# Patient Record
Sex: MALE | Race: AMERICAN INDIAN OR ALASKA NATIVE | NOT HISPANIC OR LATINO | Employment: OTHER | URBAN - METROPOLITAN AREA
[De-identification: names, ages, dates, MRNs, and addresses within clinical notes are randomized per-mention and may not be internally consistent; named-entity substitution may affect disease eponyms.]

---

## 2017-09-21 ENCOUNTER — APPOINTMENT (OUTPATIENT)
Dept: RADIOLOGY | Facility: MEDICAL CENTER | Age: 67
DRG: 638 | End: 2017-09-21
Attending: EMERGENCY MEDICINE
Payer: MEDICARE

## 2017-09-21 ENCOUNTER — APPOINTMENT (OUTPATIENT)
Dept: RADIOLOGY | Facility: MEDICAL CENTER | Age: 67
DRG: 638 | End: 2017-09-21
Attending: FAMILY MEDICINE
Payer: MEDICARE

## 2017-09-21 ENCOUNTER — RESOLUTE PROFESSIONAL BILLING HOSPITAL PROF FEE (OUTPATIENT)
Dept: HOSPITALIST | Facility: MEDICAL CENTER | Age: 67
End: 2017-09-21
Payer: MEDICARE

## 2017-09-21 ENCOUNTER — HOSPITAL ENCOUNTER (INPATIENT)
Facility: MEDICAL CENTER | Age: 67
LOS: 2 days | DRG: 638 | End: 2017-09-23
Attending: EMERGENCY MEDICINE | Admitting: FAMILY MEDICINE
Payer: MEDICARE

## 2017-09-21 DIAGNOSIS — R42 VERTIGO: ICD-10-CM

## 2017-09-21 PROBLEM — I63.9 STROKE (HCC): Status: ACTIVE | Noted: 2017-09-21

## 2017-09-21 LAB
ALBUMIN SERPL BCP-MCNC: 3.8 G/DL (ref 3.2–4.9)
ALBUMIN/GLOB SERPL: 1.1 G/DL
ALP SERPL-CCNC: 74 U/L (ref 30–99)
ALT SERPL-CCNC: 45 U/L (ref 2–50)
ANION GAP SERPL CALC-SCNC: 13 MMOL/L (ref 0–11.9)
APTT PPP: 24.3 SEC (ref 24.7–36)
AST SERPL-CCNC: 32 U/L (ref 12–45)
BASOPHILS # BLD AUTO: 0.5 % (ref 0–1.8)
BASOPHILS # BLD: 0.05 K/UL (ref 0–0.12)
BILIRUB SERPL-MCNC: 0.5 MG/DL (ref 0.1–1.5)
BUN SERPL-MCNC: 29 MG/DL (ref 8–22)
CALCIUM SERPL-MCNC: 8.8 MG/DL (ref 8.5–10.5)
CHLORIDE SERPL-SCNC: 100 MMOL/L (ref 96–112)
CO2 SERPL-SCNC: 21 MMOL/L (ref 20–33)
CREAT SERPL-MCNC: 1.54 MG/DL (ref 0.5–1.4)
EKG IMPRESSION: NORMAL
EOSINOPHIL # BLD AUTO: 0.13 K/UL (ref 0–0.51)
EOSINOPHIL NFR BLD: 1.2 % (ref 0–6.9)
ERYTHROCYTE [DISTWIDTH] IN BLOOD BY AUTOMATED COUNT: 40.3 FL (ref 35.9–50)
EST. AVERAGE GLUCOSE BLD GHB EST-MCNC: 214 MG/DL
GFR SERPL CREATININE-BSD FRML MDRD: 45 ML/MIN/1.73 M 2
GLOBULIN SER CALC-MCNC: 3.6 G/DL (ref 1.9–3.5)
GLUCOSE BLD-MCNC: 145 MG/DL (ref 65–99)
GLUCOSE SERPL-MCNC: 259 MG/DL (ref 65–99)
HBA1C MFR BLD: 9.1 % (ref 0–5.6)
HCT VFR BLD AUTO: 41.7 % (ref 42–52)
HGB BLD-MCNC: 14.8 G/DL (ref 14–18)
IMM GRANULOCYTES # BLD AUTO: 0.03 K/UL (ref 0–0.11)
IMM GRANULOCYTES NFR BLD AUTO: 0.3 % (ref 0–0.9)
INR PPP: 0.96 (ref 0.87–1.13)
LYMPHOCYTES # BLD AUTO: 1.26 K/UL (ref 1–4.8)
LYMPHOCYTES NFR BLD: 11.5 % (ref 22–41)
MCH RBC QN AUTO: 31.4 PG (ref 27–33)
MCHC RBC AUTO-ENTMCNC: 35.5 G/DL (ref 33.7–35.3)
MCV RBC AUTO: 88.5 FL (ref 81.4–97.8)
MONOCYTES # BLD AUTO: 0.6 K/UL (ref 0–0.85)
MONOCYTES NFR BLD AUTO: 5.5 % (ref 0–13.4)
NEUTROPHILS # BLD AUTO: 8.91 K/UL (ref 1.82–7.42)
NEUTROPHILS NFR BLD: 81 % (ref 44–72)
NRBC # BLD AUTO: 0.02 K/UL
NRBC BLD AUTO-RTO: 0.2 /100 WBC
PLATELET # BLD AUTO: 247 K/UL (ref 164–446)
PMV BLD AUTO: 11.5 FL (ref 9–12.9)
POTASSIUM SERPL-SCNC: 4.1 MMOL/L (ref 3.6–5.5)
PROT SERPL-MCNC: 7.4 G/DL (ref 6–8.2)
PROTHROMBIN TIME: 13.1 SEC (ref 12–14.6)
RBC # BLD AUTO: 4.71 M/UL (ref 4.7–6.1)
SODIUM SERPL-SCNC: 134 MMOL/L (ref 135–145)
TROPONIN I SERPL-MCNC: <0.01 NG/ML (ref 0–0.04)
TSH SERPL DL<=0.005 MIU/L-ACNC: 0.77 UIU/ML (ref 0.3–3.7)
WBC # BLD AUTO: 11 K/UL (ref 4.8–10.8)

## 2017-09-21 PROCEDURE — 80053 COMPREHEN METABOLIC PANEL: CPT

## 2017-09-21 PROCEDURE — 770020 HCHG ROOM/CARE - TELE (206)

## 2017-09-21 PROCEDURE — 99285 EMERGENCY DEPT VISIT HI MDM: CPT

## 2017-09-21 PROCEDURE — 71010 DX-CHEST-PORTABLE (1 VIEW): CPT

## 2017-09-21 PROCEDURE — 700102 HCHG RX REV CODE 250 W/ 637 OVERRIDE(OP): Performed by: FAMILY MEDICINE

## 2017-09-21 PROCEDURE — 93005 ELECTROCARDIOGRAM TRACING: CPT | Performed by: EMERGENCY MEDICINE

## 2017-09-21 PROCEDURE — 85730 THROMBOPLASTIN TIME PARTIAL: CPT

## 2017-09-21 PROCEDURE — 84484 ASSAY OF TROPONIN QUANT: CPT

## 2017-09-21 PROCEDURE — 700105 HCHG RX REV CODE 258: Performed by: FAMILY MEDICINE

## 2017-09-21 PROCEDURE — 70450 CT HEAD/BRAIN W/O DYE: CPT

## 2017-09-21 PROCEDURE — 82962 GLUCOSE BLOOD TEST: CPT | Mod: 91

## 2017-09-21 PROCEDURE — 84443 ASSAY THYROID STIM HORMONE: CPT

## 2017-09-21 PROCEDURE — 83036 HEMOGLOBIN GLYCOSYLATED A1C: CPT

## 2017-09-21 PROCEDURE — 700111 HCHG RX REV CODE 636 W/ 250 OVERRIDE (IP): Performed by: FAMILY MEDICINE

## 2017-09-21 PROCEDURE — 94760 N-INVAS EAR/PLS OXIMETRY 1: CPT

## 2017-09-21 PROCEDURE — 70551 MRI BRAIN STEM W/O DYE: CPT

## 2017-09-21 PROCEDURE — 99223 1ST HOSP IP/OBS HIGH 75: CPT | Mod: AI | Performed by: FAMILY MEDICINE

## 2017-09-21 PROCEDURE — A9270 NON-COVERED ITEM OR SERVICE: HCPCS | Performed by: FAMILY MEDICINE

## 2017-09-21 PROCEDURE — 93306 TTE W/DOPPLER COMPLETE: CPT | Mod: 26 | Performed by: INTERNAL MEDICINE

## 2017-09-21 PROCEDURE — 85025 COMPLETE CBC W/AUTO DIFF WBC: CPT

## 2017-09-21 PROCEDURE — 85610 PROTHROMBIN TIME: CPT

## 2017-09-21 RX ORDER — AMOXICILLIN 250 MG
2 CAPSULE ORAL 2 TIMES DAILY
Status: DISCONTINUED | OUTPATIENT
Start: 2017-09-21 | End: 2017-09-23 | Stop reason: HOSPADM

## 2017-09-21 RX ORDER — GLIMEPIRIDE 2 MG/1
2 TABLET ORAL EVERY MORNING
Status: ON HOLD | COMMUNITY
End: 2017-09-23

## 2017-09-21 RX ORDER — POLYETHYLENE GLYCOL 3350 17 G/17G
1 POWDER, FOR SOLUTION ORAL
Status: DISCONTINUED | OUTPATIENT
Start: 2017-09-21 | End: 2017-09-23 | Stop reason: HOSPADM

## 2017-09-21 RX ORDER — CHLORAL HYDRATE 500 MG
1000 CAPSULE ORAL 2 TIMES DAILY
COMMUNITY
End: 2021-06-20

## 2017-09-21 RX ORDER — ATORVASTATIN CALCIUM 20 MG/1
20 TABLET, FILM COATED ORAL EVERY MORNING
Status: ON HOLD | COMMUNITY
End: 2017-09-23

## 2017-09-21 RX ORDER — DIPYRIDAMOLE 25 MG
25 TABLET ORAL 2 TIMES DAILY
COMMUNITY
End: 2021-06-20

## 2017-09-21 RX ORDER — BISACODYL 10 MG
10 SUPPOSITORY, RECTAL RECTAL
Status: DISCONTINUED | OUTPATIENT
Start: 2017-09-21 | End: 2017-09-23 | Stop reason: HOSPADM

## 2017-09-21 RX ORDER — SODIUM CHLORIDE 9 MG/ML
INJECTION, SOLUTION INTRAVENOUS CONTINUOUS
Status: DISCONTINUED | OUTPATIENT
Start: 2017-09-21 | End: 2017-09-23 | Stop reason: HOSPADM

## 2017-09-21 RX ORDER — DOXAZOSIN 8 MG/1
8 TABLET ORAL EVERY EVENING
Status: ON HOLD | COMMUNITY
End: 2021-06-30

## 2017-09-21 RX ORDER — POTASSIUM CITRATE 10 MEQ/1
10 TABLET, EXTENDED RELEASE ORAL EVERY MORNING
COMMUNITY
End: 2017-10-08 | Stop reason: CLARIF

## 2017-09-21 RX ORDER — LISINOPRIL 40 MG/1
40 TABLET ORAL DAILY
Status: ON HOLD | COMMUNITY
End: 2021-06-30

## 2017-09-21 RX ORDER — LABETALOL HYDROCHLORIDE 5 MG/ML
10 INJECTION, SOLUTION INTRAVENOUS EVERY 4 HOURS PRN
Status: DISCONTINUED | OUTPATIENT
Start: 2017-09-21 | End: 2017-09-22

## 2017-09-21 RX ORDER — ACETAMINOPHEN 325 MG/1
325-650 TABLET ORAL EVERY 6 HOURS PRN
COMMUNITY
End: 2017-09-28

## 2017-09-21 RX ORDER — ACETAMINOPHEN 325 MG/1
650 TABLET ORAL EVERY 6 HOURS PRN
Status: DISCONTINUED | OUTPATIENT
Start: 2017-09-21 | End: 2017-09-23 | Stop reason: HOSPADM

## 2017-09-21 RX ORDER — ONDANSETRON 4 MG/1
4 TABLET, ORALLY DISINTEGRATING ORAL EVERY 4 HOURS PRN
Status: DISCONTINUED | OUTPATIENT
Start: 2017-09-21 | End: 2017-09-23 | Stop reason: HOSPADM

## 2017-09-21 RX ORDER — ATORVASTATIN CALCIUM 80 MG/1
80 TABLET, FILM COATED ORAL EVERY EVENING
Status: DISCONTINUED | OUTPATIENT
Start: 2017-09-21 | End: 2017-09-23 | Stop reason: HOSPADM

## 2017-09-21 RX ORDER — CLONIDINE HYDROCHLORIDE 0.1 MG/1
0.1 TABLET ORAL 2 TIMES DAILY
Status: ON HOLD | COMMUNITY
End: 2017-09-23

## 2017-09-21 RX ORDER — FELODIPINE 10 MG/1
10 TABLET, EXTENDED RELEASE ORAL EVERY EVENING
Status: ON HOLD | COMMUNITY
End: 2021-06-30

## 2017-09-21 RX ORDER — HEPARIN SODIUM 5000 [USP'U]/ML
5000 INJECTION, SOLUTION INTRAVENOUS; SUBCUTANEOUS EVERY 8 HOURS
Status: DISCONTINUED | OUTPATIENT
Start: 2017-09-21 | End: 2017-09-23 | Stop reason: HOSPADM

## 2017-09-21 RX ORDER — ASPIRIN AND DIPYRIDAMOLE 25; 200 MG/1; MG/1
1 CAPSULE, EXTENDED RELEASE ORAL 2 TIMES DAILY
Status: DISCONTINUED | OUTPATIENT
Start: 2017-09-21 | End: 2017-09-23 | Stop reason: HOSPADM

## 2017-09-21 RX ORDER — HYDRALAZINE HYDROCHLORIDE 20 MG/ML
10 INJECTION INTRAMUSCULAR; INTRAVENOUS
Status: DISCONTINUED | OUTPATIENT
Start: 2017-09-21 | End: 2017-09-22

## 2017-09-21 RX ORDER — HYDROCHLOROTHIAZIDE 25 MG/1
25 TABLET ORAL EVERY MORNING
Status: ON HOLD | COMMUNITY
End: 2021-06-30

## 2017-09-21 RX ORDER — ONDANSETRON 2 MG/ML
4 INJECTION INTRAMUSCULAR; INTRAVENOUS EVERY 4 HOURS PRN
Status: DISCONTINUED | OUTPATIENT
Start: 2017-09-21 | End: 2017-09-23 | Stop reason: HOSPADM

## 2017-09-21 RX ADMIN — SODIUM CHLORIDE: 9 INJECTION, SOLUTION INTRAVENOUS at 18:18

## 2017-09-21 RX ADMIN — INSULIN LISPRO 3 UNITS: 100 INJECTION, SOLUTION INTRAVENOUS; SUBCUTANEOUS at 20:57

## 2017-09-21 RX ADMIN — ATORVASTATIN CALCIUM 80 MG: 80 TABLET, FILM COATED ORAL at 20:55

## 2017-09-21 RX ADMIN — HEPARIN SODIUM 5000 UNITS: 5000 INJECTION, SOLUTION INTRAVENOUS; SUBCUTANEOUS at 18:19

## 2017-09-21 RX ADMIN — ASPIRIN AND DIPYRIDAMOLE 1 CAPSULE: 25; 200 CAPSULE, EXTENDED RELEASE ORAL at 20:57

## 2017-09-21 ASSESSMENT — PATIENT HEALTH QUESTIONNAIRE - PHQ9
1. LITTLE INTEREST OR PLEASURE IN DOING THINGS: NOT AT ALL
SUM OF ALL RESPONSES TO PHQ QUESTIONS 1-9: 0
2. FEELING DOWN, DEPRESSED, IRRITABLE, OR HOPELESS: NOT AT ALL
SUM OF ALL RESPONSES TO PHQ9 QUESTIONS 1 AND 2: 0

## 2017-09-21 ASSESSMENT — COGNITIVE AND FUNCTIONAL STATUS - GENERAL
SUGGESTED CMS G CODE MODIFIER MOBILITY: CH
DAILY ACTIVITIY SCORE: 24
MOBILITY SCORE: 24
SUGGESTED CMS G CODE MODIFIER DAILY ACTIVITY: CH

## 2017-09-21 ASSESSMENT — LIFESTYLE VARIABLES: ALCOHOL_USE: NO

## 2017-09-21 ASSESSMENT — PAIN SCALES - GENERAL: PAINLEVEL_OUTOF10: 0

## 2017-09-21 NOTE — ED NOTES
Med rec complete per pt with medication list from the [facility]  Reviewed list with pt and returned  Allergies reviewed  No ABX in last month

## 2017-09-21 NOTE — ED NOTES
Chief Complaint   Patient presents with   • Dizziness     Patient states he has been having increasing dizziness. Was seen at the clinic yesterday, was told to come to ER for further evaluation. Denies CP or SOB. AAOx4, VSS, told to inform RN of changes.

## 2017-09-21 NOTE — ED PROVIDER NOTES
"ED Provider Note    Scribed for Paulo Friend M.D. by Clifford Garcia. 9/21/2017  2:09 PM    Primary care provider: Pcp Pt States None  Means of arrival: Walk in  History obtained from: Patient  History limited by: None    CHIEF COMPLAINT  Chief Complaint   Patient presents with   • Dizziness       HPI  Jean Barboza is a 66 y.o. male who presents to the Emergency Department complaining of dizziness which initially started two weeks ago. Patient states he has been having constant dizziness since then which he describes as \"off-balance.\" He states this off-balance is worsened when closing his eyes in the shower. He reports experiencing worsened episodes of dizziness starting 5 days ago. The following day, he did not have the dizziness. Patient then experienced an episode of dizziness yesterday, prompting him to visit his PCP. He also visited his PCP today for the dizziness. He notes having labs done and was informed of having low potassium. On exam, patient reports associated left foot tingling, frontal headaches, and migraines. He denies any numbness, tingling, or weakness anywhere else other than his left foot. Patient has a history of occipital lobe stroke which occurred in 2012. 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 He was put on aspirin after this stroke. He states he is compliant with taking his aspirin twice per day.     REVIEW OF SYSTEMS  Pertinent positives include dizziness, sensation of \"off-balance,\" tingling to left foot, frontal headaches, migraines. Pertinent negatives include no numbness, tingling, or weakness anywhere else other than left foot.  All other systems reviewed and negative.  C    PAST MEDICAL HISTORY   has a past medical history of CATARACT; Diabetes (2005); Hyperlipidemia; Hypertension; Seizure ([facility]) (1987); Seizure disorder (CMS-HCC); Snoring; and Stroke (CMS-HCC).    SURGICAL HISTORY   has a past surgical history that includes other (2003); other (2011); cataract phaco " "with iol (4/20/2011); cataract phaco with iol (5/4/2011); and cataract extraction with iol (2011).    SOCIAL HISTORY  Social History   Substance Use Topics   • Smoking status: Former Smoker     Packs/day: 0.50     Years: 4.00     Types: Cigarettes     Quit date: 2/2/1985   • Alcohol use No      History   Drug Use No       FAMILY HISTORY  None noted    CURRENT MEDICATIONS  No current facility-administered medications on file prior to encounter.      No current outpatient prescriptions on file prior to encounter.      ALLERGIES  Allergies   Allergen Reactions   • Penicillin G Rash     Rxn - Exacerbated a rash on his legs  Early 2000's         PHYSICAL EXAM  VITAL SIGNS: /74   Pulse (!) 111   Temp 36.6 °C (97.8 °F)   Resp 18   Ht 1.727 m (5' 8\")   Wt 98.4 kg (216 lb 14.9 oz)   SpO2 95%   BMI 32.98 kg/m²   Constitutional: Well developed, Well nourished, no distress, Non-toxic appearance.   HENT: Normocephalic, Atraumatic, Bilateral external ears normal, Oropharynx moist, No oral exudates.   Eyes: PERRLA, EOMI, Conjunctiva normal, No discharge.   Neck: No tenderness, Supple, No stridor.   Lymphatic: No lymphadenopathy noted.   Cardiovascular: Normal heart rate, Normal rhythm.   Thorax & Lungs: Clear to auscultation bilaterally, No respiratory distress, No wheezing, No crackles.   Abdomen: Soft, No tenderness, No masses, No pulsatile masses.   Skin: Warm, Dry, No erythema, No rash.   Extremities:, No edema No cyanosis.   Musculoskeletal: No tenderness to palpation or major deformities noted.  Intact distal pulses  Neurologic: Awake, alert. Cranial nerves 2-11 intact, muscle strength 5/5 in bilateral upper and lower extremities. Normal finger-to-nose, normal heel-to-shin  Psychiatric: Affect normal, Judgment normal, Mood normal.     LABS  Results for orders placed or performed during the hospital encounter of 09/21/17   CBC WITH DIFFERENTIAL   Result Value Ref Range    WBC 11.0 (H) 4.8 - 10.8 K/uL    RBC 4.71 " 4.70 - 6.10 M/uL    Hemoglobin 14.8 14.0 - 18.0 g/dL    Hematocrit 41.7 (L) 42.0 - 52.0 %    MCV 88.5 81.4 - 97.8 fL    MCH 31.4 27.0 - 33.0 pg    MCHC 35.5 (H) 33.7 - 35.3 g/dL    RDW 40.3 35.9 - 50.0 fL    Platelet Count 247 164 - 446 K/uL    MPV 11.5 9.0 - 12.9 fL    Neutrophils-Polys 81.00 (H) 44.00 - 72.00 %    Lymphocytes 11.50 (L) 22.00 - 41.00 %    Monocytes 5.50 0.00 - 13.40 %    Eosinophils 1.20 0.00 - 6.90 %    Basophils 0.50 0.00 - 1.80 %    Immature Granulocytes 0.30 0.00 - 0.90 %    Nucleated RBC 0.20 /100 WBC    Neutrophils (Absolute) 8.91 (H) 1.82 - 7.42 K/uL    Lymphs (Absolute) 1.26 1.00 - 4.80 K/uL    Monos (Absolute) 0.60 0.00 - 0.85 K/uL    Eos (Absolute) 0.13 0.00 - 0.51 K/uL    Baso (Absolute) 0.05 0.00 - 0.12 K/uL    Immature Granulocytes (abs) 0.03 0.00 - 0.11 K/uL    NRBC (Absolute) 0.02 K/uL   COMP METABOLIC PANEL   Result Value Ref Range    Sodium 134 (L) 135 - 145 mmol/L    Potassium 4.1 3.6 - 5.5 mmol/L    Chloride 100 96 - 112 mmol/L    Co2 21 20 - 33 mmol/L    Anion Gap 13.0 (H) 0.0 - 11.9    Glucose 259 (H) 65 - 99 mg/dL    Bun 29 (H) 8 - 22 mg/dL    Creatinine 1.54 (H) 0.50 - 1.40 mg/dL    Calcium 8.8 8.5 - 10.5 mg/dL    AST(SGOT) 32 12 - 45 U/L    ALT(SGPT) 45 2 - 50 U/L    Alkaline Phosphatase 74 30 - 99 U/L    Total Bilirubin 0.5 0.1 - 1.5 mg/dL    Albumin 3.8 3.2 - 4.9 g/dL    Total Protein 7.4 6.0 - 8.2 g/dL    Globulin 3.6 (H) 1.9 - 3.5 g/dL    A-G Ratio 1.1 g/dL   TROPONIN   Result Value Ref Range    Troponin I <0.01 0.00 - 0.04 ng/mL   PRTOTHROMBIN TIME (INR)   Result Value Ref Range    PT 13.1 12.0 - 14.6 sec    INR 0.96 0.87 - 1.13   APTT   Result Value Ref Range    APTT 24.3 (L) 24.7 - 36.0 sec   ESTIMATED GFR   Result Value Ref Range    GFR If  55 (A) >60 mL/min/1.73 m 2    GFR If Non African American 45 (A) >60 mL/min/1.73 m 2   TSH (Thyroid Stimulating Hormone)   Result Value Ref Range    TSH 0.770 0.300 - 3.700 uIU/mL   ACCU-CHEK GLUCOSE   Result Value  Ref Range    Glucose - Accu-Ck 145 (H) 65 - 99 mg/dL   EKG (ER)   Result Value Ref Range    Report       [facility] Emergency Dept.    Test Date:  2017  Pt Name:    DARRELL KOWALSKI                  Department: ER  MRN:        [id number]                      Room:       80  Gender:     M                            Technician: [id number]  :        [id number]                   Requested By:CHUNG SANON  Order #:    [id number]                    Reading MD: CHUNG SANON MD    Measurements  Intervals                                Axis  Rate:       94                           P:          59  ND:         168                          QRS:        23  QRSD:       78                           T:          -33  QT:         340  QTc:        426    Interpretive Statements  SINUS RHYTHM  NONSPECIFIC T ABNORMALITIES, INFERIOR LEADS  BASELINE WANDER IN LEAD(S) V2  Compared to ECG 2012 20:55:13  Sinus bradycardia no longer present  T-wave abnormality still present    Electronically Signed On 2017 19:42:33 PDT by CHUNG SNAON MD        All labs reviewed by me.    EKG  See labs above. Interpreted by me.     RADIOLOGY  DX-CHEST-PORTABLE (1 VIEW)   Final Result      No acute cardiopulmonary process is seen.      CT-HEAD W/O   Final Result      1.  No evidence of acute intracranial process.      2.  Chronic ischemic change.      Echocardiogram Comp W/O cont    (Results Pending)   Carotid Duplex ([facility] Only) - Do not order if CTA - Neck done in ER    (Results Pending)   MR-BRAIN-W/O    (Results Pending)     The radiologist's interpretation of all radiological studies have been reviewed by me.    COURSE & MEDICAL DECISION MAKING  Pertinent Labs & Imaging studies reviewed. (See chart for details)    I reviewed the patient's medical records which showed patient has a history of occipital lobe stroke in .    2:09 PM - Patient seen and examined at bedside. Ordered CT  head, DX chest, estimated GFR, CBC, CMP, troponin, INR, APTT, EKG to evaluate his symptoms. The differential diagnoses include but are not limited to: vertigo, posterior circulation stroke, metabolic    3:39 PM Paged hospitalist.      Decision Making:  Patient with a history of posterior stroke in the past is going with vertigo, believe the patient should be admitted for stroke workup, discussed the case with the hospitalist for admission to hospital.    DISPOSITION:  Patient will be admitted to Dr. Boucher in guarded condition.     FINAL IMPRESSION  1. Vertigo          Clifford AMAYA (Scribe), am scribing for, and in the presence of, Paulo Friend M.D..    Electronically signed by: Clifford Garcia (Scribe), 9/21/2017    Paulo AMAYA M.D. personally performed the services described in this documentation, as scribed by Clifford Garcia in my presence, and it is both accurate and complete.    The note accurately reflects work and decisions made by me.  Paulo Friend  9/21/2017  7:54 PM

## 2017-09-21 NOTE — ED NOTES
Unable to complete med rec at this time, daughter went to the car to obtain patients list of meds.      No ABX in the past 30 days    Pharmacy - [facility].

## 2017-09-21 NOTE — ED NOTES
Pt to [facility] 8 via wheelchair.  Pt reports episode of dizziness on Saturday that resolved and dizziness x 2 days.  Friend reports that pt has been more forgetful and slower to do things and has been staggering when walking.  Pt changing into gown.

## 2017-09-22 ENCOUNTER — APPOINTMENT (OUTPATIENT)
Dept: RADIOLOGY | Facility: MEDICAL CENTER | Age: 67
End: 2017-09-22
Attending: GENERAL PRACTICE
Payer: MEDICARE

## 2017-09-22 PROBLEM — G45.9 TIA (TRANSIENT ISCHEMIC ATTACK): Chronic | Status: ACTIVE | Noted: 2017-09-21

## 2017-09-22 PROBLEM — G45.9 TIA (TRANSIENT ISCHEMIC ATTACK): Status: ACTIVE | Noted: 2017-09-21

## 2017-09-22 PROBLEM — I16.0 HYPERTENSIVE URGENCY: Status: ACTIVE | Noted: 2017-09-22

## 2017-09-22 LAB
ALBUMIN SERPL BCP-MCNC: 3 G/DL (ref 3.2–4.9)
ALBUMIN/GLOB SERPL: 0.9 G/DL
ALP SERPL-CCNC: 61 U/L (ref 30–99)
ALT SERPL-CCNC: 37 U/L (ref 2–50)
ANION GAP SERPL CALC-SCNC: 10 MMOL/L (ref 0–11.9)
APPEARANCE UR: CLEAR
AST SERPL-CCNC: 30 U/L (ref 12–45)
BACTERIA #/AREA URNS HPF: NEGATIVE /HPF
BASOPHILS # BLD AUTO: 0.5 % (ref 0–1.8)
BASOPHILS # BLD: 0.05 K/UL (ref 0–0.12)
BILIRUB SERPL-MCNC: 0.6 MG/DL (ref 0.1–1.5)
BILIRUB UR QL STRIP.AUTO: NEGATIVE
BUN SERPL-MCNC: 28 MG/DL (ref 8–22)
CALCIUM SERPL-MCNC: 8.5 MG/DL (ref 8.5–10.5)
CHLORIDE SERPL-SCNC: 101 MMOL/L (ref 96–112)
CHOLEST SERPL-MCNC: 94 MG/DL (ref 100–199)
CO2 SERPL-SCNC: 23 MMOL/L (ref 20–33)
COLOR UR: YELLOW
CREAT SERPL-MCNC: 1.39 MG/DL (ref 0.5–1.4)
EOSINOPHIL # BLD AUTO: 0.18 K/UL (ref 0–0.51)
EOSINOPHIL NFR BLD: 1.9 % (ref 0–6.9)
EPI CELLS #/AREA URNS HPF: NEGATIVE /HPF
ERYTHROCYTE [DISTWIDTH] IN BLOOD BY AUTOMATED COUNT: 40.2 FL (ref 35.9–50)
GFR SERPL CREATININE-BSD FRML MDRD: 51 ML/MIN/1.73 M 2
GLOBULIN SER CALC-MCNC: 3.4 G/DL (ref 1.9–3.5)
GLUCOSE BLD-MCNC: 180 MG/DL (ref 65–99)
GLUCOSE BLD-MCNC: 202 MG/DL (ref 65–99)
GLUCOSE BLD-MCNC: 215 MG/DL (ref 65–99)
GLUCOSE BLD-MCNC: 250 MG/DL (ref 65–99)
GLUCOSE BLD-MCNC: 265 MG/DL (ref 65–99)
GLUCOSE SERPL-MCNC: 183 MG/DL (ref 65–99)
GLUCOSE UR STRIP.AUTO-MCNC: 100 MG/DL
HCT VFR BLD AUTO: 36.9 % (ref 42–52)
HDLC SERPL-MCNC: 30 MG/DL
HGB BLD-MCNC: 12.9 G/DL (ref 14–18)
HYALINE CASTS #/AREA URNS LPF: ABNORMAL /LPF
IMM GRANULOCYTES # BLD AUTO: 0.02 K/UL (ref 0–0.11)
IMM GRANULOCYTES NFR BLD AUTO: 0.2 % (ref 0–0.9)
KETONES UR STRIP.AUTO-MCNC: NEGATIVE MG/DL
LDLC SERPL CALC-MCNC: 42 MG/DL
LEUKOCYTE ESTERASE UR QL STRIP.AUTO: NEGATIVE
LV EJECT FRACT  99904: 75
LV EJECT FRACT MOD 2C 99903: 61.22
LV EJECT FRACT MOD 4C 99902: 67.11
LV EJECT FRACT MOD BP 99901: 64.3
LYMPHOCYTES # BLD AUTO: 1.5 K/UL (ref 1–4.8)
LYMPHOCYTES NFR BLD: 15.6 % (ref 22–41)
MCH RBC QN AUTO: 30.6 PG (ref 27–33)
MCHC RBC AUTO-ENTMCNC: 35 G/DL (ref 33.7–35.3)
MCV RBC AUTO: 87.6 FL (ref 81.4–97.8)
MICRO URNS: ABNORMAL
MONOCYTES # BLD AUTO: 0.69 K/UL (ref 0–0.85)
MONOCYTES NFR BLD AUTO: 7.2 % (ref 0–13.4)
NEUTROPHILS # BLD AUTO: 7.16 K/UL (ref 1.82–7.42)
NEUTROPHILS NFR BLD: 74.6 % (ref 44–72)
NITRITE UR QL STRIP.AUTO: NEGATIVE
NRBC # BLD AUTO: 0 K/UL
NRBC BLD AUTO-RTO: 0 /100 WBC
PH UR STRIP.AUTO: 6 [PH]
PLATELET # BLD AUTO: 241 K/UL (ref 164–446)
PMV BLD AUTO: 11.4 FL (ref 9–12.9)
POTASSIUM SERPL-SCNC: 3.6 MMOL/L (ref 3.6–5.5)
PROT SERPL-MCNC: 6.4 G/DL (ref 6–8.2)
PROT UR QL STRIP: 300 MG/DL
PTH-INTACT SERPL-MCNC: 62.2 PG/ML (ref 14–72)
RBC # BLD AUTO: 4.21 M/UL (ref 4.7–6.1)
RBC # URNS HPF: ABNORMAL /HPF
RBC UR QL AUTO: NEGATIVE
SODIUM SERPL-SCNC: 134 MMOL/L (ref 135–145)
SP GR UR STRIP.AUTO: 1.01
TRIGL SERPL-MCNC: 109 MG/DL (ref 0–149)
UROBILINOGEN UR STRIP.AUTO-MCNC: 0.2 MG/DL
WBC # BLD AUTO: 9.6 K/UL (ref 4.8–10.8)
WBC #/AREA URNS HPF: ABNORMAL /HPF

## 2017-09-22 PROCEDURE — 80053 COMPREHEN METABOLIC PANEL: CPT

## 2017-09-22 PROCEDURE — 85025 COMPLETE CBC W/AUTO DIFF WBC: CPT

## 2017-09-22 PROCEDURE — 700101 HCHG RX REV CODE 250: Performed by: NURSE PRACTITIONER

## 2017-09-22 PROCEDURE — 97165 OT EVAL LOW COMPLEX 30 MIN: CPT

## 2017-09-22 PROCEDURE — 99233 SBSQ HOSP IP/OBS HIGH 50: CPT | Performed by: INTERNAL MEDICINE

## 2017-09-22 PROCEDURE — A9270 NON-COVERED ITEM OR SERVICE: HCPCS | Performed by: FAMILY MEDICINE

## 2017-09-22 PROCEDURE — A9270 NON-COVERED ITEM OR SERVICE: HCPCS | Performed by: NURSE PRACTITIONER

## 2017-09-22 PROCEDURE — 700111 HCHG RX REV CODE 636 W/ 250 OVERRIDE (IP): Performed by: NURSE PRACTITIONER

## 2017-09-22 PROCEDURE — 36415 COLL VENOUS BLD VENIPUNCTURE: CPT

## 2017-09-22 PROCEDURE — 97161 PT EVAL LOW COMPLEX 20 MIN: CPT

## 2017-09-22 PROCEDURE — 83970 ASSAY OF PARATHORMONE: CPT

## 2017-09-22 PROCEDURE — G8988 SELF CARE GOAL STATUS: HCPCS | Mod: CI

## 2017-09-22 PROCEDURE — G8979 MOBILITY GOAL STATUS: HCPCS | Mod: CH

## 2017-09-22 PROCEDURE — 81001 URINALYSIS AUTO W/SCOPE: CPT

## 2017-09-22 PROCEDURE — G8978 MOBILITY CURRENT STATUS: HCPCS | Mod: CI

## 2017-09-22 PROCEDURE — 95951 EEG: CPT | Mod: 52

## 2017-09-22 PROCEDURE — 80061 LIPID PANEL: CPT

## 2017-09-22 PROCEDURE — 82962 GLUCOSE BLOOD TEST: CPT

## 2017-09-22 PROCEDURE — 700111 HCHG RX REV CODE 636 W/ 250 OVERRIDE (IP): Performed by: FAMILY MEDICINE

## 2017-09-22 PROCEDURE — 93880 EXTRACRANIAL BILAT STUDY: CPT | Mod: 26 | Performed by: SURGERY

## 2017-09-22 PROCEDURE — G8989 SELF CARE D/C STATUS: HCPCS | Mod: CI

## 2017-09-22 PROCEDURE — 93880 EXTRACRANIAL BILAT STUDY: CPT

## 2017-09-22 PROCEDURE — G8987 SELF CARE CURRENT STATUS: HCPCS | Mod: CI

## 2017-09-22 PROCEDURE — 93306 TTE W/DOPPLER COMPLETE: CPT

## 2017-09-22 PROCEDURE — 700102 HCHG RX REV CODE 250 W/ 637 OVERRIDE(OP): Performed by: NURSE PRACTITIONER

## 2017-09-22 PROCEDURE — 700105 HCHG RX REV CODE 258: Performed by: FAMILY MEDICINE

## 2017-09-22 PROCEDURE — 770020 HCHG ROOM/CARE - TELE (206)

## 2017-09-22 PROCEDURE — 700102 HCHG RX REV CODE 250 W/ 637 OVERRIDE(OP): Performed by: FAMILY MEDICINE

## 2017-09-22 RX ORDER — HYDROCHLOROTHIAZIDE 25 MG/1
50 TABLET ORAL
Status: DISCONTINUED | OUTPATIENT
Start: 2017-09-23 | End: 2017-09-23 | Stop reason: HOSPADM

## 2017-09-22 RX ORDER — LISINOPRIL 20 MG/1
40 TABLET ORAL
Status: DISCONTINUED | OUTPATIENT
Start: 2017-09-22 | End: 2017-09-22

## 2017-09-22 RX ORDER — INSULIN GLARGINE 100 [IU]/ML
15 INJECTION, SOLUTION SUBCUTANEOUS EVERY EVENING
Status: DISCONTINUED | OUTPATIENT
Start: 2017-09-22 | End: 2017-09-23

## 2017-09-22 RX ORDER — HYDROCHLOROTHIAZIDE 25 MG/1
25 TABLET ORAL
Status: DISCONTINUED | OUTPATIENT
Start: 2017-09-22 | End: 2017-09-22

## 2017-09-22 RX ORDER — DOXAZOSIN 2 MG/1
8 TABLET ORAL
Status: DISCONTINUED | OUTPATIENT
Start: 2017-09-22 | End: 2017-09-23 | Stop reason: HOSPADM

## 2017-09-22 RX ORDER — HYDRALAZINE HYDROCHLORIDE 20 MG/ML
10 INJECTION INTRAMUSCULAR; INTRAVENOUS
Status: DISCONTINUED | OUTPATIENT
Start: 2017-09-22 | End: 2017-09-23 | Stop reason: HOSPADM

## 2017-09-22 RX ORDER — CLONIDINE HYDROCHLORIDE 0.1 MG/1
0.2 TABLET ORAL TWICE DAILY
Status: DISCONTINUED | OUTPATIENT
Start: 2017-09-22 | End: 2017-09-23

## 2017-09-22 RX ORDER — LISINOPRIL 20 MG/1
40 TABLET ORAL 2 TIMES DAILY
Status: DISCONTINUED | OUTPATIENT
Start: 2017-09-22 | End: 2017-09-23 | Stop reason: HOSPADM

## 2017-09-22 RX ORDER — CLONIDINE HYDROCHLORIDE 0.1 MG/1
0.1 TABLET ORAL TWICE DAILY
Status: DISCONTINUED | OUTPATIENT
Start: 2017-09-22 | End: 2017-09-22

## 2017-09-22 RX ORDER — CLONIDINE HYDROCHLORIDE 0.1 MG/1
0.2 TABLET ORAL TWICE DAILY
Status: DISCONTINUED | OUTPATIENT
Start: 2017-09-22 | End: 2017-09-22

## 2017-09-22 RX ORDER — LABETALOL HYDROCHLORIDE 5 MG/ML
10 INJECTION, SOLUTION INTRAVENOUS EVERY 4 HOURS PRN
Status: DISCONTINUED | OUTPATIENT
Start: 2017-09-22 | End: 2017-09-23 | Stop reason: HOSPADM

## 2017-09-22 RX ADMIN — INSULIN GLARGINE 15 UNITS: 100 INJECTION, SOLUTION SUBCUTANEOUS at 21:41

## 2017-09-22 RX ADMIN — LISINOPRIL 40 MG: 20 TABLET ORAL at 21:34

## 2017-09-22 RX ADMIN — SODIUM CHLORIDE: 9 INJECTION, SOLUTION INTRAVENOUS at 10:49

## 2017-09-22 RX ADMIN — LISINOPRIL 40 MG: 20 TABLET ORAL at 10:47

## 2017-09-22 RX ADMIN — INSULIN LISPRO 4 UNITS: 100 INJECTION, SOLUTION INTRAVENOUS; SUBCUTANEOUS at 17:55

## 2017-09-22 RX ADMIN — HEPARIN SODIUM 5000 UNITS: 5000 INJECTION, SOLUTION INTRAVENOUS; SUBCUTANEOUS at 13:03

## 2017-09-22 RX ADMIN — HYDRALAZINE HYDROCHLORIDE 10 MG: 20 INJECTION INTRAMUSCULAR; INTRAVENOUS at 16:28

## 2017-09-22 RX ADMIN — INSULIN LISPRO 3 UNITS: 100 INJECTION, SOLUTION INTRAVENOUS; SUBCUTANEOUS at 11:00

## 2017-09-22 RX ADMIN — CLONIDINE HYDROCHLORIDE 0.2 MG: 0.1 TABLET ORAL at 15:32

## 2017-09-22 RX ADMIN — LABETALOL HYDROCHLORIDE 10 MG: 5 INJECTION, SOLUTION INTRAVENOUS at 13:03

## 2017-09-22 RX ADMIN — CLONIDINE HYDROCHLORIDE 0.1 MG: 0.1 TABLET ORAL at 10:48

## 2017-09-22 RX ADMIN — ATORVASTATIN CALCIUM 80 MG: 80 TABLET, FILM COATED ORAL at 21:35

## 2017-09-22 RX ADMIN — HYDROCHLOROTHIAZIDE 25 MG: 25 TABLET ORAL at 10:47

## 2017-09-22 RX ADMIN — DOXAZOSIN 8 MG: 2 TABLET ORAL at 21:34

## 2017-09-22 RX ADMIN — INSULIN LISPRO 6 UNITS: 100 INJECTION, SOLUTION INTRAVENOUS; SUBCUTANEOUS at 21:41

## 2017-09-22 RX ADMIN — INSULIN LISPRO 2 UNITS: 100 INJECTION, SOLUTION INTRAVENOUS; SUBCUTANEOUS at 06:04

## 2017-09-22 RX ADMIN — HEPARIN SODIUM 5000 UNITS: 5000 INJECTION, SOLUTION INTRAVENOUS; SUBCUTANEOUS at 05:58

## 2017-09-22 RX ADMIN — HEPARIN SODIUM 5000 UNITS: 5000 INJECTION, SOLUTION INTRAVENOUS; SUBCUTANEOUS at 21:35

## 2017-09-22 RX ADMIN — ASPIRIN AND DIPYRIDAMOLE 1 CAPSULE: 25; 200 CAPSULE, EXTENDED RELEASE ORAL at 21:00

## 2017-09-22 RX ADMIN — ASPIRIN AND DIPYRIDAMOLE 1 CAPSULE: 25; 200 CAPSULE, EXTENDED RELEASE ORAL at 09:23

## 2017-09-22 RX ADMIN — LABETALOL HYDROCHLORIDE 10 MG: 5 INJECTION, SOLUTION INTRAVENOUS at 23:34

## 2017-09-22 RX ADMIN — STANDARDIZED SENNA CONCENTRATE AND DOCUSATE SODIUM 2 TABLET: 8.6; 5 TABLET, FILM COATED ORAL at 21:34

## 2017-09-22 ASSESSMENT — ENCOUNTER SYMPTOMS
HEADACHES: 1
FOCAL WEAKNESS: 1
ABDOMINAL PAIN: 0
CONSTIPATION: 0
NAUSEA: 0
COUGH: 0
PALPITATIONS: 0
FEVER: 0
VOMITING: 0
MYALGIAS: 0
DIARRHEA: 0
TREMORS: 0
SENSORY CHANGE: 1
SHORTNESS OF BREATH: 0
TINGLING: 1
EYE PAIN: 0
BLURRED VISION: 1
WEAKNESS: 1
DIZZINESS: 1
SEIZURES: 0
DIAPHORESIS: 0
PHOTOPHOBIA: 0

## 2017-09-22 ASSESSMENT — PATIENT HEALTH QUESTIONNAIRE - PHQ9
SUM OF ALL RESPONSES TO PHQ9 QUESTIONS 1 AND 2: 0
2. FEELING DOWN, DEPRESSED, IRRITABLE, OR HOPELESS: NOT AT ALL
SUM OF ALL RESPONSES TO PHQ QUESTIONS 1-9: 0
1. LITTLE INTEREST OR PLEASURE IN DOING THINGS: NOT AT ALL

## 2017-09-22 ASSESSMENT — COGNITIVE AND FUNCTIONAL STATUS - GENERAL
SUGGESTED CMS G CODE MODIFIER DAILY ACTIVITY: CH
STANDING UP FROM CHAIR USING ARMS: A LITTLE
CLIMB 3 TO 5 STEPS WITH RAILING: A LITTLE
SUGGESTED CMS G CODE MODIFIER MOBILITY: CJ
DAILY ACTIVITIY SCORE: 24
WALKING IN HOSPITAL ROOM: A LITTLE
MOBILITY SCORE: 21

## 2017-09-22 ASSESSMENT — PAIN SCALES - GENERAL
PAINLEVEL_OUTOF10: 0

## 2017-09-22 ASSESSMENT — GAIT ASSESSMENTS
DISTANCE (FEET): 100
GAIT LEVEL OF ASSIST: SUPERVISED

## 2017-09-22 ASSESSMENT — ACTIVITIES OF DAILY LIVING (ADL): TOILETING: INDEPENDENT

## 2017-09-22 NOTE — PROGRESS NOTES
Hypertensive, all other VSS, afebrile. No c/o pain or nausea. Tele in place, IV maintenance fluids started, bedside swallow eval completed and diet ordered. Bedside report given to PRATIBHA Connelly.

## 2017-09-22 NOTE — PROGRESS NOTES
Aaliyah Morel Fall Risk Assessment:     Last Known Fall: No falls  Mobility: Dizziness/generalized weakness  Medications: No meds  Mental Status/LOC/Awareness: Awake, alert, and oriented to date, place, and person  Toileting Needs: No needs  Volume/Electrolyte Status: No problems  Communication/Sensory: Visual (Glasses)/hearing deficit  Behavior: Appropriate behavior  Aaliyah Morel Fall Risk Total: 5  Fall Risk Level: NO RISK    Universal Fall Precautions:  call light/belongings in reach, bed in low position and locked, wheelchairs and assistive devices out of sight, siderails up x 2, use non-slip footwear, adequate lighting, clutter free and spill free environment, educate on level of risk, educate to call for assistance    Fall Risk Level Interventions:          Patient Specific Interventions:     Medication: review medications with patient and family and limit combination of prn medications  Mental Status/LOC/Awareness: reinforce falls education, check on patient hourly, utilize bed/chair fall alarm and reinforce the use of call light  Toileting: provide frquent toileting, instruct patient/family on the use of grab bars, instruct patient/family on the need to call for assistance when toileting and do not leave patient unattended in bathroom/refer to toileting scripting  Volume/Electrolyte Status: ensure patient remains hydrated, advance diet as tolerated, administer medications as ordered for nausea and vomiting, monitor abnormal lab values and ensure IV fluids are appropriate  Communication/Sensory: update plan of care on whiteboard, ensure proper positioning when transferrng/ambulating and ensure patient has glasses/contacts and hearing aids/dentures  Behavioral: encourage patient to voice feelings, engage patient in daily activities, administer medication as ordered, instruct/reinforce fall program rationale and encourage family to stay with impulsive patients  Mobility: dangle prior to standing, utilize  bed/chair fall alarm, ensure bed is locked and in lowest position, provide appropriate assistive device and instruct patient to exit bed on their strongest side

## 2017-09-22 NOTE — THERAPY
"Occupational Therapy Evaluation completed.   Functional Status:  Supervised with ADLs and txfs  Plan of Care: Patient with no further skilled OT needs in the acute care setting at this time  Discharge Recommendations: Post-acute therapy Currently anticipate no further skilled therapy needs once patient is discharged from the inpatient setting.    See \"Rehab Therapy-Acute\" Patient Summary Report for complete documentation.    "

## 2017-09-22 NOTE — PROCEDURES
VIDEO ELECTROENCEPHALOGRAM REPORT        Referring MD: Dr. Stewart.      DOS: 9/22/2017 (total recording of 32 minutes).      INDICATION:  Jean Barboza 66 y.o. male presenting with dizziness.      CURRENT ANTIEPILEPTIC REGIMEN: None.      TECHNIQUE: 30 channel video electroencephalogram (EEG) was performed in accordance with the international 10-20 system. The study was reviewed in bipolar and referential montages. The recording examined the patient during wakeful and drowsy/sleep state(s).      DESCRIPTION OF THE RECORD:  During the wakefulness, the background showed a symmetrical 7 Hz theta activity posteriorly with amplitude of 70 mV.  There was reactivity to eye closure/opening.  A normal anterior-posterior gradient was noted with faster beta frequencies seen anteriorly.  During drowsiness, theta/delta frequencies were seen.     During the sleep state, background shows diffuse high-amplitude 4-5 Hz delta activity.  Symmetrical vertex sharps were seen in the leads over the central regions.      ACTIVATION PROCEDURES:   Intermittent Photic stimulation was performed in a stepwise fashion from 1 to 30 Hz, but failed to elicit any background changes.         ICTAL AND/OR INTERICTAL FINDINGS:   No focal or generalized epileptiform activity noted. No regional slowing was seen during this routine study.  No clinical events or seizures were reported or recorded during the study.      EKG: sampling of the EKG recording demonstrated sinus rhythm.         INTERPRETATION:  This is an abnormal video EEG recording in the awake, drowsy, and sleep state(s).  Mild diffuse cerebral dysfunction, suggestive of an encephalopathic state. Clinical correlation is recommended.     Note: This EEG does not rule out epilepsy.  If the clinical suspicion remains high for seizures, a prolonged recording to capture clinical or subclinical events may be helpful.           Jamil Shea MD  Medical Director, Epilepsy and Neurodiagnostics.    Clinical  of Neurology [facility].   Diplomate in Neurology, Epilepsy, and Electrodiagnostic Medicine.   Office: [phone]  Fax: [phone]  MILLER REAL    DD:  09/22/2017 10:45:49  DT:  09/22/2017 10:54:45    D#:  [id number]  Job#:  [id number]

## 2017-09-22 NOTE — THERAPY
"Physical Therapy Evaluation completed.   Bed Mobility:  Supine to Sit: Supervised  Transfers: Sit to Stand: Supervised  Gait: Level Of Assist: Supervised with No Equipment Needed       Plan of Care: Will benefit from Physical Therapy 1 times per week  Discharge Recommendations: Equipment: Will Continue to Assess for Equipment Needs. Post-acute therapy Discharge to home with outpatient or home health for additional skilled therapy services.    Mr. Barboza is a 65 y/o male who presents to acute secondary to CVA. He presents with bilateral LE strength that is equal bilaterally and WFL. No sensation deficits. No LOB when ambulating, however he presents with a right drift. Unable to maintain straight trajectory of gait pattern. Able to correct with verbal cues, however once cues removed resumed drift. Functionally pt can perform all mobility without assist. His balance deficits can be addressed in an outpatient physical therapy setting. Physical therapy services will continue to monitor patient during his acute stay to assist with discharge planning as needed.     See \"Rehab Therapy-Acute\" Patient Summary Report for complete documentation.     "

## 2017-09-22 NOTE — PROGRESS NOTES
Assumed care of pt from day RN. Pt in bed with call light in reach.   Denies pain or needs at this time.  Will monitor, assist and medicate per MAR for duration of shift.  Hourly rounding implemented.      MRI completed.

## 2017-09-22 NOTE — ASSESSMENT & PLAN NOTE
- Patient complaining of blurry vision.  - Continues to have issues with balance, although could be secondary to uncontrolled BP.  - Slight left sided weakness.  May also be some residual from old CVA.  - MRI negative for acute infarct.  - Echo and carotid US pending.  - EEG negative.  - Continue aggrenox and lipitor.  - PT/OT.

## 2017-09-22 NOTE — EEG PROGRESS NOTE
VIDEO ELECTROENCEPHALOGRAM REPORT      Referring MD: Dr. Stewart.     DOS: 9/22/2017 (total recording of 32 minutes).     INDICATION:  Jean Barboza 66 y.o. male presenting with dizziness.     CURRENT ANTIEPILEPTIC REGIMEN: None.     TECHNIQUE: 30 channel video electroencephalogram (EEG) was performed in accordance with the international 10-20 system. The study was reviewed in bipolar and referential montages. The recording examined the patient during wakeful and drowsy/sleep state(s).     DESCRIPTION OF THE RECORD:  During the wakefulness, the background showed a symmetrical 7 Hz theta activity posteriorly with amplitude of 70 mV.  There was reactivity to eye closure/opening.  A normal anterior-posterior gradient was noted with faster beta frequencies seen anteriorly.  During drowsiness, theta/delta frequencies were seen.    During the sleep state, background shows diffuse high-amplitude 4-5 Hz delta activity.  Symmetrical vertex sharps were seen in the leads over the central regions.     ACTIVATION PROCEDURES:   Intermittent Photic stimulation was performed in a stepwise fashion from 1 to 30 Hz, but failed to elicit any background changes.       ICTAL AND/OR INTERICTAL FINDINGS:   No focal or generalized epileptiform activity noted. No regional slowing was seen during this routine study.  No clinical events or seizures were reported or recorded during the study.     EKG: sampling of the EKG recording demonstrated sinus rhythm.       INTERPRETATION:  This is an abnormal video EEG recording in the awake, drowsy, and sleep state(s).  Mild diffuse cerebral dysfunction, suggestive of an encephalopathic state. Clinical correlation is recommended.    Note: This EEG does not rule out epilepsy.  If the clinical suspicion remains high for seizures, a prolonged recording to capture clinical or subclinical events may be helpful.        Jamil Shea MD  Medical Director, Epilepsy and Neurodiagnostics.   Clinical Assistant  Professor of Neurology [facility].   Diplomate in Neurology, Epilepsy, and Electrodiagnostic Medicine.   Office: [phone]  Fax: [phone]

## 2017-09-22 NOTE — H&P
DOS: 9/21/2017    PATIENT ID:  NAME:  Jean Barboza  MRN:               [id number]  YOB: 1950    PMD: Antione Coley M.D.    CC: Dizziness    HPI: Jean Barboza is a 66 y.o. male who presents with dizziness which started 4 days ago, states he was coming out of the shower when he noticed that they had to hold onto the handle bar to get out of the shower. Since then he has had lingering dizziness and lightheadedness. His family is also noted that he has been somewhat confused. He denies any numbness or weakness, headache. He has had history of stroke in the past with residual findings of left hemianopsia which remains. On my exam will be in my noted left leg weakness which he states maybe new. He denies any chest pain palpitations shortness of breath. Denies any recent fever or chills, cough or congestion, he has had some nausea but no vomiting.                REVIEW OF SYSTEMS  A full review of systems was completed and all pertinent positives and negatives are included in the HPI above by AMA/CMS criteria.    PAST MEDICAL HISTORY  Past Medical History:   Diagnosis Date   • Diabetes 2005    oral meds   • Seizure (CMS-HCC) 1987   • CATARACT    • Hyperlipidemia    • Hypertension    • Seizure disorder (CMS-[facility])    • Snoring    • Stroke (CMS-[facility])        PAST SURGICAL HISTORY  Past Surgical History:   Procedure Laterality Date   • CATARACT PHACO WITH IOL  5/4/2011    [facility]   • CATARACT PHACO WITH IOL  4/20/2011    Performed by ENRIQUETA MABRY at [facility]   • OTHER  2011    left eye cataract   • CATARACT EXTRACTION WITH IOL  2011   • OTHER  2003    oral surgery       FAMILY HISTORY  No family history on file.    SOCIAL HISTORY  Social History   Substance Use Topics   • Smoking status: Former Smoker     Packs/day: 0.50     Years: 4.00     Types: Cigarettes     Quit date: 2/2/1985   • Smokeless tobacco: Not on file   • Alcohol  "use No       ALLERGIES  Allergies as of 09/21/2017 - Reviewed 09/21/2017   Allergen Reaction Noted   • Penicillin g Rash 09/21/2017       OUTPATIENT MEDICATIONS     Medication List      ASK your doctor about these medications      Instructions   acetaminophen 325 MG Tabs  Commonly known as:  TYLENOL   Take 325-650 mg by mouth every 6 hours as needed for Moderate Pain.  Dose:  325-650 mg     aspirin EC 81 MG Tbec  Commonly known as:  ECOTRIN   Take 81 mg by mouth every morning.  Dose:  81 mg     atorvastatin 20 MG Tabs  Commonly known as:  LIPITOR   Take 20 mg by mouth every morning.  Dose:  20 mg     clonidine 0.1 MG Tabs  Commonly known as:  CATAPRES   Take 0.1 mg by mouth 2 times a day.  Dose:  0.1 mg     dipyridamole 25 MG Tabs  Commonly known as:  PERSANTINE   Take 25 mg by mouth 2 Times a Day.  Dose:  25 mg     doxazosin 8 MG tablet  Commonly known as:  CARDURA   Take 8 mg by mouth every evening.  Dose:  8 mg     felodipine 10 MG Tb24  Commonly known as:  PLENDIL   Take 10 mg by mouth every morning.  Dose:  10 mg     fish oil 1000 MG Caps capsule   Take 1,000 mg by mouth 2 Times a Day.  Dose:  1000 mg     glimepiride 2 MG Tabs  Commonly known as:  AMARYL   Take 2 mg by mouth every morning.  Dose:  2 mg     hydrochlorothiazide 25 MG Tabs  Commonly known as:  HYDRODIURIL   Take 25 mg by mouth every morning.  Dose:  25 mg     lisinopril 40 MG tablet  Commonly known as:  PRINIVIL, ZESTRIL   Take 40 mg by mouth 2 times a day.  Dose:  40 mg     metformin 1000 MG tablet  Commonly known as:  GLUCOPHAGE   Take 1,000 mg by mouth 2 times a day, with meals.  Dose:  1000 mg     potassium citrate SR 10 MEQ (1080 MG) Tbcr  Commonly known as:  UROCIT-K SR   Take 10 mEq by mouth every morning.  Dose:  10 mEq            PHYSICAL EXAM:  Blood pressure 155/74, pulse 93, temperature 36.6 °C (97.8 °F), resp. rate 17, height 1.727 m (5' 8\"), weight 98.4 kg (216 lb 14.9 oz), SpO2 94 %.  Oxygen: Pulse Oximetry: 94 %, O2 Delivery: None " (Room Air)    Gen: NAD, comfortable  HEENT: NCAT, left hemianopsia, EOMI, Oropharynx moist and clear, no LAD, no JVD, neck supple  Chest: no accessory muscle use, CTAB  CV: RRR, S1 and S2 distinct, no murmur  GI: +BS, soft, NT, ND, no rebound/guarding, no hepatosplenomegaly  Extremities: Warm, well-perfused, no cyanosis/clubbing, no peripheral edema, distal pulses are intact  Neuro: AO x 3, left hemianopsia, MMT BUE and RLE 5/5 LLE 4/5, no sensory deficits    LAB TESTS and IMAGES:   Recent Labs      09/21/17   1435   WBC  11.0*   RBC  4.71   HEMOGLOBIN  14.8   HEMATOCRIT  41.7*   MCV  88.5   MCH  31.4   RDW  40.3   PLATELETCT  247   MPV  11.5   NEUTSPOLYS  81.00*   LYMPHOCYTES  11.50*   MONOCYTES  5.50   EOSINOPHILS  1.20   BASOPHILS  0.50     Recent Labs      09/21/17   1435   TROPONINI  <0.01     Recent Labs      09/21/17   1435   APTT  24.3*   INR  0.96     Recent Labs      09/21/17   1435   SODIUM  134*   POTASSIUM  4.1   CHLORIDE  100   CO2  21   BUN  29*   CREATININE  1.54*   CALCIUM  8.8   ALBUMIN  3.8     Estimated GFR/CRCL = Estimated Creatinine Clearance: 53.7 mL/min (by C-G formula based on SCr of 1.54 mg/dL).  Recent Labs      09/21/17   1435   GLUCOSE  259*     Glycohemoglobin   Date/Time Value Ref Range Status   04/29/2012 03:00 AM 6.3 <6.0 % Final     Comment:     The following guidelines have been established by the National  Glycohemoglobin Standardization Program (NGSP) for the diagnosis  and management of diabetes:  ________________________________________  Clinical Condition          NGSP  ________________________________________  Non-diabetic                   <6.0%  Diabetic goal                  <7.0%  Additional action suggested    >8.0%*  *Patients are at a higher risk of developing long-term complications.     Free T-4   Date/Time Value Ref Range Status   05/07/2012 08:58 PM 1.14 0.53 - 1.43 ng/dL Final     Recent Labs      09/21/17   1435   ASTSGOT  32   ALTSGPT  45   TBILIRUBIN  0.5    ALKPHOSPHAT  74   GLOBULIN  3.6*   INR  0.96           Invalid input(s): [id number]  Vitamin B12 -True Cobalamin   Date/Time Value Ref Range Status   05/07/2012 08:58  (L) 211 - 911 pg/mL Final     Ct-head W/o    Result Date: 9/21/2017 9/21/2017 2:38 PM HISTORY/REASON FOR EXAM: Dizziness TECHNIQUE/EXAM DESCRIPTION AND NUMBER OF VIEWS: CT of the head without contrast. Up to date radiation dose reduction adjustments have been utilized to meet ALARA standards for radiation dose reduction. COMPARISON: 5/8/2012 FINDINGS: There is no evidence of mass or mass effect. CSF spaces are prominent. There is an area of chronic ischemic change involving the right occipital lobe. There is moderate periventricular chronic small vessel ischemic change. There is no intracranial hemorrhage seen. The calvarium is intact. There is no scalp injury. There is no evidence of sinus disease.     1.  No evidence of acute intracranial process. 2.  Chronic ischemic change.    Dx-chest-portable (1 View)    Result Date: 9/21/2017 9/21/2017 2:36 PM HISTORY/REASON FOR EXAM:  Dizziness. TECHNIQUE/EXAM DESCRIPTION AND NUMBER OF VIEWS: Single portable view of the chest. COMPARISON: 5/7/2012 FINDINGS: The cardiomediastinal silhouette is stable. No focal consolidation, pleural effusion or pneumothorax is identified.  Costophrenic angles are sharp. Evaluation of the lung bases is limited by overlying soft tissues.     No acute cardiopulmonary process is seen.      ASSESSMENT/PLAN:     1.  Stroke. Continue Aggrenox, increase Lipitor, check MRI of brain, carotid, echocardiogram. Physical and occupational therapy evaluations.  2.  Diabetes mellitus. SSI for now, check hemoglobin A1c  3.  Hyperlipidemia. Lipitor, check lipid profile  4.  Hypertension. Permissive hypertension for now  5.  History of seizure. Check EEG  6.  Renal failure. Unknown baseline, check urinalysis, PTH, IV fluid hydration    PPX: Heparin    CODE STATUS: Full    Anticipate  that patient will need more than 2 midnights for management of the above discussed medical issues.    This dictation was created using voice recognition software. The accuracy of the dictation is limited to the abilities of the software. I expect there may be some errors of grammar and possibly content.

## 2017-09-22 NOTE — ASSESSMENT & PLAN NOTE
- May be causing some of his neurological symptoms.  - Increase clonidine and HCTZ.  - Continue home dose of lisinopril and doxazosin.  - Labetalol and hydralazine as needed.

## 2017-09-22 NOTE — PROGRESS NOTES
Hospital Medicine Interval Note  Date of Service:  9/22/2017    Chief Complaint  66 y.o.-year-old male with h/o CVA, HTN, and DM admitted 9/21/2017 with dizziness and confusion.      Interval Problem Update  's-200's.  Continues to complain of blurry vision and slight left LE weakness.  MRI negative.  EEG stable.  Hgb A1c 9.1.      Consultants/Specialty  N/A    Disposition  Anticipate home at d/c when bp stable.     Review of Systems   Constitutional: Negative for diaphoresis and fever.   HENT: Negative for congestion.    Eyes: Positive for blurred vision. Negative for photophobia and pain.   Respiratory: Negative for cough and shortness of breath.    Cardiovascular: Negative for chest pain, palpitations and leg swelling.   Gastrointestinal: Negative for abdominal pain, constipation, diarrhea, nausea and vomiting.   Genitourinary: Negative for dysuria.   Musculoskeletal: Negative for myalgias.   Skin: Negative for itching and rash.   Neurological: Positive for dizziness, tingling, sensory change, focal weakness, weakness and headaches. Negative for tremors and seizures.      Physical Exam Laboratory/Imaging   Vitals:    09/22/17 0400 09/22/17 0730 09/22/17 0731 09/22/17 1200   BP: (!) 186/83 (!) 215/86 (!) 220/91 (!) 212/87   Pulse: 73 69  74   Resp: 18 18  18   Temp: 36.6 °C (97.9 °F) 36.9 °C (98.4 °F)  37.4 °C (99.3 °F)   SpO2: 96%  93% 94%   Weight:       Height:         Physical Exam   Constitutional: He is oriented to person, place, and time. No distress.   HENT:   Head: Normocephalic and atraumatic.   Mouth/Throat: No oropharyngeal exudate.   Eyes: Conjunctivae are normal. Right eye exhibits no discharge. Left eye exhibits no discharge.   Neck: Normal range of motion. No tracheal deviation present.   Cardiovascular: Normal rate, regular rhythm, normal heart sounds and intact distal pulses.    No murmur heard.  Pulmonary/Chest: Effort normal and breath sounds normal. No stridor. No respiratory distress.  He has no wheezes.   Abdominal: Soft. Bowel sounds are normal. He exhibits no distension. There is no tenderness.   Musculoskeletal: He exhibits no edema, tenderness or deformity.   Left lower extremity weakness.   Neurological: He is alert and oriented to person, place, and time. He displays normal reflexes. No cranial nerve deficit. He exhibits normal muscle tone.   Skin: Skin is warm and dry. No rash noted. He is not diaphoretic. No erythema.    Lab Results   Component Value Date/Time    WBC 9.6 09/22/2017 02:00 AM    HEMOGLOBIN 12.9 (L) 09/22/2017 02:00 AM    HEMATOCRIT 36.9 (L) 09/22/2017 02:00 AM    PLATELETCT 241 09/22/2017 02:00 AM     Lab Results   Component Value Date/Time    SODIUM 134 (L) 09/22/2017 02:00 AM    POTASSIUM 3.6 09/22/2017 02:00 AM    CHLORIDE 101 09/22/2017 02:00 AM    CO2 23 09/22/2017 02:00 AM    GLUCOSE 183 (H) 09/22/2017 02:00 AM    BUN 28 (H) 09/22/2017 02:00 AM    CREATININE 1.39 09/22/2017 02:00 AM      Assessment/Plan    Hypertensive urgency- (present on admission)   Assessment & Plan    - May be causing some of his neurological symptoms.  - Increase clonidine and HCTZ.  - Continue home dose of lisinopril and doxazosin.  - Labetalol and hydralazine as needed.        TIA (transient ischemic attack)- (present on admission)   Assessment & Plan    - Patient complaining of blurry vision.  - Continues to have issues with balance, although could be secondary to uncontrolled BP.  - Slight left sided weakness.  May also be some residual from old CVA.  - MRI negative for acute infarct.  - Echo and carotid US pending.  - EEG negative.  - Continue aggrenox and lipitor.  - PT/OT.          Uncontrolled type 2 diabetes mellitus (CMS-HCC)- (present on admission)   Assessment & Plan    - Hgb A1c 9.1.  - Started on SSI and lantus.  - Diabetes educator to follow.               Reviewed items::  Medications reviewed, Radiology images reviewed and Labs reviewed  Celis catheter::  No Celis  DVT  prophylaxis pharmacological::  Heparin  Ulcer Prophylaxis::  Not indicated

## 2017-09-23 ENCOUNTER — PATIENT OUTREACH (OUTPATIENT)
Dept: HEALTH INFORMATION MANAGEMENT | Facility: OTHER | Age: 67
End: 2017-09-23

## 2017-09-23 VITALS
RESPIRATION RATE: 16 BRPM | OXYGEN SATURATION: 92 % | HEIGHT: 68 IN | SYSTOLIC BLOOD PRESSURE: 156 MMHG | HEART RATE: 62 BPM | DIASTOLIC BLOOD PRESSURE: 74 MMHG | BODY MASS INDEX: 32.88 KG/M2 | WEIGHT: 216.93 LBS | TEMPERATURE: 97.6 F

## 2017-09-23 PROBLEM — G45.9 TIA (TRANSIENT ISCHEMIC ATTACK): Chronic | Status: RESOLVED | Noted: 2017-09-21 | Resolved: 2017-09-23

## 2017-09-23 PROBLEM — I16.0 HYPERTENSIVE URGENCY: Status: RESOLVED | Noted: 2017-09-22 | Resolved: 2017-09-23

## 2017-09-23 LAB
GLUCOSE BLD-MCNC: 165 MG/DL (ref 65–99)
GLUCOSE BLD-MCNC: 248 MG/DL (ref 65–99)

## 2017-09-23 PROCEDURE — 700111 HCHG RX REV CODE 636 W/ 250 OVERRIDE (IP): Performed by: NURSE PRACTITIONER

## 2017-09-23 PROCEDURE — 82962 GLUCOSE BLOOD TEST: CPT

## 2017-09-23 PROCEDURE — 90662 IIV NO PRSV INCREASED AG IM: CPT | Performed by: FAMILY MEDICINE

## 2017-09-23 PROCEDURE — 700111 HCHG RX REV CODE 636 W/ 250 OVERRIDE (IP): Performed by: FAMILY MEDICINE

## 2017-09-23 PROCEDURE — 700105 HCHG RX REV CODE 258: Performed by: NURSE PRACTITIONER

## 2017-09-23 PROCEDURE — 700102 HCHG RX REV CODE 250 W/ 637 OVERRIDE(OP): Performed by: FAMILY MEDICINE

## 2017-09-23 PROCEDURE — A9270 NON-COVERED ITEM OR SERVICE: HCPCS | Performed by: FAMILY MEDICINE

## 2017-09-23 PROCEDURE — 99239 HOSP IP/OBS DSCHRG MGMT >30: CPT | Performed by: INTERNAL MEDICINE

## 2017-09-23 PROCEDURE — 3E0234Z INTRODUCTION OF SERUM, TOXOID AND VACCINE INTO MUSCLE, PERCUTANEOUS APPROACH: ICD-10-PCS | Performed by: FAMILY MEDICINE

## 2017-09-23 PROCEDURE — 700102 HCHG RX REV CODE 250 W/ 637 OVERRIDE(OP): Performed by: NURSE PRACTITIONER

## 2017-09-23 PROCEDURE — 90471 IMMUNIZATION ADMIN: CPT

## 2017-09-23 PROCEDURE — A9270 NON-COVERED ITEM OR SERVICE: HCPCS | Performed by: NURSE PRACTITIONER

## 2017-09-23 RX ORDER — INSULIN GLARGINE 100 [IU]/ML
20 INJECTION, SOLUTION SUBCUTANEOUS EVERY EVENING
Status: DISCONTINUED | OUTPATIENT
Start: 2017-09-23 | End: 2017-09-23 | Stop reason: HOSPADM

## 2017-09-23 RX ORDER — CARVEDILOL 6.25 MG/1
6.25 TABLET ORAL 2 TIMES DAILY WITH MEALS
Status: DISCONTINUED | OUTPATIENT
Start: 2017-09-23 | End: 2017-09-23

## 2017-09-23 RX ORDER — SODIUM CHLORIDE 9 MG/ML
1000 INJECTION, SOLUTION INTRAVENOUS ONCE
Status: COMPLETED | OUTPATIENT
Start: 2017-09-23 | End: 2017-09-23

## 2017-09-23 RX ORDER — ATORVASTATIN CALCIUM 20 MG/1
40 TABLET, FILM COATED ORAL EVERY MORNING
Qty: 30 TAB | Refills: 2 | Status: ON HOLD | OUTPATIENT
Start: 2017-09-23 | End: 2021-06-30

## 2017-09-23 RX ORDER — FELODIPINE 10 MG/1
10 TABLET, EXTENDED RELEASE ORAL
Status: DISCONTINUED | OUTPATIENT
Start: 2017-09-23 | End: 2017-09-23 | Stop reason: HOSPADM

## 2017-09-23 RX ORDER — CLONIDINE HYDROCHLORIDE 0.1 MG/1
0.1 TABLET ORAL ONCE
Status: COMPLETED | OUTPATIENT
Start: 2017-09-23 | End: 2017-09-23

## 2017-09-23 RX ORDER — CLONIDINE HYDROCHLORIDE 0.1 MG/1
0.3 TABLET ORAL TWICE DAILY
Status: DISCONTINUED | OUTPATIENT
Start: 2017-09-23 | End: 2017-09-23 | Stop reason: HOSPADM

## 2017-09-23 RX ORDER — CLONIDINE HYDROCHLORIDE 0.3 MG/1
0.3 TABLET ORAL 2 TIMES DAILY
Qty: 60 TAB | Refills: 3 | Status: ON HOLD | OUTPATIENT
Start: 2017-09-23 | End: 2017-10-04

## 2017-09-23 RX ADMIN — STANDARDIZED SENNA CONCENTRATE AND DOCUSATE SODIUM 2 TABLET: 8.6; 5 TABLET, FILM COATED ORAL at 08:14

## 2017-09-23 RX ADMIN — INSULIN LISPRO 2 UNITS: 100 INJECTION, SOLUTION INTRAVENOUS; SUBCUTANEOUS at 05:45

## 2017-09-23 RX ADMIN — HYDROCHLOROTHIAZIDE 50 MG: 25 TABLET ORAL at 08:15

## 2017-09-23 RX ADMIN — HYDRALAZINE HYDROCHLORIDE 10 MG: 20 INJECTION INTRAMUSCULAR; INTRAVENOUS at 04:14

## 2017-09-23 RX ADMIN — INFLUENZA A VIRUSA/MICHIGAN/45/2015 X-275 (H1N1) ANTIGEN (FORMALDEHYDE INACTIVATED), INFLUENZA A VIRUS A/HONG KONG/4801/2014 X-263B (H3N2) ANTIGEN (FORMALDEHYDE INACTIVATED), AND INFLUENZA B VIRUS B/BRISBANE/60/2008 ANTIGEN (FORMALDEHYDE INACTIVATED) 0.5 ML: 60; 60; 60 INJECTION, SUSPENSION INTRAMUSCULAR at 04:17

## 2017-09-23 RX ADMIN — CARVEDILOL 6.25 MG: 6.25 TABLET, FILM COATED ORAL at 08:14

## 2017-09-23 RX ADMIN — CLONIDINE HYDROCHLORIDE 0.1 MG: 0.1 TABLET ORAL at 12:31

## 2017-09-23 RX ADMIN — SODIUM CHLORIDE 1000 ML: 9 INJECTION, SOLUTION INTRAVENOUS at 10:30

## 2017-09-23 RX ADMIN — INSULIN LISPRO 4 UNITS: 100 INJECTION, SOLUTION INTRAVENOUS; SUBCUTANEOUS at 11:50

## 2017-09-23 RX ADMIN — CLONIDINE HYDROCHLORIDE 0.2 MG: 0.1 TABLET ORAL at 08:15

## 2017-09-23 RX ADMIN — ASPIRIN AND DIPYRIDAMOLE 1 CAPSULE: 25; 200 CAPSULE, EXTENDED RELEASE ORAL at 08:14

## 2017-09-23 RX ADMIN — FELODIPINE 10 MG: 10 TABLET, EXTENDED RELEASE ORAL at 13:08

## 2017-09-23 RX ADMIN — LISINOPRIL 40 MG: 20 TABLET ORAL at 08:15

## 2017-09-23 RX ADMIN — HEPARIN SODIUM 5000 UNITS: 5000 INJECTION, SOLUTION INTRAVENOUS; SUBCUTANEOUS at 05:37

## 2017-09-23 RX ADMIN — HYDRALAZINE HYDROCHLORIDE 10 MG: 20 INJECTION INTRAMUSCULAR; INTRAVENOUS at 06:30

## 2017-09-23 ASSESSMENT — PAIN SCALES - GENERAL
PAINLEVEL_OUTOF10: 0

## 2017-09-23 NOTE — CARE PLAN
Problem: Nutritional:  Goal: Patient to verbalize or demonstrate understanding of diet  Outcome: NOT MET Date Met: 09/23/17  Attempted to provide diet education multiple times - pt was unavailable for interview. Handout left at bedside w/ a visitor.

## 2017-09-23 NOTE — PROGRESS NOTES
Assumed care of pt. Able to make needs known. Tele monitor in place. BP control, dipped low, infused 1 L bolus. Room air. Voiding. BM today. Consistent carbs diet. PIV in left AC 20g. Accu checks ACHS. MRI negative for stroke. POC home when stable. Call light in reach, bed in low position, will continue hourly rounding.

## 2017-09-23 NOTE — CARE PLAN
Problem: Communication  Goal: The ability to communicate needs accurately and effectively will improve    Intervention: [place] patient and significant other/support system to call light to alert staff of needs  Able to make needs known       Problem: Pain Management  Goal: Pain level will decrease to patient's comfort goal    Intervention: Educate and implement non-pharmacologic comfort measures. Examples: relaxation, distration, play therapy, activity therapy, massage, etc.  No current complaints of pain

## 2017-09-23 NOTE — CARE PLAN
Problem: Venous Thromboembolism (VTW)/Deep Vein Thrombosis (DVT) Prevention:  Goal: Patient will participate in Venous Thrombosis (VTE)/Deep Vein Thrombosis (DVT)Prevention Measures  chemical prophylaxis in use    Problem: Risk of Aspiration  Goal: Absence of aspiration  HOB at 90* while consuming food and medications

## 2017-09-23 NOTE — PROGRESS NOTES
Aaliyah Morel Fall Risk Assessment:     Last Known Fall: No falls  Mobility: No limitations  Medications: Cardiovascular and central nervous system meds  Mental Status/LOC/Awareness: Awake, alert, and oriented to date, place, and person  Toileting Needs: No needs  Volume/Electrolyte Status: No problems  Communication/Sensory: Visual (Glasses)/hearing deficit  Behavior: Appropriate behavior  Aaliyah Morel Fall Risk Total: 6  Fall Risk Level: NO RISK    Universal Fall Precautions:  call light/belongings in reach, bed in low position and locked, wheelchairs and assistive devices out of sight, siderails up x 2, use non-slip footwear, adequate lighting, clutter free and spill free environment, educate on level of risk, educate to call for assistance    Fall Risk Level Interventions:   TRIAL (TELE 8, NEURO, MED NOEMÍ 5) Low Fall Risk Interventions  Place yellow fall risk ID band on patient: verified  Provide patient/family education based on risk assessment: completed  Educate patient/family to call staff for assistance when getting out of bed: completed  Place fall precaution signage outside patient door: verified      Patient Specific Interventions:     Medication: review medications with patient and family  Mental Status/LOC/Awareness: check on patient hourly and reinforce the use of call light  Toileting: provide frquent toileting  Volume/Electrolyte Status: ensure patient remains hydrated and monitor blood sugars and maintain appropriate blood sugar levels if diabetic  Communication/Sensory: update plan of care on whiteboard  Behavioral: not applicable  Mobility: ensure bed is locked and in lowest position and provide appropriate assistive device

## 2017-09-23 NOTE — CARE PLAN
Problem: Safety  Goal: Will remain free from injury  Outcome: PROGRESSING AS EXPECTED  All precautions in place, pt calls for help.     Problem: Knowledge Deficit  Goal: Knowledge of disease process/condition, treatment plan, diagnostic tests, and medications will improve  Outcome: PROGRESSING AS EXPECTED  Discussing test for the day and awaiting results.

## 2017-09-23 NOTE — PROGRESS NOTES
Pt is A+Ox4. CORREA. Denies pain, n/v. C/o n/t in toes. Complete hemianopia in left field of vision. 5/5 strength Q4. Pt is up SBA to restroom. UA collected. Tele in place, HTN, PRN meds given. All questions answered regarding POC.

## 2017-09-23 NOTE — PROGRESS NOTES
Aaliyah Morel Fall Risk Assessment:     Last Known Fall: No falls  Mobility: No limitations  Medications: Cardiovascular and central nervous system meds  Mental Status/LOC/Awareness: Awake, alert, and oriented to date, place, and person  Toileting Needs: No needs  Volume/Electrolyte Status: No problems  Communication/Sensory: Visual (Glasses)/hearing deficit  Behavior: Appropriate behavior  Aaliyah Morel Fall Risk Total: 6  Fall Risk Level: NO RISK    Universal Fall Precautions:  call light/belongings in reach, bed in low position and locked, wheelchairs and assistive devices out of sight, siderails up x 2, use non-slip footwear, adequate lighting, clutter free and spill free environment, educate on level of risk, educate to call for assistance    Fall Risk Level Interventions:   TRIAL (TELE 8, NEURO, MED NOEMÍ 5) Low Fall Risk Interventions  Place yellow fall risk ID band on patient: verified  Provide patient/family education based on risk assessment: completed  Educate patient/family to call staff for assistance when getting out of bed: completed  Place fall precaution signage outside patient door: verified      Patient Specific Interventions:     Medication: review medications with patient and family and limit combination of prn medications  Mental Status/LOC/Awareness: reorient patient, reinforce falls education, encourage family to stay with patient, check on patient hourly and reinforce the use of call light  Toileting: provide frquent toileting and instruct male patients prone to dizziness to void while sitting  Volume/Electrolyte Status: monitor abnormal lab values, ensure IV fluids are appropriate and teach patients to dangle before rising if hypotensive  Communication/Sensory: update plan of care on whiteboard  Behavioral: instruct/reinforce fall program rationale  Mobility: dangle prior to standing and ensure bed is locked and in lowest position

## 2017-09-23 NOTE — DISCHARGE SUMMARY
CHIEF COMPLAINT ON ADMISSION  Dizziness.    HPI & HOSPITAL COURSE  This is a 66 year old male with history of CVA, hypertension, and diabetes mellitus type 2 here with dizziness ongoing for 4 days prior to admission.  On admission the patient was noted to be in hypertensive urgency with SBP in the 200's.  He was started on an aggressive antihypertension regimen.  He was also noted have uncontrolled diabetes with a hemoglobin A1c of 9.1.  He was started on IV insulin and received diabetes education from the RN.  He will benefit from further follow up for diabetes education with the PCP.  The patient had a negative stroke workup with MRI brain, echocardiogram, and carotid US all within normal limits.  The patient's symptoms were likely exacerbated by his uncontrolled hypertension and diabetes.  At this time his symptoms are much improved and his blood pressure is controlled.  He will be discharged home at this time and can follow with his PCP for any further medication adjustments.    DISCHARGE PROBLEM LIST  1.  Uncontrolled diabetes mellitus type 2.  2.  Hypertensive urgency - resolved.  3.  Dyslipidemia.    FOLLOW UP  The patient will follow up with his PCP in 1-2 weeks.    MEDICATIONS ON DISCHARGE   Jabari Jean Josue   Home Medication Instructions KATHRYN:[id number]    Printed on:09/23/17 1238   Medication Information                      acetaminophen (TYLENOL) 325 MG Tab  Take 325-650 mg by mouth every 6 hours as needed for Moderate Pain.             aspirin EC (ECOTRIN) 81 MG Tablet Delayed Response  Take 81 mg by mouth every morning.             atorvastatin (LIPITOR) 20 MG Tab  Take 2 Tabs by mouth every morning.                          clonidine (CATAPRESS) 0.3 MG Tab  Take 1 Tab by mouth 2 Times a Day.             dipyridamole (PERSANTINE) 25 MG Tab  Take 25 mg by mouth 2 Times a Day.             doxazosin (CARDURA) 8 MG tablet  Take 8 mg by mouth every evening.             felodipine (PLENDIL) 10 MG TABLET SR 24  HR  Take 10 mg by mouth every morning.             hydrochlorothiazide (HYDRODIURIL) 25 MG Tab  Take 25 mg by mouth every morning.             insulin detemir (LEVEMIR) 100 UNIT/ML Solution Pen-injector injection  Inject 20 Units as instructed every evening.             insulin lispro, Human, (HUMALOG) 100 UNIT/ML Solution Pen-injector injection  Inject 3-12 Units as instructed 3 times a day before meals. For glucose:  70   - 150  mg/dL =      0 Units  151 - 200  mg/dL =    3 Units  201 - 250  mg/dL =    4 Units  251 - 300  mg/dL  =   7 Units  301 - 350  mg/dL  =   8 Units  351 - 400 mg/dL   =   10 Units  Over 400 mg/dL   =   12 Units             lisinopril (PRINIVIL, ZESTRIL) 40 MG tablet  Take 40 mg by mouth 2 times a day.             Omega-3 Fatty Acids (FISH OIL) 1000 MG Cap capsule  Take 1,000 mg by mouth 2 Times a Day.             potassium citrate SR (UROCIT-K SR) 10 MEQ (1080 MG) Tab CR  Take 10 mEq by mouth every morning.                 DIET  Orders Placed This Encounter   Procedures   • DIET ORDER     Standing Status:   Standing     Number of Occurrences:   1     Order Specific Question:   Diet:     Answer:   Consistent Carbohydrate [4]       ACTIVITY  As tolerated.      LABORATORY  Lab Results   Component Value Date/Time    SODIUM 134 (L) 09/22/2017 02:00 AM    POTASSIUM 3.6 09/22/2017 02:00 AM    CHLORIDE 101 09/22/2017 02:00 AM    CO2 23 09/22/2017 02:00 AM    GLUCOSE 183 (H) 09/22/2017 02:00 AM    BUN 28 (H) 09/22/2017 02:00 AM    CREATININE 1.39 09/22/2017 02:00 AM        Lab Results   Component Value Date/Time    WBC 9.6 09/22/2017 02:00 AM    HEMOGLOBIN 12.9 (L) 09/22/2017 02:00 AM    HEMATOCRIT 36.9 (L) 09/22/2017 02:00 AM    PLATELETCT 241 09/22/2017 02:00 AM        Total time of the discharge process was 39 minutes.

## 2017-09-23 NOTE — PROGRESS NOTES
Pt AOx4, does have times when he is forgetful, he states his vision changes at times, Notified JADYN Johnson. Fall precautions in place, pt calls for help. Call light in reach.

## 2017-09-23 NOTE — DISCHARGE INSTRUCTIONS
Discharge Instructions      Discharged to home by car with relative. Discharged via wheelchair, hospital escort: Yes.  Special equipment needed: Not Applicable    Be sure to schedule a follow-up appointment with your primary care doctor or any specialists as instructed.     Discharge Plan:   Influenza Vaccine Indication: Indicated: 65 years and older  Influenza Vaccine Given - only chart on this line when given: Influenza Vaccine Given (See MAR)    I understand that a diet low in cholesterol, fat, and sodium is recommended for good health. Unless I have been given specific instructions below for another diet, I accept this instruction as my diet prescription.   Other diet: Consistent carbs, diabetic     Special Instructions: None    · Is patient discharged on Warfarin / Coumadin?   No     · Is patient Post Blood Transfusion?  No        Hypertension  Hypertension is another name for high blood pressure. High blood pressure forces your heart to work harder to pump blood. A blood pressure reading has two numbers, which includes a higher number over a lower number (example: 110/72).  HOME CARE   · Have your blood pressure rechecked by your doctor.  · Only take medicine as told by your doctor. Follow the directions carefully. The medicine does not work as well if you skip doses. Skipping doses also puts you at risk for problems.  · Do not smoke.  · Monitor your blood pressure at home as told by your doctor.  GET HELP IF:  · You think you are having a reaction to the medicine you are taking.  · You have repeat headaches or feel dizzy.  · You have puffiness (swelling) in your ankles.  · You have trouble with your vision.  GET HELP RIGHT AWAY IF:   · You get a very bad headache and are confused.  · You feel weak, numb, or faint.  · You get chest or belly (abdominal) pain.  · You throw up (vomit).  · You cannot breathe very well.  MAKE SURE YOU:   · Understand these instructions.  · Will watch your condition.  · Will get  help right away if you are not doing well or get worse.     This information is not intended to replace advice given to you by your health care provider. Make sure you discuss any questions you have with your health care provider.     Document Released: 06/05/2009 Document Revised: 12/23/2014 Document Reviewed: 10/10/2014  [employer] Interactive Patient Education ©2016 [employer]    Insulin Treatment for Diabetes  Diabetes is a disease that does not go away (chronic). It occurs when the body does not properly use the sugar (glucose) that is released from food after it is digested. Glucose levels are controlled by a hormone called insulin, which is made by your pancreas. Depending on the type of diabetes you have, either of the following will apply:   · The pancreas does not make any insulin (type 1 diabetes).  · The pancreas makes too little insulin, and the body cannot respond normally to the insulin that is made (type 2 diabetes).  Without insulin, death can occur. However, with the addition of insulin, blood sugar monitoring, and treatment, someone with diabetes can live a full and productive life. This document will discuss the role of insulin in your treatment and provide information about its use.   HOW IS INSULIN GIVEN?  Insulin is a medicine that can only be given by injection. Taking it by mouth makes it inactive because of the acid in your stomach. Insulin is injected under the skin by a syringe and needle, an insulin pen, a pump, or a jet injector. Your dose will be determined by your health care provider based on your individual needs. You will also be given guidance on which method of giving insulin is right for you. Remember that if you give insulin with a needle and syringe, you must do so using only a special insulin syringe made for this purpose.  WHERE ON THE BODY SHOULD INSULIN BE INJECTED?  Insulin is injected into the fatty layer of tissue just under your skin. Good places to inject insulin  include the upper arm, the front and outer area of the thigh, the hips, and the abdomen. Giving your insulin in the abdomen is preferred because this provides the most rapid and consistent absorption. Avoid the area 2 inches (5 cm) around the navel and avoid injecting into areas on your body with scar tissue. In addition, it is important to rotate your injection sites with every shot to prevent irritation and improve absorption.   WHAT ARE THE DIFFERENT TYPES OF INSULIN?   If you have type 1 diabetes, you must take insulin to stay alive. Your body does not produce it. If you have type 2 diabetes, you might require insulin in addition to, or instead of, other medicines. In either case, proper use of insulin is critical to control your diabetes.   There are a number of different types of insulin. Usually, you will give yourself injections, though others can be trained to give them to you. Some people have an insulin pump that delivers insulin continuously through a tube (cannula) that is placed under the skin.  Using insulin requires that you check your blood sugar several times a day. The exact number of times and time of day to check will vary depending on your type of diabetes, your type of insulin, and treatment goals. Your health care provider will direct you.   Generally, different insulins have different properties. The following is a general guide. Specifics will vary by product, and new products are introduced periodically.   · Rapid-acting insulin starts working quickly (in as little as 5 minutes) and wears off in 4 to 6 hours (sometimes longer). This type of insulin works well when taken just before a meal to bring your blood sugar quickly back to normal.    · Short-acting insulin starts working in about 30 minutes and can last 6 to 10 hours. This type of insulin should be taken about 30 minutes before you start eating a meal.  · Intermediate-acting insulin starts working in 1-2 hours and wears off after  about 10 to 18 hours. This insulin will lower your blood sugar for a longer period of time, but it will not be as effective in lowering your blood sugar right after a meal.    · Long-acting insulin mimics the small amount of insulin that your pancreas usually produces throughout the day. You need to have some insulin present at all times. It is crucial to the metabolism of brain cells and other cells. Long-acting insulin is meant to be used either once or twice a day. It is usually used in combination with other types of insulin, or in combination with other diabetes medicines.    Discuss the type of insulin you are taking with your health care provider or pharmacist. You will then be aware of when the insulin can be expected to peak and when it will wear off. This is important to know so you can plan for meal times and periods of exercise.   Your health care provider will usually have a strategy in mind when treating you with insulin. This will vary with your type of diabetes, your diabetes treatment goals, and your health history. It is important that you understand this strategy so you can be an active partner in treating your diabetes. Here are some terms you might hear:   · Basal insulin. This refers to the small amount of insulin that needs to be present in your blood at all times. Sometimes oral medicines will be enough. For other people, and especially for people with type 1 diabetes, insulin is needed. Usually, intermediate-acting or long-acting insulin is used once or twice a day to accomplish this.    · Prandial (meal-related) insulin. Your blood sugar will rise rapidly after a meal. Rapid-acting or short-acting insulin can be used right before the meal to bring your blood sugar back to normal quickly. You might be instructed to adjust the amount of insulin depending on how much carbohydrate (starch) is in your meal.    · Corrective insulin. You might be instructed to check your blood sugar at certain  times of the day. You then might use a small amount of rapid-acting or short-acting insulin to bring the blood sugar down to normal if it is elevated.    · Tight control (also called intensive therapy). Tight control means keeping your blood sugar as close to your target as possible and keeping it from going too high after meals. People with tight control of their diabetes are shown to have fewer long-term problems from their diabetes.    · Glycohemoglobin (also called glyco, glycosylated hemoglobin, hemoglobin A1c, or A1c) level. This measures how well your blood sugar has been controlled during the past 1 to 3 months. It helps your health care provider see how effective your treatment is and decide if any changes are needed. Your health care provider will discuss your target glycohemoglobin level with you.    Insulin treatment requires your careful attention. While you are being treated with insulin, you should check your blood glucose at least two times each day. Treatment plans will be different for different people. Some people do well with a simple program. Others require more complicated programs, with multiple insulin injections daily. You will work with your health care provider to develop the best program for you. Regardless of your insulin treatment plan, you must also do your best on weight control, diet and food choices, exercise, blood pressure control, cholesterol control, and stress levels.   WHAT ARE THE SIDE EFFECTS OF INSULIN?  Although insulin treatment is important, it does have some side effects, such as:   · Insulin can cause your blood sugar to go too low (hypoglycemia).    · Weight gain can occur.    · Improper injection technique can cause hypoglycemia, blood sugar to go too high (hyperglycemia), skin injury or irritation, or other problems. You must learn to inject insulin properly.     This information is not intended to replace advice given to you by your health care provider. Make sure  you discuss any questions you have with your health care provider.     Document Released: 03/16/2010 Document Revised: 01/08/2016 Document Reviewed: 06/01/2014  [employer] Interactive Patient Education ©2016 [employer].    Monitoring for Diabetes  There are two blood tests that help you monitor and manage your diabetes. These include:  · An A1c (hemoglobin A1c) test.  · This test is an average of your glucose (or blood sugar) control over the past 3 months.  · This is recommended as a way for you and your caregiver to understand how well your glucose levels are controlled on the average.  · Your A1c goal will be determined by your caregiver, but it is usually best if it is less than 6.5% to 7.0%.  · Glucose (sugar) attaches itself to red blood cells. The amount of glucose then can then be measured. The amount of glucose on the cells depends on how high your blood glucose has been.  · SMBG test (self-monitoring blood glucose).  · Using a blood glucose monitor (meter) to do SMBG testing is an easy way to monitor the amount of glucose in your blood and can help you improve your control. The monitor will tell you what your blood glucose is at that very moment. Every person with diabetes should have a blood glucose monitor and know how to use it. The better you control your blood sugar on a daily basis, the better your A1c levels will be.  HOW OFTEN SHOULD I HAVE AN A1C LEVEL?  · Every 3 months if your diabetes is not well controlled or if therapy has changed.  · Every 6 months if you are meeting your treatment goals.  HOW OFTEN SHOULD I DO SMBG TESTING?   Your caregiver will recommend how often you should test. Testing times are based on the kind of medicine you take, type of diabetes you have, and your blood glucose control. Testing times can include:  · Type 1 diabetes: test 3 or 4 times a day or as directed.  · Type 2 diabetes and if you are taking insulin and diabetes pills: test 3 or 4 times a day or as  directed.  · If you are taking diabetes pills only and not reaching your target A1c: test 2 to 4 times a day or as directed.  · If you are taking diabetes pills and are controlling your diabetes well with diet and exercise, your caregiver will help you decide what is appropriate.  WHAT TIME OF DAY SHOULD I TEST?   The best time of day to test your blood glucose depends on medications, mealtimes, exercise, and blood glucose control. It is best to test at different times because this will help you know how you are doing throughout the day. Your caregiver will help you decide what is best.  WHAT SHOULD MY BLOOD GLUCOSE BE?  Blood glucose target goals may vary depending on each persons needs, whether they have type 1 or type 2 diabetes or what medications they are taking. However, as a general rule, blood glucose should be:  · Before meals    mg/dl.  · After meals    ..less than 180 mg/dl.  CHECK YOUR BLOOD GLUCOSE IF:  · You have symptoms of low blood sugar (hypoglycemia), which may include dizziness, shaking, sweating, chills and confusion.  · You have symptoms of high blood sugar (hyperglycemia), which may include sleepiness, blurred vision, frequent urination and excessive thirst.  · You are learning how meals, physical activity and medicine affect your blood glucose level. The more you learn about how various foods, your medications, and activities affect you, the better job you will do of taking care of yourself.  · You have a job in which poor control could cause safety problems while driving or operating machinery.  CHECK YOUR BLOOD SUGAR MORE FREQUENTLY:  · If you have medication or dietary changes.  · If you begin taking other kinds of medicines.  · If you become sick or your level of stress increases. With an illness, your blood sugar may even be high without eating.  · Before and after exercise.  Follow your caregiver's testing recommendations during this time.   TO DISPOSE OF SHARPS:  Each city or  state may have different regulations. Check with your public works or waste management department.  · Sharps containers can be purchased from pharmacies.  · Place all used sharps in a container. You do not need to replace any protective covers over the needle or break the needle.  · Sharps should be contained in a ridge, leakproof, puncture-resistant container.  · Plastic detergent bottle.  · Bleach bottle.  · When container is almost full, add a solution that is 1 part laundry bleach and 9 parts tap water (it is okay to use undiluted bleach if you wish). You may want to wear gloves since bleach can damage tissue. Let the solution sit for 30 minutes.  · Carefully pour all the liquid into the sanitary . Be sure to prevent the sharps from falling out.  · Once liquid is drained, reseal the container with lid and tape it shut with duct tape. This will prevent the cap from coming off.  · Dispose of the container with your regular household trash and waste. It is a good idea to let your  know that you will be disposing of sharps.  Document Released: 12/20/2004 Document Revised: 09/11/2013 Document Reviewed: 06/21/2010  [employer]® Patient Information ©2014 [employer].    Diabetes Mellitus and Food  It is important for you to manage your blood sugar (glucose) level. Your blood glucose level can be greatly affected by what you eat. Eating healthier foods in the appropriate amounts throughout the day at about the same time each day will help you control your blood glucose level. It can also help slow or prevent worsening of your diabetes mellitus. Healthy eating may even help you improve the level of your blood pressure and reach or maintain a healthy weight.   General recommendations for healthful eating and cooking habits include:  · Eating meals and snacks regularly. Avoid going long periods of time without eating to lose weight.  · Eating a diet that consists mainly of plant-based foods, such as fruits,  vegetables, nuts, legumes, and whole grains.  · Using low-heat cooking methods, such as baking, instead of high-heat cooking methods, such as deep frying.  Work with your dietitian to make sure you understand how to use the Nutrition Facts information on food labels.  HOW CAN FOOD AFFECT ME?  Carbohydrates  Carbohydrates affect your blood glucose level more than any other type of food. Your dietitian will help you determine how many carbohydrates to eat at each meal and teach you how to count carbohydrates. Counting carbohydrates is important to keep your blood glucose at a healthy level, especially if you are using insulin or taking certain medicines for diabetes mellitus.  Alcohol  Alcohol can cause sudden decreases in blood glucose (hypoglycemia), especially if you use insulin or take certain medicines for diabetes mellitus. Hypoglycemia can be a life-threatening condition. Symptoms of hypoglycemia (sleepiness, dizziness, and disorientation) are similar to symptoms of having too much alcohol.   If your health care provider has given you approval to drink alcohol, do so in moderation and use the following guidelines:  · Women should not have more than one drink per day, and men should not have more than two drinks per day. One drink is equal to:  ¨ 12 oz of beer.  ¨ 5 oz of wine.  ¨ 1½ oz of hard liquor.  · Do not drink on an empty stomach.  · Keep yourself hydrated. Have water, diet soda, or unsweetened iced tea.  · Regular soda, juice, and other mixers might contain a lot of carbohydrates and should be counted.  WHAT FOODS ARE NOT RECOMMENDED?  As you make food choices, it is important to remember that all foods are not the same. Some foods have fewer nutrients per serving than other foods, even though they might have the same number of calories or carbohydrates. It is difficult to get your body what it needs when you eat foods with fewer nutrients. Examples of foods that you should avoid that are high in  calories and carbohydrates but low in nutrients include:  · Trans fats (most processed foods list trans fats on the Nutrition Facts label).  · Regular soda.  · Juice.  · Candy.  · Sweets, such as cake, pie, doughnuts, and cookies.  · Fried foods.  WHAT FOODS CAN I EAT?  Eat nutrient-rich foods, which will nourish your body and keep you healthy. The food you should eat also will depend on several factors, including:  · The calories you need.  · The medicines you take.  · Your weight.  · Your blood glucose level.  · Your blood pressure level.  · Your cholesterol level.  You should eat a variety of foods, including:  · Protein.  ¨ Lean cuts of meat.  ¨ Proteins low in saturated fats, such as fish, egg whites, and beans. Avoid processed meats.  · Fruits and vegetables.  ¨ Fruits and vegetables that may help control blood glucose levels, such as apples, mangoes, and yams.  · Dairy products.  ¨ Choose fat-free or low-fat dairy products, such as milk, yogurt, and cheese.  · Grains, bread, pasta, and rice.  ¨ Choose whole grain products, such as multigrain bread, whole oats, and brown rice. These foods may help control blood pressure.  · Fats.  ¨ Foods containing healthful fats, such as nuts, avocado, olive oil, canola oil, and fish.  DOES EVERYONE WITH DIABETES MELLITUS HAVE THE SAME MEAL PLAN?  Because every person with diabetes mellitus is different, there is not one meal plan that works for everyone. It is very important that you meet with a dietitian who will help you create a meal plan that is just right for you.     This information is not intended to replace advice given to you by your health care provider. Make sure you discuss any questions you have with your health care provider.     Document Released: 09/14/2006 Document Revised: 01/08/2016 Document Reviewed: 11/14/2014  [employer] Interactive Patient Education ©2016 [employer]  Basic Carbohydrate Counting for Diabetes Mellitus  Carbohydrate counting is a  "method for keeping track of the amount of carbohydrates you eat. Eating carbohydrates naturally increases the level of sugar (glucose) in your blood, so it is important for you to know the amount that is okay for you to have in every meal. Carbohydrate counting helps keep the level of glucose in your blood within normal limits. The amount of carbohydrates allowed is different for every person. A dietitian can help you calculate the amount that is right for you. Once you know the amount of carbohydrates you can have, you can count the carbohydrates in the foods you want to eat.  Carbohydrates are found in the following foods:  · Grains, such as breads and cereals.  · Dried beans and soy products.  · Starchy vegetables, such as potatoes, peas, and corn.  · Fruit and fruit juices.  · Milk and yogurt.  · Sweets and snack foods, such as cake, cookies, candy, chips, soft drinks, and fruit drinks.  CARBOHYDRATE COUNTING  There are two ways to count the carbohydrates in your food. You can use either of the methods or a combination of both.  Reading the \"Nutrition Facts\" on Packaged Food  The \"Nutrition Facts\" is an area that is included on the labels of almost all packaged food and beverages in the [place]. It includes the serving size of that food or beverage and information about the nutrients in each serving of the food, including the grams (g) of carbohydrate per serving.   Decide the number of servings of this food or beverage that you will be able to eat or drink. Multiply that number of servings by the number of grams of carbohydrate that is listed on the label for that serving. The total will be the amount of carbohydrates you will be having when you eat or drink this food or beverage.  Learning Standard Serving Sizes of Food  When you eat food that is not packaged or does not include \"Nutrition Facts\" on the label, you need to measure the servings in order to count the amount of carbohydrates. A serving of " most carbohydrate-rich foods contains about 15 g of carbohydrates. The following list includes serving sizes of carbohydrate-rich foods that provide 15 g of carbohydrate per serving:    · 1 slice of bread (1 oz) or 1 six-inch tortilla.    · ½ of a hamburger bun or English muffin.  · 4-6 crackers.  · ¾ cup unsweetened dry cereal.    · ½ cup hot cereal.  ·  cup rice or pasta.    · ½ cup mashed potatoes or ¼ of a large baked potato.  · 1 cup fresh fruit or one small piece of fruit.    · ½ cup canned or frozen fruit or fruit juice.  · 1 cup milk.  ·  cup plain fat-free yogurt or yogurt sweetened with artificial sweeteners.  · ½ cup cooked dried beans or starchy vegetable, such as peas, corn, or potatoes.    Decide the number of standard-size servings that you will eat. Multiply that number of servings by 15 (the grams of carbohydrates in that serving). For example, if you eat 2 cups of strawberries, you will have eaten 2 servings and 30 g of carbohydrates (2 servings x 15 g = 30 g). For foods such as soups and casseroles, in which more than one food is mixed in, you will need to count the carbohydrates in each food that is included.  EXAMPLE OF CARBOHYDRATE COUNTING  Sample Dinner   · 3 oz chicken breast.  ·  cup of brown rice.  · ½ cup of corn.  · 1 cup milk.    · 1 cup strawberries with sugar-free whipped topping.    Carbohydrate Calculation   Step 1: Identify the foods that contain carbohydrates:   · Rice.    · Corn.    · Milk.    · Strawberries.  Step 2: Calculate the number of servings eaten of each:   · 2 servings of rice.    · 1 serving of corn.    · 1 serving of milk.    · 1 serving of strawberries.  Step 3:  Multiply each of those number of servings by 15 g:   · 2 servings of rice x 15 g = 30 g.    · 1 serving of corn x 15 g = 15 g.    · 1 serving of milk x 15 g = 15 g.    · 1 serving of strawberries x 15 g = 15 g.  Step 4: Add together all of the amounts to find the total grams of carbohydrates eaten: 30 g +  15 g + 15 g + 15 g = 75 g.     This information is not intended to replace advice given to you by your health care provider. Make sure you discuss any questions you have with your health care provider.     Document Released: 12/18/2006 Document Revised: 01/08/2016 Document Reviewed: 11/14/2014  [employer] Interactive Patient Education ©2016 [employer]    Depression / Suicide Risk    As you are discharged from this [facility] facility, it is important to learn how to keep safe from harming yourself.    Recognize the warning signs:  · Abrupt changes in personality, positive or negative- including increase in energy   · Giving away possessions  · Change in eating patterns- significant weight changes-  positive or negative  · Change in sleeping patterns- unable to sleep or sleeping all the time   · Unwillingness or inability to communicate  · Depression  · Unusual sadness, discouragement and loneliness  · Talk of wanting to die  · Neglect of personal appearance   · Rebelliousness- reckless behavior  · Withdrawal from people/activities they love  · Confusion- inability to concentrate     If you or a loved one observes any of these behaviors or has concerns about self-harm, here's what you can do:  · Talk about it- your feelings and reasons for harming yourself  · Remove any means that you might use to hurt yourself (examples: pills, rope, extension cords, firearm)  · Get professional help from the community (Mental Health, Substance Abuse, psychological counseling)  · Do not be alone:Call your Safe Contact- someone whom you trust who will be there for you.  · Call your local CRISIS HOTLINE 909-8220 or 077-050-6948  · Call your local Children's Mobile Crisis Response Team [place] (983) 450-8462 or www.VenJuvo  · Call the toll free National Suicide Prevention Hotlines   · National Suicide Prevention Lifeline 663-698-VENZ (5781)  · National Hope Line Network 800-SUICIDE (400-1543)

## 2017-09-23 NOTE — CARE PLAN
Problem: Nutritional:  Goal: Patient to verbalize or demonstrate understanding of diet  Outcome: NOT MET  Attempted to provide diet education multiple times - pt was unavailable for interview. Handout left at bedside w/ a visitor.

## 2017-09-24 ENCOUNTER — PATIENT OUTREACH (OUTPATIENT)
Dept: HEALTH INFORMATION MANAGEMENT | Facility: OTHER | Age: 67
End: 2017-09-24

## 2017-09-24 NOTE — PROGRESS NOTES
Placed discharge outreach phone call to patient s/p hospital discharge 9/23/17.  Left voicemail providing my contact information and instructions to call with any questions or concerns.

## 2017-09-28 ENCOUNTER — RESOLUTE PROFESSIONAL BILLING HOSPITAL PROF FEE (OUTPATIENT)
Dept: HOSPITALIST | Facility: MEDICAL CENTER | Age: 67
End: 2017-09-28
Payer: MEDICARE

## 2017-09-28 ENCOUNTER — APPOINTMENT (OUTPATIENT)
Dept: RADIOLOGY | Facility: MEDICAL CENTER | Age: 67
DRG: 885 | End: 2017-09-28
Attending: EMERGENCY MEDICINE
Payer: MEDICARE

## 2017-09-28 ENCOUNTER — HOSPITAL ENCOUNTER (INPATIENT)
Facility: MEDICAL CENTER | Age: 67
LOS: 6 days | DRG: 885 | End: 2017-10-06
Attending: EMERGENCY MEDICINE | Admitting: INTERNAL MEDICINE
Payer: MEDICARE

## 2017-09-28 DIAGNOSIS — R79.89 ELEVATED TROPONIN: ICD-10-CM

## 2017-09-28 DIAGNOSIS — I10 ESSENTIAL HYPERTENSION: ICD-10-CM

## 2017-09-28 DIAGNOSIS — R42 DIZZINESS: ICD-10-CM

## 2017-09-28 DIAGNOSIS — R42 LIGHTHEADEDNESS: ICD-10-CM

## 2017-09-28 LAB
ALBUMIN SERPL BCP-MCNC: 3.4 G/DL (ref 3.2–4.9)
ALBUMIN/GLOB SERPL: 0.9 G/DL
ALP SERPL-CCNC: 73 U/L (ref 30–99)
ALT SERPL-CCNC: 53 U/L (ref 2–50)
ANION GAP SERPL CALC-SCNC: 11 MMOL/L (ref 0–11.9)
AST SERPL-CCNC: 26 U/L (ref 12–45)
BASOPHILS # BLD AUTO: 0.4 % (ref 0–1.8)
BASOPHILS # BLD: 0.03 K/UL (ref 0–0.12)
BILIRUB SERPL-MCNC: 0.6 MG/DL (ref 0.1–1.5)
BNP SERPL-MCNC: 16 PG/ML (ref 0–100)
BUN SERPL-MCNC: 23 MG/DL (ref 8–22)
CALCIUM SERPL-MCNC: 9.1 MG/DL (ref 8.5–10.5)
CHLORIDE SERPL-SCNC: 102 MMOL/L (ref 96–112)
CO2 SERPL-SCNC: 23 MMOL/L (ref 20–33)
CREAT SERPL-MCNC: 1.27 MG/DL (ref 0.5–1.4)
EOSINOPHIL # BLD AUTO: 0.2 K/UL (ref 0–0.51)
EOSINOPHIL NFR BLD: 2.5 % (ref 0–6.9)
ERYTHROCYTE [DISTWIDTH] IN BLOOD BY AUTOMATED COUNT: 40.6 FL (ref 35.9–50)
GFR SERPL CREATININE-BSD FRML MDRD: 57 ML/MIN/1.73 M 2
GLOBULIN SER CALC-MCNC: 3.7 G/DL (ref 1.9–3.5)
GLUCOSE SERPL-MCNC: 129 MG/DL (ref 65–99)
HCT VFR BLD AUTO: 39.4 % (ref 42–52)
HGB BLD-MCNC: 14.1 G/DL (ref 14–18)
IMM GRANULOCYTES # BLD AUTO: 0.02 K/UL (ref 0–0.11)
IMM GRANULOCYTES NFR BLD AUTO: 0.2 % (ref 0–0.9)
LYMPHOCYTES # BLD AUTO: 1.43 K/UL (ref 1–4.8)
LYMPHOCYTES NFR BLD: 17.7 % (ref 22–41)
MAGNESIUM SERPL-MCNC: 1.8 MG/DL (ref 1.5–2.5)
MCH RBC QN AUTO: 31.6 PG (ref 27–33)
MCHC RBC AUTO-ENTMCNC: 35.8 G/DL (ref 33.7–35.3)
MCV RBC AUTO: 88.3 FL (ref 81.4–97.8)
MONOCYTES # BLD AUTO: 0.63 K/UL (ref 0–0.85)
MONOCYTES NFR BLD AUTO: 7.8 % (ref 0–13.4)
NEUTROPHILS # BLD AUTO: 5.79 K/UL (ref 1.82–7.42)
NEUTROPHILS NFR BLD: 71.4 % (ref 44–72)
NRBC # BLD AUTO: 0 K/UL
NRBC BLD AUTO-RTO: 0 /100 WBC
PLATELET # BLD AUTO: 293 K/UL (ref 164–446)
PMV BLD AUTO: 11.1 FL (ref 9–12.9)
POTASSIUM SERPL-SCNC: 3.2 MMOL/L (ref 3.6–5.5)
PROT SERPL-MCNC: 7.1 G/DL (ref 6–8.2)
RBC # BLD AUTO: 4.46 M/UL (ref 4.7–6.1)
SODIUM SERPL-SCNC: 136 MMOL/L (ref 135–145)
TROPONIN I SERPL-MCNC: 0.08 NG/ML (ref 0–0.04)
WBC # BLD AUTO: 8.1 K/UL (ref 4.8–10.8)

## 2017-09-28 PROCEDURE — 83735 ASSAY OF MAGNESIUM: CPT

## 2017-09-28 PROCEDURE — 99285 EMERGENCY DEPT VISIT HI MDM: CPT

## 2017-09-28 PROCEDURE — G0378 HOSPITAL OBSERVATION PER HR: HCPCS

## 2017-09-28 PROCEDURE — 83880 ASSAY OF NATRIURETIC PEPTIDE: CPT

## 2017-09-28 PROCEDURE — 84484 ASSAY OF TROPONIN QUANT: CPT

## 2017-09-28 PROCEDURE — 96374 THER/PROPH/DIAG INJ IV PUSH: CPT

## 2017-09-28 PROCEDURE — 700102 HCHG RX REV CODE 250 W/ 637 OVERRIDE(OP): Performed by: EMERGENCY MEDICINE

## 2017-09-28 PROCEDURE — 85025 COMPLETE CBC W/AUTO DIFF WBC: CPT

## 2017-09-28 PROCEDURE — 700101 HCHG RX REV CODE 250: Performed by: EMERGENCY MEDICINE

## 2017-09-28 PROCEDURE — 71020 DX-CHEST-2 VIEWS: CPT

## 2017-09-28 PROCEDURE — A9270 NON-COVERED ITEM OR SERVICE: HCPCS | Performed by: EMERGENCY MEDICINE

## 2017-09-28 PROCEDURE — 93005 ELECTROCARDIOGRAM TRACING: CPT | Performed by: EMERGENCY MEDICINE

## 2017-09-28 PROCEDURE — 80053 COMPREHEN METABOLIC PANEL: CPT

## 2017-09-28 RX ORDER — ASPIRIN 81 MG/1
324 TABLET, CHEWABLE ORAL ONCE
Status: COMPLETED | OUTPATIENT
Start: 2017-09-28 | End: 2017-09-28

## 2017-09-28 RX ORDER — LABETALOL HYDROCHLORIDE 5 MG/ML
10 INJECTION, SOLUTION INTRAVENOUS ONCE
Status: COMPLETED | OUTPATIENT
Start: 2017-09-28 | End: 2017-09-28

## 2017-09-28 RX ADMIN — ASPIRIN 324 MG: 81 TABLET, CHEWABLE ORAL at 23:07

## 2017-09-28 RX ADMIN — LABETALOL HYDROCHLORIDE 10 MG: 5 INJECTION, SOLUTION INTRAVENOUS at 23:09

## 2017-09-28 ASSESSMENT — PAIN SCALES - GENERAL: PAINLEVEL_OUTOF10: 0

## 2017-09-28 ASSESSMENT — LIFESTYLE VARIABLES: DO YOU DRINK ALCOHOL: NO

## 2017-09-29 PROBLEM — N18.9 CHRONIC KIDNEY DISEASE: Status: ACTIVE | Noted: 2017-09-29

## 2017-09-29 PROBLEM — Z86.73 HISTORY OF CVA (CEREBROVASCULAR ACCIDENT): Status: ACTIVE | Noted: 2017-09-29

## 2017-09-29 PROBLEM — I10 HYPERTENSION: Status: ACTIVE | Noted: 2017-09-29

## 2017-09-29 PROBLEM — R79.89 TROPONIN LEVEL ELEVATED: Status: ACTIVE | Noted: 2017-09-29

## 2017-09-29 LAB
EKG IMPRESSION: NORMAL
GLUCOSE BLD-MCNC: 145 MG/DL (ref 65–99)
GLUCOSE BLD-MCNC: 154 MG/DL (ref 65–99)
GLUCOSE BLD-MCNC: 161 MG/DL (ref 65–99)
GLUCOSE BLD-MCNC: 210 MG/DL (ref 65–99)
TROPONIN I SERPL-MCNC: <0.01 NG/ML (ref 0–0.04)

## 2017-09-29 PROCEDURE — 90670 PCV13 VACCINE IM: CPT | Performed by: INTERNAL MEDICINE

## 2017-09-29 PROCEDURE — 99220 PR INITIAL OBSERVATION CARE,LEVL III: CPT | Performed by: INTERNAL MEDICINE

## 2017-09-29 PROCEDURE — 84484 ASSAY OF TROPONIN QUANT: CPT

## 2017-09-29 PROCEDURE — G0378 HOSPITAL OBSERVATION PER HR: HCPCS

## 2017-09-29 PROCEDURE — A9270 NON-COVERED ITEM OR SERVICE: HCPCS | Performed by: INTERNAL MEDICINE

## 2017-09-29 PROCEDURE — 97161 PT EVAL LOW COMPLEX 20 MIN: CPT

## 2017-09-29 PROCEDURE — 3E0234Z INTRODUCTION OF SERUM, TOXOID AND VACCINE INTO MUSCLE, PERCUTANEOUS APPROACH: ICD-10-PCS | Performed by: INTERNAL MEDICINE

## 2017-09-29 PROCEDURE — G8978 MOBILITY CURRENT STATUS: HCPCS | Mod: CJ

## 2017-09-29 PROCEDURE — 700102 HCHG RX REV CODE 250 W/ 637 OVERRIDE(OP): Performed by: INTERNAL MEDICINE

## 2017-09-29 PROCEDURE — 93010 ELECTROCARDIOGRAM REPORT: CPT | Performed by: INTERNAL MEDICINE

## 2017-09-29 PROCEDURE — 93005 ELECTROCARDIOGRAM TRACING: CPT | Performed by: INTERNAL MEDICINE

## 2017-09-29 PROCEDURE — 90471 IMMUNIZATION ADMIN: CPT

## 2017-09-29 PROCEDURE — 700111 HCHG RX REV CODE 636 W/ 250 OVERRIDE (IP): Performed by: INTERNAL MEDICINE

## 2017-09-29 PROCEDURE — G8979 MOBILITY GOAL STATUS: HCPCS | Mod: CI

## 2017-09-29 PROCEDURE — 82962 GLUCOSE BLOOD TEST: CPT | Mod: 91

## 2017-09-29 RX ORDER — LISINOPRIL 20 MG/1
40 TABLET ORAL 2 TIMES DAILY
Status: DISCONTINUED | OUTPATIENT
Start: 2017-09-29 | End: 2017-10-06 | Stop reason: HOSPADM

## 2017-09-29 RX ORDER — POTASSIUM CHLORIDE 20 MEQ/1
20 TABLET, EXTENDED RELEASE ORAL 2 TIMES DAILY
Status: COMPLETED | OUTPATIENT
Start: 2017-09-29 | End: 2017-09-30

## 2017-09-29 RX ORDER — CLONIDINE HYDROCHLORIDE 0.1 MG/1
0.3 TABLET ORAL 2 TIMES DAILY
Status: DISCONTINUED | OUTPATIENT
Start: 2017-09-29 | End: 2017-10-03 | Stop reason: SINTOL

## 2017-09-29 RX ORDER — MAGNESIUM SULFATE HEPTAHYDRATE 40 MG/ML
2 INJECTION, SOLUTION INTRAVENOUS ONCE
Status: COMPLETED | OUTPATIENT
Start: 2017-09-29 | End: 2017-09-29

## 2017-09-29 RX ORDER — DOXAZOSIN 2 MG/1
8 TABLET ORAL
Status: DISCONTINUED | OUTPATIENT
Start: 2017-09-29 | End: 2017-10-06 | Stop reason: HOSPADM

## 2017-09-29 RX ORDER — FELODIPINE 10 MG/1
10 TABLET, EXTENDED RELEASE ORAL EVERY MORNING
Status: DISCONTINUED | OUTPATIENT
Start: 2017-09-29 | End: 2017-10-06 | Stop reason: HOSPADM

## 2017-09-29 RX ORDER — INSULIN GLARGINE 100 [IU]/ML
20 INJECTION, SOLUTION SUBCUTANEOUS EVERY EVENING
Status: DISCONTINUED | OUTPATIENT
Start: 2017-09-29 | End: 2017-10-06 | Stop reason: HOSPADM

## 2017-09-29 RX ORDER — DEXTROSE MONOHYDRATE 25 G/50ML
25 INJECTION, SOLUTION INTRAVENOUS
Status: DISCONTINUED | OUTPATIENT
Start: 2017-09-29 | End: 2017-10-06 | Stop reason: HOSPADM

## 2017-09-29 RX ORDER — AMOXICILLIN 250 MG
2 CAPSULE ORAL 2 TIMES DAILY
Status: DISCONTINUED | OUTPATIENT
Start: 2017-09-29 | End: 2017-10-06 | Stop reason: HOSPADM

## 2017-09-29 RX ORDER — BISACODYL 10 MG
10 SUPPOSITORY, RECTAL RECTAL
Status: DISCONTINUED | OUTPATIENT
Start: 2017-09-29 | End: 2017-10-06 | Stop reason: HOSPADM

## 2017-09-29 RX ORDER — POLYETHYLENE GLYCOL 3350 17 G/17G
1 POWDER, FOR SOLUTION ORAL
Status: DISCONTINUED | OUTPATIENT
Start: 2017-09-29 | End: 2017-10-06 | Stop reason: HOSPADM

## 2017-09-29 RX ORDER — ACETAMINOPHEN 325 MG/1
650 TABLET ORAL EVERY 6 HOURS PRN
Status: DISCONTINUED | OUTPATIENT
Start: 2017-09-29 | End: 2017-10-06 | Stop reason: HOSPADM

## 2017-09-29 RX ORDER — HYDROCHLOROTHIAZIDE 25 MG/1
25 TABLET ORAL EVERY MORNING
Status: DISCONTINUED | OUTPATIENT
Start: 2017-09-29 | End: 2017-10-06 | Stop reason: HOSPADM

## 2017-09-29 RX ORDER — POTASSIUM CITRATE 10 MEQ/1
40 TABLET, EXTENDED RELEASE ORAL EVERY MORNING
Status: DISCONTINUED | OUTPATIENT
Start: 2017-09-29 | End: 2017-09-29

## 2017-09-29 RX ORDER — LABETALOL HYDROCHLORIDE 5 MG/ML
10-20 INJECTION, SOLUTION INTRAVENOUS EVERY 4 HOURS PRN
Status: DISCONTINUED | OUTPATIENT
Start: 2017-09-29 | End: 2017-10-06 | Stop reason: HOSPADM

## 2017-09-29 RX ORDER — DIPYRIDAMOLE 25 MG
25 TABLET ORAL 2 TIMES DAILY
Status: DISCONTINUED | OUTPATIENT
Start: 2017-09-29 | End: 2017-10-06 | Stop reason: HOSPADM

## 2017-09-29 RX ORDER — ATORVASTATIN CALCIUM 40 MG/1
40 TABLET, FILM COATED ORAL EVERY MORNING
Status: DISCONTINUED | OUTPATIENT
Start: 2017-09-29 | End: 2017-10-06 | Stop reason: HOSPADM

## 2017-09-29 RX ORDER — HYDRALAZINE HYDROCHLORIDE 20 MG/ML
20 INJECTION INTRAMUSCULAR; INTRAVENOUS EVERY 6 HOURS PRN
Status: DISCONTINUED | OUTPATIENT
Start: 2017-09-29 | End: 2017-10-01

## 2017-09-29 RX ADMIN — POTASSIUM CITRATE 40 MEQ: 10 TABLET, EXTENDED RELEASE ORAL at 04:40

## 2017-09-29 RX ADMIN — STANDARDIZED SENNA CONCENTRATE AND DOCUSATE SODIUM 2 TABLET: 8.6; 5 TABLET, FILM COATED ORAL at 21:00

## 2017-09-29 RX ADMIN — HYDRALAZINE HYDROCHLORIDE 20 MG: 20 INJECTION INTRAMUSCULAR; INTRAVENOUS at 02:00

## 2017-09-29 RX ADMIN — INSULIN GLARGINE 20 UNITS: 100 INJECTION, SOLUTION SUBCUTANEOUS at 21:06

## 2017-09-29 RX ADMIN — INSULIN LISPRO 1 UNITS: 100 INJECTION, SOLUTION INTRAVENOUS; SUBCUTANEOUS at 21:06

## 2017-09-29 RX ADMIN — LISINOPRIL 40 MG: 20 TABLET ORAL at 02:47

## 2017-09-29 RX ADMIN — HYDRALAZINE HYDROCHLORIDE 20 MG: 20 INJECTION INTRAMUSCULAR; INTRAVENOUS at 16:26

## 2017-09-29 RX ADMIN — FELODIPINE 10 MG: 10 TABLET, EXTENDED RELEASE ORAL at 08:31

## 2017-09-29 RX ADMIN — DOXAZOSIN 8 MG: 2 TABLET ORAL at 20:58

## 2017-09-29 RX ADMIN — CLONIDINE HYDROCHLORIDE 0.3 MG: 0.1 TABLET ORAL at 20:59

## 2017-09-29 RX ADMIN — CLONIDINE HYDROCHLORIDE 0.3 MG: 0.1 TABLET ORAL at 11:29

## 2017-09-29 RX ADMIN — ENOXAPARIN SODIUM 40 MG: 100 INJECTION SUBCUTANEOUS at 08:29

## 2017-09-29 RX ADMIN — PNEUMOCOCCAL 13-VALENT CONJUGATE VACCINE 0.5 ML: 2.2; 2.2; 2.2; 2.2; 2.2; 4.4; 2.2; 2.2; 2.2; 2.2; 2.2; 2.2; 2.2 INJECTION, SUSPENSION INTRAMUSCULAR at 05:58

## 2017-09-29 RX ADMIN — STANDARDIZED SENNA CONCENTRATE AND DOCUSATE SODIUM 2 TABLET: 8.6; 5 TABLET, FILM COATED ORAL at 02:47

## 2017-09-29 RX ADMIN — ASPIRIN 81 MG: 81 TABLET, COATED ORAL at 08:28

## 2017-09-29 RX ADMIN — HYDROCHLOROTHIAZIDE 25 MG: 25 TABLET ORAL at 08:31

## 2017-09-29 RX ADMIN — MAGNESIUM SULFATE IN WATER 2 G: 40 INJECTION, SOLUTION INTRAVENOUS at 05:54

## 2017-09-29 RX ADMIN — POTASSIUM CHLORIDE 20 MEQ: 1500 TABLET, EXTENDED RELEASE ORAL at 21:00

## 2017-09-29 RX ADMIN — INSULIN LISPRO 2 UNITS: 100 INJECTION, SOLUTION INTRAVENOUS; SUBCUTANEOUS at 06:04

## 2017-09-29 RX ADMIN — CLONIDINE HYDROCHLORIDE 0.3 MG: 0.1 TABLET ORAL at 02:47

## 2017-09-29 RX ADMIN — LISINOPRIL 40 MG: 20 TABLET ORAL at 20:59

## 2017-09-29 RX ADMIN — POTASSIUM CHLORIDE 20 MEQ: 1500 TABLET, EXTENDED RELEASE ORAL at 11:31

## 2017-09-29 RX ADMIN — DIPYRIDAMOLE 25 MG: 25 TABLET, FILM COATED ORAL at 12:40

## 2017-09-29 RX ADMIN — ATORVASTATIN CALCIUM 40 MG: 40 TABLET, FILM COATED ORAL at 08:28

## 2017-09-29 RX ADMIN — DIPYRIDAMOLE 25 MG: 25 TABLET, FILM COATED ORAL at 21:00

## 2017-09-29 RX ADMIN — INSULIN LISPRO 1 UNITS: 100 INJECTION, SOLUTION INTRAVENOUS; SUBCUTANEOUS at 11:37

## 2017-09-29 RX ADMIN — LISINOPRIL 40 MG: 20 TABLET ORAL at 11:31

## 2017-09-29 ASSESSMENT — PATIENT HEALTH QUESTIONNAIRE - PHQ9
1. LITTLE INTEREST OR PLEASURE IN DOING THINGS: NOT AT ALL
SUM OF ALL RESPONSES TO PHQ QUESTIONS 1-9: 0
1. LITTLE INTEREST OR PLEASURE IN DOING THINGS: NOT AT ALL
SUM OF ALL RESPONSES TO PHQ9 QUESTIONS 1 AND 2: 0
SUM OF ALL RESPONSES TO PHQ9 QUESTIONS 1 AND 2: 0
2. FEELING DOWN, DEPRESSED, IRRITABLE, OR HOPELESS: NOT AT ALL
SUM OF ALL RESPONSES TO PHQ QUESTIONS 1-9: 0
2. FEELING DOWN, DEPRESSED, IRRITABLE, OR HOPELESS: NOT AT ALL

## 2017-09-29 ASSESSMENT — COGNITIVE AND FUNCTIONAL STATUS - GENERAL
MOBILITY SCORE: 22
SUGGESTED CMS G CODE MODIFIER MOBILITY: CJ
SUGGESTED CMS G CODE MODIFIER MOBILITY: CH
WALKING IN HOSPITAL ROOM: A LITTLE
DAILY ACTIVITIY SCORE: 24
SUGGESTED CMS G CODE MODIFIER DAILY ACTIVITY: CH
SUGGESTED CMS G CODE MODIFIER DAILY ACTIVITY: CH
MOBILITY SCORE: 24
CLIMB 3 TO 5 STEPS WITH RAILING: A LITTLE
SUGGESTED CMS G CODE MODIFIER MOBILITY: CH
DAILY ACTIVITIY SCORE: 24
MOBILITY SCORE: 24

## 2017-09-29 ASSESSMENT — ENCOUNTER SYMPTOMS
DIZZINESS: 1
HEMOPTYSIS: 0
FEVER: 0
MYALGIAS: 0
COUGH: 0
HEADACHES: 0
PALPITATIONS: 0
CHILLS: 0
BLURRED VISION: 0

## 2017-09-29 ASSESSMENT — GAIT ASSESSMENTS
GAIT LEVEL OF ASSIST: STAND BY ASSIST
ASSISTIVE DEVICE: FRONT WHEEL WALKER
DISTANCE (FEET): 100

## 2017-09-29 ASSESSMENT — PAIN SCALES - GENERAL
PAINLEVEL_OUTOF10: 0

## 2017-09-29 ASSESSMENT — LIFESTYLE VARIABLES: EVER_SMOKED: YES

## 2017-09-29 NOTE — ED NOTES
Pt states was given atibuse with other Rx. States dizziness started 8 days after stopping the antibuse. Has been on and off for 3 weeks now. States today was worse and has lasted all day. Worse with change in position. Described as pt feeling like his head is spinning.

## 2017-09-29 NOTE — ASSESSMENT & PLAN NOTE
Troponin was mildly elevated, most likely demand ischemia repeat trop negative   Patient denies chest pain  There are no specific signs of ischemia on the EKG

## 2017-09-29 NOTE — ASSESSMENT & PLAN NOTE
recent negative stroke workup, included an MRI, carotid ultrasound, echo  No  focal neurological deficit.  Sx could be 2/2 BP vs mood vs vertigo or combination thereof  Now resolved  Patient is placed on meclizine  Complete resolution of symptoms including improvement in mood  Positive orthostatics  Patient refusing PT evaluation  Encourage PT OT education

## 2017-09-29 NOTE — PROGRESS NOTES
MD Argelia paged RT BP. orders RC'd. Patient asymptomatic and resting. No needs verbalized at this time. Bed low locked, SRx2, call bell within reach, Non skid socks on.

## 2017-09-29 NOTE — CARE PLAN
Problem: Safety  Goal: Will remain free from injury  Patient remained free of falls or injury during shift.    Problem: Infection  Goal: Will remain free from infection  Outcome: PROGRESSING AS EXPECTED  No new signs or symptoms of infection noted during shift

## 2017-09-29 NOTE — PROGRESS NOTES
MD Argelia notified of BP trends. Patient asymptomatic and resting. No needs verbalized at this time. Bed low locked, SRx2, call bell within reach, Non skid socks on,

## 2017-09-29 NOTE — ASSESSMENT & PLAN NOTE
Essential  Not controlled, with sensation of impending doom  -Episode of hypotension after metoprolol and initiation, unclear etiology  We'll continue to monitor decreased dose of metoprolol, add. Holding parameters  Discussed with patient and niece at bedside regarding multiple antihypertensives with need for close blood pressure monitoring, home monitoring  Patient appeared disinterested in discussion, RN will further discuss with niece regarding education about blood pressure control and multiple antihypertensives  catapress can cause psychosis issues also- discontinued and Minoxidil added  -Hold hydralazine    On lisinopril    -May need further cardiac monitoring if patient develops recurrent hypotension with ongoing antihypertensive administration for close adjustment and monitoring  Clonidine has been held secondary to effects of rebound hypertension, or hypotension    10/5 improved blood pressure, patient released using metoprolol and minoxidil  I have discussed with patient regarding further blood pressure management versus follow-up with his primary care provider for for further antihypertensive medication adjustment

## 2017-09-29 NOTE — ED PROVIDER NOTES
ED Provider Note    ER PROVIDER NOTE    Scribed for Josue Monroe M.D.  by Josue Monroe. 9/28/2017 at 8:59 PM.    Primary Care Provider: Antione Coley M.D.  Means of Arrival: EMS  History obtained from: pt   History limited by: none     CHIEF COMPLAINT  Chief Complaint   Patient presents with   • Dizziness     on and off x 3 weeks, this eisode all day today       HPI  Jean Barboza is a 66 y.o. male who presents to the emergency department complaining ofDizziness. Patient reports he has had this dizziness over 3 weeks although seemed worse today. It is fairly constant although it is significantly worse with positional changes unsure of one side or another. He denies any nausea or vomiting. No headaches. No neck pain. No chest pain or difficulty breathing. No focal weakness numbness or tingling. Patient had recent admission for same    REVIEW OF SYSTEMS  Pertinent positives include dizziness. Pertinent negatives include no headache. See HPI for details. All other systems reviewed and are negative.    PAST MEDICAL HISTORY   has a past medical history of CATARACT; Diabetes (2005); Hyperlipidemia; Hypertension; Seizure (CMS-HCC) (1987); Seizure disorder (CMS-HCC); Snoring; and Stroke (CMS-HCC).    SURGICAL HISTORY   has a past surgical history that includes other (2003); other (2011); cataract phaco with iol (4/20/2011); cataract phaco with iol (5/4/2011); and cataract extraction with iol (2011).    FAMILY HISTORY  History reviewed. No pertinent family history.    SOCIAL HISTORY  Social History     Social History   • Marital status:      Spouse name: N/A   • Number of children: N/A   • Years of education: N/A     Social History Main Topics   • Smoking status: Former Smoker     Packs/day: 0.50     Years: 4.00     Types: Cigarettes     Quit date: 2/2/1985   • Smokeless tobacco: Never Used   • Alcohol use No   • Drug use: No   • Sexual activity: Not on file     Other Topics Concern   • Not on file  "    Social History Narrative   • No narrative on file      History   Drug Use No       CURRENT MEDICATIONS  Home Medications     Reviewed by Amada Joe R.N. (Registered Nurse) on 09/28/17 at 2053  Med List Status: Complete   Medication Last Dose Status   acetaminophen (TYLENOL) 325 MG Tab 9/20/2017 Active   aspirin EC (ECOTRIN) 81 MG Tablet Delayed Response 9/21/2017 Active   atorvastatin (LIPITOR) 20 MG Tab  Active   clonidine (CATAPRESS) 0.3 MG Tab  Active   dipyridamole (PERSANTINE) 25 MG Tab 9/21/2017 Active   doxazosin (CARDURA) 8 MG tablet 9/20/2017 Active   felodipine (PLENDIL) 10 MG TABLET SR 24 HR 9/21/2017 Active   hydrochlorothiazide (HYDRODIURIL) 25 MG Tab 9/21/2017 Active   insulin detemir (LEVEMIR) 100 UNIT/ML Solution Pen-injector injection  Active   insulin lispro, Human, (HUMALOG) 100 UNIT/ML Solution Pen-injector injection  Active   lisinopril (PRINIVIL, ZESTRIL) 40 MG tablet 9/21/2017 Active   Omega-3 Fatty Acids (FISH OIL) 1000 MG Cap capsule 9/21/2017 Active   potassium citrate SR (UROCIT-K SR) 10 MEQ (1080 MG) Tab CR 9/21/2017 Active                ALLERGIES  Allergies   Allergen Reactions   • Penicillin G Rash     Rxn - Exacerbated a rash on his legs  Early 2000's         PHYSICAL EXAM  VITAL SIGNS: BP (!) 191/91   Pulse 72   Temp 37 °C (98.6 °F)   Resp 18   Ht 1.727 m (5' 8\") Comment: Simultaneous filing. User may not have seen previous data.  Wt 96.6 kg (213 lb) Comment: Simultaneous filing. User may not have seen previous data.  SpO2 95%   BMI 32.39 kg/m²   Pulse ox interpretation: I interpret this pulse ox as normal.    Constitutional: Alert in no apparent distress.  HENT: No signs of trauma, Bilateral external ears normal, Nose normal.   Eyes: Pupils are equal and reactive, Conjunctiva normal, Non-icteric.   Neck: Normal range of motion, No tenderness, Supple, No stridor.   Lymphatic: No lymphadenopathy noted.   Cardiovascular: Regular rate and rhythm, no murmurs.   Thorax & " Lungs: Normal breath sounds, No respiratory distress, No wheezing, No chest tenderness.   Abdomen: Bowel sounds normal, Soft, No tenderness, No masses, No pulsatile masses. No peritoneal signs.  Skin: Warm, Dry, No erythema, No rash.   Back: No bony tenderness, No CVA tenderness.   Extremities: Intact distal pulses, No edema, No tenderness, No cyanosis, Negative Cathy's sign.  Musculoskeletal: Good range of motion in all major joints. No tenderness to palpation or major deformities noted.   Neurologic: Alert, cranial nerves intact, speech is appropriate or not slurred, upper extremities bilaterally exhibit no drift, no dysmetria, 5 out of 5 strength with bilateral bicep/tricep/, sensation intact to light touch throughout upper extremities. Lower extremities strength 5 out of 5 thigh extension/flexion/abduction/adduction, knee extension/flexion, dorsiflexion plantar flexion. No clonus.  2+ patella reflexes.  sensation intact to light touch.  No focal deficits noted. Ambulates with steady gait, steady tandem gait HINTS wnl  Psychiatric: Affect normal, Judgment normal, Mood normal.       DIAGNOSTIC STUDIES / PROCEDURES    EKG  Interpreted by me    Rhythm:  Normal sinus rhythm   Rate: 71  Axis: normal  Intervals: normal  Ectopy: none  Conduction: normal  ST Segments: no acute change  T Waves: T wave flattening inferiorly  Q Waves: none  Old EKG from September 2017 shows no significant changes.    LABS  Results for orders placed or performed during the hospital encounter of 09/28/17   Troponin STAT   Result Value Ref Range    Troponin I 0.08 (H) 0.00 - 0.04 ng/mL   Complete Metabolic Panel (CMP)   Result Value Ref Range    Sodium 136 135 - 145 mmol/L    Potassium 3.2 (L) 3.6 - 5.5 mmol/L    Chloride 102 96 - 112 mmol/L    Co2 23 20 - 33 mmol/L    Anion Gap 11.0 0.0 - 11.9    Glucose 129 (H) 65 - 99 mg/dL    Bun 23 (H) 8 - 22 mg/dL    Creatinine 1.27 0.50 - 1.40 mg/dL    Calcium 9.1 8.5 - 10.5 mg/dL    AST(SGOT) 26 12  - 45 U/L    ALT(SGPT) 53 (H) 2 - 50 U/L    Alkaline Phosphatase 73 30 - 99 U/L    Total Bilirubin 0.6 0.1 - 1.5 mg/dL    Albumin 3.4 3.2 - 4.9 g/dL    Total Protein 7.1 6.0 - 8.2 g/dL    Globulin 3.7 (H) 1.9 - 3.5 g/dL    A-G Ratio 0.9 g/dL   CBC w/ Differential   Result Value Ref Range    WBC 8.1 4.8 - 10.8 K/uL    RBC 4.46 (L) 4.70 - 6.10 M/uL    Hemoglobin 14.1 14.0 - 18.0 g/dL    Hematocrit 39.4 (L) 42.0 - 52.0 %    MCV 88.3 81.4 - 97.8 fL    MCH 31.6 27.0 - 33.0 pg    MCHC 35.8 (H) 33.7 - 35.3 g/dL    RDW 40.6 35.9 - 50.0 fL    Platelet Count 293 164 - 446 K/uL    MPV 11.1 9.0 - 12.9 fL    Neutrophils-Polys 71.40 44.00 - 72.00 %    Lymphocytes 17.70 (L) 22.00 - 41.00 %    Monocytes 7.80 0.00 - 13.40 %    Eosinophils 2.50 0.00 - 6.90 %    Basophils 0.40 0.00 - 1.80 %    Immature Granulocytes 0.20 0.00 - 0.90 %    Nucleated RBC 0.00 /100 WBC    Neutrophils (Absolute) 5.79 1.82 - 7.42 K/uL    Lymphs (Absolute) 1.43 1.00 - 4.80 K/uL    Monos (Absolute) 0.63 0.00 - 0.85 K/uL    Eos (Absolute) 0.20 0.00 - 0.51 K/uL    Baso (Absolute) 0.03 0.00 - 0.12 K/uL    Immature Granulocytes (abs) 0.02 0.00 - 0.11 K/uL    NRBC (Absolute) 0.00 K/uL   Magnesium   Result Value Ref Range    Magnesium 1.8 1.5 - 2.5 mg/dL   ESTIMATED GFR   Result Value Ref Range    GFR If African American >60 >60 mL/min/1.73 m 2    GFR If Non  57 (A) >60 mL/min/1.73 m 2   EKG (ER)   Result Value Ref Range    Report       [facility] Emergency Dept.    Test Date:  2017  Pt Name:    DARRELL KOWALSKI                  Department: ER  MRN:        [id number]                      Room:       [facility]  Gender:     M                            Technician: [id number]  :        [id number]                   Requested By:AMY CARTER  Order #:    [id number]                    Reading MD:    Measurements  Intervals                                Axis  Rate:       71                           P:          59  NH:         180                           QRS:        13  QRSD:       86                           T:          -38  QT:         392  QTc:        426    Interpretive Statements  SINUS RHYTHM  NONSPECIFIC T ABNORMALITIES, INFERIOR LEADS  Compared to ECG 09/21/2017 14:53:39  No significant changes         All labs reviewed by me.    RADIOLOGY  DX-CHEST-2 VIEWS    (Results Pending)     The radiologist's interpretation of all radiological studies have been reviewed by me.    COURSE & MEDICAL DECISION MAKING  Nursing notes, VS, PMSFHx reviewed in chart.    8:59 PM Patient seen and examined at bedside.  Ordered forECG, blood work to evaluate his symptoms.     10:44 PM  Patient denies any chest pain, reevaluated, updated on results, then for admission    Discussed case with Dr. Hernandez will admit the patient    Decision Making:  This is a 66 y.o. male presenting with dizziness. This does seem more lightheadedness and he did have a recent admission with rule out CVA. I think this is likely secondary to his poorly controlled hypertension, and he does addition he now have an elevation in his troponin. Has no chest pain or new ECG findings but with his hypertension that is poorly controlled as well as diabetes and lack of prior cardiac workup he'll be admitted for her care.  Has been given aspirin in the emergency department as well as labetalol.       Patient is admitted in stable condition    FINAL IMPRESSION  1. Lightheadedness    2. Essential hypertension    3. Elevated troponin           The note accurately reflects work and decisions made by me.  Josue Monroe  9/28/2017  11:40 PM

## 2017-09-29 NOTE — THERAPY
"65 y/o male adm for reports of dizziness, unk etiology, recent intake of medication , negative for new CVA. 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 Pt  withreports of dizziness with right sided head turn and flexion /ext of the head. Requires use of FWW fos safe ambulation. Acute PT toa ddress functional mobility deficits for pt to achieve higher level of function and fall prevention.    Physical Therapy Evaluation completed.   Bed Mobility:  Supine to Sit: Supervised  Transfers: Sit to Stand: Supervised  Gait: Level Of Assist: Stand by Assist with Front-Wheel Walker       Plan of Care: Will benefit from Physical Therapy 3 times per week  Discharge Recommendations: Equipment: Will Continue to Assess for Equipment Needs. Post-acute therapy Discharge to home with outpatient or home health for additional skilled therapy services.    See \"Rehab Therapy-Acute\" Patient Summary Report for complete documentation.     "

## 2017-09-29 NOTE — H&P
"HOSPITAL MEDICINE HISTORY/ PHYSICAL    Date of Service:  9/29/2017   2:49 AM       Patient ID:   Name: Jean Barboza. YOB: 1950. Age: 66 y.o. male. MRN: [id number]    Admitting Attending:  Antwan Saravia     PCP : Antione Coley M.D.          Chief Complaint:       Dizziness    History of Present Illness:    Jabari is a 66 y.o. male w/h/o diabetes on insulin, CVA 8 years ago, dyslipidemia, who presents with complaints of dizziness for the last 2-1/2 weeks.   Patient was evaluated recently here for the same. He he was not found to have any acute stroke based on evaluation. Patient is very poor historian. He states that he was told to come to ER if dizziness gotten worse. Review of systems positive for fatigue, chronic hearing loss, chronic tinnitus bilaterally poor short-term memory. On presentation, his blood pressure is 183/ 98, he reports being compliant with his blood pressure medications.  According to him, dizziness is brought up by turning his head any side. When he closes his eyes he feels like everything is spinning  Around. At the same time, he reports fatigue.  He provides me with his inconsistent history. He states that he took \"a bottle of antabus\"  today in AM by mistake. He was prescribed it long time ago. He denies any use of alcohol currently.      Review of Systems:    Has ROS  Please see HPI, all other systems were reviewed and are negative (AMA/CMS criteria)              Past Medical/ Family / Social history (PFSH):   Past Medical History:   Diagnosis Date   • Diabetes 2005    oral meds   • Seizure (CMS-HCC) 1987   • CATARACT    • Hyperlipidemia    • Hypertension    • Seizure disorder (CMS-HCC)    • Snoring    • Stroke (CMS-[facility])      Past Surgical History:   Procedure Laterality Date   • CATARACT PHACO WITH IOL  5/4/2011    Performed by ENRIQUETA MABRY at SURGERY SAME DAY [facility]   • CATARACT PHACO WITH IOL  4/20/2011    Performed by ENRIQUETA MABRY at SURGERY SAME " DAY [facility]   • OTHER  2011    left eye cataract   • CATARACT EXTRACTION WITH IOL  2011   • OTHER  2003    oral surgery     Current Outpatient Medications:  No current facility-administered medications on file prior to encounter.      Current Outpatient Prescriptions on File Prior to Encounter   Medication Sig Dispense Refill   • atorvastatin (LIPITOR) 20 MG Tab Take 2 Tabs by mouth every morning. 30 Tab 2   • clonidine (CATAPRESS) 0.3 MG Tab Take 1 Tab by mouth 2 Times a Day. 60 Tab 3   • insulin lispro, Human, (HUMALOG) 100 UNIT/ML Solution Pen-injector injection Inject 3-12 Units as instructed 3 times a day before meals. For glucose:  70   - 150  mg/dL =      0 Units  151 - 200  mg/dL =    3 Units  201 - 250  mg/dL =    4 Units  251 - 300  mg/dL  =   7 Units  301 - 350  mg/dL  =   8 Units  351 - 400 mg/dL   =   10 Units  Over 400 mg/dL   =   12 Units 3 PEN 3   • insulin detemir (LEVEMIR) 100 UNIT/ML Solution Pen-injector injection Inject 20 Units as instructed every evening. 3 PEN 3   • aspirin EC (ECOTRIN) 81 MG Tablet Delayed Response Take 81 mg by mouth every morning.     • Omega-3 Fatty Acids (FISH OIL) 1000 MG Cap capsule Take 1,000 mg by mouth 2 Times a Day.     • hydrochlorothiazide (HYDRODIURIL) 25 MG Tab Take 25 mg by mouth every morning.     • lisinopril (PRINIVIL, ZESTRIL) 40 MG tablet Take 40 mg by mouth 2 times a day.     • felodipine (PLENDIL) 10 MG TABLET SR 24 HR Take 10 mg by mouth every morning.     • doxazosin (CARDURA) 8 MG tablet Take 8 mg by mouth every evening.     • potassium citrate SR (UROCIT-K SR) 10 MEQ (1080 MG) Tab CR Take 10 mEq by mouth every morning.     • dipyridamole (PERSANTINE) 25 MG Tab Take 25 mg by mouth 2 Times a Day.       Medication Allergy/Sensitivities:  Allergies   Allergen Reactions   • Penicillin G Rash     Rxn - Exacerbated a rash on his legs  Early 2000's       Family History:  History reviewed. No pertinent family history.   Social History:  Social History  "  Substance Use Topics   • Smoking status: Former Smoker     Packs/day: 0.50     Years: 4.00     Types: Cigarettes     Quit date: 1985   • Smokeless tobacco: Never Used   • Alcohol use No     #################################################################  Physical Exam:   Vitals/ General Appearance:   Weight/BMI: Body mass index is 32.39 kg/m².  Blood pressure (!) 184/94, pulse 95, temperature 36.5 °C (97.7 °F), resp. rate 16, height 1.727 m (5' 8\"), weight 96.6 kg (213 lb), SpO2 93 %.   Vitals:    17 0100 17 0115 17 0148 17 0231   BP:       Pulse: 65 63 63 95   Resp:   16    Temp:   36.5 °C (97.7 °F)    SpO2: 95% 95% 93%    Weight:       Height:        Oxygen Therapy:  Pulse Oximetry: 93 %, O2 Delivery: None (Room Air)    Constitutional:  well developed, well nourished  HENMT: Normocephalic, atraumatic, b/l ears normal, nose normal  Eyes:  EOMI, conjunctiva normal, no discharge  Neck: no tracheal deviation, supple  Cardiovascular: normal heart rate, normal rhythm, no murmurs, no rubs or gallops; no cyanosis, clubbing or edema  Lungs: Respiratory effort is normal, normal breath sounds, breath sounds clear to auscultation b/l, no rales, rhonchi or wheezing  Abdomen: soft, non-tender, no guarding or rebound  Skin: warm, dry, no erythema, no rash  Neurologic: Alert and oriented, strength 5/5 , no focal deficits, CN II-XII normal  Psychiatric: No anxiety or depression    #################################################################  Lab Data Review:    Objective   Recent Results (from the past 24 hour(s))   EKG (ER)    Collection Time: 17  9:09 PM   Result Value Ref Range    Report       [facility] Emergency Dept.    Test Date:  2017  Pt Name:    DARRELL KOWALSKI                  Department: ER  MRN:        [id number]                      Room:       [facility]  Gender:     M                            Technician: [id number]  :        1950                   " Requested By:AMY CARTER  Order #:    [id number]                    Reading MD:    Measurements  Intervals                                Axis  Rate:       71                           P:          59  AK:         180                          QRS:        13  QRSD:       86                           T:          -38  QT:         392  QTc:        426    Interpretive Statements  SINUS RHYTHM  NONSPECIFIC T ABNORMALITIES, INFERIOR LEADS  Compared to ECG 09/21/2017 14:53:39  No significant changes     Troponin STAT    Collection Time: 09/28/17  9:28 PM   Result Value Ref Range    Troponin I 0.08 (H) 0.00 - 0.04 ng/mL   Complete Metabolic Panel (CMP)    Collection Time: 09/28/17  9:28 PM   Result Value Ref Range    Sodium 136 135 - 145 mmol/L    Potassium 3.2 (L) 3.6 - 5.5 mmol/L    Chloride 102 96 - 112 mmol/L    Co2 23 20 - 33 mmol/L    Anion Gap 11.0 0.0 - 11.9    Glucose 129 (H) 65 - 99 mg/dL    Bun 23 (H) 8 - 22 mg/dL    Creatinine 1.27 0.50 - 1.40 mg/dL    Calcium 9.1 8.5 - 10.5 mg/dL    AST(SGOT) 26 12 - 45 U/L    ALT(SGPT) 53 (H) 2 - 50 U/L    Alkaline Phosphatase 73 30 - 99 U/L    Total Bilirubin 0.6 0.1 - 1.5 mg/dL    Albumin 3.4 3.2 - 4.9 g/dL    Total Protein 7.1 6.0 - 8.2 g/dL    Globulin 3.7 (H) 1.9 - 3.5 g/dL    A-G Ratio 0.9 g/dL   CBC w/ Differential    Collection Time: 09/28/17  9:28 PM   Result Value Ref Range    WBC 8.1 4.8 - 10.8 K/uL    RBC 4.46 (L) 4.70 - 6.10 M/uL    Hemoglobin 14.1 14.0 - 18.0 g/dL    Hematocrit 39.4 (L) 42.0 - 52.0 %    MCV 88.3 81.4 - 97.8 fL    MCH 31.6 27.0 - 33.0 pg    MCHC 35.8 (H) 33.7 - 35.3 g/dL    RDW 40.6 35.9 - 50.0 fL    Platelet Count 293 164 - 446 K/uL    MPV 11.1 9.0 - 12.9 fL    Neutrophils-Polys 71.40 44.00 - 72.00 %    Lymphocytes 17.70 (L) 22.00 - 41.00 %    Monocytes 7.80 0.00 - 13.40 %    Eosinophils 2.50 0.00 - 6.90 %    Basophils 0.40 0.00 - 1.80 %    Immature Granulocytes 0.20 0.00 - 0.90 %    Nucleated RBC 0.00 /100 WBC    Neutrophils (Absolute) 5.79  1.82 - 7.42 K/uL    Lymphs (Absolute) 1.43 1.00 - 4.80 K/uL    Monos (Absolute) 0.63 0.00 - 0.85 K/uL    Eos (Absolute) 0.20 0.00 - 0.51 K/uL    Baso (Absolute) 0.03 0.00 - 0.12 K/uL    Immature Granulocytes (abs) 0.02 0.00 - 0.11 K/uL    NRBC (Absolute) 0.00 K/uL   Magnesium    Collection Time: 09/28/17  9:28 PM   Result Value Ref Range    Magnesium 1.8 1.5 - 2.5 mg/dL   ESTIMATED GFR    Collection Time: 09/28/17  9:28 PM   Result Value Ref Range    GFR If African American >60 >60 mL/min/1.73 m 2    GFR If Non  57 (A) >60 mL/min/1.73 m 2   BNP    Collection Time: 09/28/17  9:28 PM   Result Value Ref Range    B Natriuretic Peptide 16 0 - 100 pg/mL       (click the triangle to expand results)  My interpretation of lab results:   Potassium 3.2, glucose 129, ALT 53, magnesium 1.8, troponin 0.08, creatinine 1.27, BUN 26  Imaging/Procedures Review:    DX-CHEST-2 VIEWS   Final Result      No acute cardiopulmonary abnormality.               INTERPRETING LOCATION: [address] [facility], [facility], [address]        EKG:   per my independant read:  QTc: 426, HR: 73, Normal Sinus Rhythm, nonspecific T-wave abnormalities in the lower leads    Assessment and Plan:      66-year-old male with past medical history of diabetes, CVA, dyslipidemia, who was recently evaluated for dizziness, presents with  Complaints of dizziness, brought up by rotating his head  Dizziness   Assessment & Plan    Patient has recent negative stroke workup, included an MRI, carotid ultrasound, echo  He doesn't have focal neurological deficit.  symptoms could be related to peripheral vertigo  He also has poorly controlled hypertension that could cause dizziness  Therefore, plan will be to control his blood pressure, and physical therapy evaluation        History of CVA (cerebrovascular accident)   Overview    Continue aspirin, Lipitor     Chronic kidney disease   Overview    Creatinine appears to be at  baseline     Hypertension   Overview    Continue home  medications  Labetalol and hydralazine IV as needed  Continue to monitor blood pressure     Troponin level elevated   Overview    Troponin is mildly elevated, most likely demand ischemia  Patient denies chest pain  There are no specific signs of ischemia on the EKG  Recent echo from September 22 showed ejection fraction 70%, diastolic dysfunction grade 2, pulmonary hypertension 30 millimeters  We'll admit to telemetry  Repeat troponin and EKG.       Uncontrolled type 2 diabetes mellitus (CMS-[facility])- (present on admission)   Assessment & Plan    Recent A1c was 9.1  Patient is on Lantus and sliding scale at home  Continue his insulin regimen, monitor blood sugar, hypoglycemia protocol          1. Prophylaxis: lovenox  2. Code: Full code per patient with pt present  3. Dispo: Pt will be admitted to observation

## 2017-09-29 NOTE — PROGRESS NOTES
Pt received by ED nurse by rodriguez and ambulated to bed with stand by assist and c/o dizziness. Pt sbp 209. Pt given medication per MAR. PT oriented to unit policies and call system. Pt updated to current POC and verbalized understanding. Bed locked in low positoin with fall precautions in place and bed alarm on. Call light in place with bed side table in reach.

## 2017-09-29 NOTE — CARE PLAN
Problem: Safety  Goal: Will remain free from injury    Intervention: Provide assistance with mobility  Pt ambulates with SBA.      Problem: Knowledge Deficit  Goal: Knowledge of disease process/condition, treatment plan, diagnostic tests, and medications will improve    Intervention: Explain information regarding disease process/condition, treatment plan, diagnostic tests, and medications and document in education  Pt updated to current POC and verbalized understanding.

## 2017-09-29 NOTE — PROGRESS NOTES
[facility] Hospitalist Progress Note    Date of Service: 2017    Chief Complaint  66 y.o. male admitted 2017 with dizziness    Interval Problem Update  Pt seen and examined, afebrile, dizziness has improved, afebrile, no nausea or vomiting.   NO CP or SOB   Consultants/Specialty  None     Disposition  Home when medically cleared         Review of Systems   Constitutional: Negative for chills and fever.   Eyes: Negative for blurred vision.   Respiratory: Negative for cough and hemoptysis.    Cardiovascular: Negative for chest pain and palpitations.   Genitourinary: Negative for dysuria.   Musculoskeletal: Negative for myalgias.   Skin: Negative for rash.   Neurological: Positive for dizziness (better today ). Negative for headaches.      Physical Exam  Laboratory/Imaging   Hemodynamics  Temp (24hrs), Av.8 °C (98.2 °F), Min:36.4 °C (97.6 °F), Max:37.1 °C (98.8 °F)   Temperature: 36.4 °C (97.6 °F)  Pulse  Av.3  Min: 63  Max: 95    Blood Pressure : (!) 183/94 (nurse notified), NIBP: (!) 171/82      Respiratory      Respiration: 13, Pulse Oximetry: 96 %             Fluids    Intake/Output Summary (Last 24 hours) at 17 1449  Last data filed at 17 1200   Gross per 24 hour   Intake              780 ml   Output                0 ml   Net              780 ml       Nutrition  Orders Placed This Encounter   Procedures   • Diet Order     Standing Status:   Standing     Number of Occurrences:   1     Order Specific Question:   Diet:     Answer:   Diabetic [3]     Physical Exam   Constitutional: He is oriented to person, place, and time.   HENT:   Head: Normocephalic and atraumatic.   Mouth/Throat: No oropharyngeal exudate.   Eyes: Conjunctivae are normal. No scleral icterus.   Neck: Neck supple. No JVD present.   Cardiovascular: Normal rate and regular rhythm.  Exam reveals no gallop.    No murmur heard.  Pulmonary/Chest: Effort normal and breath sounds normal. No respiratory distress. He has no wheezes.    Abdominal: Soft. Bowel sounds are normal. He exhibits no distension. There is no tenderness.   Musculoskeletal: Normal range of motion. He exhibits no edema.   Neurological: He is alert and oriented to person, place, and time.   Skin: Skin is warm and dry. No rash noted. No erythema.   Nursing note and vitals reviewed.      Recent Labs      09/28/17 2128   WBC  8.1   RBC  4.46*   HEMOGLOBIN  14.1   HEMATOCRIT  39.4*   MCV  88.3   MCH  31.6   MCHC  35.8*   RDW  40.6   PLATELETCT  293   MPV  11.1     Recent Labs      09/28/17 2128   SODIUM  136   POTASSIUM  3.2*   CHLORIDE  102   CO2  23   GLUCOSE  129*   BUN  23*   CREATININE  1.27   CALCIUM  9.1         Recent Labs      09/28/17 2128   BNPBTYPENAT  16              Assessment/Plan     Dizziness   Assessment & Plan    recent negative stroke workup, included an MRI, carotid ultrasound, echo  No  focal neurological deficit.  symptoms could be related to peripheral vertigo possible BPV  He also has poorly controlled hypertension that could cause dizziness          History of CVA (cerebrovascular accident)   Assessment & Plan    Continue ASA and lipitor         Chronic kidney disease   Overview    Creatinine appears to be at  baseline     Hypertension   Assessment & Plan    Cont his outpt regimen  Also on IV Labetalol and hydralazine   Monitor        Troponin level elevated   Overview           Assessment & Plan    Troponin is mildly elevated, most likely demand ischemia repeat one is negative   Patient denies chest pain  There are no specific signs of ischemia on the EKG          Uncontrolled type 2 diabetes mellitus (CMS-[facility])- (present on admission)   Assessment & Plan    Recent A1c was 9.1  Patient is on Lantus and sliding scale at home  Continue his insulin regimen, monitor blood sugar, hypoglycemia protocol            Reviewed items::  Medications reviewed, Labs reviewed, EKG reviewed and Radiology images reviewed  Celis catheter::  No Celis  DVT prophylaxis  pharmacological::  Heparin

## 2017-09-30 LAB
ALBUMIN SERPL BCP-MCNC: 3 G/DL (ref 3.2–4.9)
ALBUMIN/GLOB SERPL: 0.9 G/DL
ALP SERPL-CCNC: 66 U/L (ref 30–99)
ALT SERPL-CCNC: 38 U/L (ref 2–50)
ANION GAP SERPL CALC-SCNC: 11 MMOL/L (ref 0–11.9)
AST SERPL-CCNC: 20 U/L (ref 12–45)
BASOPHILS # BLD AUTO: 0.5 % (ref 0–1.8)
BASOPHILS # BLD: 0.04 K/UL (ref 0–0.12)
BILIRUB SERPL-MCNC: 0.6 MG/DL (ref 0.1–1.5)
BUN SERPL-MCNC: 19 MG/DL (ref 8–22)
CALCIUM SERPL-MCNC: 9 MG/DL (ref 8.5–10.5)
CHLORIDE SERPL-SCNC: 103 MMOL/L (ref 96–112)
CO2 SERPL-SCNC: 22 MMOL/L (ref 20–33)
CREAT SERPL-MCNC: 1.32 MG/DL (ref 0.5–1.4)
EOSINOPHIL # BLD AUTO: 0.22 K/UL (ref 0–0.51)
EOSINOPHIL NFR BLD: 2.7 % (ref 0–6.9)
ERYTHROCYTE [DISTWIDTH] IN BLOOD BY AUTOMATED COUNT: 43.3 FL (ref 35.9–50)
GFR SERPL CREATININE-BSD FRML MDRD: 54 ML/MIN/1.73 M 2
GLOBULIN SER CALC-MCNC: 3.3 G/DL (ref 1.9–3.5)
GLUCOSE BLD-MCNC: 119 MG/DL (ref 65–99)
GLUCOSE BLD-MCNC: 137 MG/DL (ref 65–99)
GLUCOSE BLD-MCNC: 140 MG/DL (ref 65–99)
GLUCOSE BLD-MCNC: 191 MG/DL (ref 65–99)
GLUCOSE SERPL-MCNC: 125 MG/DL (ref 65–99)
HCT VFR BLD AUTO: 37.6 % (ref 42–52)
HGB BLD-MCNC: 13.6 G/DL (ref 14–18)
IMM GRANULOCYTES # BLD AUTO: 0.02 K/UL (ref 0–0.11)
IMM GRANULOCYTES NFR BLD AUTO: 0.2 % (ref 0–0.9)
LYMPHOCYTES # BLD AUTO: 1.43 K/UL (ref 1–4.8)
LYMPHOCYTES NFR BLD: 17.4 % (ref 22–41)
MCH RBC QN AUTO: 32.5 PG (ref 27–33)
MCHC RBC AUTO-ENTMCNC: 36.2 G/DL (ref 33.7–35.3)
MCV RBC AUTO: 89.7 FL (ref 81.4–97.8)
MONOCYTES # BLD AUTO: 0.75 K/UL (ref 0–0.85)
MONOCYTES NFR BLD AUTO: 9.1 % (ref 0–13.4)
NEUTROPHILS # BLD AUTO: 5.78 K/UL (ref 1.82–7.42)
NEUTROPHILS NFR BLD: 70.1 % (ref 44–72)
NRBC # BLD AUTO: 0 K/UL
NRBC BLD AUTO-RTO: 0 /100 WBC
PLATELET # BLD AUTO: 282 K/UL (ref 164–446)
PMV BLD AUTO: 11.2 FL (ref 9–12.9)
POTASSIUM SERPL-SCNC: 3.6 MMOL/L (ref 3.6–5.5)
PROT SERPL-MCNC: 6.3 G/DL (ref 6–8.2)
RBC # BLD AUTO: 4.19 M/UL (ref 4.7–6.1)
SODIUM SERPL-SCNC: 136 MMOL/L (ref 135–145)
WBC # BLD AUTO: 8.2 K/UL (ref 4.8–10.8)

## 2017-09-30 PROCEDURE — 82962 GLUCOSE BLOOD TEST: CPT | Mod: 91

## 2017-09-30 PROCEDURE — A9270 NON-COVERED ITEM OR SERVICE: HCPCS | Performed by: INTERNAL MEDICINE

## 2017-09-30 PROCEDURE — 700102 HCHG RX REV CODE 250 W/ 637 OVERRIDE(OP): Performed by: INTERNAL MEDICINE

## 2017-09-30 PROCEDURE — 36415 COLL VENOUS BLD VENIPUNCTURE: CPT

## 2017-09-30 PROCEDURE — 85025 COMPLETE CBC W/AUTO DIFF WBC: CPT

## 2017-09-30 PROCEDURE — 80053 COMPREHEN METABOLIC PANEL: CPT

## 2017-09-30 PROCEDURE — 700111 HCHG RX REV CODE 636 W/ 250 OVERRIDE (IP): Performed by: INTERNAL MEDICINE

## 2017-09-30 PROCEDURE — 99225 PR SUBSEQUENT OBSERVATION CARE,LEVEL II: CPT | Performed by: INTERNAL MEDICINE

## 2017-09-30 PROCEDURE — 770020 HCHG ROOM/CARE - TELE (206)

## 2017-09-30 RX ADMIN — LISINOPRIL 40 MG: 20 TABLET ORAL at 20:02

## 2017-09-30 RX ADMIN — ATORVASTATIN CALCIUM 40 MG: 40 TABLET, FILM COATED ORAL at 08:14

## 2017-09-30 RX ADMIN — POTASSIUM CHLORIDE 20 MEQ: 1500 TABLET, EXTENDED RELEASE ORAL at 08:15

## 2017-09-30 RX ADMIN — INSULIN GLARGINE 20 UNITS: 100 INJECTION, SOLUTION SUBCUTANEOUS at 20:13

## 2017-09-30 RX ADMIN — STANDARDIZED SENNA CONCENTRATE AND DOCUSATE SODIUM 2 TABLET: 8.6; 5 TABLET, FILM COATED ORAL at 08:15

## 2017-09-30 RX ADMIN — INSULIN LISPRO 1 UNITS: 100 INJECTION, SOLUTION INTRAVENOUS; SUBCUTANEOUS at 20:12

## 2017-09-30 RX ADMIN — ASPIRIN 81 MG: 81 TABLET, COATED ORAL at 08:14

## 2017-09-30 RX ADMIN — HYDRALAZINE HYDROCHLORIDE 20 MG: 20 INJECTION INTRAMUSCULAR; INTRAVENOUS at 22:15

## 2017-09-30 RX ADMIN — HYDROCHLOROTHIAZIDE 25 MG: 25 TABLET ORAL at 08:15

## 2017-09-30 RX ADMIN — FELODIPINE 10 MG: 10 TABLET, EXTENDED RELEASE ORAL at 08:19

## 2017-09-30 RX ADMIN — DOXAZOSIN 8 MG: 2 TABLET ORAL at 20:01

## 2017-09-30 RX ADMIN — DIPYRIDAMOLE 25 MG: 25 TABLET, FILM COATED ORAL at 08:19

## 2017-09-30 RX ADMIN — CLONIDINE HYDROCHLORIDE 0.3 MG: 0.1 TABLET ORAL at 20:00

## 2017-09-30 RX ADMIN — CLONIDINE HYDROCHLORIDE 0.3 MG: 0.1 TABLET ORAL at 08:15

## 2017-09-30 RX ADMIN — DIPYRIDAMOLE 25 MG: 25 TABLET, FILM COATED ORAL at 20:02

## 2017-09-30 RX ADMIN — ENOXAPARIN SODIUM 40 MG: 100 INJECTION SUBCUTANEOUS at 08:15

## 2017-09-30 RX ADMIN — LISINOPRIL 40 MG: 20 TABLET ORAL at 08:14

## 2017-09-30 ASSESSMENT — ENCOUNTER SYMPTOMS
BLURRED VISION: 0
DIZZINESS: 1
FEVER: 0
HEMOPTYSIS: 0
PALPITATIONS: 0
CHILLS: 0
HEADACHES: 0
MYALGIAS: 0
COUGH: 0

## 2017-09-30 ASSESSMENT — PAIN SCALES - GENERAL
PAINLEVEL_OUTOF10: 0

## 2017-09-30 NOTE — PROGRESS NOTES
Received report from NOC RN, assumed pt care. A&O x 4. Calm, cooperative. No c/o pain. No grimacing noted. Sister at bedside, calm. Fall precautions in place. Call light and bedside table within reach. POC: blood pressure management, ACHS FSBG, physical therapy, potassium replacement. Will continue to monitor.

## 2017-09-30 NOTE — CARE PLAN
Problem: Safety  Goal: Will remain free from falls  Fall precautions in place. Bedside table and call light within reach. Hourly rounding in place.

## 2017-09-30 NOTE — CARE PLAN
Problem: Nutritional:  Goal: Patient to verbalize or demonstrate understanding of diet  Outcome: MET Date Met: 09/30/17  Provided diabetic diet education and handout. Pt was engaged in education, took notes, asked appropriate questions, and verbalized understanding of diet.     RD available prn

## 2017-09-30 NOTE — PROGRESS NOTES
Aaliyah Morel Fall Risk Assessment:     Last Known Fall: No falls  Mobility: Use of assistive device/requires assist of two people  Medications: Cardiovascular and central nervous system meds  Mental Status/LOC/Awareness: Awake, alert, and oriented to date, place, and person  Toileting Needs: No needs  Volume/Electrolyte Status: No problems  Communication/Sensory: Visual (Glasses)/hearing deficit, Blindness or recent visual change  Behavior: Appropriate behavior  Aaliyah Morel Fall Risk Total: 13  Fall Risk Level: MODERATE RISK    Universal Fall Precautions:  call light/belongings in reach, bed in low position and locked, siderails up x 2, use non-slip footwear, wheelchairs and assistive devices out of sight, adequate lighting, clutter free and spill free environment, educate on level of risk, educate to call for assistance    Fall Risk Level Interventions:   TRIAL (TELE 8, NEURO, MED NOEMÍ 5) Low Fall Risk Interventions  Place yellow fall risk ID band on patient: completed  Provide patient/family education based on risk assessment: completed  Educate patient/family to call staff for assistance when getting out of bed: completed  Place fall precaution signage outside patient door: completedTRIAL ([employer] 8, NEURO, MED NOEMÍ 5) Moderate Fall Risk Interventions  Place yellow fall risk ID band on patient: verified  Provide patient/family education based on risk assessment : completed  Educate patient/family to call staff for assistance when getting out of bed: completed  Place fall precaution signage outside patient door: verified  Utilize bed/chair fall alarm: verified     Patient Specific Interventions:     Medication: review medications with patient and family  Mental Status/LOC/Awareness: reinforce falls education, check on patient hourly, utilize bed/chair fall alarm and reinforce the use of call light  Toileting: instruct patient/family on the need to call for assistance when toileting and do not leave patient  unattended in bathroom/refer to toileting scripting  Volume/Electrolyte Status: monitor blood sugars and maintain appropriate blood sugar levels if diabetic and monitor abnormal lab values  Communication/Sensory: update plan of care on whiteboard, ensure proper positioning when transferrng/ambulating and ensure patient has glasses/contacts and hearing aids/dentures  Behavioral: administer medication as ordered and instruct/reinforce fall program rationale  Mobility: utilize bed/chair fall alarm, ensure bed is locked and in lowest position and provide appropriate assistive device

## 2017-09-30 NOTE — CARE PLAN
Problem: Infection  Goal: Will remain free from infection    Intervention: Implement standard precautions and perform hand washing before and after patient contact  Handwashing performed before and after patient contact. Use of standard precautions in place.

## 2017-09-30 NOTE — PROGRESS NOTES
[facility]    Date of Service: 2017    Chief Complaint  66 y.o. male admitted 2017 with dizziness    Interval Problem Update  No acute events , afebrile, afebrile, no nausea or vomiting. BOP still not well controlled.   NO CP or SOB   Consultants/Specialty  None     Disposition  Home when medically cleared         Review of Systems   Constitutional: Negative for chills and fever.   Eyes: Negative for blurred vision.   Respiratory: Negative for cough and hemoptysis.    Cardiovascular: Negative for chest pain and palpitations.   Genitourinary: Negative for dysuria.   Musculoskeletal: Negative for myalgias.   Skin: Negative for rash.   Neurological: Positive for dizziness (better today ). Negative for headaches.      Physical Exam  Laboratory/Imaging   Hemodynamics  Temp (24hrs), Av.8 °C (98.2 °F), Min:36.4 °C (97.6 °F), Max:37.2 °C (98.9 °F)   Temperature: 36.8 °C (98.3 °F)  Pulse  Av.4  Min: 63  Max: 95    Blood Pressure : 154/82      Respiratory      Respiration: 18, Pulse Oximetry: 94 %             Fluids  No intake or output data in the 24 hours ending 17 1236    Nutrition  Orders Placed This Encounter   Procedures   • Diet Order     Standing Status:   Standing     Number of Occurrences:   1     Order Specific Question:   Diet:     Answer:   Diabetic [3]     Physical Exam   Constitutional: He is oriented to person, place, and time.   HENT:   Head: Normocephalic and atraumatic.   Mouth/Throat: No oropharyngeal exudate.   Eyes: Conjunctivae are normal. No scleral icterus.   Neck: Neck supple. No JVD present.   Cardiovascular: Normal rate and regular rhythm.  Exam reveals no gallop.    No murmur heard.  Pulmonary/Chest: Effort normal and breath sounds normal. No respiratory distress. He has no wheezes.   Abdominal: Soft. Bowel sounds are normal. He exhibits no distension. There is no tenderness.   Musculoskeletal: Normal range of motion. He exhibits no edema.    Neurological: He is alert and oriented to person, place, and time.   Skin: Skin is warm and dry. No rash noted. No erythema.   Nursing note and vitals reviewed.      Recent Labs      09/28/17 2128 09/30/17   0253   WBC  8.1  8.2   RBC  4.46*  4.19*   HEMOGLOBIN  14.1  13.6*   HEMATOCRIT  39.4*  37.6*   MCV  88.3  89.7   MCH  31.6  32.5   MCHC  35.8*  36.2*   RDW  40.6  43.3   PLATELETCT  293  282   MPV  11.1  11.2     Recent Labs      09/28/17 2128 09/30/17   0253   SODIUM  136  136   POTASSIUM  3.2*  3.6   CHLORIDE  102  103   CO2  23  22   GLUCOSE  129*  125*   BUN  23*  19   CREATININE  1.27  1.32   CALCIUM  9.1  9.0         Recent Labs      09/28/17 2128   BNPBTYPENAT  16              Assessment/Plan     Dizziness   Assessment & Plan    recent negative stroke workup, included an MRI, carotid ultrasound, echo  No  focal neurological deficit.  symptoms could be related to peripheral vertigo possible BPV  He also has poorly controlled hypertension that could cause dizziness          History of CVA (cerebrovascular accident)   Assessment & Plan    Continue ASA and lipitor         Chronic kidney disease   Overview    Creatinine appears to be at  baseline     Hypertension   Assessment & Plan    Cont his outpt regimen  Also on IV Labetalol and hydralazine   Monitor        Troponin level elevated   Overview           Assessment & Plan    Troponin is mildly elevated, most likely demand ischemia repeat one is negative   Patient denies chest pain  There are no specific signs of ischemia on the EKG          Uncontrolled type 2 diabetes mellitus (CMS-[facility])- (present on admission)   Assessment & Plan    Recent A1c was 9.1  Patient is on Lantus and sliding scale at home  Continue his insulin regimen, monitor blood sugar, hypoglycemia protocol            Reviewed items::  Medications reviewed, Labs reviewed, EKG reviewed and Radiology images reviewed  Celis catheter::  No Celis  DVT prophylaxis pharmacological::   Heparin

## 2017-10-01 PROBLEM — F32.A DEPRESSION: Status: ACTIVE | Noted: 2017-10-01

## 2017-10-01 LAB
ANION GAP SERPL CALC-SCNC: 10 MMOL/L (ref 0–11.9)
BUN SERPL-MCNC: 20 MG/DL (ref 8–22)
CALCIUM SERPL-MCNC: 9.1 MG/DL (ref 8.5–10.5)
CHLORIDE SERPL-SCNC: 102 MMOL/L (ref 96–112)
CO2 SERPL-SCNC: 22 MMOL/L (ref 20–33)
CREAT SERPL-MCNC: 1.4 MG/DL (ref 0.5–1.4)
ERYTHROCYTE [DISTWIDTH] IN BLOOD BY AUTOMATED COUNT: 42.1 FL (ref 35.9–50)
GFR SERPL CREATININE-BSD FRML MDRD: 51 ML/MIN/1.73 M 2
GLUCOSE BLD-MCNC: 111 MG/DL (ref 65–99)
GLUCOSE BLD-MCNC: 122 MG/DL (ref 65–99)
GLUCOSE BLD-MCNC: 128 MG/DL (ref 65–99)
GLUCOSE BLD-MCNC: 128 MG/DL (ref 65–99)
GLUCOSE SERPL-MCNC: 149 MG/DL (ref 65–99)
HCT VFR BLD AUTO: 37.7 % (ref 42–52)
HGB BLD-MCNC: 13.4 G/DL (ref 14–18)
MCH RBC QN AUTO: 32 PG (ref 27–33)
MCHC RBC AUTO-ENTMCNC: 35.5 G/DL (ref 33.7–35.3)
MCV RBC AUTO: 90 FL (ref 81.4–97.8)
PLATELET # BLD AUTO: 286 K/UL (ref 164–446)
PMV BLD AUTO: 11 FL (ref 9–12.9)
POTASSIUM SERPL-SCNC: 3.4 MMOL/L (ref 3.6–5.5)
RBC # BLD AUTO: 4.19 M/UL (ref 4.7–6.1)
SODIUM SERPL-SCNC: 134 MMOL/L (ref 135–145)
WBC # BLD AUTO: 7.3 K/UL (ref 4.8–10.8)

## 2017-10-01 PROCEDURE — 700102 HCHG RX REV CODE 250 W/ 637 OVERRIDE(OP): Performed by: INTERNAL MEDICINE

## 2017-10-01 PROCEDURE — 85027 COMPLETE CBC AUTOMATED: CPT

## 2017-10-01 PROCEDURE — 36415 COLL VENOUS BLD VENIPUNCTURE: CPT

## 2017-10-01 PROCEDURE — 700111 HCHG RX REV CODE 636 W/ 250 OVERRIDE (IP): Performed by: INTERNAL MEDICINE

## 2017-10-01 PROCEDURE — 770020 HCHG ROOM/CARE - TELE (206)

## 2017-10-01 PROCEDURE — 82962 GLUCOSE BLOOD TEST: CPT

## 2017-10-01 PROCEDURE — 99232 SBSQ HOSP IP/OBS MODERATE 35: CPT | Performed by: INTERNAL MEDICINE

## 2017-10-01 PROCEDURE — 80048 BASIC METABOLIC PNL TOTAL CA: CPT

## 2017-10-01 PROCEDURE — A9270 NON-COVERED ITEM OR SERVICE: HCPCS | Performed by: INTERNAL MEDICINE

## 2017-10-01 RX ORDER — ENALAPRILAT 1.25 MG/ML
1.25 INJECTION INTRAVENOUS EVERY 6 HOURS PRN
Status: DISCONTINUED | OUTPATIENT
Start: 2017-10-01 | End: 2017-10-06 | Stop reason: HOSPADM

## 2017-10-01 RX ADMIN — STANDARDIZED SENNA CONCENTRATE AND DOCUSATE SODIUM 2 TABLET: 8.6; 5 TABLET, FILM COATED ORAL at 09:33

## 2017-10-01 RX ADMIN — FELODIPINE 10 MG: 10 TABLET, EXTENDED RELEASE ORAL at 09:32

## 2017-10-01 RX ADMIN — CLONIDINE HYDROCHLORIDE 0.3 MG: 0.1 TABLET ORAL at 21:34

## 2017-10-01 RX ADMIN — ENOXAPARIN SODIUM 40 MG: 100 INJECTION SUBCUTANEOUS at 11:16

## 2017-10-01 RX ADMIN — STANDARDIZED SENNA CONCENTRATE AND DOCUSATE SODIUM 2 TABLET: 8.6; 5 TABLET, FILM COATED ORAL at 21:34

## 2017-10-01 RX ADMIN — DIPYRIDAMOLE 25 MG: 25 TABLET, FILM COATED ORAL at 21:34

## 2017-10-01 RX ADMIN — LISINOPRIL 40 MG: 20 TABLET ORAL at 21:34

## 2017-10-01 RX ADMIN — LISINOPRIL 40 MG: 20 TABLET ORAL at 09:32

## 2017-10-01 RX ADMIN — CLONIDINE HYDROCHLORIDE 0.3 MG: 0.1 TABLET ORAL at 09:33

## 2017-10-01 RX ADMIN — HYDROCHLOROTHIAZIDE 25 MG: 25 TABLET ORAL at 09:32

## 2017-10-01 RX ADMIN — DIPYRIDAMOLE 25 MG: 25 TABLET, FILM COATED ORAL at 09:32

## 2017-10-01 RX ADMIN — ATORVASTATIN CALCIUM 40 MG: 40 TABLET, FILM COATED ORAL at 09:32

## 2017-10-01 RX ADMIN — ASPIRIN 81 MG: 81 TABLET, COATED ORAL at 09:33

## 2017-10-01 RX ADMIN — DOXAZOSIN 8 MG: 2 TABLET ORAL at 21:34

## 2017-10-01 ASSESSMENT — ENCOUNTER SYMPTOMS
MYALGIAS: 0
FEVER: 0
BLURRED VISION: 0
HEMOPTYSIS: 0
DIZZINESS: 1
HEADACHES: 0
PALPITATIONS: 0
COUGH: 0
CHILLS: 0

## 2017-10-01 ASSESSMENT — PAIN SCALES - GENERAL
PAINLEVEL_OUTOF10: 0

## 2017-10-01 NOTE — ASSESSMENT & PLAN NOTE
Patient w/ depressed mood, thoughts of death and some psychosis.  Await psych clearance. Recommended stop hydralazine, clonidine also w/ CNS SE's so will d/c this    10/5 resolution of symptoms, does not recall events of the last couple days, unclear etiology

## 2017-10-01 NOTE — PROGRESS NOTES
[facility] Hospitalist Progress Note    Date of Service: 10/1/2017    Chief Complaint  66 y.o. male admitted 2017 with dizziness    Interval Problem Update  Pt seen and examined, afebrile, no nausea or vomiting. Feeling depressed, like he is going to die soon, decreased appetite. Psych consulted appreciate rec.   Consultants/Specialty  Psychiatry     Disposition  TBD        Review of Systems   Constitutional: Negative for chills and fever.   Eyes: Negative for blurred vision.   Respiratory: Negative for cough and hemoptysis.    Cardiovascular: Negative for chest pain and palpitations.   Genitourinary: Negative for dysuria.   Musculoskeletal: Negative for myalgias.   Skin: Negative for rash.   Neurological: Positive for dizziness (better today ). Negative for headaches.      Physical Exam  Laboratory/Imaging   Hemodynamics  Temp (24hrs), Av.5 °C (97.7 °F), Min:36.1 °C (96.9 °F), Max:37.1 °C (98.7 °F)   Temperature: 36.4 °C (97.5 °F)  Pulse  Av  Min: 63  Max: 95    Blood Pressure : 134/74      Respiratory      Respiration: 15, Pulse Oximetry: 100 %        RUL Breath Sounds: Clear, RML Breath Sounds: Clear, RLL Breath Sounds: Clear, PILAR Breath Sounds: Clear, LLL Breath Sounds: Clear    Fluids    Intake/Output Summary (Last 24 hours) at 10/01/17 1342  Last data filed at 17 1600   Gross per 24 hour   Intake                0 ml   Output              800 ml   Net             -800 ml       Nutrition  Orders Placed This Encounter   Procedures   • Diet Order     Standing Status:   Standing     Number of Occurrences:   1     Order Specific Question:   Diet:     Answer:   Diabetic [3]     Physical Exam   Constitutional: He is oriented to person, place, and time.   HENT:   Head: Normocephalic and atraumatic.   Mouth/Throat: No oropharyngeal exudate.   Eyes: Conjunctivae are normal. No scleral icterus.   Neck: Neck supple. No JVD present.   Cardiovascular: Normal rate and regular rhythm.  Exam reveals no gallop.     No murmur heard.  Pulmonary/Chest: Effort normal and breath sounds normal. No respiratory distress. He has no wheezes.   Abdominal: Soft. Bowel sounds are normal. He exhibits no distension. There is no tenderness.   Musculoskeletal: Normal range of motion. He exhibits no edema.   Neurological: He is alert and oriented to person, place, and time.   Skin: Skin is warm and dry. No rash noted. No erythema.   Nursing note and vitals reviewed.      Recent Labs      09/28/17 2128 09/30/17 0253  10/01/17   0211   WBC  8.1  8.2  7.3   RBC  4.46*  4.19*  4.19*   HEMOGLOBIN  14.1  13.6*  13.4*   HEMATOCRIT  39.4*  37.6*  37.7*   MCV  88.3  89.7  90.0   MCH  31.6  32.5  32.0   MCHC  35.8*  36.2*  35.5*   RDW  40.6  43.3  42.1   PLATELETCT  293  282  286   MPV  11.1  11.2  11.0     Recent Labs      09/28/17 2128 09/30/17 0253  10/01/17   0210   SODIUM  136  136  134*   POTASSIUM  3.2*  3.6  3.4*   CHLORIDE  102  103  102   CO2  23  22  22   GLUCOSE  129*  125*  149*   BUN  23*  19  20   CREATININE  1.27  1.32  1.40   CALCIUM  9.1  9.0  9.1         Recent Labs      09/28/17 2128   BNPBTYPENAT  16              Assessment/Plan     Dizziness   Assessment & Plan    recent negative stroke workup, included an MRI, carotid ultrasound, echo  No  focal neurological deficit.  symptoms could be related to peripheral vertigo possible BPV  He also has poorly controlled hypertension that could cause dizziness          Depression   Assessment & Plan    Pt feeling depressed, feeling like he is going to die soon, decreased apetite, denies SI.  Psych consulted.  Appreciate rec.         History of CVA (cerebrovascular accident)   Assessment & Plan    Continue ASA and lipitor         Chronic kidney disease   Overview    Creatinine appears to be at  baseline     Hypertension   Assessment & Plan    Cont his outpt regimen  Also on IV Labetalol and hydralazine   Monitor        Troponin level elevated   Overview           Assessment & Plan     Troponin is mildly elevated, most likely demand ischemia repeat one is negative   Patient denies chest pain  There are no specific signs of ischemia on the EKG          Uncontrolled type 2 diabetes mellitus (CMS-[facility])- (present on admission)   Assessment & Plan    Recent A1c was 9.1  Patient is on Lantus and sliding scale at home  Continue his insulin regimen, monitor blood sugar, hypoglycemia protocol            Reviewed items::  Medications reviewed, Labs reviewed, EKG reviewed and Radiology images reviewed  Celis catheter::  No Celis  DVT prophylaxis pharmacological::  Heparin

## 2017-10-01 NOTE — PROGRESS NOTES
Aaliyah Morel Fall Risk Assessment:     Last Known Fall: No falls  Mobility: Use of assistive device/requires assist of two people  Medications: Cardiovascular and central nervous system meds  Mental Status/LOC/Awareness: Awake, alert, and oriented to date, place, and person  Toileting Needs: No needs  Volume/Electrolyte Status: No problems  Communication/Sensory: Visual (Glasses)/hearing deficit  Behavior: Appropriate behavior  Aaliyah Morel Fall Risk Total: 9  Fall Risk Level: LOW RISK    Universal Fall Precautions:  bed in low position and locked, call light/belongings in reach, wheelchairs and assistive devices out of sight, siderails up x 2, use non-slip footwear, adequate lighting, clutter free and spill free environment, educate on level of risk, educate to call for assistance    Fall Risk Level Interventions:   TRIAL ([employer], NEURO, MED NOEMÍ 5) Low Fall Risk Interventions  Place yellow fall risk ID band on patient: verified  Provide patient/family education based on risk assessment: completed  Educate patient/family to call staff for assistance when getting out of bed: completed  Place fall precaution signage outside patient door: verifiedTRIAL ([employer], NEURO, MED NOEMÍ 5) Moderate Fall Risk Interventions  Place yellow fall risk ID band on patient: verified  Provide patient/family education based on risk assessment : completed  Educate patient/family to call staff for assistance when getting out of bed: completed  Place fall precaution signage outside patient door: completed  Utilize bed/chair fall alarm: completed     Patient Specific Interventions:     Medication: review medications with patient and family  Mental Status/LOC/Awareness: reinforce falls education, encourage family to stay with patient, check on patient hourly, utilize bed/chair fall alarm and reinforce the use of call light  Toileting: instruct patient/family on the need to call for assistance when toileting and do not leave patient  unattended in bathroom/refer to toileting scripting  Volume/Electrolyte Status: ensure patient remains hydrated, monitor blood sugars and maintain appropriate blood sugar levels if diabetic, administer medications as ordered for nausea and vomiting and monitor abnormal lab values  Communication/Sensory: update plan of care on whiteboard, ensure proper positioning when transferrng/ambulating and ensure patient has glasses/contacts and hearing aids/dentures  Behavioral: collaborate with doctor for possible psych consult, encourage patient to voice feelings, administer medication as ordered, instruct/reinforce fall program rationale and use appropriate de-escalation techniques  Mobility: utilize bed/chair fall alarm, ensure bed is locked and in lowest position and provide appropriate assistive device

## 2017-10-01 NOTE — PROGRESS NOTES
"Received report from NOC RN, assumed pt care. A&O x 3, disoriented to time. Slightly anxious. Family at bedside, calm and concerned. Per pt's family, pt states \"I feel like I am going to go either tonight or tomorrow.\" Asked pt about suicidal ideation, pt denies suicidal ideation. MD notified. No new orders received. Per MD, notify day shift RN tomorrow AM. Will update dayshift about possibly consulting psych. Assessment completed. Fall precautions in place. Call light and bedside table within reach. POC: ACHS FSBG, blood pressure management, anticipated discharged tomorrow. Will continue to monitor.  "

## 2017-10-01 NOTE — DISCHARGE PLANNING
Referral:  Request    Intervention: RN informed SW, pt is requesting a .  SW spoke trudy Stephenson whom indicates he will follow up with pt later today, SW notified RN by phone.    Plan: As Above.

## 2017-10-01 NOTE — PSYCHIATRY
"PSYCHIATRIC CONSULTATION:  Reason for admission: Dizziness  Reason for consult: depression  Requesting Physician:Dea Stout M.D.  Supervising Physician:   Candelaria Fontana MD     Legal status:  No hold    Chief Complaint: I'm dying, I know it    HPI: pt is a 67 yo M h/o CVA in 2012, no psych history p/w dizziness. Workup for CVA was negative, and pt was medically cleared when he began to endorse feelings of dying, started writing letters to family and making phone calls telling them he was dying. He also is refusing food. Psych was consulted to assess depressive symptoms.    Pt displays paucity of speech, doesn't offer explanations to display thought process. He reports that he is dying \"I know I am. I've seen it before.\" He knows because \"the same thing happened to my mom.\" He is unable to elaborate on either of these comments. He feels the doctors and nurses are lying to him, however he is unable to give any concrete examples of things they are lying about \"I just know it. 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 We did the same thing.\"  Regarding refusing to eat, he states \"I already told them they can just let me go peacefully. They can't put a tube in me.\" He is assured that they can put a feeding tube down his throat if he continues to refuse to eat.    The family is present, points out that this behavior and manner of speech is atypical and new to them. He had been very talkative just days ago.     Psychiatric Review of Systems:current symptoms as reported by pt.  Depression: blunted affect, paucity of speech, feelings of hopelessness       Eunice:denies     Anxiety/Panic Attacks denies     PTSD symptom: denies     Psychosis: probable delusions    Other:       Medical Review of Systems: as reported by pt. All systems reviewed. Only those found to be + are noted below. All others are negative.   Neurological:    TBIs:denies      SZs:one 30 years ago    Strokes: + 2012      Other: Aneurism 30 years ago  Other medical " "symptoms:   Thyroid:denies      Diabetes:+    Cardiovascular disease:denies       Psychiatric Examination:  Vitals: Blood pressure 134/74, pulse 65, temperature 36.4 °C (97.5 °F), resp. rate 15, height 1.727 m (5' 8\"), weight 95 kg (209 lb 7 oz), SpO2 100 %.  Musculoskeletal: diminished psychomotor activity, no tics or unusual mannerisms noted  Appearance: WDWN, appropriate dress and grooming. Behavior is calm, uncooperative,  limited eye contact  Thoughts:  Linear, illogical, no blocking of thought noted, probable delusions of death, not obviously responding to internal stimuli.  Speech: Paucity of speech, soft speech, no pressuring of speech noted  Mood: depressed          Affect: Mood congruent, blunted range of affect          SI/HI: Passive SI, not eating  Attention/Alertness: Alert  Memory: Recent and remote memory grossly intact     Orientation: A&Ox3     Fund of Knowledge: Fair  Insight/Judgement into symptoms: Poor  Neurological Testing: Not formally tested      Past Psychiatric Hx: denies    Family Psychiatric Hx: denies    Social Hx: Lives with twin brother, has supportive family    Drug/Alcohol/Tobacco Hx:   Drugs: denies   Alcohol: denies   Tobacco:denies    Medical Hx: labs, MARS, medications, etc were reviewed. Only those findings of potential interest to psychiatry are noted below:  Past Medical History:   Diagnosis Date   • Diabetes 2005    oral meds   • Seizure (CMS-[facility]) 1987   • CATARACT    • Hyperlipidemia    • Hypertension    • Seizure disorder (CMS-HCC)    • Snoring    • Stroke (CMS-HCC)      Medical Conditions:     Allergies: Penicillin g  Medications (currently prescribed at [facility]):    Current Facility-Administered Medications:   •  senna-docusate (PERICOLACE or SENOKOT S) 8.6-50 MG per tablet 2 Tab, 2 Tab, Oral, BID, 2 Tab at 10/01/17 0933 **AND** polyethylene glycol/lytes (MIRALAX) PACKET 1 Packet, 1 Packet, Oral, QDAY PRN **AND** magnesium hydroxide (MILK OF MAGNESIA) suspension 30 mL, 30 " mL, Oral, QDAY PRN **AND** bisacodyl (DULCOLAX) suppository 10 mg, 10 mg, Rectal, QDAY PRN, Antwan Saravia M.D.  •  enoxaparin (LOVENOX) inj 40 mg, 40 mg, Subcutaneous, DAILY, Antwan Saravia M.D., 40 mg at 10/01/17 1116  •  labetalol (NORMODYNE,TRANDATE) injection 10-20 mg, 10-20 mg, Intravenous, Q4HRS PRN, Antwan Saravia M.D., Stopped at 09/29/17 1615  •  acetaminophen (TYLENOL) tablet 650 mg, 650 mg, Oral, Q6HRS PRN, Antwan Saravia M.D.  •  insulin lispro (HUMALOG) injection 1-6 Units, 1-6 Units, Subcutaneous, 4X/DAY ACHS, Antwan Saravia M.D., Stopped at 10/01/17 0700  •  Action is required: Protocol 1073 Hypoglycemia has been implemented, , , Once **AND** Protocol 1073 Inclusion Criteria, , , CONTINUOUS **AND** Protocol 1073 NOTIFY, , , Once **AND** Protocol 1073 Initiate protocol immediately if FSBG is less than or equal to 70 mg/dL, , , CONTINUOUS **AND** glucose 4 g chewable tablet 16 g, 16 g, Oral, Q15 MIN PRN **AND** dextrose 50% (D50W) injection 25 mL, 25 mL, Intravenous, Q15 MIN PRN, Antwan Saravia M.D.  •  aspirin EC (ECOTRIN) tablet 81 mg, 81 mg, Oral, QAM, Antwan Saravia M.D., 81 mg at 10/01/17 0933  •  atorvastatin (LIPITOR) tablet 40 mg, 40 mg, Oral, QAM, Antwan Saravia M.D., 40 mg at 10/01/17 0932  •  clonidine (CATAPRES) tablet 0.3 mg, 0.3 mg, Oral, BID, Antwan Saravia M.D., 0.3 mg at 10/01/17 0933  •  dipyridamole (PERSANTINE) tablet 25 mg, 25 mg, Oral, BID, Antwan Saravia M.D., 25 mg at 10/01/17 0932  •  doxazosin (CARDURA) tablet 8 mg, 8 mg, Oral, QHS, Antwan Saravia M.D., 8 mg at 09/30/17 2001  •  felodipine (PLENDIL) tablet 10 mg, 10 mg, Oral, QADOMENICO, Antwan Saravia M.D., 10 mg at 10/01/17 0932  •  hydrochlorothiazide (HYDRODIURIL) tablet 25 mg, 25 mg, Oral, QAM, Antwan Saravia M.D., 25 mg at 10/01/17 0932  •  insulin glargine (LANTUS) injection 20 Units, 20 Units, Subcutaneous, Q EVENING, Antwan Saravia M.D., 20 Units at 09/30/17 2013  •  lisinopril  (PRINIVIL) tablet 40 mg, 40 mg, Oral, BID, Antwan Saravia M.D., 40 mg at 10/01/17 0932  •  hydrALAZINE (APRESOLINE) injection 20 mg, 20 mg, Intravenous, Q6HRS PRN, Antwan Saravia M.D., 20 mg at 09/30/17 3183  Labs:  Recent Results (from the past 24 hour(s))   ACCU-CHEK GLUCOSE    Collection Time: 09/30/17  5:05 PM   Result Value Ref Range    Glucose - Accu-Ck 137 (H) 65 - 99 mg/dL   ACCU-CHEK GLUCOSE    Collection Time: 09/30/17  8:06 PM   Result Value Ref Range    Glucose - Accu-Ck 191 (H) 65 - 99 mg/dL   BASIC METABOLIC PANEL    Collection Time: 10/01/17  2:10 AM   Result Value Ref Range    Sodium 134 (L) 135 - 145 mmol/L    Potassium 3.4 (L) 3.6 - 5.5 mmol/L    Chloride 102 96 - 112 mmol/L    Co2 22 20 - 33 mmol/L    Glucose 149 (H) 65 - 99 mg/dL    Bun 20 8 - 22 mg/dL    Creatinine 1.40 0.50 - 1.40 mg/dL    Calcium 9.1 8.5 - 10.5 mg/dL    Anion Gap 10.0 0.0 - 11.9   ESTIMATED GFR    Collection Time: 10/01/17  2:10 AM   Result Value Ref Range    GFR If African American >60 >60 mL/min/1.73 m 2    GFR If Non  51 (A) >60 mL/min/1.73 m 2   CBC WITHOUT DIFFERENTIAL    Collection Time: 10/01/17  2:11 AM   Result Value Ref Range    WBC 7.3 4.8 - 10.8 K/uL    RBC 4.19 (L) 4.70 - 6.10 M/uL    Hemoglobin 13.4 (L) 14.0 - 18.0 g/dL    Hematocrit 37.7 (L) 42.0 - 52.0 %    MCV 90.0 81.4 - 97.8 fL    MCH 32.0 27.0 - 33.0 pg    MCHC 35.5 (H) 33.7 - 35.3 g/dL    RDW 42.1 35.9 - 50.0 fL    Platelet Count 286 164 - 446 K/uL    MPV 11.0 9.0 - 12.9 fL   ACCU-CHEK GLUCOSE    Collection Time: 10/01/17  6:39 AM   Result Value Ref Range    Glucose - Accu-Ck 128 (H) 65 - 99 mg/dL      ECG: QTc 432    Cranial Imaging:   MR brain w/o  Impression       1.  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 Minimal hemosiderin   deposition.  2.  Mild  supratentorial white matter disease most consistent with microvascular ischemic change.  3.  No evidence of acute cerebral infarction, acute hemorrhage, or mass lesion.       Other:      ASSESSMENT: (new dx, acuity level)  Unspecified psychotic disorder   R/o medication induced   R/o delirium    Pt has new delusion of death which is unwarranted and on which he is unable to reason around. The only medications which were started which he does not take as an outpatient are hydralazine and lovenox, which have possible listed side effects of psychotic reaction and confusion, respectively.    It is felt that this presentation is more likely medical than psychiatric, as he has no past psych history and recently started new medications that have psychotic reaction as a noted possible side effect.    PLAN:  Recommend substitute hydralazine, as this may cause psychosis.     If pt delusions persist after replacing hydralazine, we may consider psychiatric medication.    Legal status: no hold, however recommend not discharging the patient in his current state.  Anticipate F/U within 48 hours.   Labs/tests ordered: none  Records reviewed: yes  Discussed patient with other provider: yes  Will follow  Thank you for the consult.

## 2017-10-01 NOTE — PROGRESS NOTES
Pt states that he will pass soon, pt says that staff is lying about his health status and is refusing to eat. Family at bed side, pshyc consulted. Pt requested , social work paged due to it being the weekend and no chaplains at hospital.

## 2017-10-01 NOTE — CARE PLAN
Problem: Communication  Goal: The ability to communicate needs accurately and effectively will improve    Intervention: Educate patient and significant other/support system about the plan of care, procedures, treatments, medications and allow for questions  Pt and family educated about plan of care. Pt and family verbalized understanding.

## 2017-10-02 PROBLEM — F29 PSYCHOSIS (HCC): Status: ACTIVE | Noted: 2017-10-02

## 2017-10-02 LAB
GLUCOSE BLD-MCNC: 117 MG/DL (ref 65–99)
GLUCOSE BLD-MCNC: 124 MG/DL (ref 65–99)
GLUCOSE BLD-MCNC: 127 MG/DL (ref 65–99)
GLUCOSE BLD-MCNC: 161 MG/DL (ref 65–99)

## 2017-10-02 PROCEDURE — 82962 GLUCOSE BLOOD TEST: CPT | Mod: 91

## 2017-10-02 PROCEDURE — A9270 NON-COVERED ITEM OR SERVICE: HCPCS | Performed by: INTERNAL MEDICINE

## 2017-10-02 PROCEDURE — 770001 HCHG ROOM/CARE - MED/SURG/GYN PRIV*

## 2017-10-02 PROCEDURE — 99232 SBSQ HOSP IP/OBS MODERATE 35: CPT | Performed by: INTERNAL MEDICINE

## 2017-10-02 PROCEDURE — 700111 HCHG RX REV CODE 636 W/ 250 OVERRIDE (IP): Performed by: INTERNAL MEDICINE

## 2017-10-02 PROCEDURE — 700102 HCHG RX REV CODE 250 W/ 637 OVERRIDE(OP): Performed by: INTERNAL MEDICINE

## 2017-10-02 RX ORDER — CLONIDINE HYDROCHLORIDE 0.1 MG/1
0.3 TABLET ORAL ONCE
Status: COMPLETED | OUTPATIENT
Start: 2017-10-02 | End: 2017-10-02

## 2017-10-02 RX ADMIN — FELODIPINE 10 MG: 10 TABLET, EXTENDED RELEASE ORAL at 09:25

## 2017-10-02 RX ADMIN — LISINOPRIL 40 MG: 20 TABLET ORAL at 09:25

## 2017-10-02 RX ADMIN — DIPYRIDAMOLE 25 MG: 25 TABLET, FILM COATED ORAL at 21:46

## 2017-10-02 RX ADMIN — ENOXAPARIN SODIUM 40 MG: 100 INJECTION SUBCUTANEOUS at 09:26

## 2017-10-02 RX ADMIN — INSULIN GLARGINE 20 UNITS: 100 INJECTION, SOLUTION SUBCUTANEOUS at 21:45

## 2017-10-02 RX ADMIN — ASPIRIN 81 MG: 81 TABLET, COATED ORAL at 09:25

## 2017-10-02 RX ADMIN — CLONIDINE HYDROCHLORIDE 0.3 MG: 0.1 TABLET ORAL at 09:25

## 2017-10-02 RX ADMIN — HYDROCHLOROTHIAZIDE 25 MG: 25 TABLET ORAL at 09:26

## 2017-10-02 RX ADMIN — CLONIDINE HYDROCHLORIDE 0.3 MG: 0.1 TABLET ORAL at 21:50

## 2017-10-02 RX ADMIN — LISINOPRIL 40 MG: 20 TABLET ORAL at 21:44

## 2017-10-02 RX ADMIN — DIPYRIDAMOLE 25 MG: 25 TABLET, FILM COATED ORAL at 09:37

## 2017-10-02 RX ADMIN — DOXAZOSIN 8 MG: 2 TABLET ORAL at 21:44

## 2017-10-02 RX ADMIN — ATORVASTATIN CALCIUM 40 MG: 40 TABLET, FILM COATED ORAL at 09:25

## 2017-10-02 ASSESSMENT — PAIN SCALES - GENERAL
PAINLEVEL_OUTOF10: 0

## 2017-10-02 ASSESSMENT — ENCOUNTER SYMPTOMS
COUGH: 0
FEVER: 0
HEMOPTYSIS: 0
BLURRED VISION: 0
CHILLS: 0
DIZZINESS: 1
HEADACHES: 0
PALPITATIONS: 0
MYALGIAS: 0

## 2017-10-02 NOTE — CARE PLAN
Problem: Communication  Goal: The ability to communicate needs accurately and effectively will improve  Outcome: PROGRESSING SLOWER THAN EXPECTED  Discussed with the patient the importance of discussing his feeling and concerns with us in order for us to help meet his needs. Patient accepting of the information, but reinforcement may be needed. All questions answered at this time.     Problem: Safety  Goal: Will remain free from injury  Outcome: PROGRESSING AS EXPECTED  Discussed with patient the importance of calling for assistance prior to getting out of bed in order to prevent falls. Patient accepting of the information, but reinforcement may be noted. All questions answered at this time.

## 2017-10-02 NOTE — PROGRESS NOTES
Report received. Care assumed. Patient A&Ox4, Flat affect-slow to answer questions. Pt reports feeling 0/10 pain, no SOB at this time.  Pt denies any needs.Discussed POC for the day with Pt. Call light within reach, encouraged pt to call for assistance.

## 2017-10-02 NOTE — PROGRESS NOTES
1st PIV attempt unsuccessful. Patient educated on infection prevention practice and need to change PTA PIV. Patient refusing another PIV attempt at this time despite education.

## 2017-10-02 NOTE — CARE PLAN
Problem: Knowledge Deficit  Goal: Knowledge of disease process/condition, treatment plan, diagnostic tests, and medications will improve    Intervention: Assess knowledge level of disease process/condition, treatment plan, diagnostic tests, and medications  Pt educated regarding activity, diet, meds and plan of care. Verbalized understanding.

## 2017-10-02 NOTE — PROGRESS NOTES
Aaliyah Morel Fall Risk Assessment:     Last Known Fall: No falls  Mobility: Use of assistive device/requires assist of two people, Dizziness/generalized weakness  Medications: Cardiovascular or central nervous system meds  Mental Status/LOC/Awareness: Lethargic/oriented to person only  Toileting Needs: No needs  Volume/Electrolyte Status: No problems  Communication/Sensory: Visual (Glasses)/hearing deficit  Behavior: Ethanol/substance abuse  Fischerjake Morel Fall Risk Total: 14  Fall Risk Level: MODERATE RISK    Universal Fall Precautions:  call light/belongings in reach, siderails up x 2, use non-slip footwear, adequate lighting, clutter free and spill free environment, educate on level of risk, educate to call for assistance, wheelchairs and assistive devices out of sight, bed in low position and locked    Fall Risk Level Interventions:   TRIAL ([employer], NEURO, MED NOEMÍ 5) Low Fall Risk Interventions  Place yellow fall risk ID band on patient: verified  Provide patient/family education based on risk assessment: completed  Educate patient/family to call staff for assistance when getting out of bed: completed  Place fall precaution signage outside patient door: verifiedTRIAL ([employer] [employer], NEURO, MED NOEMÍ 5) Moderate Fall Risk Interventions  Place yellow fall risk ID band on patient: completed  Provide patient/family education based on risk assessment : completed  Educate patient/family to call staff for assistance when getting out of bed: completed  Place fall precaution signage outside patient door: completed  Utilize bed/chair fall alarm: completed     Patient Specific Interventions:     Medication: review medications with patient and family and limit combination of prn medications  Mental Status/LOC/Awareness: reorient patient, reinforce falls education, utilize bed/chair fall alarm, reinforce the use of call light and provide activity  Toileting: instruct patient/family on the need to call for assistance when  toileting  Volume/Electrolyte Status: ensure patient remains hydrated, monitor blood sugars and maintain appropriate blood sugar levels if diabetic and monitor abnormal lab values  Communication/Sensory: update plan of care on whiteboard  Behavioral: collaborate with doctor for possible psych consult, engage patient in daily activities and administer medication as ordered  Mobility: schedule physical activity throughout the day, provide comfort measures during transport and utilize bed/chair fall alarm

## 2017-10-02 NOTE — PROGRESS NOTES
Received report from day shift RN. Assumed care of patient. Patient is SR on the monitor. Patient is resting in bed with family present at bedside. Patient declines any needs at this time. Plan of care discussed with patient. Bed alarm on. Bed locked and in the lowest position with call light within reach.

## 2017-10-02 NOTE — PROGRESS NOTES
[facility] Hospitalist Progress Note    Date of Service: 10/2/2017    Chief Complaint  66 y.o. male admitted 2017 with dizziness    Interval Problem Update  Pt seen and examined, afebrile, no nausea or vomiting.Still  Feeling depressed, and has a flat aspect, psychiatry following appreciate rec.     Consultants/Specialty  Psychiatry     Disposition  Home once psychiatry clears him, from medical stand point pt is cleared.         Review of Systems   Constitutional: Negative for chills and fever.   Eyes: Negative for blurred vision.   Respiratory: Negative for cough and hemoptysis.    Cardiovascular: Negative for chest pain and palpitations.   Genitourinary: Negative for dysuria.   Musculoskeletal: Negative for myalgias.   Skin: Negative for rash.   Neurological: Positive for dizziness (better today ). Negative for headaches.      Physical Exam  Laboratory/Imaging   Hemodynamics  Temp (24hrs), Av.8 °C (98.3 °F), Min:36.4 °C (97.5 °F), Max:37.1 °C (98.7 °F)   Temperature: 36.9 °C (98.5 °F)  Pulse  Av.1  Min: 63  Max: 95    Blood Pressure : 135/76      Respiratory      Respiration: 12, Pulse Oximetry: 92 %        RUL Breath Sounds: Clear, RML Breath Sounds: Clear, RLL Breath Sounds: Diminished, PILAR Breath Sounds: Clear, LLL Breath Sounds: Diminished    Fluids    Intake/Output Summary (Last 24 hours) at 10/02/17 1508  Last data filed at 10/02/17 1200   Gross per 24 hour   Intake              420 ml   Output                0 ml   Net              420 ml       Nutrition  Orders Placed This Encounter   Procedures   • Diet Order     Standing Status:   Standing     Number of Occurrences:   1     Order Specific Question:   Diet:     Answer:   Diabetic [3]     Physical Exam   Constitutional: He is oriented to person, place, and time.   HENT:   Head: Normocephalic and atraumatic.   Mouth/Throat: No oropharyngeal exudate.   Eyes: Conjunctivae are normal. No scleral icterus.   Neck: Neck supple. No JVD present.    Cardiovascular: Normal rate and regular rhythm.  Exam reveals no gallop.    No murmur heard.  Pulmonary/Chest: Effort normal and breath sounds normal. No respiratory distress. He has no wheezes.   Abdominal: Soft. Bowel sounds are normal. He exhibits no distension. There is no tenderness.   Musculoskeletal: Normal range of motion. He exhibits no edema.   Neurological: He is alert and oriented to person, place, and time.   Skin: Skin is warm and dry. No rash noted. No erythema.   Nursing note and vitals reviewed.      Recent Labs      09/30/17   0253  10/01/17   0211   WBC  8.2  7.3   RBC  4.19*  4.19*   HEMOGLOBIN  13.6*  13.4*   HEMATOCRIT  37.6*  37.7*   MCV  89.7  90.0   MCH  32.5  32.0   MCHC  36.2*  35.5*   RDW  43.3  42.1   PLATELETCT  282  286   MPV  11.2  11.0     Recent Labs      09/30/17   0253  10/01/17   0210   SODIUM  136  134*   POTASSIUM  3.6  3.4*   CHLORIDE  103  102   CO2  22  22   GLUCOSE  125*  149*   BUN  19  20   CREATININE  1.32  1.40   CALCIUM  9.0  9.1                      Assessment/Plan     Dizziness   Assessment & Plan    recent negative stroke workup, included an MRI, carotid ultrasound, echo  No  focal neurological deficit.  symptoms could be related to peripheral vertigo possible BPV  He also has poorly controlled hypertension that could cause dizziness          Depression   Assessment & Plan    Pt feeling depressed, feeling like he is going to die soon, decreased apetite, denies SI.  Psych consulted  to stop hydralazine which might cause psychosis, and it has been d/c  Psych following appreciate rec.         History of CVA (cerebrovascular accident)   Assessment & Plan    Continue ASA and lipitor         Chronic kidney disease   Overview    Creatinine appears to be at  baseline     Hypertension   Assessment & Plan    Cont his outpt regimen  Also on IV Labetalol and hydralazine   Monitor        Troponin level elevated   Overview           Assessment & Plan    Troponin is mildly  elevated, most likely demand ischemia repeat one is negative   Patient denies chest pain  There are no specific signs of ischemia on the EKG          Uncontrolled type 2 diabetes mellitus (CMS-HCC)- (present on admission)   Assessment & Plan    Recent A1c was 9.1  Patient is on Lantus and sliding scale at home  Continue his insulin regimen, monitor blood sugar, hypoglycemia protocol            Reviewed items::  Medications reviewed, Labs reviewed, EKG reviewed and Radiology images reviewed  Celis catheter::  No Celis  DVT prophylaxis pharmacological::  Heparin

## 2017-10-02 NOTE — PROGRESS NOTES
"      Spiritual Care Note           Patient's Name: Jean Barboza   MRN: [id number]    Age and Gender: 66 y.o. male   YOB: 1950   Place of Residence: [place], [place]   Unit: [place]   Room (and Bed): [place] [address]   Ethnicity or Nationality: white   Primary Language: English   Medical Diagnosis(-es)/Procedure(s): dizziness  depression  history of cerebrovascular accident  chronic kidney disease  hypertension  troponin level elevated  uncontrolled type 2 diabetes mellitus    Jainism Affiliation: Sabianist   Code Status: Full Code    Date of Admission: 9/28/2017    Length of Stay: 1 day   Date of Visit: 10/1/2017       Situation/Reason for Visit:  Received call from  that patient was requesting a .    Background:  Read in notes from psych consult that patient believes he is dying, a psychotic symptom possibly a side effect from a medication.    Observations:  Patient lying bed with bed covers up over his head, only his face showing.    Summary of Interaction/Conversation with Patient and/or Family/Friends/Others:  When asked what I might do for him, he replied \"prayer.\"  When asked what he would like prayer about, he said \"last rites.\"  When I told him that I was not a  but that I can have a  come in the morning he had no response.   I offered to pray for him and anoint him, explaining that this would not be the actual Sabianist sacrament.  He indicated that he wanted me to do that.    Assessment of Cultural/Social/Emotional/Spiritual Issues:  Patient believing that he was going to die, seeking final spiritual rite.    Interventions:  Compassionate presence, prayer, anointing.    Outcomes:  Spiritual comfort (?)    Consultations/Referrals:  none    Requests/Recommendations:  none    Continuing Care:  Will continue to follow.      Contact Information:  Chaplain JEROD Riddle  [phone]   [email or web address]    "

## 2017-10-03 PROBLEM — R09.89 SYMPTOMS OF UPPER RESPIRATORY INFECTION (URI): Status: ACTIVE | Noted: 2017-10-03

## 2017-10-03 PROBLEM — F39 MOOD DISORDER (HCC): Status: ACTIVE | Noted: 2017-10-01

## 2017-10-03 PROBLEM — E87.6 HYPOKALEMIA: Status: ACTIVE | Noted: 2017-10-03

## 2017-10-03 PROBLEM — N18.2 CKD (CHRONIC KIDNEY DISEASE) STAGE 2, GFR 60-89 ML/MIN: Status: ACTIVE | Noted: 2017-09-29

## 2017-10-03 LAB
ANION GAP SERPL CALC-SCNC: 14 MMOL/L (ref 0–11.9)
BUN SERPL-MCNC: 23 MG/DL (ref 8–22)
CALCIUM SERPL-MCNC: 9.3 MG/DL (ref 8.5–10.5)
CHLORIDE SERPL-SCNC: 101 MMOL/L (ref 96–112)
CO2 SERPL-SCNC: 22 MMOL/L (ref 20–33)
CREAT SERPL-MCNC: 1.23 MG/DL (ref 0.5–1.4)
GFR SERPL CREATININE-BSD FRML MDRD: 59 ML/MIN/1.73 M 2
GLUCOSE BLD-MCNC: 118 MG/DL (ref 65–99)
GLUCOSE BLD-MCNC: 145 MG/DL (ref 65–99)
GLUCOSE BLD-MCNC: 148 MG/DL (ref 65–99)
GLUCOSE BLD-MCNC: 235 MG/DL (ref 65–99)
GLUCOSE SERPL-MCNC: 162 MG/DL (ref 65–99)
MAGNESIUM SERPL-MCNC: 1.6 MG/DL (ref 1.5–2.5)
POTASSIUM SERPL-SCNC: 3.4 MMOL/L (ref 3.6–5.5)
SODIUM SERPL-SCNC: 137 MMOL/L (ref 135–145)

## 2017-10-03 PROCEDURE — 93005 ELECTROCARDIOGRAM TRACING: CPT | Performed by: INTERNAL MEDICINE

## 2017-10-03 PROCEDURE — 36415 COLL VENOUS BLD VENIPUNCTURE: CPT

## 2017-10-03 PROCEDURE — 99232 SBSQ HOSP IP/OBS MODERATE 35: CPT | Performed by: INTERNAL MEDICINE

## 2017-10-03 PROCEDURE — A9270 NON-COVERED ITEM OR SERVICE: HCPCS | Performed by: INTERNAL MEDICINE

## 2017-10-03 PROCEDURE — 80048 BASIC METABOLIC PNL TOTAL CA: CPT

## 2017-10-03 PROCEDURE — 93010 ELECTROCARDIOGRAM REPORT: CPT | Performed by: INTERNAL MEDICINE

## 2017-10-03 PROCEDURE — 82962 GLUCOSE BLOOD TEST: CPT | Mod: 91

## 2017-10-03 PROCEDURE — 83735 ASSAY OF MAGNESIUM: CPT

## 2017-10-03 PROCEDURE — 700102 HCHG RX REV CODE 250 W/ 637 OVERRIDE(OP): Performed by: INTERNAL MEDICINE

## 2017-10-03 PROCEDURE — 770006 HCHG ROOM/CARE - MED/SURG/GYN SEMI*

## 2017-10-03 PROCEDURE — 700111 HCHG RX REV CODE 636 W/ 250 OVERRIDE (IP): Performed by: INTERNAL MEDICINE

## 2017-10-03 RX ORDER — MINOXIDIL 2.5 MG/1
5 TABLET ORAL
Status: DISCONTINUED | OUTPATIENT
Start: 2017-10-03 | End: 2017-10-04

## 2017-10-03 RX ORDER — GUAIFENESIN/DEXTROMETHORPHAN 100-10MG/5
5 SYRUP ORAL EVERY 6 HOURS PRN
Status: DISCONTINUED | OUTPATIENT
Start: 2017-10-03 | End: 2017-10-06 | Stop reason: HOSPADM

## 2017-10-03 RX ADMIN — ENOXAPARIN SODIUM 40 MG: 100 INJECTION SUBCUTANEOUS at 08:59

## 2017-10-03 RX ADMIN — INSULIN GLARGINE 20 UNITS: 100 INJECTION, SOLUTION SUBCUTANEOUS at 21:48

## 2017-10-03 RX ADMIN — MINOXIDIL 5 MG: 2.5 TABLET ORAL at 10:46

## 2017-10-03 RX ADMIN — DIPYRIDAMOLE 25 MG: 25 TABLET, FILM COATED ORAL at 08:59

## 2017-10-03 RX ADMIN — DOXAZOSIN 8 MG: 2 TABLET ORAL at 20:57

## 2017-10-03 RX ADMIN — LISINOPRIL 40 MG: 20 TABLET ORAL at 20:49

## 2017-10-03 RX ADMIN — ATORVASTATIN CALCIUM 40 MG: 40 TABLET, FILM COATED ORAL at 08:59

## 2017-10-03 RX ADMIN — FELODIPINE 10 MG: 10 TABLET, EXTENDED RELEASE ORAL at 08:58

## 2017-10-03 RX ADMIN — METOPROLOL TARTRATE 25 MG: 25 TABLET ORAL at 15:27

## 2017-10-03 RX ADMIN — LISINOPRIL 40 MG: 20 TABLET ORAL at 08:59

## 2017-10-03 RX ADMIN — STANDARDIZED SENNA CONCENTRATE AND DOCUSATE SODIUM 2 TABLET: 8.6; 5 TABLET, FILM COATED ORAL at 08:59

## 2017-10-03 RX ADMIN — GUAIFENESIN AND DEXTROMETHORPHAN 5 ML: 100; 10 SYRUP ORAL at 12:33

## 2017-10-03 RX ADMIN — INSULIN LISPRO 2 UNITS: 100 INJECTION, SOLUTION INTRAVENOUS; SUBCUTANEOUS at 18:03

## 2017-10-03 RX ADMIN — HYDROCHLOROTHIAZIDE 25 MG: 25 TABLET ORAL at 08:59

## 2017-10-03 RX ADMIN — DIPYRIDAMOLE 25 MG: 25 TABLET, FILM COATED ORAL at 20:56

## 2017-10-03 RX ADMIN — ASPIRIN 81 MG: 81 TABLET, COATED ORAL at 08:59

## 2017-10-03 ASSESSMENT — ENCOUNTER SYMPTOMS
FOCAL WEAKNESS: 0
NAUSEA: 0
ABDOMINAL PAIN: 0
FEVER: 0
DIZZINESS: 1
HEADACHES: 0
MYALGIAS: 0
CHILLS: 0
PALPITATIONS: 0
BACK PAIN: 0
NERVOUS/ANXIOUS: 0
HEMOPTYSIS: 0
SHORTNESS OF BREATH: 0
DEPRESSION: 0

## 2017-10-03 ASSESSMENT — PAIN SCALES - GENERAL
PAINLEVEL_OUTOF10: 0
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
## 2017-10-03 NOTE — CONSULTS
"Diabetes education: Met with patient and brother regarding diabetes and order for education on dietary. Per RD note, pt was seen on 9/30 for education and was both \"engaged and taking notes\".   When CDE met with patient today, pt was not engaged, affect was flat, responded to questions some what. CDE spoke with brother who state pt was taking his own finger sticks, pt's niece would come over, cook dinner and give his bedtime insulin ( via pen). Per brother, pt was on fast acting insulin but did not need it.  Asked brother if he and or his daughter were able to do finger sticks for patient and he said yes, as not sure if patient would be able to do this at home at this time.  Plan: CDE asked brother to have nursing call if needs change or they want more information. Please call [phone].    "

## 2017-10-03 NOTE — NON-PROVIDER
"PSYCHIATRIC FOLLOW UP:    Reason for Admission: Dizziness  Dizziness  Dizziness  Psychosis  Legal hold status: No  Psychiatric Supervising Attending:   Ailin Hi MD    HPI:  Patient was resting comfortably in bed this am. Patient was talking audibly, smiling, and showing full range of emotions. Per patient, he feels like he is back to his baseline. He denies any depression or anxiety. He mentioned that he had \"weird dreams\" last night. Nursing did not specifically note delirium, but he was reoriented to his room at 2 a.m. Family was in the room and present for the exam. Per family, patient has now reached baseline.    Psychiatric Examination: observed phenomenon:  Vitals: Blood pressure (!) 178/82, pulse 78, temperature 36.6 °C (97.9 °F), resp. rate 18, height 1.727 m (5' 8\"), weight 95 kg (209 lb 7 oz), SpO2 94 %.  Musculoskeletal: normal psychomotor activity, no tics or unusual mannerisms noted  Appearance: WDWN, appropriate dress and grooming. Behavior is calm, cooperative,  appropriate eye contact  Thoughts:  Linear, logical, no blocking of thought noted, no delusional content noted, not obviously responding to internal stimuli.  Speech: Normal rate and magdalena, no pressuring of speech noted  Mood:            Affect: Mood congruent, normal range of affect          SI/HI: denies, no evidence of active SI/HI noted   Attention/Alertness: Alert  Memory: Recent and remote memory grossly intact     Orientation: A&Ox3     Fund of Knowledge: Fair     Insight/Judgement into symptoms:   Neurological Testing: Not formally tested    Medical systems reviewed: (pain, new complaint, etc)           Lab results/tests:   Recent Results (from the past 24 hour(s))   ACCU-CHEK GLUCOSE    Collection Time: 10/02/17  5:20 PM   Result Value Ref Range    Glucose - Accu-Ck 127 (H) 65 - 99 mg/dL   ACCU-CHEK GLUCOSE    Collection Time: 10/02/17  9:37 PM   Result Value Ref Range    Glucose - Accu-Ck 124 (H) 65 - 99 mg/dL   ACCU-CHEK " GLUCOSE    Collection Time: 10/03/17  6:13 AM   Result Value Ref Range    Glucose - Accu-Ck 118 (H) 65 - 99 mg/dL   BASIC METABOLIC PANEL    Collection Time: 10/03/17  8:39 AM   Result Value Ref Range    Sodium 137 135 - 145 mmol/L    Potassium 3.4 (L) 3.6 - 5.5 mmol/L    Chloride 101 96 - 112 mmol/L    Co2 22 20 - 33 mmol/L    Glucose 162 (H) 65 - 99 mg/dL    Bun 23 (H) 8 - 22 mg/dL    Creatinine 1.23 0.50 - 1.40 mg/dL    Calcium 9.3 8.5 - 10.5 mg/dL    Anion Gap 14.0 (H) 0.0 - 11.9   MAGNESIUM    Collection Time: 10/03/17  8:39 AM   Result Value Ref Range    Magnesium 1.6 1.5 - 2.5 mg/dL   ESTIMATED GFR    Collection Time: 10/03/17  8:39 AM   Result Value Ref Range    GFR If African American >60 >60 mL/min/1.73 m 2    GFR If Non African American 59 (A) >60 mL/min/1.73 m 2   ACCU-CHEK GLUCOSE    Collection Time: 10/03/17 11:42 AM   Result Value Ref Range    Glucose - Accu-Ck 148 (H) 65 - 99 mg/dL          Assessment:  Unspecified mood disorder    Plan:  Likely delirium, resolving  Clear to discharge  Signing off

## 2017-10-03 NOTE — PROGRESS NOTES
Pt arrived onto floor by wheelchair at 1130, ambulated to bed with steady gait, family member with pt. Pt A&O x4, denies pain, oriented to unit, RN and CNA numbers provided. Hourly rounding in place.

## 2017-10-03 NOTE — PROGRESS NOTES
[facility] Hospitalist Progress Note    Date of Service: 10/3/2017    Chief Complaint  66 y.o. male admitted 2017 with dizziness    Interval Problem Update  Transferred from telemetry  No new complaints  Dizziness resolved     Consultants/Specialty  Psychiatry     Disposition  Home once psychiatry clears him, from medical stand point pt is cleared.         Review of Systems   Constitutional: Negative for chills and fever.   HENT: Negative for congestion and hearing loss.    Respiratory: Negative for hemoptysis and shortness of breath.    Cardiovascular: Negative for palpitations.   Gastrointestinal: Negative for abdominal pain and nausea.   Genitourinary: Negative for dysuria.   Musculoskeletal: Negative for back pain and myalgias.   Neurological: Positive for dizziness (better today ). Negative for focal weakness and headaches.   Psychiatric/Behavioral: Negative for depression. The patient is not nervous/anxious.       Physical Exam  Laboratory/Imaging   Hemodynamics  Temp (24hrs), Av.7 °C (98 °F), Min:36.4 °C (97.6 °F), Max:36.9 °C (98.4 °F)   Temperature: 36.6 °C (97.9 °F)  Pulse  Av.9  Min: 63  Max: 95    Blood Pressure : (!) 178/82      Respiratory      Respiration: 18, Pulse Oximetry: 94 %        RUL Breath Sounds: Clear, RML Breath Sounds: Clear, RLL Breath Sounds: Clear;Diminished, PILAR Breath Sounds: Clear, LLL Breath Sounds: Clear;Diminished    Fluids    Intake/Output Summary (Last 24 hours) at 10/03/17 1425  Last data filed at 10/02/17 1600   Gross per 24 hour   Intake              240 ml   Output                0 ml   Net              240 ml       Nutrition  Orders Placed This Encounter   Procedures   • Diet Order     Standing Status:   Standing     Number of Occurrences:   1     Order Specific Question:   Diet:     Answer:   Diabetic [3]     Physical Exam   Constitutional: He is oriented to person, place, and time. No distress.   HENT:   Head: Normocephalic and atraumatic.   Eyes: Conjunctivae  are normal.   Neck: Normal range of motion. Neck supple. No JVD present.   Cardiovascular: Normal rate and regular rhythm.    No murmur heard.  Pulmonary/Chest: Effort normal and breath sounds normal. No respiratory distress. He has no wheezes.   Abdominal: Soft. Bowel sounds are normal. He exhibits no distension.   Musculoskeletal: He exhibits no edema or tenderness.   Neurological: He is alert and oriented to person, place, and time. No cranial nerve deficit. Coordination normal.   Skin: Skin is warm and dry. He is not diaphoretic. No erythema.   Psychiatric: His behavior is normal. Thought content normal.   Nursing note and vitals reviewed.      Recent Labs      10/01/17   0211   WBC  7.3   RBC  4.19*   HEMOGLOBIN  13.4*   HEMATOCRIT  37.7*   MCV  90.0   MCH  32.0   MCHC  35.5*   RDW  42.1   PLATELETCT  286   MPV  11.0     Recent Labs      10/01/17   0210  10/03/17   0839   SODIUM  134*  137   POTASSIUM  3.4*  3.4*   CHLORIDE  102  101   CO2  22  22   GLUCOSE  149*  162*   BUN  20  23*   CREATININE  1.40  1.23   CALCIUM  9.1  9.3                      Assessment/Plan     Hypertension- (present on admission)   Assessment & Plan    Essential  Not controlled  catapress can cause psychosis issues also- discontinued and Minoxidil added  Add metoprolol  On lisinopril        Dizziness- (present on admission)   Assessment & Plan    recent negative stroke workup, included an MRI, carotid ultrasound, echo  No  focal neurological deficit.  Sx could be 2/2 BP vs mood vs vertigo or combination thereof          Symptoms of upper respiratory infection (URI)   Assessment & Plan    Prn robitussin DM        Hypokalemia   Assessment & Plan    3.4 on 10/1. 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  Await psych clearance. Recommended stop hydralazine, clonidine also w/ CNS SE's so will d/c this        History of  CVA (cerebrovascular accident)- (present on admission)   Assessment & Plan    Continue ASA and lipitor         CKD (chronic kidney disease) stage 2, GFR 60-89 ml/min-2/2 diabetes- (present on admission)   Overview    Creatinine appears to be at  baseline     Assessment & Plan    Mild fluctuations- monitor        Troponin level elevated- (present on admission)   Overview           Assessment & Plan    Troponin was mildly elevated, most likely demand ischemia repeat trop negative   Patient denies chest pain  There are no specific signs of ischemia on the EKG          Uncontrolled type 2 diabetes mellitus (CMS-[facility])- (present on admission)   Assessment & Plan    Recent A1c was 9.1  Patient is on Lantus and sliding scale at home  Sugars controlled inpatient            Reviewed items::  Medications reviewed, Labs reviewed, EKG reviewed and Radiology images reviewed  Celis catheter::  No Celis  DVT prophylaxis pharmacological::  Heparin

## 2017-10-03 NOTE — PROGRESS NOTES
Diabetes education: Met with pt and his brother this afternoon. Please see consult note. Pt has a flat affect. Most conversation was done with brother.  Plan: CDE asked brother to have nursing call if needs change or they want more information. Please call [phone].

## 2017-10-03 NOTE — CARE PLAN
Problem: Safety  Goal: Will remain free from injury  Outcome: PROGRESSING AS EXPECTED  Discussed with patient the importance of calling for assistance prior to getting out of bed in order to help prevent falls. Patient accepting of information. All questions answered at this time.     Problem: Infection  Goal: Will remain free from infection  Outcome: PROGRESSING AS EXPECTED  Discussed with patient the importance of washing hands before and after eating or using the restroom in order to help prevent falls. Patient accepting of the information. All questions answered at this time.

## 2017-10-03 NOTE — PROGRESS NOTES
Pt transferred via wheelchair transport to [facility]-. Report called to PRATIBHA Hoyt

## 2017-10-03 NOTE — PROGRESS NOTES
Received report from day shift RN. Assumed care of patient. Patient is medical and not on a monitor at this time. Patient is A&Ox4. Patient declines any needs at this time. Patient is sitting up in bed with family present at bedside. Plan of care discussed with patient. Bed locked and in the lowest position with call light within reach.

## 2017-10-03 NOTE — CARE PLAN
Problem: Venous Thromboembolism (VTW)/Deep Vein Thrombosis (DVT) Prevention:  Goal: Patient will participate in Venous Thrombosis (VTE)/Deep Vein Thrombosis (DVT)Prevention Measures    Intervention: Ensure patient wears graduated elastic stockings (BRITTON hose) and/or SCDs, if ordered, when in bed or chair (Remove at least once per shift for skin check)  Lovenox administered per MAR.      Problem: Knowledge Deficit  Goal: Knowledge of disease process/condition, treatment plan, diagnostic tests, and medications will improve    Intervention: Explain information regarding disease process/condition, treatment plan, diagnostic tests, and medications and document in education  Pt and family educated on POC and verbalized understanding.

## 2017-10-03 NOTE — PROGRESS NOTES
Assumed care of patient at this time. Bedside report received from night RN. Pt resting comfortably, denies pain, needs, or concerns. Bed alarm on, pt reminded to call for assistance. Updated on plan of care. Will monitor.

## 2017-10-04 LAB
ANION GAP SERPL CALC-SCNC: 8 MMOL/L (ref 0–11.9)
BASOPHILS # BLD AUTO: 0.4 % (ref 0–1.8)
BASOPHILS # BLD: 0.03 K/UL (ref 0–0.12)
BUN SERPL-MCNC: 23 MG/DL (ref 8–22)
CALCIUM SERPL-MCNC: 8.7 MG/DL (ref 8.5–10.5)
CHLORIDE SERPL-SCNC: 102 MMOL/L (ref 96–112)
CO2 SERPL-SCNC: 25 MMOL/L (ref 20–33)
CREAT SERPL-MCNC: 1.47 MG/DL (ref 0.5–1.4)
EKG IMPRESSION: NORMAL
EKG IMPRESSION: NORMAL
EOSINOPHIL # BLD AUTO: 0.09 K/UL (ref 0–0.51)
EOSINOPHIL NFR BLD: 1.1 % (ref 0–6.9)
ERYTHROCYTE [DISTWIDTH] IN BLOOD BY AUTOMATED COUNT: 40.7 FL (ref 35.9–50)
GFR SERPL CREATININE-BSD FRML MDRD: 48 ML/MIN/1.73 M 2
GLUCOSE BLD-MCNC: 108 MG/DL (ref 65–99)
GLUCOSE BLD-MCNC: 153 MG/DL (ref 65–99)
GLUCOSE BLD-MCNC: 179 MG/DL (ref 65–99)
GLUCOSE BLD-MCNC: 193 MG/DL (ref 65–99)
GLUCOSE SERPL-MCNC: 116 MG/DL (ref 65–99)
HCT VFR BLD AUTO: 37.1 % (ref 42–52)
HGB BLD-MCNC: 13 G/DL (ref 14–18)
IMM GRANULOCYTES # BLD AUTO: 0.03 K/UL (ref 0–0.11)
IMM GRANULOCYTES NFR BLD AUTO: 0.4 % (ref 0–0.9)
LYMPHOCYTES # BLD AUTO: 1.22 K/UL (ref 1–4.8)
LYMPHOCYTES NFR BLD: 14.7 % (ref 22–41)
MCH RBC QN AUTO: 30.8 PG (ref 27–33)
MCHC RBC AUTO-ENTMCNC: 35 G/DL (ref 33.7–35.3)
MCV RBC AUTO: 87.9 FL (ref 81.4–97.8)
MONOCYTES # BLD AUTO: 0.45 K/UL (ref 0–0.85)
MONOCYTES NFR BLD AUTO: 5.4 % (ref 0–13.4)
NEUTROPHILS # BLD AUTO: 6.49 K/UL (ref 1.82–7.42)
NEUTROPHILS NFR BLD: 78 % (ref 44–72)
NRBC # BLD AUTO: 0 K/UL
NRBC BLD AUTO-RTO: 0 /100 WBC
PLATELET # BLD AUTO: 273 K/UL (ref 164–446)
PMV BLD AUTO: 11.2 FL (ref 9–12.9)
POTASSIUM SERPL-SCNC: 3.2 MMOL/L (ref 3.6–5.5)
RBC # BLD AUTO: 4.22 M/UL (ref 4.7–6.1)
SODIUM SERPL-SCNC: 135 MMOL/L (ref 135–145)
TROPONIN I SERPL-MCNC: <0.01 NG/ML (ref 0–0.04)
WBC # BLD AUTO: 8.3 K/UL (ref 4.8–10.8)

## 2017-10-04 PROCEDURE — 770006 HCHG ROOM/CARE - MED/SURG/GYN SEMI*

## 2017-10-04 PROCEDURE — A9270 NON-COVERED ITEM OR SERVICE: HCPCS | Performed by: INTERNAL MEDICINE

## 2017-10-04 PROCEDURE — 700102 HCHG RX REV CODE 250 W/ 637 OVERRIDE(OP): Performed by: INTERNAL MEDICINE

## 2017-10-04 PROCEDURE — 82962 GLUCOSE BLOOD TEST: CPT | Mod: 91

## 2017-10-04 PROCEDURE — 85025 COMPLETE CBC W/AUTO DIFF WBC: CPT

## 2017-10-04 PROCEDURE — 84484 ASSAY OF TROPONIN QUANT: CPT

## 2017-10-04 PROCEDURE — 36415 COLL VENOUS BLD VENIPUNCTURE: CPT

## 2017-10-04 PROCEDURE — 80048 BASIC METABOLIC PNL TOTAL CA: CPT

## 2017-10-04 PROCEDURE — 93010 ELECTROCARDIOGRAM REPORT: CPT | Performed by: INTERNAL MEDICINE

## 2017-10-04 PROCEDURE — 99233 SBSQ HOSP IP/OBS HIGH 50: CPT | Performed by: INTERNAL MEDICINE

## 2017-10-04 PROCEDURE — 94760 N-INVAS EAR/PLS OXIMETRY 1: CPT

## 2017-10-04 PROCEDURE — 93005 ELECTROCARDIOGRAM TRACING: CPT | Performed by: INTERNAL MEDICINE

## 2017-10-04 PROCEDURE — 700111 HCHG RX REV CODE 636 W/ 250 OVERRIDE (IP): Performed by: INTERNAL MEDICINE

## 2017-10-04 RX ORDER — MINOXIDIL 2.5 MG/1
5 TABLET ORAL DAILY
Qty: 30 TAB | Refills: 1 | Status: SHIPPED | OUTPATIENT
Start: 2017-10-04 | End: 2017-10-06

## 2017-10-04 RX ORDER — MECLIZINE HYDROCHLORIDE 25 MG/1
25 TABLET ORAL 3 TIMES DAILY
Status: DISCONTINUED | OUTPATIENT
Start: 2017-10-04 | End: 2017-10-06 | Stop reason: HOSPADM

## 2017-10-04 RX ORDER — GUAIFENESIN/DEXTROMETHORPHAN 100-10MG/5
5 SYRUP ORAL EVERY 6 HOURS PRN
Qty: 560 ML | Refills: 1 | Status: ON HOLD | OUTPATIENT
Start: 2017-10-04 | End: 2021-06-30

## 2017-10-04 RX ORDER — POTASSIUM CHLORIDE 20 MEQ/1
40 TABLET, EXTENDED RELEASE ORAL DAILY
Status: DISCONTINUED | OUTPATIENT
Start: 2017-10-04 | End: 2017-10-06 | Stop reason: HOSPADM

## 2017-10-04 RX ORDER — MINOXIDIL 2.5 MG/1
5 TABLET ORAL
Status: DISCONTINUED | OUTPATIENT
Start: 2017-10-04 | End: 2017-10-06

## 2017-10-04 RX ADMIN — ASPIRIN 81 MG: 81 TABLET, COATED ORAL at 09:39

## 2017-10-04 RX ADMIN — INSULIN LISPRO 1 UNITS: 100 INJECTION, SOLUTION INTRAVENOUS; SUBCUTANEOUS at 13:05

## 2017-10-04 RX ADMIN — MINOXIDIL 5 MG: 2.5 TABLET ORAL at 11:58

## 2017-10-04 RX ADMIN — STANDARDIZED SENNA CONCENTRATE AND DOCUSATE SODIUM 2 TABLET: 8.6; 5 TABLET, FILM COATED ORAL at 09:38

## 2017-10-04 RX ADMIN — STANDARDIZED SENNA CONCENTRATE AND DOCUSATE SODIUM 2 TABLET: 8.6; 5 TABLET, FILM COATED ORAL at 22:44

## 2017-10-04 RX ADMIN — INSULIN GLARGINE 20 UNITS: 100 INJECTION, SOLUTION SUBCUTANEOUS at 22:51

## 2017-10-04 RX ADMIN — DIPYRIDAMOLE 25 MG: 25 TABLET, FILM COATED ORAL at 09:40

## 2017-10-04 RX ADMIN — ATORVASTATIN CALCIUM 40 MG: 40 TABLET, FILM COATED ORAL at 09:39

## 2017-10-04 RX ADMIN — MECLIZINE HYDROCHLORIDE 25 MG: 25 TABLET ORAL at 14:55

## 2017-10-04 RX ADMIN — DOXAZOSIN 8 MG: 2 TABLET ORAL at 22:44

## 2017-10-04 RX ADMIN — ENOXAPARIN SODIUM 40 MG: 100 INJECTION SUBCUTANEOUS at 09:38

## 2017-10-04 RX ADMIN — MECLIZINE HYDROCHLORIDE 25 MG: 25 TABLET ORAL at 09:39

## 2017-10-04 RX ADMIN — FELODIPINE 10 MG: 10 TABLET, EXTENDED RELEASE ORAL at 11:58

## 2017-10-04 RX ADMIN — POTASSIUM CHLORIDE 40 MEQ: 1500 TABLET, EXTENDED RELEASE ORAL at 14:55

## 2017-10-04 RX ADMIN — HYDROCHLOROTHIAZIDE 25 MG: 25 TABLET ORAL at 09:39

## 2017-10-04 RX ADMIN — INSULIN LISPRO 1 UNITS: 100 INJECTION, SOLUTION INTRAVENOUS; SUBCUTANEOUS at 22:55

## 2017-10-04 RX ADMIN — INSULIN LISPRO 1 UNITS: 100 INJECTION, SOLUTION INTRAVENOUS; SUBCUTANEOUS at 09:58

## 2017-10-04 RX ADMIN — LISINOPRIL 40 MG: 20 TABLET ORAL at 22:44

## 2017-10-04 RX ADMIN — MECLIZINE HYDROCHLORIDE 25 MG: 25 TABLET ORAL at 22:44

## 2017-10-04 RX ADMIN — LISINOPRIL 40 MG: 20 TABLET ORAL at 09:39

## 2017-10-04 RX ADMIN — METOPROLOL TARTRATE 12.5 MG: 25 TABLET ORAL at 09:42

## 2017-10-04 ASSESSMENT — ENCOUNTER SYMPTOMS
BACK PAIN: 0
CHILLS: 0
DEPRESSION: 1
HEARTBURN: 0
HALLUCINATIONS: 0
FLANK PAIN: 0
PALPITATIONS: 0
HEADACHES: 0
ABDOMINAL PAIN: 0
HEMOPTYSIS: 0
BLURRED VISION: 0
MYALGIAS: 1
WEAKNESS: 1
TREMORS: 0
SHORTNESS OF BREATH: 0
FEVER: 0
SEIZURES: 0
FOCAL WEAKNESS: 0
DIZZINESS: 1
SENSORY CHANGE: 0
DOUBLE VISION: 0
LOSS OF CONSCIOUSNESS: 0
DIAPHORESIS: 0
NERVOUS/ANXIOUS: 1
NAUSEA: 0

## 2017-10-04 ASSESSMENT — PAIN SCALES - GENERAL
PAINLEVEL_OUTOF10: 0
PAINLEVEL_OUTOF10: 0

## 2017-10-04 NOTE — PROGRESS NOTES
Assumed pt care after report received from night Rn. Pt is resting comfortably at this time. No c/o pain or distress noted. Call light and belongings in reach. Bed in low position. Will continue to monitor.

## 2017-10-04 NOTE — PROGRESS NOTES
"Dr. Guerrier, resident for Dr. Fontana contacted to reconsult patient for \"impending feelings of doom\" per request of Dr. Stewart. She will pass on information to Dr. Hi.  "

## 2017-10-04 NOTE — PROGRESS NOTES
Aaliyah Morel Fall Risk Assessment:     Last Known Fall: No falls  Mobility: Immobilized/requires assist of one person  Medications: Cardiovascular or central nervous system meds  Mental Status/LOC/Awareness: Awake, alert, and oriented to date, place, and person  Toileting Needs: Use of assistive device (Bedside commode, bedpan, urinal)  Volume/Electrolyte Status: No problems  Communication/Sensory: Visual (Glasses)/hearing deficit  Behavior: Depression/anxiety  Aaliyah Morel Fall Risk Total: 10  Fall Risk Level: LOW RISK    Universal Fall Precautions:  call light/belongings in reach, bed in low position and locked, siderails up x 2, use non-slip footwear, adequate lighting, clutter free and spill free environment, wheelchairs and assistive devices out of sight, educate on level of risk, educate to call for assistance    Fall Risk Level Interventions:   TRIAL ([facility], NEURO, MED NOEMÍ 5) Low Fall Risk Interventions  Place yellow fall risk ID band on patient: verified  Provide patient/family education based on risk assessment: completed  Educate patient/family to call staff for assistance when getting out of bed: completed  Place fall precaution signage outside patient door: verifiedTRIAL ([facility], NEURO, MED NOEMÍ 5) Moderate Fall Risk Interventions  Place yellow fall risk ID band on patient: completed  Provide patient/family education based on risk assessment : completed  Educate patient/family to call staff for assistance when getting out of bed: completed  Place fall precaution signage outside patient door: completed  Utilize bed/chair fall alarm: completed     Patient Specific Interventions:     Medication: review medications with patient and family  Mental Status/LOC/Awareness: reinforce falls education, encourage family to stay with patient, utilize bed/chair fall alarm and reinforce the use of call light  Toileting: monitor intake and output/use of appropriate interventions and instruct patient/family on the  need to call for assistance when toileting  Volume/Electrolyte Status: monitor blood sugars and maintain appropriate blood sugar levels if diabetic and monitor abnormal lab values  Communication/Sensory: update plan of care on whiteboard  Behavioral: collaborate with doctor for possible psych consult and administer medication as ordered  Mobility: schedule physical activity throughout the day, dangle prior to standing, utilize bed/chair fall alarm, ensure bed is locked and in lowest position and provide appropriate assistive device

## 2017-10-04 NOTE — PROGRESS NOTES
Received report from day RN. Assumed pt care. Discussed call light/phone system, communication board and POC. Pt is aao x 3, disoriented to time. Pt mobility assessed. Pt is a one assist up to the bathroom. Bed alarm on. Fall precautions in place. Bed is locked and in low position, call light within reach. Treaded slippers in place. Pt needs met at this time. Hourly rounding in place.

## 2017-10-04 NOTE — PROGRESS NOTES
Aaliyah Morel Fall Risk Assessment:     Last Known Fall: No falls  Mobility: Use of assistive device/requires assist of two people  Medications: Cardiovascular or central nervous system meds  Mental Status/LOC/Awareness: Awake, alert, and oriented to date, place, and person  Toileting Needs: No needs  Volume/Electrolyte Status: No problems  Communication/Sensory: Visual (Glasses)/hearing deficit  Behavior: Appropriate behavior  Aaliyah Morel Fall Risk Total: 8  Fall Risk Level: LOW RISK    Universal Fall Precautions:  call light/belongings in reach, wheelchairs and assistive devices out of sight, bed in low position and locked, siderails up x 2, use non-slip footwear, adequate lighting, clutter free and spill free environment, educate on level of risk, educate to call for assistance    Fall Risk Level Interventions:   TRIAL ([facility], NEURO, MED NOEMÍ 5) Low Fall Risk Interventions  Place yellow fall risk ID band on patient: verified  Provide patient/family education based on risk assessment: verified  Educate patient/family to call staff for assistance when getting out of bed: verified  Place fall precaution signage outside patient door: verifiedTRIAL ([facility], NEURO, MED NOEMÍ 5) Moderate Fall Risk Interventions  Place yellow fall risk ID band on patient: completed  Provide patient/family education based on risk assessment : completed  Educate patient/family to call staff for assistance when getting out of bed: completed  Place fall precaution signage outside patient door: completed  Utilize bed/chair fall alarm: completed     Patient Specific Interventions:     Medication: review medications with patient and family  Mental Status/LOC/Awareness: reorient patient and encourage family to stay with patient  Toileting: provide frquent toileting  Volume/Electrolyte Status: ensure patient remains hydrated, monitor blood sugars and maintain appropriate blood sugar levels if diabetic, monitor abnormal lab values and teach  patients to dangle before rising if hypotensive  Communication/Sensory: update plan of care on whiteboard and provide communication alternatives/  Behavioral: encourage patient to voice feelings and encourage family to stay with impulsive patients  Mobility: not applicable

## 2017-10-04 NOTE — CODE DOCUMENTATION
Patient sitting on shower chair in the shower. States has numbness of bilateral lower extremeties and headache. Patient diaphoretic. Patient hypotensive 86/42. Patient moved to wheelchair and to bed with assistance from security. Vitals reassessed once in bed. Patient's BP improved and he states he can feel both lower extremities. NIH performed and is negative. Patient is confused to place and event.

## 2017-10-04 NOTE — PROGRESS NOTES
[facility] Hospitalist Progress Note    Date of Service: 10/4/2017    Chief Complaint  66 y.o. male admitted 9/28/2017 with dizziness    Interval Problem Update  Patient with episode of weakness yesterday evening  Resolution of weakness while in bed  Was noted to be hypotensive systolics in the 80s  Patient is anxious and reports sensation of impending doom  When asked for specific symptoms of nausea pain, patient states that he simply does not feel well  Patient has a flat affect, does not want to participate in therapy  Patient's niece is at bedside stating that patient reports sensation of feeling like he will die soon  Patient is unable to clarify any focal symptoms including headache blurry vision nausea  Patient does report generalized weakness and minimal right knee pain only tender to palpation minimal decreased range of motion  During interview patient is looking away, is not making eye contact, is not participating in exam     Consultants/Specialty  Psychiatry     Disposition  Home once psychiatry clears him  We'll continue low dose antihypertensive, may need transfer to telemetry for further fine tuning up blood pressure medication if additional episodes of hypotension  PT OT evaluation, SNIF referral ordered 10-4    The total time spent face to face with this patient was 65 mins of which 60% of time was spent on counseling and coordinating care.  Specifically speaking w/ rn, team and pt and niece at bedside at length re current diagnosis, and plan of care.      Review of Systems   Constitutional: Negative for chills, diaphoresis, fever and malaise/fatigue.   HENT: Negative for congestion and hearing loss.    Eyes: Negative for blurred vision and double vision.   Respiratory: Negative for hemoptysis and shortness of breath.    Cardiovascular: Negative for palpitations.   Gastrointestinal: Negative for abdominal pain, heartburn and nausea.   Genitourinary: Negative for dysuria and flank pain.    Musculoskeletal: Positive for joint pain and myalgias. Negative for back pain.   Neurological: Positive for dizziness (better today ) and weakness. Negative for tremors, sensory change, focal weakness, seizures, loss of consciousness and headaches.   Psychiatric/Behavioral: Positive for depression. Negative for hallucinations and suicidal ideas. The patient is nervous/anxious.       Physical Exam  Laboratory/Imaging   Hemodynamics  Temp (24hrs), Av.5 °C (97.7 °F), Min:35.6 °C (96.1 °F), Max:37 °C (98.6 °F)   Temperature: 37 °C (98.6 °F)  Pulse  Av.7  Min: 57  Max: 140    Blood Pressure : (!) 177/74      Respiratory      Respiration: 18, Pulse Oximetry: 93 %, O2 Daily Delivery Respiratory : Silicone Nasal Cannula     Work Of Breathing / Effort: Mild  RLL Breath Sounds: Diminished, LLL Breath Sounds: Diminished    Fluids  No intake or output data in the 24 hours ending 10/04/17 1041    Nutrition  Orders Placed This Encounter   Procedures   • Diet Order     Standing Status:   Standing     Number of Occurrences:   1     Order Specific Question:   Diet:     Answer:   Diabetic [3]     Physical Exam   Constitutional: He is oriented to person, place, and time. He appears well-developed. No distress.   HENT:   Head: Normocephalic and atraumatic.   Mouth/Throat: Oropharynx is clear and moist.   Eyes: Conjunctivae and EOM are normal. Pupils are equal, round, and reactive to light.   Neck: Normal range of motion. Neck supple. No JVD present.   Cardiovascular: Normal rate, regular rhythm and intact distal pulses.    No murmur heard.  Pulmonary/Chest: Effort normal and breath sounds normal. No respiratory distress. He has no wheezes. He has no rales.   Abdominal: Soft. Bowel sounds are normal. He exhibits no distension.   Musculoskeletal: He exhibits no edema or tenderness.   Neurological: He is alert and oriented to person, place, and time. No cranial nerve deficit. He exhibits normal muscle tone. Coordination normal.    Resident of right knee minimal edema, decreased range of motion secondary to pain  Nontender to palpation   Skin: Skin is warm and dry. No rash noted. He is not diaphoretic. No erythema. No pallor.   Psychiatric:   Flat affect   Nursing note and vitals reviewed.      Recent Labs      10/04/17   0220   WBC  8.3   RBC  4.22*   HEMOGLOBIN  13.0*   HEMATOCRIT  37.1*   MCV  87.9   MCH  30.8   MCHC  35.0   RDW  40.7   PLATELETCT  273   MPV  11.2     Recent Labs      10/03/17   0839  10/04/17   0220   SODIUM  137  135   POTASSIUM  3.4*  3.2*   CHLORIDE  101  102   CO2  22  25   GLUCOSE  162*  116*   BUN  23*  23*   CREATININE  1.23  1.47*   CALCIUM  9.3  8.7                      Assessment/Plan     Hypertension- (present on admission)   Assessment & Plan    Essential  Not controlled, with sensation of impending doom  -Episode of hypotension after metoprolol and initiation, unclear etiology  We'll continue to monitor decreased dose of metoprolol, add. Holding parameters  Discussed with patient and niece at bedside regarding multiple antihypertensives with need for close blood pressure monitoring, home monitoring  Patient appeared disinterested in discussion, RN will further discuss with niece regarding education about blood pressure control and multiple antihypertensives  catapress can cause psychosis issues also- discontinued and Minoxidil added  -Hold hydralazine    On lisinopril    -May need further cardiac monitoring if patient develops recurrent hypotension with ongoing antihypertensive administration for close adjustment and monitoring  Clonidine has been held secondary to effects of rebound hypertension, or hypotension        Dizziness- (present on admission)   Assessment & Plan    recent negative stroke workup, included an MRI, carotid ultrasound, echo  No  focal neurological deficit.  Sx could be 2/2 BP vs mood vs vertigo or combination thereof          Symptoms of upper respiratory infection (URI)   Assessment &  Plan    Prn robitussin DM        Hypokalemia   Assessment & Plan    3.4 on 10/1. Re check        Mood disorder (CMS-HCC)- w/ psychosis- (present on admission)   Assessment & Plan    Patient w/ depressed mood, thoughts of death and some psychosis.  Await psych clearance. Recommended stop hydralazine, clonidine also w/ CNS SE's so will d/c this        History of CVA (cerebrovascular accident)- (present on admission)   Assessment & Plan    Continue ASA and lipitor         CKD (chronic kidney disease) stage 2, GFR 60-89 ml/min-2/2 diabetes- (present on admission)   Overview    Creatinine appears to be at  baseline     Assessment & Plan    Mild fluctuations- monitor        Troponin level elevated- (present on admission)   Overview           Assessment & Plan    Troponin was mildly elevated, most likely demand ischemia repeat trop negative   Patient denies chest pain  There are no specific signs of ischemia on the EKG          Uncontrolled type 2 diabetes mellitus (CMS-HCC)- (present on admission)   Assessment & Plan    Recent A1c was 9.1  Patient is on Lantus and sliding scale at home  Sugars controlled inpatient            Reviewed items::  Medications reviewed, Labs reviewed, EKG reviewed and Radiology images reviewed  Celis catheter::  No Celis  DVT prophylaxis pharmacological::  Heparin  Ulcer Prophylaxis::  Not indicated

## 2017-10-04 NOTE — PROGRESS NOTES
"Called into room by family member, pt stating he feels like he is \"having a stroke.\" Upon entering room, pt sitting in shower chair and states he feels his legs are numb. Rapid response called, BP taken (see flowsheet.) Pt transferred to bed and BP taken again. MD notified, STAT EKG ordered, labs for AM entered, when in bed pt stated he felt much better. No new interventions needed per MD, just to notify if episode reoccurs.  "

## 2017-10-04 NOTE — PROGRESS NOTES
Aaliyah Morel Fall Risk Assessment:     Last Known Fall: No falls  Mobility: Use of assistive device/requires assist of two people  Medications: Cardiovascular or central nervous system meds  Mental Status/LOC/Awareness: Awake, alert, and oriented to date, place, and person  Toileting Needs: No needs  Volume/Electrolyte Status: No problems  Communication/Sensory: Visual (Glasses)/hearing deficit  Behavior: Appropriate behavior  Aaliyah Morel Fall Risk Total: 8  Fall Risk Level: LOW RISK     Universal Fall Precautions:  call light/belongings in reach, wheelchairs and assistive devices out of sight, bed in low position and locked, siderails up x 2, use non-slip footwear, adequate lighting, clutter free and spill free environment, educate on level of risk, educate to call for assistance     Fall Risk Level Interventions:   TRIAL ([facility], NEURO, MED NOEMÍ 5) Low Fall Risk Interventions  Place yellow fall risk ID band on patient: verified  Provide patient/family education based on risk assessment: verified  Educate patient/family to call staff for assistance when getting out of bed: verified  Place fall precaution signage outside patient door: verifiedTRIAL ([facility], NEURO, MED NOEMÍ 5) Moderate Fall Risk Interventions  Place yellow fall risk ID band on patient: completed  Provide patient/family education based on risk assessment : completed  Educate patient/family to call staff for assistance when getting out of bed: completed  Place fall precaution signage outside patient door: completed  Utilize bed/chair fall alarm: completed      Patient Specific Interventions:      Medication: review medications with patient and family  Mental Status/LOC/Awareness: reorient patient and encourage family to stay with patient  Toileting: provide frquent toileting  Volume/Electrolyte Status: ensure patient remains hydrated, monitor blood sugars and maintain appropriate blood sugar levels if diabetic, monitor abnormal lab values and  teach patients to dangle before rising if hypotensive  Communication/Sensory: update plan of care on whiteboard and provide communication alternatives/  Behavioral: encourage patient to voice feelings and encourage family to stay with impulsive patients  Mobility: not applicable

## 2017-10-05 LAB
GLUCOSE BLD-MCNC: 138 MG/DL (ref 65–99)
GLUCOSE BLD-MCNC: 155 MG/DL (ref 65–99)
GLUCOSE BLD-MCNC: 166 MG/DL (ref 65–99)

## 2017-10-05 PROCEDURE — 82962 GLUCOSE BLOOD TEST: CPT | Mod: 91

## 2017-10-05 PROCEDURE — 700102 HCHG RX REV CODE 250 W/ 637 OVERRIDE(OP): Performed by: INTERNAL MEDICINE

## 2017-10-05 PROCEDURE — A9270 NON-COVERED ITEM OR SERVICE: HCPCS | Performed by: INTERNAL MEDICINE

## 2017-10-05 PROCEDURE — 99232 SBSQ HOSP IP/OBS MODERATE 35: CPT | Performed by: INTERNAL MEDICINE

## 2017-10-05 PROCEDURE — 97530 THERAPEUTIC ACTIVITIES: CPT

## 2017-10-05 PROCEDURE — 700111 HCHG RX REV CODE 636 W/ 250 OVERRIDE (IP): Performed by: INTERNAL MEDICINE

## 2017-10-05 PROCEDURE — 770006 HCHG ROOM/CARE - MED/SURG/GYN SEMI*

## 2017-10-05 RX ADMIN — INSULIN GLARGINE 20 UNITS: 100 INJECTION, SOLUTION SUBCUTANEOUS at 22:27

## 2017-10-05 RX ADMIN — STANDARDIZED SENNA CONCENTRATE AND DOCUSATE SODIUM 2 TABLET: 8.6; 5 TABLET, FILM COATED ORAL at 22:24

## 2017-10-05 RX ADMIN — HYDROCHLOROTHIAZIDE 25 MG: 25 TABLET ORAL at 09:28

## 2017-10-05 RX ADMIN — ASPIRIN 81 MG: 81 TABLET, COATED ORAL at 09:28

## 2017-10-05 RX ADMIN — DIPYRIDAMOLE 25 MG: 25 TABLET, FILM COATED ORAL at 09:30

## 2017-10-05 RX ADMIN — ATORVASTATIN CALCIUM 40 MG: 40 TABLET, FILM COATED ORAL at 09:29

## 2017-10-05 RX ADMIN — LISINOPRIL 40 MG: 20 TABLET ORAL at 22:23

## 2017-10-05 RX ADMIN — ENOXAPARIN SODIUM 40 MG: 100 INJECTION SUBCUTANEOUS at 09:28

## 2017-10-05 RX ADMIN — MECLIZINE HYDROCHLORIDE 25 MG: 25 TABLET ORAL at 09:30

## 2017-10-05 RX ADMIN — DOXAZOSIN 8 MG: 2 TABLET ORAL at 22:23

## 2017-10-05 RX ADMIN — STANDARDIZED SENNA CONCENTRATE AND DOCUSATE SODIUM 2 TABLET: 8.6; 5 TABLET, FILM COATED ORAL at 09:28

## 2017-10-05 RX ADMIN — POTASSIUM CHLORIDE 40 MEQ: 1500 TABLET, EXTENDED RELEASE ORAL at 09:29

## 2017-10-05 RX ADMIN — LISINOPRIL 40 MG: 20 TABLET ORAL at 09:27

## 2017-10-05 RX ADMIN — INSULIN LISPRO 1 UNITS: 100 INJECTION, SOLUTION INTRAVENOUS; SUBCUTANEOUS at 18:14

## 2017-10-05 RX ADMIN — FELODIPINE 10 MG: 10 TABLET, EXTENDED RELEASE ORAL at 09:28

## 2017-10-05 RX ADMIN — INSULIN LISPRO 1 UNITS: 100 INJECTION, SOLUTION INTRAVENOUS; SUBCUTANEOUS at 09:31

## 2017-10-05 RX ADMIN — MECLIZINE HYDROCHLORIDE 25 MG: 25 TABLET ORAL at 22:23

## 2017-10-05 RX ADMIN — MECLIZINE HYDROCHLORIDE 25 MG: 25 TABLET ORAL at 14:42

## 2017-10-05 RX ADMIN — DIPYRIDAMOLE 25 MG: 25 TABLET, FILM COATED ORAL at 22:23

## 2017-10-05 ASSESSMENT — COGNITIVE AND FUNCTIONAL STATUS - GENERAL
MOVING TO AND FROM BED TO CHAIR: A LOT
CLIMB 3 TO 5 STEPS WITH RAILING: A LITTLE
MOBILITY SCORE: 19
WALKING IN HOSPITAL ROOM: A LITTLE
SUGGESTED CMS G CODE MODIFIER MOBILITY: CK
TURNING FROM BACK TO SIDE WHILE IN FLAT BAD: A LITTLE

## 2017-10-05 ASSESSMENT — GAIT ASSESSMENTS
ASSISTIVE DEVICE: FRONT WHEEL WALKER
GAIT LEVEL OF ASSIST: STAND BY ASSIST
DISTANCE (FEET): 30
DEVIATION: OTHER (COMMENT)

## 2017-10-05 ASSESSMENT — ENCOUNTER SYMPTOMS
FEVER: 0
BLURRED VISION: 0
MEMORY LOSS: 1
HEMOPTYSIS: 0
BACK PAIN: 0
FOCAL WEAKNESS: 0
NAUSEA: 0
MYALGIAS: 0
WEAKNESS: 1
ABDOMINAL PAIN: 0
CHILLS: 0
HALLUCINATIONS: 0
NERVOUS/ANXIOUS: 0
DIZZINESS: 0
FLANK PAIN: 0
DEPRESSION: 0
SENSORY CHANGE: 0
DOUBLE VISION: 0
PALPITATIONS: 0
SHORTNESS OF BREATH: 0

## 2017-10-05 ASSESSMENT — PAIN SCALES - GENERAL
PAINLEVEL_OUTOF10: 0
PAINLEVEL_OUTOF10: 0

## 2017-10-05 NOTE — PROGRESS NOTES
Aaliyah Morel Fall Risk Assessment:     Last Known Fall: No falls  Mobility: Use of assistive device/requires assist of two people  Medications: Cardiovascular or central nervous system meds  Mental Status/LOC/Awareness: Awake, alert, and oriented to date, place, and person  Toileting Needs: No needs  Volume/Electrolyte Status: No problems  Communication/Sensory: Visual (Glasses)/hearing deficit  Behavior: Appropriate behavior  Aaliyah Morel Fall Risk Total: 8  Fall Risk Level: LOW RISK     Universal Fall Precautions:  call light/belongings in reach, wheelchairs and assistive devices out of sight, bed in low position and locked, siderails up x 2, use non-slip footwear, adequate lighting, clutter free and spill free environment, educate on level of risk, educate to call for assistance     Fall Risk Level Interventions:   TRIAL ([employer] [facility] [employer], NEURO, MED NOEMÍ 5) Low Fall Risk Interventions  Place yellow fall risk ID band on patient: verified  Provide patient/family education based on risk assessment: verified  Educate patient/family to call staff for assistance when getting out of bed: verified  Place fall precaution signage outside patient door: verifiedTRIAL ([employer] [facility] [employer], NEURO, MED NOEMÍ 5) Moderate Fall Risk Interventions  Place yellow fall risk ID band on patient: completed  Provide patient/family education based on risk assessment : completed  Educate patient/family to call staff for assistance when getting out of bed: completed  Place fall precaution signage outside patient door: completed  Utilize bed/chair fall alarm: completed      Patient Specific Interventions:      Medication: review medications with patient and family  Mental Status/LOC/Awareness: reorient patient and encourage family to stay with patient  Toileting: provide frquent toileting  Volume/Electrolyte Status: ensure patient remains hydrated, monitor blood sugars and maintain appropriate blood sugar levels if diabetic, monitor abnormal lab values and  teach patients to dangle before rising if hypotensive  Communication/Sensory: update plan of care on whiteboard and provide communication alternatives/  Behavioral: encourage patient to voice feelings and encourage family to stay with impulsive patients  Mobility: not applicable

## 2017-10-05 NOTE — PROGRESS NOTES
Aaliyah Morel Fall Risk Assessment:     Last Known Fall: No falls  Mobility: Dizziness/generalized weakness  Medications: Cardiovascular and central nervous system meds  Mental Status/LOC/Awareness: Awake, alert, and oriented to date, place, and person  Toileting Needs: Use of assistive device (Bedside commode, bedpan, urinal)  Volume/Electrolyte Status: No problems  Communication/Sensory: Visual (Glasses)/hearing deficit  Behavior: Appropriate behavior  Aaliyah Morel Fall Risk Total: 9  Fall Risk Level: LOW RISK    Universal Fall Precautions:  call light/belongings in reach, bed in low position and locked, wheelchairs and assistive devices out of sight, siderails up x 2, use non-slip footwear, adequate lighting, clutter free and spill free environment, educate on level of risk, educate to call for assistance    Fall Risk Level Interventions:   TRIAL ([employer], NEURO, MED NOEMÍ 5) Low Fall Risk Interventions  Place yellow fall risk ID band on patient: verified  Provide patient/family education based on risk assessment: completed  Educate patient/family to call staff for assistance when getting out of bed: completed  Place fall precaution signage outside patient door: verifiedTRIAL ([employer] [facility], NEURO, MED NOEMÍ 5) Moderate Fall Risk Interventions  Place yellow fall risk ID band on patient: completed  Provide patient/family education based on risk assessment : completed  Educate patient/family to call staff for assistance when getting out of bed: completed  Place fall precaution signage outside patient door: completed  Utilize bed/chair fall alarm: completed     Patient Specific Interventions:     Medication: review medications with patient and family  Mental Status/LOC/Awareness: reorient patient, reinforce falls education, check on patient hourly, utilize bed/chair fall alarm and reinforce the use of call light  Toileting: monitor intake and output/use of appropriate interventions and instruct male patients prone to  dizziness to void while sitting  Volume/Electrolyte Status: ensure patient remains hydrated and monitor blood sugars and maintain appropriate blood sugar levels if diabetic  Communication/Sensory: update plan of care on whiteboard  Behavioral: administer medication as ordered  Mobility: dangle prior to standing, ensure bed is locked and in lowest position and provide appropriate assistive device

## 2017-10-05 NOTE — PROGRESS NOTES
[facility] Hospitalist Progress Note    Date of Service: 10/5/2017    Chief Complaint  66 y.o. male admitted 2017 with dizziness    Interval Problem Update  Complete resolution of mood disorder  Tolerating oral diet, conversant  Reports memory loss, unclear of the events of the last couple days  Ambulating with minimal weakness  Family at bedside     Consultants/Specialty  Psychiatry     Disposition  Home versus skilled nursing placement in 1-2 days with clinical improvement  Encourage out of bed     Review of Systems   Constitutional: Negative for chills and fever.   HENT: Negative for congestion and hearing loss.    Eyes: Negative for blurred vision and double vision.   Respiratory: Negative for hemoptysis and shortness of breath.    Cardiovascular: Negative for palpitations.   Gastrointestinal: Negative for abdominal pain and nausea.   Genitourinary: Negative for dysuria and flank pain.   Musculoskeletal: Negative for back pain, joint pain and myalgias.   Neurological: Positive for weakness. Negative for dizziness (better today ), sensory change and focal weakness.   Psychiatric/Behavioral: Positive for memory loss. Negative for depression, hallucinations and suicidal ideas. The patient is not nervous/anxious.       Physical Exam  Laboratory/Imaging   Hemodynamics  Temp (24hrs), Av.6 °C (97.9 °F), Min:36.4 °C (97.5 °F), Max:36.8 °C (98.3 °F)   Temperature: 36.7 °C (98.1 °F)  Pulse  Av.3  Min: 57  Max: 140    Blood Pressure : 100/66      Respiratory      Respiration: 17, Pulse Oximetry: 93 %     Work Of Breathing / Effort: Mild  RLL Breath Sounds: Diminished, LLL Breath Sounds: Diminished    Fluids    Intake/Output Summary (Last 24 hours) at 10/05/17 1623  Last data filed at 10/05/17 1300   Gross per 24 hour   Intake              600 ml   Output                0 ml   Net              600 ml       Nutrition  Orders Placed This Encounter   Procedures   • Diet Order     Standing Status:   Standing      Number of Occurrences:   1     Order Specific Question:   Diet:     Answer:   Diabetic [3]     Physical Exam   Constitutional: He is oriented to person, place, and time. He appears well-developed. No distress.   HENT:   Head: Normocephalic and atraumatic.   Mouth/Throat: Oropharynx is clear and moist. No oropharyngeal exudate.   Eyes: EOM are normal. Pupils are equal, round, and reactive to light.   Neck: Normal range of motion. Neck supple.   Cardiovascular: Normal rate, regular rhythm and intact distal pulses.    Pulmonary/Chest: Effort normal and breath sounds normal. No respiratory distress.   Abdominal: Soft. Bowel sounds are normal. He exhibits no distension.   Musculoskeletal: Normal range of motion. He exhibits no edema.   Neurological: He is alert and oriented to person, place, and time. No cranial nerve deficit. He exhibits normal muscle tone. Coordination normal.     Nontender to palpation   Skin: Skin is warm and dry. No erythema.   Psychiatric: He has a normal mood and affect. His behavior is normal. Judgment normal.   Nursing note and vitals reviewed.      Recent Labs      10/04/17   0220   WBC  8.3   RBC  4.22*   HEMOGLOBIN  13.0*   HEMATOCRIT  37.1*   MCV  87.9   MCH  30.8   MCHC  35.0   RDW  40.7   PLATELETCT  273   MPV  11.2     Recent Labs      10/03/17   0839  10/04/17   0220   SODIUM  137  135   POTASSIUM  3.4*  3.2*   CHLORIDE  101  102   CO2  22  25   GLUCOSE  162*  116*   BUN  23*  23*   CREATININE  1.23  1.47*   CALCIUM  9.3  8.7                      Assessment/Plan     Hypertension- (present on admission)   Assessment & Plan    Essential  Not controlled, with sensation of impending doom  -Episode of hypotension after metoprolol and initiation, unclear etiology  We'll continue to monitor decreased dose of metoprolol, add. Holding parameters  Discussed with patient and niece at bedside regarding multiple antihypertensives with need for close blood pressure monitoring, home monitoring  Patient  appeared disinterested in discussion, RN will further discuss with jose maria regarding education about blood pressure control and multiple antihypertensives  catapress can cause psychosis issues also- discontinued and Minoxidil added  -Hold hydralazine    On lisinopril    -May need further cardiac monitoring if patient develops recurrent hypotension with ongoing antihypertensive administration for close adjustment and monitoring  Clonidine has been held secondary to effects of rebound hypertension, or hypotension    10/5 improved blood pressure, patient released using metoprolol and minoxidil  I have discussed with patient regarding further blood pressure management versus follow-up with his primary care provider for for further antihypertensive medication adjustment        Dizziness- (present on admission)   Assessment & Plan    recent negative stroke workup, included an MRI, carotid ultrasound, echo  No  focal neurological deficit.  Sx could be 2/2 BP vs mood vs vertigo or combination thereof  Now resolved  Patient is placed on meclizine  Complete resolution of symptoms including improvement in mood  Positive orthostatics  Patient refusing PT evaluation  Encourage PT OT education          Symptoms of upper respiratory infection (URI)   Assessment & Plan    Prn robitussin DM        Hypokalemia   Assessment & Plan    3.4 on 10/1. Re check        Mood disorder (CMS-[facility])- w/ psychosis- (present on admission)   Assessment & Plan    Patient w/ depressed mood, thoughts of death and some psychosis.  Await psych clearance. Recommended stop hydralazine, clonidine also w/ CNS SE's so will d/c this    10/5 resolution of symptoms, does not recall events of the last couple days, unclear etiology        History of CVA (cerebrovascular accident)- (present on admission)   Assessment & Plan    Continue ASA and lipitor         CKD (chronic kidney disease) stage 2, GFR 60-89 ml/min-2/2 diabetes- (present on admission)   Overview     Creatinine appears to be at  baseline     Assessment & Plan    Mild fluctuations- monitor        Troponin level elevated- (present on admission)   Overview           Assessment & Plan    Troponin was mildly elevated, most likely demand ischemia repeat trop negative   Patient denies chest pain  There are no specific signs of ischemia on the EKG          Uncontrolled type 2 diabetes mellitus (CMS-[facility])- (present on admission)   Assessment & Plan    Recent A1c was 9.1  Patient is on Lantus and sliding scale at home  Sugars controlled inpatient            Reviewed items::  Medications reviewed, Labs reviewed, EKG reviewed and Radiology images reviewed  Celis catheter::  No Celis  DVT prophylaxis pharmacological::  Heparin  Ulcer Prophylaxis::  Not indicated

## 2017-10-05 NOTE — PSYCHIATRY
"      PSYCHIATRIC FOLLOW UP:     Reason for Admission: Dizziness   Dizziness   Dizziness   Psychosis   Legal hold status: No   Psychiatric Supervising Attending:   Ailin Hi MD     HPI:  Patient was resting comfortably in bed this am. Patient was talking audibly, smiling, and showing full range of emotions. Per patient, he feels like he is back to his baseline. He denies any depression or anxiety. He mentioned that he had \"weird dreams\" last night. Nursing did not specifically note delirium, but he was reoriented to his room at 2 a.m. Family was in the room and present for the exam. Per family, patient has now reached baseline.     Psychiatric Examination: observed phenomenon:   Vitals: Blood pressure (!) 178/82, pulse 78, temperature 36.6 °C (97.9 °F), resp. rate 18, height 1.727 m (5' 8\"), weight 95 kg (209 lb 7 oz), SpO2 94 %.   Musculoskeletal: normal psychomotor activity, no tics or unusual mannerisms noted   Appearance: WDWN, appropriate dress and grooming. Behavior is calm, cooperative,  appropriate eye contact   Thoughts:  Linear, logical, no blocking of thought noted, no delusional content noted, not obviously responding to internal stimuli.   Speech: Normal rate and magdalena, no pressuring of speech noted   Mood:             Affect: Mood congruent, normal range of affect           SI/HI: denies, no evidence of active SI/HI noted   Attention/Alertness: Alert   Memory: Recent and remote memory grossly intact     Orientation: A&Ox3     Fund of Knowledge: Fair     Insight/Judgement into symptoms:   Neurological Testing: Not formally tested     Medical systems reviewed: (pain, new complaint, etc)           Lab results/tests:   Recent Results (from the past 24 hour(s))   ACCU-CHEK GLUCOSE   Collection Time: 10/02/17  5:20 PM   Result Value Ref Range   Glucose - Accu-Ck 127 (H) 65 - 99 mg/dL   ACCU-CHEK GLUCOSE   Collection Time: 10/02/17  9:37 PM   Result Value Ref Range   Glucose - Accu-Ck 124 (H) 65 - 99 " mg/dL   ACCU-CHEK GLUCOSE   Collection Time: 10/03/17  6:13 AM   Result Value Ref Range   Glucose - Accu-Ck 118 (H) 65 - 99 mg/dL   BASIC METABOLIC PANEL   Collection Time: 10/03/17  8:39 AM   Result Value Ref Range   Sodium 137 135 - 145 mmol/L   Potassium 3.4 (L) 3.6 - 5.5 mmol/L   Chloride 101 96 - 112 mmol/L   Co2 22 20 - 33 mmol/L   Glucose 162 (H) 65 - 99 mg/dL   Bun 23 (H) 8 - 22 mg/dL   Creatinine 1.23 0.50 - 1.40 mg/dL   Calcium 9.3 8.5 - 10.5 mg/dL   Anion Gap 14.0 (H) 0.0 - 11.9   MAGNESIUM   Collection Time: 10/03/17  8:39 AM   Result Value Ref Range   Magnesium 1.6 1.5 - 2.5 mg/dL   ESTIMATED GFR   Collection Time: 10/03/17  8:39 AM   Result Value Ref Range   GFR If African American >60 >60 mL/min/1.73 m 2   GFR If Non African American 59 (A) >60 mL/min/1.73 m 2   ACCU-CHEK GLUCOSE   Collection Time: 10/03/17 11:42 AM   Result Value Ref Range   Glucose - Accu-Ck 148 (H) 65 - 99 mg/dL         Assessment:   Unspecified mood disorder     Plan:   Likely delirium, resolving   Signing off

## 2017-10-05 NOTE — PROGRESS NOTES
Pt has symptomatic orthostatic hypotension. Pt is c/o dizziness. Dr. Stewart made aware. No new orders at this time. Will just educate on slow movements for this patient.

## 2017-10-05 NOTE — PROGRESS NOTES
Received report from day RN. Assumed pt care. Discussed call light/phone system, communication board and POC. Pt is aao x 4 and able to verbalize understanding. RN and CNA provide full bed change and get pt up to chair. Pt mobility assessed. Pt is a one assist up to the bathroom. Bed alarm on. Fall precautions in place. Bed is locked and in low position, call light within reach. Treaded slippers in place. Pt needs met at this time. Hourly rounding in place.
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
## 2017-10-06 ENCOUNTER — HOME HEALTH ADMISSION (OUTPATIENT)
Dept: HOME HEALTH SERVICES | Facility: HOME HEALTHCARE | Age: 67
End: 2017-10-06
Payer: MEDICARE

## 2017-10-06 VITALS
HEIGHT: 68 IN | RESPIRATION RATE: 18 BRPM | SYSTOLIC BLOOD PRESSURE: 108 MMHG | OXYGEN SATURATION: 94 % | DIASTOLIC BLOOD PRESSURE: 70 MMHG | HEART RATE: 113 BPM | WEIGHT: 209.44 LBS | BODY MASS INDEX: 31.74 KG/M2 | TEMPERATURE: 98.1 F

## 2017-10-06 LAB
GLUCOSE BLD-MCNC: 130 MG/DL (ref 65–99)
GLUCOSE BLD-MCNC: 132 MG/DL (ref 65–99)
GLUCOSE BLD-MCNC: 137 MG/DL (ref 65–99)

## 2017-10-06 PROCEDURE — A9270 NON-COVERED ITEM OR SERVICE: HCPCS | Performed by: INTERNAL MEDICINE

## 2017-10-06 PROCEDURE — 99239 HOSP IP/OBS DSCHRG MGMT >30: CPT | Performed by: INTERNAL MEDICINE

## 2017-10-06 PROCEDURE — 700102 HCHG RX REV CODE 250 W/ 637 OVERRIDE(OP): Performed by: INTERNAL MEDICINE

## 2017-10-06 PROCEDURE — 82962 GLUCOSE BLOOD TEST: CPT | Mod: 91

## 2017-10-06 PROCEDURE — 700111 HCHG RX REV CODE 636 W/ 250 OVERRIDE (IP): Performed by: INTERNAL MEDICINE

## 2017-10-06 RX ORDER — MECLIZINE HYDROCHLORIDE 25 MG/1
25 TABLET ORAL 3 TIMES DAILY
Qty: 30 TAB | Refills: 0 | Status: ON HOLD | OUTPATIENT
Start: 2017-10-06 | End: 2021-06-30

## 2017-10-06 RX ADMIN — POTASSIUM CHLORIDE 40 MEQ: 1500 TABLET, EXTENDED RELEASE ORAL at 08:57

## 2017-10-06 RX ADMIN — ASPIRIN 81 MG: 81 TABLET, COATED ORAL at 08:57

## 2017-10-06 RX ADMIN — ATORVASTATIN CALCIUM 40 MG: 40 TABLET, FILM COATED ORAL at 08:57

## 2017-10-06 RX ADMIN — LISINOPRIL 40 MG: 20 TABLET ORAL at 08:56

## 2017-10-06 RX ADMIN — ENOXAPARIN SODIUM 40 MG: 100 INJECTION SUBCUTANEOUS at 08:56

## 2017-10-06 RX ADMIN — DIPYRIDAMOLE 25 MG: 25 TABLET, FILM COATED ORAL at 08:59

## 2017-10-06 RX ADMIN — FELODIPINE 10 MG: 10 TABLET, EXTENDED RELEASE ORAL at 08:59

## 2017-10-06 RX ADMIN — HYDROCHLOROTHIAZIDE 25 MG: 25 TABLET ORAL at 08:56

## 2017-10-06 RX ADMIN — MECLIZINE HYDROCHLORIDE 25 MG: 25 TABLET ORAL at 08:58

## 2017-10-06 ASSESSMENT — PAIN SCALES - GENERAL
PAINLEVEL_OUTOF10: 0
PAINLEVEL_OUTOF10: 0

## 2017-10-06 NOTE — PROGRESS NOTES
Aaliyah Morel Fall Risk Assessment:     Last Known Fall: No falls  Mobility: Use of assistive device/requires assist of two people  Medications: Cardiovascular or central nervous system meds  Mental Status/LOC/Awareness: Awake, alert, and oriented to date, place, and person  Toileting Needs: No needs  Volume/Electrolyte Status: No problems  Communication/Sensory: Visual (Glasses)/hearing deficit  Behavior: Appropriate behavior  Aaliyah Morel Fall Risk Total: 8  Fall Risk Level: LOW RISK     Universal Fall Precautions:  call light/belongings in reach, wheelchairs and assistive devices out of sight, bed in low position and locked, siderails up x 2, use non-slip footwear, adequate lighting, clutter free and spill free environment, educate on level of risk, educate to call for assistance     Fall Risk Level Interventions:   TRIAL ([employer], NEURO, MED NOEMÍ 5) Low Fall Risk Interventions  Place yellow fall risk ID band on patient: verified  Provide patient/family education based on risk assessment: verified  Educate patient/family to call staff for assistance when getting out of bed: verified  Place fall precaution signage outside patient door: verifiedTRIAL ([employer], NEURO, MED NOEMÍ 5) Moderate Fall Risk Interventions  Place yellow fall risk ID band on patient: completed  Provide patient/family education based on risk assessment : completed  Educate patient/family to call staff for assistance when getting out of bed: completed  Place fall precaution signage outside patient door: completed  Utilize bed/chair fall alarm: completed      Patient Specific Interventions:      Medication: review medications with patient and family  Mental Status/LOC/Awareness: reorient patient and encourage family to stay with patient  Toileting: provide frquent toileting  Volume/Electrolyte Status: ensure patient remains hydrated, monitor blood sugars and maintain appropriate blood sugar levels if diabetic, monitor abnormal lab values and  teach patients to dangle before rising if hypotensive  Communication/Sensory: update plan of care on whiteboard and provide communication alternatives/  Behavioral: encourage patient to voice feelings and encourage family to stay with impulsive patients  Mobility: not applicable

## 2017-10-06 NOTE — THERAPY
"Physical Therapy Treatment completed.   Bed Mobility:  Supine to Sit: Modified Independent  Transfers: Sit to Stand: Supervised  Gait: Level Of Assist: Stand by Assist with Front-Wheel Walker       Plan of Care: Will benefit from Physical Therapy 3 times per week  Discharge Recommendations: Equipment: Will Continue to Assess for Equipment Needs. See below    Pt is able to demosntrate short, hallway ambulation wtih FWW with no marysol LOB with SBA. Pt's current deficits appear exacerbated 2' to his cognitive deficits. He appears generally delayed througout visit and has generally odd/flat affect. Would recommend that pt have SBA with FWW (both here and at time of d/c to home during recovery) for ambulatory activity at this time given his current cognition and labile BP's (see activity tolerance*). Noted that pt appears to recover BP with exertional activity after nonsymptomatic orthostatic drop from sit<>stand. WIll continue to visit. Functionally, pt is doing fair and this PT's primary concerns are related to IADL/ADL management and general safety with cognition and would defer d/c recs to OT and SLP/formal cog eval in this regard. Antcipate functionally pt will be capable of d/c with FWW (with caregiver SBA) once medically cleared (provided no concerns from OT/SLP re: IADl/ADL management).      *- pt has odd affect througout visit; when prompted to ask if dizzy, pt reports \"not sure\"; BP as follows sitting EOB at 149/76 ->standing for ~1 min at 101/69 -> post ambulation standing at 132/64 ->sitting post at 130/69    See \"Rehab Therapy-Acute\" Patient Summary Report for complete documentation.       "

## 2017-10-06 NOTE — DISCHARGE PLANNING
SW met with pt at bedside to discuss HH and DME equpiment recommended for d/c. Pt was agreeable to HH and was presented choices. Pt [facility].   Pt was agreeable to receiving a walker for d/c. Pt elected for referral to be sent to [facility].  SW faxed choice forms to [facility].

## 2017-10-06 NOTE — FACE TO FACE
Face to Face Note  -  Durable Medical Equipment    Shruti Stewart D.O. - NPI: [id number]  I certify that this patient is under my care and that they have had a durable medical equipment(DME)face to face encounter by myself that meets the physician DME face-to-face encounter requirements with this patient on:    Date of encounter:   Patient:                    MRN:                       YOB: 2017  Jean Barboza  [id number]  1950     The encounter with the patient was in whole, or in part, for the following medical condition, which is the primary reason for durable medical equipment:  Diabetes    I certify that, based on my findings, the following durable medical equipment is medically necessary:  Walkers.    HOME O2 Saturation Measurements:(Values must be present for Home Oxygen orders)         ,     ,         My Clinical findings support the need for the above equipment due to:  Abnormal Gait    Supporting Symptoms:Generalized weakness    ------------------------------------------------------------------------------------------------------------------    Face to Face Supporting Documentation - Home Health    The encounter with this patient was in whole or in part the primary reason for home health admission.    Date of encounter:   Patient:                    MRN:                       YOB: 2017  Jean Barboza  [id number]  1950     Home health to see patient for:  Skilled Nursing care for assessment, interventions & education, Physical Therapy evaluation and treatment and Occupational therapy evaluation and treatment    Skilled need for:  Exacerbation of Chronic Disease State diabetes/hypertension, psychosis    Skilled nursing interventions to include:  Comment: PT/OT    Homebound evidenced status by:  Need the aid of supportive devices such as crutches, canes, wheelchairs or walkers or Needs the assistance of another person in order to leave the home.  Leaving home must require a considerable and taxing effort. There must exist a normal inability to leave the home.    Community Physician to provide follow up care: Antione Coley M.D.     Optional Interventions    Wound information & treatment:    Home Infusion Therapy orders:    Line/Drain/Airway:    I certify the face to face encounter for this home care referral meets the CMS requirements and the encounter/clinical assessment with the patient was, in whole, or in part, for the medical condition(s) listed above, which is the primary reason for home health care. Based on my clinical findings: the service(s) are medically necessary, support the need for home health care, and the homebound criteria are met.  I certify that this patient has had a face to face encounter by myself.  Shruti Stewart D.O. - NPI: [id number]    *Debility, frailty and advanced age in the absence of an acute deterioration or exacerbation of a condition do not qualify a patient for home health.

## 2017-10-06 NOTE — DISCHARGE INSTRUCTIONS
Discharge Instructions    Discharged to home by car with relative. Discharged via wheelchair, hospital escort: Yes.  Special equipment needed: Walker    Be sure to schedule a follow-up appointment with your primary care doctor or any specialists as instructed.     Discharge Plan:   Diet Plan: Discussed  Activity Level: Discussed  Confirmed Follow up Appointment: Patient to Call and Schedule Appointment  Confirmed Symptoms Management: Discussed  Medication Reconciliation Updated: Yes  Pneumococcal Vaccine Given - only chart on this line when given: Given (See MAR)  Influenza Vaccine Indication: Not indicated: Previously immunized this influenza season and > 8 years of age    I understand that a diet low in cholesterol, fat, and sodium is recommended for good health. Unless I have been given specific instructions below for another diet, I accept this instruction as my diet prescription.   Other diet: Diabetes Mellitus and Food  It is important for you to manage your blood sugar (glucose) level. Your blood glucose level can be greatly affected by what you eat. Eating healthier foods in the appropriate amounts throughout the day at about the same time each day will help you control your blood glucose level. It can also help slow or prevent worsening of your diabetes mellitus. Healthy eating may even help you improve the level of your blood pressure and reach or maintain a healthy weight.   General recommendations for healthful eating and cooking habits include:  · Eating meals and snacks regularly. Avoid going long periods of time without eating to lose weight.  · Eating a diet that consists mainly of plant-based foods, such as fruits, vegetables, nuts, legumes, and whole grains.  · Using low-heat cooking methods, such as baking, instead of high-heat cooking methods, such as deep frying.  Work with your dietitian to make sure you understand how to use the Nutrition Facts information on food labels.  HOW CAN FOOD AFFECT  ME?  Carbohydrates  Carbohydrates affect your blood glucose level more than any other type of food. Your dietitian will help you determine how many carbohydrates to eat at each meal and teach you how to count carbohydrates. Counting carbohydrates is important to keep your blood glucose at a healthy level, especially if you are using insulin or taking certain medicines for diabetes mellitus.  Alcohol  Alcohol can cause sudden decreases in blood glucose (hypoglycemia), especially if you use insulin or take certain medicines for diabetes mellitus. Hypoglycemia can be a life-threatening condition. Symptoms of hypoglycemia (sleepiness, dizziness, and disorientation) are similar to symptoms of having too much alcohol.   If your health care provider has given you approval to drink alcohol, do so in moderation and use the following guidelines:  · Women should not have more than one drink per day, and men should not have more than two drinks per day. One drink is equal to:  ¨ 12 oz of beer.  ¨ 5 oz of wine.  ¨ 1½ oz of hard liquor.  · Do not drink on an empty stomach.  · Keep yourself hydrated. Have water, diet soda, or unsweetened iced tea.  · Regular soda, juice, and other mixers might contain a lot of carbohydrates and should be counted.  WHAT FOODS ARE NOT RECOMMENDED?  As you make food choices, it is important to remember that all foods are not the same. Some foods have fewer nutrients per serving than other foods, even though they might have the same number of calories or carbohydrates. It is difficult to get your body what it needs when you eat foods with fewer nutrients. Examples of foods that you should avoid that are high in calories and carbohydrates but low in nutrients include:  · Trans fats (most processed foods list trans fats on the Nutrition Facts label).  · Regular soda.  · Juice.  · Candy.  · Sweets, such as cake, pie, doughnuts, and cookies.  · Fried foods.  WHAT FOODS CAN I EAT?  Eat nutrient-rich foods,  which will nourish your body and keep you healthy. The food you should eat also will depend on several factors, including:  · The calories you need.  · The medicines you take.  · Your weight.  · Your blood glucose level.  · Your blood pressure level.  · Your cholesterol level.  You should eat a variety of foods, including:  · Protein.  ¨ Lean cuts of meat.  ¨ Proteins low in saturated fats, such as fish, egg whites, and beans. Avoid processed meats.  · Fruits and vegetables.  ¨ Fruits and vegetables that may help control blood glucose levels, such as apples, mangoes, and yams.  · Dairy products.  ¨ Choose fat-free or low-fat dairy products, such as milk, yogurt, and cheese.  · Grains, bread, pasta, and rice.  ¨ Choose whole grain products, such as multigrain bread, whole oats, and brown rice. These foods may help control blood pressure.  · Fats.  ¨ Foods containing healthful fats, such as nuts, avocado, olive oil, canola oil, and fish.  DOES EVERYONE WITH DIABETES MELLITUS HAVE THE SAME MEAL PLAN?  Because every person with diabetes mellitus is different, there is not one meal plan that works for everyone. It is very important that you meet with a dietitian who will help you create a meal plan that is just right for you.     This information is not intended to replace advice given to you by your health care provider. Make sure you discuss any questions you have with your health care provider.     Document Released: 09/14/2006 Document Revised: 01/08/2016 Document Reviewed: 11/14/2014  [employer] Interactive Patient Education ©2016 [employer]      Special Instructions: None    · Is patient discharged on Warfarin / Coumadin?   No     · Is patient Post Blood Transfusion?  No    Dizziness  Dizziness is a common problem. It is a feeling of unsteadiness or light-headedness. You may feel like you are about to faint. Dizziness can lead to injury if you stumble or fall. Anyone can become dizzy, but dizziness is more common  in older adults. This condition can be caused by a number of things, including medicines, dehydration, or illness.  HOME CARE INSTRUCTIONS  Taking these steps may help with your condition:  Eating and Drinking  · Drink enough fluid to keep your urine clear or pale yellow. This helps to keep you from becoming dehydrated. Try to drink more clear fluids, such as water.  · Do not drink alcohol.  · Limit your caffeine intake if directed by your health care provider.  · Limit your salt intake if directed by your health care provider.  Activity  · Avoid making quick movements.  ¨ Rise slowly from chairs and steady yourself until you feel okay.  ¨ In the morning, first sit up on the side of the bed. When you feel okay, stand slowly while you hold onto something until you know that your balance is fine.  · Move your legs often if you need to  one place for a long time. Tighten and relax your muscles in your legs while you are standing.  · Do not drive or operate heavy machinery if you feel dizzy.  · Avoid bending down if you feel dizzy. Place items in your home so that they are easy for you to reach without leaning over.  Lifestyle  · Do not use any tobacco products, including cigarettes, chewing tobacco, or electronic cigarettes. If you need help quitting, ask your health care provider.  · Try to reduce your stress level, such as with yoga or meditation. Talk with your health care provider if you need help.  General Instructions  · Watch your dizziness for any changes.  · Take medicines only as directed by your health care provider. Talk with your health care provider if you think that your dizziness is caused by a medicine that you are taking.  · Tell a friend or a family member that you are feeling dizzy. If he or she notices any changes in your behavior, have this person call your health care provider.  · Keep all follow-up visits as directed by your health care provider. This is important.  SEEK MEDICAL CARE  IF:  · Your dizziness does not go away.  · Your dizziness or light-headedness gets worse.  · You feel nauseous.  · You have reduced hearing.  · You have new symptoms.  · You are unsteady on your feet or you feel like the room is spinning.  SEEK IMMEDIATE MEDICAL CARE IF:  · You vomit or have diarrhea and are unable to eat or drink anything.  · You have problems talking, walking, swallowing, or using your arms, hands, or legs.  · You feel generally weak.  · You are not thinking clearly or you have trouble forming sentences. It may take a friend or family member to notice this.  · You have chest pain, abdominal pain, shortness of breath, or sweating.  · Your vision changes.  · You notice any bleeding.  · You have a headache.  · You have neck pain or a stiff neck.  · You have a fever.     This information is not intended to replace advice given to you by your health care provider. Make sure you discuss any questions you have with your health care provider.     Document Released: 06/13/2002 Document Revised: 05/03/2016 Document Reviewed: 12/14/2015  [employer] Interactive Patient Education ©2016 [employer]      Depression / Suicide Risk    As you are discharged from this [facility] facility, it is important to learn how to keep safe from harming yourself.    Recognize the warning signs:  · Abrupt changes in personality, positive or negative- including increase in energy   · Giving away possessions  · Change in eating patterns- significant weight changes-  positive or negative  · Change in sleeping patterns- unable to sleep or sleeping all the time   · Unwillingness or inability to communicate  · Depression  · Unusual sadness, discouragement and loneliness  · Talk of wanting to die  · Neglect of personal appearance   · Rebelliousness- reckless behavior  · Withdrawal from people/activities they love  · Confusion- inability to concentrate     If you or a loved one observes any of these behaviors or has concerns about  self-harm, here's what you can do:  · Talk about it- your feelings and reasons for harming yourself  · Remove any means that you might use to hurt yourself (examples: pills, rope, extension cords, firearm)  · Get professional help from the community (Mental Health, Substance Abuse, psychological counseling)  · Do not be alone:Call your Safe Contact- someone whom you trust who will be there for you.  · Call your local CRISIS HOTLINE 855-1406 or 556-337-5087  · Call your local Children's Mobile Crisis Response Team [place] (170) 825-8430 or www.OpenDrive  · Call the toll free National Suicide Prevention Hotlines   · National Suicide Prevention Lifeline 164-404-YDPZ (0917)  · National Hope Line Network 800-SUICIDE (393-5324)

## 2017-10-06 NOTE — PROGRESS NOTES
Aaliyah Morel Fall Risk Assessment:     Last Known Fall: No falls  Mobility: Immobilized/requires assist of one person  Medications: Cardiovascular or central nervous system meds  Mental Status/LOC/Awareness: Awake, alert, and oriented to date, place, and person  Toileting Needs: Use of assistive device (Bedside commode, bedpan, urinal)  Volume/Electrolyte Status: No problems  Communication/Sensory: Visual (Glasses)/hearing deficit  Behavior: Appropriate behavior  Aaliyah Morel Fall Risk Total: 9  Fall Risk Level: LOW RISK    Universal Fall Precautions:  call light/belongings in reach, bed in low position and locked, wheelchairs and assistive devices out of sight, siderails up x 2, use non-slip footwear, adequate lighting, clutter free and spill free environment, educate to call for assistance    Fall Risk Level Interventions:   TRIAL ([employer], NEURO, MED NOEMÍ 5) Low Fall Risk Interventions  Place yellow fall risk ID band on patient: verified  Provide patient/family education based on risk assessment: completed  Educate patient/family to call staff for assistance when getting out of bed: completed  Place fall precaution signage outside patient door: verifiedTRIAL ([employer], NEURO, MED NOEMÍ 5) Moderate Fall Risk Interventions  Place yellow fall risk ID band on patient: completed  Provide patient/family education based on risk assessment : completed  Educate patient/family to call staff for assistance when getting out of bed: completed  Place fall precaution signage outside patient door: completed  Utilize bed/chair fall alarm: completed     Patient Specific Interventions:     Medication: review medications with patient and family and assess for medications that can be discontinued or dosage decreased  Mental Status/LOC/Awareness: reinforce falls education, check on patient hourly, utilize bed/chair fall alarm and reinforce the use of call light  Toileting: provide frquent toileting and instruct patient/family on the  need to call for assistance when toileting  Volume/Electrolyte Status: ensure patient remains hydrated and monitor abnormal lab values  Communication/Sensory: update plan of care on whiteboard, ensure proper positioning when transferrng/ambulating and ensure patient has glasses/contacts and hearing aids/dentures  Behavioral: encourage patient to voice feelings, engage patient in daily activities, administer medication as ordered and instruct/reinforce fall program rationale  Mobility: provide comfort measures during transport, dangle prior to standing, utilize bed/chair fall alarm, ensure bed is locked and in lowest position, provide appropriate assistive device and instruct patient to exit bed on their strongest side

## 2017-10-06 NOTE — DISCHARGE SUMMARY
CHIEF COMPLAINT ON ADMISSION  Chief Complaint   Patient presents with   • Dizziness     on and off x 3 weeks, this eisode all day today       CODE STATUS  Full Code    HPI & HOSPITAL COURSE  This is a 66 y.o. male here with History of hypertension/diabetes, history of CVA, mood disorder presents with complaints of dizziness. Patient was noted to have uncontrolled hypertension and diabetes. Patient did have a neurologic workup including MRI of the brain and carotid ultrasound and echocardiogram which were unrevealing. Patient's blood pressure is better controlled. Patient did appear to have an odd and flat affect during this admission which may be related to his mood disorder. We have consulted psychiatry with recommendations to consider initiation of antidepressants versus mood stabilizers. Patient was requesting further psychiatric follow-up as an outpatient, but at this time does not want to initiate any prescriptions for underlying psychiatric condition. Patient continued to have complaints of dizziness and sensation of impending doom. Patient and family believe this is secondary to his medications. Patient's clonidine as well as hydralazine have been stopped due to possible association with CNS effects. We did start a trial of minoxidil as well as metoprolol for further blood pressure control. However patient was anxious and cautious to continue these medications believing that they may have been associated to his episodes. We have initiated meclizine with resolution of basilar dizziness for patient. Patient is noted to have orthostatic hypotension and has been educated on proper oral hydration as well as close blood pressure monitoring. Patient has been seen by PT with education on further therapy and assistance. Patient has been educated on maneuvers to avoid further exacerbation of orthostatic hypotension.    We have had multiple extensive discussions with patient and family regarding concern for underlying  psychiatric disorder with auto affect, mood disorder with strong recommendation to consider starting psychiatric prescription including antidepressants. Patient will benefit from further outpatient psychiatric tree follow-up. Patient and family state understanding. At this time patient will be discharged to home with home health and further family assistance. Patient will need standby assist.    Of note patient did have some symptoms of an upper respiratory infection likely viral and has been started on Robitussin as needed.    Of note patient was witnessed to have episodes of being withdrawn and lack of participation. Patient did have resolution of dizziness with complete change of demeanor and report of confusion and memory loss. Upon discharge she symptoms have completely resolved. However patient is noted to have continued odd affect.    Therefore, he is discharged in good and stable condition with close outpatient follow-up.    SPECIFIC OUTPATIENT FOLLOW-UP  Primary care provider/discharge clinic in one week  Psychiatry follow-up in 2-3 weeks    DISCHARGE PROBLEM LIST  Active Problems:    Dizziness POA: Yes    Hypertension POA: Yes    Uncontrolled type 2 diabetes mellitus (CMS-[facility]) POA: Yes    Troponin level elevated POA: Yes      Overview:           CKD (chronic kidney disease) stage 2, GFR 60-89 ml/min-2/2 diabetes POA: Yes      Overview: Creatinine appears to be at  baseline    History of CVA (cerebrovascular accident) POA: Yes    Mood disorder (CMS-[facility])- w/ psychosis POA: Yes    Hypokalemia POA: No    Symptoms of upper respiratory infection (URI) POA: Unknown  Resolved Problems:    * No resolved hospital problems. *      FOLLOW UP  No future appointments.  Antione Coley M.D.  [address] [facility] [address]  [phone]       has left a message with the office to contact patient and schedule an appointment.      MEDICATIONS ON DISCHARGE   Jean Barboza   Home Medication Instructions  [facility]:[id number]    Printed on:10/06/17 1797   Medication Information                      aspirin EC (ECOTRIN) 81 MG Tablet Delayed Response  Take 81 mg by mouth every morning.             atorvastatin (LIPITOR) 20 MG Tab  Take 2 Tabs by mouth every morning.             dipyridamole (PERSANTINE) 25 MG Tab  Take 25 mg by mouth 2 Times a Day.             doxazosin (CARDURA) 8 MG tablet  Take 8 mg by mouth every evening.             felodipine (PLENDIL) 10 MG TABLET SR 24 HR  Take 10 mg by mouth every morning.             guaifenesin dextromethorphan (ROBITUSSIN DM) 100-10 MG/5ML Syrup syrup  Take 5 mL by mouth every 6 hours as needed for Cough.             hydrochlorothiazide (HYDRODIURIL) 25 MG Tab  Take 25 mg by mouth every morning.             insulin detemir (LEVEMIR) 100 UNIT/ML Solution Pen-injector injection  Inject 20 Units as instructed every evening.             insulin lispro, Human, (HUMALOG) 100 UNIT/ML Solution Pen-injector injection  Inject 3-12 Units as instructed 3 times a day before meals. For glucose:  70   - 150  mg/dL =      0 Units  151 - 200  mg/dL =    3 Units  201 - 250  mg/dL =    4 Units  251 - 300  mg/dL  =   7 Units  301 - 350  mg/dL  =   8 Units  351 - 400 mg/dL   =   10 Units  Over 400 mg/dL   =   12 Units             lisinopril (PRINIVIL, ZESTRIL) 40 MG tablet  Take 40 mg by mouth 2 times a day.             meclizine (ANTIVERT) 25 MG Tab  Take 1 Tab by mouth 3 times a day.             Omega-3 Fatty Acids (FISH OIL) 1000 MG Cap capsule  Take 1,000 mg by mouth 2 Times a Day.             potassium citrate SR (UROCIT-K SR) 10 MEQ (1080 MG) Tab CR  Take 10 mEq by mouth every morning.                 DIET  Orders Placed This Encounter   Procedures   • Diet Order     Standing Status:   Standing     Number of Occurrences:   1     Order Specific Question:   Diet:     Answer:   Diabetic [3]       ACTIVITY  As tolerated.  Weight bearing as  tolerated      CONSULTATIONS  Psychiatry    PROCEDURES  None    LABORATORY  Lab Results   Component Value Date/Time    SODIUM 135 10/04/2017 02:20 AM    POTASSIUM 3.2 (L) 10/04/2017 02:20 AM    CHLORIDE 102 10/04/2017 02:20 AM    CO2 25 10/04/2017 02:20 AM    GLUCOSE 116 (H) 10/04/2017 02:20 AM    BUN 23 (H) 10/04/2017 02:20 AM    CREATININE 1.47 (H) 10/04/2017 02:20 AM        Lab Results   Component Value Date/Time    WBC 8.3 10/04/2017 02:20 AM    HEMOGLOBIN 13.0 (L) 10/04/2017 02:20 AM    HEMATOCRIT 37.1 (L) 10/04/2017 02:20 AM    PLATELETCT 273 10/04/2017 02:20 AM        Total time of the discharge process exceeds 45 minutes

## 2017-10-06 NOTE — PROGRESS NOTES
Received report from day RN. Assumed pt care. Discussed call light/phone system, communication board and POC. Pt is aao x 4 and able to verbalize understanding. Pt is more alert today and engaged with staff. Pt mobility assessed. Pt is a one assist up to the bathroom. Bed alarm on. Fall precautions in place. Bed is locked and in low position, call light within reach. Treaded slippers in place. Pt needs met at this time. Hourly rounding in place.

## 2017-10-06 NOTE — PROGRESS NOTES
Discharge instruction and education provided to patient.Patient questions have been answered at this time. Patient informed of scheduled appointments.  Patient stable at this time. Wheelchair assistance provided to patient for transport out of facility.

## 2017-10-06 NOTE — PROGRESS NOTES
Report received from night RN. Assumed Patient care at 0700. Patient is AAOx4. Labs and Orders Reviewed. Patient assisted with ambulation from chair to bed.Patient mobility assessed, Patient is a one assist.  Patient in a sitting position in bed at this time. Assessment completed. Patient denies any pain at this time. Patient needs have been met at this time. Patient encouraged to call when needing assistance. Call light within reach. Bed alarm on. Treaded slipper socks in place. Bed locked and in the lowest position. Hourly rounding in place.

## 2017-10-06 NOTE — DISCHARGE PLANNING
Received choice form from [facility] for DME and HH services, referral has been sent to [facility] per patient request.

## 2017-10-08 ENCOUNTER — HOME CARE VISIT (OUTPATIENT)
Dept: HOME HEALTH SERVICES | Facility: HOME HEALTHCARE | Age: 67
End: 2017-10-08

## 2017-10-08 ENCOUNTER — HOME CARE VISIT (OUTPATIENT)
Dept: HOME HEALTH SERVICES | Facility: HOME HEALTHCARE | Age: 67
End: 2017-10-08
Payer: MEDICARE

## 2017-10-08 VITALS
BODY MASS INDEX: 31.67 KG/M2 | HEIGHT: 68 IN | RESPIRATION RATE: 20 BRPM | DIASTOLIC BLOOD PRESSURE: 64 MMHG | TEMPERATURE: 97.5 F | HEART RATE: 76 BPM | WEIGHT: 209 LBS | SYSTOLIC BLOOD PRESSURE: 122 MMHG | OXYGEN SATURATION: 90 %

## 2017-10-08 PROCEDURE — 665998 HH PPS REVENUE CREDIT

## 2017-10-08 PROCEDURE — G0162 HHC RN E&M PLAN SVS, 15 MIN: HCPCS

## 2017-10-08 PROCEDURE — 665001 SOC-HOME HEALTH

## 2017-10-08 PROCEDURE — 665999 HH PPS REVENUE DEBIT

## 2017-10-08 SDOH — ECONOMIC STABILITY: HOUSING INSECURITY: UNSAFE APPLIANCES: 0

## 2017-10-08 SDOH — ECONOMIC STABILITY: HOUSING INSECURITY: UNSAFE COOKING RANGE AREA: 0

## 2017-10-08 ASSESSMENT — ACTIVITIES OF DAILY LIVING (ADL): HOME_HEALTH_OASIS: 01

## 2017-10-08 ASSESSMENT — PATIENT HEALTH QUESTIONNAIRE - PHQ9
1. LITTLE INTEREST OR PLEASURE IN DOING THINGS: 00
2. FEELING DOWN, DEPRESSED, IRRITABLE, OR HOPELESS: 00

## 2017-10-08 ASSESSMENT — ENCOUNTER SYMPTOMS: DEPRESSED MOOD: 1

## 2017-10-09 ENCOUNTER — TELEPHONE (OUTPATIENT)
Dept: VASCULAR LAB | Facility: MEDICAL CENTER | Age: 67
End: 2017-10-09

## 2017-10-09 PROCEDURE — 665999 HH PPS REVENUE DEBIT

## 2017-10-09 PROCEDURE — 665998 HH PPS REVENUE CREDIT

## 2017-10-09 NOTE — TELEPHONE ENCOUNTER
10/9/2017    Medication list reviewed. No clinically significant drug/drug interactions. No significant medication related issues noted.    Royal Bugress, PharmD

## 2017-10-10 ENCOUNTER — HOME CARE VISIT (OUTPATIENT)
Dept: HOME HEALTH SERVICES | Facility: HOME HEALTHCARE | Age: 67
End: 2017-10-10
Payer: MEDICARE

## 2017-10-10 ENCOUNTER — PATIENT OUTREACH (OUTPATIENT)
Dept: HEALTH INFORMATION MANAGEMENT | Facility: OTHER | Age: 67
End: 2017-10-10

## 2017-10-10 VITALS
BODY MASS INDEX: 29.44 KG/M2 | RESPIRATION RATE: 19 BRPM | WEIGHT: 193.6 LBS | DIASTOLIC BLOOD PRESSURE: 78 MMHG | SYSTOLIC BLOOD PRESSURE: 162 MMHG | OXYGEN SATURATION: 96 % | TEMPERATURE: 97.4 F | HEART RATE: 89 BPM

## 2017-10-10 PROCEDURE — G0156 HHCP-SVS OF AIDE,EA 15 MIN: HCPCS

## 2017-10-10 PROCEDURE — G0152 HHCP-SERV OF OT,EA 15 MIN: HCPCS

## 2017-10-10 PROCEDURE — 665999 HH PPS REVENUE DEBIT

## 2017-10-10 PROCEDURE — 665998 HH PPS REVENUE CREDIT

## 2017-10-10 NOTE — PROGRESS NOTES
10/10/2017 1535 - Discharge Outreach attempt - LM  10/10/2017 1649 - Discharge Outreach - reached patient and call completed.

## 2017-10-11 ENCOUNTER — HOME CARE VISIT (OUTPATIENT)
Dept: HOME HEALTH SERVICES | Facility: HOME HEALTHCARE | Age: 67
End: 2017-10-11
Payer: MEDICARE

## 2017-10-11 VITALS
SYSTOLIC BLOOD PRESSURE: 132 MMHG | RESPIRATION RATE: 18 BRPM | HEART RATE: 79 BPM | OXYGEN SATURATION: 96 % | TEMPERATURE: 99.3 F | DIASTOLIC BLOOD PRESSURE: 78 MMHG

## 2017-10-11 VITALS
HEART RATE: 89 BPM | BODY MASS INDEX: 29.44 KG/M2 | OXYGEN SATURATION: 96 % | TEMPERATURE: 97.4 F | DIASTOLIC BLOOD PRESSURE: 78 MMHG | WEIGHT: 193.6 LBS | SYSTOLIC BLOOD PRESSURE: 162 MMHG | RESPIRATION RATE: 19 BRPM

## 2017-10-11 PROCEDURE — G0151 HHCP-SERV OF PT,EA 15 MIN: HCPCS

## 2017-10-11 PROCEDURE — 665999 HH PPS REVENUE DEBIT

## 2017-10-11 PROCEDURE — 665998 HH PPS REVENUE CREDIT

## 2017-10-11 SDOH — ECONOMIC STABILITY: HOUSING INSECURITY: HOME SAFETY: PT LIVES WITH BROTHER AND SISTER-IN-LAW AND SON LIVES A FEW MINS AWAY.     2 SMALL STEPS TO GET INTO HOME.

## 2017-10-11 ASSESSMENT — ACTIVITIES OF DAILY LIVING (ADL)
GROOMING_ASSISTANCE: 0
BATHING_ASSISTANCE: 6
TOILETING_EQUIPMENT_NEEDED: TOILET RISER
DRESSING_LB_ASSISTANCE: 0
BATHING_ASSISTIVE_EQUIPMENT_NEEDED: TUB TRANSFER BENCH, HANDHELD SHOWER
DRESSING_UB_ASSISTANCE: 0
EATING_ASSISTANCE: 0
ORAL_CARE_ASSISTANCE: 0
TOILETING_EQUIPMENT_USED: LOW TOILET SEAT
TOILETING_ASSISTANCE: 6

## 2017-10-12 ENCOUNTER — HOME CARE VISIT (OUTPATIENT)
Dept: HOME HEALTH SERVICES | Facility: HOME HEALTHCARE | Age: 67
End: 2017-10-12
Payer: MEDICARE

## 2017-10-12 VITALS
WEIGHT: 199.2 LBS | HEART RATE: 75 BPM | BODY MASS INDEX: 30.29 KG/M2 | DIASTOLIC BLOOD PRESSURE: 85 MMHG | OXYGEN SATURATION: 98 % | TEMPERATURE: 97 F | RESPIRATION RATE: 17 BRPM | SYSTOLIC BLOOD PRESSURE: 150 MMHG

## 2017-10-12 PROCEDURE — 665999 HH PPS REVENUE DEBIT

## 2017-10-12 PROCEDURE — G0299 HHS/HOSPICE OF RN EA 15 MIN: HCPCS

## 2017-10-12 PROCEDURE — G0152 HHCP-SERV OF OT,EA 15 MIN: HCPCS

## 2017-10-12 PROCEDURE — G0156 HHCP-SVS OF AIDE,EA 15 MIN: HCPCS

## 2017-10-12 PROCEDURE — 665998 HH PPS REVENUE CREDIT

## 2017-10-12 SDOH — ECONOMIC STABILITY: HOUSING INSECURITY: UNSAFE APPLIANCES: 0

## 2017-10-12 SDOH — ECONOMIC STABILITY: HOUSING INSECURITY: UNSAFE COOKING RANGE AREA: 0

## 2017-10-12 ASSESSMENT — ACTIVITIES OF DAILY LIVING (ADL)
TELEPHONE_ASSISTANCE: 0
ORAL_CARE_ASSISTANCE: 0
MEAL_PREP_ASSISTANCE: 3
DRESSING_LB_ASSISTANCE: 0
MEAL_PREP_ASSISTANCE: 2
TRANSPORTATION_ASSISTANCE: 6
TOILETING_ASSISTANCE: 0
SHOPPING_ASSISTANCE: 6
EATING_ASSISTANCE: 0
HOUSEKEEPING_ASSISTANCE: 6
GROOMING_ASSISTANCE: 0
DRESSING_UB_ASSISTANCE: 0
MEAL_PREP_ASSISTANCE: 1
LAUNDRY_ASSISTANCE: 6

## 2017-10-12 NOTE — DOCUMENTATION QUERY
DOCUMENTATION QUERY    PROVIDERS: Please select “Cosign w/ note”to reply to query.    To better represent the severity of illness of your patient, please review the following information and exercise your independent professional judgment in responding to this query.     The patient presents with uncontrolled diabetes. Can this condition be further specified?    • Diabetes with hyperglycemia  • Diabetes with hypoglycemia  • Other explanation of clinical findings (please document)  • Unable to determine        The medical record reflects the following:   Clinical Findings     Uncontrolled type 2 diabetes mellitus (CMS-[facility])- (present on admission)   Assessment & Plan     Recent A1c was 9.1  Patient is on Lantus and sliding scale at home  Continue his insulin regimen, monitor blood sugar, hypoglycemia protocol        Treatment Continue his insulin regimen, monitor blood sugar, hypoglycemia protocol   Risk Factors    Location within medical record H&P     Thank You,   Zulema Arzate BA, CCS, CPC, DEMARCO.

## 2017-10-12 NOTE — ADDENDUM NOTE
Encounter addended by: Zulema Arzate on: 10/12/2017 12:10 PM<BR>    Actions taken: Pend clinical note

## 2017-10-13 VITALS
TEMPERATURE: 98.7 F | BODY MASS INDEX: 30.29 KG/M2 | SYSTOLIC BLOOD PRESSURE: 132 MMHG | HEART RATE: 81 BPM | WEIGHT: 199.2 LBS | DIASTOLIC BLOOD PRESSURE: 78 MMHG | RESPIRATION RATE: 16 BRPM | OXYGEN SATURATION: 97 %

## 2017-10-13 PROCEDURE — 665998 HH PPS REVENUE CREDIT

## 2017-10-13 PROCEDURE — 665999 HH PPS REVENUE DEBIT

## 2017-10-14 PROCEDURE — 665999 HH PPS REVENUE DEBIT

## 2017-10-14 PROCEDURE — 665998 HH PPS REVENUE CREDIT

## 2017-10-15 VITALS
TEMPERATURE: 97.2 F | BODY MASS INDEX: 30.47 KG/M2 | RESPIRATION RATE: 18 BRPM | WEIGHT: 200.4 LBS | SYSTOLIC BLOOD PRESSURE: 140 MMHG | HEART RATE: 81 BPM | OXYGEN SATURATION: 97 % | DIASTOLIC BLOOD PRESSURE: 78 MMHG

## 2017-10-15 PROCEDURE — 665999 HH PPS REVENUE DEBIT

## 2017-10-15 PROCEDURE — 665998 HH PPS REVENUE CREDIT

## 2017-10-15 SDOH — ECONOMIC STABILITY: HOUSING INSECURITY: UNSAFE APPLIANCES: 0

## 2017-10-15 SDOH — ECONOMIC STABILITY: HOUSING INSECURITY: UNSAFE COOKING RANGE AREA: 0

## 2017-10-15 ASSESSMENT — ENCOUNTER SYMPTOMS
NAUSEA: DENIES ANY
VOMITING: NONE EXPRESSED

## 2017-10-16 LAB — EKG IMPRESSION: NORMAL

## 2017-10-16 PROCEDURE — 665999 HH PPS REVENUE DEBIT

## 2017-10-16 PROCEDURE — 665998 HH PPS REVENUE CREDIT

## 2017-10-17 ENCOUNTER — HOME CARE VISIT (OUTPATIENT)
Dept: HOME HEALTH SERVICES | Facility: HOME HEALTHCARE | Age: 67
End: 2017-10-17
Payer: MEDICARE

## 2017-10-17 PROCEDURE — 665998 HH PPS REVENUE CREDIT

## 2017-10-17 PROCEDURE — G0152 HHCP-SERV OF OT,EA 15 MIN: HCPCS

## 2017-10-17 PROCEDURE — 665999 HH PPS REVENUE DEBIT

## 2017-10-17 NOTE — ADDENDUM NOTE
Encounter addended by: Zulema Arzate on: 10/17/2017  7:38 AM<BR>    Actions taken: Sign clinical note

## 2017-10-18 ENCOUNTER — HOME CARE VISIT (OUTPATIENT)
Dept: HOME HEALTH SERVICES | Facility: HOME HEALTHCARE | Age: 67
End: 2017-10-18
Payer: MEDICARE

## 2017-10-18 VITALS
SYSTOLIC BLOOD PRESSURE: 140 MMHG | RESPIRATION RATE: 18 BRPM | DIASTOLIC BLOOD PRESSURE: 70 MMHG | OXYGEN SATURATION: 95 % | HEART RATE: 106 BPM | TEMPERATURE: 98.9 F

## 2017-10-18 VITALS
TEMPERATURE: 99.1 F | WEIGHT: 198 LBS | HEART RATE: 94 BPM | BODY MASS INDEX: 30.11 KG/M2 | RESPIRATION RATE: 16 BRPM | OXYGEN SATURATION: 93 % | SYSTOLIC BLOOD PRESSURE: 134 MMHG | DIASTOLIC BLOOD PRESSURE: 76 MMHG

## 2017-10-18 PROCEDURE — G0157 HHC PT ASSISTANT EA 15: HCPCS

## 2017-10-18 PROCEDURE — 665999 HH PPS REVENUE DEBIT

## 2017-10-18 PROCEDURE — 665998 HH PPS REVENUE CREDIT

## 2017-10-18 ASSESSMENT — ACTIVITIES OF DAILY LIVING (ADL): HOME_HEALTH_OASIS: 02

## 2017-10-19 ENCOUNTER — HOME CARE VISIT (OUTPATIENT)
Dept: HOME HEALTH SERVICES | Facility: HOME HEALTHCARE | Age: 67
End: 2017-10-19
Payer: MEDICARE

## 2017-10-19 PROCEDURE — 665998 HH PPS REVENUE CREDIT

## 2017-10-19 PROCEDURE — 665999 HH PPS REVENUE DEBIT

## 2017-10-19 ASSESSMENT — ACTIVITIES OF DAILY LIVING (ADL): OASIS_M1830: 03

## 2017-10-20 ENCOUNTER — HOME CARE VISIT (OUTPATIENT)
Dept: HOME HEALTH SERVICES | Facility: HOME HEALTHCARE | Age: 67
End: 2017-10-20
Payer: MEDICARE

## 2017-10-20 PROCEDURE — 665998 HH PPS REVENUE CREDIT

## 2017-10-20 PROCEDURE — 665999 HH PPS REVENUE DEBIT

## 2017-10-21 PROCEDURE — 665998 HH PPS REVENUE CREDIT

## 2017-10-21 PROCEDURE — 665999 HH PPS REVENUE DEBIT

## 2017-10-22 PROCEDURE — 665999 HH PPS REVENUE DEBIT

## 2017-10-22 PROCEDURE — 665998 HH PPS REVENUE CREDIT

## 2017-10-23 ENCOUNTER — HOME CARE VISIT (OUTPATIENT)
Dept: HOME HEALTH SERVICES | Facility: HOME HEALTHCARE | Age: 67
End: 2017-10-23
Payer: MEDICARE

## 2017-10-23 PROCEDURE — A9300 EXERCISE EQUIPMENT: HCPCS

## 2017-10-23 PROCEDURE — 665998 HH PPS REVENUE CREDIT

## 2017-10-23 PROCEDURE — 665999 HH PPS REVENUE DEBIT

## 2017-10-23 PROCEDURE — G0152 HHCP-SERV OF OT,EA 15 MIN: HCPCS

## 2017-10-24 ENCOUNTER — HOME CARE VISIT (OUTPATIENT)
Dept: HOME HEALTH SERVICES | Facility: HOME HEALTHCARE | Age: 67
End: 2017-10-24
Payer: MEDICARE

## 2017-10-24 VITALS
RESPIRATION RATE: 18 BRPM | SYSTOLIC BLOOD PRESSURE: 136 MMHG | WEIGHT: 196.2 LBS | OXYGEN SATURATION: 98 % | HEART RATE: 98 BPM | TEMPERATURE: 99.5 F | DIASTOLIC BLOOD PRESSURE: 68 MMHG | BODY MASS INDEX: 29.83 KG/M2

## 2017-10-24 PROCEDURE — 665998 HH PPS REVENUE CREDIT

## 2017-10-24 PROCEDURE — G0496 LPN CARE TRAIN/EDU IN HH: HCPCS

## 2017-10-24 PROCEDURE — 665999 HH PPS REVENUE DEBIT

## 2017-10-24 PROCEDURE — 665997 HH PPS REVENUE ADJ

## 2017-10-24 ASSESSMENT — ACTIVITIES OF DAILY LIVING (ADL)
TOILETING_ASSISTANCE: 0
MEAL_PREP_ASSISTANCE: 0
ORAL_CARE_ASSISTANCE: 0
TELEPHONE_ASSISTANCE: 0
TRANSPORTATION_ASSISTANCE: 6
HOUSEKEEPING_ASSISTANCE: 6
DRESSING_UB_ASSISTANCE: 0
DRESSING_LB_ASSISTANCE: 0
GROOMING_ASSISTANCE: 0
LAUNDRY_ASSISTANCE: 0
SHOPPING_ASSISTANCE: 5
EATING_ASSISTANCE: 0
BATHING_ASSISTANCE: 0

## 2017-10-25 ENCOUNTER — HOME CARE VISIT (OUTPATIENT)
Dept: HOME HEALTH SERVICES | Facility: HOME HEALTHCARE | Age: 67
End: 2017-10-25
Payer: MEDICARE

## 2017-10-25 VITALS
DIASTOLIC BLOOD PRESSURE: 62 MMHG | TEMPERATURE: 98.2 F | HEART RATE: 98 BPM | OXYGEN SATURATION: 98 % | SYSTOLIC BLOOD PRESSURE: 106 MMHG | RESPIRATION RATE: 16 BRPM

## 2017-10-25 PROCEDURE — 665999 HH PPS REVENUE DEBIT

## 2017-10-25 PROCEDURE — 665998 HH PPS REVENUE CREDIT

## 2017-10-25 SDOH — ECONOMIC STABILITY: HOUSING INSECURITY: UNSAFE APPLIANCES: 0

## 2017-10-25 SDOH — ECONOMIC STABILITY: HOUSING INSECURITY: UNSAFE COOKING RANGE AREA: 0

## 2017-10-25 ASSESSMENT — ENCOUNTER SYMPTOMS: RESPIRATORY SYMPTOMS COMMENTS: PT LUNGS CLEAR IN ALL FIELDS, NO ADVANTAGEOUS SOUND NOTED.

## 2017-10-26 PROCEDURE — 665998 HH PPS REVENUE CREDIT

## 2017-10-26 PROCEDURE — 665999 HH PPS REVENUE DEBIT

## 2017-10-27 ENCOUNTER — HOME CARE VISIT (OUTPATIENT)
Dept: HOME HEALTH SERVICES | Facility: HOME HEALTHCARE | Age: 67
End: 2017-10-27
Payer: MEDICARE

## 2017-10-27 PROCEDURE — 665999 HH PPS REVENUE DEBIT

## 2017-10-27 PROCEDURE — 665998 HH PPS REVENUE CREDIT

## 2017-10-28 PROCEDURE — 665998 HH PPS REVENUE CREDIT

## 2017-10-28 PROCEDURE — 665999 HH PPS REVENUE DEBIT

## 2017-10-29 PROCEDURE — 665998 HH PPS REVENUE CREDIT

## 2017-10-29 PROCEDURE — 665999 HH PPS REVENUE DEBIT

## 2017-10-30 ENCOUNTER — HOME CARE VISIT (OUTPATIENT)
Dept: HOME HEALTH SERVICES | Facility: HOME HEALTHCARE | Age: 67
End: 2017-10-30
Payer: MEDICARE

## 2017-10-30 PROCEDURE — 665999 HH PPS REVENUE DEBIT

## 2017-10-30 PROCEDURE — 665998 HH PPS REVENUE CREDIT

## 2017-10-31 PROCEDURE — 665999 HH PPS REVENUE DEBIT

## 2017-10-31 PROCEDURE — 665998 HH PPS REVENUE CREDIT

## 2017-11-01 ENCOUNTER — HOME CARE VISIT (OUTPATIENT)
Dept: HOME HEALTH SERVICES | Facility: HOME HEALTHCARE | Age: 67
End: 2017-11-01
Payer: MEDICARE

## 2017-11-01 PROCEDURE — 665999 HH PPS REVENUE DEBIT

## 2017-11-01 PROCEDURE — 665998 HH PPS REVENUE CREDIT

## 2017-11-02 ENCOUNTER — HOSPITAL ENCOUNTER (OUTPATIENT)
Dept: RADIOLOGY | Facility: MEDICAL CENTER | Age: 67
End: 2017-11-02
Attending: GENERAL PRACTICE
Payer: MEDICARE

## 2017-11-02 DIAGNOSIS — I10 PRIMARY HYPERTENSION: ICD-10-CM

## 2017-11-02 PROCEDURE — A9577 INJ MULTIHANCE: HCPCS | Performed by: GENERAL PRACTICE

## 2017-11-02 PROCEDURE — 665999 HH PPS REVENUE DEBIT

## 2017-11-02 PROCEDURE — 700117 HCHG RX CONTRAST REV CODE 255: Performed by: GENERAL PRACTICE

## 2017-11-02 PROCEDURE — 665998 HH PPS REVENUE CREDIT

## 2017-11-02 PROCEDURE — C8902 MRA W/O FOL W/CONT, ABD: HCPCS

## 2017-11-02 RX ADMIN — GADOBENATE DIMEGLUMINE 10 ML: 529 INJECTION, SOLUTION INTRAVENOUS at 14:18

## 2017-11-03 PROCEDURE — 665998 HH PPS REVENUE CREDIT

## 2017-11-03 PROCEDURE — 665999 HH PPS REVENUE DEBIT

## 2017-11-04 PROCEDURE — 665999 HH PPS REVENUE DEBIT

## 2017-11-04 PROCEDURE — 665998 HH PPS REVENUE CREDIT

## 2017-11-05 PROCEDURE — 665998 HH PPS REVENUE CREDIT

## 2017-11-05 PROCEDURE — 665999 HH PPS REVENUE DEBIT

## 2017-11-06 PROCEDURE — 665998 HH PPS REVENUE CREDIT

## 2017-11-06 PROCEDURE — 665999 HH PPS REVENUE DEBIT

## 2017-11-07 PROCEDURE — 665998 HH PPS REVENUE CREDIT

## 2017-11-07 PROCEDURE — 665999 HH PPS REVENUE DEBIT

## 2017-11-08 ENCOUNTER — HOME CARE VISIT (OUTPATIENT)
Dept: HOME HEALTH SERVICES | Facility: HOME HEALTHCARE | Age: 67
End: 2017-11-08
Payer: MEDICARE

## 2017-11-08 PROCEDURE — 665998 HH PPS REVENUE CREDIT

## 2017-11-08 PROCEDURE — 665999 HH PPS REVENUE DEBIT

## 2017-11-09 PROCEDURE — 665998 HH PPS REVENUE CREDIT

## 2017-11-09 PROCEDURE — 665999 HH PPS REVENUE DEBIT

## 2017-11-10 PROCEDURE — 665999 HH PPS REVENUE DEBIT

## 2017-11-10 PROCEDURE — 665998 HH PPS REVENUE CREDIT

## 2017-11-11 PROCEDURE — 665998 HH PPS REVENUE CREDIT

## 2017-11-11 PROCEDURE — 665999 HH PPS REVENUE DEBIT

## 2017-11-12 PROCEDURE — 665998 HH PPS REVENUE CREDIT

## 2017-11-12 PROCEDURE — 665999 HH PPS REVENUE DEBIT

## 2017-11-13 ENCOUNTER — OFFICE VISIT (OUTPATIENT)
Dept: NEUROLOGY | Facility: MEDICAL CENTER | Age: 67
End: 2017-11-13
Payer: MEDICARE

## 2017-11-13 VITALS
OXYGEN SATURATION: 97 % | HEIGHT: 68 IN | SYSTOLIC BLOOD PRESSURE: 130 MMHG | BODY MASS INDEX: 28.95 KG/M2 | DIASTOLIC BLOOD PRESSURE: 80 MMHG | HEART RATE: 106 BPM | RESPIRATION RATE: 16 BRPM | TEMPERATURE: 98.4 F | WEIGHT: 191 LBS

## 2017-11-13 DIAGNOSIS — R42 DIZZINESS: ICD-10-CM

## 2017-11-13 DIAGNOSIS — F39 MOOD DISORDER (HCC): ICD-10-CM

## 2017-11-13 DIAGNOSIS — I10 ESSENTIAL HYPERTENSION: ICD-10-CM

## 2017-11-13 DIAGNOSIS — R42 EPISODE OF DIZZINESS: ICD-10-CM

## 2017-11-13 DIAGNOSIS — R41.89 COGNITIVE DECLINE: ICD-10-CM

## 2017-11-13 DIAGNOSIS — Z86.73 HISTORY OF CVA (CEREBROVASCULAR ACCIDENT): ICD-10-CM

## 2017-11-13 PROBLEM — F29 PSYCHOSIS (HCC): Status: RESOLVED | Noted: 2017-10-02 | Resolved: 2017-11-13

## 2017-11-13 PROCEDURE — 665998 HH PPS REVENUE CREDIT

## 2017-11-13 PROCEDURE — 99204 OFFICE O/P NEW MOD 45 MIN: CPT | Performed by: PSYCHIATRY & NEUROLOGY

## 2017-11-13 PROCEDURE — 665999 HH PPS REVENUE DEBIT

## 2017-11-13 ASSESSMENT — PATIENT HEALTH QUESTIONNAIRE - PHQ9: CLINICAL INTERPRETATION OF PHQ2 SCORE: 0

## 2017-11-13 NOTE — PATIENT INSTRUCTIONS
Results for DARRELL KOWALSKI (MRN [id number]) as of 11/13/2017 15:40   Ref. Range 5/7/2012 20:58   Vitamin B12 -True Cobalamin Latest Ref Range: 211 - 911 pg/mL 176 (L)       The patient stated that he believed that the problem he was admitted into this hospital from Sep to Oct 2017 was secondary to the mistake prescription of Disulfiram  (antabuse)-- the patient did not drink alcohol at all, his brother told him that he was fine before he was put on antabuse---      IMPRESSION:    1. The patient stated that he lost his memory from Sep to Oct 2017 -- ended in the hospital-- stated that he was dizzy at that time  2. Hx of DM, HTN, Vit B12 deficiency  3. Cognitive Decline for years  4. Right knee pain      PLAN/RECOMMENDATIONS:    MRI of brain done in the last admission showed old stroke        SIGNATURE:  Alia Webster    CC:  Antione Coley M.D.    9/2017  1.  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 Minimal hemosiderin   deposition.

## 2017-11-13 NOTE — PROGRESS NOTES
NEUROLOGY NOTE    Referring Physician  Antione Coely M.D.      CHIEF COMPLAINT:  Memory trouble from Sep to Oct 2017    Chief Complaint   Patient presents with   • Establish Care     Dizziness       PRESENT ILLNESS:   Memory trouble from Sep to Oct 2017    The patient stated that he believed that the problem he was admitted into this hospital from Sep to Oct 2017 was secondary to the mistake prescription of Disulfiram  (antabuse)-- the patient did not drink alcohol at all, his brother told him that he was fine before he was put on antabuse---    I did explain to the patient that antabuse was not the cause of prolonged confusion since antabuse's half life is not long enough to make the patient confused for months    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,  fishing      The patient is living with his brother--- [place] heritage      PAST MEDICAL HISTORY:  Past Medical History:   Diagnosis Date   • CATARACT    • Diabetes 2005    oral meds   • Hyperlipidemia    • Hypertension    • Seizure (CMS-[facility]) 1987   • Seizure disorder (CMS-HCC)    • Snoring    • Stroke (CMS-HCC)        PAST SURGICAL HISTORY:  Past Surgical History:   Procedure Laterality Date   • CATARACT PHACO WITH IOL  5/4/2011    Performed by [facility]   • CATARACT PHACO WITH IOL  4/20/2011    Performed by ENRIQUETA MABRY at [facility]   • OTHER  2011    left eye cataract   • CATARACT EXTRACTION WITH IOL  2011   • OTHER  2003    oral surgery       FAMILY HISTORY:  No family history on file.    SOCIAL HISTORY:  Social History     Social History   • Marital status:      Spouse name: N/A   • Number of children: N/A   • Years of education: N/A     Occupational History   • Not on file.     Social History Main Topics   • Smoking status: Former Smoker     Packs/day: 0.50     Years: 4.00     Types: Cigarettes     Quit date: 2/2/1985   • Smokeless tobacco: Never Used   • Alcohol use No      • Drug use: No   • Sexual activity: Not on file     Other Topics Concern   • Not on file     Social History Narrative   • No narrative on file     ALLERGIES:  Allergies   Allergen Reactions   • Penicillin G Rash     Rxn - Exacerbated a rash on his legs  Early 2000's       TOBHX  History   Smoking Status   • Former Smoker   • Packs/day: 0.50   • Years: 4.00   • Types: Cigarettes   • Quit date: 2/2/1985   Smokeless Tobacco   • Never Used     ALCHX  History   Alcohol Use No     DRUGHX  History   Drug Use No           MEDICATIONS:  Current Outpatient Prescriptions   Medication   • Acetaminophen 325 MG Cap   • atorvastatin (LIPITOR) 20 MG Tab   • insulin lispro, Human, (HUMALOG) 100 UNIT/ML Solution Pen-injector injection   • insulin detemir (LEVEMIR) 100 UNIT/ML Solution Pen-injector injection   • aspirin EC (ECOTRIN) 81 MG Tablet Delayed Response   • hydrochlorothiazide (HYDRODIURIL) 25 MG Tab   • lisinopril (PRINIVIL, ZESTRIL) 40 MG tablet   • felodipine (PLENDIL) 10 MG TABLET SR 24 HR   • doxazosin (CARDURA) 8 MG tablet   • dipyridamole (PERSANTINE) 25 MG Tab   • Potassium Chloride (KLOR-CON 10 PO)   • meclizine (ANTIVERT) 25 MG Tab   • guaifenesin dextromethorphan (ROBITUSSIN DM) 100-10 MG/5ML Syrup syrup   • Omega-3 Fatty Acids (FISH OIL) 1000 MG Cap capsule     No current facility-administered medications for this visit.        REVIEW OF SYSTEM:    Constitutional: Denies fevers, Denies weight changes   Eyes: Denies changes in vision, no eye pain   Ears/Nose/Throat/Mouth: Denies nasal congestion or sore throat   Cardiovascular: HTN  Respiratory: Denies SOB.   Gastrointestinal/Hepatic: Denies abdominal pain, nausea, vomiting, diarrhea, constipation or GI bleeding   Genitourinary: Denies bladder dysfunction, dysuria or frequency   Musculoskeletal/Rheum: Denies joint pain and swelling   Skin/Breast: Denies rash, denies breast lumps or discharge   Neurological: old stroke, dizziness  Psychiatric: denies mood disorder  "  Endocrine: DM  Heme/Oncology/Lymph Nodes: Denies enlarged lymph nodes, denies brusing or known bleeding disorder   Allergic/Immunologic: Denies hx of allergies         PHYSICAL AND NEUROLOGICAL EXMAINATIONS:  VITAL SIGNS: /80   Pulse (!) 106   Temp 36.9 °C (98.4 °F)   Resp 16   Ht 1.727 m (5' 8\")   Wt 86.6 kg (191 lb)   SpO2 97%   BMI 29.04 kg/m²   CURRENT WEIGHT: BMI: Body mass index is 29.04 kg/m².  PREVIOUS WEIGHTS:  Wt Readings from Last 25 Encounters:   11/13/17 86.6 kg (191 lb)   10/23/17 89 kg (196 lb 3.2 oz)   10/17/17 89.8 kg (198 lb)   10/12/17 90.9 kg (200 lb 6.4 oz)   10/12/17 90.4 kg (199 lb 3.2 oz)   10/12/17 90.4 kg (199 lb 3.2 oz)   10/10/17 87.8 kg (193 lb 9.6 oz)   10/10/17 87.8 kg (193 lb 9.6 oz)   10/08/17 94.8 kg (209 lb)   09/30/17 95 kg (209 lb 7 oz)   09/21/17 98.4 kg (216 lb 14.9 oz)   05/07/12 89.8 kg (198 lb)   04/28/12 83.5 kg (184 lb)   05/04/11 98.9 kg (218 lb)   04/20/11 98.9 kg (218 lb)       General appearance of patient: WDWN(+) NAD(+)    EYES  o Fundus : Papilledem(-) Exudates(-) Hemorrhage(-)  Nervous System  Orientation to time, place and person(+)  Memory normal(-) recall 0/3, 2/3 and 1/3 ( three trials)  Language: aphasia(-)  Knowledge: past(+) Current(+)  Attention(+)  Cranial Nerves  • Nerve 2: intact  • Nerve 3,4,6: intact  • Nerve 5 : intact  • Nerve 7: intact  • Nerve 8: intact  • Nerve 9 & 10: intact  • Nerve 11: intact  • Nerve 12: intact  Muscle Power and muscle tone: symmetric, normal in upper and lower  Sensory System: Pin sensation intact(+)  Reflexes: symmetric throughout  Cerebellar Function FNP normal   Gait : able to ambulate   Heart and Vascular  Peripheral Vasucular system : Edema (-) Swelling(-)  RHB, Breathing sound clear  abdomen bowel sound normoactive  Extremities freely moveable  Joints no contracture       NEUROIMAGING: I reviewed the MRI/CT of brain       LAB:         Results for DARRELL KOWALSKI (MRN [id number]) as of 11/13/2017 15:40   " Ref. Range 5/7/2012 20:58   Vitamin B12 -True Cobalamin Latest Ref Range: 211 - 911 pg/mL 176 (L)       The patient stated that he believed that the problem he was admitted into this hospital from Sep to Oct 2017 was secondary to the mistake prescription of Disulfiram  (antabuse)-- the patient did not drink alcohol at all, his brother told him that he was fine before he was put on antabuse---  I did tell the patient, Disulfiram could not stay in his body and made him confused and poor memory for months      IMPRESSION:    1. The patient stated that he lost his memory from Sep to Oct 2017 -- ended in the hospital-- stated that he was dizzy at that time  2. Hx of DM, HTN, Vit B12 deficiency  3. Cognitive Decline for years  4. Right Knee pain      PLAN/RECOMMENDATIONS:    MRI of brain done in the last admission showed old stroke  Will offer EEG and blood tests        SIGNATURE:  Alia Webster    CC:  Antione Coley M.D.    9/2017  1.  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 Minimal hemosiderin   deposition.

## 2017-11-14 PROCEDURE — 665999 HH PPS REVENUE DEBIT

## 2017-11-14 PROCEDURE — 665998 HH PPS REVENUE CREDIT

## 2017-11-14 SDOH — ECONOMIC STABILITY: HOUSING INSECURITY: UNSAFE COOKING RANGE AREA: 0

## 2017-11-14 SDOH — ECONOMIC STABILITY: HOUSING INSECURITY: UNSAFE APPLIANCES: 0

## 2017-11-14 ASSESSMENT — ACTIVITIES OF DAILY LIVING (ADL)
HOME_HEALTH_OASIS: 01
OASIS_M1830: 01

## 2017-11-15 ENCOUNTER — HOME CARE VISIT (OUTPATIENT)
Dept: HOME HEALTH SERVICES | Facility: HOME HEALTHCARE | Age: 67
End: 2017-11-15
Payer: MEDICARE

## 2017-11-15 PROCEDURE — 665998 HH PPS REVENUE CREDIT

## 2017-11-15 PROCEDURE — 665999 HH PPS REVENUE DEBIT

## 2017-11-16 ENCOUNTER — PATIENT OUTREACH (OUTPATIENT)
Dept: HEALTH INFORMATION MANAGEMENT | Facility: OTHER | Age: 67
End: 2017-11-16

## 2017-11-16 DIAGNOSIS — E11.9 TYPE 2 DIABETES MELLITUS WITHOUT COMPLICATION, UNSPECIFIED LONG TERM INSULIN USE STATUS: ICD-10-CM

## 2017-11-16 PROCEDURE — 665998 HH PPS REVENUE CREDIT

## 2017-11-16 PROCEDURE — 665999 HH PPS REVENUE DEBIT

## 2017-11-17 PROCEDURE — 665999 HH PPS REVENUE DEBIT

## 2017-11-17 PROCEDURE — 665998 HH PPS REVENUE CREDIT

## 2017-11-18 PROCEDURE — 665998 HH PPS REVENUE CREDIT

## 2017-11-18 PROCEDURE — 665999 HH PPS REVENUE DEBIT

## 2017-11-19 PROCEDURE — 665999 HH PPS REVENUE DEBIT

## 2017-11-19 PROCEDURE — 665998 HH PPS REVENUE CREDIT

## 2017-11-20 PROCEDURE — 665998 HH PPS REVENUE CREDIT

## 2017-11-20 PROCEDURE — 665999 HH PPS REVENUE DEBIT

## 2017-11-21 PROCEDURE — 665998 HH PPS REVENUE CREDIT

## 2017-11-21 PROCEDURE — 665999 HH PPS REVENUE DEBIT

## 2017-11-22 PROCEDURE — 665999 HH PPS REVENUE DEBIT

## 2017-11-22 PROCEDURE — 665998 HH PPS REVENUE CREDIT

## 2017-11-23 PROCEDURE — 665998 HH PPS REVENUE CREDIT

## 2017-11-23 PROCEDURE — 665999 HH PPS REVENUE DEBIT

## 2017-11-24 PROCEDURE — 665998 HH PPS REVENUE CREDIT

## 2017-11-24 PROCEDURE — 665999 HH PPS REVENUE DEBIT

## 2017-11-25 PROCEDURE — 665998 HH PPS REVENUE CREDIT

## 2017-11-25 PROCEDURE — 665999 HH PPS REVENUE DEBIT

## 2017-11-26 PROCEDURE — 665998 HH PPS REVENUE CREDIT

## 2017-11-26 PROCEDURE — 665999 HH PPS REVENUE DEBIT

## 2017-11-27 PROCEDURE — 665998 HH PPS REVENUE CREDIT

## 2017-11-27 PROCEDURE — 665999 HH PPS REVENUE DEBIT

## 2020-05-01 NOTE — PROGRESS NOTES
Aaliyah Morel Fall Risk Assessment:     Last Known Fall: No falls  Mobility: Dizziness/generalized weakness  Medications: Cardiovascular or central nervous system meds, Diuretics  Mental Status/LOC/Awareness: Awake, alert, and oriented to date, place, and person  Toileting Needs: No needs  Volume/Electrolyte Status: No problems  Communication/Sensory: Visual (Glasses)/hearing deficit  Behavior: Appropriate behavior  Aaliyah Morel Fall Risk Total: 9  Fall Risk Level: LOW RISK    Universal Fall Precautions:  call light/belongings in reach, bed in low position and locked, siderails up x 2, use non-slip footwear, clutter free and spill free environment, educate to call for assistance    Fall Risk Level Interventions:   TRIAL (TELE 8, NEURO, MED NOEMÍ 5) Low Fall Risk Interventions  Place yellow fall risk ID band on patient: verified  Provide patient/family education based on risk assessment: completed  Educate patient/family to call staff for assistance when getting out of bed: completed  Place fall precaution signage outside patient door: verified      Patient Specific Interventions:     Medication: review medications with patient and family  Mental Status/LOC/Awareness: reinforce falls education, check on patient hourly, utilize bed/chair fall alarm and reinforce the use of call light  Toileting: provide frquent toileting  Volume/Electrolyte Status: ensure patient remains hydrated, monitor blood sugars and maintain appropriate blood sugar levels if diabetic and advance diet as tolerated  Communication/Sensory: update plan of care on whiteboard  Behavioral: encourage patient to voice feelings and engage patient in daily activities  Mobility: schedule physical activity throughout the day, provide comfort measures during transport and collaborate with doctor for possible PT/OT consult   2 = assistive person 0 = independent

## 2021-01-15 DIAGNOSIS — Z23 NEED FOR VACCINATION: ICD-10-CM

## 2021-06-20 ENCOUNTER — HOSPITAL ENCOUNTER (INPATIENT)
Facility: MEDICAL CENTER | Age: 71
LOS: 10 days | DRG: 291 | End: 2021-06-30
Attending: EMERGENCY MEDICINE | Admitting: STUDENT IN AN ORGANIZED HEALTH CARE EDUCATION/TRAINING PROGRAM
Payer: MEDICARE

## 2021-06-20 ENCOUNTER — APPOINTMENT (OUTPATIENT)
Dept: RADIOLOGY | Facility: MEDICAL CENTER | Age: 71
DRG: 291 | End: 2021-06-20
Attending: EMERGENCY MEDICINE
Payer: MEDICARE

## 2021-06-20 DIAGNOSIS — R09.02 HYPOXIC: ICD-10-CM

## 2021-06-20 DIAGNOSIS — D64.9 ANEMIA, UNSPECIFIED TYPE: ICD-10-CM

## 2021-06-20 DIAGNOSIS — I50.9 ACUTE ON CHRONIC CONGESTIVE HEART FAILURE, UNSPECIFIED HEART FAILURE TYPE (HCC): ICD-10-CM

## 2021-06-20 DIAGNOSIS — N18.2 CKD (CHRONIC KIDNEY DISEASE) STAGE 2, GFR 60-89 ML/MIN: ICD-10-CM

## 2021-06-20 DIAGNOSIS — R06.02 SHORTNESS OF BREATH: ICD-10-CM

## 2021-06-20 DIAGNOSIS — J96.01 ACUTE RESPIRATORY FAILURE WITH HYPOXIA (HCC): ICD-10-CM

## 2021-06-20 DIAGNOSIS — I35.0 NONRHEUMATIC AORTIC VALVE STENOSIS: ICD-10-CM

## 2021-06-20 DIAGNOSIS — N17.9 AKI (ACUTE KIDNEY INJURY) (HCC): ICD-10-CM

## 2021-06-20 LAB
ALBUMIN SERPL BCP-MCNC: 2.6 G/DL (ref 3.2–4.9)
ALBUMIN/GLOB SERPL: 0.8 G/DL
ALP SERPL-CCNC: 64 U/L (ref 30–99)
ALT SERPL-CCNC: 11 U/L (ref 2–50)
ANION GAP SERPL CALC-SCNC: 12 MMOL/L (ref 7–16)
AST SERPL-CCNC: 21 U/L (ref 12–45)
BASE EXCESS BLDV CALC-SCNC: -6 MMOL/L
BASOPHILS # BLD AUTO: 0.5 % (ref 0–1.8)
BASOPHILS # BLD: 0.05 K/UL (ref 0–0.12)
BILIRUB SERPL-MCNC: 0.2 MG/DL (ref 0.1–1.5)
BODY TEMPERATURE: ABNORMAL CENTIGRADE
BUN SERPL-MCNC: 60 MG/DL (ref 8–22)
CALCIUM SERPL-MCNC: 7.5 MG/DL (ref 8.5–10.5)
CHLORIDE SERPL-SCNC: 109 MMOL/L (ref 96–112)
CO2 SERPL-SCNC: 21 MMOL/L (ref 20–33)
CREAT SERPL-MCNC: 5.3 MG/DL (ref 0.5–1.4)
D DIMER PPP IA.FEU-MCNC: 1.85 UG/ML (FEU) (ref 0–0.5)
EKG IMPRESSION: NORMAL
EOSINOPHIL # BLD AUTO: 0.35 K/UL (ref 0–0.51)
EOSINOPHIL NFR BLD: 3.6 % (ref 0–6.9)
ERYTHROCYTE [DISTWIDTH] IN BLOOD BY AUTOMATED COUNT: 49.9 FL (ref 35.9–50)
GLOBULIN SER CALC-MCNC: 3.1 G/DL (ref 1.9–3.5)
GLUCOSE SERPL-MCNC: 106 MG/DL (ref 65–99)
HCO3 BLDV-SCNC: 20 MMOL/L (ref 24–28)
HCT VFR BLD AUTO: 24 % (ref 42–52)
HGB BLD-MCNC: 8 G/DL (ref 14–18)
IMM GRANULOCYTES # BLD AUTO: 0.04 K/UL (ref 0–0.11)
IMM GRANULOCYTES NFR BLD AUTO: 0.4 % (ref 0–0.9)
LYMPHOCYTES # BLD AUTO: 1.01 K/UL (ref 1–4.8)
LYMPHOCYTES NFR BLD: 10.3 % (ref 22–41)
MCH RBC QN AUTO: 31.6 PG (ref 27–33)
MCHC RBC AUTO-ENTMCNC: 33.3 G/DL (ref 33.7–35.3)
MCV RBC AUTO: 94.9 FL (ref 81.4–97.8)
MONOCYTES # BLD AUTO: 0.64 K/UL (ref 0–0.85)
MONOCYTES NFR BLD AUTO: 6.5 % (ref 0–13.4)
NEUTROPHILS # BLD AUTO: 7.69 K/UL (ref 1.82–7.42)
NEUTROPHILS NFR BLD: 78.7 % (ref 44–72)
NRBC # BLD AUTO: 0 K/UL
NRBC BLD-RTO: 0 /100 WBC
NT-PROBNP SERPL IA-MCNC: ABNORMAL PG/ML (ref 0–125)
PCO2 BLDV: 39.6 MMHG (ref 41–51)
PH BLDV: 7.31 [PH] (ref 7.31–7.45)
PLATELET # BLD AUTO: 199 K/UL (ref 164–446)
PMV BLD AUTO: 11.2 FL (ref 9–12.9)
PO2 BLDV: 63 MMHG (ref 25–40)
POTASSIUM SERPL-SCNC: 3.1 MMOL/L (ref 3.6–5.5)
PROT SERPL-MCNC: 5.7 G/DL (ref 6–8.2)
RBC # BLD AUTO: 2.53 M/UL (ref 4.7–6.1)
SAO2 % BLDV: 87.1 %
SODIUM SERPL-SCNC: 142 MMOL/L (ref 135–145)
TROPONIN T SERPL-MCNC: 313 NG/L (ref 6–19)
WBC # BLD AUTO: 9.8 K/UL (ref 4.8–10.8)

## 2021-06-20 PROCEDURE — 770020 HCHG ROOM/CARE - TELE (206)

## 2021-06-20 PROCEDURE — 71045 X-RAY EXAM CHEST 1 VIEW: CPT

## 2021-06-20 PROCEDURE — 96374 THER/PROPH/DIAG INJ IV PUSH: CPT

## 2021-06-20 PROCEDURE — 84484 ASSAY OF TROPONIN QUANT: CPT

## 2021-06-20 PROCEDURE — 99223 1ST HOSP IP/OBS HIGH 75: CPT | Mod: AI | Performed by: STUDENT IN AN ORGANIZED HEALTH CARE EDUCATION/TRAINING PROGRAM

## 2021-06-20 PROCEDURE — 0241U HCHG SARS-COV-2 COVID-19 NFCT DS RESP RNA 4 TRGT MIC: CPT

## 2021-06-20 PROCEDURE — 94660 CPAP INITIATION&MGMT: CPT

## 2021-06-20 PROCEDURE — 93005 ELECTROCARDIOGRAM TRACING: CPT | Performed by: EMERGENCY MEDICINE

## 2021-06-20 PROCEDURE — A9270 NON-COVERED ITEM OR SERVICE: HCPCS | Performed by: EMERGENCY MEDICINE

## 2021-06-20 PROCEDURE — 87040 BLOOD CULTURE FOR BACTERIA: CPT

## 2021-06-20 PROCEDURE — 80053 COMPREHEN METABOLIC PANEL: CPT

## 2021-06-20 PROCEDURE — 83880 ASSAY OF NATRIURETIC PEPTIDE: CPT

## 2021-06-20 PROCEDURE — 5A09357 ASSISTANCE WITH RESPIRATORY VENTILATION, LESS THAN 24 CONSECUTIVE HOURS, CONTINUOUS POSITIVE AIRWAY PRESSURE: ICD-10-PCS | Performed by: EMERGENCY MEDICINE

## 2021-06-20 PROCEDURE — 85025 COMPLETE CBC W/AUTO DIFF WBC: CPT

## 2021-06-20 PROCEDURE — 85379 FIBRIN DEGRADATION QUANT: CPT

## 2021-06-20 PROCEDURE — 93971 EXTREMITY STUDY: CPT | Mod: RT

## 2021-06-20 PROCEDURE — 99285 EMERGENCY DEPT VISIT HI MDM: CPT

## 2021-06-20 PROCEDURE — 700102 HCHG RX REV CODE 250 W/ 637 OVERRIDE(OP): Performed by: EMERGENCY MEDICINE

## 2021-06-20 PROCEDURE — 700111 HCHG RX REV CODE 636 W/ 250 OVERRIDE (IP): Performed by: EMERGENCY MEDICINE

## 2021-06-20 PROCEDURE — 82803 BLOOD GASES ANY COMBINATION: CPT

## 2021-06-20 PROCEDURE — C9803 HOPD COVID-19 SPEC COLLECT: HCPCS | Performed by: EMERGENCY MEDICINE

## 2021-06-20 PROCEDURE — 96375 TX/PRO/DX INJ NEW DRUG ADDON: CPT

## 2021-06-20 RX ORDER — HYDROMORPHONE HYDROCHLORIDE 1 MG/ML
0.25 INJECTION, SOLUTION INTRAMUSCULAR; INTRAVENOUS; SUBCUTANEOUS
Status: DISCONTINUED | OUTPATIENT
Start: 2021-06-20 | End: 2021-06-21

## 2021-06-20 RX ORDER — ONDANSETRON 2 MG/ML
4 INJECTION INTRAMUSCULAR; INTRAVENOUS EVERY 4 HOURS PRN
Status: DISCONTINUED | OUTPATIENT
Start: 2021-06-20 | End: 2021-06-30 | Stop reason: HOSPADM

## 2021-06-20 RX ORDER — FUROSEMIDE 10 MG/ML
40 INJECTION INTRAMUSCULAR; INTRAVENOUS ONCE
Status: COMPLETED | OUTPATIENT
Start: 2021-06-20 | End: 2021-06-20

## 2021-06-20 RX ORDER — LABETALOL HYDROCHLORIDE 5 MG/ML
10 INJECTION, SOLUTION INTRAVENOUS EVERY 4 HOURS PRN
Status: DISCONTINUED | OUTPATIENT
Start: 2021-06-20 | End: 2021-06-30 | Stop reason: HOSPADM

## 2021-06-20 RX ORDER — ATORVASTATIN CALCIUM 40 MG/1
40 TABLET, FILM COATED ORAL DAILY
Status: DISCONTINUED | OUTPATIENT
Start: 2021-06-21 | End: 2021-06-30 | Stop reason: HOSPADM

## 2021-06-20 RX ORDER — AMOXICILLIN 250 MG
2 CAPSULE ORAL 2 TIMES DAILY
Status: DISCONTINUED | OUTPATIENT
Start: 2021-06-21 | End: 2021-06-30 | Stop reason: HOSPADM

## 2021-06-20 RX ORDER — HEPARIN SODIUM 5000 [USP'U]/ML
5000 INJECTION, SOLUTION INTRAVENOUS; SUBCUTANEOUS EVERY 8 HOURS
Status: DISCONTINUED | OUTPATIENT
Start: 2021-06-21 | End: 2021-06-21

## 2021-06-20 RX ORDER — ATORVASTATIN CALCIUM 20 MG/1
20 TABLET, FILM COATED ORAL EVERY EVENING
COMMUNITY

## 2021-06-20 RX ORDER — BISACODYL 10 MG
10 SUPPOSITORY, RECTAL RECTAL
Status: DISCONTINUED | OUTPATIENT
Start: 2021-06-20 | End: 2021-06-30 | Stop reason: HOSPADM

## 2021-06-20 RX ORDER — POLYETHYLENE GLYCOL 3350 17 G/17G
1 POWDER, FOR SOLUTION ORAL
Status: DISCONTINUED | OUTPATIENT
Start: 2021-06-20 | End: 2021-06-30 | Stop reason: HOSPADM

## 2021-06-20 RX ORDER — OXYCODONE HYDROCHLORIDE 5 MG/1
2.5 TABLET ORAL
Status: DISCONTINUED | OUTPATIENT
Start: 2021-06-20 | End: 2021-06-29

## 2021-06-20 RX ORDER — ENALAPRILAT 1.25 MG/ML
1.25 INJECTION INTRAVENOUS EVERY 6 HOURS PRN
Status: DISCONTINUED | OUTPATIENT
Start: 2021-06-20 | End: 2021-06-21

## 2021-06-20 RX ORDER — HYDROCHLOROTHIAZIDE 25 MG/1
25 TABLET ORAL EVERY MORNING
Status: DISCONTINUED | OUTPATIENT
Start: 2021-06-21 | End: 2021-06-21

## 2021-06-20 RX ORDER — LORAZEPAM 1 MG/1
1 TABLET ORAL ONCE
Status: COMPLETED | OUTPATIENT
Start: 2021-06-21 | End: 2021-06-20

## 2021-06-20 RX ORDER — NITROGLYCERIN 0.4 MG/1
0.4 TABLET SUBLINGUAL
Status: COMPLETED | OUTPATIENT
Start: 2021-06-21 | End: 2021-06-21

## 2021-06-20 RX ORDER — OXYCODONE HYDROCHLORIDE 5 MG/1
5 TABLET ORAL
Status: DISCONTINUED | OUTPATIENT
Start: 2021-06-20 | End: 2021-06-21

## 2021-06-20 RX ORDER — FELODIPINE 10 MG/1
10 TABLET, EXTENDED RELEASE ORAL EVERY EVENING
Status: DISCONTINUED | OUTPATIENT
Start: 2021-06-21 | End: 2021-06-21

## 2021-06-20 RX ORDER — DOXAZOSIN 2 MG/1
8 TABLET ORAL EVERY EVENING
Status: DISCONTINUED | OUTPATIENT
Start: 2021-06-21 | End: 2021-06-27

## 2021-06-20 RX ORDER — ONDANSETRON 4 MG/1
4 TABLET, ORALLY DISINTEGRATING ORAL EVERY 4 HOURS PRN
Status: DISCONTINUED | OUTPATIENT
Start: 2021-06-20 | End: 2021-06-30 | Stop reason: HOSPADM

## 2021-06-20 RX ORDER — DEXTROSE MONOHYDRATE 25 G/50ML
50 INJECTION, SOLUTION INTRAVENOUS
Status: DISCONTINUED | OUTPATIENT
Start: 2021-06-20 | End: 2021-06-21

## 2021-06-20 RX ORDER — FUROSEMIDE 10 MG/ML
40 INJECTION INTRAMUSCULAR; INTRAVENOUS
Status: DISCONTINUED | OUTPATIENT
Start: 2021-06-21 | End: 2021-06-21

## 2021-06-20 RX ORDER — ACETAMINOPHEN 325 MG/1
650 TABLET ORAL EVERY 6 HOURS PRN
Status: DISCONTINUED | OUTPATIENT
Start: 2021-06-20 | End: 2021-06-30 | Stop reason: HOSPADM

## 2021-06-20 RX ADMIN — NITROGLYCERIN 0.4 MG: 0.4 TABLET, ORALLY DISINTEGRATING SUBLINGUAL at 23:54

## 2021-06-20 RX ADMIN — FUROSEMIDE 40 MG: 10 INJECTION, SOLUTION INTRAMUSCULAR; INTRAVENOUS at 22:58

## 2021-06-20 RX ADMIN — LORAZEPAM 1 MG: 1 TABLET ORAL at 23:53

## 2021-06-20 RX ADMIN — CEFTRIAXONE SODIUM 2 G: 10 INJECTION, POWDER, FOR SOLUTION INTRAVENOUS at 22:58

## 2021-06-20 ASSESSMENT — PULMONARY FUNCTION TESTS: EPAP_CMH2O: 8

## 2021-06-20 ASSESSMENT — PAIN DESCRIPTION - PAIN TYPE: TYPE: ACUTE PAIN

## 2021-06-21 ENCOUNTER — APPOINTMENT (OUTPATIENT)
Dept: RADIOLOGY | Facility: MEDICAL CENTER | Age: 71
DRG: 291 | End: 2021-06-21
Attending: STUDENT IN AN ORGANIZED HEALTH CARE EDUCATION/TRAINING PROGRAM
Payer: MEDICARE

## 2021-06-21 ENCOUNTER — APPOINTMENT (OUTPATIENT)
Dept: RADIOLOGY | Facility: MEDICAL CENTER | Age: 71
DRG: 291 | End: 2021-06-21
Attending: INTERNAL MEDICINE
Payer: MEDICARE

## 2021-06-21 PROBLEM — E11.9 TYPE 2 DIABETES MELLITUS (HCC): Status: ACTIVE | Noted: 2017-09-22

## 2021-06-21 PROBLEM — I50.9 ACUTE EXACERBATION OF CHF (CONGESTIVE HEART FAILURE) (HCC): Status: ACTIVE | Noted: 2021-06-21

## 2021-06-21 PROBLEM — R09.89 SUSPECTED PULMONARY EMBOLISM: Status: RESOLVED | Noted: 2017-10-03 | Resolved: 2021-06-21

## 2021-06-21 PROBLEM — N17.9 AKI (ACUTE KIDNEY INJURY) (HCC): Status: ACTIVE | Noted: 2021-06-21

## 2021-06-21 PROBLEM — J96.01 ACUTE RESPIRATORY FAILURE WITH HYPOXIA (HCC): Status: RESOLVED | Noted: 2021-06-21 | Resolved: 2021-06-21

## 2021-06-21 PROBLEM — J96.01 ACUTE RESPIRATORY FAILURE WITH HYPOXIA (HCC): Status: ACTIVE | Noted: 2021-06-21

## 2021-06-21 PROBLEM — D64.9 NORMOCYTIC ANEMIA: Status: ACTIVE | Noted: 2021-06-21

## 2021-06-21 LAB
ABO GROUP BLD: NORMAL
ALBUMIN SERPL BCP-MCNC: 2.5 G/DL (ref 3.2–4.9)
ALBUMIN SERPL BCP-MCNC: 2.6 G/DL (ref 3.2–4.9)
ALBUMIN/GLOB SERPL: 0.8 G/DL
ALBUMIN/GLOB SERPL: 0.9 G/DL
ALP SERPL-CCNC: 60 U/L (ref 30–99)
ALP SERPL-CCNC: 64 U/L (ref 30–99)
ALT SERPL-CCNC: 13 U/L (ref 2–50)
ALT SERPL-CCNC: 16 U/L (ref 2–50)
ANION GAP SERPL CALC-SCNC: 13 MMOL/L (ref 7–16)
ANION GAP SERPL CALC-SCNC: 13 MMOL/L (ref 7–16)
APTT PPP: 31.3 SEC (ref 24.7–36)
AST SERPL-CCNC: 23 U/L (ref 12–45)
AST SERPL-CCNC: 24 U/L (ref 12–45)
BASE EXCESS BLDA CALC-SCNC: -1 MMOL/L (ref -4–3)
BASOPHILS # BLD AUTO: 0.3 % (ref 0–1.8)
BASOPHILS # BLD AUTO: 0.5 % (ref 0–1.8)
BASOPHILS # BLD: 0.02 K/UL (ref 0–0.12)
BASOPHILS # BLD: 0.04 K/UL (ref 0–0.12)
BILIRUB SERPL-MCNC: 0.2 MG/DL (ref 0.1–1.5)
BILIRUB SERPL-MCNC: 0.2 MG/DL (ref 0.1–1.5)
BLD GP AB SCN SERPL QL: NORMAL
BODY TEMPERATURE: ABNORMAL DEGREES
BUN SERPL-MCNC: 58 MG/DL (ref 8–22)
BUN SERPL-MCNC: 59 MG/DL (ref 8–22)
CALCIUM SERPL-MCNC: 7.3 MG/DL (ref 8.5–10.5)
CALCIUM SERPL-MCNC: 7.6 MG/DL (ref 8.5–10.5)
CHLORIDE SERPL-SCNC: 109 MMOL/L (ref 96–112)
CHLORIDE SERPL-SCNC: 109 MMOL/L (ref 96–112)
CO2 BLDA-SCNC: 25 MMOL/L (ref 20–33)
CO2 SERPL-SCNC: 20 MMOL/L (ref 20–33)
CO2 SERPL-SCNC: 21 MMOL/L (ref 20–33)
CREAT SERPL-MCNC: 5.25 MG/DL (ref 0.5–1.4)
CREAT SERPL-MCNC: 5.44 MG/DL (ref 0.5–1.4)
CREAT UR-MCNC: 40.86 MG/DL
DELSYS IDSYS: ABNORMAL
EKG IMPRESSION: NORMAL
EOSINOPHIL # BLD AUTO: 0.1 K/UL (ref 0–0.51)
EOSINOPHIL # BLD AUTO: 0.13 K/UL (ref 0–0.51)
EOSINOPHIL NFR BLD: 1.3 % (ref 0–6.9)
EOSINOPHIL NFR BLD: 1.6 % (ref 0–6.9)
ERYTHROCYTE [DISTWIDTH] IN BLOOD BY AUTOMATED COUNT: 49.1 FL (ref 35.9–50)
ERYTHROCYTE [DISTWIDTH] IN BLOOD BY AUTOMATED COUNT: 50.4 FL (ref 35.9–50)
EST. AVERAGE GLUCOSE BLD GHB EST-MCNC: 105 MG/DL
FERRITIN SERPL-MCNC: 215 NG/ML (ref 22–322)
FLUAV RNA SPEC QL NAA+PROBE: NEGATIVE
FLUBV RNA SPEC QL NAA+PROBE: NEGATIVE
FOLATE SERPL-MCNC: 3.1 NG/ML
GLOBULIN SER CALC-MCNC: 2.8 G/DL (ref 1.9–3.5)
GLOBULIN SER CALC-MCNC: 3.1 G/DL (ref 1.9–3.5)
GLUCOSE BLD-MCNC: 101 MG/DL (ref 65–99)
GLUCOSE SERPL-MCNC: 166 MG/DL (ref 65–99)
GLUCOSE SERPL-MCNC: 96 MG/DL (ref 65–99)
HBA1C MFR BLD: 5.3 % (ref 4–5.6)
HCO3 BLDA-SCNC: 24.1 MMOL/L (ref 17–25)
HCT VFR BLD AUTO: 23.5 % (ref 42–52)
HCT VFR BLD AUTO: 24 % (ref 42–52)
HCT VFR BLD AUTO: 24.1 % (ref 42–52)
HGB BLD-MCNC: 7.4 G/DL (ref 14–18)
HGB BLD-MCNC: 7.9 G/DL (ref 14–18)
HGB BLD-MCNC: 8 G/DL (ref 14–18)
HGB RETIC QN AUTO: 33.7 PG/CELL (ref 29–35)
HOROWITZ INDEX BLDA+IHG-RTO: 73 MM[HG]
IMM GRANULOCYTES # BLD AUTO: 0.02 K/UL (ref 0–0.11)
IMM GRANULOCYTES # BLD AUTO: 0.03 K/UL (ref 0–0.11)
IMM GRANULOCYTES NFR BLD AUTO: 0.3 % (ref 0–0.9)
IMM GRANULOCYTES NFR BLD AUTO: 0.4 % (ref 0–0.9)
IMM RETICS NFR: 13.2 % (ref 9.3–17.4)
INR PPP: 1.34 (ref 0.87–1.13)
IRON SATN MFR SERPL: 12 % (ref 15–55)
IRON SERPL-MCNC: 17 UG/DL (ref 50–180)
LACTATE BLD-SCNC: 1 MMOL/L (ref 0.5–2)
LPM ILPM: 15 LPM
LYMPHOCYTES # BLD AUTO: 0.42 K/UL (ref 1–4.8)
LYMPHOCYTES # BLD AUTO: 0.68 K/UL (ref 1–4.8)
LYMPHOCYTES NFR BLD: 5.3 % (ref 22–41)
LYMPHOCYTES NFR BLD: 8.3 % (ref 22–41)
MAGNESIUM SERPL-MCNC: 1.8 MG/DL (ref 1.5–2.5)
MCH RBC QN AUTO: 30.3 PG (ref 27–33)
MCH RBC QN AUTO: 31.6 PG (ref 27–33)
MCHC RBC AUTO-ENTMCNC: 31.5 G/DL (ref 33.7–35.3)
MCHC RBC AUTO-ENTMCNC: 33.3 G/DL (ref 33.7–35.3)
MCV RBC AUTO: 94.9 FL (ref 81.4–97.8)
MCV RBC AUTO: 96.3 FL (ref 81.4–97.8)
MICROALBUMIN UR-MCNC: 264.8 MG/DL
MICROALBUMIN/CREAT UR: 6481 MG/G (ref 0–30)
MONOCYTES # BLD AUTO: 0.39 K/UL (ref 0–0.85)
MONOCYTES # BLD AUTO: 0.5 K/UL (ref 0–0.85)
MONOCYTES NFR BLD AUTO: 4.9 % (ref 0–13.4)
MONOCYTES NFR BLD AUTO: 6.1 % (ref 0–13.4)
NEUTROPHILS # BLD AUTO: 6.84 K/UL (ref 1.82–7.42)
NEUTROPHILS # BLD AUTO: 7.01 K/UL (ref 1.82–7.42)
NEUTROPHILS NFR BLD: 83.1 % (ref 44–72)
NEUTROPHILS NFR BLD: 87.9 % (ref 44–72)
NRBC # BLD AUTO: 0 K/UL
NRBC # BLD AUTO: 0 K/UL
NRBC BLD-RTO: 0 /100 WBC
NRBC BLD-RTO: 0 /100 WBC
O2/TOTAL GAS SETTING VFR VENT: 100 %
PCO2 BLDA: 40.6 MMHG (ref 26–37)
PCO2 TEMP ADJ BLDA: 40.7 MMHG (ref 26–37)
PH BLDA: 7.38 [PH] (ref 7.4–7.5)
PH TEMP ADJ BLDA: 7.38 [PH] (ref 7.4–7.5)
PLATELET # BLD AUTO: 186 K/UL (ref 164–446)
PLATELET # BLD AUTO: 189 K/UL (ref 164–446)
PMV BLD AUTO: 10.8 FL (ref 9–12.9)
PMV BLD AUTO: 11.7 FL (ref 9–12.9)
PO2 BLDA: 73 MMHG (ref 64–87)
PO2 TEMP ADJ BLDA: 73 MMHG (ref 64–87)
POTASSIUM SERPL-SCNC: 3.3 MMOL/L (ref 3.6–5.5)
POTASSIUM SERPL-SCNC: 3.3 MMOL/L (ref 3.6–5.5)
PROT SERPL-MCNC: 5.4 G/DL (ref 6–8.2)
PROT SERPL-MCNC: 5.6 G/DL (ref 6–8.2)
PROTHROMBIN TIME: 16.2 SEC (ref 12–14.6)
RBC # BLD AUTO: 2.44 M/UL (ref 4.7–6.1)
RBC # BLD AUTO: 2.53 M/UL (ref 4.7–6.1)
RETICS # AUTO: 0.04 M/UL (ref 0.04–0.06)
RETICS/RBC NFR: 1.8 % (ref 0.8–2.1)
RH BLD: NORMAL
RSV RNA SPEC QL NAA+PROBE: NEGATIVE
SAO2 % BLDA: 94 % (ref 93–99)
SARS-COV-2 RNA RESP QL NAA+PROBE: NOTDETECTED
SODIUM SERPL-SCNC: 142 MMOL/L (ref 135–145)
SODIUM SERPL-SCNC: 143 MMOL/L (ref 135–145)
SPECIMEN DRAWN FROM PATIENT: ABNORMAL
SPECIMEN SOURCE: NORMAL
TIBC SERPL-MCNC: 141 UG/DL (ref 250–450)
TROPONIN T SERPL-MCNC: 298 NG/L (ref 6–19)
TROPONIN T SERPL-MCNC: 301 NG/L (ref 6–19)
TROPONIN T SERPL-MCNC: 313 NG/L (ref 6–19)
TSH SERPL DL<=0.005 MIU/L-ACNC: 3.35 UIU/ML (ref 0.38–5.33)
UFH PPP CHRO-ACNC: 0.87 IU/ML
UFH PPP CHRO-ACNC: <0.1 IU/ML
UIBC SERPL-MCNC: 124 UG/DL (ref 110–370)
VIT B12 SERPL-MCNC: 502 PG/ML (ref 211–911)
WBC # BLD AUTO: 8 K/UL (ref 4.8–10.8)
WBC # BLD AUTO: 8.2 K/UL (ref 4.8–10.8)

## 2021-06-21 PROCEDURE — 86900 BLOOD TYPING SEROLOGIC ABO: CPT

## 2021-06-21 PROCEDURE — 82728 ASSAY OF FERRITIN: CPT

## 2021-06-21 PROCEDURE — 85025 COMPLETE CBC W/AUTO DIFF WBC: CPT

## 2021-06-21 PROCEDURE — 76775 US EXAM ABDO BACK WALL LIM: CPT

## 2021-06-21 PROCEDURE — 82607 VITAMIN B-12: CPT

## 2021-06-21 PROCEDURE — 82570 ASSAY OF URINE CREATININE: CPT

## 2021-06-21 PROCEDURE — 93005 ELECTROCARDIOGRAM TRACING: CPT | Performed by: INTERNAL MEDICINE

## 2021-06-21 PROCEDURE — 83605 ASSAY OF LACTIC ACID: CPT

## 2021-06-21 PROCEDURE — 86901 BLOOD TYPING SEROLOGIC RH(D): CPT

## 2021-06-21 PROCEDURE — 700102 HCHG RX REV CODE 250 W/ 637 OVERRIDE(OP): Performed by: EMERGENCY MEDICINE

## 2021-06-21 PROCEDURE — 84484 ASSAY OF TROPONIN QUANT: CPT

## 2021-06-21 PROCEDURE — 770020 HCHG ROOM/CARE - TELE (206)

## 2021-06-21 PROCEDURE — 700102 HCHG RX REV CODE 250 W/ 637 OVERRIDE(OP): Performed by: STUDENT IN AN ORGANIZED HEALTH CARE EDUCATION/TRAINING PROGRAM

## 2021-06-21 PROCEDURE — A9270 NON-COVERED ITEM OR SERVICE: HCPCS | Performed by: INTERNAL MEDICINE

## 2021-06-21 PROCEDURE — 82803 BLOOD GASES ANY COMBINATION: CPT

## 2021-06-21 PROCEDURE — 83550 IRON BINDING TEST: CPT

## 2021-06-21 PROCEDURE — 700102 HCHG RX REV CODE 250 W/ 637 OVERRIDE(OP): Performed by: INTERNAL MEDICINE

## 2021-06-21 PROCEDURE — 82746 ASSAY OF FOLIC ACID SERUM: CPT

## 2021-06-21 PROCEDURE — 83036 HEMOGLOBIN GLYCOSYLATED A1C: CPT

## 2021-06-21 PROCEDURE — 85730 THROMBOPLASTIN TIME PARTIAL: CPT

## 2021-06-21 PROCEDURE — 81001 URINALYSIS AUTO W/SCOPE: CPT

## 2021-06-21 PROCEDURE — A9270 NON-COVERED ITEM OR SERVICE: HCPCS | Performed by: STUDENT IN AN ORGANIZED HEALTH CARE EDUCATION/TRAINING PROGRAM

## 2021-06-21 PROCEDURE — 84443 ASSAY THYROID STIM HORMONE: CPT

## 2021-06-21 PROCEDURE — 51798 US URINE CAPACITY MEASURE: CPT

## 2021-06-21 PROCEDURE — 700111 HCHG RX REV CODE 636 W/ 250 OVERRIDE (IP): Performed by: STUDENT IN AN ORGANIZED HEALTH CARE EDUCATION/TRAINING PROGRAM

## 2021-06-21 PROCEDURE — 83540 ASSAY OF IRON: CPT

## 2021-06-21 PROCEDURE — 85046 RETICYTE/HGB CONCENTRATE: CPT

## 2021-06-21 PROCEDURE — 85014 HEMATOCRIT: CPT

## 2021-06-21 PROCEDURE — 99233 SBSQ HOSP IP/OBS HIGH 50: CPT | Mod: GC | Performed by: HOSPITALIST

## 2021-06-21 PROCEDURE — 700105 HCHG RX REV CODE 258: Performed by: STUDENT IN AN ORGANIZED HEALTH CARE EDUCATION/TRAINING PROGRAM

## 2021-06-21 PROCEDURE — A9270 NON-COVERED ITEM OR SERVICE: HCPCS | Performed by: EMERGENCY MEDICINE

## 2021-06-21 PROCEDURE — 85610 PROTHROMBIN TIME: CPT

## 2021-06-21 PROCEDURE — 93010 ELECTROCARDIOGRAM REPORT: CPT | Performed by: INTERNAL MEDICINE

## 2021-06-21 PROCEDURE — 5A1D70Z PERFORMANCE OF URINARY FILTRATION, INTERMITTENT, LESS THAN 6 HOURS PER DAY: ICD-10-PCS | Performed by: INTERNAL MEDICINE

## 2021-06-21 PROCEDURE — 71045 X-RAY EXAM CHEST 1 VIEW: CPT

## 2021-06-21 PROCEDURE — 85520 HEPARIN ASSAY: CPT

## 2021-06-21 PROCEDURE — 82962 GLUCOSE BLOOD TEST: CPT

## 2021-06-21 PROCEDURE — 82043 UR ALBUMIN QUANTITATIVE: CPT

## 2021-06-21 PROCEDURE — 83735 ASSAY OF MAGNESIUM: CPT

## 2021-06-21 PROCEDURE — 85018 HEMOGLOBIN: CPT

## 2021-06-21 PROCEDURE — 36415 COLL VENOUS BLD VENIPUNCTURE: CPT

## 2021-06-21 PROCEDURE — 80053 COMPREHEN METABOLIC PANEL: CPT

## 2021-06-21 PROCEDURE — 93970 EXTREMITY STUDY: CPT

## 2021-06-21 PROCEDURE — 86850 RBC ANTIBODY SCREEN: CPT

## 2021-06-21 PROCEDURE — 93970 EXTREMITY STUDY: CPT | Mod: 26 | Performed by: INTERNAL MEDICINE

## 2021-06-21 RX ORDER — HEPARIN SODIUM 5000 [USP'U]/100ML
0-30 INJECTION, SOLUTION INTRAVENOUS CONTINUOUS
Status: DISCONTINUED | OUTPATIENT
Start: 2021-06-21 | End: 2021-06-21

## 2021-06-21 RX ORDER — FUROSEMIDE 10 MG/ML
80 INJECTION INTRAMUSCULAR; INTRAVENOUS
Status: DISCONTINUED | OUTPATIENT
Start: 2021-06-21 | End: 2021-06-27

## 2021-06-21 RX ORDER — FOLIC ACID 1 MG/1
1 TABLET ORAL DAILY
Status: DISCONTINUED | OUTPATIENT
Start: 2021-06-21 | End: 2021-06-30 | Stop reason: HOSPADM

## 2021-06-21 RX ORDER — POTASSIUM CHLORIDE 20 MEQ/1
20 TABLET, EXTENDED RELEASE ORAL ONCE
Status: COMPLETED | OUTPATIENT
Start: 2021-06-21 | End: 2021-06-21

## 2021-06-21 RX ORDER — HEPARIN SODIUM 1000 [USP'U]/ML
40 INJECTION, SOLUTION INTRAVENOUS; SUBCUTANEOUS PRN
Status: DISCONTINUED | OUTPATIENT
Start: 2021-06-21 | End: 2021-06-21

## 2021-06-21 RX ORDER — HEPARIN SODIUM 1000 [USP'U]/ML
80 INJECTION, SOLUTION INTRAVENOUS; SUBCUTANEOUS ONCE
Status: COMPLETED | OUTPATIENT
Start: 2021-06-21 | End: 2021-06-21

## 2021-06-21 RX ORDER — METOLAZONE 2.5 MG/1
2.5 TABLET ORAL ONCE
Status: COMPLETED | OUTPATIENT
Start: 2021-06-21 | End: 2021-06-21

## 2021-06-21 RX ADMIN — DOCUSATE SODIUM 50 MG AND SENNOSIDES 8.6 MG 2 TABLET: 8.6; 5 TABLET, FILM COATED ORAL at 16:37

## 2021-06-21 RX ADMIN — ATORVASTATIN CALCIUM 40 MG: 40 TABLET, FILM COATED ORAL at 05:49

## 2021-06-21 RX ADMIN — FUROSEMIDE 80 MG: 10 INJECTION, SOLUTION INTRAMUSCULAR; INTRAVENOUS at 16:36

## 2021-06-21 RX ADMIN — ENALAPRILAT 1.25 MG: 1.25 INJECTION INTRAVENOUS at 01:56

## 2021-06-21 RX ADMIN — HYDROCHLOROTHIAZIDE 25 MG: 25 TABLET ORAL at 05:49

## 2021-06-21 RX ADMIN — METOLAZONE 2.5 MG: 2.5 TABLET ORAL at 22:15

## 2021-06-21 RX ADMIN — HEPARIN SODIUM 18 UNITS/KG/HR: 5000 INJECTION, SOLUTION INTRAVENOUS at 02:20

## 2021-06-21 RX ADMIN — SODIUM CHLORIDE 125 MG: 9 INJECTION, SOLUTION INTRAVENOUS at 17:52

## 2021-06-21 RX ADMIN — POTASSIUM CHLORIDE 20 MEQ: 1500 TABLET, EXTENDED RELEASE ORAL at 16:37

## 2021-06-21 RX ADMIN — DOXAZOSIN 8 MG: 2 TABLET ORAL at 02:34

## 2021-06-21 RX ADMIN — FOLIC ACID 1 MG: 1 TABLET ORAL at 09:35

## 2021-06-21 RX ADMIN — FELODIPINE 10 MG: 10 TABLET, EXTENDED RELEASE ORAL at 16:37

## 2021-06-21 RX ADMIN — NITROGLYCERIN 0.4 MG: 0.4 TABLET, ORALLY DISINTEGRATING SUBLINGUAL at 00:53

## 2021-06-21 RX ADMIN — NITROGLYCERIN 0.5 INCH: 20 OINTMENT TOPICAL at 02:36

## 2021-06-21 RX ADMIN — HEPARIN SODIUM 6500 UNITS: 1000 INJECTION, SOLUTION INTRAVENOUS; SUBCUTANEOUS at 02:20

## 2021-06-21 RX ADMIN — DOCUSATE SODIUM 50 MG AND SENNOSIDES 8.6 MG 2 TABLET: 8.6; 5 TABLET, FILM COATED ORAL at 05:49

## 2021-06-21 RX ADMIN — FUROSEMIDE 40 MG: 10 INJECTION, SOLUTION INTRAMUSCULAR; INTRAVENOUS at 05:49

## 2021-06-21 RX ADMIN — NITROGLYCERIN 0.4 MG: 0.4 TABLET, ORALLY DISINTEGRATING SUBLINGUAL at 00:25

## 2021-06-21 RX ADMIN — DOXAZOSIN 8 MG: 2 TABLET ORAL at 16:37

## 2021-06-21 RX ADMIN — ASPIRIN 81 MG: 81 TABLET, COATED ORAL at 05:49

## 2021-06-21 RX ADMIN — FELODIPINE 10 MG: 10 TABLET, EXTENDED RELEASE ORAL at 02:34

## 2021-06-21 ASSESSMENT — ENCOUNTER SYMPTOMS
FEVER: 0
DIARRHEA: 0
WHEEZING: 0
SORE THROAT: 0
BLURRED VISION: 0
BLOOD IN STOOL: 0
INSOMNIA: 0
DIZZINESS: 1
CONSTIPATION: 0
NECK PAIN: 0
LOSS OF CONSCIOUSNESS: 0
HEADACHES: 0
ABDOMINAL PAIN: 0
COUGH: 0
CHILLS: 0
WEAKNESS: 0
FOCAL WEAKNESS: 0
DOUBLE VISION: 0
HEARTBURN: 0
PALPITATIONS: 0
VOMITING: 0
SPUTUM PRODUCTION: 0
NAUSEA: 0
BRUISES/BLEEDS EASILY: 0
ORTHOPNEA: 1
BACK PAIN: 0
DEPRESSION: 0
PND: 1
SHORTNESS OF BREATH: 1

## 2021-06-21 ASSESSMENT — PAIN DESCRIPTION - PAIN TYPE: TYPE: ACUTE PAIN

## 2021-06-21 ASSESSMENT — PATIENT HEALTH QUESTIONNAIRE - PHQ9
SUM OF ALL RESPONSES TO PHQ9 QUESTIONS 1 AND 2: 0
2. FEELING DOWN, DEPRESSED, IRRITABLE, OR HOPELESS: NOT AT ALL
1. LITTLE INTEREST OR PLEASURE IN DOING THINGS: NOT AT ALL

## 2021-06-21 ASSESSMENT — LIFESTYLE VARIABLES
HOW MANY TIMES IN THE PAST YEAR HAVE YOU HAD 5 OR MORE DRINKS IN A DAY: 0
ALCOHOL_USE: NO
HAVE PEOPLE ANNOYED YOU BY CRITICIZING YOUR DRINKING: NO
DOES PATIENT WANT TO STOP DRINKING: NO
EVER HAD A DRINK FIRST THING IN THE MORNING TO STEADY YOUR NERVES TO GET RID OF A HANGOVER: NO
TOTAL SCORE: 0
EVER FELT BAD OR GUILTY ABOUT YOUR DRINKING: NO
AVERAGE NUMBER OF DAYS PER WEEK YOU HAVE A DRINK CONTAINING ALCOHOL: 0
HAVE YOU EVER FELT YOU SHOULD CUT DOWN ON YOUR DRINKING: NO
CONSUMPTION TOTAL: NEGATIVE
TOTAL SCORE: 0
ON A TYPICAL DAY WHEN YOU DRINK ALCOHOL HOW MANY DRINKS DO YOU HAVE: 0
TOTAL SCORE: 0

## 2021-06-21 ASSESSMENT — COGNITIVE AND FUNCTIONAL STATUS - GENERAL
HELP NEEDED FOR BATHING: A LITTLE
STANDING UP FROM CHAIR USING ARMS: A LITTLE
WALKING IN HOSPITAL ROOM: A LITTLE
MOBILITY SCORE: 19
MOVING FROM LYING ON BACK TO SITTING ON SIDE OF FLAT BED: A LITTLE
DAILY ACTIVITIY SCORE: 22
SUGGESTED CMS G CODE MODIFIER MOBILITY: CK
CLIMB 3 TO 5 STEPS WITH RAILING: A LITTLE
DRESSING REGULAR LOWER BODY CLOTHING: A LITTLE
SUGGESTED CMS G CODE MODIFIER DAILY ACTIVITY: CJ
MOVING TO AND FROM BED TO CHAIR: A LITTLE

## 2021-06-21 ASSESSMENT — FIBROSIS 4 INDEX
FIB4 SCORE: 2.222222222222222222
FIB4 SCORE: 2.23

## 2021-06-21 NOTE — PROGRESS NOTES
4 Eyes Skin Assessment Completed by PRATIBHA Torres and PRATIBHA Patel.    Head WDL  Ears Redness and Blanching  Nose WDL  Mouth WDL  Neck WDL  Breast/Chest WDL  Shoulder Blades WDL  Spine WDL  (R) Arm/Elbow/Hand Redness and Blanching, skin tear to upper right arm and scattered scabbing  (L) Arm/Elbow/Hand Redness and Blanching  Abdomen Redness and Blanching, bruising to left flank (picture in patient chart)  Groin WDL  Scrotum/Coccyx/Buttocks Redness and Blanching (slow tp judit)  (R) Leg Redness and Blanching  (L) Leg Redness and Blanching, wound to top of left foot (appears healed picture in chart), scratches to left shin (picture in chart)  (R) Heel/Foot/Toe Redness, Blanching and Boggy  (L) Heel/Foot/Toe Redness, Blanching and Boggy          Devices In Places Tele Box and Nasal Cannula      Interventions In Place Gray Ear Foams, NC W/Ear Foams, Pillows, Barrier Cream and Pressure Redistribution Mattress    Possible Skin Injury Yes    Pictures Uploaded Into Epic Yes  Wound Consult Placed Yes  RN Wound Prevention Protocol Ordered Yes

## 2021-06-21 NOTE — ASSESSMENT & PLAN NOTE
Presents with acute shortness of breath, peripheral edema, BNP elevation. No oxygen supplementation at home, was requiring up to 10L on ambulation. Possibly ischemic (previous CVA hx), possibly related to past HTN/DM2 though both relatively well controlled.  -cardiac diet, 1.5L restriction  -Lasix 80mg IV BID diuretic  -Daily standing weights  -No blocker in acute heart failure  -no lisinopril per renal failure  -STOP felodipine per heart failure

## 2021-06-21 NOTE — ASSESSMENT & PLAN NOTE
Presents in acute renal failure, previous baseline Cr estimate 1.3. Suspected due to cardiorenal syndrome versus diabetic nephropathy. Hepatitis negative, elevated ESR/CRP, with mildly low C3, normal C4. Albumin/Cr urine random 6g/D. Improvement of hypervolemia, hypoxia post diaysis.  -Nephrology following  -Dialysis per nephrology   -STOPPED IV lasix 80 mg BID   -gabapentin 100mg PO TID (extremity tingling, at max renal dose)

## 2021-06-21 NOTE — ASSESSMENT & PLAN NOTE
Elevate troponin, stable at 313. Likely elevation due to heart and renal failure. Unable to do V/Q or CTPE due to abnormal CXR and renal failure, US for DVT negative, wells of 1.5. EKG sinus tachy.

## 2021-06-21 NOTE — ED NOTES
Med rec updated and complete. Per interview with pt at bedside.  Pt reports tat he is not currently on HUMALOG and only uses the LEVIMIR .      HOME PHARMACY  [facility] [phone]      No current facility-administered medications on file prior to encounter.     Current Outpatient Medications on File Prior to Encounter   Medication Sig Dispense Refill   • atorvastatin (LIPITOR) 40 MG Tab Take 40 mg by mouth every day.     • insulin detemir (LEVEMIR) 100 UNIT/ML Solution Pen-injector injection Inject 20 Units as instructed every evening. 3 PEN 3   • aspirin EC (ECOTRIN) 81 MG Tablet Delayed Response Take 81 mg by mouth every morning.     • hydrochlorothiazide (HYDRODIURIL) 25 MG Tab Take 25 mg by mouth every morning.     • lisinopril (PRINIVIL, ZESTRIL) 40 MG tablet Take 40 mg by mouth every day. Indications: High Blood Pressure Disorder     • felodipine (PLENDIL) 10 MG TABLET SR 24 HR Take 10 mg by mouth every evening. Indications: High Blood Pressure Disorder     • doxazosin (CARDURA) 8 MG tablet Take 8 mg by mouth every evening.

## 2021-06-21 NOTE — ASSESSMENT & PLAN NOTE
Hx of HTN. Now with orthostatic hypotension.  -STOP hydralazine 10mg PO TID  -STOP Isordil 5mg PO TID  -STOP lisinopril 5mg PO qD  -AM cortisol level

## 2021-06-21 NOTE — ASSESSMENT & PLAN NOTE
Presented with progressively worsening SOB, b/l swelling and orthopnea. CXR showed pulmonary edema, BNP elevated. ECHO showed EF 60%, grade II diastolic dysfunction, Ao stenosis , RVSP 70. Likely etiology is Ao stenosis, as low likelihood of PE (wells 1.5, DVT US negative).   -Nephrology following   -STOP Lasix 80mg IV BID diuretic  -requested MWF dialysis schedule  -daily weights , fluid restriction   -titrate O2 supplements to >90%  -Outpatient cardiology follow up  for TAVR

## 2021-06-21 NOTE — PROGRESS NOTES
Assumed care of pt. Bedside report received from night RN Chrissy. Pt was updated on plan of care. Call light, phone and personal belongings within reach. Bed locked in lowest position, 2 side rails up, bed alarm on and working appropriately. Heparin verified. Hep xa draw scheduled for 0820

## 2021-06-21 NOTE — CONSULTS
[facility] -  CONSULTATION NOTE               Author: Nayla Gan M.D. Date & Time: 6/21/2021  9:34 AM       REASON FOR CONSULTATION:   - Evaluate and Manage Elevated Creatinine    CHIEF COMPLAINT:   -  Shortness of breath    HISTORY OF PRESENT ILLNESS:    70yoM with PMH significant for HTN, DM II, Hyperlipidemia, Seizure Disorder admitted with complaints of worsening SOB over the past 3 days and worsening bilateral LE edema for the past 1 week and in the ER found to have elevated Creatinine of . Pt reports that he has been getting progressively more SOB over the past 3 days and his LE edema has been worsening over the past 1 week so he came to the ED for eval. In the ED Cr was 5.3 on admission and pt reports that he has never been told of abnormal renal function in the past. His CXR showed pulm edema and there was some concern for DVT or PE but due to his elevated Creatinine he did not undergo a CTA. Bilateral lower extremity dopplers have been ordered. Pt was admitted and started on lasix 40mg IV BID as well as hydrochlorothiazide. He reports that his SOB is a little better this but he is still requiring face mask supplemental O2. He reports he is urinating more with the lasix but overnight only minimal UOP documented.   No F/C/N/V/CP, +SOB and +LE Edema, No melena, hematochezia, hematemesis.  No HA, visual changes, or abdominal pain.    REVIEW OF SYSTEMS:    10 point ROS was performed and is as per HPI or otherwise negative    PAST MEDICAL HISTORY:   - HTN  - DM II--at least 20yrs  - Hyperlipidemia  - Seizure Disorder    PAST SURGICAL HISTORY:   - Cataract Surgery    FAMILY HISTORY:   - No family history of kidney disease or relatives on dialysis    SOCIAL HISTORY:   - Remote smoking, quit 36yrs ago  - No EtOH  - No illicits    HOME MEDICATIONS:   - Reviewed and documented in chart    LABORATORY STUDIES:   - Reviewed and documented in chart    ALLERGIES:  Penicillin g    VS:  BP  "122/74   Pulse 96   Temp 36.1 °C (96.9 °F) (Temporal)   Resp 18   Ht 1.727 m (5' 8\")   Wt 85 kg (187 lb 6.3 oz)   SpO2 90%   BMI 28.49 kg/m²   Physical Exam  Constitutional:       Appearance: Normal appearance. He is ill-appearing.   HENT:      Head: Normocephalic and atraumatic.      Right Ear: External ear normal.      Left Ear: External ear normal.      Nose: Nose normal.      Mouth/Throat:      Mouth: Mucous membranes are moist.      Pharynx: Oropharynx is clear.   Eyes:      General: No scleral icterus.     Extraocular Movements: Extraocular movements intact.      Conjunctiva/sclera: Conjunctivae normal.   Cardiovascular:      Rate and Rhythm: Normal rate and regular rhythm.      Heart sounds: Normal heart sounds. No murmur heard.     Pulmonary:      Effort: Pulmonary effort is normal. No respiratory distress.      Breath sounds: Examination of the right-lower field reveals decreased breath sounds. Examination of the left-lower field reveals decreased breath sounds. Decreased breath sounds and rhonchi present.   Abdominal:      General: Bowel sounds are normal.      Palpations: Abdomen is soft.      Tenderness: There is no abdominal tenderness.   Musculoskeletal:         General: Swelling present. No deformity.      Cervical back: Normal range of motion and neck supple.      Right lower leg: Edema present.      Left lower leg: Edema present.   Skin:     General: Skin is warm and dry.   Neurological:      General: No focal deficit present.      Mental Status: He is alert and oriented to person, place, and time.   Psychiatric:         Mood and Affect: Mood normal.         Behavior: Behavior normal.         FLUID BALANCE:  In: 240 [P.O.:240]  Out: -     LABS:  Recent Labs     06/20/21  2111 06/21/21  0220   SODIUM 142 142   POTASSIUM 3.1* 3.3*   CHLORIDE 109 109   CO2 21 20   GLUCOSE 106* 96   BUN 60* 58*   CREATININE 5.30* 5.25*   CALCIUM 7.5* 7.3*        IMAGING:  - Imaging studies reviewed by " me    IMPRESSION:  # FERNANDO--pt denies any prior history of CKD but review of labs indicates Cr ~1.4 in 2017, unclear recent baseline  # Acute Hypoxic Respiratory Failure--pulm edema on CXR  --Some suspicion for PE per primary svc and IV heparin drip started  --Started on IV lasix, UOP not clearly documented overnight  # HTN--BP controlled  # DM II--management per primary svc  # LE Edema--IV lasix ordered  # Anemia--?related to CKD, iron sat low  # Hypokalemia  # Elevated Troponin--being monitored  # Left Foot and Right Bicep Wound--wound care      PLAN:  - No compelling indication for RRT but monitor closely and may need to initiate in next 24-48hrs  - I did discuss the risks/benefits of dialysis with patient and his son at bedside and they are agreeable to proceeding when/if indicated  - On heparin drip per primary svc  - Increase lasix to 80mg BID  - DC Hydrochlorothiazide given low GFR  - Record strict I/O's  - Dose adjust all meds for decreased GFR  - Avoid IV contrast/nephrotoxins/NSAIDs  - Check UA and urine sodium and urine urea nitrogen and urine creatinine  - Check renal ultrasound  - Avoid ACE-I/ARB for now  - Will order potassium replacement  - Continue supplemental O2  - Daily evaluation for RRT needs    **Discussed above with son and niece at bedside      Thank you for the consultation!

## 2021-06-21 NOTE — DISCHARGE PLANNING
Anticipated Discharge Disposition: Home    Action: Per IDT rounds pt pending medical clearance, likely in 2-3 days. Pt currently on 5liters O2, will need to wean or need a home O2 assessment.    Barriers to Discharge: Medical clearance, O2 needs    Plan: f/u with medical team and pt to discuss dc needs and barriers

## 2021-06-21 NOTE — CARE PLAN
The patient is Watcher - Medium risk of patient condition declining or worsening       Problem: Knowledge Deficit - Standard  Goal: Patient and family/care givers will demonstrate understanding of plan of care, disease process/condition, diagnostic tests and medications  Outcome: Progressing  Note:   Patient will demonstrate understanding of care plan and disease process/condition, throughout shift.       Problem: Skin Integrity  Goal: Skin integrity is maintained or improved  Outcome: Progressing  Note: Patient has increased risk for impaired skin integrity and pressure ulcers, precautions in place to prevent new skin injury. patient will remain free from new skin injury's during shift.

## 2021-06-21 NOTE — CARE PLAN
Problem: Knowledge Deficit - Standard  Goal: Patient and family/care givers will demonstrate understanding of plan of care, disease process/condition, diagnostic tests and medications  Outcome: Progressing  Note: Pt educated regarding plan of care and medications. All questions answered.        Problem: Fall Risk  Goal: Patient will remain free from falls  Outcome: Progressing  Note: Fall precautions in place. Bed in lowest position. Non-skid socks in place. Personal possessions within reach. Mobility sign on door. Bed-alarm on. Call light within reach. Pt educated regarding fall prevention and states understanding.      The patient is Stable - Low risk of patient condition declining or worsening         Progress made toward(s) clinical / shift goals:  progressing    Patient is not progressing towards the following goals:

## 2021-06-21 NOTE — PROGRESS NOTES
Daily Progress Note:     Date of Service: 6/21/2021  Primary Team: [facility] IM White Team   Attending: Donell Gunn M.D.   Senior Resident: Dr. Ang  Intern: Dr. Brewer  Contact:  [phone]    Chief Complaint:   Heart failure, renal failure, hypoxic respiratory failure    Subjective:   Admitted 6/20 for heart failure, renal failure, hypoxic respiratory failure (no home O2, now on 5L). Overnight felt somewhat improved on shortness of breath.  Today: Improved shortness of breath. Denies CP/palpitations, nausea/vomiting; hematemesis, hematuria, hematochezia/melena.      Consultants/Specialty:  Nephrology    Review of Systems:    Review of Systems   Constitutional: Negative for chills and fever.   Respiratory: Positive for shortness of breath. Negative for cough and sputum production.    Cardiovascular: Positive for orthopnea and leg swelling. Negative for chest pain.   Gastrointestinal: Negative for abdominal pain, blood in stool, melena, nausea and vomiting.   Genitourinary: Negative for dysuria, frequency, hematuria and urgency.   Neurological: Positive for dizziness. Negative for focal weakness, weakness and headaches.       Objective Data:   Physical Exam:   Vitals:   Temp:  [36.1 °C (97 °F)-37.1 °C (98.8 °F)] 36.2 °C (97.2 °F)  Pulse:  [] 101  Resp:  [19-42] 19  BP: (140-198)/() 140/93  SpO2:  [76 %-97 %] 94 %     Physical Exam  Vitals and nursing note reviewed.   Constitutional:       General: He is not in acute distress.     Appearance: He is not ill-appearing or diaphoretic.   HENT:      Head: Normocephalic and atraumatic.      Mouth/Throat:      Mouth: Mucous membranes are moist.      Pharynx: Oropharynx is clear.   Eyes:      Extraocular Movements: Extraocular movements intact.      Pupils: Pupils are equal, round, and reactive to light.   Cardiovascular:      Rate and Rhythm: Normal rate and regular rhythm.      Heart sounds: No murmur heard.   No friction rub. No gallop.    Pulmonary:       Comments: Bibasilar crackles, no wheeze.  Abdominal:      General: There is no distension.      Palpations: Abdomen is soft.      Tenderness: There is no abdominal tenderness. There is no guarding or rebound.   Skin:     General: Skin is warm and dry.   Neurological:      General: No focal deficit present.      Mental Status: He is alert and oriented to person, place, and time.      Cranial Nerves: No cranial nerve deficit.      Motor: No weakness.   Psychiatric:         Mood and Affect: Mood normal.         Behavior: Behavior normal.         Thought Content: Thought content normal.         Judgment: Judgment normal.     interval exam: rectal: no external hemorrhoids, no obvious bleeding. Formed stool in rectal vault without masses. Rectal tone present and normal. Guaiac negative.      Labs:   Lab Results   Component Value Date/Time    SODIUM 142 06/21/2021 02:20 AM    POTASSIUM 3.3 (L) 06/21/2021 02:20 AM    CHLORIDE 109 06/21/2021 02:20 AM    CO2 20 06/21/2021 02:20 AM    GLUCOSE 96 06/21/2021 02:20 AM    BUN 58 (H) 06/21/2021 02:20 AM    CREATININE 5.25 (HH) 06/21/2021 02:20 AM        Recent Labs     06/20/21  2111 06/21/21  0220   WBC 9.8 8.2   RBC 2.53* 2.44*   HEMOGLOBIN 8.0* 7.4*   HEMATOCRIT 24.0* 23.5*   MCV 94.9 96.3   MCH 31.6 30.3   RDW 49.9 50.4*   PLATELETCT 199 189   MPV 11.2 11.7   NEUTSPOLYS 78.70* 83.10*   LYMPHOCYTES 10.30* 8.30*   MONOCYTES 6.50 6.10   EOSINOPHILS 3.60 1.60   BASOPHILS 0.50 0.50       Imaging:   RUE US DVT:   No evidence of superficial or deep venous thrombosis in RUE.  DVT US BLE:   Normal bilateral superficial and deep venous examination of the lower    extremities.    Jean Barboza is a 71yo M with hx of CVA, seizure, HLD/HTN, DM2 (5.3 A1c); presenting for acute shortness of breath of 2 days; admitted 6/20 for heart failure exacerbation, hypoxic respiratory failure, suspected cardiorenal syndrome, undergoing diuresis; PE low likelihood (wells 1.5, negative DVT US).     * Acute  exacerbation of CHF (congestive heart failure) (HCC)- (present on admission)  Assessment & Plan  Presents with acute shortness of breath, peripheral edema, BNP elevation. No oxygen supplementation at home, was requiring up to 10L on ambulation. Possibly ischemic (previous CVA hx), possibly related to past HTN/DM2 though both relatively well controlled.  -cardiac diet, 1.5L restriction  -Lasix 80mg IV BID diuretic  -Daily standing weights  -No blocker in acute heart failure  -no lisinopril per renal failure  -STOP felodipine per heart failure    Acute renal failure (HCC)- (present on admission)  Assessment & Plan  Presents in acute renal failure, previous baseline Cr estimate 1.3. Suspected due to cardiorenal syndrome type 1, 3. Does not appear to be in florid heart failure, but may acute exacerbation may lead to type 1. No apparent thromboembolic events, so type 3 somewhat less likely.  -Lasix 80mg IV BID  -albumin/Cr urine random  -nephrology following      Normocytic anemia- (present on admission)  Assessment & Plan  Normocytic anaemia, Fe studies indicative of Fe deficiency, though may have complicating chronic disease. Denies any hematochezia/hematuria/hematemesis/melena. Low Folate with normal B12/TSH. Acute Hgb drop to 7.4 without identified source.  -rectal exam, to be added to addenda  -Fe gluconate IV  -Type/screen  -H&H q12h  -Transfuse <7g/dL    Acute respiratory failure with hypoxia (HCC)  Assessment & Plan  Requiring 5-10L to maintain saturations greater than 90%. PE less likely with WELLS of 1.5, DVT US negative. Unable to do V/Q or CTPE due to ongoing acuity of renal injury. Most likely due to heart/renal failure.  -STOPPED heparin gtt  -Lasix 80mg IV BID diuretic  -titrate O2 supplements to >90%    Hypokalemia- (present on admission)  Assessment & Plan  -replete as indicated    Hypertension- (present on admission)  Assessment & Plan  Hx of HTN, appears well controlled.  -Lasix 80mg IV BID  diuretic  -STOPPED felodipine 10mg (heart failure)  -STOPPED lisinopril 20mg (renal failure)  -Labetalol 10mg PRN    Elevated troponin- (present on admission)  Assessment & Plan  Elevate troponin, stable at 313. Likely elevation due to heart and renal failure. Unable to do V/Q or CTPE due to abnormal CXR and renal failure, US for DVT negative, wells of 1.5. EKG sinus tachy.        Type 2 diabetes mellitus (HCC)- (present on admission)  Assessment & Plan  Hx of DM2, admission A1c of 5.3, previously on insulin.  -PCP follow up

## 2021-06-21 NOTE — H&P
Hospital Medicine History & Physical Note    Date of Service  6/20/2021    Primary Care Physician  Antione Coley M.D.    Consultants  None    Code Status  Full Code    Chief Complaint  Chief Complaint   Patient presents with   • Shortness of Breath       History of Presenting Illness  70 y.o. male who presented 6/20/2021 with  complaints of acute dyspnea, orthopnea, paroxysmal nocturnal dyspnea, bilateral lower extremity swelling and dyspnea on exertion.  Patient states that the symptoms have progressed over the past few days.  He denies any fevers or chills.  He mentions that he has a blister on his right bicep and left dorsum of foot which is unknown to him.  He denies any purulent discharge from these blisters and states that his right bicep blister just popped today.  He states he is compliant with his home medication regiment and unsure why he is in heart failure exacerbation.  He does not wear oxygen at home and required BiPAP upon presentation to ED and subsequently transition to nasal cannula.  Otherwise denies at time of evaluation: Substernal chest pain, cough with sputum production, hemoptysis, nausea, vomiting, fever, chills, anosmia, ageusia, diaphoresis, hematemesis, constipation, diarrhea, melena, hematochezia, dysuria, hematuria, incoordination, vertigo, syncope, visual changes.    Vital signs admission were as follows: 98.8, 119, 20, 183/83, 94% 4 L nasal cannula.  He was noted to saturate 76% requiring BiPAP and subsequently back to nasal cannula.    Twelve-lead EKG reveals sinus tachycardia with what appears to be ventricular strain pattern and anterior leads.  QTc within normal limits and no ST segment changes.    Labs were obtained on admission and unfortunately CTA unable to be performed as elevated D-dimer of 1.85 was noted.  He does have FERNANDO on CKD and unable to perform CTA due to this.  Initial troponin at level of 313 and with his sinus tachycardia, hypoxia, tachypnea, leg swelling and  ventricular strain pattern on EKG, initiated heparin infusion for suspected PE.  BNP noted to be greater than 35,000 on admission and VBG performed reveals hypoxia.  CBC was performed and moderate normocytic anemia otherwise unremarkable.  CMP was performed and revealed hypokalemia of 3.1, FERNANDO with BUN 60, creatinine 5.30 with baseline creatinine of 1.4 and hypoalbuminemia of 2.6.    Patient was administered Lasix in ED and nephrology consulted by ERP and recommends diuresis and monitoring/follow-up.     Nitroglycerin patch administered in ED for dual benefit of his pulmonary edema, effusions, malignant hypertension.  Chest x-ray was performed and revealed    Pulmonary edema, cardiomegaly and bilateral pleural effusions right greater than left.  Right upper extremity ultrasound was performed and unremarkable without evidence of DVT.     Patient agrees to full CODE STATUS at time of evaluation and agrees to inpatient hospitalization for FERNANDO, acute hypoxic respiratory failure, heart failure exacerbation and possible pulmonary emboli.  He will be optimized in ED and subsequently transferred to medical lowery floor for further optimization of medical management.      Review of Systems  Review of Systems   Constitutional: Negative for chills and fever.   HENT: Negative for congestion and sore throat.    Eyes: Negative for blurred vision and double vision.   Respiratory: Positive for shortness of breath. Negative for cough and wheezing.    Cardiovascular: Positive for orthopnea, leg swelling and PND. Negative for chest pain and palpitations.   Gastrointestinal: Negative for abdominal pain, blood in stool, constipation, diarrhea, heartburn, melena, nausea and vomiting.   Genitourinary: Negative for dysuria and frequency.   Musculoskeletal: Negative for back pain and neck pain.   Skin: Positive for rash. Negative for itching.   Neurological: Negative for focal weakness, loss of consciousness, weakness and headaches.      Endo/Heme/Allergies: Negative for environmental allergies. Does not bruise/bleed easily.   Psychiatric/Behavioral: Negative for depression. The patient does not have insomnia.        Past Medical History   has a past medical history of CATARACT, Diabetes (2005), Hyperlipidemia, Hypertension, Seizure (HCC) (1987), Seizure disorder (HCC), Snoring, and Stroke (HCC).    Surgical History   has a past surgical history that includes other (2003); other (2011); cataract phaco with iol (4/20/2011); cataract phaco with iol (5/4/2011); and cataract extraction with iol (2011).     Family History  family history includes Diabetes in his mother; Heart Disease in his father.     Social History   reports that he quit smoking about 36 years ago. His smoking use included cigarettes. He has a 2.00 pack-year smoking history. He has never used smokeless tobacco. He reports that he does not drink alcohol and does not use drugs.    Allergies  Allergies   Allergen Reactions   • Penicillin G Rash     Rxn - Exacerbated a rash on his legs  Early 2000's         Medications  Prior to Admission Medications   Prescriptions Last Dose Informant Patient Reported? Taking?   aspirin EC (ECOTRIN) 81 MG Tablet Delayed Response 6/20/2021 at 0900 Patient Yes No   Sig: Take 81 mg by mouth every morning.   atorvastatin (LIPITOR) 20 MG Tab 6/20/2021 at Unknown time Patient No No   Sig: Take 2 Tabs by mouth every morning.   Patient not taking: Reported on 6/20/2021   atorvastatin (LIPITOR) 40 MG Tab 6/18/2021 at 0900 Patient Yes Yes   Sig: Take 40 mg by mouth every day.   doxazosin (CARDURA) 8 MG tablet 6/19/2021 at 2100 Patient Yes No   Sig: Take 8 mg by mouth every evening.   felodipine (PLENDIL) 10 MG TABLET SR 24 HR 6/19/2021 at 2100 Patient Yes No   Sig: Take 10 mg by mouth every evening. Indications: High Blood Pressure Disorder   guaifenesin dextromethorphan (ROBITUSSIN DM) 100-10 MG/5ML Syrup syrup Not Taking at Unknown time Patient No No   Sig:  Take 5 mL by mouth every 6 hours as needed for Cough.   Patient not taking: Reported on 6/20/2021   hydrochlorothiazide (HYDRODIURIL) 25 MG Tab 6/20/2021 at 0900 Patient Yes No   Sig: Take 25 mg by mouth every morning.   insulin detemir (LEVEMIR) 100 UNIT/ML Solution Pen-injector injection 6/19/2021 at 2100 Patient No No   Sig: Inject 20 Units as instructed every evening.   insulin lispro, Human, (HUMALOG) 100 UNIT/ML Solution Pen-injector injection Not Taking at Unknown time Patient No No   Sig: Inject 3-12 Units as instructed 3 times a day before meals. For glucose:  70   - 150  mg/dL =      0 Units  151 - 200  mg/dL =    3 Units  201 - 250  mg/dL =    4 Units  251 - 300  mg/dL  =   7 Units  301 - 350  mg/dL  =   8 Units  351 - 400 mg/dL   =   10 Units  Over 400 mg/dL   =   12 Units   Patient not taking: Reported on 6/20/2021   lisinopril (PRINIVIL, ZESTRIL) 40 MG tablet 6/20/2021 at 0900 Patient Yes No   Sig: Take 40 mg by mouth every day. Indications: High Blood Pressure Disorder   meclizine (ANTIVERT) 25 MG Tab Not Taking at Unknown time Patient No No   Sig: Take 1 Tab by mouth 3 times a day.   Patient not taking: Reported on 6/20/2021      Facility-Administered Medications: None       Physical Exam  Temp:  [37.1 °C (98.8 °F)] 37.1 °C (98.8 °F)  Pulse:  [] 104  Resp:  [20-42] 21  BP: (176-198)/() 198/100  SpO2:  [76 %-97 %] 97 %    Physical Exam  Vitals reviewed.   Constitutional:       Appearance: Normal appearance. He is normal weight. He is not toxic-appearing or diaphoretic.   HENT:      Head: Normocephalic and atraumatic.      Mouth/Throat:      Mouth: Mucous membranes are moist.      Pharynx: Oropharynx is clear. No oropharyngeal exudate or posterior oropharyngeal erythema.      Comments: Poor oral dentition  Eyes:      General: No scleral icterus.     Extraocular Movements: Extraocular movements intact.      Pupils: Pupils are equal, round, and reactive to light.      Comments: Pale  conjunctiva, bilateral arcus senilis   Neck:      Vascular: No carotid bruit.      Comments: No thyromegaly appreciated.  Cardiovascular:      Rate and Rhythm: Regular rhythm. Tachycardia present.      Pulses: Normal pulses.      Heart sounds: Murmur heard.   No friction rub. No gallop.       Comments: Accentuated S1 and S2 with grade 3 systolic ejection murmur best appreciated left sternal border.  Pulmonary:      Effort: Pulmonary effort is normal.      Breath sounds: Normal breath sounds. No wheezing, rhonchi or rales.   Abdominal:      General: Bowel sounds are normal. There is no distension.      Palpations: Abdomen is soft. There is no mass.      Tenderness: There is no abdominal tenderness. There is no guarding or rebound.      Hernia: No hernia is present.   Musculoskeletal:         General: Normal range of motion.      Cervical back: Normal range of motion and neck supple. No muscular tenderness.   Lymphadenopathy:      Cervical: No cervical adenopathy.   Skin:     General: Skin is warm and dry.      Capillary Refill: Capillary refill takes less than 2 seconds.      Coloration: Skin is not pale.      Findings: Lesion present. No erythema or rash.      Comments: DorsumBlister formation right bicep and left foot see picture below   Neurological:      General: No focal deficit present.      Mental Status: He is alert and oriented to person, place, and time. Mental status is at baseline.      Cranial Nerves: No cranial nerve deficit.      Sensory: No sensory deficit.      Motor: No weakness.   Psychiatric:         Mood and Affect: Mood normal.         Behavior: Behavior normal.         Thought Content: Thought content normal.         Judgment: Judgment normal.             Laboratory:  Recent Labs     06/20/21 2111   WBC 9.8   RBC 2.53*   HEMOGLOBIN 8.0*   HEMATOCRIT 24.0*   MCV 94.9   MCH 31.6   MCHC 33.3*   RDW 49.9   PLATELETCT 199   MPV 11.2     Recent Labs     06/20/21 2111   SODIUM 142   POTASSIUM 3.1*    CHLORIDE 109   CO2 21   GLUCOSE 106*   BUN 60*   CREATININE 5.30*   CALCIUM 7.5*     Recent Labs     06/20/21 2111   ALTSGPT 11   ASTSGOT 21   ALKPHOSPHAT 64   TBILIRUBIN 0.2   GLUCOSE 106*         Recent Labs     06/20/21 2111   NTPROBNP >48447*         Recent Labs     06/20/21 2111   TROPONINT 313*     I reviewed and interpreted the above twelve-lead EKG and appreciate sinus tachycardia without S1 every 3 T3 changes.  Inverted T wave in lead III appreciated and right axis deviation appreciated which could be possible ventricular strain pattern.  QTc within normal limits and good R wave progression in precordial leads.    Imaging:  US-EXTREMITY VENOUS UPPER UNILAT RIGHT         DX-CHEST-PORTABLE (1 VIEW)   Final Result      1.  Development of pulmonary edema      2.  Enlarged cardiac silhouette      3.  Bilateral pleural effusion, right greater than left        I reviewed and interpreted the above chest x-ray and do appreciate cardiomegaly with bilateral pleural effusion and pulmonary edema as seen above.      Assessment/Plan:  I anticipate this patient will require at least two midnights for appropriate medical management, necessitating inpatient admission.    * Acute exacerbation of CHF (congestive heart failure) (HCC)- (present on admission)  Assessment & Plan  Echocardiogram ordered  Fluid restriction  Daily weights  Cardiac/diabetic diet  Lasix 40 mg IV twice daily  Possible cardiorenal syndrome and nephrology has been consulted and will follow up    Acute respiratory failure with hypoxia (HCC)- (present on admission)  Assessment & Plan  Possibly secondary to pulmonary embolism and bilateral pleural effusion with acute heart failure exacerbation    Maintain SPO2 greater than 90% with supplemental O2    Suspected pulmonary embolism- (present on admission)  Assessment & Plan  Heparin infusion started  Trend troponins  Cannot obtain CTA secondary to FERNANDO    Elevated troponin- (present on  admission)  Assessment & Plan  Suspicion for pulmonary embolism as patient has sinus tachycardia, tachypnea, extremity swelling, hypoxia and right axis deviation on EKG.    Start heparin infusion  Trend troponins      Hypertension- (present on admission)  Assessment & Plan  Avoid ACE/ARB secondary to FERNANDO  Nitropaste added in ED for dual benefit of pulmonary edema malignant hypertension  Hydrochlorothiazide 25 mg p.o. daily    FERNANDO (acute kidney injury) (HCC)- (present on admission)  Assessment & Plan  Possibly cardiorenal syndrome  Nephrology consulted and will follow  Avoid nephrotoxic agents    Normocytic anemia- (present on admission)  Assessment & Plan  Iron/TIBC/ferritin/folate/B12/reticulocytes ordered pending  CBC in a.m.    Hypokalemia- (present on admission)  Assessment & Plan  We will check magnesium level first prior to infusion of potassium repletion    Uncontrolled type 2 diabetes mellitus (HCC)- (present on admission)  Assessment & Plan  Hemoglobin A1c ordered and pending  POC glucose QA CHS  Low SSI  Continue daily aspirin  Follow-up outpatient PCP and endocrinology

## 2021-06-21 NOTE — ED TRIAGE NOTES
Pt comes from home with increased SOB over the past week. Pt does not require O2 at home but comes in on 3.5L NC sat at 94%. 20G LAC was estblished with EMS.

## 2021-06-21 NOTE — ASSESSMENT & PLAN NOTE
Normocytic anaemia, Fe studies indicative of Fe deficiency, though may have complicating chronic disease. Denies any hematochezia/hematuria/hematemesis/melena. Low Folate with normal B12/TSH. Acute Hgb drop to 7.4 without identified source.  -Fe 325mg PO qD at discharge  -Transfuse Hgb <7

## 2021-06-21 NOTE — PROGRESS NOTES
Assumed care at 0110, bedside report received from ED RN. Pt on the monitor. Initial assessment completed, orders reviewed, call light within reach, bed alarm in use, and hourly rounding in place. POC addressed with patient, no additional questions at this time.

## 2021-06-21 NOTE — ED NOTES
Pt was trailed off O2 and dropped to 76% on RA. was immediately placed back on to 3L NC and pt went up to 92%

## 2021-06-21 NOTE — ED NOTES
Pt has room assigned to T719-00. Spoke to Antonina from that floor and she said bed yari told her that the pt is now not going to that bed.

## 2021-06-21 NOTE — ED PROVIDER NOTES
ED Provider Note    CHIEF COMPLAINT  Chief Complaint   Patient presents with   • Shortness of Breath     HPI  This is a 70 year old male with PMH HTN, HLD, and DM2 presenting by ambulance with dyspnea which he noticed initially 2-3 weeks ago but which has been worsening over the past week. Reports associated orthopnea and has had a hard time getting to sleep because of his shortness of breath. Also has swelling in bilateral lower extremities all the way up to his groin. He states both legs feel uncomfortable but denies any specific extremity pain. He has not had symptoms like this in the past and does not require supplemental oxygen at home. Denies any fevers, chills, or recent illness. Denies any chest pain, palpitations, syncope, abdominal pain, nausea, or vomiting. No recent surgeries or recent travel. The patient does note that he was recently referred to see a nephrologist but is unsure why, has not scheduled that appointment.    PPE Note: I personally donned full PPE for all patient encounters during this visit, including being clean-shaven with an N95 respirator mask, gloves, and goggles.       REVIEW OF SYSTEMS  See HPI for further details. All other systems are negative.       PAST MEDICAL HISTORY   has a past medical history of CATARACT, Diabetes (), Hyperlipidemia, Hypertension, Seizure (CMS-HCC) (), Seizure disorder (CMS-HCC), Snoring, and Stroke (CMS-HCC).    SOCIAL HISTORY  Social History     Tobacco Use   • Smoking status: Former Smoker     Packs/day: 0.50     Years: 4.00     Pack years: 2.00     Types: Cigarettes     Quit date: 1985     Years since quittin.4   • Smokeless tobacco: Never Used   Substance and Sexual Activity   • Alcohol use: No   • Drug use: No   • Sexual activity: Not on file       SURGICAL HISTORY   has a past surgical history that includes other (); other (); cataract phaco with iol (2011); cataract phaco with iol (2011); and cataract extraction  "with iol (2011).    CURRENT MEDICATIONS  Home Medications    **Home medications have not yet been reviewed for this encounter**         ALLERGIES  Allergies   Allergen Reactions   • Penicillin G Rash     Rxn - Exacerbated a rash on his legs  Early 2000's         PHYSICAL EXAM  VITAL SIGNS: BP (!) 176/103   Pulse (!) 112 Comment: pt is on 4L NC  Temp 37.1 °C (98.8 °F) (Temporal)   Resp (!) 25 Comment: pt is on 4L NC  Ht 1.727 m (5' 8\")   Wt 80.7 kg (178 lb)   SpO2 94% Comment: pt is on 4L NC  BMI 27.06 kg/m²   Pulse ox interpretation: Patient is hypoxic on room air, SpO2 is 91% on 3.5 L/min O2 via nasal cannula  Genl: Ill-appearing male sitting upright in gurney, speaking in 2-3 word sentenses, appears in moderate distress   Head: NC/AT   ENT: Mucous membranes moist, posterior pharynx clear, uvula midline, nares patent bilaterally  Eyes: Normal sclera, pupils equal round reactive to light  Neck: Supple, FROM, no LAD appreciated  Pulmonary: Tachypneic. Decreased lung sounds wheezes or rhonchi in the upper air fields globally decreased lung sounds bilaterally lower lung fields.  Chest: No TTP  CV:  Tachycardic, regular rhythm, 3/6 systolic murmur, pulses 2+ in both upper and lower extremities,  Abdomen: Soft, non-tender. Abdomen is distended without focal tenderness. No rebound/guarding, no masses palpated, no HSM.  : no CVA or suprapubic tenderness  Musculoskeletal: Pain free ROM of the neck. Moving upper and lower extremities and spontaneous in coordinated fashion. There is 2+ pitting edema to bilateral lower extremities up to the groin. There is swelling of the right hand and forearm. Upper and lower extremities are non-tender without erythema or warmth.  Neuro: A&Ox4 (person, place, time, situation), speech fluent, gait not assessed, no focal deficits appreciated, No cerebellar signs. Sensation is grossly intact in the distal upper and lower extremities.   Psych: Patient has an appropriate affect and " behavior  Skin: No rash or lesions.  No pallor or jaundice.  No cyanosis.  Warm and dry.     DIAGNOSTIC STUDIES / PROCEDURES    EKG  Results for orders placed or performed during the hospital encounter of 21   EKG   Result Value Ref Range    Report       [facility]    Test Date:  2021  Pt Name:    DARRELL KOWALSKI                  Department: ER  MRN:        [id number]                      Room:       [facility]  Gender:     Male                         Technician: [id number]  :        1950                   Requested By:TRIXIE MARLEY  Order #:    [id number]                    Reading MD:    Measurements  Intervals                                Axis  Rate:       111                          P:          70  TX:         156                          QRS:        83  QRSD:       86                           T:          -49  QT:         348  QTc:        473    Interpretive Statements  SINUS TACHYCARDIA  BORDERLINE RIGHT AXIS DEVIATION  BORDERLINE LOW VOLTAGE IN FRONTAL LEADS  NONSPECIFIC REPOL ABNORMALITY, DIFFUSE LEADS  Compared to ECG 10/04/2017 19:11:06  Early repolarization now present  Sinus rhythm no longer present       LABS  Labs Reviewed   CBC WITH DIFFERENTIAL - Abnormal; Notable for the following components:       Result Value    RBC 2.53 (*)     Hemoglobin 8.0 (*)     Hematocrit 24.0 (*)     MCHC 33.3 (*)     Neutrophils-Polys 78.70 (*)     Lymphocytes 10.30 (*)     Neutrophils (Absolute) 7.69 (*)     All other components within normal limits    Narrative:     1. Age less then 50  2. Heart reate less then 100  3. 02 sat > 94%  4. No prior history of DVT or PE  5. No recent trauma or surgery (2 weeks)  6. No hemoptisis.  7. No  or HRP  8. No un-lateral leg swelling.   COMP METABOLIC PANEL - Abnormal; Notable for the following components:    Potassium 3.1 (*)     Glucose 106 (*)     Bun 60 (*)     Creatinine 5.30 (*)     Calcium 7.5 (*)     Albumin 2.6 (*)     Total  Protein 5.7 (*)     All other components within normal limits    Narrative:     1. Age less then 50  2. Heart reate less then 100  3. 02 sat > 94%  4. No prior history of DVT or PE  5. No recent trauma or surgery (2 weeks)  6. No hemoptisis.  7. No  or HRP  8. No un-lateral leg swelling.   PROBRAIN NATRIURETIC PEPTIDE, NT - Abnormal; Notable for the following components:    NT-proBNP >84378 (*)     All other components within normal limits    Narrative:     1. Age less then 50  2. Heart reate less then 100  3. 02 sat > 94%  4. No prior history of DVT or PE  5. No recent trauma or surgery (2 weeks)  6. No hemoptisis.  7. No  or HRP  8. No un-lateral leg swelling.   TROPONIN - Abnormal; Notable for the following components:    Troponin T 313 (*)     All other components within normal limits    Narrative:     1. Age less then 50  2. Heart reate less then 100  3. 02 sat > 94%  4. No prior history of DVT or PE  5. No recent trauma or surgery (2 weeks)  6. No hemoptisis.  7. No  or HRP  8. No un-lateral leg swelling.   D-DIMER - Abnormal; Notable for the following components:    D-Dimer Screen 1.85 (*)     All other components within normal limits    Narrative:     1. Age less then 50  2. Heart reate less then 100  3. 02 sat > 94%  4. No prior history of DVT or PE  5. No recent trauma or surgery (2 weeks)  6. No hemoptisis.  7. No  or HRP  8. No un-lateral leg swelling.   VENOUS BLOOD GAS - Abnormal; Notable for the following components:    Venous Bg Pco2 39.6 (*)     Venous Bg Po2 63.0 (*)     Venous Bg Hco3 20 (*)     All other components within normal limits   ESTIMATED GFR - Abnormal; Notable for the following components:    GFR If  13 (*)     GFR If Non  11 (*)     All other components within normal limits    Narrative:     1. Age less then 50  2. Heart reate less then 100  3. 02 sat > 94%  4. No prior history of DVT or PE  5. No recent trauma or surgery (2 weeks)  6. No  "hemoptisis.  7. No  or HRP  8. No un-lateral leg swelling.   PROTHROMBIN TIME - Abnormal; Notable for the following components:    PT 16.2 (*)     INR 1.34 (*)     All other components within normal limits    Narrative:     Indicate which anticoagulants the patient is on:->HEPARIN   BLOOD CULTURE    Narrative:     Per Hospital Policy: Only change Specimen Src: to \"Line\" if  specified by physician order.   BLOOD CULTURE    Narrative:     Per Hospital Policy: Only change Specimen Src: to \"Line\" if  specified by physician order.   COV-2, FLU A/B, AND RSV BY PCR    Narrative:     Have you been in close contact with a person who is suspected  or known to be positive for COVID-19 within the last 30 days  (e.g. last seen that person < 30 days ago)->No   APTT    Narrative:     Indicate which anticoagulants the patient is on:->HEPARIN   HEPARIN XA (UNFRACTIONATED)    Narrative:     Indicate which anticoagulants the patient is on:->HEPARIN   CBC WITH DIFFERENTIAL   COMP METABOLIC PANEL   FERRITIN   FOLATE   HEMOGLOBIN A1C   IRON/TOTAL IRON BIND   MAGNESIUM   RETICULOCYTES COUNT   TROPONIN   VITAMIN B12     RADIOLOGY  DX-CHEST-PORTABLE (1 VIEW)    (Results Pending)     COURSE & MEDICAL DECISION MAKING  Pertinent Labs & Imaging studies reviewed. (See chart for details)    DDX:  Pneumothorax  Pulmonary embolism  Pneumonia  COPD exacerbation  Asthma exacerbation  CHF exacerbation  Rib fractures  ACS  Anxiety  Foreign body      MDM  Number of Diagnoses or Management Options      Initial evaluation at 2057:  Patient is a 70-year-old who presents emergency room for symptoms as described above.  On further questioning it does sound the patient has been seen in the [facility] and had had some element of abnormal renal function as it been recommended that he follow-up with a nephrologist.  His acute presenting symptoms appear to be some respiratory distress, hypertension and tachycardia.  Chest x-ray shows what looks appears to " be either a large effusion, bilateral infiltrate and with his likely cardiovascular source I did initiate some BiPAP to help facilitate positive pressure and assist with his breathing and removal some fluids in addition to initiating initial dose of Lasix in anticipation of possible CHF exacerbation.  Patient has not had any previous history of this in our has worsening creatinine function with a multitude of other abnormalities likely stemming from acute renal failure.  Creatinine is elevated greater than 5, no recent lab work is available for comparison.  Troponin and BNP are grossly elevated likely secondary to this acute dysfunction.    Following administration of BiPAP there is some interval improvement, Lasix was administered and serial dose of nitroglycerin was given.  He has a stable and worsening anemia, he has no evidence of acute LFT elevations, no gross sodium or potassium abnormalities.    Discussed the case with the on-call nephrologist as there is a multitude of ongoing changes likely secondary to kidney dysfunction and he agrees that the patient should be admitted and he will arrange for possible dialysis should there be a lack of acute clearance following resuscitation.    I discussed case with the hospitalist who is in agreement of seeing the patient for admission.      CRITICAL CARE:  I saw and evaluated this patient. I personally provided 40 minutes of critical care time to the patient excluding billable procedures and directly and personally provided the following treatment and critical care management:  Critical Care Interventions  Multispecialty coordination, Multiple bedside assessments, coordination of care with family and other historical sources and Continuous hemodynamic and respiratory monitoring    FINAL IMPRESSION  Visit Diagnoses     ICD-10-CM   1. Shortness of breath  R06.02   2. Anemia, unspecified type  D64.9   3. Hypoxic  R09.02   4. FERNANDO (acute kidney injury) ([facility])  N17.9      Electronically signed by: Cliff Hunt M.D., 6/20/2021 8:57 PM

## 2021-06-22 ENCOUNTER — APPOINTMENT (OUTPATIENT)
Dept: RADIOLOGY | Facility: MEDICAL CENTER | Age: 71
DRG: 291 | End: 2021-06-22
Attending: INTERNAL MEDICINE
Payer: MEDICARE

## 2021-06-22 ENCOUNTER — APPOINTMENT (OUTPATIENT)
Dept: CARDIOLOGY | Facility: MEDICAL CENTER | Age: 71
DRG: 291 | End: 2021-06-22
Attending: STUDENT IN AN ORGANIZED HEALTH CARE EDUCATION/TRAINING PROGRAM
Payer: MEDICARE

## 2021-06-22 PROBLEM — I27.20 PULMONARY HYPERTENSION (HCC): Status: ACTIVE | Noted: 2021-06-22

## 2021-06-22 PROBLEM — I35.0 AORTIC STENOSIS: Status: ACTIVE | Noted: 2021-06-22

## 2021-06-22 LAB
ANION GAP SERPL CALC-SCNC: 12 MMOL/L (ref 7–16)
ANION GAP SERPL CALC-SCNC: 13 MMOL/L (ref 7–16)
APPEARANCE UR: CLEAR
BACTERIA #/AREA URNS HPF: NEGATIVE /HPF
BASOPHILS # BLD AUTO: 0.4 % (ref 0–1.8)
BASOPHILS # BLD: 0.03 K/UL (ref 0–0.12)
BILIRUB UR QL STRIP.AUTO: NEGATIVE
BUN SERPL-MCNC: 52 MG/DL (ref 8–22)
BUN SERPL-MCNC: 63 MG/DL (ref 8–22)
C3 SERPL-MCNC: 85.2 MG/DL (ref 87–200)
C4 SERPL-MCNC: 32.1 MG/DL (ref 19–52)
CALCIUM SERPL-MCNC: 7.4 MG/DL (ref 8.5–10.5)
CALCIUM SERPL-MCNC: 7.8 MG/DL (ref 8.5–10.5)
CHLORIDE SERPL-SCNC: 107 MMOL/L (ref 96–112)
CHLORIDE SERPL-SCNC: 109 MMOL/L (ref 96–112)
CO2 SERPL-SCNC: 21 MMOL/L (ref 20–33)
CO2 SERPL-SCNC: 23 MMOL/L (ref 20–33)
COLOR UR: YELLOW
CREAT SERPL-MCNC: 4.79 MG/DL (ref 0.5–1.4)
CREAT SERPL-MCNC: 5.46 MG/DL (ref 0.5–1.4)
CRP SERPL HS-MCNC: 10.88 MG/DL (ref 0–0.75)
EKG IMPRESSION: NORMAL
EOSINOPHIL # BLD AUTO: 0.13 K/UL (ref 0–0.51)
EOSINOPHIL NFR BLD: 1.8 % (ref 0–6.9)
EPI CELLS #/AREA URNS HPF: NEGATIVE /HPF
ERYTHROCYTE [DISTWIDTH] IN BLOOD BY AUTOMATED COUNT: 48 FL (ref 35.9–50)
ERYTHROCYTE [DISTWIDTH] IN BLOOD BY AUTOMATED COUNT: 49 FL (ref 35.9–50)
ERYTHROCYTE [SEDIMENTATION RATE] IN BLOOD BY WESTERGREN METHOD: >140 MM/HOUR (ref 0–20)
GLUCOSE SERPL-MCNC: 119 MG/DL (ref 65–99)
GLUCOSE SERPL-MCNC: 147 MG/DL (ref 65–99)
GLUCOSE UR STRIP.AUTO-MCNC: 100 MG/DL
HAV IGM SERPL QL IA: NORMAL
HBV CORE IGM SER QL: NORMAL
HBV SURFACE AB SERPL IA-ACNC: <3.5 MIU/ML (ref 0–10)
HBV SURFACE AG SER QL: NORMAL
HCT VFR BLD AUTO: 21.4 % (ref 42–52)
HCT VFR BLD AUTO: 24.5 % (ref 42–52)
HCV AB SER QL: NORMAL
HGB BLD-MCNC: 7.3 G/DL (ref 14–18)
HGB BLD-MCNC: 8.4 G/DL (ref 14–18)
HYALINE CASTS #/AREA URNS LPF: ABNORMAL /LPF
IMM GRANULOCYTES # BLD AUTO: 0.03 K/UL (ref 0–0.11)
IMM GRANULOCYTES NFR BLD AUTO: 0.4 % (ref 0–0.9)
KETONES UR STRIP.AUTO-MCNC: NEGATIVE MG/DL
LEUKOCYTE ESTERASE UR QL STRIP.AUTO: NEGATIVE
LV EJECT FRACT  99904: 60
LV EJECT FRACT MOD 2C 99903: 65.58
LV EJECT FRACT MOD 4C 99902: 61.56
LV EJECT FRACT MOD BP 99901: 62.99
LYMPHOCYTES # BLD AUTO: 0.48 K/UL (ref 1–4.8)
LYMPHOCYTES NFR BLD: 6.7 % (ref 22–41)
MAGNESIUM SERPL-MCNC: 1.8 MG/DL (ref 1.5–2.5)
MCH RBC QN AUTO: 32 PG (ref 27–33)
MCH RBC QN AUTO: 32.2 PG (ref 27–33)
MCHC RBC AUTO-ENTMCNC: 34.1 G/DL (ref 33.7–35.3)
MCHC RBC AUTO-ENTMCNC: 34.3 G/DL (ref 33.7–35.3)
MCV RBC AUTO: 93.9 FL (ref 81.4–97.8)
MCV RBC AUTO: 93.9 FL (ref 81.4–97.8)
MICRO URNS: ABNORMAL
MONOCYTES # BLD AUTO: 0.42 K/UL (ref 0–0.85)
MONOCYTES NFR BLD AUTO: 5.9 % (ref 0–13.4)
NEUTROPHILS # BLD AUTO: 6.06 K/UL (ref 1.82–7.42)
NEUTROPHILS NFR BLD: 84.8 % (ref 44–72)
NITRITE UR QL STRIP.AUTO: NEGATIVE
NRBC # BLD AUTO: 0 K/UL
NRBC BLD-RTO: 0 /100 WBC
PH UR STRIP.AUTO: 5 [PH] (ref 5–8)
PLATELET # BLD AUTO: 177 K/UL (ref 164–446)
PLATELET # BLD AUTO: 216 K/UL (ref 164–446)
PMV BLD AUTO: 11 FL (ref 9–12.9)
PMV BLD AUTO: 11.2 FL (ref 9–12.9)
POTASSIUM SERPL-SCNC: 3.2 MMOL/L (ref 3.6–5.5)
POTASSIUM SERPL-SCNC: 3.4 MMOL/L (ref 3.6–5.5)
PROT UR QL STRIP: 300 MG/DL
RBC # BLD AUTO: 2.28 M/UL (ref 4.7–6.1)
RBC # BLD AUTO: 2.61 M/UL (ref 4.7–6.1)
RBC # URNS HPF: ABNORMAL /HPF
RBC UR QL AUTO: ABNORMAL
SODIUM SERPL-SCNC: 142 MMOL/L (ref 135–145)
SODIUM SERPL-SCNC: 143 MMOL/L (ref 135–145)
SP GR UR STRIP.AUTO: 1.01
UROBILINOGEN UR STRIP.AUTO-MCNC: 0.2 MG/DL
WBC # BLD AUTO: 7.2 K/UL (ref 4.8–10.8)
WBC # BLD AUTO: 7.3 K/UL (ref 4.8–10.8)
WBC #/AREA URNS HPF: ABNORMAL /HPF

## 2021-06-22 PROCEDURE — 86255 FLUORESCENT ANTIBODY SCREEN: CPT

## 2021-06-22 PROCEDURE — 86160 COMPLEMENT ANTIGEN: CPT | Mod: 91

## 2021-06-22 PROCEDURE — 71045 X-RAY EXAM CHEST 1 VIEW: CPT

## 2021-06-22 PROCEDURE — 94760 N-INVAS EAR/PLS OXIMETRY 1: CPT

## 2021-06-22 PROCEDURE — A9270 NON-COVERED ITEM OR SERVICE: HCPCS | Performed by: STUDENT IN AN ORGANIZED HEALTH CARE EDUCATION/TRAINING PROGRAM

## 2021-06-22 PROCEDURE — 83735 ASSAY OF MAGNESIUM: CPT

## 2021-06-22 PROCEDURE — 5A1D70Z PERFORMANCE OF URINARY FILTRATION, INTERMITTENT, LESS THAN 6 HOURS PER DAY: ICD-10-PCS | Performed by: INTERNAL MEDICINE

## 2021-06-22 PROCEDURE — 700105 HCHG RX REV CODE 258: Performed by: STUDENT IN AN ORGANIZED HEALTH CARE EDUCATION/TRAINING PROGRAM

## 2021-06-22 PROCEDURE — 93306 TTE W/DOPPLER COMPLETE: CPT

## 2021-06-22 PROCEDURE — 90935 HEMODIALYSIS ONE EVALUATION: CPT

## 2021-06-22 PROCEDURE — 05HY33Z INSERTION OF INFUSION DEVICE INTO UPPER VEIN, PERCUTANEOUS APPROACH: ICD-10-PCS | Performed by: INTERNAL MEDICINE

## 2021-06-22 PROCEDURE — 86225 DNA ANTIBODY NATIVE: CPT

## 2021-06-22 PROCEDURE — 36556 INSERT NON-TUNNEL CV CATH: CPT | Mod: RT | Performed by: INTERNAL MEDICINE

## 2021-06-22 PROCEDURE — 700111 HCHG RX REV CODE 636 W/ 250 OVERRIDE (IP): Performed by: INTERNAL MEDICINE

## 2021-06-22 PROCEDURE — A9270 NON-COVERED ITEM OR SERVICE: HCPCS | Performed by: INTERNAL MEDICINE

## 2021-06-22 PROCEDURE — 99233 SBSQ HOSP IP/OBS HIGH 50: CPT | Mod: GC | Performed by: HOSPITALIST

## 2021-06-22 PROCEDURE — 770020 HCHG ROOM/CARE - TELE (206)

## 2021-06-22 PROCEDURE — 93010 ELECTROCARDIOGRAM REPORT: CPT | Performed by: INTERNAL MEDICINE

## 2021-06-22 PROCEDURE — 93005 ELECTROCARDIOGRAM TRACING: CPT

## 2021-06-22 PROCEDURE — 86140 C-REACTIVE PROTEIN: CPT

## 2021-06-22 PROCEDURE — 86706 HEP B SURFACE ANTIBODY: CPT

## 2021-06-22 PROCEDURE — 85027 COMPLETE CBC AUTOMATED: CPT

## 2021-06-22 PROCEDURE — 99291 CRITICAL CARE FIRST HOUR: CPT | Mod: 25 | Performed by: INTERNAL MEDICINE

## 2021-06-22 PROCEDURE — 80074 ACUTE HEPATITIS PANEL: CPT

## 2021-06-22 PROCEDURE — 700102 HCHG RX REV CODE 250 W/ 637 OVERRIDE(OP): Performed by: STUDENT IN AN ORGANIZED HEALTH CARE EDUCATION/TRAINING PROGRAM

## 2021-06-22 PROCEDURE — 93306 TTE W/DOPPLER COMPLETE: CPT | Mod: 26 | Performed by: INTERNAL MEDICINE

## 2021-06-22 PROCEDURE — C1752 CATH,HEMODIALYSIS,SHORT-TERM: HCPCS

## 2021-06-22 PROCEDURE — 700111 HCHG RX REV CODE 636 W/ 250 OVERRIDE (IP): Performed by: STUDENT IN AN ORGANIZED HEALTH CARE EDUCATION/TRAINING PROGRAM

## 2021-06-22 PROCEDURE — 36415 COLL VENOUS BLD VENIPUNCTURE: CPT

## 2021-06-22 PROCEDURE — 94640 AIRWAY INHALATION TREATMENT: CPT

## 2021-06-22 PROCEDURE — 86038 ANTINUCLEAR ANTIBODIES: CPT

## 2021-06-22 PROCEDURE — 80048 BASIC METABOLIC PNL TOTAL CA: CPT

## 2021-06-22 PROCEDURE — 700102 HCHG RX REV CODE 250 W/ 637 OVERRIDE(OP): Performed by: INTERNAL MEDICINE

## 2021-06-22 PROCEDURE — 85025 COMPLETE CBC W/AUTO DIFF WBC: CPT

## 2021-06-22 PROCEDURE — 85652 RBC SED RATE AUTOMATED: CPT

## 2021-06-22 RX ORDER — HEPARIN SODIUM 1000 [USP'U]/ML
INJECTION, SOLUTION INTRAVENOUS; SUBCUTANEOUS
Status: DISPENSED
Start: 2021-06-22 | End: 2021-06-23

## 2021-06-22 RX ORDER — CARVEDILOL 6.25 MG/1
6.25 TABLET ORAL 2 TIMES DAILY WITH MEALS
Status: DISCONTINUED | OUTPATIENT
Start: 2021-06-22 | End: 2021-06-26

## 2021-06-22 RX ORDER — HYDRALAZINE HYDROCHLORIDE 10 MG/1
10 TABLET, FILM COATED ORAL EVERY 8 HOURS
Status: DISCONTINUED | OUTPATIENT
Start: 2021-06-22 | End: 2021-06-27

## 2021-06-22 RX ORDER — POTASSIUM CHLORIDE 20 MEQ/1
40 TABLET, EXTENDED RELEASE ORAL ONCE
Status: COMPLETED | OUTPATIENT
Start: 2021-06-22 | End: 2021-06-22

## 2021-06-22 RX ORDER — HEPARIN SODIUM 1000 [USP'U]/ML
2600 INJECTION, SOLUTION INTRAVENOUS; SUBCUTANEOUS
Status: DISCONTINUED | OUTPATIENT
Start: 2021-06-22 | End: 2021-06-30 | Stop reason: HOSPADM

## 2021-06-22 RX ADMIN — DOXAZOSIN 8 MG: 2 TABLET ORAL at 19:41

## 2021-06-22 RX ADMIN — ASPIRIN 81 MG: 81 TABLET, COATED ORAL at 05:28

## 2021-06-22 RX ADMIN — FOLIC ACID 1 MG: 1 TABLET ORAL at 05:28

## 2021-06-22 RX ADMIN — CARVEDILOL 6.25 MG: 6.25 TABLET, FILM COATED ORAL at 13:15

## 2021-06-22 RX ADMIN — DOCUSATE SODIUM 50 MG AND SENNOSIDES 8.6 MG 2 TABLET: 8.6; 5 TABLET, FILM COATED ORAL at 19:41

## 2021-06-22 RX ADMIN — SODIUM CHLORIDE 125 MG: 9 INJECTION, SOLUTION INTRAVENOUS at 18:00

## 2021-06-22 RX ADMIN — HEPARIN SODIUM 2600 UNITS: 1000 INJECTION, SOLUTION INTRAVENOUS; SUBCUTANEOUS at 03:20

## 2021-06-22 RX ADMIN — CARVEDILOL 6.25 MG: 6.25 TABLET, FILM COATED ORAL at 19:41

## 2021-06-22 RX ADMIN — HYDRALAZINE HYDROCHLORIDE 10 MG: 10 TABLET, FILM COATED ORAL at 22:52

## 2021-06-22 RX ADMIN — ATORVASTATIN CALCIUM 40 MG: 40 TABLET, FILM COATED ORAL at 05:28

## 2021-06-22 RX ADMIN — POTASSIUM CHLORIDE 40 MEQ: 1500 TABLET, EXTENDED RELEASE ORAL at 15:02

## 2021-06-22 RX ADMIN — FUROSEMIDE 80 MG: 10 INJECTION, SOLUTION INTRAMUSCULAR; INTRAVENOUS at 19:41

## 2021-06-22 RX ADMIN — HEPARIN SODIUM 2600 UNITS: 1000 INJECTION, SOLUTION INTRAVENOUS; SUBCUTANEOUS at 18:36

## 2021-06-22 ASSESSMENT — ENCOUNTER SYMPTOMS
ORTHOPNEA: 1
COUGH: 0
DIZZINESS: 1
HEADACHES: 0
ABDOMINAL PAIN: 0
FEVER: 0
FOCAL WEAKNESS: 0
WEAKNESS: 0
VOMITING: 0
CHILLS: 0
SHORTNESS OF BREATH: 1
PALPITATIONS: 0
SPUTUM PRODUCTION: 0
BLOOD IN STOOL: 0
NAUSEA: 0

## 2021-06-22 ASSESSMENT — PAIN DESCRIPTION - PAIN TYPE: TYPE: ACUTE PAIN

## 2021-06-22 NOTE — ASSESSMENT & PLAN NOTE
Found to have moderate-severe Ao stenosis on ECHO. Most likely etiology of pulmHTN.  -STOP hydralazine 10mg PO TID  -STOP isordil 5mg PO TID    -titrate O2 supplements to >90%  -Outpatient cardiology consultation for TAVR

## 2021-06-22 NOTE — PROGRESS NOTES
Received report from day shift RNRachel. Assumed care of pt. Pt reports no needs at this time. Updated pt on plan of care. Pt resting comfortably in bed. Fall and skin precautions in place. Educated on use of call light. Hourly rounding and continuous monitoring in place.

## 2021-06-22 NOTE — PROGRESS NOTES
[facility] Progress Note        HD today x 2 1/5 hours per Dr. Gan. Initiated at 1556 and ended at 1837.   Patient arrived from to dialysis in bed alert and oriented x 4 .        UF Net: 3000 mL            See paper flowsheet for details.        Right chest CVC dressing and catheter intact. Patent with good flow during dialysis. No s/sx of infection, no redness nor bleeding noted on the Right chest CVC site. Right chest CVC dressing clean, dry and intact.   Blood returned and Right chest CVC port flushed with NS and Heparin 1000 units/mL used  to lock catheter given per designated amount on mar. Right chest CVC ports clamped, capped and labeled accordingly. Dressing changed.           Report given to Primary Socrates Sams RN.

## 2021-06-22 NOTE — PROGRESS NOTES
Daily Progress Note:     Date of Service: 6/22/2021  Primary Team: [facility] IM White Team   Attending: Donell Gunn M.D.   Senior Resident: Dr. Ang  Intern: Dr. Brewer  Contact:  [phone]    Chief Complaint:   Heart failure, renal failure, hypoxic respiratory failur    Interval history:   Overnight, Rapid response called for acute hypoxic respiratory failure. Chest X- ray showed pulmonary edema and B/l Pleural effusion with no significant change from previous one. Emergent dialysis was placed and patient underwent dialysis with 3 L UF.  This morning, patient reports mild improvement in SOB, Vitals stable, On High flow with 100 % FIO2.   Net Negative -2560 since admission, UO 1250.   Plan for dialysis today, will continue lasix,         Consultants/Specialty:  Nephrology    Review of Systems:    Review of Systems   Constitutional: Negative for chills and fever.   Respiratory: Positive for shortness of breath. Negative for cough and sputum production.    Cardiovascular: Positive for orthopnea and leg swelling. Negative for chest pain.   Gastrointestinal: Negative for abdominal pain, blood in stool, melena, nausea and vomiting.   Genitourinary: Negative for dysuria, frequency, hematuria and urgency.   Neurological: Positive for dizziness. Negative for focal weakness, weakness and headaches.       Objective Data:   Physical Exam:   Vitals:   Temp:  [36.2 °C (97.2 °F)-37.6 °C (99.6 °F)] 36.8 °C (98.3 °F)  Pulse:  [] 101  Resp:  [17-24] 19  BP: (111-168)/(70-91) 168/86  SpO2:  [88 %-99 %] 95 %     Physical Exam  Vitals and nursing note reviewed.   Constitutional:       General: He is not in acute distress.     Appearance: He is not ill-appearing or diaphoretic.   HENT:      Head: Normocephalic and atraumatic.      Mouth/Throat:      Mouth: Mucous membranes are moist.      Pharynx: Oropharynx is clear.   Eyes:      Extraocular Movements: Extraocular movements intact.      Pupils: Pupils are equal, round, and  reactive to light.   Cardiovascular:      Rate and Rhythm: Normal rate and regular rhythm.      Heart sounds: No murmur heard.   No friction rub. No gallop.    Pulmonary:      Comments: Bibasilar crackles, no wheeze.  Abdominal:      General: There is no distension.      Palpations: Abdomen is soft.      Tenderness: There is no abdominal tenderness. There is no guarding or rebound.   Skin:     General: Skin is warm and dry.   Neurological:      General: No focal deficit present.      Mental Status: He is alert and oriented to person, place, and time.      Cranial Nerves: No cranial nerve deficit.      Motor: No weakness.   Psychiatric:         Mood and Affect: Mood normal.         Behavior: Behavior normal.         Thought Content: Thought content normal.         Judgment: Judgment normal.     interval exam: rectal: no external hemorrhoids, no obvious bleeding. Formed stool in rectal vault without masses. Rectal tone present and normal. Guaiac negative.      Labs:   Lab Results   Component Value Date/Time    SODIUM 143 06/22/2021 12:40 AM    POTASSIUM 3.2 (L) 06/22/2021 12:40 AM    CHLORIDE 109 06/22/2021 12:40 AM    CO2 21 06/22/2021 12:40 AM    GLUCOSE 147 (H) 06/22/2021 12:40 AM    BUN 63 (H) 06/22/2021 12:40 AM    CREATININE 5.46 (HH) 06/22/2021 12:40 AM        Recent Labs     06/21/21  0220 06/21/21  1802 06/21/21  2209 06/22/21  0040 06/22/21  1338   WBC 8.2  --  8.0 7.2 7.3   RBC 2.44*  --  2.53* 2.28* 2.61*   HEMOGLOBIN 7.4*   < > 8.0* 7.3* 8.4*   HEMATOCRIT 23.5*   < > 24.0* 21.4* 24.5*   MCV 96.3  --  94.9 93.9 93.9   MCH 30.3  --  31.6 32.0 32.2   RDW 50.4*  --  49.1 49.0 48.0   PLATELETCT 189  --  186 177 216   MPV 11.7  --  10.8 11.2 11.0   NEUTSPOLYS 83.10*  --  87.90* 84.80*  --    LYMPHOCYTES 8.30*  --  5.30* 6.70*  --    MONOCYTES 6.10  --  4.90 5.90  --    EOSINOPHILS 1.60  --  1.30 1.80  --    BASOPHILS 0.50  --  0.30 0.40  --     < > = values in this interval not displayed.       Imaging:   PARKE  US DVT:   No evidence of superficial or deep venous thrombosis in RUE.  DVT US BLE:   Normal bilateral superficial and deep venous examination of the lower    extremities.    Jean Barboza is a 69yo M with hx of CVA, seizure, HLD/HTN, DM2 (5.3 A1c); presenting for acute shortness of breath of 2 days; admitted 6/20 for heart failure exacerbation, hypoxic respiratory failure, suspected cardiorenal syndrome, undergoing diuresis; PE low likelihood (wells 1.5, negative DVT US).     Acute respiratory failure with hypoxia (HCC)  Assessment & Plan  Requiring 5-10L to maintain saturations greater than 90%.  Etiology :  PE less likely with WELLS of 1.5, DVT US negative. Unable to do V/Q or CTPE due to ongoing acuity of renal injury. No concern for infectious etiology.  Most likely due to heart/renal failure.  Echo : EF 60, Grade 2 diastolic dysfunction, Moderate to severe aortic stenosis , RVSP 70mmhg  PLAN    -heparin gtt stopped  on 6/22   - Lasix 80mg IV BID diuretic  - dialysis per nephrology   - daily weights , fluid restriction   - titrate O2 supplements to >90%  - Cardiology consultation for likely TAVR     Pulmonary hypertension (HCC)  Assessment & Plan  Echo : EF 60, Grade 2 diastolic dysfunction, Moderate to severe aortic stenosis , RVSP 70 mmhg  Likely Type 2 in the setting of Aortic stenosis, HFPEF     PLAN   - Lasix 80mg IV BID diuretic  - titrate O2 supplements to >90%      Aortic stenosis  Assessment & Plan  Presented with SOB, orthopnea, leg swelling   Chest x-ray - Pulmonary edema   Echo : EF 60, Moderate to severe aortic stenosis     PLAN    - Lasix 80mg IV BID diuretic  - titrate O2 supplements to >90%  - Cardiology consultation for likely TAVR       Acute renal failure (HCC)- (present on admission)  Assessment & Plan  Presents in acute renal failure, previous baseline Cr estimate 1.3. Suspected due to cardiorenal syndrome type 1, 3. Does not appear to be in florid heart failure, but may acute exacerbation  may lead to type 1. No apparent thromboembolic events, so type 3 somewhat less likely.  - albumin/Cr urine random - proteinuria in nephrotic range   - Hep panel negative   - Nephrology following  - Dialysis per nephrology   - IV lasix 80 mg BID   - Ordered autoimmune workup       Normocytic anemia- (present on admission)  Assessment & Plan  Normocytic anaemia, Fe studies indicative of Fe deficiency, though may have complicating chronic disease. Denies any hematochezia/hematuria/hematemesis/melena. Low Folate with normal B12/TSH. Acute Hgb drop to 7.4 without identified source.  -rectal exam, to be added to addenda  -Fe gluconate IV  -Type/screen  -H&H q12h  -Transfuse <7g/dL    Hypokalemia- (present on admission)  Assessment & Plan  -replete as indicated    Hypertension- (present on admission)  Assessment & Plan  Hx of HTN, appears well controlled.  -Lasix 80mg IV BID diuretic  -STOPPED felodipine 10mg (heart failure)  -STOPPED lisinopril 20mg (renal failure)  -Labetalol 10mg PRN    Elevated troponin- (present on admission)  Assessment & Plan  Elevate troponin, stable at 313. Likely elevation due to heart and renal failure. Unable to do V/Q or CTPE due to abnormal CXR and renal failure, US for DVT negative, wells of 1.5. EKG sinus tachy.        Type 2 diabetes mellitus (HCC)- (present on admission)  Assessment & Plan  Hx of DM2, admission A1c of 5.3, previously on insulin.  -PCP follow up

## 2021-06-22 NOTE — PROGRESS NOTES
Pt showing increasingly difficult work of breathing with accessory muscle use. Restless with mild anxiety. Oxygen saturation 85% on 6 liters. O2 increased to 10 L oxy mask. Pt now satting 90%. Tachycardic at 101. Physician paged.     1380  Physician returned page. Stat chest x ray ordered and physician to come see pt.

## 2021-06-22 NOTE — CARE PLAN
Problem: Pain - Standard  Goal: Alleviation of pain or a reduction in pain to the patient’s comfort goal  Outcome: Progressing     Problem: Knowledge Deficit - Standard  Goal: Patient and family/care givers will demonstrate understanding of plan of care, disease process/condition, diagnostic tests and medications  Outcome: Progressing     Problem: Skin Integrity  Goal: Skin integrity is maintained or improved  Outcome: Progressing     Problem: Fall Risk  Goal: Patient will remain free from falls  Outcome: Progressing       The patient is Unstable - High likelihood or risk of patient condition declining or worsening    Shift Goals  Clinical Goals: stable resp. status  Patient Goals: Improved breathing; rest  Family Goals: Stable resp. status    Progress made toward(s) clinical / shift goals:  Patient resting comfortably, SPO2 stable on HFHNC, POC reviewed with patient and family.

## 2021-06-22 NOTE — CODE DOCUMENTATION
Plan to leave patient on tele floor pending HD cath placement and emergent HD tonight. Per Dr. Geraldine art to place patient on HFNC pending HD.

## 2021-06-22 NOTE — PROGRESS NOTES
Dialysis cath placed by intensivist in right subclavian with two attempts. X ray confirmation received and diaylsis RN present to initiate therapy. Pt satting 88-95% on high-flow. Oxygen saturation intermittent and fluctuating but pt not in distress.

## 2021-06-22 NOTE — PROGRESS NOTES
"[facility] -  PROGRESS NOTE               Author: Nayla Gan M.D. Date & Time: 6/22/2021  10:35 AM     Chief Complaint:  Follow up ESRD    HPI:  70yoM with PMH significant for HTN, DM II, Hyperlipidemia, Seizure Disorder admitted with complaints of worsening SOB over the past 3 days and worsening bilateral LE edema for the past 1 week and in the ER found to have elevated Creatinine of . Pt reports that he has been getting progressively more SOB over the past 3 days and his LE edema has been worsening over the past 1 week so he came to the ED for eval. In the ED Cr was 5.3 on admission and pt reports that he has never been told of abnormal renal function in the past. His CXR showed pulm edema and there was some concern for DVT or PE but due to his elevated Creatinine he did not undergo a CTA. Bilateral lower extremity dopplers have been ordered. Pt was admitted and started on lasix 40mg IV BID as well as hydrochlorothiazide. He reports that his SOB is a little better this but he is still requiring face mask supplemental O2. He reports he is urinating more with the lasix but overnight only minimal UOP documented.   No F/C/N/V/CP, +SOB and +LE Edema, No melena, hematochezia, hematemesis.  No HA, visual changes, or abdominal pain    DAILY NEPHROLOGY SUMMARY:  6/22/21: rapid response called overnight for acute resp decompensation, urgent HD catheter placed and pt dialyzed with 3L UF, currently on high flow NC O2 with 100% FiO2, reports breathing a little better, 1.2L UOP over past 24hrs, BP mildly elevated, still with LE edema, denies any CP or n/v/diarrhea    REVIEW OF SYSTEMS:    10 point ROS reviewed and is as per HPI/daily summary or otherwise negative    PMH/PSH/SH/FH: Reviewed and unchanged since admission note  CURRENT MEDICATIONS: Reviewed from admission to present day    VS:  /88   Pulse (!) 102   Temp 36.5 °C (97.7 °F) (Temporal)   Resp 18   Ht 1.727 m (5' 8\")   Wt " 80.4 kg (177 lb 4 oz)   SpO2 93%   BMI 26.95 kg/m²   Physical Exam  Constitutional:       General: He is not in acute distress.     Appearance: He is ill-appearing.   HENT:      Head: Normocephalic and atraumatic.      Right Ear: External ear normal.      Left Ear: External ear normal.      Nose: Nose normal.      Comments: +high flow nasal canula     Mouth/Throat:      Mouth: Mucous membranes are moist.   Eyes:      Extraocular Movements: Extraocular movements intact.      Conjunctiva/sclera: Conjunctivae normal.   Cardiovascular:      Rate and Rhythm: Regular rhythm. Tachycardia present.      Heart sounds: No murmur heard.     Pulmonary:      Effort: No respiratory distress.      Breath sounds: Examination of the right-lower field reveals decreased breath sounds. Examination of the left-lower field reveals decreased breath sounds. Decreased breath sounds present. No rhonchi.   Abdominal:      General: Bowel sounds are normal. There is no distension.      Palpations: Abdomen is soft.   Musculoskeletal:         General: No tenderness.      Cervical back: Normal range of motion and neck supple.      Right lower leg: Edema present.      Left lower leg: Edema present.   Skin:     General: Skin is warm and dry.      Findings: No erythema.   Neurological:      General: No focal deficit present.      Mental Status: He is oriented to person, place, and time.   Psychiatric:         Mood and Affect: Mood normal.         Behavior: Behavior normal.         Fluids:  In: 1450 [P.O.:1350]  Out: 4250     LABS:  Recent Labs     06/21/21  0220 06/21/21  2209 06/22/21  0040   SODIUM 142 143 143   POTASSIUM 3.3* 3.3* 3.2*   CHLORIDE 109 109 109   CO2 20 21 21   GLUCOSE 96 166* 147*   BUN 58* 59* 63*   CREATININE 5.25* 5.44* 5.46*   CALCIUM 7.3* 7.6* 7.4*       IMPRESSION:  # FERNANDO--pt denies any prior history of CKD but review of labs indicates Cr ~1.4 in 2017, unclear recent baseline  # Acute Hypoxic Respiratory Failure--pulm edema  on CXR  -HD initiated 6/21 for acute hypoxic respiratory failure  # HTN--BP controlled  # DM II--management per primary svc  # LE Edema--fluid removal with HD  # Anemia--?related to CKD, iron sat low  # Hypokalemia  # Elevated Troponin--being monitored  # Left Foot and Right Bicep Wound--wound care  # Acute Hypoxic Respiratory Failure--now on high flow NC O2  --CXR with pulm edema        PLAN:  - Completed HD earlier this am  - Will dialyze again this afternoon with additional fluid removal  - Pt is negative fluid balance but still requiring high flow NC O2 with increasing O2 requirements  - Continue lasix 80mg IV BID for additional fluid removal  - IV Iron ordered  - No role for NAFISA in FERNANDO  - Record strict I/O's  - Dose adjust all meds for decreased GFR  - Avoid IV contrast/nephrotoxins/NSAIDs  - Check urine P/C ratio  - Avoid ACE-I/ARB for now  - Will order potassium replacement  - Continue supplemental O2     **Discussed above with jose maria at bedside

## 2021-06-22 NOTE — ASSESSMENT & PLAN NOTE
Pt is a 71 yo  male with h/o of CHF and ESRD.  Pt having SOB tonight.  CXR showed pulmonary edema.  Pt given 80 mg of Lasix with minimal result.  Pt had BUN 63, and Cr 5.  Because pt's kidneys are not working well pt is retaining fluid and pt's fluid overload is causing pt's SOB.  Nephrology called and emergent HD requested for volume overload.  A 12.5F Viewglass HD catheter was inserted into the pt's R Subclavian vein, please see procedure note for details.      - Pt to get HD tonight  - Pt's respiratory distress should resolve once pt has HD  - Cont support with HFNC until pt completes HD  - Pt should undergo echocardiogram as pt had what appeared to be aortic stenosis and mitral regurgitation on auscultation.  Pt would benefit from echocardiogram to see if pt has valvular abnormalities attributing to pt's heart failure.

## 2021-06-22 NOTE — CODE DOCUMENTATION
Dr. Randolph at bedside, RRT at bedside, expiratory wheezes noted, placed on 15 L NRB, SpO2 90%. 80 IV lasix given at 1700 with only 200 mL urine out.

## 2021-06-22 NOTE — CONSULTS
Critical Care Consultation    Date of consult: 6/22/2021    Referring Physician  Donell Gunn M.D.    Reason for Consultation  Rapid Response SOB    History of Presenting Illness  70 y.o. male who presented 6/20/2021 with SOB from CHF exacerbation.  Pt also has FERNANDO and his kidneys are not making much urine, so pt became fluid overloaded with pulmonary edema on CXR.  Pt given 80 mg Lasix earlier tonight w/o much urine output.  Pt became SOB due to fluid overload and pt required HFNC oxygen support.  RRT called as pt was in respiratory distress.  Critical care consulted for respiratory distress.    Code Status  Full Code    Review of Systems  Review of Systems   Constitutional: Negative for chills and fever.   Respiratory: Positive for shortness of breath.    Cardiovascular: Positive for leg swelling. Negative for chest pain and palpitations.   Gastrointestinal: Negative for abdominal pain, nausea and vomiting.       Past Medical History   has a past medical history of CATARACT, Diabetes (2005), Hyperlipidemia, Hypertension, Seizure (HCC) (1987), Seizure disorder (HCC), Snoring, and Stroke (HCC).    Surgical History   has a past surgical history that includes other (2003); other (2011); cataract phaco with iol (4/20/2011); cataract phaco with iol (5/4/2011); and cataract extraction with iol (2011).    Family History  family history includes Diabetes in his mother; Heart Disease in his father.    Social History   reports that he quit smoking about 36 years ago. His smoking use included cigarettes. He has a 2.00 pack-year smoking history. He has never used smokeless tobacco. He reports that he does not drink alcohol and does not use drugs.    Medications  Home Medications     Reviewed by Rocky NEWSOME Chief Goes Out, PhT (Pharmacy Tech) on 06/21/21 at 0056  Med List Status: Complete   Medication Last Dose Status   aspirin EC (ECOTRIN) 81 MG Tablet Delayed Response 6/20/2021 Active   atorvastatin (LIPITOR) 20 MG Tab  6/20/2021 Active   atorvastatin (LIPITOR) 40 MG Tab 6/18/2021 Active   doxazosin (CARDURA) 8 MG tablet 6/19/2021 Active   felodipine (PLENDIL) 10 MG TABLET SR 24 HR 6/19/2021 Active   guaifenesin dextromethorphan (ROBITUSSIN DM) 100-10 MG/5ML Syrup syrup Not Taking Active   hydrochlorothiazide (HYDRODIURIL) 25 MG Tab 6/20/2021 Active   insulin detemir (LEVEMIR) 100 UNIT/ML Solution Pen-injector injection 6/19/2021 Active   insulin lispro, Human, (HUMALOG) 100 UNIT/ML Solution Pen-injector injection Not Taking Active   lisinopril (PRINIVIL, ZESTRIL) 40 MG tablet 6/20/2021 Active   meclizine (ANTIVERT) 25 MG Tab Not Taking Active              Current Facility-Administered Medications   Medication Dose Route Frequency Provider Last Rate Last Admin   • folic acid (FOLVITE) tablet 1 mg  1 mg Oral DAILY Santi Brewer M.D.   1 mg at 06/21/21 0935   • furosemide (LASIX) injection 80 mg  80 mg Intravenous BID DIURETIC Sherly Ang M.D.   80 mg at 06/21/21 1636   • ferric gluconate complex (FERRLECIT) 125 mg in  mL IVPB  125 mg Intravenous Q24HR Sherly Ang M.D.        Followed by   • [START ON 6/23/2021] ferric gluconate complex (FERRLECIT) 250 mg in  mL IVPB  250 mg Intravenous Q24HR Sherly Ang M.D.       • aspirin EC (ECOTRIN) tablet 81 mg  81 mg Oral QAM Piyush Fontaine D.O.   81 mg at 06/21/21 0549   • atorvastatin (LIPITOR) tablet 40 mg  40 mg Oral DAILY Piyush Fontaine D.O.   40 mg at 06/21/21 0549   • doxazosin (CARDURA) tablet 8 mg  8 mg Oral Q EVENING Piyush Fontaine D.O.   8 mg at 06/21/21 1637   • senna-docusate (PERICOLACE or SENOKOT S) 8.6-50 MG per tablet 2 tablet  2 tablet Oral BID Piyush Fontaine D.O.   2 tablet at 06/21/21 1637    And   • polyethylene glycol/lytes (MIRALAX) PACKET 1 Packet  1 Packet Oral QDAY PRN Piyush Fontaine D.O.        And   • magnesium hydroxide (MILK OF MAGNESIA) suspension 30 mL  30 mL Oral QDAY PRN Piyush Fontaine D.O.        And   •  bisacodyl (DULCOLAX) suppository 10 mg  10 mg Rectal QDAY PRN LASHAWN Manuel.O.       • acetaminophen (Tylenol) tablet 650 mg  650 mg Oral Q6HRS PRN LASHAWN Manuel.O.       • Pharmacy Consult Request ...Pain Management Review 1 Each  1 Each Other PHARMACY TO DOSE LASHAWN Manuel.O.       • oxyCODONE immediate-release (ROXICODONE) tablet 2.5 mg  2.5 mg Oral Q3HRS PRN Piyush Fontaine D.O.       • labetalol (NORMODYNE/TRANDATE) injection 10 mg  10 mg Intravenous Q4HRS PRN LASHAWN Manuel.O.       • ondansetron (ZOFRAN) syringe/vial injection 4 mg  4 mg Intravenous Q4HRS PRN Piyush Fontaine D.O.       • ondansetron (ZOFRAN ODT) dispertab 4 mg  4 mg Oral Q4HRS PRN Piyush Fontaine D.O.           Allergies  Allergies   Allergen Reactions   • Penicillin G Rash     Rxn - Exacerbated a rash on his legs  Early 2000's         Vital Signs last 24 hours  Temp:  [36 °C (96.8 °F)-37 °C (98.6 °F)] 36.8 °C (98.3 °F)  Pulse:  [] 100  Resp:  [17-24] 21  BP: (122-178)/() 140/77  SpO2:  [88 %-95 %] 88 %    Physical Exam  Physical Exam  Constitutional:       Appearance: He is ill-appearing.   HENT:      Head: Normocephalic and atraumatic.      Nose: Nose normal.      Mouth/Throat:      Mouth: Mucous membranes are moist.      Pharynx: Oropharynx is clear.   Eyes:      Conjunctiva/sclera: Conjunctivae normal.      Pupils: Pupils are equal, round, and reactive to light.   Cardiovascular:      Rate and Rhythm: Tachycardia present.      Pulses: Normal pulses.      Heart sounds: Murmur heard.   Crescendo systolic murmur is present with a grade of 3/6.   Decrescendo diastolic murmur is present with a grade of 2/4.     Pulmonary:      Effort: Respiratory distress present.   Abdominal:      General: There is no distension.      Palpations: Abdomen is soft.      Tenderness: There is no abdominal tenderness. There is no guarding.   Musculoskeletal:         General: Normal range of motion.      Cervical  back: Normal range of motion and neck supple.      Right lower le+ Pitting Edema present.      Left lower le+ Pitting Edema present.   Skin:     General: Skin is warm and dry.      Capillary Refill: Capillary refill takes less than 2 seconds.   Neurological:      Mental Status: He is alert and oriented to person, place, and time.         Fluids    Intake/Output Summary (Last 24 hours) at 2021 0123  Last data filed at 2021 2200  Gross per 24 hour   Intake 1690 ml   Output 950 ml   Net 740 ml       Laboratory  Recent Results (from the past 48 hour(s))   CBC w/ Differential    Collection Time: 21  9:11 PM   Result Value Ref Range    WBC 9.8 4.8 - 10.8 K/uL    RBC 2.53 (L) 4.70 - 6.10 M/uL    Hemoglobin 8.0 (L) 14.0 - 18.0 g/dL    Hematocrit 24.0 (L) 42.0 - 52.0 %    MCV 94.9 81.4 - 97.8 fL    MCH 31.6 27.0 - 33.0 pg    MCHC 33.3 (L) 33.7 - 35.3 g/dL    RDW 49.9 35.9 - 50.0 fL    Platelet Count 199 164 - 446 K/uL    MPV 11.2 9.0 - 12.9 fL    Neutrophils-Polys 78.70 (H) 44.00 - 72.00 %    Lymphocytes 10.30 (L) 22.00 - 41.00 %    Monocytes 6.50 0.00 - 13.40 %    Eosinophils 3.60 0.00 - 6.90 %    Basophils 0.50 0.00 - 1.80 %    Immature Granulocytes 0.40 0.00 - 0.90 %    Nucleated RBC 0.00 /100 WBC    Neutrophils (Absolute) 7.69 (H) 1.82 - 7.42 K/uL    Lymphs (Absolute) 1.01 1.00 - 4.80 K/uL    Monos (Absolute) 0.64 0.00 - 0.85 K/uL    Eos (Absolute) 0.35 0.00 - 0.51 K/uL    Baso (Absolute) 0.05 0.00 - 0.12 K/uL    Immature Granulocytes (abs) 0.04 0.00 - 0.11 K/uL    NRBC (Absolute) 0.00 K/uL   Complete Metabolic Panel (CMP)    Collection Time: 21  9:11 PM   Result Value Ref Range    Sodium 142 135 - 145 mmol/L    Potassium 3.1 (L) 3.6 - 5.5 mmol/L    Chloride 109 96 - 112 mmol/L    Co2 21 20 - 33 mmol/L    Anion Gap 12.0 7.0 - 16.0    Glucose 106 (H) 65 - 99 mg/dL    Bun 60 (H) 8 - 22 mg/dL    Creatinine 5.30 (HH) 0.50 - 1.40 mg/dL    Calcium 7.5 (L) 8.5 - 10.5 mg/dL    AST(SGOT) 21 12 - 45  U/L    ALT(SGPT) 11 2 - 50 U/L    Alkaline Phosphatase 64 30 - 99 U/L    Total Bilirubin 0.2 0.1 - 1.5 mg/dL    Albumin 2.6 (L) 3.2 - 4.9 g/dL    Total Protein 5.7 (L) 6.0 - 8.2 g/dL    Globulin 3.1 1.9 - 3.5 g/dL    A-G Ratio 0.8 g/dL   proBrain Natriuretic Peptide, NT    Collection Time: 21  9:11 PM   Result Value Ref Range    NT-proBNP >34882 (H) 0 - 125 pg/mL   Troponin STAT    Collection Time: 21  9:11 PM   Result Value Ref Range    Troponin T 313 (H) 6 - 19 ng/L   ESTIMATED GFR    Collection Time: 21  9:11 PM   Result Value Ref Range    GFR If  13 (A) >60 mL/min/1.73 m 2    GFR If Non African American 11 (A) >60 mL/min/1.73 m 2   D-Dimer (only helpful in low pre-test probability wells critieria. Do not order if patient ruled out by PERC criteria. See Weblinks at top of Labs section)    Collection Time: 21  9:52 PM   Result Value Ref Range    D-Dimer Screen 1.85 (H) 0.00 - 0.50 ug/mL (FEU)   Venous Blood Gas    Collection Time: 21  9:52 PM   Result Value Ref Range    Venous Bg Ph 7.31 7.31 - 7.45    Venous Bg Pco2 39.6 (L) 41.0 - 51.0 mmHg    Venous Bg Po2 63.0 (H) 25.0 - 40.0 mmHg    Venous Bg O2 Saturation 87.1 %    Venous Bg Hco3 20 (L) 24 - 28 mmol/L    Venous Bg Base Excess -6 mmol/L    Body Temp see below Centigrade   EKG    Collection Time: 21 10:04 PM   Result Value Ref Range    Report       [facility]    Test Date:  2021  Pt Name:    DARRELL KOWALSKI                  Department: ER  MRN:        [id number]                      Room:       [facility]  Gender:     Male                         Technician: [id number]  :        1950                   Requested By:TRIXIE MARLEY  Order #:    [id number]                    Reading MD: Gilbert Coronado MD    Measurements  Intervals                                Axis  Rate:       111                          P:          70  DE:         156                          QRS:        83  QRSD:        86                           T:          -49  QT:         348  QTc:        473    Interpretive Statements  SINUS TACHYCARDIA  BORDERLINE RIGHT AXIS DEVIATION  BORDERLINE LOW VOLTAGE IN FRONTAL LEADS  NONSPECIFIC REPOL ABNORMALITY, DIFFUSE LEADS  Compared to ECG 10/04/2017 19:11:06  Early repolarization now present  Sinus rhythm no longer present  Electronically Signed On 6- 22:30:53 PDT by Gilbert Coronado MD     BLOOD CULTURE x2    Collection Time: 06/20/21 10:52 PM    Specimen: Peripheral; Blood   Result Value Ref Range    Significant Indicator NEG     Source BLD     Site PERIPHERAL     Culture Result       No Growth  Note: Blood cultures are incubated for 5 days and  are monitored continuously.Positive blood cultures  are called to the RN and reported as soon as  they are identified.     COV-2, FLU A/B, AND RSV BY PCR (2-4 HOURS CEPHEID): Collect NP swab in VTM    Collection Time: 06/20/21 10:52 PM    Specimen: Nasopharyngeal; Respirate   Result Value Ref Range    Influenza virus A RNA Negative Negative    Influenza virus B, PCR Negative Negative    RSV, PCR Negative Negative    SARS-CoV-2 by PCR NotDetected     SARS-CoV-2 Source NP Swab    aPTT    Collection Time: 06/21/21  1:12 AM   Result Value Ref Range    APTT 31.3 24.7 - 36.0 sec   Prothrombin Time    Collection Time: 06/21/21  1:12 AM   Result Value Ref Range    PT 16.2 (H) 12.0 - 14.6 sec    INR 1.34 (H) 0.87 - 1.13   Heparin Xa (Unfractionated)    Collection Time: 06/21/21  1:12 AM   Result Value Ref Range    Heparin Xa (UFH) <0.10 IU/mL   CBC with Differential    Collection Time: 06/21/21  2:20 AM   Result Value Ref Range    WBC 8.2 4.8 - 10.8 K/uL    RBC 2.44 (L) 4.70 - 6.10 M/uL    Hemoglobin 7.4 (L) 14.0 - 18.0 g/dL    Hematocrit 23.5 (L) 42.0 - 52.0 %    MCV 96.3 81.4 - 97.8 fL    MCH 30.3 27.0 - 33.0 pg    MCHC 31.5 (L) 33.7 - 35.3 g/dL    RDW 50.4 (H) 35.9 - 50.0 fL    Platelet Count 189 164 - 446 K/uL    MPV 11.7 9.0 - 12.9 fL     Neutrophils-Polys 83.10 (H) 44.00 - 72.00 %    Lymphocytes 8.30 (L) 22.00 - 41.00 %    Monocytes 6.10 0.00 - 13.40 %    Eosinophils 1.60 0.00 - 6.90 %    Basophils 0.50 0.00 - 1.80 %    Immature Granulocytes 0.40 0.00 - 0.90 %    Nucleated RBC 0.00 /100 WBC    Neutrophils (Absolute) 6.84 1.82 - 7.42 K/uL    Lymphs (Absolute) 0.68 (L) 1.00 - 4.80 K/uL    Monos (Absolute) 0.50 0.00 - 0.85 K/uL    Eos (Absolute) 0.13 0.00 - 0.51 K/uL    Baso (Absolute) 0.04 0.00 - 0.12 K/uL    Immature Granulocytes (abs) 0.03 0.00 - 0.11 K/uL    NRBC (Absolute) 0.00 K/uL   Comp Metabolic Panel (CMP)    Collection Time: 06/21/21  2:20 AM   Result Value Ref Range    Sodium 142 135 - 145 mmol/L    Potassium 3.3 (L) 3.6 - 5.5 mmol/L    Chloride 109 96 - 112 mmol/L    Co2 20 20 - 33 mmol/L    Anion Gap 13.0 7.0 - 16.0    Glucose 96 65 - 99 mg/dL    Bun 58 (H) 8 - 22 mg/dL    Creatinine 5.25 (HH) 0.50 - 1.40 mg/dL    Calcium 7.3 (L) 8.5 - 10.5 mg/dL    AST(SGOT) 24 12 - 45 U/L    ALT(SGPT) 16 2 - 50 U/L    Alkaline Phosphatase 60 30 - 99 U/L    Total Bilirubin 0.2 0.1 - 1.5 mg/dL    Albumin 2.6 (L) 3.2 - 4.9 g/dL    Total Protein 5.4 (L) 6.0 - 8.2 g/dL    Globulin 2.8 1.9 - 3.5 g/dL    A-G Ratio 0.9 g/dL   FERRITIN    Collection Time: 06/21/21  2:20 AM   Result Value Ref Range    Ferritin 215.0 22.0 - 322.0 ng/mL   FOLATE    Collection Time: 06/21/21  2:20 AM   Result Value Ref Range    Folate -Folic Acid 3.1 (A) >4.0 ng/mL   HEMOGLOBIN A1C    Collection Time: 06/21/21  2:20 AM   Result Value Ref Range    Glycohemoglobin 5.3 4.0 - 5.6 %    Est Avg Glucose 105 mg/dL   IRON/TOTAL IRON BIND    Collection Time: 06/21/21  2:20 AM   Result Value Ref Range    Iron 17 (L) 50 - 180 ug/dL    Total Iron Binding 141 (L) 250 - 450 ug/dL    Unsat Iron Binding 124 110 - 370 ug/dL    % Saturation 12 (L) 15 - 55 %   Magnesium    Collection Time: 06/21/21  2:20 AM   Result Value Ref Range    Magnesium 1.8 1.5 - 2.5 mg/dL   RETICULOCYTES COUNT    Collection  Time: 06/21/21  2:20 AM   Result Value Ref Range    Reticulocyte Count 1.8 0.8 - 2.1 %    Retic, Absolute 0.04 0.04 - 0.06 M/uL    Imm. Reticulocyte Fraction 13.2 9.3 - 17.4 %    Retic Hgb Equivalent 33.7 29.0 - 35.0 pg/cell   TROPONIN    Collection Time: 06/21/21  2:20 AM   Result Value Ref Range    Troponin T 298 (H) 6 - 19 ng/L   VITAMIN B12    Collection Time: 06/21/21  2:20 AM   Result Value Ref Range    Vitamin B12 -True Cobalamin 502 211 - 911 pg/mL   ESTIMATED GFR    Collection Time: 06/21/21  2:20 AM   Result Value Ref Range    GFR If  13 (A) >60 mL/min/1.73 m 2    GFR If Non African American 11 (A) >60 mL/min/1.73 m 2   COD - Adult (Type and Screen)    Collection Time: 06/21/21  2:20 AM   Result Value Ref Range    ABO Grouping Only A     Rh Grouping Only POS     Antibody Screen-Cod NEG    POCT glucose device results    Collection Time: 06/21/21  2:40 AM   Result Value Ref Range    Glucose - Accu-Ck 101 (H) 65 - 99 mg/dL   Heparin Xa (Unfractionated)    Collection Time: 06/21/21  8:34 AM   Result Value Ref Range    Heparin Xa (UFH) 0.87 IU/mL   TSH    Collection Time: 06/21/21  8:34 AM   Result Value Ref Range    TSH 3.350 0.380 - 5.330 uIU/mL   TROPONIN    Collection Time: 06/21/21 11:55 AM   Result Value Ref Range    Troponin T 313 (H) 6 - 19 ng/L   MICROALBUMIN CREAT RATIO URINE    Collection Time: 06/21/21  1:26 PM   Result Value Ref Range    Creatinine, Urine 40.86 mg/dL    Microalbumin, Urine Random 264.8 mg/dL    Micro Alb Creat Ratio 6481 (H) 0 - 30 mg/g   HEMOGLOBIN AND HEMATOCRIT    Collection Time: 06/21/21  6:02 PM   Result Value Ref Range    Hemoglobin 7.9 (L) 14.0 - 18.0 g/dL    Hematocrit 24.1 (L) 42.0 - 52.0 %   POCT arterial blood gas device results    Collection Time: 06/21/21 10:08 PM   Result Value Ref Range    Ph 7.382 (L) 7.400 - 7.500    Pco2 40.6 (H) 26.0 - 37.0 mmHg    Po2 73 64 - 87 mmHg    Tco2 25 20 - 33 mmol/L    S02 94 93 - 99 %    Hco3 24.1 17.0 - 25.0 mmol/L     BE -1 -4 - 3 mmol/L    Body Temp 98.7 F degrees    O2 Therapy 100 %    iPF Ratio 73     Ph Temp Viola 7.381 (L) 7.400 - 7.500    Pco2 Temp Co 40.7 (H) 26.0 - 37.0 mmHg    Po2 Temp Cor 73 64 - 87 mmHg    Specimen Arterial     DelSys Other     LPM 15 lpm   Comp Metabolic Panel    Collection Time: 06/21/21 10:09 PM   Result Value Ref Range    Sodium 143 135 - 145 mmol/L    Potassium 3.3 (L) 3.6 - 5.5 mmol/L    Chloride 109 96 - 112 mmol/L    Co2 21 20 - 33 mmol/L    Anion Gap 13.0 7.0 - 16.0    Glucose 166 (H) 65 - 99 mg/dL    Bun 59 (H) 8 - 22 mg/dL    Creatinine 5.44 (HH) 0.50 - 1.40 mg/dL    Calcium 7.6 (L) 8.5 - 10.5 mg/dL    AST(SGOT) 23 12 - 45 U/L    ALT(SGPT) 13 2 - 50 U/L    Alkaline Phosphatase 64 30 - 99 U/L    Total Bilirubin 0.2 0.1 - 1.5 mg/dL    Albumin 2.5 (L) 3.2 - 4.9 g/dL    Total Protein 5.6 (L) 6.0 - 8.2 g/dL    Globulin 3.1 1.9 - 3.5 g/dL    A-G Ratio 0.8 g/dL   CBC WITH DIFFERENTIAL    Collection Time: 06/21/21 10:09 PM   Result Value Ref Range    WBC 8.0 4.8 - 10.8 K/uL    RBC 2.53 (L) 4.70 - 6.10 M/uL    Hemoglobin 8.0 (L) 14.0 - 18.0 g/dL    Hematocrit 24.0 (L) 42.0 - 52.0 %    MCV 94.9 81.4 - 97.8 fL    MCH 31.6 27.0 - 33.0 pg    MCHC 33.3 (L) 33.7 - 35.3 g/dL    RDW 49.1 35.9 - 50.0 fL    Platelet Count 186 164 - 446 K/uL    MPV 10.8 9.0 - 12.9 fL    Neutrophils-Polys 87.90 (H) 44.00 - 72.00 %    Lymphocytes 5.30 (L) 22.00 - 41.00 %    Monocytes 4.90 0.00 - 13.40 %    Eosinophils 1.30 0.00 - 6.90 %    Basophils 0.30 0.00 - 1.80 %    Immature Granulocytes 0.30 0.00 - 0.90 %    Nucleated RBC 0.00 /100 WBC    Neutrophils (Absolute) 7.01 1.82 - 7.42 K/uL    Lymphs (Absolute) 0.42 (L) 1.00 - 4.80 K/uL    Monos (Absolute) 0.39 0.00 - 0.85 K/uL    Eos (Absolute) 0.10 0.00 - 0.51 K/uL    Baso (Absolute) 0.02 0.00 - 0.12 K/uL    Immature Granulocytes (abs) 0.02 0.00 - 0.11 K/uL    NRBC (Absolute) 0.00 K/uL   TROPONIN    Collection Time: 06/21/21 10:09 PM   Result Value Ref Range    Troponin T 301  (H) 6 - 19 ng/L   LACTIC ACID    Collection Time: 21 10:09 PM   Result Value Ref Range    Lactic Acid 1.0 0.5 - 2.0 mmol/L   ESTIMATED GFR    Collection Time: 21 10:09 PM   Result Value Ref Range    GFR If  13 (A) >60 mL/min/1.73 m 2    GFR If Non African American 10 (A) >60 mL/min/1.73 m 2   EKG    Collection Time: 21 11:21 PM   Result Value Ref Range    Report       [facility]    Test Date:  2021  Pt Name:    DARRELL KOWALSKI                  Department: 183  MRN:        [id number]                      Room:       [facility]  Gender:     Male                         Technician: HARESH  :        1950                   Requested By:SHAUNNA GILES  Order #:    [id number]                    Reading MD: Buck Lopez MD    Measurements  Intervals                                Axis  Rate:       98                           P:          49  WI:         156                          QRS:        56  QRSD:       76                           T:          -18  QT:         380  QTc:        486    Interpretive Statements  SINUS RHYTHM  ATRIAL PREMATURE COMPLEX  BORDERLINE LOW VOLTAGE IN FRONTAL LEADS  CONSIDER ANTEROSEPTAL INFARCT  BORDERLINE T ABNORMALITIES, INFERIOR LEADS  BORDERLINE PROLONGED QT INTERVAL  Electronically Signed On 2021 23:57:24 PDT by Buck Lopez MD     CBC WITH DIFFERENTIAL    Collection Time: 21 12:40 AM   Result Value Ref Range    WBC 7.2 4.8 - 10.8 K/uL    RBC 2.28 (L) 4.70 - 6.10 M/uL    Hemoglobin 7.3 (L) 14.0 - 18.0 g/dL    Hematocrit 21.4 (L) 42.0 - 52.0 %    MCV 93.9 81.4 - 97.8 fL    MCH 32.0 27.0 - 33.0 pg    MCHC 34.1 33.7 - 35.3 g/dL    RDW 49.0 35.9 - 50.0 fL    Platelet Count 177 164 - 446 K/uL    MPV 11.2 9.0 - 12.9 fL    Neutrophils-Polys 84.80 (H) 44.00 - 72.00 %    Lymphocytes 6.70 (L) 22.00 - 41.00 %    Monocytes 5.90 0.00 - 13.40 %    Eosinophils 1.80 0.00 - 6.90 %    Basophils 0.40 0.00 - 1.80 %    Immature Granulocytes 0.40 0.00 - 0.90  %    Nucleated RBC 0.00 /100 WBC    Neutrophils (Absolute) 6.06 1.82 - 7.42 K/uL    Lymphs (Absolute) 0.48 (L) 1.00 - 4.80 K/uL    Monos (Absolute) 0.42 0.00 - 0.85 K/uL    Eos (Absolute) 0.13 0.00 - 0.51 K/uL    Baso (Absolute) 0.03 0.00 - 0.12 K/uL    Immature Granulocytes (abs) 0.03 0.00 - 0.11 K/uL    NRBC (Absolute) 0.00 K/uL       Imaging  DX-CHEST-PORTABLE (1 VIEW)   Final Result      New right subclavian line tip overlies the brachiocephalic confluence and there is a kink/mild angulation which could impair flow      Stable congestive failure with low lung volumes, cardiac silhouette enlargement, pulmonary edema and pleural effusions. Superimposed aspiration or pneumonia cannot be excluded      DX-CHEST-PORTABLE (1 VIEW)   Final Result      Stable congestive failure with low lung volumes, cardiac silhouette enlargement, pulmonary edema and pleural effusions. Superimposed aspiration or pneumonia cannot be excluded      US-RENAL   Final Result            No hydronephrosis. No renal calculus.      Incidentally noted pleural effusions bilaterally      US-EXTREMITY VENOUS LOWER BILAT   Final Result      US-EXTREMITY VENOUS UPPER UNILAT RIGHT   Final Result      DX-CHEST-PORTABLE (1 VIEW)   Final Result      1.  Development of pulmonary edema      2.  Enlarged cardiac silhouette      3.  Bilateral pleural effusion, right greater than left      EC-ECHOCARDIOGRAM COMPLETE W/O CONT    (Results Pending)       Assessment/Plan  Acute respiratory failure with hypoxia (HCC)  Assessment & Plan  Pt is a 71 yo  male with h/o of CHF and ESRD.  Pt having SOB tonight.  CXR showed pulmonary edema.  Pt given 80 mg of Lasix with minimal result.  Pt had BUN 63, and Cr 5.  Because pt's kidneys are not working well pt is retaining fluid and pt's fluid overload is causing pt's SOB.  Nephrology called and emergent HD requested for volume overload.  A 12.5F iSquareuker HD catheter was inserted into the pt's R Subclavian vein, please see  procedure note for details.      - Pt to get HD tonight  - Pt's respiratory distress should resolve once pt has HD  - Cont support with HFNC until pt completes HD  - Pt should undergo echocardiogram as pt had what appeared to be aortic stenosis and mitral regurgitation on auscultation.  Pt would benefit from echocardiogram to see if pt has valvular abnormalities attributing to pt's heart failure.          Discussed patient condition and risk of morbidity and/or mortality with Hospitalist and Patient.    The patient remains critically ill.  Critical care time = 67 minutes in directly providing and coordinating critical care and extensive data review.  No time overlap and excludes procedures.

## 2021-06-22 NOTE — PROGRESS NOTES
[facility] Services Progress Note      STAT HD #1 today x 2 hours per Dr. Gan . Initiated at 0120 and ended at 0320.   Patient assessed prior tx. Increased SOB, on high flow O2, O2 sat 94%  Dr. Miki Chandler verified that the HD catheter is okay to use.       UF Net: 3,000 mL    Patient tolerated tx. Decreased SOB, O2 sat 99%. Alert and oriented x 4. Still on high flow O2.     See paper flowsheet for details.      R subclavian intact and patent with good flow during dialysis. No s/sx of infection, no redness nor bleeding noted on the CVC site. CVC dressing clean, dry and intact.   Blood returned and CVC port flushed with NS and Heparin 1000 units/mL used  to lock catheter given per designated amount. CVC port clamped, capped and labeled accordingly.       Report given to Primary MAGO Plata RN.

## 2021-06-22 NOTE — HEART FAILURE PROGRAM
Possible heart failure, echo is pending. Of note, patient has a creatinine of 5.46 and is undergoing emergent dialysis. Critical care note from yesterday indicates that kidneys are not working and this has caused volume overload which has cause respiratory distress.    So, even if the echo comes back supporting new heart failure, it may not be pertinent to this admission.    Thank you, Amanda Cardio RN Navigator [phone]    ADDENDUM 06.23.21 1205: echo completed, Dr. Whiting attempted to consult yesterday patient in dialysis. I will await cardiology consult.

## 2021-06-22 NOTE — PROGRESS NOTES
Monitor Summary  SR-ST    R PVC/ R couplet  R PAC    0029 potential afib on the monitor following dialysis cath placement. Self-resolved within hour and EKG confirmed SR. Physician aware and does not feel it was true afib.    .14/.08/.32

## 2021-06-22 NOTE — CODE DOCUMENTATION
Bladder scan ordered d/t low UO s/p lasix dose. MD to discuss case with senior resident and get back to RRT about plan of care.

## 2021-06-22 NOTE — CONSULTS
Reason for Consult:  Asked by Dr Donell Gunn M.D. to see this patient with volume overload and FERNANDO  Patient's PCP: Antione Coley M.D.    CC:   Chief Complaint   Patient presents with   • Shortness of Breath       HPI:  Jean Barboza is a 70 year old man with history of hypertension, DM, and CVA (a few years ago) who presented with shortness of breath, found to have volume overload and Cr 4.79 (last Cr was over 3 years ago - Cr 1.4).  Cardiology is consulted for volume overload and aortic stenosis.    The patient had no prior history of CKD or ESRD.  The patient denies any history of MI or heart failure.  He has been undergoing dialysis while here as well as IV diuretics.  The patient reports feeling okay this morning.  He is on high flow oxygen currently.  He denies any chest pain.  For some orthopnea.  Reports that leg swelling has much improved.  No palpitations.  No syncope or presyncopal episodes.      Medications / Drug list prior to admission:  No current facility-administered medications on file prior to encounter.     Current Outpatient Medications on File Prior to Encounter   Medication Sig Dispense Refill   • atorvastatin (LIPITOR) 40 MG Tab Take 40 mg by mouth every day.     • meclizine (ANTIVERT) 25 MG Tab Take 1 Tab by mouth 3 times a day. (Patient not taking: Reported on 6/20/2021) 30 Tab 0   • guaifenesin dextromethorphan (ROBITUSSIN DM) 100-10 MG/5ML Syrup syrup Take 5 mL by mouth every 6 hours as needed for Cough. (Patient not taking: Reported on 6/20/2021) 560 mL 1   • atorvastatin (LIPITOR) 20 MG Tab Take 2 Tabs by mouth every morning. (Patient not taking: Reported on 6/20/2021) 30 Tab 2   • insulin lispro, Human, (HUMALOG) 100 UNIT/ML Solution Pen-injector injection Inject 3-12 Units as instructed 3 times a day before meals. For glucose:  70   - 150  mg/dL =      0 Units  151 - 200  mg/dL =    3 Units  201 - 250  mg/dL =    4 Units  251 - 300  mg/dL  =   7 Units  301 - 350  mg/dL  =    8 Units  351 - 400 mg/dL   =   10 Units  Over 400 mg/dL   =   12 Units (Patient not taking: Reported on 6/20/2021) 3 PEN 3   • insulin detemir (LEVEMIR) 100 UNIT/ML Solution Pen-injector injection Inject 20 Units as instructed every evening. 3 PEN 3   • aspirin EC (ECOTRIN) 81 MG Tablet Delayed Response Take 81 mg by mouth every morning.     • hydrochlorothiazide (HYDRODIURIL) 25 MG Tab Take 25 mg by mouth every morning.     • lisinopril (PRINIVIL, ZESTRIL) 40 MG tablet Take 40 mg by mouth every day. Indications: High Blood Pressure Disorder     • felodipine (PLENDIL) 10 MG TABLET SR 24 HR Take 10 mg by mouth every evening. Indications: High Blood Pressure Disorder     • doxazosin (CARDURA) 8 MG tablet Take 8 mg by mouth every evening.         Current list of administered Medications:    Current Facility-Administered Medications:   •  heparin injection 2,600 Units, 2,600 Units, Intracatheter, ACUTE DIALYSIS PRN, Nayla Gan M.D., 2,600 Units at 06/22/21 1836  •  carvedilol (COREG) tablet 6.25 mg, 6.25 mg, Oral, BID WITH MEALS, Sherly Ang M.D., 6.25 mg at 06/22/21 1941  •  hydrALAZINE (APRESOLINE) tablet 10 mg, 10 mg, Oral, Q8HRS, Sherly Ang M.D., 10 mg at 06/23/21 0446  •  folic acid (FOLVITE) tablet 1 mg, 1 mg, Oral, DAILY, Santi Brewer M.D., 1 mg at 06/23/21 0446  •  furosemide (LASIX) injection 80 mg, 80 mg, Intravenous, BID DIURETIC, Sherly Ang M.D., 80 mg at 06/23/21 0445  •  [COMPLETED] ferric gluconate complex (FERRLECIT) 125 mg in  mL IVPB, 125 mg, Intravenous, Q24HR, Stopped at 06/21/21 1852 **FOLLOWED BY** [COMPLETED] ferric gluconate complex (FERRLECIT) 125 mg in  mL IVPB, 125 mg, Intravenous, Q24HR, Stopped at 06/22/21 1900 **FOLLOWED BY** ferric gluconate complex (FERRLECIT) 250 mg in  mL IVPB, 250 mg, Intravenous, Q24HR, Sherly Ang M.D.  •  aspirin EC (ECOTRIN) tablet 81 mg, 81 mg, Oral, [facility], Pyiush Fontaine D.O., 81 mg at 06/23/21 0446  •   atorvastatin (LIPITOR) tablet 40 mg, 40 mg, Oral, DAILY, Piyush Fontaine D.O., 40 mg at 06/23/21 0446  •  doxazosin (CARDURA) tablet 8 mg, 8 mg, Oral, Q EVENING, Piyush Fontaine D.O., 8 mg at 06/22/21 1941  •  senna-docusate (PERICOLACE or SENOKOT S) 8.6-50 MG per tablet 2 tablet, 2 tablet, Oral, BID, 2 tablet at 06/23/21 0446 **AND** polyethylene glycol/lytes (MIRALAX) PACKET 1 Packet, 1 Packet, Oral, QDAY PRN **AND** magnesium hydroxide (MILK OF MAGNESIA) suspension 30 mL, 30 mL, Oral, QDAY PRN **AND** bisacodyl (DULCOLAX) suppository 10 mg, 10 mg, Rectal, QDAY PRN, LASHAWN Manuel.O.  •  acetaminophen (Tylenol) tablet 650 mg, 650 mg, Oral, Q6HRS PRN, LASHAWN Manuel.O.  •  Notify provider if pain remains uncontrolled, , , CONTINUOUS **AND** Use the Numeric Rating Scale (NRS), Carmen-Baker Faces (WBF), or FLACC on regular floors and Critical-Care Pain Observation Tool (CPOT) on ICUs/Trauma to assess pain, , , CONTINUOUS **AND** Pulse Ox, , , CONTINUOUS **AND** Pharmacy Consult Request ...Pain Management Review 1 Each, 1 Each, Other, PHARMACY TO DOSE **AND** If patient difficult to arouse and/or has respiratory depression (respiratory rate of 10 or less), stop any opiates that are currently infusing and call a Rapid Response., , , CONTINUOUS, LASHAWN Manuel.[email or web address].  •  oxyCODONE immediate-release (ROXICODONE) tablet 2.5 mg, 2.5 mg, Oral, Q3HRS PRN **OR** [DISCONTINUED] oxyCODONE immediate-release (ROXICODONE) tablet 5 mg, 5 mg, Oral, Q3HRS PRN **OR** [DISCONTINUED] HYDROmorphone (Dilaudid) injection 0.25 mg, 0.25 mg, Intravenous, Q3HRS PRN, Piyush Fontaine D.O.  •  labetalol (NORMODYNE/TRANDATE) injection 10 mg, 10 mg, Intravenous, Q4HRS PRN, Piyush Fontaine D.O.  •  ondansetron (ZOFRAN) syringe/vial injection 4 mg, 4 mg, Intravenous, Q4HRS PRN, Piyush Fontaine D.O.  •  ondansetron (ZOFRAN ODT) dispertab 4 mg, 4 mg, Oral, Q4HRS PRN, Piyush Fontaine, D.O.    Past Medical History:    Diagnosis Date   • CATARACT    • Diabetes     oral meds   • Hyperlipidemia    • Hypertension    • Seizure (HCC)    • Seizure disorder (HCC)    • Snoring    • Stroke (HCC)        Past Surgical History:   Procedure Laterality Date   • CATARACT PHACO WITH IOL  2011    Performed by ENRIQUETA MABRY at [facility]   • CATARACT PHACO WITH IOL  2011    Performed by ENRIQUETA MABRY at [facility]GERY SAME DAY [facility]   • OTHER      left eye cataract   • CATARACT EXTRACTION WITH IOL     • OTHER      oral surgery       Family History   Problem Relation Age of Onset   • Diabetes Mother    • Heart Disease Father      Patient family history was personally reviewed, no pertinent family history to current presentation    Social History     Tobacco Use   • Smoking status: Former Smoker     Packs/day: 0.50     Years: 4.00     Pack years: 2.00     Types: Cigarettes     Quit date: 1985     Years since quittin.4   • Smokeless tobacco: Never Used   Substance Use Topics   • Alcohol use: No   • Drug use: No       ALLERGIES:  Allergies   Allergen Reactions   • Penicillin G Rash     Rxn - Exacerbated a rash on his legs  Early          Review of systems:  A complete review of symptoms was reviewed with patient. This is reviewed in H&P and PMH. ALL OTHERS reviewed and negative    Physical exam:  Patient Vitals for the past 24 hrs:   BP Temp Temp src Pulse Resp SpO2 Weight   21 1522 -- -- -- 89 -- -- --   21 1124 (!) 168/86 36.8 °C (98.3 °F) Temporal (!) 101 19 95 % --   21 1035 -- -- -- (!) 104 18 95 % --   21 0742 -- -- -- (!) 102 18 93 % --   21 0712 148/88 36.5 °C (97.7 °F) Temporal (!) 105 18 95 % --   21 0428 143/88 37.6 °C (99.6 °F) Temporal (!) 107 18 95 % --   21 0330 111/70 36.7 °C (98 °F) Temporal -- 19 99 % --   21 0300 -- -- -- 90 -- -- --   21 0200 -- -- -- (!) 101 20 93 % --   21 0115 143/91 36.8 °C (98.3 °F)  Temporal -- (!) 24 94 % --   06/21/21 2352 140/77 36.8 °C (98.3 °F) Temporal 100 (!) 21 88 % --   06/21/21 2212 138/79 -- -- (!) 103 (!) 24 94 % --   06/21/21 2158 138/73 -- -- (!) 104 (!) 22 95 % --   06/21/21 2138 138/71 37 °C (98.6 °F) -- (!) 104 (!) 24 94 % --   06/21/21 1947 153/77 36.2 °C (97.2 °F) Temporal 95 17 89 % 80.4 kg (177 lb 4 oz)   06/21/21 1626 155/89 36.3 °C (97.3 °F) Temporal (!) 101 18 -- --     General: No acute distress.   EYES: no jaundice  HEENT: OP clear   Neck: No bruits No JVD.   CVS:  RRR.  3/6 systolic murmur  Resp: Decreased breath sounds at bases bilaterally.    Abdomen: Soft, NT, ND,  Skin: Grossly nothing acute no obvious rashes  Neurological: Alert, Moves all extremities, no cranial nerve defects on limited exam  Extremities: Pulse 2+ in b/l LE.  1+ pitting edema bilaterally. No cyanosis.       Data:  Laboratory studies personally reviewed by me:  Recent Results (from the past 24 hour(s))   EC-ECHOCARDIOGRAM COMPLETE W/O CONT    Collection Time: 06/22/21 10:00 AM   Result Value Ref Range    Eject.Frac. MOD BP 62.99     Eject.Frac. MOD 4C 61.56     Eject.Frac. MOD 2C 65.58     Left Ventrical Ejection Fraction 60    Sed Rate    Collection Time: 06/22/21  1:38 PM   Result Value Ref Range    Sed Rate Westergren >140 (H) 0 - 20 mm/hour   COMPLEMENT C3    Collection Time: 06/22/21  1:38 PM   Result Value Ref Range    C3 Complement 85.2 (L) 87.0 - 200.0 mg/dL   COMPLEMENT C4    Collection Time: 06/22/21  1:38 PM   Result Value Ref Range    Complement C4 32.1 19.0 - 52.0 mg/dL   CBC WITHOUT DIFFERENTIAL    Collection Time: 06/22/21  1:38 PM   Result Value Ref Range    WBC 7.3 4.8 - 10.8 K/uL    RBC 2.61 (L) 4.70 - 6.10 M/uL    Hemoglobin 8.4 (L) 14.0 - 18.0 g/dL    Hematocrit 24.5 (L) 42.0 - 52.0 %    MCV 93.9 81.4 - 97.8 fL    MCH 32.2 27.0 - 33.0 pg    MCHC 34.3 33.7 - 35.3 g/dL    RDW 48.0 35.9 - 50.0 fL    Platelet Count 216 164 - 446 K/uL    MPV 11.0 9.0 - 12.9 fL   Basic Metabolic  Panel    Collection Time: 06/22/21  1:38 PM   Result Value Ref Range    Sodium 142 135 - 145 mmol/L    Potassium 3.4 (L) 3.6 - 5.5 mmol/L    Chloride 107 96 - 112 mmol/L    Co2 23 20 - 33 mmol/L    Glucose 119 (H) 65 - 99 mg/dL    Bun 52 (H) 8 - 22 mg/dL    Creatinine 4.79 (H) 0.50 - 1.40 mg/dL    Calcium 7.8 (L) 8.5 - 10.5 mg/dL    Anion Gap 12.0 7.0 - 16.0   CRP QUANTITIVE (NON-CARDIAC)    Collection Time: 06/22/21  1:38 PM   Result Value Ref Range    Stat C-Reactive Protein 10.88 (H) 0.00 - 0.75 mg/dL   MAGNESIUM    Collection Time: 06/22/21  1:38 PM   Result Value Ref Range    Magnesium 1.8 1.5 - 2.5 mg/dL   ESTIMATED GFR    Collection Time: 06/22/21  1:38 PM   Result Value Ref Range    GFR If  15 (A) >60 mL/min/1.73 m 2    GFR If Non  12 (A) >60 mL/min/1.73 m 2   URINE SODIUM RANDOM    Collection Time: 06/23/21  2:50 AM   Result Value Ref Range    Sodium, Urine -per volume 84 mmol/L   URINE CREATININE RANDOM    Collection Time: 06/23/21  2:50 AM   Result Value Ref Range    Creatinine, Random Urine 43.68 mg/dL   CBC WITHOUT DIFFERENTIAL    Collection Time: 06/23/21  3:40 AM   Result Value Ref Range    WBC 8.1 4.8 - 10.8 K/uL    RBC 2.57 (L) 4.70 - 6.10 M/uL    Hemoglobin 7.9 (L) 14.0 - 18.0 g/dL    Hematocrit 24.5 (L) 42.0 - 52.0 %    MCV 95.3 81.4 - 97.8 fL    MCH 30.7 27.0 - 33.0 pg    MCHC 32.2 (L) 33.7 - 35.3 g/dL    RDW 50.3 (H) 35.9 - 50.0 fL    Platelet Count 196 164 - 446 K/uL    MPV 11.2 9.0 - 12.9 fL   Basic Metabolic Panel    Collection Time: 06/23/21  3:40 AM   Result Value Ref Range    Sodium 141 135 - 145 mmol/L    Potassium 4.0 3.6 - 5.5 mmol/L    Chloride 108 96 - 112 mmol/L    Co2 24 20 - 33 mmol/L    Glucose 123 (H) 65 - 99 mg/dL    Bun 40 (H) 8 - 22 mg/dL    Creatinine 3.61 (H) 0.50 - 1.40 mg/dL    Calcium 7.9 (L) 8.5 - 10.5 mg/dL    Anion Gap 9.0 7.0 - 16.0   ESTIMATED GFR    Collection Time: 06/23/21  3:40 AM   Result Value Ref Range    GFR If   20 (A) >60 mL/min/1.73 m 2    GFR If Non African American 17 (A) >60 mL/min/1.73 m 2       Imaging:  EC-ECHOCARDIOGRAM COMPLETE W/O CONT   Final Result      DX-CHEST-PORTABLE (1 VIEW)   Final Result      New right subclavian line tip overlies the brachiocephalic confluence and there is a kink/mild angulation which could impair flow      Stable congestive failure with low lung volumes, cardiac silhouette enlargement, pulmonary edema and pleural effusions. Superimposed aspiration or pneumonia cannot be excluded      DX-CHEST-PORTABLE (1 VIEW)   Final Result      Stable congestive failure with low lung volumes, cardiac silhouette enlargement, pulmonary edema and pleural effusions. Superimposed aspiration or pneumonia cannot be excluded      US-RENAL   Final Result            No hydronephrosis. No renal calculus.      Incidentally noted pleural effusions bilaterally      US-EXTREMITY VENOUS LOWER BILAT   Final Result      US-EXTREMITY VENOUS UPPER UNILAT RIGHT   Final Result      DX-CHEST-PORTABLE (1 VIEW)   Final Result      1.  Development of pulmonary edema      2.  Enlarged cardiac silhouette      3.  Bilateral pleural effusion, right greater than left              EKG 6/22/2021: personally reviewed by me  Sinus tachycardia    Echocardiogram 6/21/2021  CONCLUSIONS  Prior echo on 9/22/17.  The left ventricle was normal in size.  Left ventricular ejection fraction is visually estimated to be 60%.  Moderate concentric left ventricular hypertrophy.  Grade II diastolic dysfunction.  Moderate to severe aortic stenosis. Transvalvular gradients are Mean:   40 mmHg. Aortic valve area calculated from the continuity equation is   0.9 cm2.  Estimated right ventricular systolic pressure is 70 mmHg.  Normal inferior vena cava size without inspiratory collapse.  Compared to the prior study done - there has been progression of AS and   development of pulmonary hypertension.        All pertinent features of laboratory and imaging  reviewed including primary images where applicable      Active Problems:    Type 2 diabetes mellitus (HCC) POA: Yes    Elevated troponin POA: Yes      Overview:           Hypertension POA: Yes    Hypokalemia POA: Yes    Acute respiratory failure with hypoxia (HCC) POA: Unknown    Normocytic anemia POA: Yes    Acute renal failure (HCC) POA: Yes    Aortic stenosis POA: Unknown      Overview: Presented with SOB, orthopnea, leg swelling       Chest x-ray - Pulmonary edema       Echo : EF 60, Moderate to severe aortic stenosis             PLAN        - Lasix 80mg IV BID diuretic      - titrate O2 supplements to >90%      - Cardiology consultation for likely TAVR           Pulmonary hypertension (HCC) POA: Unknown      Overview:         Resolved Problems:    Suspected pulmonary embolism POA: Yes      Assessment / Plan:    Acute on chronic HFpEF exacerbation  Moderate to severity aortic stenosis  FERNANDO  Etiology HFpEF likely secondary to kidney disease and metabolic disorder.  The patient is volume overloaded on exam.  -Agree with IV diuretics and dialysis as per nephrology  -Management of hypertension as per nephrology  -Consider BiPAP if respiratory status worsens.  -Aortic valve appears moderate to severely stenotic, may benefit from valve replacement.  Will need to follow-up in cardiology clinic to assess for TAVR versus surgical replacement.    I personally discussed his case with  Dr Donell Gunn M.D.      It is my pleasure to participate in the care of Mr. Barboza.  Please do not hesitate to contact me with questions or concerns.    Yamil Whiting MD   Cardiologist [facility]    6/22/2021    Please note that this dictation was created using voice recognition software. There may be errors I did not discover before finalizing the note.

## 2021-06-22 NOTE — PROGRESS NOTES
Both son He and niece updated to pt condition and overnight events. State understanding and all current questions answered. Family to visit this morning during rounds to speak with physician and be updated to pt plan.

## 2021-06-22 NOTE — ASSESSMENT & PLAN NOTE
Echo: EF 60%, Grade II diastolic dysfunction, Moderate to severe aortic stenosis , RVSP 70. Likely Type 2 in the setting of Aortic stenosis, HFPEF   -STOP Lasix 80mg IV BID diuretic  -HD per nephrology for volume overload, pending dialysis chair  -titrate O2 supplements to >90%

## 2021-06-22 NOTE — PROGRESS NOTES
On-call physician verbalized to hold Lasix in light of pt's elevated creatinine. Wishes for day team to re-evaluate safety of med for renal function.

## 2021-06-22 NOTE — PROCEDURES
Central Line Insertion    Date/Time: 6/22/2021 1:21 AM  Performed by: Miki Chandler D.O.  Authorized by: Miki Chandler D.O.     Consent:     Consent obtained:  Verbal    Consent given by:  Patient    Risks discussed:  Bleeding, infection and pneumothorax    Alternatives discussed:  No treatment  Pre-procedure details:     Hand hygiene: Hand hygiene performed prior to insertion      Sterile barrier technique: All elements of maximal sterile technique followed      Skin preparation:  2% chlorhexidine    Skin preparation agent: Skin preparation agent completely dried prior to procedure    Anesthesia:     Anesthesia method:  Local infiltration    Local anesthetic:  Lidocaine 1% w/o epi  Procedure details:     Location:  R subclavian    Patient position:  Trendelenburg    Procedural supplies:  Triple lumen    Catheter size:  12 Fr    Landmarks identified: yes      Ultrasound guidance: no      Number of attempts:  1    Successful placement: yes    Post-procedure details:     Post-procedure:  Dressing applied and line sutured    Assessment:  Blood return through all ports, free fluid flow, no pneumothorax on x-ray and placement verified by x-ray    Patient tolerance of procedure:  Tolerated well, no immediate complications  Comments:      Walt 12.5 F HD catheter

## 2021-06-22 NOTE — PROGRESS NOTES
Stat chest x ray performed, pt de-satted to 83% on 10 L oxymask. Rapid response called. Pt placed on non-rebreather mask and slowly corrected back up to 90-91%. ABG performed with BMP and trope. Physician at bedside. Pt still demonstrating increased work of breathing with anxiety and accessory muscle use. Renal labs reviewed with physician and lasix deemed not appropriate at this time due to creatinine elevation. Chest x ray showing worsening edema. Urine output poor and not demonstrating Lasix effectiveness. Bladder scan received.  Intensivist consulted and at bedside. Emergent dialysis ordered and dialysis cath to be placed by intensivist. Nephrology contacted. Pt placed on high-flow nasal cannula. Satting 95%.

## 2021-06-22 NOTE — PROGRESS NOTES
Cross Coverage Medicine Note    Rapid response called by bedside nurse for increased hypoxemia.  On exam, patient was afebrile, tachypneic, tachycardic 101, SPO2 94% on 10 L oxygen mask.  Blood pressure was stable.  He was in mild distress.  He had diminished lung volumes bilaterally and mild wheezing at the bilateral bases.  2+ pitting pretibial edema.  He was A&O x3.  ABG 7.38/40.6/73/24.1. A stat chest x-ray was ordered which looks similar to the previous one and was consistent with pulmonary edema and bilateral pleural effusions.  Per bedside nurse, he has produced roughly 700 cc of urine since the beginning of day shift despite IV furosemide.  Bladder scan was negative for any significant urinary retention.  I contacted Dr. Gan, nephrologist, for emergent dialysis tonight for volume overload refractory to diuretic and she agreed.  Intensivist, Dr. Chandler, was also contacted and placed a temporary dialysis catheter to right subclavian.    This was discussed with Dr. Chandler, my senior resident, the nurse, and the patient at the bedside.

## 2021-06-23 ENCOUNTER — APPOINTMENT (OUTPATIENT)
Dept: RADIOLOGY | Facility: MEDICAL CENTER | Age: 71
DRG: 291 | End: 2021-06-23
Attending: STUDENT IN AN ORGANIZED HEALTH CARE EDUCATION/TRAINING PROGRAM
Payer: MEDICARE

## 2021-06-23 LAB
ANION GAP SERPL CALC-SCNC: 9 MMOL/L (ref 7–16)
BASE EXCESS BLDA CALC-SCNC: -1 MMOL/L (ref -4–3)
BODY TEMPERATURE: ABNORMAL CENTIGRADE
BUN SERPL-MCNC: 40 MG/DL (ref 8–22)
CALCIUM SERPL-MCNC: 7.9 MG/DL (ref 8.5–10.5)
CHLORIDE SERPL-SCNC: 108 MMOL/L (ref 96–112)
CO2 SERPL-SCNC: 24 MMOL/L (ref 20–33)
CREAT SERPL-MCNC: 3.61 MG/DL (ref 0.5–1.4)
CREAT UR-MCNC: 43.68 MG/DL
ERYTHROCYTE [DISTWIDTH] IN BLOOD BY AUTOMATED COUNT: 50.3 FL (ref 35.9–50)
GLUCOSE SERPL-MCNC: 123 MG/DL (ref 65–99)
HCO3 BLDA-SCNC: 24 MMOL/L (ref 17–25)
HCT VFR BLD AUTO: 24.5 % (ref 42–52)
HGB BLD-MCNC: 7.9 G/DL (ref 14–18)
INHALED O2 FLOW RATE: 40 L/MIN (ref 2–10)
LACTATE BLD-SCNC: 0.7 MMOL/L (ref 0.5–2)
LACTATE BLD-SCNC: 0.8 MMOL/L (ref 0.5–2)
MCH RBC QN AUTO: 30.7 PG (ref 27–33)
MCHC RBC AUTO-ENTMCNC: 32.2 G/DL (ref 33.7–35.3)
MCV RBC AUTO: 95.3 FL (ref 81.4–97.8)
PCO2 BLDA: 45.6 MMHG (ref 26–37)
PH BLDA: 7.35 [PH] (ref 7.4–7.5)
PLATELET # BLD AUTO: 196 K/UL (ref 164–446)
PMV BLD AUTO: 11.2 FL (ref 9–12.9)
PO2 BLDA: 51.3 MMHG (ref 64–87)
POTASSIUM SERPL-SCNC: 4 MMOL/L (ref 3.6–5.5)
RBC # BLD AUTO: 2.57 M/UL (ref 4.7–6.1)
SAO2 % BLDA: 83.3 % (ref 93–99)
SODIUM SERPL-SCNC: 141 MMOL/L (ref 135–145)
SODIUM UR-SCNC: 84 MMOL/L
WBC # BLD AUTO: 8.1 K/UL (ref 4.8–10.8)

## 2021-06-23 PROCEDURE — 36415 COLL VENOUS BLD VENIPUNCTURE: CPT

## 2021-06-23 PROCEDURE — 700102 HCHG RX REV CODE 250 W/ 637 OVERRIDE(OP): Performed by: STUDENT IN AN ORGANIZED HEALTH CARE EDUCATION/TRAINING PROGRAM

## 2021-06-23 PROCEDURE — 770020 HCHG ROOM/CARE - TELE (206)

## 2021-06-23 PROCEDURE — 85027 COMPLETE CBC AUTOMATED: CPT

## 2021-06-23 PROCEDURE — 700111 HCHG RX REV CODE 636 W/ 250 OVERRIDE (IP): Performed by: STUDENT IN AN ORGANIZED HEALTH CARE EDUCATION/TRAINING PROGRAM

## 2021-06-23 PROCEDURE — A9270 NON-COVERED ITEM OR SERVICE: HCPCS | Performed by: STUDENT IN AN ORGANIZED HEALTH CARE EDUCATION/TRAINING PROGRAM

## 2021-06-23 PROCEDURE — 99291 CRITICAL CARE FIRST HOUR: CPT | Performed by: INTERNAL MEDICINE

## 2021-06-23 PROCEDURE — 82803 BLOOD GASES ANY COMBINATION: CPT

## 2021-06-23 PROCEDURE — 84300 ASSAY OF URINE SODIUM: CPT

## 2021-06-23 PROCEDURE — 82570 ASSAY OF URINE CREATININE: CPT

## 2021-06-23 PROCEDURE — 99222 1ST HOSP IP/OBS MODERATE 55: CPT | Performed by: STUDENT IN AN ORGANIZED HEALTH CARE EDUCATION/TRAINING PROGRAM

## 2021-06-23 PROCEDURE — 99233 SBSQ HOSP IP/OBS HIGH 50: CPT | Mod: GC | Performed by: HOSPITALIST

## 2021-06-23 PROCEDURE — 94640 AIRWAY INHALATION TREATMENT: CPT

## 2021-06-23 PROCEDURE — 5A1D70Z PERFORMANCE OF URINARY FILTRATION, INTERMITTENT, LESS THAN 6 HOURS PER DAY: ICD-10-PCS | Performed by: INTERNAL MEDICINE

## 2021-06-23 PROCEDURE — 700111 HCHG RX REV CODE 636 W/ 250 OVERRIDE (IP): Performed by: INTERNAL MEDICINE

## 2021-06-23 PROCEDURE — 90935 HEMODIALYSIS ONE EVALUATION: CPT

## 2021-06-23 PROCEDURE — 80048 BASIC METABOLIC PNL TOTAL CA: CPT

## 2021-06-23 PROCEDURE — 83605 ASSAY OF LACTIC ACID: CPT | Mod: 91

## 2021-06-23 PROCEDURE — 700105 HCHG RX REV CODE 258: Performed by: STUDENT IN AN ORGANIZED HEALTH CARE EDUCATION/TRAINING PROGRAM

## 2021-06-23 PROCEDURE — 71045 X-RAY EXAM CHEST 1 VIEW: CPT

## 2021-06-23 PROCEDURE — 94760 N-INVAS EAR/PLS OXIMETRY 1: CPT

## 2021-06-23 RX ORDER — ISOSORBIDE DINITRATE 10 MG/1
5 TABLET ORAL EVERY 8 HOURS
Status: DISCONTINUED | OUTPATIENT
Start: 2021-06-23 | End: 2021-06-27

## 2021-06-23 RX ADMIN — DOXAZOSIN 8 MG: 2 TABLET ORAL at 19:55

## 2021-06-23 RX ADMIN — HEPARIN SODIUM 2600 UNITS: 1000 INJECTION, SOLUTION INTRAVENOUS; SUBCUTANEOUS at 14:10

## 2021-06-23 RX ADMIN — FUROSEMIDE 80 MG: 10 INJECTION, SOLUTION INTRAMUSCULAR; INTRAVENOUS at 04:45

## 2021-06-23 RX ADMIN — FUROSEMIDE 80 MG: 10 INJECTION, SOLUTION INTRAMUSCULAR; INTRAVENOUS at 17:15

## 2021-06-23 RX ADMIN — FOLIC ACID 1 MG: 1 TABLET ORAL at 04:46

## 2021-06-23 RX ADMIN — CARVEDILOL 6.25 MG: 6.25 TABLET, FILM COATED ORAL at 17:15

## 2021-06-23 RX ADMIN — CARVEDILOL 6.25 MG: 6.25 TABLET, FILM COATED ORAL at 08:37

## 2021-06-23 RX ADMIN — ISOSORBIDE DINITRATE 5 MG: 10 TABLET ORAL at 13:47

## 2021-06-23 RX ADMIN — HYDRALAZINE HYDROCHLORIDE 10 MG: 10 TABLET, FILM COATED ORAL at 22:43

## 2021-06-23 RX ADMIN — ASPIRIN 81 MG: 81 TABLET, COATED ORAL at 04:46

## 2021-06-23 RX ADMIN — ISOSORBIDE DINITRATE 5 MG: 10 TABLET ORAL at 22:43

## 2021-06-23 RX ADMIN — HYDRALAZINE HYDROCHLORIDE 10 MG: 10 TABLET, FILM COATED ORAL at 04:46

## 2021-06-23 RX ADMIN — DOCUSATE SODIUM 50 MG AND SENNOSIDES 8.6 MG 2 TABLET: 8.6; 5 TABLET, FILM COATED ORAL at 04:46

## 2021-06-23 RX ADMIN — ATORVASTATIN CALCIUM 40 MG: 40 TABLET, FILM COATED ORAL at 04:46

## 2021-06-23 RX ADMIN — HYDRALAZINE HYDROCHLORIDE 10 MG: 10 TABLET, FILM COATED ORAL at 13:49

## 2021-06-23 RX ADMIN — SODIUM CHLORIDE 250 MG: 9 INJECTION, SOLUTION INTRAVENOUS at 17:14

## 2021-06-23 ASSESSMENT — FIBROSIS 4 INDEX: FIB4 SCORE: 2.28

## 2021-06-23 ASSESSMENT — ENCOUNTER SYMPTOMS
HEADACHES: 0
BLOOD IN STOOL: 0
SPUTUM PRODUCTION: 0
ABDOMINAL PAIN: 0
NAUSEA: 0
VOMITING: 0
COUGH: 0
DIZZINESS: 1
FOCAL WEAKNESS: 0
CHILLS: 0
FEVER: 0
ORTHOPNEA: 1
SHORTNESS OF BREATH: 1
WEAKNESS: 0

## 2021-06-23 ASSESSMENT — PAIN DESCRIPTION - PAIN TYPE: TYPE: ACUTE PAIN

## 2021-06-23 NOTE — CARE PLAN
The patient is Watcher - Medium risk of patient condition declining or worsening    Shift Goals  Clinical Goals: stable resp. status  Patient Goals: Improved breathing; rest  Family Goals: Stable resp. status    Progress made toward(s) clinical / shift goals:  Pt whiteboard updated on plan of care. Pt encouraged to call for assistance. Pt encouraged to voice any concerns or questions regarding care plan. Pt updated on plan of care as it develops and changes. Fall precautions in place. Bed in lowest position. Non-skid socks in place. Personal possessions within reach. Mobility sign on door. Bed-alarm on. Call light within reach. Pt educated regarding fall prevention and states understanding. Pt verbalizes understanding of using 0-10 pain scale, when to ask for pain medication, and medication information.      Problem: Pain - Standard  Goal: Alleviation of pain or a reduction in pain to the patient’s comfort goal  Outcome: Progressing     Problem: Knowledge Deficit - Standard  Goal: Patient and family/care givers will demonstrate understanding of plan of care, disease process/condition, diagnostic tests and medications  Outcome: Progressing     Problem: Skin Integrity  Goal: Skin integrity is maintained or improved  Outcome: Progressing     Problem: Fall Risk  Goal: Patient will remain free from falls  Outcome: Progressing      [facility] Division of Kidney Diseases & Hypertension  FOLLOW UP NOTE  [phone]--------------------------------------------------------------------------------    HPI: 76 year old man with past medical history of AAA 5.5cm s/p EVAAR repair on 1/29 with incidental finding of Right Neoplasm, previous history of Left Neoplasm with Left Nephrectomy in 2009 transferred from [facility] for worsening SOB. Patient was found to have DONNIE and fluid overload and was initiated on CVVHDF on 2/4/17 and was stopped on 2/6/18.  Had rapid afib with HD.  Ultrasound suggestive of high grade right renal artery narrowing.  Patient had CT angiogram over weekend which showed moderate to severe right renal artery stenosis. Patient is scheduled for PCI for WOOD. Currently patient is still on high flow nasal cannula; denies complains of CP, abdominal pain, nausea.       PAST HISTORY  --------------------------------------------------------------------------------  No significant changes to PMH, PSH, FHx, SHx, unless otherwise noted    ALLERGIES & MEDICATIONS  --------------------------------------------------------------------------------  Allergies    Cipro (Other)  Levaquin (Other)  penicillin (Hives)    Intolerances      Standing Inpatient Medications  argatroban Infusion 3 MICROgram(s)/kG/Min IV Continuous <Continuous>  aspirin enteric coated 81 milliGRAM(s) Oral daily  atorvastatin 80 milliGRAM(s) Oral at bedtime  aztreonam  IVPB      aztreonam  IVPB 500 milliGRAM(s) IV Intermittent once  aztreonam  IVPB 500 milliGRAM(s) IV Intermittent every 12 hours  docusate sodium 100 milliGRAM(s) Oral daily  hydrALAZINE 100 milliGRAM(s) Oral every 8 hours  isosorbide   dinitrate Tablet (ISORDIL) 10 milliGRAM(s) Oral three times a day  metoprolol succinate ER 50 milliGRAM(s) Oral daily  senna 1 Tablet(s) Oral at bedtime  sevelamer hydrochloride 1600 milliGRAM(s) Oral three times a day with meals  vancomycin  IVPB 1000 milliGRAM(s) IV Intermittent once    PRN Inpatient Medications  diazepam    Tablet 10 milliGRAM(s) Oral two times a day PRN  polyethylene glycol 3350 17 Gram(s) Oral daily PRN      REVIEW OF SYSTEMS  --------------------------------------------------------------------------------  Gen: Fatigue +  Head/Eyes/Ears/Mouth: No headache  Respiratory: No dyspnea  CV: No chest pain  GI: No abdominal pain, nausea, vomiting  MSK:  no edema  Neuro: No dizziness/lightheadedness      All other systems were reviewed and are negative, except as noted.    VITALS/PHYSICAL EXAM  --------------------------------------------------------------------------------  T(C): 36.7 (02-13-18 @ 08:00), Max: 36.7 (02-13-18 @ 08:00)  HR: 82 (02-13-18 @ 10:00) (82 - 98)  BP: 112/69 (02-12-18 @ 21:30) (112/69 - 112/69)  RR: 27 (02-13-18 @ 10:00) (18 - 41)  SpO2: 96% (02-13-18 @ 10:00) (94% - 99%)  Wt(kg): --        02-12-18 @ 07:01  -  02-13-18 @ 07:00  --------------------------------------------------------  IN: 652.8 mL / OUT: 0 mL / NET: 652.8 mL    02-13-18 @ 07:01  -  02-13-18 @ 11:01  --------------------------------------------------------  IN: 24.4 mL / OUT: 0 mL / NET: 24.4 mL      Physical Exam:  	             Gen: NAD   	HEENT: anicteric  	Pulm: crackles present bilaterally  	CV: RRR s1 and s2  	Abd: soft, nontender, nondistended  	LE: no B/L pedal edema present.   	Skin: Warm and dry   	Vascular access: right non tunneled HD catheter present.     LABS/STUDIES  --------------------------------------------------------------------------------              8.4    13.0  >-----------<  111      [02-13-18 @ 04:28]              24.2     131  |  90  |  90  ----------------------------<  103      [02-13-18 @ 04:28]  5.1   |  19  |  7.92        Ca     8.1     [02-13-18 @ 04:28]      Mg     2.6     [02-13-18 @ 04:28]      Phos  7.9     [02-13-18 @ 04:28]    TPro  6.2  /  Alb  2.7  /  TBili  0.3  /  DBili  x   /  AST  41  /  ALT  27  /  AlkPhos  65  [02-13-18 @ 04:28]    PT/INR: PT 29.8 , INR 2.68       [02-13-18 @ 04:28]  PTT: 71.5       [02-13-18 @ 04:28]    Troponin 0.62      [02-12-18 @ 02:21]  CK 61      [02-12-18 @ 02:21]    Creatinine Trend:  SCr 7.92 [02-13 @ 04:28]  SCr 4.95 [02-12 @ 02:21]  SCr 7.27 [02-11 @ 05:54]  SCr 8.95 [02-10 @ 05:35]  SCr 8.08 [02-09 @ 16:42]          HBsAb <3.0      [02-07-18 @ 23:02]  HBsAg Nonreact      [02-07-18 @ 23:02]  HCV 0.11, Nonreact      [02-07-18 @ 23:02] [facility] Division of Kidney Diseases & Hypertension  FOLLOW UP NOTE  [phone]--------------------------------------------------------------------------------    HPI: 76 year old man with past medical history of AAA 5.5cm s/p EVAAR repair on 1/29 with incidental finding of Right Neoplasm, previous history of Left Neoplasm with Left Nephrectomy in 2009 transferred from [facility] for worsening SOB. Patient was found to have DONNIE and fluid overload and was initiated on CVVHDF on 2/4/17 and was stopped on 2/6/18.  Had rapid afib with HD.  Ultrasound suggestive of high grade right renal artery narrowing.  Patient had CT angiogram over weekend which showed moderate to severe right renal artery stenosis. Patient is scheduled for PCI for WOOD. Currently patient is still on high flow nasal cannula; denies complains of CP, abdominal pain, nausea.       PAST HISTORY  --------------------------------------------------------------------------------  No significant changes to PMH, PSH, FHx, SHx, unless otherwise noted    ALLERGIES & MEDICATIONS  --------------------------------------------------------------------------------  Allergies    Cipro (Other)  Levaquin (Other)  penicillin (Hives)    Intolerances      Standing Inpatient Medications  argatroban Infusion 3 MICROgram(s)/kG/Min IV Continuous <Continuous>  aspirin enteric coated 81 milliGRAM(s) Oral daily  atorvastatin 80 milliGRAM(s) Oral at bedtime  aztreonam  IVPB      aztreonam  IVPB 500 milliGRAM(s) IV Intermittent once  aztreonam  IVPB 500 milliGRAM(s) IV Intermittent every 12 hours  docusate sodium 100 milliGRAM(s) Oral daily  hydrALAZINE 100 milliGRAM(s) Oral every 8 hours  isosorbide   dinitrate Tablet (ISORDIL) 10 milliGRAM(s) Oral three times a day  metoprolol succinate ER 50 milliGRAM(s) Oral daily  senna 1 Tablet(s) Oral at bedtime  sevelamer hydrochloride 1600 milliGRAM(s) Oral three times a day with meals  vancomycin  IVPB 1000 milliGRAM(s) IV Intermittent once    PRN Inpatient Medications  diazepam    Tablet 10 milliGRAM(s) Oral two times a day PRN  polyethylene glycol 3350 17 Gram(s) Oral daily PRN      REVIEW OF SYSTEMS  --------------------------------------------------------------------------------  Gen: Fatigue +  Head/Eyes/Ears/Mouth: No headache  Respiratory: No dyspnea  CV: No chest pain  GI: No abdominal pain, nausea, vomiting  MSK:  no edema  Neuro: No dizziness/lightheadedness      All other systems were reviewed and are negative, except as noted.    VITALS/PHYSICAL EXAM  --------------------------------------------------------------------------------  T(C): 36.7 (02-13-18 @ 08:00), Max: 36.7 (02-13-18 @ 08:00)  HR: 82 (02-13-18 @ 10:00) (82 - 98)  BP: 112/69 (02-12-18 @ 21:30) (112/69 - 112/69)  RR: 27 (02-13-18 @ 10:00) (18 - 41)  SpO2: 96% (02-13-18 @ 10:00) (94% - 99%)  Wt(kg): --        02-12-18 @ 07:01  -  02-13-18 @ 07:00  --------------------------------------------------------  IN: 652.8 mL / OUT: 0 mL / NET: 652.8 mL    02-13-18 @ 07:01  -  02-13-18 @ 11:01  --------------------------------------------------------  IN: 24.4 mL / OUT: 0 mL / NET: 24.4 mL      Physical Exam:  	             Gen: NAD   	HEENT: anicteric  	Pulm: crackles present bilaterally  	CV: RRR s1 and s2  	Abd: soft, nontender, nondistended  	LE: edema improved  	Skin: Warm and dry   	Vascular access: right non tunneled HD catheter present.     LABS/STUDIES  --------------------------------------------------------------------------------              8.4    13.0  >-----------<  111      [02-13-18 @ 04:28]              24.2     131  |  90  |  90  ----------------------------<  103      [02-13-18 @ 04:28]  5.1   |  19  |  7.92        Ca     8.1     [02-13-18 @ 04:28]      Mg     2.6     [02-13-18 @ 04:28]      Phos  7.9     [02-13-18 @ 04:28]    TPro  6.2  /  Alb  2.7  /  TBili  0.3  /  DBili  x   /  AST  41  /  ALT  27  /  AlkPhos  65  [02-13-18 @ 04:28]    PT/INR: PT 29.8 , INR 2.68       [02-13-18 @ 04:28]  PTT: 71.5       [02-13-18 @ 04:28]    Troponin 0.62      [02-12-18 @ 02:21]  CK 61      [02-12-18 @ 02:21]    Creatinine Trend:  SCr 7.92 [02-13 @ 04:28]  SCr 4.95 [02-12 @ 02:21]  SCr 7.27 [02-11 @ 05:54]  SCr 8.95 [02-10 @ 05:35]  SCr 8.08 [02-09 @ 16:42]          HBsAb <3.0      [02-07-18 @ 23:02]  HBsAg Nonreact      [02-07-18 @ 23:02]  HCV 0.11, Nonreact      [02-07-18 @ 23:02]

## 2021-06-23 NOTE — PROGRESS NOTES
Pt back from dialysis. Received report from night shift [facility]. Assumed care of pt @ 1900. Pt reports no pain at this time. Updated pt on plan of care. Pt resting comfortably in bed. Fall precautions in place. Educated on use of call light. Hourly rounding and continuous monitoring in place.

## 2021-06-23 NOTE — PROGRESS NOTES
Found patient on 15L oxymask, he stated he could not tolerate HFNC.  Sats were 85% and he has an increased WOB.  I explained to patient along with RN with his increased WOB and high O2 demands he needed HFNC.  Patient was reluctant by agreeable to try HFNC.  Placed on 40L an d70% with sats of 90%.

## 2021-06-23 NOTE — PROGRESS NOTES
[facility] -  PROGRESS NOTE               Author: Nayla Gan M.D. Date & Time: 6/23/2021  11:33 AM     Chief Complaint:  Follow up ESRD    HPI:  70yoM with PMH significant for HTN, DM II, Hyperlipidemia, Seizure Disorder admitted with complaints of worsening SOB over the past 3 days and worsening bilateral LE edema for the past 1 week and in the ER found to have elevated Creatinine of . Pt reports that he has been getting progressively more SOB over the past 3 days and his LE edema has been worsening over the past 1 week so he came to the ED for eval. In the ED Cr was 5.3 on admission and pt reports that he has never been told of abnormal renal function in the past. His CXR showed pulm edema and there was some concern for DVT or PE but due to his elevated Creatinine he did not undergo a CTA. Bilateral lower extremity dopplers have been ordered. Pt was admitted and started on lasix 40mg IV BID as well as hydrochlorothiazide. He reports that his SOB is a little better this but he is still requiring face mask supplemental O2. He reports he is urinating more with the lasix but overnight only minimal UOP documented.   No F/C/N/V/CP, +SOB and +LE Edema, No melena, hematochezia, hematemesis.  No HA, visual changes, or abdominal pain    DAILY NEPHROLOGY SUMMARY:  6/22/21: rapid response called overnight for acute resp decompensation, urgent HD catheter placed and pt dialyzed with 3L UF, currently on high flow NC O2 with 100% FiO2, reports breathing a little better, 1.2L UOP over past 24hrs, BP mildly elevated, still with LE edema, denies any CP or n/v/diarrhea  6/23/21: No events, remains on high flow NC O2, tolerated HD yest afternoon with 3.5L UF, pt ~6L net negative fluid balance since admit and still requiring significant O2 support, BP stable, reports SOB is a little better, still with LE edema but reports it is better, on high flow NC O2 with 70% FiO2    REVIEW OF SYSTEMS:    10 point  "ROS reviewed and is as per HPI/daily summary or otherwise negative    PMH/PSH/SH/FH: Reviewed and unchanged since admission note  CURRENT MEDICATIONS: Reviewed from admission to present day    VS:  /80   Pulse 80   Temp 36.3 °C (97.4 °F) (Temporal)   Resp (!) 22   Ht 1.727 m (5' 8\")   Wt 80.4 kg (177 lb 4 oz)   SpO2 92%   BMI 26.95 kg/m²   Physical Exam  Constitutional:       General: He is not in acute distress.     Appearance: He is ill-appearing.   HENT:      Head: Normocephalic and atraumatic.      Right Ear: External ear normal.      Left Ear: External ear normal.      Nose: Nose normal.      Comments: +high flow nasal canula     Mouth/Throat:      Mouth: Mucous membranes are moist.   Eyes:      Extraocular Movements: Extraocular movements intact.      Conjunctiva/sclera: Conjunctivae normal.   Cardiovascular:      Rate and Rhythm: Regular rhythm. Tachycardia present.      Heart sounds: No murmur heard.     Pulmonary:      Effort: No respiratory distress.      Breath sounds: Examination of the right-lower field reveals decreased breath sounds. Examination of the left-lower field reveals decreased breath sounds. Decreased breath sounds and rhonchi present.   Abdominal:      General: Bowel sounds are normal. There is no distension.      Palpations: Abdomen is soft.   Musculoskeletal:         General: No tenderness.      Cervical back: Normal range of motion and neck supple.      Right lower leg: Edema present.      Left lower leg: Edema present.   Skin:     General: Skin is warm and dry.      Findings: No erythema.   Neurological:      General: No focal deficit present.      Mental Status: He is oriented to person, place, and time.   Psychiatric:         Mood and Affect: Mood normal.         Behavior: Behavior normal.         Fluids:  In: 500 [Dialysis:500]  Out: 4750     LABS:  Recent Labs     06/22/21  0040 06/22/21  1338 06/23/21  0340   SODIUM 143 142 141   POTASSIUM 3.2* 3.4* 4.0   CHLORIDE 109 " 107 108   CO2 21 23 24   GLUCOSE 147* 119* 123*   BUN 63* 52* 40*   CREATININE 5.46* 4.79* 3.61*   CALCIUM 7.4* 7.8* 7.9*       IMPRESSION:  # FERNANDO--pt denies any prior history of CKD but review of labs indicates Cr ~1.4 in 2017, unclear recent baseline  # Acute Hypoxic Respiratory Failure--pulm edema on CXR  -HD initiated 6/21 for acute hypoxic respiratory failure  # HTN--BP controlled  # DM II--management per primary svc  # LE Edema--fluid removal with HD  # Anemia--?related to CKD, iron sat low  # Hypokalemia  # Elevated Troponin--being monitored  # Left Foot and Right Bicep Wound--wound care  # Acute Hypoxic Respiratory Failure--now on high flow NC O2  --CXR with pulm edema        PLAN:  - HD again today and for the next several days for fluid removal and to optimize volume status  - Continue lasix 80mg IV BID for additional fluid removal as pt is still urinating  - IV Iron ordered  - No role for NAFISA in FERNANDO  - Transfuse PRN for Hgb less than 7  - Record strict I/O's  - Dose adjust all meds for decreased GFR  - Avoid IV contrast/nephrotoxins/NSAIDs  - Check urine P/C ratio  - Avoid ACE-I/ARB for now  - Continue supplemental O2 and wean high flow NC O2 as tolerated     **Discussed above with Pulm/Critical Care

## 2021-06-23 NOTE — PROGRESS NOTES
Daily Progress Note:     Date of Service: 6/23/2021  Primary Team: [facility] IM White Team   Attending: Donell Gunn M.D.   Senior Resident: Dr. Ang  Intern: Dr. Brewer  Contact:  [phone]    Chief Complaint:   Heart failure, renal failure, hypoxic respiratory failure    Jean Barboza is a 69yo M with hx of CVA, seizure, HLD/HTN, DM2 (5.3 A1c); presenting with slowly progressive worsening of shortness of breath ( onset few months ago) , bilateral lower extremity edema and orthopnea.  Found to have acute kidney injury with creatinine at 5.3 (congestive nephropathy vs diabetic nephropathy)  And acute hypoxic respiratory failure ( Likely in the setting of HFpEF, moderate to severe aortic stenosis and severe pulmonary hypertension.  Followed by cardiology and nephrology.  Currently on daily dialysis.      Interval history:   No overnight event.  This morning patient was desaturating with minimal activity to low 80s on high flow with 100% FiO2.  Subjectively patient refused worsening shortness of breath or difficulty breathing.  He appeared to be comfortable.  Case was discussed with ICU intensivist for transfer to ICU for BiPAP.  Chest x-ray obtained showed mild worsening pulmonary edema.  ABG: pH 7.35, PCO2 45.6, HCO3 24, PO2 51.3.  ICU  intensivist recommended to continue to titrate high flow nasal cannula, at this point there is no need for ICU transfer.  If condition further deteriorates will reconsult.  Patient underwent dialysis today, net UF 3500 mL. Post dialysis Saturation improved to high 90's.         Consultants/Specialty:  Nephrology    Review of Systems:    Review of Systems   Constitutional: Negative for chills and fever.   Respiratory: Positive for shortness of breath. Negative for cough and sputum production.    Cardiovascular: Positive for orthopnea and leg swelling. Negative for chest pain.   Gastrointestinal: Negative for abdominal pain, blood in stool, melena, nausea and vomiting.    Genitourinary: Negative for dysuria, frequency, hematuria and urgency.   Neurological: Positive for dizziness. Negative for focal weakness, weakness and headaches.       Objective Data:   Physical Exam:   Vitals:   Temp:  [36.1 °C (97 °F)-36.7 °C (98.1 °F)] 36.4 °C (97.6 °F)  Pulse:  [74-90] 87  Resp:  [17-25] 22  BP: (123-177)/() 145/82  SpO2:  [82 %-100 %] 94 %     Physical Exam  Vitals and nursing note reviewed.   Constitutional:       General: He is not in acute distress.     Appearance: He is not ill-appearing or diaphoretic.   HENT:      Head: Normocephalic and atraumatic.      Mouth/Throat:      Mouth: Mucous membranes are moist.      Pharynx: Oropharynx is clear.   Eyes:      Extraocular Movements: Extraocular movements intact.      Pupils: Pupils are equal, round, and reactive to light.   Cardiovascular:      Rate and Rhythm: Normal rate and regular rhythm.      Heart sounds: Murmur heard.   No friction rub. No gallop.    Pulmonary:      Comments: Bibasilar crackles, no wheeze.  Abdominal:      General: There is no distension.      Palpations: Abdomen is soft.      Tenderness: There is no abdominal tenderness. There is no guarding or rebound.   Skin:     General: Skin is warm and dry.   Neurological:      General: No focal deficit present.      Mental Status: He is alert and oriented to person, place, and time.      Cranial Nerves: No cranial nerve deficit.      Motor: No weakness.   Psychiatric:         Mood and Affect: Mood normal.         Behavior: Behavior normal.         Thought Content: Thought content normal.         Judgment: Judgment normal.     interval exam: rectal: no external hemorrhoids, no obvious bleeding. Formed stool in rectal vault without masses. Rectal tone present and normal. Guaiac negative.      Labs:   Lab Results   Component Value Date/Time    SODIUM 141 06/23/2021 03:40 AM    POTASSIUM 4.0 06/23/2021 03:40 AM    CHLORIDE 108 06/23/2021 03:40 AM    CO2 24 06/23/2021 03:40  AM    GLUCOSE 123 (H) 06/23/2021 03:40 AM    BUN 40 (H) 06/23/2021 03:40 AM    CREATININE 3.61 (H) 06/23/2021 03:40 AM        Recent Labs     06/21/21  0220 06/21/21  1802 06/21/21  2209 06/21/21  2209 06/22/21  0040 06/22/21  1338 06/23/21  0340   WBC 8.2  --  8.0   < > 7.2 7.3 8.1   RBC 2.44*  --  2.53*   < > 2.28* 2.61* 2.57*   HEMOGLOBIN 7.4*   < > 8.0*   < > 7.3* 8.4* 7.9*   HEMATOCRIT 23.5*   < > 24.0*   < > 21.4* 24.5* 24.5*   MCV 96.3  --  94.9   < > 93.9 93.9 95.3   MCH 30.3  --  31.6   < > 32.0 32.2 30.7   RDW 50.4*  --  49.1   < > 49.0 48.0 50.3*   PLATELETCT 189  --  186   < > 177 216 196   MPV 11.7  --  10.8   < > 11.2 11.0 11.2   NEUTSPOLYS 83.10*  --  87.90*  --  84.80*  --   --    LYMPHOCYTES 8.30*  --  5.30*  --  6.70*  --   --    MONOCYTES 6.10  --  4.90  --  5.90  --   --    EOSINOPHILS 1.60  --  1.30  --  1.80  --   --    BASOPHILS 0.50  --  0.30  --  0.40  --   --     < > = values in this interval not displayed.       Imaging:   RUE US DVT:   No evidence of superficial or deep venous thrombosis in RUE.  DVT US BLE:   Normal bilateral superficial and deep venous examination of the lower    extremities.    Jean Barboza is a 71yo M with hx of CVA, seizure, HLD/HTN, DM2 (5.3 A1c); presenting with slowly progressive worsening of shortness of breath, bilateral lower extremity edema and orthopnea.  Found to have acute kidney injury with creatinine at 5.3 (congestive nephropathy vs diabetic nephropathy) , chest x-ray reveals pulmonary edema concerning for CHF, echocardiogram showed EF 60%, grade 2 diastolic dysfunction, moderate to severe aortic stenosis and severe pulmonary hypertension.  Followed by cardiology and nephrology.  Currently on daily dialysis.    Acute respiratory failure with hypoxia (HCC)  Assessment & Plan  Presented with progressively worsening SOB, b/l swelling and orthopnea   In ED, Chest x ray concerning for Pulmonary edema, BNP elevated   Echo : EF 60, Grade 2 diastolic dysfunction,  Moderate to severe aortic stenosis , RVSP 70mmhg  Currently on High flow with 100 % FIO2   Etiology :  PE less likely with WELLS of 1.5, DVT US negative, Unable to do V/Q or CTPE due to ongoing acuity of renal injury,No concern for infectious etiology, Most likely secondary to  HFpEF, Aortic stenosis .   PLAN   - Nephrology following   - cardiology following   - Lasix 80mg IV BID diuretic  - dialysis per nephrology   - daily weights , fluid restriction   - titrate O2 supplements to >90%  - Outpatient cardiology follow up  for TAVR   - Low threshold to transfer to ICU     Pulmonary hypertension (HCC)  Assessment & Plan  Echo : EF 60, Grade 2 diastolic dysfunction, Moderate to severe aortic stenosis , RVSP 70 mmhg  Likely Type 2 in the setting of Aortic stenosis, HFPEF     PLAN   - Lasix 80mg IV BID diuretic  - titrate O2 supplements to >90%      Aortic stenosis  Assessment & Plan  Presented with SOB, orthopnea, leg swelling   Chest x-ray - Pulmonary edema   Echo : EF 60, Moderate to severe aortic stenosis   Evaluated by cardiology     PLAN    -Lasix 80mg IV BID diuretic  - dialysis per nephrology  - Initiated on hydralazine and imdur for after load reduction    - titrate O2 supplements to >90%  - Outpatient cardiology  consultation for TAVR       Acute renal failure (HCC)- (present on admission)  Assessment & Plan  Presents in acute renal failure, previous baseline Cr estimate 1.3. Suspected due to cardiorenal syndrome versus diabetic nephropathy   - albumin/Cr urine random - 6g- proteinuria in nephrotic range.  - Initiated on dialysis due to worsening hypoxia and volume overload    - Nephrology following  - Dialysis per nephrology   - IV lasix 80 mg BID   - Hep panel negative, , CRP 10.88, C3 85.2 (low) , C 4 Normal   - Pending NERIS , Anti DsDNA, ANCA       Normocytic anemia- (present on admission)  Assessment & Plan  Normocytic anaemia, Fe studies indicative of Fe deficiency, though may have complicating  chronic disease. Denies any hematochezia/hematuria/hematemesis/melena. Low Folate with normal B12/TSH. Acute Hgb drop to 7.4 without identified source.  -rectal exam, to be added to addenda  -Fe gluconate IV  -Type/screen  -frequent H@H   -Transfuse <7g/dL    Hypokalemia- (present on admission)  Assessment & Plan  -replete as indicated    Hypertension- (present on admission)  Assessment & Plan  Hx of HTN,   Continue carvedilol, imdur, hydralazine.   Uptitrate as indicated       Elevated troponin- (present on admission)  Assessment & Plan  Elevate troponin, stable at 313. Likely elevation due to heart and renal failure. Unable to do V/Q or CTPE due to abnormal CXR and renal failure, US for DVT negative, wells of 1.5. EKG sinus tachy.        Type 2 diabetes mellitus (HCC)- (present on admission)  Assessment & Plan  Hx of DM2, admission A1c of 5.3, previously on insulin.  -PCP follow up

## 2021-06-23 NOTE — PROGRESS NOTES
[facility] Progress Note     HD treatment ordered by Dr Gan x 3 hours.  Treatment Start time: 1106          End time: 1407       Net UF 3500 ml    Patient tolerated treatment well. VS stable all through out. Breathing improved.    All blood was returned. CVC locked with heparin (Red limb-1.3 ml; Blue limb-1.3 ml). End cap changed.  See paper flow sheet for details.     Report given to PRATIBHA Borjas.

## 2021-06-23 NOTE — CARE PLAN
Problem: Pain - Standard  Goal: Alleviation of pain or a reduction in pain to the patient’s comfort goal  Outcome: Progressing     Problem: Knowledge Deficit - Standard  Goal: Patient and family/care givers will demonstrate understanding of plan of care, disease process/condition, diagnostic tests and medications  Outcome: Progressing     Problem: Skin Integrity  Goal: Skin integrity is maintained or improved  Outcome: Progressing     Problem: Fall Risk  Goal: Patient will remain free from falls  Outcome: Progressing       The patient is Unstable - High likelihood or risk of patient condition declining or worsening    Shift Goals  Clinical Goals: stable resp. status  Patient Goals: Improved breathing; rest  Family Goals: Stable resp. status    Progress made toward(s) clinical / shift goals:  Patient tolerating dialysis, VSS, POC discussed with patient.    Patient is not progressing towards the following goals: Patient still requiring HFN at 40 LPM and 100% FIO2, with desaturation at minimal effort.

## 2021-06-23 NOTE — PROGRESS NOTES
Pulmonary/Critical Care Medicine   Progress Note    Date of service: 6/23/2021  Time: 0945    I was asked by the primary team to evaluate the patient for potential need to transfer to the ICU this morning.  He had been seen yesterday by my colleague Dr. Chandler (please see his separate note for additional details).  In short he is a 70-year-old male who presents with several weeks of progressive worsening shortness of breath, orthopnea, dyspnea on exertion and lower extremity edema.  He was found to have evidence of valvular disease, diastolic heart failure, and renal failure.  He was started on emergent hemodialysis last night for fluid removal and has remained on high flow nasal cannula.  The primary team was questioning today whether he needed BiPAP.  A chest x-ray was performed showing unchanged enlarged cardiac silhouette with moderate edema pattern and an arterial blood gas revealed a partially compensated minimal respiratory acidosis with large A-a gradient.  His nurse was concerned that when patient undergoes dialysis he has to be transferred to the dialysis floor and is unable to go on high flow nasal cannula.  I discussed with nephrology who agrees that patient can receive dialysis in his current condition so that he can be maintained on high flow nasal cannula.  On exam, patient is eating breakfast and despite being minimally tachycardic and tachypneic, he appears comfortable and is not using accessory muscles.  He is able to speak in full sentences and provide history.  On lung exam, he has coarse crackles in bilateral bases and on cardiac exam he has a murmur with laterally displaced PMI with good distal pulses and capillary refill.  He is alert and oriented x4.  He is protecting his airway.  I reviewed his labs, echo, chest x-ray and discussed my assessment and plan with the primary team as well as nephrology and cardiology.  At this time patient does not require noninvasive ventilatory support nor  transfer to ICU and can be maintained on high flow nasal cannula.  Should he decompensate, we will be happy to reevaluate the patient at that time.  Recommend continuing to titrate high flow nasal cannula to keep SPO2 greater than 90% and continue optimizing cardiac and renal function with ongoing fluid management using dialysis and diuretics.  At this time he does not appear to have an infection affecting his pulmonary status.    I spent 31 min in reviewing the patient's condition, physical examination, laboratory and imaging data, prior documentation, in discussion with RN, RT, patient, cardiology, hospitalist, nephrology, [facility] resident service and in formulating an assessment/plan.    Critical Care time: 31 min. No time overlap, procedures not included in time.  11051

## 2021-06-23 NOTE — PROGRESS NOTES
Patient maintaining SPO2 on 40LPM at 100% FIO2 (increased from 70%) at rest, however desaturated with increased work of breathing to low 80s with minimal work such as sitting up to eat breakfast or conversation. Rapid Response nurse is rounding on the patient at this time, physician called to bedside, orders for CXR, lactic acid, ABG received.

## 2021-06-23 NOTE — CARE PLAN
Problem: Oxygenation:  Goal: Maintain adequate oxygenation dependent on patient condition  Outcome: Progressing     Patient on 40L/100% HFNC.  Awaiting dialysis, then will titrate HFNC as able.

## 2021-06-24 LAB
ANCA IGG TITR SER IF: NORMAL {TITER}
ANION GAP SERPL CALC-SCNC: 8 MMOL/L (ref 7–16)
BASOPHILS # BLD AUTO: 0.4 % (ref 0–1.8)
BASOPHILS # BLD: 0.03 K/UL (ref 0–0.12)
BUN SERPL-MCNC: 31 MG/DL (ref 8–22)
CALCIUM SERPL-MCNC: 7.9 MG/DL (ref 8.5–10.5)
CHLORIDE SERPL-SCNC: 106 MMOL/L (ref 96–112)
CO2 SERPL-SCNC: 24 MMOL/L (ref 20–33)
CREAT SERPL-MCNC: 3.04 MG/DL (ref 0.5–1.4)
DSDNA AB TITR SER CLIF: 4 IU (ref 0–24)
EOSINOPHIL # BLD AUTO: 0.19 K/UL (ref 0–0.51)
EOSINOPHIL NFR BLD: 2.3 % (ref 0–6.9)
ERYTHROCYTE [DISTWIDTH] IN BLOOD BY AUTOMATED COUNT: 47.8 FL (ref 35.9–50)
GLUCOSE SERPL-MCNC: 104 MG/DL (ref 65–99)
HCT VFR BLD AUTO: 22.2 % (ref 42–52)
HGB BLD-MCNC: 7.6 G/DL (ref 14–18)
IMM GRANULOCYTES # BLD AUTO: 0.03 K/UL (ref 0–0.11)
IMM GRANULOCYTES NFR BLD AUTO: 0.4 % (ref 0–0.9)
LYMPHOCYTES # BLD AUTO: 0.65 K/UL (ref 1–4.8)
LYMPHOCYTES NFR BLD: 8 % (ref 22–41)
MCH RBC QN AUTO: 31.8 PG (ref 27–33)
MCHC RBC AUTO-ENTMCNC: 34.2 G/DL (ref 33.7–35.3)
MCV RBC AUTO: 92.9 FL (ref 81.4–97.8)
MONOCYTES # BLD AUTO: 0.64 K/UL (ref 0–0.85)
MONOCYTES NFR BLD AUTO: 7.9 % (ref 0–13.4)
NEUTROPHILS # BLD AUTO: 6.57 K/UL (ref 1.82–7.42)
NEUTROPHILS NFR BLD: 81 % (ref 44–72)
NRBC # BLD AUTO: 0 K/UL
NRBC BLD-RTO: 0 /100 WBC
NUCLEAR IGG SER QL IA: NORMAL
PLATELET # BLD AUTO: 186 K/UL (ref 164–446)
PMV BLD AUTO: 10.9 FL (ref 9–12.9)
POTASSIUM SERPL-SCNC: 3.7 MMOL/L (ref 3.6–5.5)
RBC # BLD AUTO: 2.39 M/UL (ref 4.7–6.1)
SODIUM SERPL-SCNC: 138 MMOL/L (ref 135–145)
WBC # BLD AUTO: 8.1 K/UL (ref 4.8–10.8)

## 2021-06-24 PROCEDURE — A9270 NON-COVERED ITEM OR SERVICE: HCPCS | Performed by: STUDENT IN AN ORGANIZED HEALTH CARE EDUCATION/TRAINING PROGRAM

## 2021-06-24 PROCEDURE — 700111 HCHG RX REV CODE 636 W/ 250 OVERRIDE (IP): Performed by: STUDENT IN AN ORGANIZED HEALTH CARE EDUCATION/TRAINING PROGRAM

## 2021-06-24 PROCEDURE — 80048 BASIC METABOLIC PNL TOTAL CA: CPT

## 2021-06-24 PROCEDURE — 94640 AIRWAY INHALATION TREATMENT: CPT

## 2021-06-24 PROCEDURE — 94760 N-INVAS EAR/PLS OXIMETRY 1: CPT

## 2021-06-24 PROCEDURE — 5A1D70Z PERFORMANCE OF URINARY FILTRATION, INTERMITTENT, LESS THAN 6 HOURS PER DAY: ICD-10-PCS | Performed by: INTERNAL MEDICINE

## 2021-06-24 PROCEDURE — 90935 HEMODIALYSIS ONE EVALUATION: CPT

## 2021-06-24 PROCEDURE — 99232 SBSQ HOSP IP/OBS MODERATE 35: CPT | Performed by: STUDENT IN AN ORGANIZED HEALTH CARE EDUCATION/TRAINING PROGRAM

## 2021-06-24 PROCEDURE — 700102 HCHG RX REV CODE 250 W/ 637 OVERRIDE(OP): Performed by: STUDENT IN AN ORGANIZED HEALTH CARE EDUCATION/TRAINING PROGRAM

## 2021-06-24 PROCEDURE — 99233 SBSQ HOSP IP/OBS HIGH 50: CPT | Mod: GC | Performed by: HOSPITALIST

## 2021-06-24 PROCEDURE — 36415 COLL VENOUS BLD VENIPUNCTURE: CPT

## 2021-06-24 PROCEDURE — 85025 COMPLETE CBC W/AUTO DIFF WBC: CPT

## 2021-06-24 PROCEDURE — 700105 HCHG RX REV CODE 258: Performed by: STUDENT IN AN ORGANIZED HEALTH CARE EDUCATION/TRAINING PROGRAM

## 2021-06-24 PROCEDURE — 770020 HCHG ROOM/CARE - TELE (206)

## 2021-06-24 RX ORDER — LISINOPRIL 5 MG/1
5 TABLET ORAL
Status: DISCONTINUED | OUTPATIENT
Start: 2021-06-24 | End: 2021-06-27

## 2021-06-24 RX ADMIN — DOCUSATE SODIUM 50 MG AND SENNOSIDES 8.6 MG 2 TABLET: 8.6; 5 TABLET, FILM COATED ORAL at 05:36

## 2021-06-24 RX ADMIN — ASPIRIN 81 MG: 81 TABLET, COATED ORAL at 05:35

## 2021-06-24 RX ADMIN — LISINOPRIL 5 MG: 5 TABLET ORAL at 18:08

## 2021-06-24 RX ADMIN — HYDRALAZINE HYDROCHLORIDE 10 MG: 10 TABLET, FILM COATED ORAL at 16:37

## 2021-06-24 RX ADMIN — FUROSEMIDE 80 MG: 10 INJECTION, SOLUTION INTRAMUSCULAR; INTRAVENOUS at 05:37

## 2021-06-24 RX ADMIN — ISOSORBIDE DINITRATE 5 MG: 10 TABLET ORAL at 16:37

## 2021-06-24 RX ADMIN — ISOSORBIDE DINITRATE 5 MG: 10 TABLET ORAL at 05:36

## 2021-06-24 RX ADMIN — HYDRALAZINE HYDROCHLORIDE 10 MG: 10 TABLET, FILM COATED ORAL at 05:36

## 2021-06-24 RX ADMIN — DOXAZOSIN 8 MG: 2 TABLET ORAL at 18:08

## 2021-06-24 RX ADMIN — SODIUM CHLORIDE 250 MG: 9 INJECTION, SOLUTION INTRAVENOUS at 18:08

## 2021-06-24 RX ADMIN — CARVEDILOL 6.25 MG: 6.25 TABLET, FILM COATED ORAL at 07:26

## 2021-06-24 RX ADMIN — FOLIC ACID 1 MG: 1 TABLET ORAL at 05:36

## 2021-06-24 RX ADMIN — FUROSEMIDE 80 MG: 10 INJECTION, SOLUTION INTRAMUSCULAR; INTRAVENOUS at 16:37

## 2021-06-24 RX ADMIN — ATORVASTATIN CALCIUM 40 MG: 40 TABLET, FILM COATED ORAL at 05:36

## 2021-06-24 RX ADMIN — HYDRALAZINE HYDROCHLORIDE 10 MG: 10 TABLET, FILM COATED ORAL at 22:13

## 2021-06-24 RX ADMIN — CARVEDILOL 6.25 MG: 6.25 TABLET, FILM COATED ORAL at 16:37

## 2021-06-24 RX ADMIN — ISOSORBIDE DINITRATE 5 MG: 10 TABLET ORAL at 22:13

## 2021-06-24 ASSESSMENT — ENCOUNTER SYMPTOMS
FOCAL WEAKNESS: 0
CONSTIPATION: 0
CHILLS: 0
COUGH: 0
WEAKNESS: 0
ABDOMINAL PAIN: 0
DIARRHEA: 0
SPUTUM PRODUCTION: 0
SHORTNESS OF BREATH: 1
NAUSEA: 0
HEADACHES: 0
FEVER: 0
ORTHOPNEA: 1
PALPITATIONS: 0
VOMITING: 0
DIZZINESS: 0

## 2021-06-24 ASSESSMENT — FIBROSIS 4 INDEX: FIB4 SCORE: 2.4

## 2021-06-24 NOTE — CARE PLAN
Problem: Pain - Standard  Goal: Alleviation of pain or a reduction in pain to the patient’s comfort goal  Outcome: Progressing     The patient is Stable - Low risk of patient condition declining or worsening    Shift Goals  Clinical Goals: Stable respiratory status  Patient Goals: improved WOB, rest  Family Goals: Stable resp. status    Progress made toward(s) clinical / shift goals:  Patient resting comfortably, titrating elena HFNC as tolerated, using incentive spirometer.    Patient is not progressing towards the following goals:

## 2021-06-24 NOTE — PROGRESS NOTES
[facility] Progress Note     HD treatment ordered by Dr Gan x 3.5 hours.  Treatment Start time: 1251          End time: 1622       Net UF 4000 ml    Patient tolerated treatment well. VS stable all through out.     -300 due to arterial access alarms, line reversal without improvement.    BM x2 during tx using bed pan.      All blood was returned. CVC R locked with heparin (Red limb-1.3 ml; Blue limb-1.3 ml). End cap changed.  See paper flow sheet for details.     Report given to DEBORAH Sams RN.

## 2021-06-24 NOTE — PROGRESS NOTES
Assumed care of pt. Bedside report received from PRATIBHA Crowell. Pt was updated on plan of care. Call light, phone and personal belongings within reach. Bed locked in lowest position, 2 side rails up, bed alarm on and working appropriately.

## 2021-06-24 NOTE — PROGRESS NOTES
Daily Progress Note:     Date of Service: 6/24/2021  Primary Team: [facility] IM White Team   Attending: Donell Gunn M.D.   Senior Resident: Dr. Ang  Intern: Dr. Brewer  Contact:  [phone]    Chief Complaint:   Heart failure exacerbation, acute renal failure, acute hypoxic respiratory failure    Subjective:   Underwent dialysis with 4L UF. Continues to be on HFNC for oxygen supplementation.  Today: Denies CP/palpitations, nausea/vomiting. Feels short of breath with exertion/movement/position changes.    Consultants/Specialty:  Nephrology  Critical care  Cardiology    Review of Systems:    Review of Systems   Constitutional: Negative for chills and fever.   Respiratory: Positive for shortness of breath. Negative for cough and sputum production.    Cardiovascular: Positive for orthopnea and leg swelling. Negative for chest pain and palpitations.   Gastrointestinal: Negative for abdominal pain, constipation, diarrhea, nausea and vomiting.   Neurological: Negative for dizziness, focal weakness, weakness and headaches.       Objective Data:   Physical Exam:   Vitals:   Temp:  [36.1 °C (97 °F)-36.8 °C (98.2 °F)] 36.6 °C (97.8 °F)  Pulse:  [75-89] 85  Resp:  [16-24] 16  BP: (123-155)/(53-85) 155/84  SpO2:  [89 %-100 %] 93 %     Physical Exam  Vitals and nursing note reviewed.   Constitutional:       General: He is not in acute distress.     Appearance: He is not ill-appearing or diaphoretic.   HENT:      Head: Normocephalic and atraumatic.      Mouth/Throat:      Mouth: Mucous membranes are moist.      Pharynx: Oropharynx is clear.   Eyes:      Extraocular Movements: Extraocular movements intact.      Pupils: Pupils are equal, round, and reactive to light.   Cardiovascular:      Comments: RRR 3/6 early systolic murmur  Pulmonary:      Comments: Bibasilar fine crackles, without wheeze.  Abdominal:      General: There is no distension.      Palpations: Abdomen is soft.      Tenderness: There is no abdominal  tenderness. There is no guarding or rebound.   Skin:     General: Skin is warm and dry.   Neurological:      Mental Status: He is alert and oriented to person, place, and time.      Cranial Nerves: No cranial nerve deficit.      Sensory: No sensory deficit.   Psychiatric:         Mood and Affect: Mood normal.         Behavior: Behavior normal.         Thought Content: Thought content normal.         Judgment: Judgment normal.           Labs:   Lab Results   Component Value Date/Time    SODIUM 138 06/24/2021 01:10 AM    POTASSIUM 3.7 06/24/2021 01:10 AM    CHLORIDE 106 06/24/2021 01:10 AM    CO2 24 06/24/2021 01:10 AM    GLUCOSE 104 (H) 06/24/2021 01:10 AM    BUN 31 (H) 06/24/2021 01:10 AM    CREATININE 3.04 (H) 06/24/2021 01:10 AM        Recent Labs     06/21/21  2209 06/21/21  2209 06/22/21  0040 06/22/21  0040 06/22/21  1338 06/23/21  0340 06/24/21  0110   WBC 8.0   < > 7.2   < > 7.3 8.1 8.1   RBC 2.53*   < > 2.28*   < > 2.61* 2.57* 2.39*   HEMOGLOBIN 8.0*   < > 7.3*   < > 8.4* 7.9* 7.6*   HEMATOCRIT 24.0*   < > 21.4*   < > 24.5* 24.5* 22.2*   MCV 94.9   < > 93.9   < > 93.9 95.3 92.9   MCH 31.6   < > 32.0   < > 32.2 30.7 31.8   RDW 49.1   < > 49.0   < > 48.0 50.3* 47.8   PLATELETCT 186   < > 177   < > 216 196 186   MPV 10.8   < > 11.2   < > 11.0 11.2 10.9   NEUTSPOLYS 87.90*  --  84.80*  --   --   --  81.00*   LYMPHOCYTES 5.30*  --  6.70*  --   --   --  8.00*   MONOCYTES 4.90  --  5.90  --   --   --  7.90   EOSINOPHILS 1.30  --  1.80  --   --   --  2.30   BASOPHILS 0.30  --  0.40  --   --   --  0.40    < > = values in this interval not displayed.       Imaging:     Jean Barboza is a 69yo M with hx of CVA, seizure, HLD/HTN, DM2 (5.3 A1c); presenting for acute shortness of breath of 2 days; admitted 6/20 for heart failure exacerbation, hypoxic respiratory failure, acute renal failure; found to have moderate-severe Ao stenosis, pulmHTN; undergoing dialysis for volume overload.    Acute renal failure (HCC)- (present on  admission)  Assessment & Plan  Presents in acute renal failure, previous baseline Cr estimate 1.3. Suspected due to cardiorenal syndrome versus diabetic nephropathy   -albumin/Cr urine random - 6g- proteinuria in nephrotic range.  -Initiated on dialysis due to worsening hypoxia and volume overload    -Nephrology following  -Dialysis per nephrology   -IV lasix 80 mg BID   -Hep panel negative, , CRP 10.88, C3 85.2 (low) , C4 Normal         Normocytic anemia- (present on admission)  Assessment & Plan  Normocytic anaemia, Fe studies indicative of Fe deficiency, though may have complicating chronic disease. Denies any hematochezia/hematuria/hematemesis/melena. Low Folate with normal B12/TSH. Acute Hgb drop to 7.4 without identified source.  -Fe gluconate IV  -Transfuse Hgb <7    Acute respiratory failure with hypoxia (HCC)  Assessment & Plan  Presented with progressively worsening SOB, b/l swelling and orthopnea. CXR showed pulmonary edema, BNP elevated. ECHO showed EF 60%, grade II diastolic dysfunction, Ao stenosis , RVSP 70. Likely etiology is Ao stenosis, as low likelihood of PE (wells 1.5, DVT US negative).   -Nephrology following   -cardiology following   -Lasix 80mg IV BID diuretic  -dialysis per nephrology   -daily weights , fluid restriction   -titrate O2 supplements to >90%  -Outpatient cardiology follow up  for TAVR       Pulmonary hypertension (HCC)  Assessment & Plan  Echo: EF 60%, Grade II diastolic dysfunction, Moderate to severe aortic stenosis , RVSP 70. Likely Type 2 in the setting of Aortic stenosis, HFPEF   -Lasix 80mg IV BID diuretic  -HD per nephrology for volume overload  -titrate O2 supplements to >90%      Aortic stenosis  Assessment & Plan  Found to have moderate-severe Ao stenosis on ECHO. Most likely etiology of pulmHTN.  -Initiated on hydralazine and imdur for after load reduction    -titrate O2 supplements to >90%  -Outpatient cardiology consultation for TAVR       Hypokalemia-  (present on admission)  Assessment & Plan  -replete as indicated    Hypertension- (present on admission)  Assessment & Plan  Hx of HTN,   Continue carvedilol, imdur, hydralazine.   Uptitrate as indicated       Elevated troponin- (present on admission)  Assessment & Plan  Elevate troponin, stable at 313. Likely elevation due to heart and renal failure. Unable to do V/Q or CTPE due to abnormal CXR and renal failure, US for DVT negative, wells of 1.5. EKG sinus tachy.        Type 2 diabetes mellitus (HCC)- (present on admission)  Assessment & Plan  Hx of DM2, admission A1c of 5.3, previously on insulin.  -PCP follow up

## 2021-06-24 NOTE — PROGRESS NOTES
[facility] Cardiology Follow-up Note    Date of Service:    6/24/2021      Name:   Jean Barboza   YOB: 1950  Age:   70 y.o.  male   MRN:   [id number]      Chief Complaint: Shortness of breath     Attending Provider: Dr. Gunn    Primary Cardiologist: n/a    HPI: Jean Barboza is a 70 year old male with PMH HTN, HLD, and DM2 admitted on 6/20/21 presenting with worsening dyspnea x 2-3 weeks.  Also reported swelling in bilateral lower extremities all the way up to his groin and orthopnea.    Pt was started on HD on 6/21 given acute renal failure, currently being dialyzed.  He is also getting high doses of IV diuretics.    Found to have mod-severe AS on Echo on 6/22/21.  Cardiology was consulted.      Interim Events:  - Personal Telemetry interpretation: SR 60-80's  - Overnight events: breathing better, lower extremity swelling improved, remains of HFNC, titrating down  - Vital signs: -150's  - labs reviewed: h/h 7.6/22.2, kidney function improving, making urine    ROS  Constitutional: Positive fatigue.   Respiratory: Positive shortness of breath, no cough.  Cardiovascular:  Denies chest pain.  Improved lower extremity edema.  Denies orthopnea or PND.  : no oliguria, no dysuria.  GI:  Denies nausea/vomiting.  No abdominal distention.  Neuro:  Denies dizziness, syncope.  Hem/lymph: Denies easy bleeding/bruising.      All other review of systems reviewed and negative.    Past medical, surgical, social, and family history reviewed and unchanged from admission except as noted in HPI.    Medications: Reviewed in MAR  Current Facility-Administered Medications   Medication Dose Frequency Provider Last Rate Last Admin   • isosorbide dinitrate (ISORDIL) tablet 5 mg  5 mg Q8HRS Sherly Ang M.D.   5 mg at 06/24/21 0536   • heparin injection 2,600 Units  2,600 Units ACUTE DIALYSIS PRN Nayla Gan M.D.   2,600 Units at 06/23/21 1410   • carvedilol (COREG) tablet 6.25 mg  6.25 mg BID WITH  MEALS Sherly Ang M.D.   6.25 mg at 06/24/21 0726   • hydrALAZINE (APRESOLINE) tablet 10 mg  10 mg Q8HRS Sherly Ang M.D.   10 mg at 06/24/21 0536   • folic acid (FOLVITE) tablet 1 mg  1 mg DAILY Santi Brewer M.D.   1 mg at 06/24/21 0536   • furosemide (LASIX) injection 80 mg  80 mg BID DIURETIC Sherly Ang M.D.   80 mg at 06/24/21 0537   • ferric gluconate complex (FERRLECIT) 250 mg in  mL IVPB  250 mg Q24HR Sherly Ang M.D.   Stopped at 06/23/21 1814   • aspirin EC (ECOTRIN) tablet 81 mg  81 mg QAM Piyush Fontaine D.O.   81 mg at 06/24/21 0535   • atorvastatin (LIPITOR) tablet 40 mg  40 mg DAILY Piyush Fontaine D.O.   40 mg at 06/24/21 0536   • doxazosin (CARDURA) tablet 8 mg  8 mg Q EVENING Piyush Fontaine D.O.   8 mg at 06/23/21 1955   • senna-docusate (PERICOLACE or SENOKOT S) 8.6-50 MG per tablet 2 tablet  2 tablet BID Piyush Fontaine D.O.   2 tablet at 06/24/21 0536    And   • polyethylene glycol/lytes (MIRALAX) PACKET 1 Packet  1 Packet QDAY PRN Piyush Fontaine D.O.        And   • magnesium hydroxide (MILK OF MAGNESIA) suspension 30 mL  30 mL QDAY PRN Piyush Fontaine D.O.        And   • bisacodyl (DULCOLAX) suppository 10 mg  10 mg QDAY PRN Piyush Fontaine D.O.       • acetaminophen (Tylenol) tablet 650 mg  650 mg Q6HRS PRN LASHAWN Manuel.O.       • Pharmacy Consult Request ...Pain Management Review 1 Each  1 Each PHARMACY TO DOSE LASHAWN Manuel.O.       • oxyCODONE immediate-release (ROXICODONE) tablet 2.5 mg  2.5 mg Q3HRS PRN Piyush Fontaine D.O.       • labetalol (NORMODYNE/TRANDATE) injection 10 mg  10 mg Q4HRS PRN LASHAWN Manuel.O.       • ondansetron (ZOFRAN) syringe/vial injection 4 mg  4 mg Q4HRS PRN LASHAWN Manuel.O.       • ondansetron (ZOFRAN ODT) dispertab 4 mg  4 mg Q4HRS PRN Piyush Fontaine D.O.       Last reviewed on 6/21/2021 12:56 AM by Rocky NEWSOME Chief Goes Out, PhT    Allergies   Allergen Reactions   • Penicillin  "G Rash     Rxn - Exacerbated a rash on his legs  Early 2000's         Physical Exam  Body mass index is 24.2 kg/m². /84   Pulse 66   Temp 36.6 °C (97.8 °F) (Temporal)   Resp 18   Ht 1.727 m (5' 8\")   Wt 72.2 kg (159 lb 2.8 oz)   SpO2 98%    Vitals:    06/24/21 0824 06/24/21 0840 06/24/21 1153 06/24/21 1527   BP:       Pulse:  74 78 66   Resp:  18 18 18   Temp:       TempSrc:       SpO2: 93% 94% 92% 98%   Weight:       Height:        Oxygen Therapy:  Pulse Oximetry: 98 %, O2 (LPM): 40, FiO2%: 50 %, O2 Delivery Device: Heated High Flow Nasal Cannula    General: no acute distress, ill appearing  Neck: no JVD, no bruits  Lungs: CTAB, diminished at the bases, increased work of breathing at rest. no wheezing, rales, or rhonchi  Heart: RRR, normal S1 /S2 3/6 systolic murmur, no rub  EXT: 1+ lower extremity edema, 2+ radial pulses. 2+ pedal pulses.   Abdomen: soft, non tender, non distended  Neurological: No focal deficits, no facial asymmetry.  Normal speech  Psychiatric: Appropriate affect, alert and oriented x 3  Skin: Warm and dry extremities, no rashes    Labs (personally reviewed):     Lab Results   Component Value Date/Time    SODIUM 138 06/24/2021 01:10 AM    POTASSIUM 3.7 06/24/2021 01:10 AM    CHLORIDE 106 06/24/2021 01:10 AM    CO2 24 06/24/2021 01:10 AM    GLUCOSE 104 (H) 06/24/2021 01:10 AM    BUN 31 (H) 06/24/2021 01:10 AM    CREATININE 3.04 (H) 06/24/2021 01:10 AM     Lab Results   Component Value Date/Time    ALKPHOSPHAT 64 06/21/2021 10:09 PM    ASTSGOT 23 06/21/2021 10:09 PM    ALTSGPT 13 06/21/2021 10:09 PM    TBILIRUBIN 0.2 06/21/2021 10:09 PM      Lab Results   Component Value Date/Time    CHOLSTRLTOT 94 (L) 09/22/2017 02:00 AM    LDL 42 09/22/2017 02:00 AM    HDL 30 (A) 09/22/2017 02:00 AM    TRIGLYCERIDE 109 09/22/2017 02:00 AM       Cardiac Imaging and Procedures Review:      EKG 6/22/21: My Personal interpretation reveals     Echocardiogram 6/21/2021  CONCLUSIONS  Prior echo on " 9/22/17.  The left ventricle was normal in size.  Left ventricular ejection fraction is visually estimated to be 60%.  Moderate concentric left ventricular hypertrophy.  Grade II diastolic dysfunction.  Moderate to severe aortic stenosis. Transvalvular gradients are Mean:   40 mmHg. Aortic valve area calculated from the continuity equation is   0.9 cm2.  Estimated right ventricular systolic pressure is 70 mmHg.  Normal inferior vena cava size without inspiratory collapse.  Compared to the prior study done - there has been progression of AS and   development of pulmonary hypertension.     Assessment and Medical Decision Making:    Moderate to severe aortic stenosis   -Grade 2 diastolic dysfunction  -LVEF preserved at 60%  -Possible TAVR vs. SAVR in near future, discussed with son and patient  -Referral with outpatient valve team placed, Appt. made for 7/13 with Dr. Valerio     Fluid volume overload  -Nephrology managing HD and IV Lasix    Hypertension  -Nephrology okay to start ACE or ARB  -Lisinopril 5 mg started today    HFpEF  -Likely secondary to kidney disease and metabolic disorder    Anemia  -? secondary to CKD  -Per nephrology transfuse if hemoglobin less than 7    Thank you for allowing me to participate in this patients care.  Please contact me with any questions or concerns.    TONO Luz.   [facility]  [phone]

## 2021-06-24 NOTE — DISCHARGE PLANNING
Anticipated Discharge Disposition: TBD    Action: pt pending medical clearance, current discharge needs unknown at this time. Potential for long term dialysis chair per medical team    Barriers to Discharge: High Flow O2, dialysis needs    Plan: f/u with medical team and pt to discuss needs and barriers.

## 2021-06-24 NOTE — CARE PLAN
The patient is Watcher - Medium risk of patient condition declining or worsening    Shift Goals  Clinical Goals: Stable respiratory status  Patient Goals: improved WOB, rest  Family Goals: Stable resp. status    Progress made toward(s) clinical / shift goals:  Progressing      Problem: Pain - Standard  Goal: Alleviation of pain or a reduction in pain to the patient’s comfort goal  Outcome: Progressing  Note: Pt denies pain. Pt's goal is to rest comfortably.      Problem: Knowledge Deficit - Standard  Goal: Patient and family/care givers will demonstrate understanding of plan of care, disease process/condition, diagnostic tests and medications  Outcome: Progressing  Note: Pt updated on POC, all questions answered at this time.      Problem: Skin Integrity  Goal: Skin integrity is maintained or improved  Outcome: Progressing     Problem: Fall Risk  Goal: Patient will remain free from falls  Outcome: Progressing

## 2021-06-24 NOTE — PROGRESS NOTES
[facility] -  PROGRESS NOTE               Author: Nayla Gan M.D. Date & Time: 6/24/2021  11:58 AM     Chief Complaint:  Follow up ESRD    HPI:  70yoM with PMH significant for HTN, DM II, Hyperlipidemia, Seizure Disorder admitted with complaints of worsening SOB over the past 3 days and worsening bilateral LE edema for the past 1 week and in the ER found to have elevated Creatinine of . Pt reports that he has been getting progressively more SOB over the past 3 days and his LE edema has been worsening over the past 1 week so he came to the ED for eval. In the ED Cr was 5.3 on admission and pt reports that he has never been told of abnormal renal function in the past. His CXR showed pulm edema and there was some concern for DVT or PE but due to his elevated Creatinine he did not undergo a CTA. Bilateral lower extremity dopplers have been ordered. Pt was admitted and started on lasix 40mg IV BID as well as hydrochlorothiazide. He reports that his SOB is a little better this but he is still requiring face mask supplemental O2. He reports he is urinating more with the lasix but overnight only minimal UOP documented.   No F/C/N/V/CP, +SOB and +LE Edema, No melena, hematochezia, hematemesis.  No HA, visual changes, or abdominal pain    DAILY NEPHROLOGY SUMMARY:  6/22/21: rapid response called overnight for acute resp decompensation, urgent HD catheter placed and pt dialyzed with 3L UF, currently on high flow NC O2 with 100% FiO2, reports breathing a little better, 1.2L UOP over past 24hrs, BP mildly elevated, still with LE edema, denies any CP or n/v/diarrhea  6/23/21: No events, remains on high flow NC O2, tolerated HD yest afternoon with 3.5L UF, pt ~6L net negative fluid balance since admit and still requiring significant O2 support, BP stable, reports SOB is a little better, still with LE edema but reports it is better, on high flow NC O2 with 70% FiO2  6/24/21: No events, still  "requiring 70% FiO2 on high flow NC, pt reports ROSADO with exertion, edema improving per patient, BP stable, tolerated HD yest with 4L UF    REVIEW OF SYSTEMS:    10 point ROS reviewed and is as per HPI/daily summary or otherwise negative    PMH/PSH/SH/FH: Reviewed and unchanged since admission note  CURRENT MEDICATIONS: Reviewed from admission to present day    VS:  /84   Pulse 78   Temp 36.6 °C (97.8 °F) (Temporal)   Resp 18   Ht 1.727 m (5' 8\")   Wt 72.2 kg (159 lb 2.8 oz)   SpO2 92%   BMI 24.20 kg/m²   Physical Exam  Constitutional:       General: He is not in acute distress.     Appearance: He is ill-appearing.   HENT:      Head: Normocephalic and atraumatic.      Right Ear: External ear normal.      Left Ear: External ear normal.      Nose: Nose normal.      Comments: +high flow nasal canula     Mouth/Throat:      Mouth: Mucous membranes are moist.   Eyes:      Extraocular Movements: Extraocular movements intact.      Conjunctiva/sclera: Conjunctivae normal.   Cardiovascular:      Rate and Rhythm: Regular rhythm. Tachycardia present.      Heart sounds: No murmur heard.     Pulmonary:      Effort: No respiratory distress.      Breath sounds: Examination of the right-lower field reveals decreased breath sounds. Examination of the left-lower field reveals decreased breath sounds. Decreased breath sounds and rhonchi present.   Abdominal:      General: Bowel sounds are normal. There is no distension.      Palpations: Abdomen is soft.   Musculoskeletal:         General: No tenderness.      Cervical back: Normal range of motion and neck supple.      Right lower leg: Edema present.      Left lower leg: Edema present.   Skin:     General: Skin is warm and dry.      Findings: No erythema.   Neurological:      General: No focal deficit present.      Mental Status: He is oriented to person, place, and time.   Psychiatric:         Mood and Affect: Mood normal.         Behavior: Behavior normal. "         Fluids:  In: 940 [P.O.:440; Dialysis:500]  Out: 4400     LABS:  Recent Labs     06/22/21  1338 06/23/21  0340 06/24/21  0110   SODIUM 142 141 138   POTASSIUM 3.4* 4.0 3.7   CHLORIDE 107 108 106   CO2 23 24 24   GLUCOSE 119* 123* 104*   BUN 52* 40* 31*   CREATININE 4.79* 3.61* 3.04*   CALCIUM 7.8* 7.9* 7.9*       IMPRESSION:  # FERNANDO--pt denies any prior history of CKD but review of labs indicates Cr ~1.4 in 2017, unclear recent baseline  # Acute Hypoxic Respiratory Failure--pulm edema on CXR  -HD initiated 6/21 for acute hypoxic respiratory failure  # HTN--BP controlled  # DM II--management per primary svc  # LE Edema--improved with fluid removal with HD  # Anemia--?related to CKD, iron sat low  # Hypokalemia--resolved  # Elevated Troponin--being monitored  # Left Foot and Right Bicep Wound--wound care  # Acute Hypoxic Respiratory Failure--now on high flow NC O2  --CXR with pulm edema, worse on CXR from 6/23  # Valvular Heart Disease--echo 6/22 with moderate/severe AS that has progressed since his previous echo in 2017        PLAN:  - HD again today and for the next several days for fluid removal and to optimize volume status  - Continue lasix 80mg IV BID for additional fluid removal as pt is still urinating  - No role for NFAISA in FERNANDO  - Transfuse PRN for Hgb less than 7  - Record strict I/O's  - Dose adjust all meds for decreased GFR  - Avoid IV contrast/nephrotoxins/NSAIDs  - OK to start ACE-I or ARB now that he is on dialysis  - Continue supplemental O2 and wean high flow NC O2 as tolerated  - Will need cardiology eval for aortic stenosis

## 2021-06-24 NOTE — PROGRESS NOTES
4 Eyes Skin Assessment Completed by PRATIBHA Munson and PRATIBHA Walker.    Head Blanching  Ears Redness and Blanching  Nose WDL  Mouth WDL  Neck WDL  Breast/Chest Bruising and Scab  Shoulder Blades Redness and Blanching  Spine WDL  (R) Arm/Elbow/Hand Redness, Blanching, Bruising and Scab  (L) Arm/Elbow/Hand Redness, Blanching and Scab, healed/closed wound open to air   Abdomen Scab and Bruising  Groin WDL  Scrotum/Coccyx/Buttocks Redness and Blanching  (R) Leg Redness, Shiny and Edema  (L) Leg Redness, Shiny and Edema  (R) Heel/Foot/Toe Redness and Blanching  (L) Heel/Foot/Toe Redness and Blanching, healed/closed wound open to air          Devices In Places Tele Box, Pulse Ox, Central Line and Nasal Cannula (high flow)      Interventions In Place Pillows, Dri-Truman Pads and Pressure Redistribution Mattress    Possible Skin Injury No    Pictures Uploaded Into Epic N/A  Wound Consult Placed N/A  RN Wound Prevention Protocol Ordered No

## 2021-06-25 LAB
ANION GAP SERPL CALC-SCNC: 7 MMOL/L (ref 7–16)
BACTERIA BLD CULT: NORMAL
BUN SERPL-MCNC: 30 MG/DL (ref 8–22)
CALCIUM SERPL-MCNC: 7.9 MG/DL (ref 8.5–10.5)
CHLORIDE SERPL-SCNC: 106 MMOL/L (ref 96–112)
CO2 SERPL-SCNC: 26 MMOL/L (ref 20–33)
CREAT SERPL-MCNC: 2.42 MG/DL (ref 0.5–1.4)
ERYTHROCYTE [DISTWIDTH] IN BLOOD BY AUTOMATED COUNT: 48.1 FL (ref 35.9–50)
GLUCOSE SERPL-MCNC: 127 MG/DL (ref 65–99)
HCT VFR BLD AUTO: 21.7 % (ref 42–52)
HGB BLD-MCNC: 7.2 G/DL (ref 14–18)
MCH RBC QN AUTO: 31.2 PG (ref 27–33)
MCHC RBC AUTO-ENTMCNC: 33.2 G/DL (ref 33.7–35.3)
MCV RBC AUTO: 93.9 FL (ref 81.4–97.8)
PLATELET # BLD AUTO: 190 K/UL (ref 164–446)
PMV BLD AUTO: 11.2 FL (ref 9–12.9)
POTASSIUM SERPL-SCNC: 3.3 MMOL/L (ref 3.6–5.5)
RBC # BLD AUTO: 2.31 M/UL (ref 4.7–6.1)
SIGNIFICANT IND 70042: NORMAL
SITE SITE: NORMAL
SODIUM SERPL-SCNC: 139 MMOL/L (ref 135–145)
SOURCE SOURCE: NORMAL
WBC # BLD AUTO: 6.4 K/UL (ref 4.8–10.8)

## 2021-06-25 PROCEDURE — 99233 SBSQ HOSP IP/OBS HIGH 50: CPT | Mod: GC | Performed by: HOSPITALIST

## 2021-06-25 PROCEDURE — A9270 NON-COVERED ITEM OR SERVICE: HCPCS | Performed by: STUDENT IN AN ORGANIZED HEALTH CARE EDUCATION/TRAINING PROGRAM

## 2021-06-25 PROCEDURE — 94640 AIRWAY INHALATION TREATMENT: CPT

## 2021-06-25 PROCEDURE — 36415 COLL VENOUS BLD VENIPUNCTURE: CPT

## 2021-06-25 PROCEDURE — 94760 N-INVAS EAR/PLS OXIMETRY 1: CPT

## 2021-06-25 PROCEDURE — 80048 BASIC METABOLIC PNL TOTAL CA: CPT

## 2021-06-25 PROCEDURE — 700101 HCHG RX REV CODE 250: Performed by: STUDENT IN AN ORGANIZED HEALTH CARE EDUCATION/TRAINING PROGRAM

## 2021-06-25 PROCEDURE — 700102 HCHG RX REV CODE 250 W/ 637 OVERRIDE(OP): Performed by: INTERNAL MEDICINE

## 2021-06-25 PROCEDURE — 770020 HCHG ROOM/CARE - TELE (206)

## 2021-06-25 PROCEDURE — 700111 HCHG RX REV CODE 636 W/ 250 OVERRIDE (IP): Performed by: STUDENT IN AN ORGANIZED HEALTH CARE EDUCATION/TRAINING PROGRAM

## 2021-06-25 PROCEDURE — 90935 HEMODIALYSIS ONE EVALUATION: CPT

## 2021-06-25 PROCEDURE — A9270 NON-COVERED ITEM OR SERVICE: HCPCS | Performed by: INTERNAL MEDICINE

## 2021-06-25 PROCEDURE — 85027 COMPLETE CBC AUTOMATED: CPT

## 2021-06-25 PROCEDURE — 700102 HCHG RX REV CODE 250 W/ 637 OVERRIDE(OP): Performed by: STUDENT IN AN ORGANIZED HEALTH CARE EDUCATION/TRAINING PROGRAM

## 2021-06-25 PROCEDURE — 700105 HCHG RX REV CODE 258: Performed by: STUDENT IN AN ORGANIZED HEALTH CARE EDUCATION/TRAINING PROGRAM

## 2021-06-25 PROCEDURE — 5A1D70Z PERFORMANCE OF URINARY FILTRATION, INTERMITTENT, LESS THAN 6 HOURS PER DAY: ICD-10-PCS | Performed by: INTERNAL MEDICINE

## 2021-06-25 RX ORDER — POTASSIUM CHLORIDE 20 MEQ/1
40 TABLET, EXTENDED RELEASE ORAL ONCE
Status: COMPLETED | OUTPATIENT
Start: 2021-06-25 | End: 2021-06-25

## 2021-06-25 RX ORDER — GABAPENTIN 100 MG/1
100 CAPSULE ORAL 3 TIMES DAILY
Status: DISCONTINUED | OUTPATIENT
Start: 2021-06-25 | End: 2021-06-30 | Stop reason: HOSPADM

## 2021-06-25 RX ADMIN — LABETALOL HYDROCHLORIDE 10 MG: 5 INJECTION, SOLUTION INTRAVENOUS at 12:43

## 2021-06-25 RX ADMIN — ATORVASTATIN CALCIUM 40 MG: 40 TABLET, FILM COATED ORAL at 04:40

## 2021-06-25 RX ADMIN — HEPARIN SODIUM 2600 UNITS: 1000 INJECTION, SOLUTION INTRAVENOUS; SUBCUTANEOUS at 12:58

## 2021-06-25 RX ADMIN — LISINOPRIL 5 MG: 5 TABLET ORAL at 04:40

## 2021-06-25 RX ADMIN — DOXAZOSIN 8 MG: 2 TABLET ORAL at 17:17

## 2021-06-25 RX ADMIN — HYDRALAZINE HYDROCHLORIDE 10 MG: 10 TABLET, FILM COATED ORAL at 15:09

## 2021-06-25 RX ADMIN — GABAPENTIN 100 MG: 100 CAPSULE ORAL at 13:12

## 2021-06-25 RX ADMIN — CARVEDILOL 6.25 MG: 6.25 TABLET, FILM COATED ORAL at 07:35

## 2021-06-25 RX ADMIN — HYDRALAZINE HYDROCHLORIDE 10 MG: 10 TABLET, FILM COATED ORAL at 21:44

## 2021-06-25 RX ADMIN — ISOSORBIDE DINITRATE 5 MG: 10 TABLET ORAL at 15:08

## 2021-06-25 RX ADMIN — FOLIC ACID 1 MG: 1 TABLET ORAL at 04:40

## 2021-06-25 RX ADMIN — SODIUM CHLORIDE 250 MG: 9 INJECTION, SOLUTION INTRAVENOUS at 17:59

## 2021-06-25 RX ADMIN — HYDRALAZINE HYDROCHLORIDE 10 MG: 10 TABLET, FILM COATED ORAL at 04:39

## 2021-06-25 RX ADMIN — GABAPENTIN 100 MG: 100 CAPSULE ORAL at 17:18

## 2021-06-25 RX ADMIN — POTASSIUM CHLORIDE 40 MEQ: 1500 TABLET, EXTENDED RELEASE ORAL at 15:08

## 2021-06-25 RX ADMIN — ISOSORBIDE DINITRATE 5 MG: 10 TABLET ORAL at 21:44

## 2021-06-25 RX ADMIN — CARVEDILOL 6.25 MG: 6.25 TABLET, FILM COATED ORAL at 17:18

## 2021-06-25 RX ADMIN — FUROSEMIDE 80 MG: 10 INJECTION, SOLUTION INTRAMUSCULAR; INTRAVENOUS at 04:41

## 2021-06-25 RX ADMIN — FUROSEMIDE 80 MG: 10 INJECTION, SOLUTION INTRAMUSCULAR; INTRAVENOUS at 15:09

## 2021-06-25 RX ADMIN — ASPIRIN 81 MG: 81 TABLET, COATED ORAL at 04:40

## 2021-06-25 RX ADMIN — ISOSORBIDE DINITRATE 5 MG: 10 TABLET ORAL at 04:40

## 2021-06-25 ASSESSMENT — ENCOUNTER SYMPTOMS
VOMITING: 0
DIARRHEA: 0
FOCAL WEAKNESS: 0
SPUTUM PRODUCTION: 0
ABDOMINAL PAIN: 0
COUGH: 0
FEVER: 0
SHORTNESS OF BREATH: 0
HEADACHES: 0
ORTHOPNEA: 1
CHILLS: 0
TINGLING: 1
DIZZINESS: 0
PALPITATIONS: 0
WEAKNESS: 0
CONSTIPATION: 0
NAUSEA: 0

## 2021-06-25 ASSESSMENT — PAIN DESCRIPTION - PAIN TYPE: TYPE: ACUTE PAIN

## 2021-06-25 ASSESSMENT — FIBROSIS 4 INDEX: FIB4 SCORE: 2.35

## 2021-06-25 NOTE — CARE PLAN
The patient is Watcher - Medium risk of patient condition declining or worsening    Shift Goals  Clinical Goals: stable respiratory status  Patient Goals: rest  Family Goals: Stable resp. status    Progress made toward(s) clinical / shift goals:  Progressing      Problem: Pain - Standard  Goal: Alleviation of pain or a reduction in pain to the patient’s comfort goal  Outcome: Progressing  Note: Pt denies any pain.     Problem: Knowledge Deficit - Standard  Goal: Patient and family/care givers will demonstrate understanding of plan of care, disease process/condition, diagnostic tests and medications  Outcome: Progressing  Note: Pt updated on POC, all questions answered at this time.      Problem: Skin Integrity  Goal: Skin integrity is maintained or improved  Outcome: Progressing     Problem: Fall Risk  Goal: Patient will remain free from falls  Outcome: Progressing

## 2021-06-25 NOTE — PROGRESS NOTES
[facility] -  PROGRESS NOTE               Author: Nayla Gan M.D. Date & Time: 6/25/2021  2:51 PM     Chief Complaint:  Follow up ESRD    HPI:  70yoM with PMH significant for HTN, DM II, Hyperlipidemia, Seizure Disorder admitted with complaints of worsening SOB over the past 3 days and worsening bilateral LE edema for the past 1 week and in the ER found to have elevated Creatinine of . Pt reports that he has been getting progressively more SOB over the past 3 days and his LE edema has been worsening over the past 1 week so he came to the ED for eval. In the ED Cr was 5.3 on admission and pt reports that he has never been told of abnormal renal function in the past. His CXR showed pulm edema and there was some concern for DVT or PE but due to his elevated Creatinine he did not undergo a CTA. Bilateral lower extremity dopplers have been ordered. Pt was admitted and started on lasix 40mg IV BID as well as hydrochlorothiazide. He reports that his SOB is a little better this but he is still requiring face mask supplemental O2. He reports he is urinating more with the lasix but overnight only minimal UOP documented.   No F/C/N/V/CP, +SOB and +LE Edema, No melena, hematochezia, hematemesis.  No HA, visual changes, or abdominal pain    DAILY NEPHROLOGY SUMMARY:  6/22/21: rapid response called overnight for acute resp decompensation, urgent HD catheter placed and pt dialyzed with 3L UF, currently on high flow NC O2 with 100% FiO2, reports breathing a little better, 1.2L UOP over past 24hrs, BP mildly elevated, still with LE edema, denies any CP or n/v/diarrhea  6/23/21: No events, remains on high flow NC O2, tolerated HD yest afternoon with 3.5L UF, pt ~6L net negative fluid balance since admit and still requiring significant O2 support, BP stable, reports SOB is a little better, still with LE edema but reports it is better, on high flow NC O2 with 70% FiO2  6/24/21: No events, still  "requiring 70% FiO2 on high flow NC, pt reports ROSADO with exertion, edema improving per patient, BP stable, tolerated HD yest with 4L UF  6/25/21: No events, now on 4L NC O2, still has SOB with exertion, BP stable, tolerated HD today with 3.5L UF, edema improving    REVIEW OF SYSTEMS:    10 point ROS reviewed and is as per HPI/daily summary or otherwise negative    PMH/PSH/SH/FH: Reviewed and unchanged since admission note  CURRENT MEDICATIONS: Reviewed from admission to present day    VS:  /79 Comment: post HD  Pulse 62   Temp 36.4 °C (97.5 °F) (Temporal)   Resp 20   Ht 1.727 m (5' 8\")   Wt 68.5 kg (151 lb 0.2 oz)   SpO2 99%   BMI 22.96 kg/m²   Physical Exam  Constitutional:       General: He is not in acute distress.     Appearance: He is ill-appearing.   HENT:      Head: Normocephalic and atraumatic.      Right Ear: External ear normal.      Left Ear: External ear normal.      Nose: Nose normal.      Mouth/Throat:      Mouth: Mucous membranes are moist.   Eyes:      Extraocular Movements: Extraocular movements intact.      Conjunctiva/sclera: Conjunctivae normal.   Cardiovascular:      Rate and Rhythm: Regular rhythm. Tachycardia present.      Heart sounds: No murmur heard.     Pulmonary:      Effort: No respiratory distress.      Breath sounds: Examination of the right-lower field reveals decreased breath sounds. Examination of the left-lower field reveals decreased breath sounds. Decreased breath sounds and rhonchi present.   Abdominal:      General: Bowel sounds are normal. There is no distension.      Palpations: Abdomen is soft.   Musculoskeletal:         General: No tenderness.      Cervical back: Normal range of motion and neck supple.      Right lower leg: Edema present.      Left lower leg: Edema present.   Skin:     General: Skin is warm and dry.      Findings: No erythema.   Neurological:      General: No focal deficit present.      Mental Status: He is oriented to person, place, and time. "   Psychiatric:         Mood and Affect: Mood normal.         Behavior: Behavior normal.         Fluids:  In: 740 [P.O.:240; Dialysis:500]  Out: 4500     LABS:  Recent Labs     06/23/21  0340 06/24/21  0110 06/25/21  0119   SODIUM 141 138 139   POTASSIUM 4.0 3.7 3.3*   CHLORIDE 108 106 106   CO2 24 24 26   GLUCOSE 123* 104* 127*   BUN 40* 31* 30*   CREATININE 3.61* 3.04* 2.42*   CALCIUM 7.9* 7.9* 7.9*       IMPRESSION:  # FERNANDO--pt denies any prior history of CKD but review of labs indicates Cr ~1.4 in 2017, unclear recent baseline  # Acute Hypoxic Respiratory Failure--pulm edema on CXR  -HD initiated 6/21 for acute hypoxic respiratory failure  - Off high flow NC   - Still requiring supplemental O2  # HTN--BP controlled  # DM II--management per primary svc  # LE Edema--improved with fluid removal with HD  # Anemia--?related to CKD, iron sat low  # Hypokalemia  # Elevated Troponin--being monitored  # Left Foot and Right Bicep Wound--wound care  # Acute Hypoxic Respiratory Failure--now on high flow NC O2  --CXR with pulm edema, worse on CXR from 6/23  # Valvular Heart Disease--echo 6/22 with moderate/severe AS that has progressed since his previous echo in 2017        PLAN:  - HD today and tomorrow for fluid removal  - Continue lasix 80mg IV BID for additional fluid removal as pt is still urinating  - No role for NAFISA in FERNANDO  - Transfuse PRN for Hgb less than 7  - Record strict I/O's  - Dose adjust all meds for decreased GFR  - Avoid IV contrast/nephrotoxins/NSAIDs  - wean O2 as tolerated  - Will need cardiology eval for aortic stenosis

## 2021-06-25 NOTE — PROGRESS NOTES
[facility] Progress Note    Hemodialysis treatment performed x 3.5 hours per Dr. Gan  Treatment started at 0925 and ended at 1256.   Right chest CVC access intact, patent with good flow on both ports.  Patient tolerated treatment well. UF goal reached.   BP pre-tx 140s/70s. SBPs elevated @ 150s-160s after 1.5 hrs of treatment, patient asymptomatic, PRN BP med given by primary RN. UF goal adjusted as BP tolerates. BP instantly improved post tx at 140s/70s. Patient stable post treatment. SpO2s stable throughout treatment at % at 4lpm/NC.  See Acute HD paper flow sheet for details.      Net UF Removed: 3,500 mL (see Dialysis I & O flow sheet)     Post treatment: Right chest HD catheter flushed with saline then locked with heparin 1000 units/ml per designated amount in each lumen (see MAR) then clamped, capped aseptically and labeled properly. Heparin lock to be aspirated prior to next dialysis/CVC use by dialysis RN only. Please do not flush or draw from ports.    Notify Dialysis and Nephrologist for follow-up.     Report given to primary care nurse NHUNG Banks, RN.

## 2021-06-25 NOTE — PROGRESS NOTES
Daily Progress Note:     Date of Service: 6/25/2021  Primary Team: [facility] White Team   Attending: Donell Gunn M.D.   Senior Resident: Dr. Sav MD  Intern: Dr. Brewer  Contact:  [phone]    Chief Complaint:   Heart failure exacerbation, acute renal failure, acute hypoxic respiratory failure, nephrotic syndrome    Subjective:   Underwent dialysis with UF of 4L, improved oxygen demands from 10L to 4L.  Today: Continues to have shortness of breath on exertion, now with pins/needles sensation of bilateral feet. Denies CP/palpitations, nausea/vomiting, lightheaded/dizzy.    Consultants/Specialty:  Nephrology  Cardiology    Review of Systems:    Review of Systems   Constitutional: Positive for malaise/fatigue. Negative for chills and fever.   Respiratory: Negative for cough, sputum production and shortness of breath.    Cardiovascular: Positive for orthopnea and leg swelling. Negative for chest pain and palpitations.   Gastrointestinal: Negative for abdominal pain, constipation, diarrhea, nausea and vomiting.   Genitourinary: Negative for dysuria, frequency and urgency.   Neurological: Positive for tingling. Negative for dizziness, focal weakness, weakness and headaches.       Objective Data:   Physical Exam:   Vitals:   Temp:  [36.2 °C (97.1 °F)-36.7 °C (98 °F)] 36.2 °C (97.1 °F)  Pulse:  [65-79] 69  Resp:  [18-20] 18  BP: (116-152)/(60-78) 146/71  SpO2:  [92 %-100 %] 96 %     Physical Exam  Vitals and nursing note reviewed.   Constitutional:       General: He is not in acute distress.     Appearance: He is not ill-appearing or diaphoretic.   HENT:      Head: Normocephalic and atraumatic.      Mouth/Throat:      Mouth: Mucous membranes are moist.      Pharynx: Oropharynx is clear.   Eyes:      Extraocular Movements: Extraocular movements intact.      Pupils: Pupils are equal, round, and reactive to light.   Cardiovascular:      Comments: RRR 3/6 early systolic murmur  Pulmonary:      Effort: Pulmonary effort  is normal.      Breath sounds: Normal breath sounds. No wheezing, rhonchi or rales.   Abdominal:      General: There is no distension.      Palpations: Abdomen is soft.      Tenderness: There is no abdominal tenderness. There is no guarding or rebound.   Skin:     General: Skin is warm and dry.   Neurological:      General: No focal deficit present.      Mental Status: He is alert and oriented to person, place, and time.      Cranial Nerves: No cranial nerve deficit.      Sensory: No sensory deficit.   Psychiatric:         Mood and Affect: Mood normal.         Behavior: Behavior normal.         Thought Content: Thought content normal.         Judgment: Judgment normal.           Labs:   Lab Results   Component Value Date/Time    SODIUM 139 06/25/2021 01:19 AM    POTASSIUM 3.3 (L) 06/25/2021 01:19 AM    CHLORIDE 106 06/25/2021 01:19 AM    CO2 26 06/25/2021 01:19 AM    GLUCOSE 127 (H) 06/25/2021 01:19 AM    BUN 30 (H) 06/25/2021 01:19 AM    CREATININE 2.42 (H) 06/25/2021 01:19 AM        Recent Labs     06/23/21  0340 06/24/21  0110 06/25/21  0119   WBC 8.1 8.1 6.4   RBC 2.57* 2.39* 2.31*   HEMOGLOBIN 7.9* 7.6* 7.2*   HEMATOCRIT 24.5* 22.2* 21.7*   MCV 95.3 92.9 93.9   MCH 30.7 31.8 31.2   RDW 50.3* 47.8 48.1   PLATELETCT 196 186 190   MPV 11.2 10.9 11.2   NEUTSPOLYS  --  81.00*  --    LYMPHOCYTES  --  8.00*  --    MONOCYTES  --  7.90  --    EOSINOPHILS  --  2.30  --    BASOPHILS  --  0.40  --        Imaging:     Jean Barboza is a 71yo M with hx of CVA, seizure, HLD/HTN, DM2 (5.3 A1c); presenting for acute shortness of breath of 2 days; admitted 6/20 for heart failure exacerbation, hypoxic respiratory failure, acute renal failure; found to have moderate-severe Ao stenosis, pulmHTN; undergoing dialysis for volume overload.    Acute renal failure (HCC)- (present on admission)  Assessment & Plan  Presents in acute renal failure, previous baseline Cr estimate 1.3. Suspected due to cardiorenal syndrome versus diabetic  nephropathy. Hepatitis negative, elevated ESR/CRP, with mildly low C3, normal C4.  -albumin/Cr urine random - 6g- proteinuria in nephrotic range.  -Initiated on dialysis due to worsening hypoxia and volume overload    -Nephrology following  -Dialysis per nephrology   -IV lasix 80 mg BID   -gabapentin 100mg PO TID (bilateral foot needle sensation)        Normocytic anemia- (present on admission)  Assessment & Plan  Normocytic anaemia, Fe studies indicative of Fe deficiency, though may have complicating chronic disease. Denies any hematochezia/hematuria/hematemesis/melena. Low Folate with normal B12/TSH. Acute Hgb drop to 7.4 without identified source.  -Fe gluconate IV  -Transfuse Hgb <7    Acute respiratory failure with hypoxia (HCC)  Assessment & Plan  Presented with progressively worsening SOB, b/l swelling and orthopnea. CXR showed pulmonary edema, BNP elevated. ECHO showed EF 60%, grade II diastolic dysfunction, Ao stenosis , RVSP 70. Likely etiology is Ao stenosis, as low likelihood of PE (wells 1.5, DVT US negative).   -Nephrology following   -cardiology following   -Lasix 80mg IV BID diuretic  -dialysis per nephrology   -daily weights , fluid restriction   -titrate O2 supplements to >90%  -Outpatient cardiology follow up  for TAVR       Pulmonary hypertension (HCC)  Assessment & Plan  Echo: EF 60%, Grade II diastolic dysfunction, Moderate to severe aortic stenosis , RVSP 70. Likely Type 2 in the setting of Aortic stenosis, HFPEF   -Lasix 80mg IV BID diuretic  -HD per nephrology for volume overload  -titrate O2 supplements to >90%      Aortic stenosis  Assessment & Plan  Found to have moderate-severe Ao stenosis on ECHO. Most likely etiology of pulmHTN.  -Initiated on hydralazine and imdur for after load reduction    -titrate O2 supplements to >90%  -Outpatient cardiology consultation for TAVR       Hypokalemia- (present on admission)  Assessment & Plan  -replete as indicated    Hypertension- (present on  admission)  Assessment & Plan  Hx of HTN,   Continue carvedilol, imdur, hydralazine.   Uptitrate as indicated       Elevated troponin- (present on admission)  Assessment & Plan  Elevate troponin, stable at 313. Likely elevation due to heart and renal failure. Unable to do V/Q or CTPE due to abnormal CXR and renal failure, US for DVT negative, wells of 1.5. EKG sinus tachy.        Type 2 diabetes mellitus (HCC)- (present on admission)  Assessment & Plan  Hx of DM2, admission A1c of 5.3, previously on insulin.  -PCP follow up

## 2021-06-25 NOTE — CARE PLAN
The patient is Stable - Low risk of patient condition declining or worsening    Shift Goals  Clinical Goals: weaning o2  Patient Goals: get out of bed and change linens  Family Goals: Stable resp. status    Progress made toward(s) clinical / shift goals:    Problem: Pain - Standard  Goal: Alleviation of pain or a reduction in pain to the patient’s comfort goal  Outcome: Progressing     Problem: Knowledge Deficit - Standard  Goal: Patient and family/care givers will demonstrate understanding of plan of care, disease process/condition, diagnostic tests and medications  Outcome: Progressing     Problem: Skin Integrity  Goal: Skin integrity is maintained or improved  Outcome: Progressing     Problem: Fall Risk  Goal: Patient will remain free from falls  Outcome: Progressing       Patient is not progressing towards the following goals:

## 2021-06-26 LAB
ALBUMIN SERPL BCP-MCNC: 2.4 G/DL (ref 3.2–4.9)
ALBUMIN/GLOB SERPL: 0.8 G/DL
ALP SERPL-CCNC: 54 U/L (ref 30–99)
ALT SERPL-CCNC: 16 U/L (ref 2–50)
ANION GAP SERPL CALC-SCNC: 6 MMOL/L (ref 7–16)
AST SERPL-CCNC: 23 U/L (ref 12–45)
BACTERIA BLD CULT: NORMAL
BILIRUB SERPL-MCNC: 0.2 MG/DL (ref 0.1–1.5)
BUN SERPL-MCNC: 35 MG/DL (ref 8–22)
CALCIUM SERPL-MCNC: 7.9 MG/DL (ref 8.5–10.5)
CHLORIDE SERPL-SCNC: 102 MMOL/L (ref 96–112)
CO2 SERPL-SCNC: 25 MMOL/L (ref 20–33)
CREAT SERPL-MCNC: 3.48 MG/DL (ref 0.5–1.4)
ERYTHROCYTE [DISTWIDTH] IN BLOOD BY AUTOMATED COUNT: 46.8 FL (ref 35.9–50)
GLOBULIN SER CALC-MCNC: 3.1 G/DL (ref 1.9–3.5)
GLUCOSE SERPL-MCNC: 106 MG/DL (ref 65–99)
HCT VFR BLD AUTO: 23.6 % (ref 42–52)
HGB BLD-MCNC: 7.8 G/DL (ref 14–18)
MCH RBC QN AUTO: 31 PG (ref 27–33)
MCHC RBC AUTO-ENTMCNC: 33.1 G/DL (ref 33.7–35.3)
MCV RBC AUTO: 93.7 FL (ref 81.4–97.8)
PLATELET # BLD AUTO: 200 K/UL (ref 164–446)
PMV BLD AUTO: 10.4 FL (ref 9–12.9)
POTASSIUM SERPL-SCNC: 4.1 MMOL/L (ref 3.6–5.5)
PROT SERPL-MCNC: 5.5 G/DL (ref 6–8.2)
RBC # BLD AUTO: 2.52 M/UL (ref 4.7–6.1)
SIGNIFICANT IND 70042: NORMAL
SITE SITE: NORMAL
SODIUM SERPL-SCNC: 133 MMOL/L (ref 135–145)
SOURCE SOURCE: NORMAL
WBC # BLD AUTO: 7.2 K/UL (ref 4.8–10.8)

## 2021-06-26 PROCEDURE — A9270 NON-COVERED ITEM OR SERVICE: HCPCS | Performed by: STUDENT IN AN ORGANIZED HEALTH CARE EDUCATION/TRAINING PROGRAM

## 2021-06-26 PROCEDURE — 770020 HCHG ROOM/CARE - TELE (206)

## 2021-06-26 PROCEDURE — 80053 COMPREHEN METABOLIC PANEL: CPT

## 2021-06-26 PROCEDURE — 700102 HCHG RX REV CODE 250 W/ 637 OVERRIDE(OP): Performed by: STUDENT IN AN ORGANIZED HEALTH CARE EDUCATION/TRAINING PROGRAM

## 2021-06-26 PROCEDURE — 86480 TB TEST CELL IMMUN MEASURE: CPT

## 2021-06-26 PROCEDURE — 700111 HCHG RX REV CODE 636 W/ 250 OVERRIDE (IP): Performed by: STUDENT IN AN ORGANIZED HEALTH CARE EDUCATION/TRAINING PROGRAM

## 2021-06-26 PROCEDURE — 90935 HEMODIALYSIS ONE EVALUATION: CPT

## 2021-06-26 PROCEDURE — 700111 HCHG RX REV CODE 636 W/ 250 OVERRIDE (IP)

## 2021-06-26 PROCEDURE — 5A1D70Z PERFORMANCE OF URINARY FILTRATION, INTERMITTENT, LESS THAN 6 HOURS PER DAY: ICD-10-PCS | Performed by: INTERNAL MEDICINE

## 2021-06-26 PROCEDURE — 36415 COLL VENOUS BLD VENIPUNCTURE: CPT

## 2021-06-26 PROCEDURE — 99233 SBSQ HOSP IP/OBS HIGH 50: CPT | Mod: GC | Performed by: HOSPITALIST

## 2021-06-26 PROCEDURE — 85027 COMPLETE CBC AUTOMATED: CPT

## 2021-06-26 RX ORDER — POTASSIUM CHLORIDE 20 MEQ/1
40 TABLET, EXTENDED RELEASE ORAL ONCE
Status: DISCONTINUED | OUTPATIENT
Start: 2021-06-26 | End: 2021-06-26

## 2021-06-26 RX ORDER — HEPARIN SODIUM 1000 [USP'U]/ML
INJECTION, SOLUTION INTRAVENOUS; SUBCUTANEOUS
Status: COMPLETED
Start: 2021-06-26 | End: 2021-06-26

## 2021-06-26 RX ADMIN — DOXAZOSIN 8 MG: 2 TABLET ORAL at 21:18

## 2021-06-26 RX ADMIN — ISOSORBIDE DINITRATE 5 MG: 10 TABLET ORAL at 05:10

## 2021-06-26 RX ADMIN — GABAPENTIN 100 MG: 100 CAPSULE ORAL at 12:50

## 2021-06-26 RX ADMIN — HEPARIN SODIUM: 1000 INJECTION, SOLUTION INTRAVENOUS; SUBCUTANEOUS at 19:56

## 2021-06-26 RX ADMIN — ATORVASTATIN CALCIUM 40 MG: 40 TABLET, FILM COATED ORAL at 05:11

## 2021-06-26 RX ADMIN — ISOSORBIDE DINITRATE 5 MG: 10 TABLET ORAL at 12:50

## 2021-06-26 RX ADMIN — ASPIRIN 81 MG: 81 TABLET, COATED ORAL at 05:10

## 2021-06-26 RX ADMIN — GABAPENTIN 100 MG: 100 CAPSULE ORAL at 05:10

## 2021-06-26 RX ADMIN — FUROSEMIDE 80 MG: 10 INJECTION, SOLUTION INTRAMUSCULAR; INTRAVENOUS at 21:19

## 2021-06-26 RX ADMIN — HEPARIN SODIUM 2600 UNITS: 1000 INJECTION, SOLUTION INTRAVENOUS; SUBCUTANEOUS at 19:56

## 2021-06-26 RX ADMIN — ISOSORBIDE DINITRATE 5 MG: 10 TABLET ORAL at 21:18

## 2021-06-26 RX ADMIN — GABAPENTIN 100 MG: 100 CAPSULE ORAL at 21:18

## 2021-06-26 RX ADMIN — HYDRALAZINE HYDROCHLORIDE 10 MG: 10 TABLET, FILM COATED ORAL at 05:10

## 2021-06-26 RX ADMIN — LISINOPRIL 5 MG: 5 TABLET ORAL at 05:11

## 2021-06-26 RX ADMIN — CARVEDILOL 6.25 MG: 6.25 TABLET, FILM COATED ORAL at 08:04

## 2021-06-26 RX ADMIN — HYDRALAZINE HYDROCHLORIDE 10 MG: 10 TABLET, FILM COATED ORAL at 21:18

## 2021-06-26 RX ADMIN — FUROSEMIDE 80 MG: 10 INJECTION, SOLUTION INTRAMUSCULAR; INTRAVENOUS at 05:11

## 2021-06-26 RX ADMIN — DOCUSATE SODIUM 50 MG AND SENNOSIDES 8.6 MG 2 TABLET: 8.6; 5 TABLET, FILM COATED ORAL at 05:10

## 2021-06-26 RX ADMIN — HYDRALAZINE HYDROCHLORIDE 10 MG: 10 TABLET, FILM COATED ORAL at 12:50

## 2021-06-26 RX ADMIN — FOLIC ACID 1 MG: 1 TABLET ORAL at 05:10

## 2021-06-26 ASSESSMENT — ENCOUNTER SYMPTOMS
VOMITING: 0
FOCAL WEAKNESS: 0
FEVER: 0
COUGH: 0
ORTHOPNEA: 1
PALPITATIONS: 0
WEAKNESS: 1
TINGLING: 1
HEADACHES: 0
ABDOMINAL PAIN: 0
SHORTNESS OF BREATH: 0
CONSTIPATION: 0
CHILLS: 0
DIARRHEA: 0
NAUSEA: 0
DIZZINESS: 1
SPUTUM PRODUCTION: 0

## 2021-06-26 ASSESSMENT — PAIN DESCRIPTION - PAIN TYPE
TYPE: ACUTE PAIN
TYPE: ACUTE PAIN

## 2021-06-26 ASSESSMENT — FIBROSIS 4 INDEX: FIB4 SCORE: 2.01

## 2021-06-26 NOTE — PROGRESS NOTES
Assumed care of patient. Bedside report, received from Omayra MCKINNON. Updated POC, call light within reach, and fall precautions in place. Bed locked and and in lowest position. Patient instructed to call for assistance before getting out of bed. All questions answered, no further needs at this time.

## 2021-06-26 NOTE — PROGRESS NOTES
Daily Progress Note:     Date of Service: 6/26/2021  Primary Team: [facility] White Team   Attending: Donell Gunn M.D.   Senior Resident: Dr. Ang  Intern: Dr. Brewer  Contact:  [phone]    Chief Complaint:   Heart failure exacerbation, acute renal failure, acute hypoxic respiratory failure, nephrotic syndrome    Subjective:   Underwent dialysis with UF of 3.5L, minimal urinary output of 250ml. Oxygen demands decreasing post dialysis, now down to 2L NC.  Today: Generalised weakness, tired, needles/burning sensations of bilateral feet. Denies CP/palpitations, nausea/vomiting, lightheaded/dizziness. Felt unstable on feet yesterday while transitioning to chair.    Consultants/Specialty:  Nephrology  Cardiology    Review of Systems:    Review of Systems   Constitutional: Positive for malaise/fatigue. Negative for chills and fever.   Respiratory: Negative for cough, sputum production and shortness of breath.    Cardiovascular: Positive for orthopnea and leg swelling. Negative for chest pain and palpitations.   Gastrointestinal: Negative for abdominal pain, constipation, diarrhea, nausea and vomiting.   Genitourinary: Negative for dysuria, frequency and urgency.   Neurological: Positive for dizziness, tingling and weakness. Negative for focal weakness and headaches.       Objective Data:   Physical Exam:   Vitals:   Temp:  [36.4 °C (97.5 °F)-37.1 °C (98.8 °F)] 36.7 °C (98.1 °F)  Pulse:  [58-69] 69  Resp:  [16-20] 16  BP: (123-166)/(62-82) 129/70  SpO2:  [94 %-100 %] 97 %     Physical Exam  Vitals and nursing note reviewed.   Constitutional:       General: He is not in acute distress.     Appearance: He is not ill-appearing or diaphoretic.   HENT:      Head: Normocephalic and atraumatic.      Mouth/Throat:      Mouth: Mucous membranes are moist.      Pharynx: Oropharynx is clear.   Eyes:      Extraocular Movements: Extraocular movements intact.      Pupils: Pupils are equal, round, and reactive to light.    Cardiovascular:      Heart sounds: No murmur heard.        Comments: RRR 3/6 early systolic murmur, 1+ bilateral LE edema  Pulmonary:      Comments: CTAB, no wheeze, crackles/rales  Skin:     General: Skin is warm and dry.   Neurological:      Mental Status: He is alert and oriented to person, place, and time. Mental status is at baseline.      Cranial Nerves: No cranial nerve deficit.      Sensory: No sensory deficit.      Motor: No weakness.   Psychiatric:         Mood and Affect: Mood normal.         Behavior: Behavior normal.         Thought Content: Thought content normal.         Judgment: Judgment normal.           Labs:   Lab Results   Component Value Date/Time    SODIUM 139 06/25/2021 01:19 AM    POTASSIUM 3.3 (L) 06/25/2021 01:19 AM    CHLORIDE 106 06/25/2021 01:19 AM    CO2 26 06/25/2021 01:19 AM    GLUCOSE 127 (H) 06/25/2021 01:19 AM    BUN 30 (H) 06/25/2021 01:19 AM    CREATININE 2.42 (H) 06/25/2021 01:19 AM        Recent Labs     06/24/21  0110 06/25/21  0119 06/26/21  0814   WBC 8.1 6.4 7.2   RBC 2.39* 2.31* 2.52*   HEMOGLOBIN 7.6* 7.2* 7.8*   HEMATOCRIT 22.2* 21.7* 23.6*   MCV 92.9 93.9 93.7   MCH 31.8 31.2 31.0   RDW 47.8 48.1 46.8   PLATELETCT 186 190 200   MPV 10.9 11.2 10.4   NEUTSPOLYS 81.00*  --   --    LYMPHOCYTES 8.00*  --   --    MONOCYTES 7.90  --   --    EOSINOPHILS 2.30  --   --    BASOPHILS 0.40  --   --        Imaging:     Jean Barboza is a 71yo M with hx of CVA, seizure, HLD/HTN, DM2 (5.3 A1c); presenting for acute shortness of breath of 2 days; admitted 6/20 for heart failure exacerbation, hypoxic respiratory failure, acute renal failure; found to have moderate-severe Ao stenosis, pulmHTN; undergoing dialysis for volume overload.    Acute renal failure (HCC)- (present on admission)  Assessment & Plan  Presents in acute renal failure, previous baseline Cr estimate 1.3. Suspected due to cardiorenal syndrome versus diabetic nephropathy. Hepatitis negative, elevated ESR/CRP, with mildly  low C3, normal C4. Albumin/Cr urine random 6g/D. Improvement of hypervolemia, hypoxia post diaysis.  -Nephrology following  -Dialysis per nephrology   -IV lasix 80 mg BID   -gabapentin 100mg PO TID (bilateral foot needle sensation)        Normocytic anemia- (present on admission)  Assessment & Plan  Normocytic anaemia, Fe studies indicative of Fe deficiency, though may have complicating chronic disease. Denies any hematochezia/hematuria/hematemesis/melena. Low Folate with normal B12/TSH. Acute Hgb drop to 7.4 without identified source.  -Fe gluconate IV  -Transfuse Hgb <7    Acute respiratory failure with hypoxia (HCC)  Assessment & Plan  Presented with progressively worsening SOB, b/l swelling and orthopnea. CXR showed pulmonary edema, BNP elevated. ECHO showed EF 60%, grade II diastolic dysfunction, Ao stenosis , RVSP 70. Likely etiology is Ao stenosis, as low likelihood of PE (wells 1.5, DVT US negative).   -Nephrology following   -Lasix 80mg IV BID diuretic  -dialysis per nephrology   -daily weights , fluid restriction   -titrate O2 supplements to >90%  -Outpatient cardiology follow up  for TAVR       Pulmonary hypertension (HCC)  Assessment & Plan  Echo: EF 60%, Grade II diastolic dysfunction, Moderate to severe aortic stenosis , RVSP 70. Likely Type 2 in the setting of Aortic stenosis, HFPEF   -Lasix 80mg IV BID diuretic  -HD per nephrology for volume overload  -titrate O2 supplements to >90%      Aortic stenosis  Assessment & Plan  Found to have moderate-severe Ao stenosis on ECHO. Most likely etiology of pulmHTN.  -hydralazine 10mg PO TID  -isordil 5mg PO TID    -titrate O2 supplements to >90%  -Outpatient cardiology consultation for TAVR       Hypokalemia- (present on admission)  Assessment & Plan  -replete as indicated    Hypertension- (present on admission)  Assessment & Plan  Hx of HTN.  -hydralazine 10mg PO TID  -Isordil 5mg PO TID      Elevated troponin- (present on admission)  Assessment &  Plan  Elevate troponin, stable at 313. Likely elevation due to heart and renal failure. Unable to do V/Q or CTPE due to abnormal CXR and renal failure, US for DVT negative, wells of 1.5. EKG sinus tachy.        Type 2 diabetes mellitus (HCC)- (present on admission)  Assessment & Plan  Hx of DM2, admission A1c of 5.3, previously on insulin.  -PCP follow up

## 2021-06-26 NOTE — DOCUMENTATION QUERY
"                                                                         [facility]                                                                       Query Response Note      PATIENT:               DARRELL KOWALSKI  ACCT #:                  [id number]  MRN:                     [id number]  :                      1950  ADMIT DATE:       2021 8:43 PM  DISCH DATE:          RESPONDING  PROVIDER #:        [id number]           QUERY TEXT:    Congestive Heart Failure is documented in the H&P and in the Progress Notes. Please document the type and acuity (includes probable or suspected).     NOTE:  If an appropriate response is not listed below, please respond with a new note.    The patient's Clinical Indicators include:  U/S Cardiac ECHO ON 21 noted grade II diastolic dysfunction, moderate left ventricular hypertrophy, moderate to severe aortic stenosis, and an LVEF of 60%. Troponin T: 313 and NT-proBNP: > 60715 on admission. \"Acute exacerbation of CHF\" is noted in the H&P.    Treatments include: Lasix IV, Coreg, U/S Cardiac ECHO, and Cardiology Consult.    Risk factors include: hx HTN + ESRD + CHF, dx FERNANDO, hx DM II, and Advanced Age.    Thank you,  Clifford Campbell RN, BSN  Clinical   Connect via [employer].  Options provided:   -- Acute Systolic heart failure   -- Acute on Chronic Systolic heart failure   -- Acute Diastolic heart failure   -- Acute on Chronic Diastolic heart failure   -- Acute Systolic and Diastolic heart failure   -- Acute on Chronic Systolic and Diastolic heart failure   -- Unable to determine      Query created by: Clifford Campbell on 2021 7:02 AM    RESPONSE TEXT:    Acute Diastolic heart failure          Electronically signed by:  DARIEL BURNETT MD 2021 2:53 PM              "

## 2021-06-26 NOTE — CARE PLAN
The patient is Watcher - Medium risk of patient condition declining or worsening    Shift Goals  Clinical Goals: Improve WOB, decrease oxygen demands  Patient Goals: get out of bed and change linens  Family Goals: Stable resp. status    Progress made toward(s) clinical / shift goals:    Problem: Pain - Standard  Goal: Alleviation of pain or a reduction in pain to the patient’s comfort goal  Outcome: Progressing     Problem: Knowledge Deficit - Standard  Goal: Patient and family/care givers will demonstrate understanding of plan of care, disease process/condition, diagnostic tests and medications  Outcome: Progressing     Problem: Skin Integrity  Goal: Skin integrity is maintained or improved  Outcome: Progressing     Problem: Fall Risk  Goal: Patient will remain free from falls  Outcome: Progressing       Patient is not progressing towards the following goals:

## 2021-06-26 NOTE — CARE PLAN
The patient is Stable - Low risk of patient condition declining or worsening    Shift Goals  Clinical Goals: Wean off oxygen  Patient Goals: get out of bed and change linens  Family Goals: Stable resp. status    Progress made toward(s) clinical / shift goals:  Able to titrate oxygen down this shift.     Patient is not progressing towards the following goals:N/A    Problem: Pain - Standard  Goal: Alleviation of pain or a reduction in pain to the patient’s comfort goal  Outcome: Progressing  Flowsheets (Taken 6/25/2021 2000)  Pain Rating Scale (NPRS): 0  Note: Pt pain goal is 0.     Problem: Knowledge Deficit - Standard  Goal: Patient and family/care givers will demonstrate understanding of plan of care, disease process/condition, diagnostic tests and medications  Outcome: Progressing     Problem: Skin Integrity  Goal: Skin integrity is maintained or improved  Outcome: Progressing     Problem: Fall Risk  Goal: Patient will remain free from falls  Outcome: Progressing

## 2021-06-26 NOTE — PROGRESS NOTES
Assumed care of patient at bedside report from DAY RN. Updated on POC. Patient currently A & O x 4; on 4 L O2 silicone nasal cannula; up with one assist; without complaints of acute pain. Call light within reach. Whiteboard updated. Fall precautions in place. Bed locked and in lowest position. All questions answered. No other needs indicated at this time.

## 2021-06-26 NOTE — PROGRESS NOTES
Monitor Summary  Rhythm: SR  Rate: 65-69  Ectopy: R pac, 4 BBB bts, R coup  .17 / .08 / .46

## 2021-06-26 NOTE — PROGRESS NOTES
[facility] -  PROGRESS NOTE               Author: Nayla Gan M.D. Date & Time: 6/26/2021  10:46 AM     Chief Complaint:  Follow up ESRD    HPI:  70yoM with PMH significant for HTN, DM II, Hyperlipidemia, Seizure Disorder admitted with complaints of worsening SOB over the past 3 days and worsening bilateral LE edema for the past 1 week and in the ER found to have elevated Creatinine of . Pt reports that he has been getting progressively more SOB over the past 3 days and his LE edema has been worsening over the past 1 week so he came to the ED for eval. In the ED Cr was 5.3 on admission and pt reports that he has never been told of abnormal renal function in the past. His CXR showed pulm edema and there was some concern for DVT or PE but due to his elevated Creatinine he did not undergo a CTA. Bilateral lower extremity dopplers have been ordered. Pt was admitted and started on lasix 40mg IV BID as well as hydrochlorothiazide. He reports that his SOB is a little better this but he is still requiring face mask supplemental O2. He reports he is urinating more with the lasix but overnight only minimal UOP documented.   No F/C/N/V/CP, +SOB and +LE Edema, No melena, hematochezia, hematemesis.  No HA, visual changes, or abdominal pain    DAILY NEPHROLOGY SUMMARY:  6/22/21: rapid response called overnight for acute resp decompensation, urgent HD catheter placed and pt dialyzed with 3L UF, currently on high flow NC O2 with 100% FiO2, reports breathing a little better, 1.2L UOP over past 24hrs, BP mildly elevated, still with LE edema, denies any CP or n/v/diarrhea  6/23/21: No events, remains on high flow NC O2, tolerated HD yest afternoon with 3.5L UF, pt ~6L net negative fluid balance since admit and still requiring significant O2 support, BP stable, reports SOB is a little better, still with LE edema but reports it is better, on high flow NC O2 with 70% FiO2  6/24/21: No events, still  "requiring 70% FiO2 on high flow NC, pt reports ROSADO with exertion, edema improving per patient, BP stable, tolerated HD yest with 4L UF  6/25/21: No events, now on 4L NC O2, still has SOB with exertion, BP stable, tolerated HD today with 3.5L UF, edema improving  6/26/21: No events, feels better, still reports SOB with talking but improved, now on 2L NC, BP stable, reports LE edema resolved, denies any n/v/CP    REVIEW OF SYSTEMS:    10 point ROS reviewed and is as per HPI/daily summary or otherwise negative    PMH/PSH/SH/FH: Reviewed and unchanged since admission note  CURRENT MEDICATIONS: Reviewed from admission to present day    VS:  /70   Pulse 69   Temp 36.7 °C (98.1 °F) (Temporal)   Resp 16   Ht 1.727 m (5' 8\")   Wt 65 kg (143 lb 4.8 oz)   SpO2 97%   BMI 21.79 kg/m²   Physical Exam  Constitutional:       General: He is not in acute distress.     Appearance: Normal appearance.   HENT:      Head: Normocephalic and atraumatic.      Right Ear: External ear normal.      Left Ear: External ear normal.      Nose: Nose normal.      Mouth/Throat:      Mouth: Mucous membranes are moist.   Eyes:      Extraocular Movements: Extraocular movements intact.      Conjunctiva/sclera: Conjunctivae normal.   Cardiovascular:      Rate and Rhythm: Normal rate and regular rhythm.      Heart sounds: No murmur heard.     Pulmonary:      Effort: Pulmonary effort is normal. No respiratory distress.      Breath sounds: Examination of the right-lower field reveals decreased breath sounds. Examination of the left-lower field reveals decreased breath sounds. Decreased breath sounds present. No rhonchi.   Abdominal:      General: Bowel sounds are normal. There is no distension.      Palpations: Abdomen is soft.   Musculoskeletal:         General: No tenderness.      Cervical back: Normal range of motion and neck supple.      Right lower leg: Edema (trace) present.      Left lower leg: Edema (trace) present.   Skin:     General: " Skin is warm and dry.      Findings: No erythema.   Neurological:      General: No focal deficit present.      Mental Status: He is alert and oriented to person, place, and time.   Psychiatric:         Mood and Affect: Mood normal.         Behavior: Behavior normal.         Fluids:  In: 740 [P.O.:240; Dialysis:500]  Out: 4250     LABS:  Recent Labs     06/24/21  0110 06/25/21  0119 06/26/21  0814   SODIUM 138 139 133*   POTASSIUM 3.7 3.3* 4.1   CHLORIDE 106 106 102   CO2 24 26 25   GLUCOSE 104* 127* 106*   BUN 31* 30* 35*   CREATININE 3.04* 2.42* 3.48*   CALCIUM 7.9* 7.9* 7.9*       IMPRESSION:  # FERNANDO--pt denies any prior history of CKD but review of labs indicates Cr ~1.4 in 2017, unclear recent baseline  # Acute Hypoxic Respiratory Failure--pulm edema on CXR  -HD initiated 6/21 for acute hypoxic respiratory failure  - Off high flow NC   - Still requiring supplemental O2  # HTN--BP controlled  # DM II--management per primary svc  # LE Edema--improved with fluid removal with HD  # Anemia--?related to CKD, iron sat low  # Hypokalemia  # Elevated Troponin--being monitored  # Left Foot and Right Bicep Wound--wound care  # Acute Hypoxic Respiratory Failure--now on high flow NC O2  --CXR with pulm edema, worse on CXR from 6/23  # Valvular Heart Disease--echo 6/22 with moderate/severe AS that has progressed since his previous echo in 2017        PLAN:  - HD again today for fluid removal  - Continue lasix 80mg IV BID for additional fluid removal as pt is still urinating  - No role for NAFISA in FERNANDO  - Transfuse PRN for Hgb less than 7  - Record strict I/O's  - Dose adjust all meds for decreased GFR  - Avoid IV contrast/nephrotoxins/NSAIDs  - wean O2 as tolerated  - Will need cardiology eval for aortic stenosis

## 2021-06-27 LAB
ALBUMIN SERPL BCP-MCNC: 2.3 G/DL (ref 3.2–4.9)
ALBUMIN/GLOB SERPL: 0.7 G/DL
ALP SERPL-CCNC: 53 U/L (ref 30–99)
ALT SERPL-CCNC: 19 U/L (ref 2–50)
ANION GAP SERPL CALC-SCNC: 7 MMOL/L (ref 7–16)
AST SERPL-CCNC: 29 U/L (ref 12–45)
BASOPHILS # BLD AUTO: 0.5 % (ref 0–1.8)
BASOPHILS # BLD: 0.03 K/UL (ref 0–0.12)
BILIRUB SERPL-MCNC: 0.2 MG/DL (ref 0.1–1.5)
BUN SERPL-MCNC: 20 MG/DL (ref 8–22)
CALCIUM SERPL-MCNC: 7.6 MG/DL (ref 8.5–10.5)
CHLORIDE SERPL-SCNC: 104 MMOL/L (ref 96–112)
CO2 SERPL-SCNC: 27 MMOL/L (ref 20–33)
CREAT SERPL-MCNC: 2.47 MG/DL (ref 0.5–1.4)
EOSINOPHIL # BLD AUTO: 0.53 K/UL (ref 0–0.51)
EOSINOPHIL NFR BLD: 9.2 % (ref 0–6.9)
ERYTHROCYTE [DISTWIDTH] IN BLOOD BY AUTOMATED COUNT: 49.4 FL (ref 35.9–50)
GLOBULIN SER CALC-MCNC: 3.1 G/DL (ref 1.9–3.5)
GLUCOSE SERPL-MCNC: 157 MG/DL (ref 65–99)
HCT VFR BLD AUTO: 24.4 % (ref 42–52)
HGB BLD-MCNC: 7.7 G/DL (ref 14–18)
IMM GRANULOCYTES # BLD AUTO: 0.02 K/UL (ref 0–0.11)
IMM GRANULOCYTES NFR BLD AUTO: 0.3 % (ref 0–0.9)
LYMPHOCYTES # BLD AUTO: 0.84 K/UL (ref 1–4.8)
LYMPHOCYTES NFR BLD: 14.6 % (ref 22–41)
MAGNESIUM SERPL-MCNC: 1.7 MG/DL (ref 1.5–2.5)
MCH RBC QN AUTO: 30.6 PG (ref 27–33)
MCHC RBC AUTO-ENTMCNC: 31.6 G/DL (ref 33.7–35.3)
MCV RBC AUTO: 96.8 FL (ref 81.4–97.8)
MONOCYTES # BLD AUTO: 0.59 K/UL (ref 0–0.85)
MONOCYTES NFR BLD AUTO: 10.3 % (ref 0–13.4)
NEUTROPHILS # BLD AUTO: 3.74 K/UL (ref 1.82–7.42)
NEUTROPHILS NFR BLD: 65.1 % (ref 44–72)
NRBC # BLD AUTO: 0 K/UL
NRBC BLD-RTO: 0 /100 WBC
PHOSPHATE SERPL-MCNC: 2.2 MG/DL (ref 2.5–4.5)
PLATELET # BLD AUTO: 181 K/UL (ref 164–446)
PMV BLD AUTO: 10.9 FL (ref 9–12.9)
POTASSIUM SERPL-SCNC: 4.1 MMOL/L (ref 3.6–5.5)
PROT SERPL-MCNC: 5.4 G/DL (ref 6–8.2)
RBC # BLD AUTO: 2.52 M/UL (ref 4.7–6.1)
SODIUM SERPL-SCNC: 138 MMOL/L (ref 135–145)
WBC # BLD AUTO: 5.8 K/UL (ref 4.8–10.8)

## 2021-06-27 PROCEDURE — A9270 NON-COVERED ITEM OR SERVICE: HCPCS | Performed by: STUDENT IN AN ORGANIZED HEALTH CARE EDUCATION/TRAINING PROGRAM

## 2021-06-27 PROCEDURE — A9270 NON-COVERED ITEM OR SERVICE: HCPCS | Performed by: INTERNAL MEDICINE

## 2021-06-27 PROCEDURE — 700102 HCHG RX REV CODE 250 W/ 637 OVERRIDE(OP): Performed by: STUDENT IN AN ORGANIZED HEALTH CARE EDUCATION/TRAINING PROGRAM

## 2021-06-27 PROCEDURE — 700111 HCHG RX REV CODE 636 W/ 250 OVERRIDE (IP): Performed by: STUDENT IN AN ORGANIZED HEALTH CARE EDUCATION/TRAINING PROGRAM

## 2021-06-27 PROCEDURE — 85025 COMPLETE CBC W/AUTO DIFF WBC: CPT

## 2021-06-27 PROCEDURE — 700102 HCHG RX REV CODE 250 W/ 637 OVERRIDE(OP): Performed by: INTERNAL MEDICINE

## 2021-06-27 PROCEDURE — 51798 US URINE CAPACITY MEASURE: CPT

## 2021-06-27 PROCEDURE — 84100 ASSAY OF PHOSPHORUS: CPT

## 2021-06-27 PROCEDURE — 83735 ASSAY OF MAGNESIUM: CPT

## 2021-06-27 PROCEDURE — 36415 COLL VENOUS BLD VENIPUNCTURE: CPT

## 2021-06-27 PROCEDURE — 80053 COMPREHEN METABOLIC PANEL: CPT

## 2021-06-27 PROCEDURE — 770020 HCHG ROOM/CARE - TELE (206)

## 2021-06-27 PROCEDURE — 97162 PT EVAL MOD COMPLEX 30 MIN: CPT

## 2021-06-27 PROCEDURE — 99233 SBSQ HOSP IP/OBS HIGH 50: CPT | Mod: GC | Performed by: HOSPITALIST

## 2021-06-27 RX ORDER — LISINOPRIL 5 MG/1
2.5 TABLET ORAL
Status: DISCONTINUED | OUTPATIENT
Start: 2021-06-28 | End: 2021-06-30 | Stop reason: HOSPADM

## 2021-06-27 RX ADMIN — GABAPENTIN 100 MG: 100 CAPSULE ORAL at 11:55

## 2021-06-27 RX ADMIN — HYDRALAZINE HYDROCHLORIDE 10 MG: 10 TABLET, FILM COATED ORAL at 04:58

## 2021-06-27 RX ADMIN — GABAPENTIN 100 MG: 100 CAPSULE ORAL at 16:58

## 2021-06-27 RX ADMIN — ATORVASTATIN CALCIUM 40 MG: 40 TABLET, FILM COATED ORAL at 04:58

## 2021-06-27 RX ADMIN — FUROSEMIDE 80 MG: 10 INJECTION, SOLUTION INTRAMUSCULAR; INTRAVENOUS at 04:59

## 2021-06-27 RX ADMIN — LISINOPRIL 5 MG: 5 TABLET ORAL at 04:59

## 2021-06-27 RX ADMIN — GABAPENTIN 100 MG: 100 CAPSULE ORAL at 04:58

## 2021-06-27 RX ADMIN — DOCUSATE SODIUM 50 MG AND SENNOSIDES 8.6 MG 2 TABLET: 8.6; 5 TABLET, FILM COATED ORAL at 16:58

## 2021-06-27 RX ADMIN — DOXAZOSIN 8 MG: 2 TABLET ORAL at 16:58

## 2021-06-27 RX ADMIN — FOLIC ACID 1 MG: 1 TABLET ORAL at 04:58

## 2021-06-27 RX ADMIN — ASPIRIN 81 MG: 81 TABLET, COATED ORAL at 04:58

## 2021-06-27 RX ADMIN — ISOSORBIDE DINITRATE 5 MG: 10 TABLET ORAL at 04:58

## 2021-06-27 ASSESSMENT — ENCOUNTER SYMPTOMS
PALPITATIONS: 0
ABDOMINAL PAIN: 0
SHORTNESS OF BREATH: 0
SPUTUM PRODUCTION: 0
FEVER: 0
COUGH: 1
ORTHOPNEA: 1
NAUSEA: 0
DIARRHEA: 0
CONSTIPATION: 0
CHILLS: 0
FOCAL WEAKNESS: 0
DIZZINESS: 1
TINGLING: 1
VOMITING: 0
WEAKNESS: 1
HEADACHES: 0

## 2021-06-27 ASSESSMENT — PAIN DESCRIPTION - PAIN TYPE: TYPE: ACUTE PAIN

## 2021-06-27 ASSESSMENT — COGNITIVE AND FUNCTIONAL STATUS - GENERAL
CLIMB 3 TO 5 STEPS WITH RAILING: A LOT
TURNING FROM BACK TO SIDE WHILE IN FLAT BAD: A LITTLE
SUGGESTED CMS G CODE MODIFIER MOBILITY: CL
MOVING FROM LYING ON BACK TO SITTING ON SIDE OF FLAT BED: UNABLE
WALKING IN HOSPITAL ROOM: A LOT
MOVING TO AND FROM BED TO CHAIR: UNABLE
MOBILITY SCORE: 12
STANDING UP FROM CHAIR USING ARMS: A LITTLE

## 2021-06-27 NOTE — PROGRESS NOTES
[facility] Progress Note    Hemodialysis treatment ordered today per Dr. Gan x 3.5 hours. Treatment initiated at 1639 and ended at 2009    Pt tolerated treatment, intermittent high arterial pressure alarms noted during HD, lines flushed and reversed, still alarming intermittently, Pt stable throughout HD tx.; see paper flow sheets for details.    Net UF 3,000 mL    Post tx, CVC flushed with saline then locked with heparin 1000 units/mL per designated amount in each wing then clamped and capped. Aspirate heparin prior to next CVC use.    Report given to Primary RN.

## 2021-06-27 NOTE — PROGRESS NOTES
Daily Progress Note:     Date of Service: 6/27/2021  Primary Team: [facility] White Team   Attending: Donell Gunn M.D.   Senior Resident: Dr. Ang  Intern: Dr. Brewer  Contact:  [phone]    Chief Complaint:   Renal failure, acute hypoxic respiratory distress, diastolic heart failure, nephrotic syndrome    Subjective:   Underwent dialysis with UF of 3L, additional 200ml urine. Continues to improve on oxygen demands. Generalised weakness, unsteady on feet.  Today: Bilateral foot and hand cramping/needles sensations, cough, with generalised weakness and increased difficulty with movements. Significant lightheadedness with head movements, positional changes.    Consultants/Specialty:  Nephrology  Cardiology    Review of Systems:    Review of Systems   Constitutional: Negative for chills and fever.   Respiratory: Positive for cough. Negative for sputum production and shortness of breath.    Cardiovascular: Positive for orthopnea. Negative for chest pain, palpitations and leg swelling.   Gastrointestinal: Negative for abdominal pain, constipation, diarrhea, nausea and vomiting.   Genitourinary: Negative for dysuria, frequency and urgency.   Neurological: Positive for dizziness, tingling and weakness. Negative for focal weakness and headaches.       Objective Data:   Physical Exam:   Vitals:   Temp:  [35.9 °C (96.6 °F)-37.1 °C (98.8 °F)] 36.9 °C (98.5 °F)  Pulse:  [62-66] 62  Resp:  [16-18] 18  BP: (120-151)/(60-71) 129/69  SpO2:  [95 %-98 %] 95 %     Physical Exam  Vitals and nursing note reviewed.   Constitutional:       General: He is not in acute distress.     Appearance: He is not ill-appearing or diaphoretic.   HENT:      Head: Normocephalic and atraumatic.      Mouth/Throat:      Mouth: Mucous membranes are moist.      Pharynx: Oropharynx is clear.   Eyes:      Extraocular Movements: Extraocular movements intact.      Pupils: Pupils are equal, round, and reactive to light.   Cardiovascular:      Comments:  RRR 3/6 early systolic murmur, 1+ to trace bilateral LE edema.  Abdominal:      Palpations: Abdomen is soft.      Tenderness: There is no abdominal tenderness. There is no guarding or rebound.   Skin:     General: Skin is warm and dry.   Neurological:      Mental Status: He is alert and oriented to person, place, and time.      Cranial Nerves: No cranial nerve deficit.      Sensory: No sensory deficit.   Psychiatric:         Mood and Affect: Mood normal.         Behavior: Behavior normal.         Thought Content: Thought content normal.         Judgment: Judgment normal.           Labs:   Lab Results   Component Value Date/Time    SODIUM 138 06/27/2021 01:46 AM    POTASSIUM 4.1 06/27/2021 01:46 AM    CHLORIDE 104 06/27/2021 01:46 AM    CO2 27 06/27/2021 01:46 AM    GLUCOSE 157 (H) 06/27/2021 01:46 AM    BUN 20 06/27/2021 01:46 AM    CREATININE 2.47 (H) 06/27/2021 01:46 AM        Recent Labs     06/25/21  0119 06/26/21  0814 06/27/21  0146   WBC 6.4 7.2 5.8   RBC 2.31* 2.52* 2.52*   HEMOGLOBIN 7.2* 7.8* 7.7*   HEMATOCRIT 21.7* 23.6* 24.4*   MCV 93.9 93.7 96.8   MCH 31.2 31.0 30.6   RDW 48.1 46.8 49.4   PLATELETCT 190 200 181   MPV 11.2 10.4 10.9   NEUTSPOLYS  --   --  65.10   LYMPHOCYTES  --   --  14.60*   MONOCYTES  --   --  10.30   EOSINOPHILS  --   --  9.20*   BASOPHILS  --   --  0.50       Imaging:     Jean Barboza is a 71yo M with hx of CVA, seizure, HLD/HTN, DM2 (5.3 A1c); presenting for acute shortness of breath of 2 days; admitted 6/20 for heart failure exacerbation, hypoxic respiratory failure, acute renal failure; found to have moderate-severe Ao stenosis, pulmHTN; undergoing dialysis for volume overload.    Acute renal failure (HCC)- (present on admission)  Assessment & Plan  Presents in acute renal failure, previous baseline Cr estimate 1.3. Suspected due to cardiorenal syndrome versus diabetic nephropathy. Hepatitis negative, elevated ESR/CRP, with mildly low C3, normal C4. Albumin/Cr urine random 6g/D.  Improvement of hypervolemia, hypoxia post diaysis.  -Nephrology following  -Dialysis per nephrology   -STOPPED IV lasix 80 mg BID   -gabapentin 100mg PO TID (extremity tingling, at max renal dose)        Normocytic anemia- (present on admission)  Assessment & Plan  Normocytic anaemia, Fe studies indicative of Fe deficiency, though may have complicating chronic disease. Denies any hematochezia/hematuria/hematemesis/melena. Low Folate with normal B12/TSH. Acute Hgb drop to 7.4 without identified source.  -Fe 325mg PO qD at discharge  -Transfuse Hgb <7    Acute respiratory failure with hypoxia (HCC)  Assessment & Plan  Presented with progressively worsening SOB, b/l swelling and orthopnea. CXR showed pulmonary edema, BNP elevated. ECHO showed EF 60%, grade II diastolic dysfunction, Ao stenosis , RVSP 70. Likely etiology is Ao stenosis, as low likelihood of PE (wells 1.5, DVT US negative).   -Nephrology following   -STOP Lasix 80mg IV BID diuretic  -dialysis per nephrology   -daily weights , fluid restriction   -titrate O2 supplements to >90%  -Outpatient cardiology follow up  for TAVR       Pulmonary hypertension (HCC)  Assessment & Plan  Echo: EF 60%, Grade II diastolic dysfunction, Moderate to severe aortic stenosis , RVSP 70. Likely Type 2 in the setting of Aortic stenosis, HFPEF   -STOP Lasix 80mg IV BID diuretic  -HD per nephrology for volume overload, pending dialysis chair  -titrate O2 supplements to >90%      Aortic stenosis  Assessment & Plan  Found to have moderate-severe Ao stenosis on ECHO. Most likely etiology of pulmHTN.  -STOP hydralazine 10mg PO TID  -STOP isordil 5mg PO TID    -titrate O2 supplements to >90%  -Outpatient cardiology consultation for TAVR       Hypokalemia- (present on admission)  Assessment & Plan  -replete as indicated    Hypertension- (present on admission)  Assessment & Plan  Hx of HTN.  -STOP hydralazine 10mg PO TID  -STOP Isordil 5mg PO TID  -lisinopril 5mg PO qD      Elevated  troponin- (present on admission)  Assessment & Plan  Elevate troponin, stable at 313. Likely elevation due to heart and renal failure. Unable to do V/Q or CTPE due to abnormal CXR and renal failure, US for DVT negative, wells of 1.5. EKG sinus tachy.        Type 2 diabetes mellitus (HCC)- (present on admission)  Assessment & Plan  Hx of DM2, admission A1c of 5.3, previously on insulin.  -PCP follow up

## 2021-06-27 NOTE — THERAPY
"Physical Therapy   Initial Evaluation     Patient Name: Jean Barboza  Age:  70 y.o., Sex:  male  Medical Record #: [id number]  Today's Date: 6/27/2021     Precautions: Fall Risk    Assessment  Patient is a 70 y.o. male who was admitted to acute on 6/20 for heart failure exacerbation and BLE edema. He was found to have aortic stenosis, pulmonary hypertension, normocytic anemia, hypoxic respiratory failure, acute renal failure, and is undergoing dialysis for volume overload. PMH of CVA, seizures, HLD, HTN, and DM2. Patient presented to PT with decreased activity tolerance due to dizziness, patient found to be hypotensive, RN aware. He initially reported dizziness upon sitting at EOB that did not improve after 4 minutes. His BP and HR were 95/57 and 83. BP and HR were taken two more times while seated at EOB with 3 minute intervals between recording with little change. Patient was able to transfer to a chair with a FWW and min A. BP and HR after tranferring were 93/54 and 86.  Recommend patient sit up for meals as tolerated. Given patient's decreased activity tolerance, recommend post-acute placement, although patient may progress while in house. Will follow.    Plan    Recommend Physical Therapy 3 times per week until therapy goals are met for the following treatments:  Community Re-integration, Equipment, Gait Training, Manual Therapy, Neuro Re-Education / Balance, Self Care/Home Evaluation, Stair Training, Therapeutic Activities and Therapeutic Exercises    DC Equipment Recommendations: Unable to determine at this time  Discharge Recommendations: Recommend post-acute placement for additional physical therapy services prior to discharge home (however anticipate pt to progress while in house)       Subjective    \"I still feel dizzy.\"     Objective       06/27/21 0945   Cosignature   Documentation Review Approved with modifications made by preceptor in flowsheet   Total Time Spent   Total Time Spent (Mins) 29   Charge " Group   PT Evaluation PT Evaluation Mod   Initial Contact Note    Initial Contact Note Order Received and Verified, Physical Therapy Evaluation in Progress with Full Report to Follow.   Precautions   Precautions Fall Risk   Comments hypotensive   Vitals   Pulse 83   Patient BP Position Sitting   Blood Pressure  (!) 95/57   O2 (LPM) 0   O2 Delivery Device None - Room Air   Pain 0 - 10 Group   Location Foot   Location Orientation Right;Left   Therapist Pain Assessment Post Activity Pain Same as Prior to Activity;Nurse Notified   Prior Living Situation   Prior Services Home-Independent   Housing / Facility 1 Story House   Steps Into Home 4   Steps In Home 0   Equipment Owned 4-Wheel Walker;Single Point Cane   Lives with - Patient's Self Care Capacity Other (Comments)  (brother)   Comments doesn't know if brother can assist, nieces and nephews may be available to assist, son does grocery shopping   Prior Level of Functional Mobility   Bed Mobility Independent   Transfer Status Independent   Ambulation Independent   Distance Ambulation (Feet)   (community)   Assistive Devices Used 4-Wheel Walker  (and SPC)   Stairs Independent   Comments reported using 4WW or SPC when ambulating long distances or when tired   History of Falls   History of Falls No   Cognition    Cognition / Consciousness WDL   Level of Consciousness Alert   Comments pleasant, cooperative   Passive ROM Lower Body   Passive ROM Lower Body WDL   Comments not formally tested, WFL for bed mobility and transfers   Active ROM Lower Body    Active ROM Lower Body  WDL   Comments as above   Strength Lower Body   Lower Body Strength  WDL   Comments as above   Sensation Lower Body   Lower Extremity Sensation   Not Tested   Lower Body Muscle Tone   Lower Body Muscle Tone  WDL   Neurological Concerns   Neurological Concerns No   Coordination Lower Body    Coordination Lower Body  WDL   Balance Assessment   Sitting Balance (Static) Fair   Sitting Balance (Dynamic) Fair    Standing Balance (Static) Fair -   Standing Balance (Dynamic) Fair -   Weight Shift Sitting Fair   Weight Shift Standing Fair   Comments w/ FWW, no overt LOB   Gait Analysis   # of Stairs Climbed 0   Weight Bearing Status FWB   Comments ambulation deferred due to dizziness and hypotension   Bed Mobility    Supine to Sit Supervised   Sit to Supine   (NT - left in chair)   Scooting Supervised  (to EOB)   Rolling Supervised   Functional Mobility   Sit to Stand Minimal Assist   Bed, Chair, Wheelchair Transfer Minimal Assist   Transfer Method Stand Step   Comments stand step pivot to chair   How much difficulty does the patient currently have...   Turning over in bed (including adjusting bedclothes, sheets and blankets)? 3   Sitting down on and standing up from a chair with arms (e.g., wheelchair, bedside commode, etc.) 1   Moving from lying on back to sitting on the side of the bed? 1   How much help from another person does the patient currently need...   Moving to and from a bed to a chair (including a wheelchair)? 3   Need to walk in a hospital room? 2   Climbing 3-5 steps with a railing? 2   6 clicks Mobility Score 12   Activity Tolerance   Sitting in Chair left in chair   Sitting Edge of Bed 12 min   Standing 1 min   Comments limited by dizziness, BP was 95/57 (83 bpm) while seated EOB and did not increase over 7 minutes of sitting, after moving to chair, BP was 93/54 (HR 86)   Edema / Skin Assessment   Edema / Skin  Not Assessed   Patient / Family Goals    Patient / Family Goal #1 to leave the hospital   Short Term Goals    Short Term Goal # 1 Pt to perform transfers with SPV in 6tx to allow for transfers at home.   Short Term Goal # 2 Pt to ambulate 150ft with SPV in 6tx to allow for mobility at home   Short Term Goal # 3 Pt to ascend/descend 4 steps with SPV in 6tx to allow for access to home.   Education Group   Education Provided Role of Physical Therapist   Role of Physical Therapist Patient Response  Patient;Acceptance;Explanation;Demonstration;Verbal Demonstration;Action Demonstration   Problem List    Problems Impaired Transfers;Impaired Ambulation;Impaired Balance;Decreased Activity Tolerance   Anticipated Discharge Equipment and Recommendations   DC Equipment Recommendations Unable to determine at this time   Discharge Recommendations Recommend post-acute placement for additional physical therapy services prior to discharge home  (however anticipate pt to progress while in house)   Interdisciplinary Plan of Care Collaboration   IDT Collaboration with  Nursing;Physician   Patient Position at End of Therapy Seated;Call Light within Reach;Tray Table within Reach;Phone within Reach   Collaboration Comments discussed with RN   Session Information   Date / Session Number  6/27 - 1(1/3, 7/3)   Priority   (.)

## 2021-06-27 NOTE — PROGRESS NOTES
[facility] -  PROGRESS NOTE               Author: Nayla Gan M.D. Date & Time: 6/27/2021  12:39 PM     Chief Complaint:  Follow up ESRD    HPI:  70yoM with PMH significant for HTN, DM II, Hyperlipidemia, Seizure Disorder admitted with complaints of worsening SOB over the past 3 days and worsening bilateral LE edema for the past 1 week and in the ER found to have elevated Creatinine of . Pt reports that he has been getting progressively more SOB over the past 3 days and his LE edema has been worsening over the past 1 week so he came to the ED for eval. In the ED Cr was 5.3 on admission and pt reports that he has never been told of abnormal renal function in the past. His CXR showed pulm edema and there was some concern for DVT or PE but due to his elevated Creatinine he did not undergo a CTA. Bilateral lower extremity dopplers have been ordered. Pt was admitted and started on lasix 40mg IV BID as well as hydrochlorothiazide. He reports that his SOB is a little better this but he is still requiring face mask supplemental O2. He reports he is urinating more with the lasix but overnight only minimal UOP documented.   No F/C/N/V/CP, +SOB and +LE Edema, No melena, hematochezia, hematemesis.  No HA, visual changes, or abdominal pain    DAILY NEPHROLOGY SUMMARY:  6/22/21: rapid response called overnight for acute resp decompensation, urgent HD catheter placed and pt dialyzed with 3L UF, currently on high flow NC O2 with 100% FiO2, reports breathing a little better, 1.2L UOP over past 24hrs, BP mildly elevated, still with LE edema, denies any CP or n/v/diarrhea  6/23/21: No events, remains on high flow NC O2, tolerated HD yest afternoon with 3.5L UF, pt ~6L net negative fluid balance since admit and still requiring significant O2 support, BP stable, reports SOB is a little better, still with LE edema but reports it is better, on high flow NC O2 with 70% FiO2  6/24/21: No events, still  "requiring 70% FiO2 on high flow NC, pt reports ROSADO with exertion, edema improving per patient, BP stable, tolerated HD yest with 4L UF  6/25/21: No events, now on 4L NC O2, still has SOB with exertion, BP stable, tolerated HD today with 3.5L UF, edema improving  6/26/21: No events, feels better, still reports SOB with talking but improved, now on 2L NC, BP stable, reports LE edema resolved, denies any n/v/CP  6/27/21: No events, tolerated HD yest with 3L UF, BP stable overnight and little low this am in the 90's systolic and pt does have some light-headednes, now on RA and pt reports SOB has improved and LE edema resolved    REVIEW OF SYSTEMS:    10 point ROS reviewed and is as per HPI/daily summary or otherwise negative    PMH/PSH/SH/FH: Reviewed and unchanged since admission note  CURRENT MEDICATIONS: Reviewed from admission to present day    VS:  BP (!) 96/56   Pulse 64   Temp 35.9 °C (96.6 °F) (Temporal)   Resp 16   Ht 1.727 m (5' 8\")   Wt 64.9 kg (143 lb 1.3 oz)   SpO2 94%   BMI 21.76 kg/m²   Physical Exam  Constitutional:       General: He is not in acute distress.     Appearance: Normal appearance.   HENT:      Head: Normocephalic and atraumatic.      Right Ear: External ear normal.      Left Ear: External ear normal.      Nose: Nose normal.      Mouth/Throat:      Mouth: Mucous membranes are moist.   Eyes:      Extraocular Movements: Extraocular movements intact.      Conjunctiva/sclera: Conjunctivae normal.   Cardiovascular:      Rate and Rhythm: Normal rate and regular rhythm.      Heart sounds: No murmur heard.     Pulmonary:      Effort: Pulmonary effort is normal. No respiratory distress.      Breath sounds: Examination of the right-lower field reveals decreased breath sounds. Examination of the left-lower field reveals decreased breath sounds. Decreased breath sounds present. No rhonchi.   Abdominal:      General: Bowel sounds are normal. There is no distension.      Palpations: Abdomen is soft. "   Musculoskeletal:         General: No tenderness.      Cervical back: Normal range of motion and neck supple.      Right lower leg: No edema.      Left lower leg: No edema.   Skin:     General: Skin is warm and dry.      Findings: No erythema.   Neurological:      General: No focal deficit present.      Mental Status: He is alert and oriented to person, place, and time.   Psychiatric:         Mood and Affect: Mood normal.         Behavior: Behavior normal.         Fluids:  In: 740 [P.O.:240; Dialysis:500]  Out: 3700     LABS:  Recent Labs     06/25/21  0119 06/26/21  0814 06/27/21  0146   SODIUM 139 133* 138   POTASSIUM 3.3* 4.1 4.1   CHLORIDE 106 102 104   CO2 26 25 27   GLUCOSE 127* 106* 157*   BUN 30* 35* 20   CREATININE 2.42* 3.48* 2.47*   CALCIUM 7.9* 7.9* 7.6*       IMPRESSION:  # FERNANDO--pt denies any prior history of CKD but review of labs indicates Cr ~1.4 in 2017, unclear recent baseline  # Acute Hypoxic Respiratory Failure--pulm edema on CXR  -HD initiated 6/21 for acute hypoxic respiratory failure  - Dialyzed daily x6 days  - Now off supplemental O2  # HTN--BP controlled, ?overly controlled this am  # DM II--management per primary svc  # LE Edema--resolved with HD  # Anemia--?related to CKD, iron sat low  # Hypokalemia--resolved  # Elevated Troponin--medical management  # Left Foot and Right Bicep Wound--wound care  # Acute Hypoxic Respiratory Failure  --CXR with pulm edema, worse on CXR from 6/23  --Now resolved and off supplemental O2  # Valvular Heart Disease--echo 6/22 with moderate/severe AS that has progressed since his previous echo in 2017        PLAN:  - No HD today  - Recheck renal function in am (cr lower today due aggressive daily HD) and eval for any renal recovery or if pt is still dialysis dependent then he will need permcath  - DC IV lasix  - Carvedilol and hydralazine dc'ed today  - Recommend decreasing or stopping doxazosin  - Will place holding parameters on lisinopril  - No role for NAFISA  in FERNANDO  - Transfuse PRN for Hgb less than 7  - Record strict I/O's  - Dose adjust all meds for decreased GFR  - Avoid IV contrast/nephrotoxins/NSAIDs  - Will need cardiology eval for aortic stenosis

## 2021-06-27 NOTE — CARE PLAN
The patient is Stable - Low risk of patient condition declining or worsening    Shift Goals  Clinical Goals: Wean off O2  Patient Goals: get out of bed and change linens  Family Goals: Stable resp. status    Progress made toward(s) clinical / shift goals:    Problem: Knowledge Deficit - Standard  Goal: Patient and family/care givers will demonstrate understanding of plan of care, disease process/condition, diagnostic tests and medications  Outcome: Progressing  POC discussed with patient. Patient educated on importance of getting up to chair for meals.      Problem: Skin Integrity  Goal: Skin integrity is maintained or improved  Outcome: Progressing  Patient educated on importance of making frequent turns in bed. Patient on waffle overlay.     Problem: Fall Risk  Goal: Patient will remain free from falls  Outcome: Progressing       Patient is not progressing towards the following goals:

## 2021-06-27 NOTE — CARE PLAN
The patient is Stable - Low risk of patient condition declining or worsening    Shift Goals  Clinical Goals: Wean off O2  Patient Goals: get out of bed and change linens  Family Goals: Stable resp. status    Progress made toward(s) clinical / shift goals: During shift pt required no increase in o2.    Patient is not progressing towards the following goals: n/a    Problem: Pain - Standard  Goal: Alleviation of pain or a reduction in pain to the patient’s comfort goal  Outcome: Progressing  Flowsheets (Taken 6/26/2021 2050)  Pain Rating Scale (NPRS): 0  Note: Pt pain goal is 0     Problem: Knowledge Deficit - Standard  Goal: Patient and family/care givers will demonstrate understanding of plan of care, disease process/condition, diagnostic tests and medications  Outcome: Progressing     Problem: Skin Integrity  Goal: Skin integrity is maintained or improved  Outcome: Progressing     Problem: Fall Risk  Goal: Patient will remain free from falls  Outcome: Progressing

## 2021-06-27 NOTE — PROGRESS NOTES
Assumed care of patient at bedside report from DAY RN. Updated on POC. Patient currently A & O x 4; on 1 L O2 silicone nasal cannula; up with one assist; without complaints of acute pain. Call light within reach. Whiteboard updated. Fall precautions in place. Bed locked and in lowest position. All questions answered. No other needs indicated at this time.

## 2021-06-28 LAB
ALBUMIN SERPL BCP-MCNC: 2.3 G/DL (ref 3.2–4.9)
ALBUMIN/GLOB SERPL: 0.7 G/DL
ALP SERPL-CCNC: 57 U/L (ref 30–99)
ALT SERPL-CCNC: 29 U/L (ref 2–50)
ANION GAP SERPL CALC-SCNC: 7 MMOL/L (ref 7–16)
AST SERPL-CCNC: 37 U/L (ref 12–45)
BASOPHILS # BLD AUTO: 0.4 % (ref 0–1.8)
BASOPHILS # BLD: 0.03 K/UL (ref 0–0.12)
BILIRUB SERPL-MCNC: <0.2 MG/DL (ref 0.1–1.5)
BUN SERPL-MCNC: 36 MG/DL (ref 8–22)
CALCIUM SERPL-MCNC: 8 MG/DL (ref 8.5–10.5)
CHLORIDE SERPL-SCNC: 100 MMOL/L (ref 96–112)
CO2 SERPL-SCNC: 28 MMOL/L (ref 20–33)
CREAT SERPL-MCNC: 3.54 MG/DL (ref 0.5–1.4)
EOSINOPHIL # BLD AUTO: 0.54 K/UL (ref 0–0.51)
EOSINOPHIL NFR BLD: 7.8 % (ref 0–6.9)
ERYTHROCYTE [DISTWIDTH] IN BLOOD BY AUTOMATED COUNT: 47.8 FL (ref 35.9–50)
GLOBULIN SER CALC-MCNC: 3.2 G/DL (ref 1.9–3.5)
GLUCOSE SERPL-MCNC: 158 MG/DL (ref 65–99)
HCT VFR BLD AUTO: 24.5 % (ref 42–52)
HGB BLD-MCNC: 8.1 G/DL (ref 14–18)
IMM GRANULOCYTES # BLD AUTO: 0.02 K/UL (ref 0–0.11)
IMM GRANULOCYTES NFR BLD AUTO: 0.3 % (ref 0–0.9)
LYMPHOCYTES # BLD AUTO: 1.24 K/UL (ref 1–4.8)
LYMPHOCYTES NFR BLD: 17.9 % (ref 22–41)
MAGNESIUM SERPL-MCNC: 1.6 MG/DL (ref 1.5–2.5)
MCH RBC QN AUTO: 31.6 PG (ref 27–33)
MCHC RBC AUTO-ENTMCNC: 33.1 G/DL (ref 33.7–35.3)
MCV RBC AUTO: 95.7 FL (ref 81.4–97.8)
MONOCYTES # BLD AUTO: 0.65 K/UL (ref 0–0.85)
MONOCYTES NFR BLD AUTO: 9.4 % (ref 0–13.4)
NEUTROPHILS # BLD AUTO: 4.44 K/UL (ref 1.82–7.42)
NEUTROPHILS NFR BLD: 64.2 % (ref 44–72)
NRBC # BLD AUTO: 0 K/UL
NRBC BLD-RTO: 0 /100 WBC
PHOSPHATE SERPL-MCNC: 3.1 MG/DL (ref 2.5–4.5)
PLATELET # BLD AUTO: 199 K/UL (ref 164–446)
PMV BLD AUTO: 11 FL (ref 9–12.9)
POTASSIUM SERPL-SCNC: 4.4 MMOL/L (ref 3.6–5.5)
PROT SERPL-MCNC: 5.5 G/DL (ref 6–8.2)
RBC # BLD AUTO: 2.56 M/UL (ref 4.7–6.1)
SODIUM SERPL-SCNC: 135 MMOL/L (ref 135–145)
WBC # BLD AUTO: 6.9 K/UL (ref 4.8–10.8)

## 2021-06-28 PROCEDURE — 97165 OT EVAL LOW COMPLEX 30 MIN: CPT

## 2021-06-28 PROCEDURE — 36415 COLL VENOUS BLD VENIPUNCTURE: CPT

## 2021-06-28 PROCEDURE — 700102 HCHG RX REV CODE 250 W/ 637 OVERRIDE(OP): Performed by: HOSPITALIST

## 2021-06-28 PROCEDURE — 84100 ASSAY OF PHOSPHORUS: CPT

## 2021-06-28 PROCEDURE — 700111 HCHG RX REV CODE 636 W/ 250 OVERRIDE (IP): Performed by: STUDENT IN AN ORGANIZED HEALTH CARE EDUCATION/TRAINING PROGRAM

## 2021-06-28 PROCEDURE — 700102 HCHG RX REV CODE 250 W/ 637 OVERRIDE(OP): Performed by: STUDENT IN AN ORGANIZED HEALTH CARE EDUCATION/TRAINING PROGRAM

## 2021-06-28 PROCEDURE — 770020 HCHG ROOM/CARE - TELE (206)

## 2021-06-28 PROCEDURE — 5A1D70Z PERFORMANCE OF URINARY FILTRATION, INTERMITTENT, LESS THAN 6 HOURS PER DAY: ICD-10-PCS | Performed by: INTERNAL MEDICINE

## 2021-06-28 PROCEDURE — A9270 NON-COVERED ITEM OR SERVICE: HCPCS | Performed by: STUDENT IN AN ORGANIZED HEALTH CARE EDUCATION/TRAINING PROGRAM

## 2021-06-28 PROCEDURE — 51798 US URINE CAPACITY MEASURE: CPT

## 2021-06-28 PROCEDURE — 90935 HEMODIALYSIS ONE EVALUATION: CPT

## 2021-06-28 PROCEDURE — 80053 COMPREHEN METABOLIC PANEL: CPT

## 2021-06-28 PROCEDURE — A9270 NON-COVERED ITEM OR SERVICE: HCPCS | Performed by: HOSPITALIST

## 2021-06-28 PROCEDURE — 85025 COMPLETE CBC W/AUTO DIFF WBC: CPT

## 2021-06-28 PROCEDURE — 99232 SBSQ HOSP IP/OBS MODERATE 35: CPT | Mod: GC | Performed by: HOSPITALIST

## 2021-06-28 PROCEDURE — 83735 ASSAY OF MAGNESIUM: CPT

## 2021-06-28 RX ORDER — MAGNESIUM SULFATE HEPTAHYDRATE 40 MG/ML
4 INJECTION, SOLUTION INTRAVENOUS ONCE
Status: COMPLETED | OUTPATIENT
Start: 2021-06-28 | End: 2021-06-28

## 2021-06-28 RX ADMIN — ATORVASTATIN CALCIUM 40 MG: 40 TABLET, FILM COATED ORAL at 05:23

## 2021-06-28 RX ADMIN — DOCUSATE SODIUM 50 MG AND SENNOSIDES 8.6 MG 2 TABLET: 8.6; 5 TABLET, FILM COATED ORAL at 18:31

## 2021-06-28 RX ADMIN — DOCUSATE SODIUM 50 MG AND SENNOSIDES 8.6 MG 2 TABLET: 8.6; 5 TABLET, FILM COATED ORAL at 05:23

## 2021-06-28 RX ADMIN — GABAPENTIN 100 MG: 100 CAPSULE ORAL at 18:31

## 2021-06-28 RX ADMIN — MAGNESIUM SULFATE IN WATER 4 G: 40 INJECTION, SOLUTION INTRAVENOUS at 08:04

## 2021-06-28 RX ADMIN — LISINOPRIL 2.5 MG: 5 TABLET ORAL at 05:23

## 2021-06-28 RX ADMIN — GABAPENTIN 100 MG: 100 CAPSULE ORAL at 05:23

## 2021-06-28 RX ADMIN — HEPARIN SODIUM 2600 UNITS: 1000 INJECTION, SOLUTION INTRAVENOUS; SUBCUTANEOUS at 18:12

## 2021-06-28 RX ADMIN — OXYCODONE 2.5 MG: 5 TABLET ORAL at 08:15

## 2021-06-28 RX ADMIN — OXYCODONE 2.5 MG: 5 TABLET ORAL at 19:43

## 2021-06-28 RX ADMIN — GABAPENTIN 100 MG: 100 CAPSULE ORAL at 12:14

## 2021-06-28 RX ADMIN — ASPIRIN 81 MG: 81 TABLET, COATED ORAL at 05:24

## 2021-06-28 RX ADMIN — FOLIC ACID 1 MG: 1 TABLET ORAL at 05:23

## 2021-06-28 ASSESSMENT — ENCOUNTER SYMPTOMS
DIZZINESS: 1
ORTHOPNEA: 0
PALPITATIONS: 0
CONSTIPATION: 0
FEVER: 0
ABDOMINAL PAIN: 0
FOCAL WEAKNESS: 0
CHILLS: 0
DIARRHEA: 0
VOMITING: 0
COUGH: 1
WEAKNESS: 1
HEADACHES: 0
NAUSEA: 0
SHORTNESS OF BREATH: 0
TINGLING: 1

## 2021-06-28 ASSESSMENT — COGNITIVE AND FUNCTIONAL STATUS - GENERAL
HELP NEEDED FOR BATHING: A LOT
DRESSING REGULAR UPPER BODY CLOTHING: A LITTLE
DRESSING REGULAR LOWER BODY CLOTHING: A LITTLE
SUGGESTED CMS G CODE MODIFIER DAILY ACTIVITY: CK
TOILETING: A LITTLE
DAILY ACTIVITIY SCORE: 18
PERSONAL GROOMING: A LITTLE

## 2021-06-28 ASSESSMENT — PAIN DESCRIPTION - PAIN TYPE
TYPE: ACUTE PAIN

## 2021-06-28 ASSESSMENT — ACTIVITIES OF DAILY LIVING (ADL): TOILETING: INDEPENDENT

## 2021-06-28 ASSESSMENT — FIBROSIS 4 INDEX: FIB4 SCORE: 2.42

## 2021-06-28 NOTE — DISCHARGE PLANNING
Anticipated Discharge Disposition: TBD    Action: pt pending medical clearance, dialysis needs unkown at this time.    Barriers to Discharge: medical clearance    Plan: continue to follow up with medical team and pt to discuss dc needs and barriers

## 2021-06-28 NOTE — CARE PLAN
Problem: Pain - Standard  Goal: Alleviation of pain or a reduction in pain to the patient’s comfort goal  Outcome: Progressing     Problem: Knowledge Deficit - Standard  Goal: Patient and family/care givers will demonstrate understanding of plan of care, disease process/condition, diagnostic tests and medications  Outcome: Progressing     Problem: Fall Risk  Goal: Patient will remain free from falls  Outcome: Progressing   The patient is Stable - Low risk of patient condition declining or worsening    Shift Goals  Clinical Goals: Wean off O2  Patient Goals: get out of bed and change linens  Family Goals: Stable resp. status    Progress made toward(s) clinical / shift goals:  progressing    Patient is not progressing towards the following goals:

## 2021-06-28 NOTE — DISCHARGE PLANNING
Outpatient Dialysis Placement Notification    Pt. Has been accepted pending [facility].     Tentative Schedule: Monday, Wednesday, Friday   Time: 2:45pm  Start date: 06/30/21    Received notification for outpatient dialysis placement from Dr. Avelar.    Met/Spoke with patient and confirmed demographics and insurance information.  Provided patient with dialysis education materials and list of HD centers, referral initiated to:    [facility]   [address] [place] [id number]    Pending medical and financial clearance.    Zulema Hubbard- Dialysis Coordinator Ph# [phone]  [facility]

## 2021-06-28 NOTE — PROGRESS NOTES
Daily Progress Note:     Date of Service: 6/28/2021  Primary Team: [facility] IM White Team   Attending: Donell Gunn M.D.   Senior Resident: Dr. Ang  Intern: Dr. Brewer  Contact:  [phone]    Chief Complaint:   Renal failure, hypoxic respiratory failure, diastolic heart failure, nephrotic syndrome    Subjective:   NAOE, titrated off antihypertensives. Dizziness with ambulation, head movement.  Today: Improved, off oxygen. States dizziness on exertion, bilateral hand/foot burning/needles sensation-unchanged.    Consultants/Specialty:  Nephrology  Cardiology    Review of Systems:    Review of Systems   Constitutional: Negative for chills and fever.   Respiratory: Positive for cough. Negative for shortness of breath.    Cardiovascular: Negative for chest pain, palpitations, orthopnea and leg swelling.   Gastrointestinal: Negative for abdominal pain, constipation, diarrhea, nausea and vomiting.   Genitourinary: Negative for dysuria, frequency and urgency.   Neurological: Positive for dizziness, tingling and weakness. Negative for focal weakness and headaches.       Objective Data:   Physical Exam:   Vitals:   Temp:  [35.9 °C (96.6 °F)-37.1 °C (98.8 °F)] 36.2 °C (97.2 °F)  Pulse:  [67-83] 73  Resp:  [16] 16  BP: ()/(56-74) 161/71  SpO2:  [92 %-98 %] 96 %     Physical Exam  Vitals and nursing note reviewed.   Constitutional:       General: He is not in acute distress.     Appearance: He is not ill-appearing or diaphoretic.   HENT:      Head: Normocephalic and atraumatic.      Mouth/Throat:      Mouth: Mucous membranes are moist.      Pharynx: Oropharynx is clear.   Eyes:      Extraocular Movements: Extraocular movements intact.      Pupils: Pupils are equal, round, and reactive to light.   Cardiovascular:      Comments: RRR 3/6 early systolic murmur; no peripheral edema; orthostatic.   Pulmonary:      Breath sounds: Normal breath sounds. No wheezing, rhonchi or rales.   Musculoskeletal:         General: No  swelling or tenderness. Normal range of motion.   Skin:     General: Skin is warm and dry.   Neurological:      Mental Status: He is alert and oriented to person, place, and time.      Cranial Nerves: No cranial nerve deficit.   Psychiatric:         Mood and Affect: Mood normal.         Behavior: Behavior normal.         Thought Content: Thought content normal.         Judgment: Judgment normal.           Labs:   Lab Results   Component Value Date/Time    SODIUM 135 06/28/2021 12:50 AM    POTASSIUM 4.4 06/28/2021 12:50 AM    CHLORIDE 100 06/28/2021 12:50 AM    CO2 28 06/28/2021 12:50 AM    GLUCOSE 158 (H) 06/28/2021 12:50 AM    BUN 36 (H) 06/28/2021 12:50 AM    CREATININE 3.54 (H) 06/28/2021 12:50 AM        Recent Labs     06/26/21  0814 06/27/21  0146 06/28/21  0050   WBC 7.2 5.8 6.9   RBC 2.52* 2.52* 2.56*   HEMOGLOBIN 7.8* 7.7* 8.1*   HEMATOCRIT 23.6* 24.4* 24.5*   MCV 93.7 96.8 95.7   MCH 31.0 30.6 31.6   RDW 46.8 49.4 47.8   PLATELETCT 200 181 199   MPV 10.4 10.9 11.0   NEUTSPOLYS  --  65.10 64.20   LYMPHOCYTES  --  14.60* 17.90*   MONOCYTES  --  10.30 9.40   EOSINOPHILS  --  9.20* 7.80*   BASOPHILS  --  0.50 0.40       Imaging:     Jean Barboza is a 71yo M with hx of CVA, seizure, HLD/HTN, DM2 (5.3 A1c); presenting for acute shortness of breath of 2 days; admitted 6/20 for heart failure exacerbation, hypoxic respiratory failure, acute renal failure; found to have moderate-severe Ao stenosis, pulmHTN; pending dialysis chair placement/SNF, with orthostatic hypotension.    Acute renal failure (HCC)- (present on admission)  Assessment & Plan  Presents in acute renal failure, previous baseline Cr estimate 1.3. Suspected due to cardiorenal syndrome versus diabetic nephropathy. Hepatitis negative, elevated ESR/CRP, with mildly low C3, normal C4. Albumin/Cr urine random 6g/D. Improvement of hypervolemia, hypoxia post diaysis.  -Nephrology following  -Dialysis per nephrology   -STOPPED IV lasix 80 mg BID   -gabapentin  100mg PO TID (extremity tingling, at max renal dose)        Normocytic anemia- (present on admission)  Assessment & Plan  Normocytic anaemia, Fe studies indicative of Fe deficiency, though may have complicating chronic disease. Denies any hematochezia/hematuria/hematemesis/melena. Low Folate with normal B12/TSH. Acute Hgb drop to 7.4 without identified source.  -Fe 325mg PO qD at discharge  -Transfuse Hgb <7    Acute respiratory failure with hypoxia (HCC)  Assessment & Plan  Presented with progressively worsening SOB, b/l swelling and orthopnea. CXR showed pulmonary edema, BNP elevated. ECHO showed EF 60%, grade II diastolic dysfunction, Ao stenosis , RVSP 70. Likely etiology is Ao stenosis, as low likelihood of PE (wells 1.5, DVT US negative).   -Nephrology following   -STOP Lasix 80mg IV BID diuretic  -dialysis per nephrology   -daily weights , fluid restriction   -titrate O2 supplements to >90%  -Outpatient cardiology follow up  for TAVR       Pulmonary hypertension (HCC)  Assessment & Plan  Echo: EF 60%, Grade II diastolic dysfunction, Moderate to severe aortic stenosis , RVSP 70. Likely Type 2 in the setting of Aortic stenosis, HFPEF   -STOP Lasix 80mg IV BID diuretic  -HD per nephrology for volume overload, pending dialysis chair  -titrate O2 supplements to >90%      Aortic stenosis  Assessment & Plan  Found to have moderate-severe Ao stenosis on ECHO. Most likely etiology of pulmHTN.  -STOP hydralazine 10mg PO TID  -STOP isordil 5mg PO TID    -titrate O2 supplements to >90%  -Outpatient cardiology consultation for TAVR       Hypokalemia- (present on admission)  Assessment & Plan  -replete as indicated    Hypertension- (present on admission)  Assessment & Plan  Hx of HTN. Now with orthostatic hypotension.  -STOP hydralazine 10mg PO TID  -STOP Isordil 5mg PO TID  -STOP lisinopril 5mg PO qD      Elevated troponin- (present on admission)  Assessment & Plan  Elevate troponin, stable at 313. Likely elevation due to  heart and renal failure. Unable to do V/Q or CTPE due to abnormal CXR and renal failure, US for DVT negative, wells of 1.5. EKG sinus tachy.        Type 2 diabetes mellitus (HCC)- (present on admission)  Assessment & Plan  Hx of DM2, admission A1c of 5.3, previously on insulin.  -PCP follow up

## 2021-06-28 NOTE — PROGRESS NOTES
Report received by day shift RN. Pt awake alert and oriented x 4 with no c/o pain. Discussed POC. Pt verbalized understanding. Bed locked in low position with fall precautions in place and call light in reach.

## 2021-06-28 NOTE — CARE PLAN
Problem: Pain - Standard  Goal: Alleviation of pain or a reduction in pain to the patient’s comfort goal  Outcome: Progressing     Problem: Knowledge Deficit - Standard  Goal: Patient and family/care givers will demonstrate understanding of plan of care, disease process/condition, diagnostic tests and medications  Outcome: Progressing   The patient is Stable - Low risk of patient condition declining or worsening    Shift Goals  Clinical Goals: Wean off O2  Patient Goals: get out of bed and change linens  Family Goals: Stable resp. status    Progress made toward(s) clinical / shift goals:  Sleep a consistent 4 hours without interruption.     Patient is not progressing towards the following goals:

## 2021-06-28 NOTE — PROGRESS NOTES
[facility] -  PROGRESS NOTE               Author: Fantasma Avelar M.D. Date & Time: 6/28/2021  11:14 AM     Chief Complaint:  Follow up ESRD    HPI:  70yoM with PMH significant for HTN, DM II, Hyperlipidemia, Seizure Disorder admitted with complaints of worsening SOB over the past 3 days and worsening bilateral LE edema for the past 1 week and in the ER found to have elevated Creatinine of . Pt reports that he has been getting progressively more SOB over the past 3 days and his LE edema has been worsening over the past 1 week so he came to the ED for eval. In the ED Cr was 5.3 on admission and pt reports that he has never been told of abnormal renal function in the past. His CXR showed pulm edema and there was some concern for DVT or PE but due to his elevated Creatinine he did not undergo a CTA. Bilateral lower extremity dopplers have been ordered. Pt was admitted and started on lasix 40mg IV BID as well as hydrochlorothiazide. He reports that his SOB is a little better this but he is still requiring face mask supplemental O2. He reports he is urinating more with the lasix but overnight only minimal UOP documented.   No F/C/N/V/CP, +SOB and +LE Edema, No melena, hematochezia, hematemesis.  No HA, visual changes, or abdominal pain    DAILY NEPHROLOGY SUMMARY:  6/22/21: rapid response called overnight for acute resp decompensation, urgent HD catheter placed and pt dialyzed with 3L UF, currently on high flow NC O2 with 100% FiO2, reports breathing a little better, 1.2L UOP over past 24hrs, BP mildly elevated, still with LE edema, denies any CP or n/v/diarrhea  6/23/21: No events, remains on high flow NC O2, tolerated HD yest afternoon with 3.5L UF, pt ~6L net negative fluid balance since admit and still requiring significant O2 support, BP stable, reports SOB is a little better, still with LE edema but reports it is better, on high flow NC O2 with 70% FiO2  6/24/21: No events, still requiring  "70% FiO2 on high flow NC, pt reports ROSADO with exertion, edema improving per patient, BP stable, tolerated HD yest with 4L UF  6/25/21: No events, now on 4L NC O2, still has SOB with exertion, BP stable, tolerated HD today with 3.5L UF, edema improving  6/26/21: No events, feels better, still reports SOB with talking but improved, now on 2L NC, BP stable, reports LE edema resolved, denies any n/v/CP  6/27/21: No events, tolerated HD yest with 3L UF, BP stable overnight and little low this am in the 90's systolic and pt does have some light-headednes, now on RA and pt reports SOB has improved and LE edema resolved  6/28/21: NAEO, no complaints, feels good    REVIEW OF SYSTEMS:    10 point ROS reviewed and is as per HPI/daily summary or otherwise negative    PMH/PSH/SH/FH: Reviewed and unchanged since admission note  CURRENT MEDICATIONS: Reviewed from admission to present day    VS:  BP (!) 161/71 Comment: Rn Notifed  Pulse 73   Temp 36.2 °C (97.2 °F) (Temporal)   Resp 16   Ht 1.727 m (5' 8\")   Wt 66.4 kg (146 lb 6.2 oz)   SpO2 96%   BMI 22.26 kg/m²   Physical Exam  Constitutional:       General: He is not in acute distress.     Appearance: Normal appearance.   Eyes:      General: No scleral icterus.  Cardiovascular:      Rate and Rhythm: Normal rate.   Pulmonary:      Effort: Pulmonary effort is normal. No respiratory distress.      Breath sounds: Examination of the right-lower field reveals decreased breath sounds. Examination of the left-lower field reveals decreased breath sounds. Decreased breath sounds present.   Abdominal:      General: There is no distension.   Musculoskeletal:         General: No deformity.   Skin:     Findings: No lesion.   Neurological:      Mental Status: He is alert and oriented to person, place, and time.   Psychiatric:         Mood and Affect: Mood normal.         Behavior: Behavior normal.     Fluids:  In: 120 [P.O.:120]  Out: 300     LABS:  Recent Labs     06/26/21  0814 " 06/27/21  0146 06/28/21  0050   SODIUM 133* 138 135   POTASSIUM 4.1 4.1 4.4   CHLORIDE 102 104 100   CO2 25 27 28   GLUCOSE 106* 157* 158*   BUN 35* 20 36*   CREATININE 3.48* 2.47* 3.54*   CALCIUM 7.9* 7.6* 8.0*     IMPRESSION:  # FERNANDO--pt denies any prior history of CKD but review of labs indicates Cr ~1.4 in 2017, unclear recent baseline  # Acute Hypoxic Respiratory Failure--pulm edema on CXR  -HD initiated 6/21 for acute hypoxic respiratory failure  - Dialyzed daily x6 days  - Now off supplemental O2  # HTN--BP controlled, ?overly controlled this am  # DM II--management per primary svc  # LE Edema--resolved with HD  # Anemia--?related to CKD, iron sat low  # Hypokalemia--resolved  # Elevated Troponin--medical management  # Left Foot and Right Bicep Wound--wound care  # Acute Hypoxic Respiratory Failure  --CXR with pulm edema, worse on CXR from 6/23  --Now resolved and off supplemental O2  # Valvular Heart Disease--echo 6/22 with moderate/severe AS that has progressed since his previous echo in 2017    PLAN:  - MWF iHD  - UF as tolerated  - OP iHD placement in progress  - No role for NAFISA in FERNANDO  - Transfuse PRN for Hgb less than 7  - Record strict I/O's  - Dose adjust all meds for decreased GFR  - Avoid IV contrast/nephrotoxins/NSAIDs  - Will need cardiology eval for aortic stenosis

## 2021-06-28 NOTE — PROGRESS NOTES
Received report from day shift RN, assumed pt care. A&O x 4. 4/10 report of pain. Fall precautions in place. Call light and bedside table within reach. Assessment completed. Will continue to monitor.

## 2021-06-28 NOTE — THERAPY
Occupational Therapy   Initial Evaluation     Patient Name: Jean Barboza  Age:  70 y.o., Sex:  male  Medical Record #: [id number]  Today's Date: 6/28/2021     Precautions: (P) Fall Risk  Comments: (P) orthostatic hypotension     Assessment  Patient is 70 y.o. male admitted for renal failure, hypoxic respiratory failure, diastolic heart failure, nephrotic syndrome. PMH of CVA, seizures, HLD, HTN, and DM2. Pt presented today with impaired activity tolerance, impaired balance, delayed processing (requiring multiple cues for task completion), and impaired sequencing impacting participation in ADLs and functional mobility. Pt reported dizziness with positional changes today (BP of 95/66 with standing). Pt lives with his brother who is not able to assist upon DC. Pt is not safe to DC home at this time. Recommend post acute placement prior to DC home for progress with self cares and mobility. Will follow for OT while in house.   Plan    Recommend Occupational Therapy 3 times per week until therapy goals are met for the following treatments:  Adaptive Equipment, Cognitive Skill Development, Neuro Re-Education / Balance, Self Care/Activities of Daily Living, Therapeutic Activities and Therapeutic Exercises.    DC Equipment Recommendations: (P) Unable to determine at this time  Discharge Recommendations: (P) Recommend post-acute placement for additional occupational therapy services prior to discharge home        06/28/21 0950   Initial Contact Note    Initial Contact Note Order Received and Verified, Occupational Therapy Evaluation in Progress with Full Report to Follow.   Prior Living Situation   Prior Services Home-Independent   Housing / Facility 1 Story House   Steps Into Home 4   Steps In Home 0   Bathroom Set up Bathtub / Shower Combination  (suction grab bars)   Equipment Owned 4-Wheel Walker;Grab Bar(s) In Tub / Shower   Lives with - Patient's Self Care Capacity   (lives with brother)   Comments Pt reports brother  is often gone and unable to assist post DC   Prior Level of ADL Function   Self Feeding Independent   Grooming / Hygiene Independent   Bathing Independent   Dressing Independent   Toileting Independent   Prior Level of IADL Function   Medication Management Requires Assist   Laundry Independent   Kitchen Mobility Independent   Finances Independent   Home Management Independent   Shopping Independent   Prior Level Of Mobility Independent With Device in Community;Independent With Device in Home   History of Falls   History of Falls No   Precautions   Precautions Fall Risk   Comments orthostatic with positional changes   Vitals   Patient BP Position Orthostatic:;Prone;Sitting;Standing 1 minute   Vitals Comments Orthostatics: sitting (134/71), standing (95/66), sitting (120/67)   Pain   Pain Scales 0 to 10 Scale    Intervention Declines   Cognition    Cognition / Consciousness X   Level of Consciousness Alert   Safety Awareness Impaired   New Learning Impaired   Attention Impaired   Comments reported noted memory changes lately and difficulty recalling recent events; very pleasant and cooperative   Active ROM Upper Body   Active ROM Upper Body  WDL   Strength Upper Body   Upper Body Strength  WDL   Balance Assessment   Sitting Balance (Static) Fair   Sitting Balance (Dynamic) Fair   Standing Balance (Static) Fair -   Standing Balance (Dynamic) Fair -   Weight Shift Sitting Fair   Weight Shift Standing Poor   Comments with FWW; reported feeling dizzy with standing and short steps   Bed Mobility    Supine to Sit Minimal Assist   Scooting Supervised   Rolling Supervised   ADL Assessment   Lower Body Dressing Moderate Assist   Toileting   (unable to participate)   Comments Pt unable to tolerate ambulation to bathroom or further ADL assessment d/t low activity tolerance with dizziness   How much help from another person does the patient currently need...   Putting on and taking off regular lower body clothing? 3   Bathing  (including washing, rinsing, and drying)? 2   Toileting, which includes using a toilet, bedpan, or urinal? 3   Putting on and taking off regular upper body clothing? 3   Taking care of personal grooming such as brushing teeth? 3   Eating meals? 4   6 Clicks Daily Activity Score 18   Functional Mobility   Sit to Stand Minimal Assist   Bed, Chair, Wheelchair Transfer Minimal Assist   Toilet Transfers Unable to Participate   Transfer Method Stand Step   Mobility EOB mob, txf to recliner   Comments with FWW   Activity Tolerance   Sitting in Chair up in chair post session   Sitting Edge of Bed 10 min   Standing 1 min   Comments limited by dizziness and orthostatic hypotension   Patient / Family Goals   Patient / Family Goal #1 none stated   Short Term Goals   Short Term Goal # 1 Pt will demo BSC txf with min A   Short Term Goal # 2 Pt will tolerate seated G/H for 10 min   Short Term Goal # 3 Pt will be up in chair for meals 3x/day    Education Group   Role of Occupational Therapist Patient Response Patient;Acceptance;Explanation   Problem List   Problem List Decreased Active Daily Living Skills;Decreased Functional Mobility;Decreased Activity Tolerance;Safety Awareness Deficits / Cognition   Anticipated Discharge Equipment and Recommendations   DC Equipment Recommendations Unable to determine at this time   Discharge Recommendations Recommend post-acute placement for additional occupational therapy services prior to discharge home   Interdisciplinary Plan of Care Collaboration   IDT Collaboration with  Nursing   Patient Position at End of Therapy Phone within Reach;Tray Table within Reach;Call Light within Reach;Seated;Chair Alarm On   Collaboration Comments OT findings and pt disposition   Session Information   Date / Session Number  6/28 1 (1/3, 7/4)   Priority 2

## 2021-06-29 LAB
ALBUMIN SERPL BCP-MCNC: 2.5 G/DL (ref 3.2–4.9)
ALBUMIN/GLOB SERPL: 0.8 G/DL
ALP SERPL-CCNC: 55 U/L (ref 30–99)
ALT SERPL-CCNC: 26 U/L (ref 2–50)
ANION GAP SERPL CALC-SCNC: 9 MMOL/L (ref 7–16)
AST SERPL-CCNC: 34 U/L (ref 12–45)
BILIRUB SERPL-MCNC: 0.2 MG/DL (ref 0.1–1.5)
BUN SERPL-MCNC: 23 MG/DL (ref 8–22)
CALCIUM SERPL-MCNC: 7.7 MG/DL (ref 8.5–10.5)
CHLORIDE SERPL-SCNC: 103 MMOL/L (ref 96–112)
CO2 SERPL-SCNC: 25 MMOL/L (ref 20–33)
CREAT SERPL-MCNC: 2.58 MG/DL (ref 0.5–1.4)
ERYTHROCYTE [DISTWIDTH] IN BLOOD BY AUTOMATED COUNT: 46.9 FL (ref 35.9–50)
GAMMA INTERFERON BACKGROUND BLD IA-ACNC: 0.04 IU/ML
GLOBULIN SER CALC-MCNC: 3 G/DL (ref 1.9–3.5)
GLUCOSE SERPL-MCNC: 105 MG/DL (ref 65–99)
HCT VFR BLD AUTO: 24.4 % (ref 42–52)
HGB BLD-MCNC: 8.2 G/DL (ref 14–18)
M TB IFN-G BLD-IMP: NEGATIVE
M TB IFN-G CD4+ BCKGRND COR BLD-ACNC: 0 IU/ML
MAGNESIUM SERPL-MCNC: 2.2 MG/DL (ref 1.5–2.5)
MCH RBC QN AUTO: 31.7 PG (ref 27–33)
MCHC RBC AUTO-ENTMCNC: 33.6 G/DL (ref 33.7–35.3)
MCV RBC AUTO: 94.2 FL (ref 81.4–97.8)
MITOGEN IGNF BCKGRD COR BLD-ACNC: >10 IU/ML
PLATELET # BLD AUTO: 198 K/UL (ref 164–446)
PMV BLD AUTO: 11 FL (ref 9–12.9)
POTASSIUM SERPL-SCNC: 4.2 MMOL/L (ref 3.6–5.5)
PROT SERPL-MCNC: 5.5 G/DL (ref 6–8.2)
QFT TB2 - NIL TBQ2: 0 IU/ML
RBC # BLD AUTO: 2.59 M/UL (ref 4.7–6.1)
SODIUM SERPL-SCNC: 137 MMOL/L (ref 135–145)
WBC # BLD AUTO: 5.9 K/UL (ref 4.8–10.8)

## 2021-06-29 PROCEDURE — 80053 COMPREHEN METABOLIC PANEL: CPT

## 2021-06-29 PROCEDURE — 83735 ASSAY OF MAGNESIUM: CPT

## 2021-06-29 PROCEDURE — A9270 NON-COVERED ITEM OR SERVICE: HCPCS | Performed by: STUDENT IN AN ORGANIZED HEALTH CARE EDUCATION/TRAINING PROGRAM

## 2021-06-29 PROCEDURE — 99233 SBSQ HOSP IP/OBS HIGH 50: CPT | Mod: GC | Performed by: HOSPITALIST

## 2021-06-29 PROCEDURE — 36415 COLL VENOUS BLD VENIPUNCTURE: CPT

## 2021-06-29 PROCEDURE — 51798 US URINE CAPACITY MEASURE: CPT

## 2021-06-29 PROCEDURE — 700102 HCHG RX REV CODE 250 W/ 637 OVERRIDE(OP): Performed by: STUDENT IN AN ORGANIZED HEALTH CARE EDUCATION/TRAINING PROGRAM

## 2021-06-29 PROCEDURE — 770020 HCHG ROOM/CARE - TELE (206)

## 2021-06-29 PROCEDURE — 700105 HCHG RX REV CODE 258: Performed by: STUDENT IN AN ORGANIZED HEALTH CARE EDUCATION/TRAINING PROGRAM

## 2021-06-29 PROCEDURE — 85027 COMPLETE CBC AUTOMATED: CPT

## 2021-06-29 RX ORDER — SODIUM CHLORIDE, SODIUM LACTATE, POTASSIUM CHLORIDE, AND CALCIUM CHLORIDE .6; .31; .03; .02 G/100ML; G/100ML; G/100ML; G/100ML
500 INJECTION, SOLUTION INTRAVENOUS ONCE
Status: COMPLETED | OUTPATIENT
Start: 2021-06-29 | End: 2021-06-29

## 2021-06-29 RX ADMIN — ACETAMINOPHEN 650 MG: 325 TABLET, FILM COATED ORAL at 17:33

## 2021-06-29 RX ADMIN — FOLIC ACID 1 MG: 1 TABLET ORAL at 05:00

## 2021-06-29 RX ADMIN — GABAPENTIN 100 MG: 100 CAPSULE ORAL at 17:33

## 2021-06-29 RX ADMIN — GABAPENTIN 100 MG: 100 CAPSULE ORAL at 12:18

## 2021-06-29 RX ADMIN — SODIUM CHLORIDE, POTASSIUM CHLORIDE, SODIUM LACTATE AND CALCIUM CHLORIDE 500 ML: 600; 310; 30; 20 INJECTION, SOLUTION INTRAVENOUS at 10:07

## 2021-06-29 RX ADMIN — GABAPENTIN 100 MG: 100 CAPSULE ORAL at 05:00

## 2021-06-29 RX ADMIN — ATORVASTATIN CALCIUM 40 MG: 40 TABLET, FILM COATED ORAL at 05:00

## 2021-06-29 RX ADMIN — ASPIRIN 81 MG: 81 TABLET, COATED ORAL at 05:00

## 2021-06-29 RX ADMIN — OXYCODONE 2.5 MG: 5 TABLET ORAL at 07:43

## 2021-06-29 ASSESSMENT — ENCOUNTER SYMPTOMS
NAUSEA: 0
DIARRHEA: 1
HEADACHES: 0
DIZZINESS: 1
SPUTUM PRODUCTION: 0
ORTHOPNEA: 0
FEVER: 0
VOMITING: 0
PALPITATIONS: 0
CHILLS: 0
COUGH: 0
FOCAL WEAKNESS: 0
WEAKNESS: 1
SHORTNESS OF BREATH: 0
TINGLING: 1

## 2021-06-29 ASSESSMENT — PAIN DESCRIPTION - PAIN TYPE
TYPE: ACUTE PAIN

## 2021-06-29 ASSESSMENT — FIBROSIS 4 INDEX: FIB4 SCORE: 2.36

## 2021-06-29 NOTE — CARE PLAN
Problem: Pain - Standard  Goal: Alleviation of pain or a reduction in pain to the patient’s comfort goal  Outcome: Progressing     Problem: Fall Risk  Goal: Patient will remain free from falls  Outcome: Progressing     Problem: Hemodynamics  Goal: Patient's hemodynamics, fluid balance and neurologic status will be stable or improve  Outcome: Progressing     Problem: Chest Tube Management  Goal: Complications related to chest tube will be avoided or minimized  Outcome: Progressing     Problem: Fluid Volume  Goal: Fluid volume balance will be maintained  Outcome: Not Met     Problem: Urinary Elimination  Goal: Establish and maintain regular urinary output  Outcome: Not Met   The patient is Watcher - Medium risk of patient condition declining or worsening    Shift Goals  Clinical Goals: Improved renal function  Patient Goals: Rest  Family Goals: Stable resp. status    Progress made toward(s) clinical / shift goals:  Patient is receiving dialysis. Patient is bladder scanned q shift.     Patient is not progressing towards the following goals:      Problem: Fluid Volume  Goal: Fluid volume balance will be maintained  Outcome: Not Met     Problem: Urinary Elimination  Goal: Establish and maintain regular urinary output  Outcome: Not Met

## 2021-06-29 NOTE — CARE PLAN
Problem: Pain - Standard  Goal: Alleviation of pain or a reduction in pain to the patient’s comfort goal  Outcome: Progressing     Problem: Knowledge Deficit - Standard  Goal: Patient and family/care givers will demonstrate understanding of plan of care, disease process/condition, diagnostic tests and medications  Outcome: Progressing   The patient is Stable - Low risk of patient condition declining or worsening    Shift Goals  Clinical Goals: Improved renal function  Patient Goals: Rest  Family Goals: Stable resp. status    Progress made toward(s) clinical / shift goals:  progressing    Patient is not progressing towards the following goals:

## 2021-06-29 NOTE — DISCHARGE PLANNING
Anticipated Discharge Disposition: SNF     Action: CM notified pt has a dialysis chair confirmed at [facility] MWF 1445; MD notified. Order for SNF referral placed per protocol.   CM spoke with pt at bedside. Pt agreeable with SNF short term placement. Choice given and form signed by patient. [facility].     [id number]  [facility] [phone]    Barriers to Discharge:   Medical clearance pending  SNF acceptance pending    Plan: HCM will follow up on SNF referral and arrange transfer when accepted and pt is medically cleared.

## 2021-06-29 NOTE — PROGRESS NOTES
[facility] Progress Note    Hemodialysis treatment performed x 3 hours per Dr. NHUNG Avelar.  Treatment started at 1507 and ended at 1807.   Blood lines reversed due to slightly sluggish arterial port, otherwise patent.  Patient tolerated treatment without any adverse events.   SBPs slightly elevated pre and during treatment at 140s-150s  Patient stable during and post treatment, no complaints.  See Acute HD paper flow sheet for details.      Net UF Removed:  2,300 mL (see Dialysis I & O flow sheet)     Post treatment: Right chest HD catheter flushed with saline then locked with heparin 1000 units/ml per designated amount in each lumen (see MAR) then clamped, capped aseptically and labeled properly.  Heparin lock to be aspirated prior to next dialysis/CVC use by dialysis RN only. Please do not flush or draw from ports.    Notify Dialysis and Nephrologist for follow-up.     1820 Report given to primary care nurse MIRTA Dotson, PRATIBHA.    1821 Patient transported back to room via bed by transport. Patient AOx4.

## 2021-06-29 NOTE — PROGRESS NOTES
4 Eyes Skin Assessment Completed by PRATIBHA Lewis and PRATIBHA Romero.    Head WDL  Ears Redness and Blanching  Nose WDL  Mouth WDL  Neck WDL  Breast/Chest WDL  Shoulder Blades WDL  Spine WDL  (R) Arm/Elbow/Hand Blister on right bicep POA  (L) Arm/Elbow/Hand WDL  Abdomen WDL  Groin WDL  Scrotum/Coccyx/Buttocks Redness, Blanching  (R) Leg WDL  (L) Leg WDL  (R) Heel/Foot/Toe WDL  (L) Heel/Foot/Toe blister on top of foot POA          Devices In Places Tele Box and SCD's      Interventions In Place Waffle Overlay, Pillows and Barrier Cream    Possible Skin Injury No    Pictures Uploaded Into Epic N/A  Wound Consult Placed N/A  RN Wound Prevention Protocol Ordered No

## 2021-06-29 NOTE — DISCHARGE PLANNING
Received Choice form at 0996  Agency/Facility Name: [facility] [place] [place]  Referral sent per Choice form @ 0979    RN JONA informed

## 2021-06-29 NOTE — PROGRESS NOTES
Received report from day shift RN, assumed pt care. A&O x 4. 6/10 report of pain. Fall precautions in place. Call light and bedside table within reach. Assessment completed. Will continue to monitor.

## 2021-06-29 NOTE — PROGRESS NOTES
[facility] -  PROGRESS NOTE               Author: Fantasma Avelar M.D. Date & Time: 6/29/2021  11:06 AM     Chief Complaint:  Follow up ESRD    HPI:  70yoM with PMH significant for HTN, DM II, Hyperlipidemia, Seizure Disorder admitted with complaints of worsening SOB over the past 3 days and worsening bilateral LE edema for the past 1 week and in the ER found to have elevated Creatinine of . Pt reports that he has been getting progressively more SOB over the past 3 days and his LE edema has been worsening over the past 1 week so he came to the ED for eval. In the ED Cr was 5.3 on admission and pt reports that he has never been told of abnormal renal function in the past. His CXR showed pulm edema and there was some concern for DVT or PE but due to his elevated Creatinine he did not undergo a CTA. Bilateral lower extremity dopplers have been ordered. Pt was admitted and started on lasix 40mg IV BID as well as hydrochlorothiazide. He reports that his SOB is a little better this but he is still requiring face mask supplemental O2. He reports he is urinating more with the lasix but overnight only minimal UOP documented.   No F/C/N/V/CP, +SOB and +LE Edema, No melena, hematochezia, hematemesis.  No HA, visual changes, or abdominal pain    DAILY NEPHROLOGY SUMMARY:  6/22/21: rapid response called overnight for acute resp decompensation, urgent HD catheter placed and pt dialyzed with 3L UF, currently on high flow NC O2 with 100% FiO2, reports breathing a little better, 1.2L UOP over past 24hrs, BP mildly elevated, still with LE edema, denies any CP or n/v/diarrhea  6/23/21: No events, remains on high flow NC O2, tolerated HD yest afternoon with 3.5L UF, pt ~6L net negative fluid balance since admit and still requiring significant O2 support, BP stable, reports SOB is a little better, still with LE edema but reports it is better, on high flow NC O2 with 70% FiO2  6/24/21: No events, still requiring  "70% FiO2 on high flow NC, pt reports ROSADO with exertion, edema improving per patient, BP stable, tolerated HD yest with 4L UF  6/25/21: No events, now on 4L NC O2, still has SOB with exertion, BP stable, tolerated HD today with 3.5L UF, edema improving  6/26/21: No events, feels better, still reports SOB with talking but improved, now on 2L NC, BP stable, reports LE edema resolved, denies any n/v/CP  6/27/21: No events, tolerated HD yest with 3L UF, BP stable overnight and little low this am in the 90's systolic and pt does have some light-headednes, now on RA and pt reports SOB has improved and LE edema resolved  6/28/21: NAEO, no complaints, feels good  6/29/21: NAEO, no complaint, family at bedside    REVIEW OF SYSTEMS:    10 point ROS reviewed and is as per HPI/daily summary or otherwise negative    PMH/PSH/SH/FH: Reviewed and unchanged since admission note  CURRENT MEDICATIONS: Reviewed from admission to present day    VS:  BP (!) 83/48   Pulse 83   Temp 36.1 °C (96.9 °F) (Temporal)   Resp 17   Ht 1.727 m (5' 8\")   Wt 66.4 kg (146 lb 6.2 oz)   SpO2 98%   BMI 22.26 kg/m²   Physical Exam  Constitutional:       General: He is not in acute distress.     Appearance: Normal appearance.   Eyes:      General: No scleral icterus.  Cardiovascular:      Rate and Rhythm: Normal rate.   Pulmonary:      Effort: Pulmonary effort is normal. No respiratory distress.      Breath sounds: Examination of the right-lower field reveals decreased breath sounds. Examination of the left-lower field reveals decreased breath sounds. Decreased breath sounds present.   Abdominal:      General: There is no distension.   Musculoskeletal:         General: No deformity.   Skin:     Findings: No lesion.   Neurological:      Mental Status: He is alert and oriented to person, place, and time.   Psychiatric:         Mood and Affect: Mood normal.         Behavior: Behavior normal.     Fluids:  In: 524.6 [I.V.:24.6; Dialysis:500]  Out: 2800 "     LABS:  Recent Labs     06/27/21  0146 06/28/21  0050 06/29/21  0139   SODIUM 138 135 137   POTASSIUM 4.1 4.4 4.2   CHLORIDE 104 100 103   CO2 27 28 25   GLUCOSE 157* 158* 105*   BUN 20 36* 23*   CREATININE 2.47* 3.54* 2.58*   CALCIUM 7.6* 8.0* 7.7*     IMPRESSION:  # FERNANDO  -pt denies any prior history of CKD but review of labs indicates Cr ~1.4 in 2017, unclear recent baseline  -OP HD @ [facility] confirmed  # Acute Hypoxic Respiratory Failure--pulm edema on CXR  -HD initiated 6/21 for acute hypoxic respiratory failure  - Dialyzed daily x6 days  - Now off supplemental O2  # HTN--BP controlled, ?overly controlled this am  # DM II--management per primary svc  # LE Edema--resolved with HD  # Anemia--?related to CKD, iron sat low  # Hypokalemia--resolved  # Elevated Troponin--medical management  # Left Foot and Right Bicep Wound--wound care  # Acute Hypoxic Respiratory Failure  --CXR with pulm edema, worse on CXR from 6/23  --Now resolved and off supplemental O2  # Valvular Heart Disease--echo 6/22 with moderate/severe AS that has progressed since his previous echo in 2017    PLAN:  - MWF iHD  - UF as tolerated  - OP iHD placement confirmed  - No role for NAFISA in FERNANDO  - Transfuse PRN for Hgb less than 7  - Record strict I/O's  - Dose adjust all meds for decreased GFR  - Avoid IV contrast/nephrotoxins/NSAIDs  - Will need cardiology eval for aortic stenosis  - Ok to transition to OP care from renal standpoint

## 2021-06-29 NOTE — PROGRESS NOTES
4 Eyes Skin Assessment Completed by Royal RN and PRATIBHA Baez.    Head WDL  Ears Redness and Blanching  Nose WDL  Mouth WDL  Neck WDL  Breast/Chest HD cath dressing CDI  Shoulder Blades WDL   Spine WDL  (R) Arm/Elbow/Hand Blanching, blister to right bicep  (L) Arm/Elbow/Hand Blanching  Abdomen WDL  Groin WDL  Scrotum/Coccyx/Buttocks Redness, Blanching and Discoloration  (R) Leg Dry and cracked  (L) Leg WDL  (R) Heel/Foot/Toe Redness, Blanching and Boggy,Blister to top of foot  (L) Heel/Foot/Toe Redness, Blanching and Boggy          Devices In Places Tele Box, HD cath      Interventions In Place Waffle Overlay, Pillows and Barrier Cream, patient ambulates and turns self in bed    Possible Skin Injury Yes    Pictures Uploaded Into Epic Yes  Wound Consult Placed Yes  RN Wound Prevention Protocol Ordered Yes

## 2021-06-29 NOTE — DISCHARGE PLANNING
Agency/Facility Name: [facility]  Spoke To: Ana Paula  Outcome: Pt accepted. Bed and transport available 6/30 @ 1500.    RN CM informed

## 2021-06-29 NOTE — PROGRESS NOTES
Daily Progress Note:     Date of Service: 6/29/2021  Primary Team: [facility] IM White Team   Attending: Donell Gunn M.D.   Senior Resident: Dr. Ang  Intern: Dr. Brewer  Contact:  [phone]    Chief Complaint:   Renal failure, heart failure exacerbation, nephrotic syndrome, hypoxic respiratory failure    Subjective:   Dialysis with UF of 2.3L removal. Dizziness, ambulating short distances with worsened dizziness. 5 episodes of loose stools, suspected due to dietary change.  Today: dizziness mildly improved; continues to have hand/foot burning sensations. Feeling significantly better from presentation.    Consultants/Specialty:  Nephrology  Cardiology    Review of Systems:    Review of Systems   Constitutional: Negative for chills and fever.   Respiratory: Negative for cough, sputum production and shortness of breath.    Cardiovascular: Negative for chest pain, palpitations, orthopnea and leg swelling.   Gastrointestinal: Positive for diarrhea. Negative for nausea and vomiting.   Neurological: Positive for dizziness, tingling and weakness. Negative for focal weakness and headaches.       Objective Data:   Physical Exam:   Vitals:   Temp:  [36 °C (96.8 °F)-37.1 °C (98.8 °F)] 36.1 °C (96.9 °F)  Pulse:  [64-83] 83  Resp:  [16-18] 17  BP: (108-153)/(61-77) 108/61  SpO2:  [93 %-99 %] 98 %     Physical Exam  Vitals and nursing note reviewed.   Constitutional:       General: He is not in acute distress.     Appearance: He is not ill-appearing or diaphoretic.   HENT:      Head: Normocephalic and atraumatic.      Mouth/Throat:      Mouth: Mucous membranes are moist.      Pharynx: Oropharynx is clear.   Eyes:      Extraocular Movements: Extraocular movements intact.      Pupils: Pupils are equal, round, and reactive to light.   Cardiovascular:      Comments: RRR 3/6 early systolic murmur  Pulmonary:      Effort: Pulmonary effort is normal.      Breath sounds: Normal breath sounds. No wheezing, rhonchi or rales.    Abdominal:      General: There is no distension.      Palpations: Abdomen is soft.      Tenderness: There is no abdominal tenderness. There is no guarding or rebound.   Musculoskeletal:      Cervical back: Normal range of motion.   Skin:     General: Skin is warm and dry.   Neurological:      Mental Status: He is alert and oriented to person, place, and time.      Cranial Nerves: No cranial nerve deficit.      Sensory: No sensory deficit.   Psychiatric:         Mood and Affect: Mood normal.         Behavior: Behavior normal.         Thought Content: Thought content normal.         Judgment: Judgment normal.           Labs:   Lab Results   Component Value Date/Time    SODIUM 137 06/29/2021 01:39 AM    POTASSIUM 4.2 06/29/2021 01:39 AM    CHLORIDE 103 06/29/2021 01:39 AM    CO2 25 06/29/2021 01:39 AM    GLUCOSE 105 (H) 06/29/2021 01:39 AM    BUN 23 (H) 06/29/2021 01:39 AM    CREATININE 2.58 (H) 06/29/2021 01:39 AM        Recent Labs     06/27/21  0146 06/28/21  0050 06/29/21  0139   WBC 5.8 6.9 5.9   RBC 2.52* 2.56* 2.59*   HEMOGLOBIN 7.7* 8.1* 8.2*   HEMATOCRIT 24.4* 24.5* 24.4*   MCV 96.8 95.7 94.2   MCH 30.6 31.6 31.7   RDW 49.4 47.8 46.9   PLATELETCT 181 199 198   MPV 10.9 11.0 11.0   NEUTSPOLYS 65.10 64.20  --    LYMPHOCYTES 14.60* 17.90*  --    MONOCYTES 10.30 9.40  --    EOSINOPHILS 9.20* 7.80*  --    BASOPHILS 0.50 0.40  --        Imaging:     Jean Barboza is a 71yo M with hx of CVA, seizure, HLD/HTN, DM2 (5.3 A1c); presenting for acute shortness of breath of 2 days; admitted 6/20 for heart failure exacerbation, hypoxic respiratory failure, acute renal failure; found to have moderate-severe Ao stenosis, pulmHTN; pending dialysis chair placement/SNF, with orthostatic hypotension.    Acute renal failure (HCC)- (present on admission)  Assessment & Plan  Presents in acute renal failure, previous baseline Cr estimate 1.3. Suspected due to cardiorenal syndrome versus diabetic nephropathy. Hepatitis negative,  elevated ESR/CRP, with mildly low C3, normal C4. Albumin/Cr urine random 6g/D. Improvement of hypervolemia, hypoxia post diaysis.  -Nephrology following  -Dialysis per nephrology   -STOPPED IV lasix 80 mg BID   -gabapentin 100mg PO TID (extremity tingling, at max renal dose)        Normocytic anemia- (present on admission)  Assessment & Plan  Normocytic anaemia, Fe studies indicative of Fe deficiency, though may have complicating chronic disease. Denies any hematochezia/hematuria/hematemesis/melena. Low Folate with normal B12/TSH. Acute Hgb drop to 7.4 without identified source.  -Fe 325mg PO qD at discharge  -Transfuse Hgb <7    Acute respiratory failure with hypoxia (HCC)  Assessment & Plan  Presented with progressively worsening SOB, b/l swelling and orthopnea. CXR showed pulmonary edema, BNP elevated. ECHO showed EF 60%, grade II diastolic dysfunction, Ao stenosis , RVSP 70. Likely etiology is Ao stenosis, as low likelihood of PE (wells 1.5, DVT US negative).   -Nephrology following   -STOP Lasix 80mg IV BID diuretic  -requested MWF dialysis schedule  -daily weights , fluid restriction   -titrate O2 supplements to >90%  -Outpatient cardiology follow up  for TAVR       Pulmonary hypertension (HCC)  Assessment & Plan  Echo: EF 60%, Grade II diastolic dysfunction, Moderate to severe aortic stenosis , RVSP 70. Likely Type 2 in the setting of Aortic stenosis, HFPEF   -STOP Lasix 80mg IV BID diuretic  -HD per nephrology for volume overload, pending dialysis chair  -titrate O2 supplements to >90%      Aortic stenosis  Assessment & Plan  Found to have moderate-severe Ao stenosis on ECHO. Most likely etiology of pulmHTN.  -STOP hydralazine 10mg PO TID  -STOP isordil 5mg PO TID    -titrate O2 supplements to >90%  -Outpatient cardiology consultation for TAVR       Hypokalemia- (present on admission)  Assessment & Plan  -replete as indicated    Hypertension- (present on admission)  Assessment & Plan  Hx of HTN. Now with  orthostatic hypotension.  -STOP hydralazine 10mg PO TID  -STOP Isordil 5mg PO TID  -STOP lisinopril 5mg PO qD  -AM cortisol level      Elevated troponin- (present on admission)  Assessment & Plan  Elevate troponin, stable at 313. Likely elevation due to heart and renal failure. Unable to do V/Q or CTPE due to abnormal CXR and renal failure, US for DVT negative, wells of 1.5. EKG sinus tachy.        Type 2 diabetes mellitus (HCC)- (present on admission)  Assessment & Plan  Hx of DM2, admission A1c of 5.3, previously on insulin.  -PCP follow up

## 2021-06-29 NOTE — DISCHARGE PLANNING
Outpatient Dialysis Placement Confirmation    Patient has been placed and confirmed at:    [facility]   [address] [place] [address]    Ph# [phone]    Schedule: Monday, Wednesday, Friday   Time: 2:45pm    Patient can start on 06/30/21and needs to be there by 2:15pm on your first day.    Follow up call made to Ibis MCKINNON CM and relayed outpatient dialysis placement confirmation. Follow up message to Dr. Avelar to notify of placement. Provided patient dialysis schedule welcome letter.    Zulema Hubbard- Dialysis Coordinator Ph# [phone]  [facility]

## 2021-06-29 NOTE — PROGRESS NOTES
Assumed care at 1900, bedside report received from Florence MCKINNON. Pt. Is SR on the monitor. Initial assessment completed, orders reviewed, call light within reach, bed alarm is in use, and hourly rounding in place. POC addressed with patient, no additional questions at this time.

## 2021-06-29 NOTE — DISCHARGE PLANNING
Agency/Facility Name: [facility]  Spoke To: fax  Outcome: Pt accepted      Agency/Facility Name: [facility]  Spoke To: fax  Outcome: Pt declined. No beds    RN CM informed

## 2021-06-30 VITALS
DIASTOLIC BLOOD PRESSURE: 67 MMHG | OXYGEN SATURATION: 98 % | HEIGHT: 68 IN | TEMPERATURE: 97.6 F | WEIGHT: 145.94 LBS | HEART RATE: 77 BPM | SYSTOLIC BLOOD PRESSURE: 144 MMHG | BODY MASS INDEX: 22.12 KG/M2 | RESPIRATION RATE: 18 BRPM

## 2021-06-30 LAB
CORTIS SERPL-MCNC: 10.4 UG/DL (ref 0–23)
CORTIS SERPL-MCNC: 8.6 UG/DL (ref 0–23)

## 2021-06-30 PROCEDURE — A9270 NON-COVERED ITEM OR SERVICE: HCPCS | Performed by: STUDENT IN AN ORGANIZED HEALTH CARE EDUCATION/TRAINING PROGRAM

## 2021-06-30 PROCEDURE — 700102 HCHG RX REV CODE 250 W/ 637 OVERRIDE(OP): Performed by: STUDENT IN AN ORGANIZED HEALTH CARE EDUCATION/TRAINING PROGRAM

## 2021-06-30 PROCEDURE — 90935 HEMODIALYSIS ONE EVALUATION: CPT

## 2021-06-30 PROCEDURE — 99238 HOSP IP/OBS DSCHRG MGMT 30/<: CPT | Mod: GC | Performed by: HOSPITALIST

## 2021-06-30 PROCEDURE — 5A1D70Z PERFORMANCE OF URINARY FILTRATION, INTERMITTENT, LESS THAN 6 HOURS PER DAY: ICD-10-PCS | Performed by: INTERNAL MEDICINE

## 2021-06-30 PROCEDURE — 82533 TOTAL CORTISOL: CPT

## 2021-06-30 PROCEDURE — 36415 COLL VENOUS BLD VENIPUNCTURE: CPT

## 2021-06-30 RX ORDER — GABAPENTIN 100 MG/1
100 CAPSULE ORAL 3 TIMES DAILY
Qty: 90 CAPSULE | Status: SHIPPED
Start: 2021-06-30 | End: 2021-07-05

## 2021-06-30 RX ADMIN — ATORVASTATIN CALCIUM 40 MG: 40 TABLET, FILM COATED ORAL at 05:11

## 2021-06-30 RX ADMIN — ASPIRIN 81 MG: 81 TABLET, COATED ORAL at 05:11

## 2021-06-30 RX ADMIN — HEPARIN SODIUM 2600 UNITS: 1000 INJECTION, SOLUTION INTRAVENOUS; SUBCUTANEOUS at 10:24

## 2021-06-30 RX ADMIN — GABAPENTIN 100 MG: 100 CAPSULE ORAL at 05:11

## 2021-06-30 RX ADMIN — DOCUSATE SODIUM 50 MG AND SENNOSIDES 8.6 MG 2 TABLET: 8.6; 5 TABLET, FILM COATED ORAL at 05:12

## 2021-06-30 RX ADMIN — FOLIC ACID 1 MG: 1 TABLET ORAL at 05:11

## 2021-06-30 RX ADMIN — GABAPENTIN 100 MG: 100 CAPSULE ORAL at 13:02

## 2021-06-30 ASSESSMENT — PAIN DESCRIPTION - PAIN TYPE
TYPE: ACUTE PAIN
TYPE: ACUTE PAIN

## 2021-06-30 NOTE — PROGRESS NOTES
[facility] Progress Note     Hemodialysis treatment ordered today per Dr. NHUNG Avelar x 3 hours.   Treatment initiated at 0724 ended at 1024.      Patient tolerated tx; see paper flow sheet for details.      Net UF 3000 mL.      Post tx, CVC flushed with saline then locked with heparin 1000 units/mL per designated amount in each wing then clamped and capped. Aspirate heparin prior to next CVC use.     Report given to Primary RN.

## 2021-06-30 NOTE — DISCHARGE PLANNING
Anticipated Discharge Disposition: [place]    Action: Pt has dialysis chair changed to accommodate [facility] transportation schedule. Pt currently in dialysis, Dr. Brewer notified that pt can transfer to SNF today, and needs dc summary.     Barriers to Discharge: DC summary for transfer    Plan: Transfer to [place] this afternoon at 1500 once DC summary is completed by [facility] team.    Update: Transport time set for 1600 by [facility].  Transfer packet completed, given to PRATIBHA Henriquez.

## 2021-06-30 NOTE — DISCHARGE PLANNING
Agency/Facility Name: [facility]  Spoke To: Najma  Outcome: Confirmed pt transport 6/30 @ 1500    RN CM informed

## 2021-06-30 NOTE — PROGRESS NOTES
[facility] -  PROGRESS NOTE               Author: BRANDO Larson Date & Time: 6/30/2021  10:20 AM     Chief Complaint:  Follow up ESRD    HPI:  70yoM with PMH significant for HTN, DM II, Hyperlipidemia, Seizure Disorder admitted with complaints of worsening SOB over the past 3 days and worsening bilateral LE edema for the past 1 week and in the ER found to have elevated Creatinine of . Pt reports that he has been getting progressively more SOB over the past 3 days and his LE edema has been worsening over the past 1 week so he came to the ED for eval. In the ED Cr was 5.3 on admission and pt reports that he has never been told of abnormal renal function in the past. His CXR showed pulm edema and there was some concern for DVT or PE but due to his elevated Creatinine he did not undergo a CTA. Bilateral lower extremity dopplers have been ordered. Pt was admitted and started on lasix 40mg IV BID as well as hydrochlorothiazide. He reports that his SOB is a little better this but he is still requiring face mask supplemental O2. He reports he is urinating more with the lasix but overnight only minimal UOP documented.   No F/C/N/V/CP, +SOB and +LE Edema, No melena, hematochezia, hematemesis.  No HA, visual changes, or abdominal pain    DAILY NEPHROLOGY SUMMARY:  6/22/21: rapid response called overnight for acute resp decompensation, urgent HD catheter placed and pt dialyzed with 3L UF, currently on high flow NC O2 with 100% FiO2, reports breathing a little better, 1.2L UOP over past 24hrs, BP mildly elevated, still with LE edema, denies any CP or n/v/diarrhea  6/23/21: No events, remains on high flow NC O2, tolerated HD yest afternoon with 3.5L UF, pt ~6L net negative fluid balance since admit and still requiring significant O2 support, BP stable, reports SOB is a little better, still with LE edema but reports it is better, on high flow NC O2 with 70% FiO2  6/24/21: No events, still  "requiring 70% FiO2 on high flow NC, pt reports ROSADO with exertion, edema improving per patient, BP stable, tolerated HD yest with 4L UF  6/25/21: No events, now on 4L NC O2, still has SOB with exertion, BP stable, tolerated HD today with 3.5L UF, edema improving  6/26/21: No events, feels better, still reports SOB with talking but improved, now on 2L NC, BP stable, reports LE edema resolved, denies any n/v/CP  6/27/21: No events, tolerated HD yest with 3L UF, BP stable overnight and little low this am in the 90's systolic and pt does have some light-headednes, now on RA and pt reports SOB has improved and LE edema resolved  6/28/21: NAEO, no complaints, feels good  6/29/21: NAEO, no complaint, family at bedside  6/30/21: Seen on HD this am. C/o cramping in calfs and ankles at end of HD tx. C/o fatigue, did not sleep night before. 750 mL UOP last 12 hrs.     REVIEW OF SYSTEMS:    10 point ROS reviewed and is as per HPI/daily summary or otherwise negative    PMH/PSH/SH/FH: Reviewed and unchanged since admission note  CURRENT MEDICATIONS: Reviewed from admission to present day    VS:  /74   Pulse 85   Temp 36.8 °C (98.2 °F) (Temporal)   Resp 18   Ht 1.727 m (5' 8\")   Wt 66.2 kg (145 lb 15.1 oz)   SpO2 95%   BMI 22.19 kg/m²   Physical Exam  Constitutional:       General: He is not in acute distress.     Appearance: Normal appearance.   HENT:      Head: Normocephalic and atraumatic.   Eyes:      General: No scleral icterus.  Cardiovascular:      Rate and Rhythm: Normal rate.   Pulmonary:      Effort: Pulmonary effort is normal. No respiratory distress.      Breath sounds: Normal breath sounds. No stridor. No rhonchi.   Abdominal:      General: There is no distension.   Musculoskeletal:         General: No deformity.   Skin:     Findings: Lesion present.      Comments: S/p sunburn with blister RUE   Neurological:      Mental Status: He is alert and oriented to person, place, and time.   Psychiatric:         " Mood and Affect: Mood normal.         Behavior: Behavior normal.     Fluids:  In: 600 [P.O.:600]  Out: 750     LABS:  Recent Labs     06/28/21  0050 06/29/21  0139   SODIUM 135 137   POTASSIUM 4.4 4.2   CHLORIDE 100 103   CO2 28 25   GLUCOSE 158* 105*   BUN 36* 23*   CREATININE 3.54* 2.58*   CALCIUM 8.0* 7.7*     IMPRESSION:  # FERNANDO  -pt denies any prior history of CKD but review of labs indicates Cr ~1.4 in 2017, unclear recent baseline  -OP HD @ [facility] confirmed  # Acute Hypoxic Respiratory Failure--pulm edema on CXR  -HD initiated 6/21 for acute hypoxic respiratory failure  - Dialyzed daily x6 days  - Now off supplemental O2  # HTN--BP controlled  # DM II--management per primary svc  # LE Edema--resolved with HD  # Anemia--?related to CKD, iron sat low  # Hypokalemia--resolved  # Elevated Troponin--medical management  # Left Foot and Right Bicep Wound--wound care  # Acute Hypoxic Respiratory Failure  --CXR with pulm edema, worse on CXR from 6/23  --Now resolved and off supplemental O2  # Valvular Heart Disease--echo 6/22 with moderate/severe AS that has progressed since his previous echo in 2017    PLAN:  - MWF iHD, HD today (WED)  - UF as tolerated  - OP iHD placement confirmed  - No role for NAFISA in FERNANDO  - Transfuse PRN for Hgb less than 7  - Record strict I/O's  - Dose adjust all meds for decreased GFR  - Avoid IV contrast/nephrotoxins/NSAIDs  - Will need cardiology eval for aortic stenosis  - Ok to transition to OP care from renal standpoint  - Discharge planning underway, confirmed Outpt chair at [facility] QMWF awaiting SNF confirmation

## 2021-06-30 NOTE — CARE PLAN
The patient is Watcher - Medium risk of patient condition declining or worsening    Shift Goals  Clinical Goals: Improved renal function  Patient Goals: Rest  Family Goals: Stable resp. status    Problem: Knowledge Deficit - Standard  Goal: Patient and family/care givers will demonstrate understanding of plan of care, disease process/condition, diagnostic tests and medications  Outcome: Progressing     Problem: Fall Risk  Goal: Patient will remain free from falls  Outcome: Progressing     Problem: Communication  Goal: The ability to communicate needs accurately and effectively will improve  Outcome: Progressing

## 2021-06-30 NOTE — DISCHARGE SUMMARY
Discharge Summary    Date of Admission: 6/20/2021  Date of Discharge: 06/30/21  Discharging Attending: Donell Gunn M.D.   Discharging Senior Resident: Dr. Ang  Discharging Intern: Dr. Santi Brewer MD    CHIEF COMPLAINT ON ADMISSION  Chief Complaint   Patient presents with   • Shortness of Breath       Reason for Admission  Renal failure, heart failure exacerbation, acute hypoxic respiratory failure, nephrotic syndrome    Admission Date  6/20/2021    CODE STATUS  Full Code    HPI & HOSPITAL COURSE    Jean Barboza is a 71yo M with hx of CVA, seizure, HLD/HTN, DM2 (5.3 A1c); presenting for acute shortness of breath of 2 days; admitted 6/20 for heart failure exacerbation, hypoxic respiratory failure, acute renal failure; found to have moderate-severe Ao stenosis, pulmHTN.     Presented on 6/20 with bilateral edema, shortness of breath, and exertional dyspnea. He was found to have elevated BNP and troponin, with ECHO of 60% EF, diastolic grade II dysfunction, aortic stenosis and pulmonary hypertension. He also was found to have nephrotic syndrome as urinary losses of protein >6g/d.  Hospital course c/b  Worsening hypoxia requiring  high flow supplementation. Underwent urgent permcath placement with subsequent hemodialysis on 6/22 . Oxygenation requirements improved after dialysis. Continued on daily dialysis as renal replacement until 6/27, where he was shifted to a MWF schedule. Was able to improve to baseline of no required oxygen supplmentation, though did develop bilateral foot burning/needles sensation.    Acute renal failure  Presented in renal failure, suspected due to cardiorenal/aortic stenosis. Possible diabetic nephropathy contribution. Inflammatory studies: ESR/CRP elevated, with borderline/normal complement levels. Negative for hepatitis. Despite dialysis, renal function did not appear to return evidenced by consistent low urinary output.  -MWF dialysis  -Gabapentin 100mg PO TID (max dose for renal  failure, indication of bilateral foot cramping/neuropathy)  -lipitor 40mg PO qD  -Nephrology follow up  -PCP follow up, regular labs: PTH, iCa, Ca, phos, mag  -May consider phos binder as indicated (normal-low during admission)  -May consider Patiromer as indicated (normal throughout admission)    Aortic Stenosis:  Found to have aortic stenosis on presentation by exam, and confirmed by ECHO. Discussed with cardiology, who recommended outpatient follow up and replacement planning. Likely a strong contributing factor to heart failure exacerbation, pulmonary hypertension.  -Cardiology follow up for TAVR consideration    Acute hypoxic respiratory failure:  Presented in respiratory failure, baseline no supplemental oxygen, acutely requiring high flow. Oxygen requirements improved after dialysis. PE was low suspicion (suspected due to nephrotic syndrome leading to hypercoagulopathic state) after DVT US negative, wells score of 1.5. Hypoxia improved after dialysis, suspected etiology of volume overload/pulmonary edema; combination of nephrotic syndrome and pulmonary hypertension due to aortic stenosis.  -Cardiology TAVR consideration  -PCP follow up for consideration of diuretic initiation (lasix 40mg PO qD to be considered)    Diastolic heart failure, preserved ejection fraction of 60%:  Presented in volume overload state, concerns of heart failure. ECHO revealed severe aortic stenosis, with pulmonary hypertension. Presumptive etiology is aortic stenosis, with pulmonary hypertension. Had orthostatic hypotension in association with dialysis.  -stopped lisinopril per hypotension, may titrate up from 2.5mg Po qD as indicated  -B-blocker not indicated at this time  -Lasix held per hypotension, dialysis  -Lipitor 40mg PO qD    Pulmonary hypertension:  Found to have pulmonary hypertension by ECHO, due to aortic stenosis. Possible compounding apnea.  -Cardiology TAVR consideration    Hypertension:  Presented in hypertensive  urgency/emergency evidenced by pulmonary edema, elevated troponin. Responded to hydralazine/nitrate/coreg, though these were titrated down after orthostatic hypotension with dialysis.  -PCP follow up (consider restart of lisinopril 2.5mg PO qD)  -stop HCTZ 25mg PO qD  -stop felodipine 10mg PO qD  -stop lisinopril 40mg PO qD    Diabetes mellitus 2:  A1c of 5.3 on admission, decreased from 9.1 in 2017. Indication of either improved glycemic control or potentiated insulin action due to renal function worsening.  -PCP follow up    Normocytic anaemia:  Presented with normocytic anaemia of 8. Iron studies indicative of Fe deficiency, with possible compounding anaemia of chronic disease. Was given two days of IV iron. B12/TSH normal, with low folate. Likely dietary, though no obvious deficits identified.  -PCP follow up (consider Fe gluconate 325mg PO qD)      CVA:  Hx of CVA, no apparent residual deficits.  -Atorvastatin 40mg PO qD    Therefore, he is discharged in fair and stable condition to skilled nursing facility.    The patient met 2-midnight criteria for an inpatient stay at the time of discharge.    PHYSICAL EXAM ON DISCHARGE  Temp:  [35.9 °C (96.7 °F)-36.8 °C (98.3 °F)] 36.8 °C (98.3 °F)  Pulse:  [70-85] 79  Resp:  [16-18] 18  BP: (124-181)/(62-81) 131/64  SpO2:  [94 %-99 %] 95 %    Physical Exam    GEN: well appearing, no acute distress  CV: RRR, 3/6 systolic murmur  Pulm: CTAB, no rales, wheeze, crackles  Abd: soft, nontender, nondistended  Extremities: shoulder/elbow flexion/extension 5/5 bilaterally, knee flexion/extension 5/5 bilaterally, no peripheral edema  Neuro: CN II-XII intact, no focal deficits, Aox4  Psych: Denies any SI/HI morning of discharge    Discharge Date  06/30/21    FOLLOW UP ITEMS POST DISCHARGE  Cardiology follow up of aortic stenosis, TAVR consideration  Nephrology for dialysis, electrolyte derangements  PCP for hypertension, normocytic anaemia, respiratory failure    DISCHARGE  DIAGNOSES  Active Problems:    Acute respiratory failure with hypoxia (HCC) POA: Unknown    Normocytic anemia POA: Yes    Acute renal failure (HCC) POA: Yes    Aortic stenosis POA: Unknown    Pulmonary hypertension (HCC) POA: Unknown    Type 2 diabetes mellitus (HCC) POA: Yes    Elevated troponin POA: Yes    Hypertension POA: Yes    Hypokalemia POA: Yes  Resolved Problems:    Suspected pulmonary embolism POA: Yes      FOLLOW UP  Future Appointments   Date Time Provider Department Center   7/13/2021  2:40 PM Alphonso Valerio M.D. [facility] None     [facility]  [address] [facility] [address]  [phone]          MEDICATIONS ON DISCHARGE     Medication List      START taking these medications      Instructions   gabapentin 100 MG Caps  Commonly known as: NEURONTIN   Take 1 capsule by mouth 3 times a day.  Dose: 100 mg        CHANGE how you take these medications      Instructions   Lipitor 40 MG Tabs  What changed: Another medication with the same name was removed. Continue taking this medication, and follow the directions you see here.  Generic drug: atorvastatin   Take 40 mg by mouth every day.  Dose: 40 mg        STOP taking these medications    aspirin EC 81 MG Tbec  Commonly known as: ECOTRIN     doxazosin 8 MG tablet  Commonly known as: CARDURA     felodipine 10 MG Tb24  Commonly known as: PLENDIL     guaiFENesin dextromethorphan 100-10 MG/5ML Syrp syrup  Commonly known as: ROBITUSSIN DM     hydroCHLOROthiazide 25 MG Tabs  Commonly known as: HYDRODIURIL     insulin detemir 100 UNIT/ML injection PEN  Commonly known as: Levemir     insulin lispro (Human) 100 UNIT/ML injection PEN  Commonly known as: HUMALOG     lisinopril 40 MG tablet  Commonly known as: PRINIVIL     meclizine 25 MG Tabs  Commonly known as: ANTIVERT            Allergies  Allergies   Allergen Reactions   • Penicillin G Rash     Rxn - Exacerbated a rash on his legs  Early 2000's         DIET  Orders Placed This Encounter    Procedures   • Diet Order Diet: Consistent CHO (Diabetic); Second Modifier: (optional): Cardiac     Standing Status:   Standing     Number of Occurrences:   1     Order Specific Question:   Diet:     Answer:   Consistent CHO (Diabetic) [4]     Order Specific Question:   Second Modifier: (optional)     Answer:   Cardiac [6]       ACTIVITY  As tolerated.  Exercise encouraged.  Weight bearing as tolerated    CONSULTATIONS  Dr. Whiting with Cardiology consulted.  Treatment options were discussed and plan of care agreed upon. and Dr. Avelar with Nephrology Service consulted.  Treatment options were discussed and plan of care agreed upon.    PROCEDURES  Subclavian dialysis catheter placement 6/22:   Walt 12.5 fr, R subclavian vein; Miki Chandler MD critical care    ECHO 6/22:  The left ventricle was normal in size.  Left ventricular ejection fraction is visually estimated to be 60%.  Moderate concentric left ventricular hypertrophy.  Grade II diastolic dysfunction.  Moderate to severe aortic stenosis. Transvalvular gradients are Mean:   40 mmHg. Aortic valve area calculated from the continuity equation is   0.9 cm2.  Estimated right ventricular systolic pressure is 70 mmHg.  Normal inferior vena cava size without inspiratory collapse.  Compared to the prior study done - there has been progression of AS and   development of pulmonary hypertension.

## 2021-06-30 NOTE — DISCHARGE PLANNING
Outpatient Dialysis Placement Notification    Received notification for outpatient dialysis placement from Dr. Avelar.     Met/Spoke with patient and confirmed demographics and insurance information.  Provided patient with dialysis education materials and list of HD centers, referral initiated to:    [facility] [place]  [address] [facility]  [place] [id number]    Pending medical and financial clearance.    Zulema Hubbard- Dialysis Coordinator Ph# [phone]  [facility]

## 2021-06-30 NOTE — PROGRESS NOTES
Assumed care of pt. Bedside report received from Florence MCKINNON. Pt was updated on plan of care. Call light, phone and personal belongings in reach. Bed alarm on and working properly, bed in lowest position, and locked.

## 2021-06-30 NOTE — DISCHARGE INSTRUCTIONS
Discharge Instructions    Discharged to other by medical transportation with escort. Discharged via wheelchair, hospital escort: Yes.  Special equipment needed: Not Applicable    Be sure to schedule a follow-up appointment with your primary care doctor or any specialists as instructed.     Discharge Plan:        I understand that a diet low in cholesterol, fat, and sodium is recommended for good health. Unless I have been given specific instructions below for another diet, I accept this instruction as my diet prescription.   Other diet: renal    Special Instructions: None    · Is patient discharged on Warfarin / Coumadin?   No     Depression / Suicide Risk    As you are discharged from this [facility] facility, it is important to learn how to keep safe from harming yourself.    Recognize the warning signs:  · Abrupt changes in personality, positive or negative- including increase in energy   · Giving away possessions  · Change in eating patterns- significant weight changes-  positive or negative  · Change in sleeping patterns- unable to sleep or sleeping all the time   · Unwillingness or inability to communicate  · Depression  · Unusual sadness, discouragement and loneliness  · Talk of wanting to die  · Neglect of personal appearance   · Rebelliousness- reckless behavior  · Withdrawal from people/activities they love  · Confusion- inability to concentrate     If you or a loved one observes any of these behaviors or has concerns about self-harm, here's what you can do:  · Talk about it- your feelings and reasons for harming yourself  · Remove any means that you might use to hurt yourself (examples: pills, rope, extension cords, firearm)  · Get professional help from the community (Mental Health, Substance Abuse, psychological counseling)  · Do not be alone:Call your Safe Contact- someone whom you trust who will be there for you.  · Call your local CRISIS HOTLINE 898-1094 or 398-098-1815  · Call your local  Children's Mobile Crisis Response Team [place] (178) 055-5640 or www.Gateshop  · Call the toll free National Suicide Prevention Hotlines   · National Suicide Prevention Lifeline 715-231-BEVK (8427)  · [facility] Hope Line Network 800-SUICIDE (100-2338)      End-Stage Kidney Disease  End-stage kidney disease occurs when the kidneys are so damaged that they cannot function and cannot get better. This condition may also be referred to as end-stage renal disease or ESRD. The kidneys are two organs that do many important jobs in the body, including:  · Removing wastes and extra fluids from the blood.  · Making hormones that maintain the amount of fluid in your tissues and blood vessels.  · Maintaining the right amount of fluids and chemicals in the body.  Without functioning kidneys, toxins build up in the blood and life-threatening complications can occur.  What are the causes?  This condition usually occurs when a long-term (chronic) kidney disease gets worse and results in permanent damage to the kidneys. It may also be caused by sudden damage to the kidneys (acute kidney injury).  Causes of this condition include:  · Having a family history of chronic kidney disease (CKD).  · Having chronic kidney disease for many years.  · Chronic medical conditions that affect the kidneys, such as:  ? Cardiovascular disease, including high blood pressure.  ? Diabetes.  ? Certain diseases that affect the body's disease-fighting (immune) system.  · Overuse of over-the-counter pain medicines.  · Being around or being in contact with poisonous (toxic) substances.  What increases the risk?  The following factors may make you more likely to develop this condition:  · Being older than 60.  · Being male.  · Being of -American, , , , or  descent.  · Smoking or a history of smoking.  · Obesity.  What are the signs or symptoms?  Symptoms of this condition include:  · Swelling  (edema) of the face, legs, ankles, or feet.  · Numbness, tingling, or loss of feeling in the hands or feet.  · Tiredness (lethargy).  · Nausea or vomiting.  · Confusion, trouble concentrating, or loss of consciousness.  · Chest pain.  · Shortness of breath.  · Passing little or no urine.  · Muscle twitches and cramps, especially in the legs.  · Dry, itchy skin.  · Loss of appetite.  · Pale skin due to anemia, including the skin and tissue around the eye (conjunctiva).  · Headaches.  · Abnormally dark or light skin.  · Decrease in muscle size (muscle wasting).  · Easy bruising.  · Frequent hiccups.  · Stopping of the monthly period in women.  · Jerky movements (seizures).  How is this diagnosed?  This condition may be diagnosed based on:  · A physical exam, including blood pressure measurements.  · Urine tests.  · Blood tests.  · Imaging tests.  · A test in which a sample of tissue is removed from the kidneys to be examined under a microscope (kidney biopsy).  How is this treated?  This condition may be treated with:  · A procedure that removes toxic wastes from the body (dialysis). There are two types of dialysis:  ? Dialysis that is done through your abdomen (peritoneal dialysis). This may be done several times a day.  ? Dialysis that is done by a machine (hemodialysis). This may be done several times a week.  · Surgery to receive a new kidney (kidney transplant).  In addition to having dialysis or a kidney transplant, you may need to take medicines:  · To control high blood pressure (hypertension).  · To control high cholesterol.  · To treat diabetes.  · To maintain healthy levels of minerals in the blood (electrolytes).  You may also be given a specific meal plan to follow that includes requirements or limits for:  · Salt (sodium).  · Protein.  · Phosphorous.  · Potassium.  · Calcium.  Follow these instructions at home:  Medicines  · Take over-the-counter and prescription medicines only as told by your health  care provider.  · Do not take any new medicines, vitamins, or mineral supplements unless approved by your health care provider. Many medicines and supplements can worsen kidney damage.  · Follow instructions from your health care provider about adjusting the doses of any medicines you take.  Lifestyle  · Do not use any products that contain nicotine or tobacco, such as cigarettes and e-cigarettes. If you need help quitting, ask your health care provider.  · Achieve and maintain a healthy weight. If you need help with this, ask your health care provider.  · Start or continue an exercise plan. Exercise at least 30 minutes a day, 5 days a week.  · Follow your prescribed meal plan.  General instructions  · Stay current with your shots (immunizations) as told by your health care provider.  · Keep track of your blood pressure. Report changes in your blood pressure as told by your health care provider.  · If you are being treated for diabetes, monitor and track your blood sugar (blood glucose) levels as told by your health care provider.  · Keep all follow-up visits as told by your health care provider. This is important.  Where to find more information  · American Association of Kidney Patients: www.aakp.org  · National Kidney Foundation: www.kidney.org  · American Kidney Fund: www.akfinc.org  · [facility]: www.lifeoptions.org and www.kidneyschool.org  Contact a health care provider if:  · Your symptoms get worse.  · You develop new symptoms.  Get help right away if:  · You have weakness in an arm or leg on one side of your body.  · You have difficulty speaking or you are slurring your speech.  · You have a sudden change in your vision.  · You have a sudden, severe headache.  · You have a sudden weight increase.  · You have difficulty breathing.  · Your symptoms suddenly get worse.  Summary  · End-stage kidney disease occurs when the kidneys are so damaged that they cannot function and cannot get  better.  · Without functioning kidneys, toxins build up in the blood and life-threatening complications can occur.  · Treatment may include dialysis or a kidney transplant along with medicines and lifestyle changes.  This information is not intended to replace advice given to you by your health care provider. Make sure you discuss any questions you have with your health care provider.  Document Released: 03/09/2005 Document Revised: 11/30/2018 Document Reviewed: 01/23/2018  [employer] Patient Education © 2020 [employer]      Dialysis  Dialysis is a procedure that is done when the kidneys have stopped working properly (kidney failure). It may also be done earlier if it may help improve symptoms. During dialysis, wastes, salt, and extra water are removed from the blood, and the levels of certain minerals in the blood are maintained.  Dialysis is done in sessions which are continued until the kidneys get better. If the kidneys cannot get better, such as in end-stage kidney disease, dialysis is continued for life or until you receive a new kidney from a donor (kidney transplant). There are two types of dialysis: hemodialysis and peritoneal dialysis.  What is hemodialysis?              Hemodialysis is when a machine called a dialyzer is used to filter the blood. Before starting hemodialysis, you will have surgery to create a site where blood can be removed from the body and returned to the body (vascular access). There are three types of vascular accesses:  · Arteriovenous fistula. This type of access is created when an artery and a vein (usually in the arm) are connected during surgery. The arteriovenous fistula usually takes 1-6 months to develop after surgery. It may last longer than the other types of vascular accesses and is less likely to become infected or cause blood clots.  · Arteriovenous graft. This type of access is created when an artery and a vein in the arm are connected during surgery with a tube. An  arteriovenous graft can usually be used within 2-3 weeks of surgery.  · A venous catheter. To create this type of access, a thin tube (catheter) is placed in a large vein in your neck, chest, or groin. A venous catheter can be used right away. It is usually used as a temporary access when dialysis needs to begin immediately.  During hemodialysis, blood leaves your body through your access site. It travels through a tube to the dialyzer, where it is filtered. The blood then returns to your body through another tube.  Hemodialysis is usually done at a hospital or dialysis center three times a week. Visits last about 3-5 hours. With special training, it may also be done at home with the help of another person.  What is peritoneal dialysis?  Peritoneal dialysis is when the thin lining of the abdomen (peritoneum) and a fluid called dialysate are used to filter the blood. Before starting peritoneal dialysis, you will have surgery to place a catheter in your abdomen. The catheter will be used to transfer dialysate to and from your abdomen.  At the start of a session, your abdomen is filled with dialysate. During the session, wastes, salt, and extra water in the blood pass through the peritoneum and into the dialysate. The dialysate is drained from the body at the end of the session. The process of filling and draining the dialysate is called an exchange. Exchanges are repeated until you have used up all the dialysate for the day.  You may do peritoneal dialysis at home or at almost any other location. It is done every day. You may need up to five exchanges a day. Each exchange takes about 30-40 minutes. The amount of time the dialysate is in your body between exchanges is called a dwell. The dwell usually lasts 1.5-3 hours and can vary with each person. You may choose to do exchanges at night while you sleep, using a machine called a cycler.  Which type of dialysis should I choose?  Both types of dialysis have advantages  and disadvantages. Talk with your health care provider about which type of dialysis is best for you. Your lifestyle, preferences, and medical condition should be considered. In some cases, only one type of dialysis can be chosen.  Advantages of hemodialysis  · It is done less often than peritoneal dialysis.  · Someone else can do the dialysis for you.  · If you go to a dialysis center:  ? Your health care provider can recognize any problems you may be having.  ? You can interact with others who are having dialysis. This can provide you with emotional support.  Disadvantages of hemodialysis  · Hemodialysis may cause cramps and low blood pressure. It may leave you feeling tired on the days you have the treatment.  · If you go to a dialysis center, you will need to make weekly appointments and work around the center’s schedule.  · You will need to take extra care when traveling. If you usually get treatment in a dialysis center, you will need to arrange to visit a dialysis center near your destination. If you are having treatments at home, you will need to take the dialyzer with you when traveling.  · There are more eating restrictions than with peritoneal dialysis.  Advantages of peritoneal dialysis  · It is less likely than hemodialysis to cause cramps and low blood pressure.  · There are fewer eating restrictions than with hemodialysis.  · You may do exchanges on your own wherever you are, including when you travel.  Disadvantages of peritoneal dialysis  · It is done more often than hemodialysis.  · Doing peritoneal dialysis requires you to have a good use (dexterity) of your hands. You must also be able to lift bags.  · You must learn how to make your equipment free of germs (sterilization techniques). You will need to use these techniques every day to prevent infection.  What changes will I need to make to my diet during dialysis?  Both types of dialysis require you to make some changes to your diet. For example,  you will need to limit your intake of foods that contain a lot of phosphorus and potassium. You will also need to limit your fluid intake. A diet and nutrition specialist (dietitian) can help you make a meal plan that can help improve your dialysis and your health.  What should I expect when starting dialysis?  Adjusting to the dialysis treatment, schedule, and diet can take some time. You may need to stop working and may not be able to do some of your normal activities. You may feel anxious or depressed when starting dialysis. Over time, many people feel better overall because of dialysis. You may be able to return to work after making some changes, such as reducing work intensity.  Where to find more information  · National Kidney Foundation: www.kidney.org  · American Association of Kidney Patients: www.aakp.org  · American Kidney Fund: www.kidneyfund.org  Summary  · During dialysis, wastes, salt, and extra water are removed from the blood, and the levels of certain minerals in the blood are maintained. There are two types of dialysis: hemodialysis and peritoneal dialysis.  · Hemodialysis is when a machine called a dialyzer is used to filter the blood.  · Hemodialysis is usually done by a health care provider at a hospital or dialysis center three times a week.  · Peritoneal dialysis is when the peritoneum is used as a filter. You may do peritoneal dialysis at home or at almost any other location.  · Both types of dialysis have advantages and disadvantages. Talk with your health care provider about which type of dialysis is best for you.  This information is not intended to replace advice given to you by your health care provider. Make sure you discuss any questions you have with your health care provider.  Document Released: 03/09/2004 Document Revised: 04/08/2020 Document Reviewed: 02/13/2018  Elsevier Patient Education © 2020 [employer]      Acute Respiratory Failure, Adult    Acute respiratory failure  occurs when there is not enough oxygen passing from your lungs to your body. When this happens, your lungs have trouble removing carbon dioxide from the blood. This causes your blood oxygen level to drop too low as carbon dioxide builds up.  Acute respiratory failure is a medical emergency. It can develop quickly, but it is temporary if treated promptly. Your lung capacity, or how much air your lungs can hold, may improve with time, exercise, and treatment.  What are the causes?  There are many possible causes of acute respiratory failure, including:  · Lung injury.  · Chest injury or damage to the ribs or tissues near the lungs.  · Lung conditions that affect the flow of air and blood into and out of the lungs, such as pneumonia, acute respiratory distress syndrome, and cystic fibrosis.  · Medical conditions, such as strokes or spinal cord injuries, that affect the muscles and nerves that control breathing.  · Blood infection (sepsis).  · Inflammation of the pancreas (pancreatitis).  · A blood clot in the lungs (pulmonary embolism).  · A large-volume blood transfusion.  · Burns.  · Near-drowning.  · Seizure.  · Smoke inhalation.  · Reaction to medicines.  · Alcohol or drug overdose.  What increases the risk?  This condition is more likely to develop in people who have:  · A blocked airway.  · Asthma.  · A condition or disease that damages or weakens the muscles, nerves, bones, or tissues that are involved in breathing.  · A serious infection.  · A health problem that blocks the unconscious reflex that is involved in breathing, such as hypothyroidism or sleep apnea.  · A lung injury or trauma.  What are the signs or symptoms?  Trouble breathing is the main symptom of acute respiratory failure. Symptoms may also include:  · Rapid breathing.  · Restlessness or anxiety.  · Skin, lips, or fingernails that appear blue (cyanosis).  · Rapid heart rate.  · Abnormal heart rhythms (arrhythmias).  · Confusion or changes in  behavior.  · Tiredness or loss of energy.  · Feeling sleepy or having a loss of consciousness.  How is this diagnosed?  Your health care provider can diagnose acute respiratory failure with a medical history and physical exam. During the exam, your health care provider will listen to your heart and check for crackling or wheezing sounds in your lungs. Your may also have tests to confirm the diagnosis and determine what is causing respiratory failure. These tests may include:  · Measuring the amount of oxygen in your blood (pulse oximetry). The measurement comes from a small device that is placed on your finger, earlobe, or toe.  · Other blood tests to measure blood gases and to look for signs of infection.  · Sampling your cerebral spinal fluid or tracheal fluid to check for infections.  · Chest X-ray to look for fluid in spaces that should be filled with air.  · Electrocardiogram (ECG) to look at the heart's electrical activity.  How is this treated?  Treatment for this condition usually takes places in a hospital intensive care unit (ICU). Treatment depends on what is causing the condition. It may include one or more treatments until your symptoms improve. Treatment may include:  · Supplemental oxygen. Extra oxygen is given through a tube in the nose, a face mask, or a lockhart.  · A device such as a continuous positive airway pressure (CPAP) or bi-level positive airway pressure (BiPAP or BPAP) machine. This treatment uses mild air pressure to keep the airways open. A mask or other device will be placed over your nose or mouth. A tube that is connected to a motor will deliver oxygen through the mask.  · Ventilator. This treatment helps move air into and out of the lungs. This may be done with a bag and mask or a machine. For this treatment, a tube is placed in your windpipe (trachea) so air and oxygen can flow to the lungs.  · Extracorporeal membrane oxygenation (ECMO). This treatment temporarily takes over the  function of the heart and lungs, supplying oxygen and removing carbon dioxide. ECMO gives the lungs a chance to recover. It may be used if a ventilator is not effective.  · Tracheostomy. This is a procedure that creates a hole in the neck to insert a breathing tube.  · Receiving fluids and medicines.  · Rocking the bed to help breathing.  Follow these instructions at home:  · Take over-the-counter and prescription medicines only as told by your health care provider.  · Return to normal activities as told by your health care provider. Ask your health care provider what activities are safe for you.  · Keep all follow-up visits as told by your health care provider. This is important.  How is this prevented?  Treating infections and medical conditions that may lead to acute respiratory failure can help prevent the condition from developing.  Contact a health care provider if:  · You have a fever.  · Your symptoms do not improve or they get worse.  Get help right away if:  · You are having trouble breathing.  · You lose consciousness.  · Your have cyanosis or turn blue.  · You develop a rapid heart rate.  · You are confused.  These symptoms may represent a serious problem that is an emergency. Do not wait to see if the symptoms will go away. Get medical help right away. Call your local emergency services (911 in the [place] Do not drive yourself to the hospital.  This information is not intended to replace advice given to you by your health care provider. Make sure you discuss any questions you have with your health care provider.  Document Released: 12/23/2014 Document Revised: 11/30/2018 Document Reviewed: 07/05/2017  Elsevier Patient Education © 2020 [employer]      Heart Failure Action Plan  A heart failure action plan helps you understand what to do when you have symptoms of heart failure. Follow the plan that was created by you and your health care provider. Review your plan each time you visit your health care  provider.  Red zone  These signs and symptoms mean you should get medical help right away:  · You have trouble breathing when resting.  · You have a dry cough that is getting worse.  · You have swelling or pain in your legs or abdomen that is getting worse.  · You suddenly gain more than 2-3 lb (0.9-1.4 kg) in a day, or more than 5 lb (2.3 kg) in one week. This amount may be more or less depending on your condition.  · You have trouble staying awake or you feel confused.  · You have chest pain.  · You do not have an appetite.  · You pass out.  If you experience any of these symptoms:  · Call your local emergency services (911 in the [place] right away or seek help at the emergency department of the nearest hospital.  Yellow zone  These signs and symptoms mean your condition may be getting worse and you should make some changes:  · You have trouble breathing when you are active or you need to sleep with extra pillows.  · You have swelling in your legs or abdomen.  · You gain 2-3 lb (0.9-1.4 kg) in one day, or 5 lb (2.3 kg) in one week. This amount may be more or less depending on your condition.  · You get tired easily.  · You have trouble sleeping.  · You have a dry cough.  If you experience any of these symptoms:  · Contact your health care provider within the next day.  · Your health care provider may adjust your medicines.  Green zone  These signs mean you are doing well and can continue what you are doing:  · You do not have shortness of breath.  · You have very little swelling or no new swelling.  · Your weight is stable (no gain or loss).  · You have a normal activity level.  · You do not have chest pain or any other new symptoms.  Follow these instructions at home:  · Take over-the-counter and prescription medicines only as told by your health care provider.  · Weigh yourself daily. Your target weight is __________ lb (__________ kg).  ? Call your health care provider if you gain more than __________ lb  (__________ kg) in a day, or more than __________ lb (__________ kg) in one week.  · Eat a heart-healthy diet. Work with a diet and nutrition specialist (dietitian) to create an eating plan that is best for you.  · Keep all follow-up visits as told by your health care provider. This is important.  Where to find more information  · American Heart Association: www.heart.org  Summary  · Follow the action plan that was created by you and your health care provider.  · Get help right away if you have any symptoms in the Red zone.  This information is not intended to replace advice given to you by your health care provider. Make sure you discuss any questions you have with your health care provider.  Document Released: 01/27/2018 Document Revised: 11/30/2018 Document Reviewed: 01/27/2018  Elsevier Patient Education © 2020 [employer]

## 2021-06-30 NOTE — DISCHARGE PLANNING
Outpatient Dialysis Placement Confirmation    Patient referral was re-routed due to SNF placement cannot accommodate Hillary Alcaraz at 2:45pm. I spoke with patient and patient agreed to send referral to [facility]. I called [facility] and they said they can accept patient with new chair time, I called and spoke with Reyes MCKINNON CM and relayed this information.     Patient has been placed and confirmed at:    [facility]   [address] [place] [address]    Ph# [phone]    Schedule: Monday, Wednesday, Friday   Time: 1:30pm     Patient can start on 07/02/21 and needs to be there by 1:00pm on your first day.    Follow up call made to Reyes MCKINNON CM and relayed outpatient dialysis placement confirmation. Follow up message to Dr. Avelar to notify of placement. Provided patient dialysis schedule welcome letter.    Zulema Hubbard- Dialysis Coordinator Ph# [phone]  [facility]

## 2021-07-05 ENCOUNTER — HOSPITAL ENCOUNTER (OUTPATIENT)
Facility: MEDICAL CENTER | Age: 71
End: 2021-07-07
Attending: EMERGENCY MEDICINE | Admitting: INTERNAL MEDICINE
Payer: MEDICARE

## 2021-07-05 DIAGNOSIS — Z78.9 PROBLEM WITH VASCULAR ACCESS: ICD-10-CM

## 2021-07-05 DIAGNOSIS — R41.89 COGNITIVE DECLINE: ICD-10-CM

## 2021-07-05 PROBLEM — N18.6 ESRD (END STAGE RENAL DISEASE) ON DIALYSIS (HCC): Status: ACTIVE | Noted: 2021-07-05

## 2021-07-05 PROBLEM — Z99.2 ESRD (END STAGE RENAL DISEASE) ON DIALYSIS (HCC): Status: ACTIVE | Noted: 2021-07-05

## 2021-07-05 LAB
ALBUMIN SERPL BCP-MCNC: 2.9 G/DL (ref 3.2–4.9)
ALBUMIN/GLOB SERPL: 0.8 G/DL
ALP SERPL-CCNC: 68 U/L (ref 30–99)
ALT SERPL-CCNC: 28 U/L (ref 2–50)
ANION GAP SERPL CALC-SCNC: 14 MMOL/L (ref 7–16)
AST SERPL-CCNC: 36 U/L (ref 12–45)
BASOPHILS # BLD AUTO: 0.6 % (ref 0–1.8)
BASOPHILS # BLD: 0.03 K/UL (ref 0–0.12)
BILIRUB SERPL-MCNC: 0.2 MG/DL (ref 0.1–1.5)
BUN SERPL-MCNC: 47 MG/DL (ref 8–22)
CALCIUM SERPL-MCNC: 8.1 MG/DL (ref 8.5–10.5)
CHLORIDE SERPL-SCNC: 98 MMOL/L (ref 96–112)
CO2 SERPL-SCNC: 23 MMOL/L (ref 20–33)
CREAT SERPL-MCNC: 5.01 MG/DL (ref 0.5–1.4)
EOSINOPHIL # BLD AUTO: 0.26 K/UL (ref 0–0.51)
EOSINOPHIL NFR BLD: 4.8 % (ref 0–6.9)
ERYTHROCYTE [DISTWIDTH] IN BLOOD BY AUTOMATED COUNT: 46.8 FL (ref 35.9–50)
GLOBULIN SER CALC-MCNC: 3.6 G/DL (ref 1.9–3.5)
GLUCOSE SERPL-MCNC: 108 MG/DL (ref 65–99)
HCT VFR BLD AUTO: 26.4 % (ref 42–52)
HGB BLD-MCNC: 8.7 G/DL (ref 14–18)
IMM GRANULOCYTES # BLD AUTO: 0.02 K/UL (ref 0–0.11)
IMM GRANULOCYTES NFR BLD AUTO: 0.4 % (ref 0–0.9)
LYMPHOCYTES # BLD AUTO: 1.11 K/UL (ref 1–4.8)
LYMPHOCYTES NFR BLD: 20.4 % (ref 22–41)
MCH RBC QN AUTO: 31 PG (ref 27–33)
MCHC RBC AUTO-ENTMCNC: 33 G/DL (ref 33.7–35.3)
MCV RBC AUTO: 94 FL (ref 81.4–97.8)
MONOCYTES # BLD AUTO: 0.55 K/UL (ref 0–0.85)
MONOCYTES NFR BLD AUTO: 10.1 % (ref 0–13.4)
NEUTROPHILS # BLD AUTO: 3.46 K/UL (ref 1.82–7.42)
NEUTROPHILS NFR BLD: 63.7 % (ref 44–72)
NRBC # BLD AUTO: 0 K/UL
NRBC BLD-RTO: 0 /100 WBC
PLATELET # BLD AUTO: 227 K/UL (ref 164–446)
PMV BLD AUTO: 10.5 FL (ref 9–12.9)
POTASSIUM SERPL-SCNC: 4.4 MMOL/L (ref 3.6–5.5)
PROT SERPL-MCNC: 6.5 G/DL (ref 6–8.2)
RBC # BLD AUTO: 2.81 M/UL (ref 4.7–6.1)
SODIUM SERPL-SCNC: 135 MMOL/L (ref 135–145)
WBC # BLD AUTO: 5.4 K/UL (ref 4.8–10.8)

## 2021-07-05 PROCEDURE — 700102 HCHG RX REV CODE 250 W/ 637 OVERRIDE(OP): Performed by: INTERNAL MEDICINE

## 2021-07-05 PROCEDURE — 36415 COLL VENOUS BLD VENIPUNCTURE: CPT

## 2021-07-05 PROCEDURE — 80053 COMPREHEN METABOLIC PANEL: CPT

## 2021-07-05 PROCEDURE — 96372 THER/PROPH/DIAG INJ SC/IM: CPT

## 2021-07-05 PROCEDURE — 700111 HCHG RX REV CODE 636 W/ 250 OVERRIDE (IP): Performed by: INTERNAL MEDICINE

## 2021-07-05 PROCEDURE — A9270 NON-COVERED ITEM OR SERVICE: HCPCS | Performed by: INTERNAL MEDICINE

## 2021-07-05 PROCEDURE — 99220 PR INITIAL OBSERVATION CARE,LEVL III: CPT | Performed by: INTERNAL MEDICINE

## 2021-07-05 PROCEDURE — G0378 HOSPITAL OBSERVATION PER HR: HCPCS

## 2021-07-05 PROCEDURE — 99285 EMERGENCY DEPT VISIT HI MDM: CPT

## 2021-07-05 PROCEDURE — 85025 COMPLETE CBC W/AUTO DIFF WBC: CPT

## 2021-07-05 RX ORDER — M-VIT,TX,IRON,MINS/CALC/FOLIC 27MG-0.4MG
1 TABLET ORAL DAILY
Status: SHIPPED | COMMUNITY
End: 2024-01-10

## 2021-07-05 RX ORDER — POLYETHYLENE GLYCOL 3350 17 G/17G
1 POWDER, FOR SOLUTION ORAL
Status: DISCONTINUED | OUTPATIENT
Start: 2021-07-05 | End: 2021-07-07 | Stop reason: HOSPADM

## 2021-07-05 RX ORDER — ATORVASTATIN CALCIUM 40 MG/1
40 TABLET, FILM COATED ORAL EVERY EVENING
Status: DISCONTINUED | OUTPATIENT
Start: 2021-07-05 | End: 2021-07-07 | Stop reason: HOSPADM

## 2021-07-05 RX ORDER — DOXAZOSIN 8 MG/1
8 TABLET ORAL DAILY
Status: SHIPPED | COMMUNITY
End: 2022-12-10

## 2021-07-05 RX ORDER — ONDANSETRON 4 MG/1
4 TABLET, ORALLY DISINTEGRATING ORAL EVERY 4 HOURS PRN
Status: DISCONTINUED | OUTPATIENT
Start: 2021-07-05 | End: 2021-07-07 | Stop reason: HOSPADM

## 2021-07-05 RX ORDER — AMOXICILLIN 250 MG
2 CAPSULE ORAL 2 TIMES DAILY
Status: DISCONTINUED | OUTPATIENT
Start: 2021-07-05 | End: 2021-07-07 | Stop reason: HOSPADM

## 2021-07-05 RX ORDER — HYDRALAZINE HYDROCHLORIDE 10 MG/1
10 TABLET, FILM COATED ORAL 2 TIMES DAILY
Status: SHIPPED | COMMUNITY
End: 2022-12-10

## 2021-07-05 RX ORDER — ACETAMINOPHEN 325 MG/1
650 TABLET ORAL EVERY 6 HOURS PRN
Status: DISCONTINUED | OUTPATIENT
Start: 2021-07-05 | End: 2021-07-07 | Stop reason: HOSPADM

## 2021-07-05 RX ORDER — ENALAPRILAT 1.25 MG/ML
1.25 INJECTION INTRAVENOUS EVERY 6 HOURS PRN
Status: DISCONTINUED | OUTPATIENT
Start: 2021-07-05 | End: 2021-07-07 | Stop reason: HOSPADM

## 2021-07-05 RX ORDER — ONDANSETRON 2 MG/ML
4 INJECTION INTRAMUSCULAR; INTRAVENOUS EVERY 4 HOURS PRN
Status: DISCONTINUED | OUTPATIENT
Start: 2021-07-05 | End: 2021-07-07 | Stop reason: HOSPADM

## 2021-07-05 RX ORDER — BISACODYL 10 MG
10 SUPPOSITORY, RECTAL RECTAL
Status: DISCONTINUED | OUTPATIENT
Start: 2021-07-05 | End: 2021-07-07 | Stop reason: HOSPADM

## 2021-07-05 RX ORDER — LISINOPRIL 40 MG/1
40 TABLET ORAL DAILY
COMMUNITY

## 2021-07-05 RX ORDER — HYDRALAZINE HYDROCHLORIDE 10 MG/1
10 TABLET, FILM COATED ORAL 2 TIMES DAILY
Status: DISCONTINUED | OUTPATIENT
Start: 2021-07-05 | End: 2021-07-07 | Stop reason: HOSPADM

## 2021-07-05 RX ORDER — MULTIVIT-MIN/IRON/FOLIC ACID/K 18-600-40
500 CAPSULE ORAL DAILY
Status: SHIPPED | COMMUNITY
End: 2024-01-10

## 2021-07-05 RX ORDER — GINSENG 100 MG
50 CAPSULE ORAL DAILY
Status: SHIPPED | COMMUNITY
End: 2022-12-10

## 2021-07-05 RX ORDER — HEPARIN SODIUM 5000 [USP'U]/ML
5000 INJECTION, SOLUTION INTRAVENOUS; SUBCUTANEOUS EVERY 8 HOURS
Status: DISCONTINUED | OUTPATIENT
Start: 2021-07-05 | End: 2021-07-07 | Stop reason: HOSPADM

## 2021-07-05 RX ORDER — FAMOTIDINE 20 MG/1
20 TABLET, FILM COATED ORAL 2 TIMES DAILY
Status: SHIPPED | COMMUNITY
End: 2024-01-10

## 2021-07-05 RX ORDER — MECLIZINE HYDROCHLORIDE 25 MG/1
25 TABLET ORAL 3 TIMES DAILY PRN
Status: SHIPPED | COMMUNITY
End: 2024-01-10

## 2021-07-05 RX ORDER — DOXAZOSIN 2 MG/1
8 TABLET ORAL DAILY
Status: DISCONTINUED | OUTPATIENT
Start: 2021-07-06 | End: 2021-07-07 | Stop reason: HOSPADM

## 2021-07-05 RX ORDER — LABETALOL HYDROCHLORIDE 5 MG/ML
10 INJECTION, SOLUTION INTRAVENOUS EVERY 4 HOURS PRN
Status: DISCONTINUED | OUTPATIENT
Start: 2021-07-05 | End: 2021-07-07 | Stop reason: HOSPADM

## 2021-07-05 RX ADMIN — HYDRALAZINE HYDROCHLORIDE 10 MG: 10 TABLET, FILM COATED ORAL at 23:26

## 2021-07-05 RX ADMIN — HEPARIN SODIUM 5000 UNITS: 5000 INJECTION, SOLUTION INTRAVENOUS; SUBCUTANEOUS at 21:45

## 2021-07-05 RX ADMIN — ATORVASTATIN CALCIUM 40 MG: 40 TABLET, FILM COATED ORAL at 23:26

## 2021-07-05 ASSESSMENT — ENCOUNTER SYMPTOMS
PALPITATIONS: 0
VOMITING: 0
HEADACHES: 0
HEMOPTYSIS: 0
BLURRED VISION: 0
DEPRESSION: 0
MYALGIAS: 0
STRIDOR: 0
BRUISES/BLEEDS EASILY: 0
DIZZINESS: 0
DOUBLE VISION: 0
NAUSEA: 0
NECK PAIN: 0
FEVER: 0
WEIGHT LOSS: 0
COUGH: 0
INSOMNIA: 0
SORE THROAT: 0

## 2021-07-05 ASSESSMENT — LIFESTYLE VARIABLES
HAVE YOU EVER FELT YOU SHOULD CUT DOWN ON YOUR DRINKING: NO
TOTAL SCORE: 0
DOES PATIENT WANT TO STOP DRINKING: NO
AVERAGE NUMBER OF DAYS PER WEEK YOU HAVE A DRINK CONTAINING ALCOHOL: 0
HOW MANY TIMES IN THE PAST YEAR HAVE YOU HAD 5 OR MORE DRINKS IN A DAY: 0
ALCOHOL_USE: NO
EVER HAD A DRINK FIRST THING IN THE MORNING TO STEADY YOUR NERVES TO GET RID OF A HANGOVER: NO
EVER FELT BAD OR GUILTY ABOUT YOUR DRINKING: NO
TOTAL SCORE: 0
ON A TYPICAL DAY WHEN YOU DRINK ALCOHOL HOW MANY DRINKS DO YOU HAVE: 0
CONSUMPTION TOTAL: NEGATIVE
HAVE PEOPLE ANNOYED YOU BY CRITICIZING YOUR DRINKING: NO
TOTAL SCORE: 0

## 2021-07-05 ASSESSMENT — FIBROSIS 4 INDEX
FIB4 SCORE: 2.36
FIB4 SCORE: 2.1

## 2021-07-05 NOTE — ED TRIAGE NOTES
.  Chief Complaint   Patient presents with   • Vascular Access Problem      Pt BIB EMS to triage. Per report Pt was at [facility] today for dialysis but was unable to complete dialysis due to catheter problem. Pt notes he said the center can not access the port he has. Pt had port placed 3-4 weeks ago here at [facility], notes he had dialysis last week at [facility].    Pt educated on triage process and returned to [facility].

## 2021-07-06 ENCOUNTER — PATIENT OUTREACH (OUTPATIENT)
Dept: HEALTH INFORMATION MANAGEMENT | Facility: OTHER | Age: 71
End: 2021-07-06

## 2021-07-06 ENCOUNTER — APPOINTMENT (OUTPATIENT)
Dept: RADIOLOGY | Facility: MEDICAL CENTER | Age: 71
End: 2021-07-06
Attending: INTERNAL MEDICINE
Payer: MEDICARE

## 2021-07-06 LAB
ANION GAP SERPL CALC-SCNC: 11 MMOL/L (ref 7–16)
BUN SERPL-MCNC: 56 MG/DL (ref 8–22)
CALCIUM SERPL-MCNC: 7.9 MG/DL (ref 8.5–10.5)
CHLORIDE SERPL-SCNC: 99 MMOL/L (ref 96–112)
CO2 SERPL-SCNC: 24 MMOL/L (ref 20–33)
CREAT SERPL-MCNC: 4.89 MG/DL (ref 0.5–1.4)
GLUCOSE BLD-MCNC: 174 MG/DL (ref 65–99)
GLUCOSE BLD-MCNC: 93 MG/DL (ref 65–99)
GLUCOSE SERPL-MCNC: 102 MG/DL (ref 65–99)
INR PPP: 1.13 (ref 0.87–1.13)
POTASSIUM SERPL-SCNC: 3.9 MMOL/L (ref 3.6–5.5)
PROTHROMBIN TIME: 14.2 SEC (ref 12–14.6)
SODIUM SERPL-SCNC: 134 MMOL/L (ref 135–145)

## 2021-07-06 PROCEDURE — 96375 TX/PRO/DX INJ NEW DRUG ADDON: CPT | Mod: XU

## 2021-07-06 PROCEDURE — 82962 GLUCOSE BLOOD TEST: CPT

## 2021-07-06 PROCEDURE — 85610 PROTHROMBIN TIME: CPT

## 2021-07-06 PROCEDURE — 700101 HCHG RX REV CODE 250: Performed by: INTERNAL MEDICINE

## 2021-07-06 PROCEDURE — A9270 NON-COVERED ITEM OR SERVICE: HCPCS | Performed by: INTERNAL MEDICINE

## 2021-07-06 PROCEDURE — 700111 HCHG RX REV CODE 636 W/ 250 OVERRIDE (IP)

## 2021-07-06 PROCEDURE — 99152 MOD SED SAME PHYS/QHP 5/>YRS: CPT

## 2021-07-06 PROCEDURE — 700111 HCHG RX REV CODE 636 W/ 250 OVERRIDE (IP): Performed by: INTERNAL MEDICINE

## 2021-07-06 PROCEDURE — 80048 BASIC METABOLIC PNL TOTAL CA: CPT

## 2021-07-06 PROCEDURE — G0378 HOSPITAL OBSERVATION PER HR: HCPCS

## 2021-07-06 PROCEDURE — 700102 HCHG RX REV CODE 250 W/ 637 OVERRIDE(OP): Performed by: INTERNAL MEDICINE

## 2021-07-06 PROCEDURE — 700101 HCHG RX REV CODE 250

## 2021-07-06 PROCEDURE — 700111 HCHG RX REV CODE 636 W/ 250 OVERRIDE (IP): Performed by: RADIOLOGY

## 2021-07-06 PROCEDURE — 96374 THER/PROPH/DIAG INJ IV PUSH: CPT

## 2021-07-06 PROCEDURE — 99226 PR SUBSEQUENT OBSERVATION CARE,LEVEL III: CPT | Performed by: NURSE PRACTITIONER

## 2021-07-06 PROCEDURE — 96372 THER/PROPH/DIAG INJ SC/IM: CPT | Mod: XU

## 2021-07-06 RX ORDER — ONDANSETRON 2 MG/ML
4 INJECTION INTRAMUSCULAR; INTRAVENOUS PRN
Status: ACTIVE | OUTPATIENT
Start: 2021-07-06 | End: 2021-07-06

## 2021-07-06 RX ORDER — MIDAZOLAM HYDROCHLORIDE 1 MG/ML
INJECTION INTRAMUSCULAR; INTRAVENOUS
Status: COMPLETED
Start: 2021-07-06 | End: 2021-07-06

## 2021-07-06 RX ORDER — HEPARIN SODIUM (PORCINE) LOCK FLUSH IV SOLN 100 UNIT/ML 100 UNIT/ML
SOLUTION INTRAVENOUS
Status: COMPLETED
Start: 2021-07-06 | End: 2021-07-06

## 2021-07-06 RX ORDER — LIDOCAINE HYDROCHLORIDE AND EPINEPHRINE 10; 10 MG/ML; UG/ML
INJECTION, SOLUTION INFILTRATION; PERINEURAL
Status: COMPLETED
Start: 2021-07-06 | End: 2021-07-06

## 2021-07-06 RX ORDER — MIDAZOLAM HYDROCHLORIDE 1 MG/ML
.5-2 INJECTION INTRAMUSCULAR; INTRAVENOUS PRN
Status: ACTIVE | OUTPATIENT
Start: 2021-07-06 | End: 2021-07-06

## 2021-07-06 RX ORDER — SODIUM CHLORIDE 9 MG/ML
500 INJECTION, SOLUTION INTRAVENOUS
Status: ACTIVE | OUTPATIENT
Start: 2021-07-06 | End: 2021-07-06

## 2021-07-06 RX ADMIN — HYDRALAZINE HYDROCHLORIDE 10 MG: 10 TABLET, FILM COATED ORAL at 04:17

## 2021-07-06 RX ADMIN — FENTANYL CITRATE 50 MCG: 50 INJECTION, SOLUTION INTRAMUSCULAR; INTRAVENOUS at 16:38

## 2021-07-06 RX ADMIN — LABETALOL HYDROCHLORIDE 10 MG: 5 INJECTION, SOLUTION INTRAVENOUS at 10:27

## 2021-07-06 RX ADMIN — HEPARIN 5 ML: 100 SYRINGE at 16:57

## 2021-07-06 RX ADMIN — MIDAZOLAM HYDROCHLORIDE 1 MG: 1 INJECTION, SOLUTION INTRAMUSCULAR; INTRAVENOUS at 16:51

## 2021-07-06 RX ADMIN — HEPARIN SODIUM 5000 UNITS: 5000 INJECTION, SOLUTION INTRAVENOUS; SUBCUTANEOUS at 20:30

## 2021-07-06 RX ADMIN — MIDAZOLAM HYDROCHLORIDE 2 MG: 1 INJECTION, SOLUTION INTRAMUSCULAR; INTRAVENOUS at 16:38

## 2021-07-06 RX ADMIN — LIDOCAINE HYDROCHLORIDE AND EPINEPHRINE: 10; 10 INJECTION, SOLUTION INFILTRATION; PERINEURAL at 16:40

## 2021-07-06 RX ADMIN — ENALAPRILAT 1.25 MG: 1.25 INJECTION INTRAVENOUS at 07:56

## 2021-07-06 RX ADMIN — ATORVASTATIN CALCIUM 40 MG: 40 TABLET, FILM COATED ORAL at 18:21

## 2021-07-06 RX ADMIN — FENTANYL CITRATE 50 MCG: 50 INJECTION, SOLUTION INTRAMUSCULAR; INTRAVENOUS at 16:44

## 2021-07-06 RX ADMIN — DOXAZOSIN 8 MG: 2 TABLET ORAL at 04:17

## 2021-07-06 RX ADMIN — INSULIN GLARGINE 20 UNITS: 100 INJECTION, SOLUTION SUBCUTANEOUS at 20:28

## 2021-07-06 RX ADMIN — HYDRALAZINE HYDROCHLORIDE 10 MG: 10 TABLET, FILM COATED ORAL at 18:21

## 2021-07-06 SDOH — ECONOMIC STABILITY: FOOD INSECURITY: WITHIN THE PAST 12 MONTHS, YOU WORRIED THAT YOUR FOOD WOULD RUN OUT BEFORE YOU GOT MONEY TO BUY MORE.: NEVER TRUE

## 2021-07-06 SDOH — ECONOMIC STABILITY: TRANSPORTATION INSECURITY
IN THE PAST 12 MONTHS, HAS THE LACK OF TRANSPORTATION KEPT YOU FROM MEDICAL APPOINTMENTS OR FROM GETTING MEDICATIONS?: NO

## 2021-07-06 SDOH — ECONOMIC STABILITY: FOOD INSECURITY: WITHIN THE PAST 12 MONTHS, THE FOOD YOU BOUGHT JUST DIDN'T LAST AND YOU DIDN'T HAVE MONEY TO GET MORE.: NEVER TRUE

## 2021-07-06 SDOH — ECONOMIC STABILITY: TRANSPORTATION INSECURITY
IN THE PAST 12 MONTHS, HAS LACK OF TRANSPORTATION KEPT YOU FROM MEETINGS, WORK, OR FROM GETTING THINGS NEEDED FOR DAILY LIVING?: NO

## 2021-07-06 ASSESSMENT — ENCOUNTER SYMPTOMS
FOCAL WEAKNESS: 0
COUGH: 0
WEAKNESS: 0
DIZZINESS: 0
PALPITATIONS: 0
BLURRED VISION: 0
FEVER: 0
DIARRHEA: 0
DOUBLE VISION: 0
NAUSEA: 0
SHORTNESS OF BREATH: 0
CONSTIPATION: 0
TREMORS: 0
MYALGIAS: 0
ABDOMINAL PAIN: 0
HEADACHES: 0
SORE THROAT: 0
CHILLS: 0
VOMITING: 0
TINGLING: 0

## 2021-07-06 ASSESSMENT — PAIN DESCRIPTION - PAIN TYPE
TYPE: ACUTE PAIN
TYPE: ACUTE PAIN

## 2021-07-06 ASSESSMENT — PATIENT HEALTH QUESTIONNAIRE - PHQ9
2. FEELING DOWN, DEPRESSED, IRRITABLE, OR HOPELESS: NOT AT ALL
1. LITTLE INTEREST OR PLEASURE IN DOING THINGS: NOT AT ALL
SUM OF ALL RESPONSES TO PHQ9 QUESTIONS 1 AND 2: 0

## 2021-07-06 ASSESSMENT — SOCIAL DETERMINANTS OF HEALTH (SDOH): HOW HARD IS IT FOR YOU TO PAY FOR THE VERY BASICS LIKE FOOD, HOUSING, MEDICAL CARE, AND HEATING?: NOT HARD AT ALL

## 2021-07-06 NOTE — ED PROVIDER NOTES
ED Provider Note    Scribed for Shruti Persaud M.D. by Josue Cortez-Reyes. 2021, 6:35 PM.    Primary care provider: Antione Coley M.D.  Means of arrival: EMS  History obtained from: Patient  History limited by: None    CHIEF COMPLAINT  Chief Complaint   Patient presents with   • Vascular Access Problem       HPI  Jean Barboza is a 70 y.o. male who presents to the Emergency Department for evaluation of a vascular access problem.  The patient's family member states that the patient had a dialysis performed at [facility] last Friday; however, he was unable to have his dialysis performed today due to a catheter problem. The patient declares that he is still producing urine and denies any shortness of breath.     REVIEW OF SYSTEMS  Pertinent positives include vascular access problem. Pertinent negatives include no shortness of breath.  All other systems reviewed and negative.    PAST MEDICAL HISTORY   has a past medical history of CATARACT, Diabetes (), Hyperlipidemia, Hypertension, Seizure (HCC) (), Seizure disorder (), Snoring, and Stroke ().    SURGICAL HISTORY   has a past surgical history that includes other (); other (); cataract phaco with iol (2011); cataract phaco with iol (2011); and cataract extraction with iol ().    SOCIAL HISTORY  Social History     Tobacco Use   • Smoking status: Former Smoker     Packs/day: 0.50     Years: 4.00     Pack years: 2.00     Types: Cigarettes     Quit date: 1985     Years since quittin.4   • Smokeless tobacco: Never Used   Substance Use Topics   • Alcohol use: No   • Drug use: No      Social History     Substance and Sexual Activity   Drug Use No       FAMILY HISTORY  Family History   Problem Relation Age of Onset   • Diabetes Mother    • Heart Disease Father        CURRENT MEDICATIONS  Home Medications     Reviewed by Darrell Hernández (Pharmacy Tech) on 21 at 203  Med List Status: Complete   Medication Last Dose  "Status   Ascorbic Acid (VITAMIN C) 1000 MG Tab 7/5/2021 Active   aspirin EC (ECOTRIN) 81 MG Tablet Delayed Response 7/5/2021 Active   atorvastatin (LIPITOR) 40 MG Tab 7/4/2021 Active   Cholecalciferol (VITAMIN D3) 125 MCG (5000 UT) Cap 7/5/2021 Active   doxazosin (CARDURA) 8 MG tablet 7/4/2021 Active   famotidine (PEPCID) 20 MG Tab 7/5/2021 Active   gabapentin (NEURONTIN) 100 MG Cap  Active   hydrALAZINE (APRESOLINE) 10 MG Tab 7/5/2021 Active   insulin detemir (LEVEMIR) 100 UNIT/ML Solution 7/4/2021 Active   insulin regular (NOVOLIN R) 100 Unit/mL Solution 7/5/2021 Active   lisinopril (PRINIVIL) 40 MG tablet 7/4/2021 Active   meclizine (ANTIVERT) 25 MG Tab 7/5/2021 Active   QUERCETIN PO 7/4/2021 Active   therapeutic multivitamin-minerals (THERAGRAN-M) Tab 7/5/2021 Active   Zinc 50 MG Tab 7/5/2021 Active                ALLERGIES  Allergies   Allergen Reactions   • Penicillin G Rash     Rxn - Exacerbated a rash on his legs  Early 2000's         PHYSICAL EXAM  VITAL SIGNS: /79   Pulse 78   Temp 36.4 °C (97.5 °F) (Temporal)   Resp 16   Ht 1.727 m (5' 8\")   Wt 70.1 kg (154 lb 8.7 oz)   SpO2 99%   BMI 23.50 kg/m²   Constitutional: Alert in no apparent distress.  HENT: No signs of trauma, Bilateral external ears normal, Nose normal.   Eyes: Pupils are equal and reactive, Conjunctiva normal, Non-icteric.   Neck: Normal range of motion, No tenderness, Supple, No stridor.   Cardiovascular: Regular rate and rhythm, systolic murmur  Thorax & Lungs: Normal breath sounds, No respiratory distress, No wheezing, No chest tenderness.   Abdomen: Bowel sounds normal, Soft, No tenderness, No masses, No peritoneal signs.  Skin: Warm, Dry, No erythema, No rash.   Musculoskeletal:  No major deformities noted.   Neurologic: Alert, moving all extremities without difficulty, no focal deficits.    LABS  Labs Reviewed   CBC WITH DIFFERENTIAL - Abnormal; Notable for the following components:       Result Value    RBC 2.81 (*)     " Hemoglobin 8.7 (*)     Hematocrit 26.4 (*)     MCHC 33.0 (*)     Lymphocytes 20.40 (*)     All other components within normal limits   COMP METABOLIC PANEL - Abnormal; Notable for the following components:    Glucose 108 (*)     Bun 47 (*)     Creatinine 5.01 (*)     Calcium 8.1 (*)     Albumin 2.9 (*)     Globulin 3.6 (*)     All other components within normal limits   ESTIMATED GFR - Abnormal; Notable for the following components:    GFR If  14 (*)     GFR If Non  11 (*)     All other components within normal limits     All labs reviewed by me.    COURSE & MEDICAL DECISION MAKING  Pertinent Labs & Imaging studies reviewed. (See chart for details)      6:35 PM - Patient seen and examined at bedside. Ordered CBC with differential, and CMP to evaluate his symptoms. I informed the patient of my plan to obtain the above lab work and informed him that he would likely have to be admitted to the hospital. Patient verbalizes understanding and agreement to this plan of care.     7:44 PM - Paged Hospitalist.    7:48 PM - I discussed the patient's case and the above findings with Dr. Velásquez (Hospitalist) who agrees to evaluate the patient for hospitalization. Patient care will be transferred at this time.       Decision Making:  This is a 70 y.o. year old male who presents with difficulties with his dialysis catheter.  Apparently the dialysis center will not do dialysis through this catheter because it is not a permanent catheter.  Patient does not appear terribly fluid overloaded.  His creatinine is still elevated at 5.  I do think he requires hospitalization for catheter switch with IR.    DISPOSITION:  Patient will be hospitalized by Dr. Velásquez in guarded condition.    FINAL IMPRESSION  1. Problem with vascular access           This dictation has been created using voice recognition software and/or scribes. The accuracy of the dictation is limited by the abilities of the software and the  expertise of the scribes. I expect there may be some errors of grammar and possibly content. I made every attempt to manually correct the errors within my dictation. However, errors related to voice recognition software and/or scribes may still exist and should be interpreted within the appropriate context.     I, Josue Cortez-Reyes (Scribe), am scribing for, and in the presence of, Shruti Persaud M.D..    Electronically signed by: Josue Cortez-Reyes (Bobbyibe), 7/5/2021    Shruti AMAYA M.D. personally performed the services described in this documentation, as scribed by Josue Cortez-Reyes in my presence, and it is both accurate and complete. C.    The note accurately reflects work and decisions made by me.  Shruti Persaud M.D.  7/5/2021  10:03 PM

## 2021-07-06 NOTE — ASSESSMENT & PLAN NOTE
Continue outpatient Levemir 20 units daily.  Regular insulin sliding scale before every meal as needed

## 2021-07-06 NOTE — PROGRESS NOTES
4 Eyes Skin Assessment Completed by PRATIBHA Cortés and PRATIBHA Murcia.    Head WDL  Ears WDL  Nose WDL  Mouth WDL  Neck WDL  Breast/Chest WDL vascath to right chest wall  Shoulder Blades WDL  Spine WDL  (R) Arm/Elbow/Hand WDL  (L) Arm/Elbow/Hand WDL  Abdomen rounded  Groin WDL  Scrotum/Coccyx/Buttocks WDL  (R) Leg Edema  (L) Leg Edema  (R) Heel/Foot/Toe WDL  (L) Heel/Foot/Toe WDL          Devices In Places      Interventions In Place N/A    Possible Skin Injury No    Pictures Uploaded Into Epic N/A  Wound Consult Placed N/A  RN Wound Prevention Protocol Ordered No

## 2021-07-06 NOTE — CARE PLAN
Problem: Knowledge Deficit - Standard  Goal: Patient and family/care givers will demonstrate understanding of plan of care, disease process/condition, diagnostic tests and medications  Outcome: Progressing     Problem: Pain - Standard  Goal: Alleviation of pain or a reduction in pain to the patient’s comfort goal  Outcome: Progressing   The patient is Stable - Low risk of patient condition declining or worsening    Shift Goals  Clinical Goals: vascath replacement  Patient Goals: rest, HD tomorrow    Progress made toward(s) clinical / shift goals:  updated poc    Patient is not progressing towards the following goals:

## 2021-07-06 NOTE — OR SURGEON
Immediate Post- Operative Note        PostOp Diagnosis: CRF.      Procedure(s): Permcath placement, RIJ. R SCV tempcath removal.      Estimated Blood Loss: Less than 5 ml        Complications: None            7/6/2021     1652 PM     Abe Tomas M.D.

## 2021-07-06 NOTE — PROGRESS NOTES
New admit from ER. Up to bathroom with one person assist. Generalized weakness. Voided upon arrival. On RA.

## 2021-07-06 NOTE — PROGRESS NOTES
Community Health Worker Intake  • Social determinates of health intake Complete.   • Identified barriers to medical equipment.  • Contact information provided to Jean Barboza   • Has PCP appointment scheduled   • Accepted Meds-To-Beds.   • Inpatient assessment completed.    KEITH Nguyen met with pt bedside to introduce CCM and GSC programs. Pt accepted GSC. Pt states he has a PCP appointment made. Pt states need for a new cane, CHW introduced [facility]. Pt identifies no other barriers to resources and expressed no needs at this time     Plan: CHW will refer pt to GSC

## 2021-07-06 NOTE — DISCHARGE PLANNING
Care Transition Team Assessment    Spoke withe patient at bedside and verified all information. Lives with brother in single story house. Uses cane around the house. PCP Dr. Briseno. Has Dialysis on MWF at [facility]. Has Medicare and Medicaid FFS. Family will be ride @ D/C. Anticipate no needs @ present time.        Information Source  Orientation Level: Oriented X4  Information Given By: Patient    Readmission Evaluation  Is this a readmission?: No    Interdisciplinary Discharge Planning  Primary Care Physician: Dr. Briseno  Lives with - Patient's Self Care Capacity: Other (Comments) (Brother)  Patient or legal guardian wants to designate a caregiver: No  Support Systems: Family Member(s)  Housing / Facility: 1 Story House  Do You Take your Prescribed Medications Regularly: Yes  Able to Return to Previous ADL's: Yes  Mobility Issues: No  Prior Services: Home-Independent  Patient Prefers to be Discharged to:: Home  Assistance Needed: No  Durable Medical Equipment: Other - Specify (Cane)    Discharge Preparedness  What are your discharge supports?: Sibling  Prior Functional Level: Ambulatory, Uses Cane    Functional Assesment  Prior Functional Level: Ambulatory, Uses Cane    Finances  Prescription Coverage: Yes    Discharge Risks or Barriers  Patient risk factors: Vulnerable adult    Anticipated Discharge Information  Discharge Address: [address]  Discharge Contact Phone Number: [phone]

## 2021-07-06 NOTE — H&P
Hospital Medicine History & Physical Note    Date of Service  7/5/2021    Primary Care Physician  Antione Coley M.D.    Consultants  Interventional radiology    Specialist Names: IR      Code Status  Full Code    Chief Complaint  Chief Complaint   Patient presents with   • Vascular Access Problem       History of Presenting Illness  Jean Barboza is a 70 y.o. male with history of diabetes, hypertension, nonoliguric ESRD on hemodialysis who presented 7/5/2021 with dysfunctional vascular access referred by [facility] for replacement.  Nonoliguric and thus no overload, no uncontrolled hypertension, nor metabolic derangement.  At bedside patient denies pain.    I discussed the plan of care with ERP Dr. Coles.    Review of Systems  Review of Systems   Constitutional: Negative for fever, malaise/fatigue and weight loss.   HENT: Negative for sore throat and tinnitus.    Eyes: Negative for blurred vision and double vision.   Respiratory: Negative for cough, hemoptysis and stridor.    Cardiovascular: Negative for chest pain and palpitations.   Gastrointestinal: Negative for nausea and vomiting.   Genitourinary: Negative for dysuria and urgency.   Musculoskeletal: Negative for myalgias and neck pain.   Skin: Negative for itching and rash.   Neurological: Negative for dizziness and headaches.   Endo/Heme/Allergies: Does not bruise/bleed easily.   Psychiatric/Behavioral: Negative for depression. The patient does not have insomnia.        Past Medical History   has a past medical history of CATARACT, Diabetes (2005), Hyperlipidemia, Hypertension, Seizure (HCC) (1987), Seizure disorder ([facility]), Snoring, and Stroke (HCC).    Surgical History   has a past surgical history that includes other (2003); other (2011); cataract phaco with iol (4/20/2011); cataract phaco with iol (5/4/2011); and cataract extraction with iol (2011).     Family History  family history includes Diabetes in his mother; Heart Disease in his father.    Family history reviewed with patient. There is no family history that is pertinent to the chief complaint.     Social History   reports that he quit smoking about 36 years ago. His smoking use included cigarettes. He has a 2.00 pack-year smoking history. He has never used smokeless tobacco. He reports that he does not drink alcohol and does not use drugs.    Allergies  Allergies   Allergen Reactions   • Penicillin G Rash     Rxn - Exacerbated a rash on his legs  Early 2000's         Medications  Prior to Admission Medications   Prescriptions Last Dose Informant Patient Reported? Taking?   Ascorbic Acid (VITAMIN C) 1000 MG Tab 7/5/2021 at 0800 MAR from Other Facility Yes Yes   Sig: Take 1,000 mg by mouth every day.   Cholecalciferol (VITAMIN D3) 125 MCG (5000 UT) Cap 7/5/2021 at 0800 MAR from Other Facility Yes Yes   Sig: Take 5,000 Units by mouth every day.   QUERCETIN PO 7/4/2021 at 2000 MAR from Other Facility Yes Yes   Sig: Take 1 tablet by mouth every evening.   Zinc 50 MG Tab 7/5/2021 at 0800 MAR from Other Facility Yes Yes   Sig: Take 50 mg by mouth every day.   aspirin EC (ECOTRIN) 81 MG Tablet Delayed Response 7/5/2021 at 0800 MAR from Other Facility Yes Yes   Sig: Take 81 mg by mouth every day.   atorvastatin (LIPITOR) 40 MG Tab 7/4/2021 at 2000 MAR from Other Facility Yes No   Sig: Take 40 mg by mouth every evening. Indications: Cerebrovascular Accident or Stroke   doxazosin (CARDURA) 8 MG tablet 7/4/2021 at 1600 MAR from Other Facility Yes Yes   Sig: Take 8 mg by mouth every day.   famotidine (PEPCID) 20 MG Tab 7/5/2021 at 0800 MAR from Other Facility Yes Yes   Sig: Take 20 mg by mouth 2 times a day.   hydrALAZINE (APRESOLINE) 10 MG Tab 7/5/2021 at 0800 MAR from Other Facility Yes Yes   Sig: Take 10 mg by mouth 2 times a day.   insulin detemir (LEVEMIR) 100 UNIT/ML Solution 7/4/2021 at 2000 MAR from Other Facility Yes Yes   Sig: Inject 20 Units under the skin every evening.   insulin regular (NOVOLIN R)  100 Unit/mL Solution 7/5/2021 at 1130 MAR from Other Facility Yes Yes   Sig: Inject 2-10 Units under the skin 3 times a day before meals. 151-200 = 2 units  201-250 = 4 units  251-300 = 6 units  301-350 = 8 units  351-400 = 10 units   lisinopril (PRINIVIL) 40 MG tablet 7/4/2021 at 0800 MAR from Other Facility Yes Yes   Sig: Take 40 mg by mouth every day.   meclizine (ANTIVERT) 25 MG Tab 7/5/2021 at 1200 MAR from Other Facility Yes Yes   Sig: Take 25 mg by mouth 3 times a day as needed for Nausea/Vomiting.   therapeutic multivitamin-minerals (THERAGRAN-M) Tab 7/5/2021 at 0800 MAR from Other Facility Yes Yes   Sig: Take 1 tablet by mouth every day.      Facility-Administered Medications: None       Physical Exam  Temp:  [36.3 °C (97.3 °F)-36.4 °C (97.5 °F)] 36.3 °C (97.3 °F)  Pulse:  [71-78] 71  Resp:  [15-18] 18  BP: (136-177)/(72-79) 177/78  SpO2:  [96 %-99 %] 98 %    Physical Exam  Vitals and nursing note reviewed.   Constitutional:       General: He is not in acute distress.     Appearance: Normal appearance. He is normal weight. He is not toxic-appearing.   HENT:      Head: Normocephalic and atraumatic.      Nose: Nose normal. No congestion or rhinorrhea.      Mouth/Throat:      Mouth: Mucous membranes are moist.      Pharynx: Oropharynx is clear.   Eyes:      Extraocular Movements: Extraocular movements intact.      Conjunctiva/sclera: Conjunctivae normal.      Pupils: Pupils are equal, round, and reactive to light.   Neck:      Vascular: No carotid bruit.   Cardiovascular:      Rate and Rhythm: Normal rate and regular rhythm.      Pulses: Normal pulses.      Heart sounds: Normal heart sounds. No murmur heard.   No gallop.    Pulmonary:      Effort: No respiratory distress.      Breath sounds: Normal breath sounds. No wheezing or rales.   Abdominal:      General: Abdomen is flat. Bowel sounds are normal. There is no distension.      Palpations: Abdomen is soft. There is no mass.      Tenderness: There is no  abdominal tenderness.      Hernia: No hernia is present.   Musculoskeletal:         General: No tenderness or signs of injury.      Cervical back: Normal range of motion and neck supple. No muscular tenderness.   Lymphadenopathy:      Cervical: No cervical adenopathy.   Skin:     Capillary Refill: Capillary refill takes less than 2 seconds.      Coloration: Skin is not jaundiced or pale.      Findings: No bruising.   Neurological:      General: No focal deficit present.      Mental Status: He is alert and oriented to person, place, and time. Mental status is at baseline.      Cranial Nerves: No cranial nerve deficit.      Motor: No weakness.      Coordination: Coordination normal.   Psychiatric:         Mood and Affect: Mood normal.         Thought Content: Thought content normal.         Judgment: Judgment normal.         Laboratory:  Recent Labs     07/05/21  1848   WBC 5.4   RBC 2.81*   HEMOGLOBIN 8.7*   HEMATOCRIT 26.4*   MCV 94.0   MCH 31.0   MCHC 33.0*   RDW 46.8   PLATELETCT 227   MPV 10.5     Recent Labs     07/05/21  1848   SODIUM 135   POTASSIUM 4.4   CHLORIDE 98   CO2 23   GLUCOSE 108*   BUN 47*   CREATININE 5.01*   CALCIUM 8.1*     Recent Labs     07/05/21  1848   ALTSGPT 28   ASTSGOT 36   ALKPHOSPHAT 68   TBILIRUBIN 0.2   GLUCOSE 108*         No results for input(s): NTPROBNP in the last 72 hours.      No results for input(s): TROPONINT in the last 72 hours.    Imaging:  IR-CONSULT AND TREAT    (Results Pending)       no X-Ray or EKG requiring interpretation    Assessment/Plan:  I anticipate this patient is appropriate for observation status at this time.    Problem with vascular access  Assessment & Plan  Follow-up interventional radiology consult for evaluation of dysfunctional temporary vascular access.    ESRD (end stage renal disease) on dialysis (HCC)  Assessment & Plan  Nonoliguric, no evidence for metabolic derangement justifying emergent dialysis.    Type 2 diabetes mellitus (HCC)- (present on  admission)  Assessment & Plan  Continue outpatient Levemir 20 units daily.  Regular insulin sliding scale before every meal as needed        VTE prophylaxis: heparin ppx

## 2021-07-06 NOTE — DISCHARGE SUMMARY
Discharge Summary    CHIEF COMPLAINT ON ADMISSION  Chief Complaint   Patient presents with   • Vascular Access Problem       Reason for Admission  Vascular Access Problem      Admission Date  7/5/2021    CODE STATUS  Full Code    HPI & HOSPITAL COURSE  This is a 70 y.o. male with a past medical history of diabetes, hypertension, and recently diagnosed renal failure on HD here with vascular access problems.  The patient was noted to have a temporary dialysis catheter on admission, which is only good for 7 days and was unable to be used on presentation to outpatient dialysis.  He was admitted for placement of tunneled dialysis catheter.  He did miss 1 day of dialysis, although the patient does still produce urine and has no evidence of significant volume overload.  He is safe for discharge and will resume dialysis as scheduled.  He will follow up with nephrology in the outpatient setting.  Has no further need for acute intervention.    Therefore, he is discharged in good and stable condition to home with close outpatient follow-up.    Discharge Date  7/7/2021    FOLLOW UP ITEMS POST DISCHARGE  -Continue dialysis as scheduled.     DISCHARGE DIAGNOSES  Active Problems:    Type 2 diabetes mellitus (HCC) POA: Yes    ESRD (end stage renal disease) on dialysis (HCC) POA: Unknown    Problem with vascular access POA: Unknown  Resolved Problems:    * No resolved hospital problems. *      FOLLOW UP  Future Appointments   Date Time Provider Department Center   7/13/2021  2:40 PM Alphonso Valerio M.D. [facility] None         MEDICATIONS ON DISCHARGE     Medication List      CONTINUE taking these medications      Instructions   aspirin EC 81 MG Tbec  Commonly known as: ECOTRIN   Take 81 mg by mouth every day.  Dose: 81 mg     doxazosin 8 MG tablet  Commonly known as: CARDURA   Take 8 mg by mouth every day.  Dose: 8 mg     famotidine 20 MG Tabs  Commonly known as: PEPCID   Take 20 mg by mouth 2 times a day.  Dose: 20 mg      hydrALAZINE 10 MG Tabs  Commonly known as: APRESOLINE   Take 10 mg by mouth 2 times a day.  Dose: 10 mg     Levemir 100 UNIT/ML Soln  Generic drug: insulin detemir   Inject 20 Units under the skin every evening.  Dose: 20 Units     Lipitor 40 MG Tabs  Generic drug: atorvastatin   Take 40 mg by mouth every evening. Indications: Cerebrovascular Accident or Stroke  Dose: 40 mg     lisinopril 40 MG tablet  Commonly known as: PRINIVIL   Take 40 mg by mouth every day.  Dose: 40 mg     meclizine 25 MG Tabs  Commonly known as: ANTIVERT   Take 25 mg by mouth 3 times a day as needed for Nausea/Vomiting.  Dose: 25 mg     NovoLIN R 100 Unit/mL Soln  Generic drug: insulin regular   Inject 2-10 Units under the skin 3 times a day before meals. 151-200 = 2 units  201-250 = 4 units  251-300 = 6 units  301-350 = 8 units  351-400 = 10 units  Dose: 2-10 Units     QUERCETIN PO   Take 1 tablet by mouth every evening.  Dose: 1 tablet     therapeutic multivitamin-minerals Tabs   Take 1 tablet by mouth every day.  Dose: 1 tablet     Vitamin C 1000 MG Tabs   Take 1,000 mg by mouth every day.  Dose: 1,000 mg     vitamin D3 125 MCG (5000 UT) Caps   Take 5,000 Units by mouth every day.  Dose: 5,000 Units     Zinc 50 MG Tabs   Take 50 mg by mouth every day.  Dose: 50 mg            Allergies  Allergies   Allergen Reactions   • Penicillin G Rash     Rxn - Exacerbated a rash on his legs  Early 2000's         DIET  Orders Placed This Encounter   Procedures   • Diet Order Diet: Renal     Standing Status:   Standing     Number of Occurrences:   1     Order Specific Question:   Diet:     Answer:   Renal [8]       ACTIVITY  As tolerated.  Weight bearing as tolerated    CONSULTATIONS  IR     PROCEDURES  Placement of tunneled dialysis catheter - 7/6/2021    LABORATORY  Lab Results   Component Value Date    SODIUM 134 (L) 07/06/2021    POTASSIUM 3.9 07/06/2021    CHLORIDE 99 07/06/2021    CO2 24 07/06/2021    GLUCOSE 102 (H) 07/06/2021    BUN 56 (H)  07/06/2021    CREATININE 4.89 (H) 07/06/2021        Lab Results   Component Value Date    WBC 5.4 07/05/2021    HEMOGLOBIN 8.7 (L) 07/05/2021    HEMATOCRIT 26.4 (L) 07/05/2021    PLATELETCT 227 07/05/2021

## 2021-07-06 NOTE — PROGRESS NOTES
Patient underwent a right chest subclavian catheter removal with placement of tunneled dialysis catheter by Dr. Tomas, assisted by Ailin OTOOLE and Den RT. Patient signed consent prior to sedation medication with Omayra Hernandez RN.     Patient was placed in a supine position. Time Out taken per safety protocol. MD Tomas removed right subclavian catheter using imaging guidance. Vitals were taken every 5 minutes and remained stable during procedure (see doc flow sheet for results). CO2 waveform capnography was monitored and remained 28-34 throughout procedure. Patient appeared to tolerate procedure well, all sedation medication given at discretion of MD Tomas. Surgical pocket sutured by Ailin OTOOLE, then a dermabond/steri strips/gauze/tegaderm dressing was placed over right chest surgical site. Report called to Alcon MCKINNON. Pt transported by bed with this ACLS RN to [place] [address]. During bedside handoff with Alcon MCKINNON, patient awakens to voice, VSS, right surgical site C/D/i     [employer] HemoSplit Long Term Hemodialysis Catheter 14.5Fr x 23cm placed in right IJ   Ref# [id number] Lot# [id number] Exp Date 01/31/2023

## 2021-07-06 NOTE — PROGRESS NOTES
Assessment done, Pt educated on plan of care. Waiting on dr rounds  Patient resting in bed, no signs of distress,  no complaints of pain at this time. Call light within reach,  side rails up, will monitor. Condition stable prn meds given for bp

## 2021-07-06 NOTE — DISCHARGE PLANNING
Outpatient Dialysis Note    Confirmed patient is active at:    [facility]  [address] [address]    Schedule: Monday, Wednesday, Friday   Time: 1:45pm    Patient is seen by Dr. Cole in HD clinic.    Spoke with Wyatt at facility who confirmed.    Forwarded records for review.    Zulema Hubbard- Dialysis Coordinator # [phone]  [facility]

## 2021-07-07 ENCOUNTER — PHARMACY VISIT (OUTPATIENT)
Dept: PHARMACY | Facility: MEDICAL CENTER | Age: 71
End: 2021-07-07
Payer: MEDICARE

## 2021-07-07 VITALS
RESPIRATION RATE: 16 BRPM | WEIGHT: 154.32 LBS | TEMPERATURE: 97.6 F | HEIGHT: 68 IN | BODY MASS INDEX: 23.39 KG/M2 | OXYGEN SATURATION: 96 % | DIASTOLIC BLOOD PRESSURE: 63 MMHG | HEART RATE: 71 BPM | SYSTOLIC BLOOD PRESSURE: 133 MMHG

## 2021-07-07 LAB
GLUCOSE BLD-MCNC: 39 MG/DL (ref 65–99)
GLUCOSE BLD-MCNC: 59 MG/DL (ref 65–99)
GLUCOSE BLD-MCNC: 91 MG/DL (ref 65–99)

## 2021-07-07 PROCEDURE — 96375 TX/PRO/DX INJ NEW DRUG ADDON: CPT

## 2021-07-07 PROCEDURE — 700111 HCHG RX REV CODE 636 W/ 250 OVERRIDE (IP): Performed by: INTERNAL MEDICINE

## 2021-07-07 PROCEDURE — RXMED WILLOW AMBULATORY MEDICATION CHARGE: Performed by: NURSE PRACTITIONER

## 2021-07-07 PROCEDURE — 96376 TX/PRO/DX INJ SAME DRUG ADON: CPT

## 2021-07-07 PROCEDURE — 99217 PR OBSERVATION CARE DISCHARGE: CPT | Performed by: INTERNAL MEDICINE

## 2021-07-07 PROCEDURE — 82962 GLUCOSE BLOOD TEST: CPT

## 2021-07-07 PROCEDURE — 96372 THER/PROPH/DIAG INJ SC/IM: CPT

## 2021-07-07 PROCEDURE — G0378 HOSPITAL OBSERVATION PER HR: HCPCS

## 2021-07-07 PROCEDURE — 700102 HCHG RX REV CODE 250 W/ 637 OVERRIDE(OP): Performed by: INTERNAL MEDICINE

## 2021-07-07 PROCEDURE — A9270 NON-COVERED ITEM OR SERVICE: HCPCS | Performed by: INTERNAL MEDICINE

## 2021-07-07 RX ORDER — HYDRALAZINE HYDROCHLORIDE 20 MG/ML
20 INJECTION INTRAMUSCULAR; INTRAVENOUS EVERY 6 HOURS PRN
Status: DISCONTINUED | OUTPATIENT
Start: 2021-07-07 | End: 2021-07-07 | Stop reason: HOSPADM

## 2021-07-07 RX ADMIN — ACETAMINOPHEN 650 MG: 325 TABLET, FILM COATED ORAL at 10:02

## 2021-07-07 RX ADMIN — DOXAZOSIN 8 MG: 2 TABLET ORAL at 05:12

## 2021-07-07 RX ADMIN — HYDRALAZINE HYDROCHLORIDE 10 MG: 10 TABLET, FILM COATED ORAL at 05:12

## 2021-07-07 RX ADMIN — HEPARIN SODIUM 5000 UNITS: 5000 INJECTION, SOLUTION INTRAVENOUS; SUBCUTANEOUS at 05:13

## 2021-07-07 RX ADMIN — ENALAPRILAT 1.25 MG: 1.25 INJECTION INTRAVENOUS at 10:02

## 2021-07-07 RX ADMIN — HYDRALAZINE HYDROCHLORIDE 20 MG: 20 INJECTION INTRAMUSCULAR; INTRAVENOUS at 11:51

## 2021-07-07 ASSESSMENT — ENCOUNTER SYMPTOMS
COUGH: 0
SHORTNESS OF BREATH: 0
CHILLS: 0
BLURRED VISION: 0
PALPITATIONS: 0
ABDOMINAL PAIN: 0
MYALGIAS: 0
EYE PAIN: 0
HEMOPTYSIS: 0
DIZZINESS: 0
HEADACHES: 0
WEAKNESS: 0
VOMITING: 0
DEPRESSION: 0
BRUISES/BLEEDS EASILY: 0
FEVER: 0

## 2021-07-07 ASSESSMENT — PAIN DESCRIPTION - PAIN TYPE: TYPE: ACUTE PAIN

## 2021-07-07 NOTE — PROGRESS NOTES
Called np re discharge unable to discharge home patient Son states he was at [facility] for rehab would like to return there. Er  notified to start the process to return.

## 2021-07-07 NOTE — PROGRESS NOTES
Pt request blood sugar to be checked, states he feels like he dropped. BG noted to be 38. 1 cup of OJ given with BG rising to 59. Given manjinder crackers per request. BG up to 90.

## 2021-07-07 NOTE — DISCHARGE PLANNING
Meds-to-Beds: Discharge prescription orders listed below delivered to patient's bedside. PRATIBHA Schmidt notified. Patient counseled.  Patient elected to have co-payment billed to patient account.      Jean Barboza   Home Medication Instructions KATHRYN:[id number]    Printed on:07/07/21 1148   Medication Information                      SITagliptin (JANUVIA) 25 MG Tab  Take 1 tablet by mouth every day.                 Radhika Barragan, PharmD

## 2021-07-07 NOTE — DISCHARGE INSTRUCTIONS
Discharge Instructions    Discharged to other by [facility] with self. Discharged via wheelchair, hospital escort: Yes.  Special equipment needed: Not Applicable    Be sure to schedule a follow-up appointment with your primary care doctor or any specialists as instructed.     Discharge Plan:   Diet Plan: Discussed  Activity Level: Discussed  Confirmed Follow up Appointment: Patient to Call and Schedule Appointment  Confirmed Symptoms Management: Discussed  Medication Reconciliation Updated: Yes    I understand that a diet low in cholesterol, fat, and sodium is recommended for good health. Unless I have been given specific instructions below for another diet, I accept this instruction as my diet prescription.   Other diet: Renal Diet    Special Instructions: None    · Is patient discharged on Warfarin / Coumadin?   No     Depression / Suicide Risk    As you are discharged from this [facility] facility, it is important to learn how to keep safe from harming yourself.    Recognize the warning signs:  · Abrupt changes in personality, positive or negative- including increase in energy   · Giving away possessions  · Change in eating patterns- significant weight changes-  positive or negative  · Change in sleeping patterns- unable to sleep or sleeping all the time   · Unwillingness or inability to communicate  · Depression  · Unusual sadness, discouragement and loneliness  · Talk of wanting to die  · Neglect of personal appearance   · Rebelliousness- reckless behavior  · Withdrawal from people/activities they love  · Confusion- inability to concentrate     If you or a loved one observes any of these behaviors or has concerns about self-harm, here's what you can do:  · Talk about it- your feelings and reasons for harming yourself  · Remove any means that you might use to hurt yourself (examples: pills, rope, extension cords, firearm)  · Get professional help from the community (Mental Health, Substance Abuse,  psychological counseling)  · Do not be alone:Call your Safe Contact- someone whom you trust who will be there for you.  · Call your local CRISIS HOTLINE 452-0245 or 261-600-3014  · Call your local Children's Mobile Crisis Response Team [place] (396) 137-1122 or www.[employer]  · Call the toll free National Suicide Prevention Hotlines   · National Suicide Prevention Lifeline 521-817-PHQQ (7133)  · National Hope Line Network 800-SUICIDE (922-4883)

## 2021-07-07 NOTE — PROGRESS NOTES
Radiology Progress Note   Author: BRANDO Castro Date & Time created: 7/7/2021  11:58 AM   Date of admission  7/5/2021  Note to reader: this note follows the APSO format rather than the historical SOAP format. Assessment and plan located at the top of the note for ease of use.    Chief Complaint  70 y.o. male admitted 7/5/2021 with dialysis catheter replacement    HPI  This is a 70 y.o. male with a past medical history of diabetes, hypertension, and recently diagnosed renal failure on HD here with vascular access problems.  The patient was noted to have a temporary dialysis catheter on admission, which is only good for 7 days and was unable to be used on presentation to outpatient dialysis.  He was admitted for placement of tunneled dialysis catheter.    Assessment/Plan  Interval History   Active Problems:    Type 2 diabetes mellitus (HCC)    ESRD (end stage renal disease) on dialysis (HCC)    Problem with vascular access    Plan IR  - Permcath site with no redness or swelling  - Dressing on permcath site  - Previous HD catheter site CDI  - HD catheter education provided to patient  - Thank you for allowing Interventional Radiology team to participate in the patients care, if any additonal care or requests are needed in the future please do not hesitate call or place IR order, IR signing off       [id number]  IR:   7/6- Permcath placement, RAQUEL. SHERINE SCV tempcath removal.         Review of Systems  Physical Exam   Review of Systems   Constitutional: Negative for chills and fever.   HENT: Negative for hearing loss.    Eyes: Negative for blurred vision and pain.   Respiratory: Negative for cough, hemoptysis and shortness of breath.    Cardiovascular: Negative for chest pain and palpitations.   Gastrointestinal: Negative for abdominal pain and vomiting.   Genitourinary: Negative for dysuria.   Musculoskeletal: Negative for myalgias.   Skin: Negative for rash.   Neurological: Negative for dizziness, weakness and  headaches.   Endo/Heme/Allergies: Does not bruise/bleed easily.   Psychiatric/Behavioral: Negative for depression.      Vitals:    07/07/21 1100   BP: (!) 178/72   Pulse:    Resp:    Temp:    SpO2:       Physical Exam  Constitutional:       Appearance: Normal appearance.   HENT:      Head: Normocephalic.      Nose: Nose normal.      Mouth/Throat:      Mouth: Mucous membranes are moist.   Eyes:      Pupils: Pupils are equal, round, and reactive to light.   Cardiovascular:      Rate and Rhythm: Normal rate.   Pulmonary:      Effort: Pulmonary effort is normal. No respiratory distress.   Abdominal:      General: Abdomen is flat.      Tenderness: There is no abdominal tenderness.   Musculoskeletal:         General: No tenderness or deformity.      Cervical back: Normal range of motion.      Comments: Right chest tunneled HD catheter   HD cath site CDI, no redness or swelling, tender to palpation    Skin:     General: Skin is warm and dry.      Capillary Refill: Capillary refill takes less than 2 seconds.      Coloration: Skin is not jaundiced or pale.   Neurological:      General: No focal deficit present.      Mental Status: He is alert.      Motor: No weakness.   Psychiatric:         Mood and Affect: Mood normal.         Behavior: Behavior normal.             Labs    Recent Labs     07/05/21  1848   WBC 5.4   RBC 2.81*   HEMOGLOBIN 8.7*   HEMATOCRIT 26.4*   MCV 94.0   MCH 31.0   MCHC 33.0*   RDW 46.8   PLATELETCT 227   MPV 10.5     Recent Labs     07/05/21  1848 07/06/21  0240   SODIUM 135 134*   POTASSIUM 4.4 3.9   CHLORIDE 98 99   CO2 23 24   GLUCOSE 108* 102*   BUN 47* 56*   CREATININE 5.01* 4.89*   CALCIUM 8.1* 7.9*     IR-GABRIEL ROB PLACEMENT >5   Final Result      1. Ultrasound and fluoroscopic guided placement of a right internal jugular 14.5 [place] HemoSplit tunneled dialysis catheter.      2. The hemodialysis catheter may be used immediately as clinically indicated. Flushes per protocol.      3. Removal  of the existing right-sided subclavian non tunneled temporary dialysis catheter under direct fluoroscopic visualization.        Recent Labs     07/05/21  1848 07/06/21  0240   SODIUM 135 134*   POTASSIUM 4.4 3.9   CHLORIDE 98 99   CO2 23 24   GLUCOSE 108* 102*   BUN 47* 56*     INR   Date Value Ref Range Status   07/06/2021 1.13 0.87 - 1.13 Final     Comment:     INR - Non-therapeutic Reference Range: 0.87-1.13  INR - Therapeutic Reference Range: 2.0-4.0       No results found for: POCINR     Intake/Output Summary (Last 24 hours) at 7/7/2021 1158  Last data filed at 7/6/2021 1457  Gross per 24 hour   Intake --   Output 275 ml   Net -275 ml      Labs not explicitly included in this progress note were reviewed by the author. Radiology/imaging not explicitly included in this progress note was reviewed by the author.     I have performed a physical exam and reviewed and updated ROS and Plan today (7/7/2021).    15 minutes in directly providing and coordinating care and extensive data review.  No time overlap and excludes procedures.

## 2021-07-07 NOTE — CARE PLAN
The patient is Stable - Low risk of patient condition declining or worsening    Shift Goals  Clinical Goals: rest  Patient Goals: HD tomorrow    Progress made toward(s) clinical / shift goals:    Problem: Knowledge Deficit - Standard  Goal: Patient and family/care givers will demonstrate understanding of plan of care, disease process/condition, diagnostic tests and medications  Outcome: Progressing     Problem: Pain - Standard  Goal: Alleviation of pain or a reduction in pain to the patient’s comfort goal  Outcome: Progressing       Patient is not progressing towards the following goals:

## 2021-07-07 NOTE — DISCHARGE PLANNING
Received message from Sherri CLAY that patient accepted back to [facility]. Patient has outpatient dialysis at [facility] at 145. [facility] will pick patient up from Dialysis. [facility] transport set to transfer patient at 1-1:15 to dialysis. Chart copied and cobra filled out. COBRA signed by patient. Patient wishes to contact family. Chart packet given to Roxana MCKINNON and updated on D/C plan.

## 2021-07-07 NOTE — CARE PLAN
Problem: Knowledge Deficit - Standard  Goal: Patient and family/care givers will demonstrate understanding of plan of care, disease process/condition, diagnostic tests and medications  Outcome: Progressing     Problem: Pain - Standard  Goal: Alleviation of pain or a reduction in pain to the patient’s comfort goal  Outcome: Progressing   The patient is Stable - Low risk of patient condition declining or worsening    Shift Goals  Clinical Goals: B/P control  Patient Goals: rest    Progress made toward(s) clinical / shift goals:  Pt has shown understaffing to his POC.    Patient is not progressing towards the following goals: N/A

## 2021-07-07 NOTE — DISCHARGE PLANNING
Received call from Alcon, bedside RN that patient needs to be discharged back to [facility]. Called [facility], spoke with Jackie who states that patient would need to have a new referral placed in the system, and that she would need to get a new auth for patient to return and she would not be able to start that auth until tomorrow morning. Alcon will talk with YESSI to get new referral placed. Choice form faxed to [facility].

## 2021-07-07 NOTE — PROGRESS NOTES
Report received by PRATIBHA Melo.  Patient sitting up in bed watching TV.  POC gone over with the patient and all questions answered.  Patient A & O  X 4.  No apparent signs of distress.  Safety precautions in place.  Patient educated to call for assistance.  Will continue to monitor.

## 2021-07-07 NOTE — PROGRESS NOTES
[facility] Medicine Daily Progress Note    Date of Service  7/6/2021    Chief Complaint  Jean Barboza is a 70 y.o. male admitted 7/5/2021 for dialysis catheter replacement.    Hospital Course  This is a 70 y.o. male with a past medical history of diabetes, hypertension, and recently diagnosed renal failure on HD here with vascular access problems.  The patient was noted to have a temporary dialysis catheter on admission, which is only good for 7 days and was unable to be used on presentation to outpatient dialysis.  He was admitted for placement of tunneled dialysis catheter.  He did miss 1 day of dialysis, although the patient does still produce urine and has no evidence of significant volume overload.    Interval Problem Update  7/6:  S/P permacath placement today.  No evidence of volume overload.  Denies acute pain or discomfort.  Pending transfer back to SNF.     I have personally seen and examined the patient at bedside. I discussed the plan of care with patient and family.    Consultants/Specialty  IR    Code Status  Full Code    Disposition  Patient is medically cleared pending return to SNF.   Anticipate discharge to to skilled nursing facility.  I have placed the appropriate orders for post-discharge needs.    Review of Systems  Review of Systems   Constitutional: Negative for chills, fever and malaise/fatigue.   HENT: Negative for congestion and sore throat.    Eyes: Negative for blurred vision and double vision.   Respiratory: Negative for cough and shortness of breath.    Cardiovascular: Negative for chest pain, palpitations and leg swelling.   Gastrointestinal: Negative for abdominal pain, constipation, diarrhea, nausea and vomiting.   Genitourinary: Negative for dysuria.   Musculoskeletal: Negative for joint pain and myalgias.   Skin: Negative for itching and rash.   Neurological: Negative for dizziness, tingling, tremors, focal weakness, weakness and headaches.        Physical Exam  Temp:  [36.1 °C  (97 °F)-36.8 °C (98.2 °F)] 36.5 °C (97.7 °F)  Pulse:  [65-90] 74  Resp:  [12-18] 18  BP: (120-206)/(59-93) 175/79  SpO2:  [92 %-100 %] 97 %    Physical Exam  Vitals and nursing note reviewed.   Constitutional:       General: He is not in acute distress.     Appearance: Normal appearance. He is not ill-appearing.   HENT:      Head: Normocephalic and atraumatic.      Nose: Nose normal. No congestion.      Mouth/Throat:      Mouth: Mucous membranes are moist.      Pharynx: Oropharynx is clear. No oropharyngeal exudate.   Eyes:      General:         Right eye: No discharge.         Left eye: No discharge.      Conjunctiva/sclera: Conjunctivae normal.   Neck:      Comments: Right IF permacath  Cardiovascular:      Rate and Rhythm: Normal rate and regular rhythm.      Pulses: Normal pulses.      Heart sounds: Normal heart sounds.   Pulmonary:      Effort: Pulmonary effort is normal. No respiratory distress.      Breath sounds: Normal breath sounds. No wheezing or rales.   Abdominal:      General: Bowel sounds are normal. There is no distension.      Palpations: Abdomen is soft.      Tenderness: There is no abdominal tenderness.   Musculoskeletal:         General: Normal range of motion.      Cervical back: Normal range of motion and neck supple. No rigidity. No muscular tenderness.      Right lower leg: No edema.      Left lower leg: No edema.   Skin:     General: Skin is warm and dry.      Coloration: Skin is not jaundiced or pale.   Neurological:      General: No focal deficit present.      Mental Status: He is alert and oriented to person, place, and time. Mental status is at baseline.      Cranial Nerves: No cranial nerve deficit.   Psychiatric:         Mood and Affect: Mood normal.         Behavior: Behavior normal.         Thought Content: Thought content normal.         Judgment: Judgment normal.         Fluids    Intake/Output Summary (Last 24 hours) at 7/6/2021 1905  Last data filed at 7/6/2021 1457  Gross per 24  hour   Intake --   Output 1300 ml   Net -1300 ml       Laboratory  Recent Labs     07/05/21  1848   WBC 5.4   RBC 2.81*   HEMOGLOBIN 8.7*   HEMATOCRIT 26.4*   MCV 94.0   MCH 31.0   MCHC 33.0*   RDW 46.8   PLATELETCT 227   MPV 10.5     Recent Labs     07/05/21  1848 07/06/21  0240   SODIUM 135 134*   POTASSIUM 4.4 3.9   CHLORIDE 98 99   CO2 23 24   GLUCOSE 108* 102*   BUN 47* 56*   CREATININE 5.01* 4.89*   CALCIUM 8.1* 7.9*     Recent Labs     07/06/21  1040   INR 1.13               Imaging  IR-ROB,GROSHONG PLACEMENT >5   Final Result      1. Ultrasound and fluoroscopic guided placement of a right internal jugular 14.5 French HemoSplit tunneled dialysis catheter.      2. The hemodialysis catheter may be used immediately as clinically indicated. Flushes per protocol.      3. Removal of the existing right-sided subclavian non tunneled temporary dialysis catheter under direct fluoroscopic visualization.           Assessment/Plan  Problem with vascular access  Assessment & Plan  S/P temporary HD cath removal and placement of permacath.     ESRD (end stage renal disease) on dialysis (HCC)  Assessment & Plan  Nonoliguric, no evidence for metabolic derangement justifying emergent dialysis.    Type 2 diabetes mellitus (HCC)- (present on admission)  Assessment & Plan  Continue outpatient Levemir 20 units daily.  Regular insulin sliding scale before every meal as needed         VTE prophylaxis: heparin.    I have performed a physical exam and reviewed and updated ROS and Plan today (7/6/2021). In review of yesterday's note (7/5/2021), there are no changes except as documented above.

## 2021-07-07 NOTE — DISCHARGE PLANNING
Outpatient Dialysis Note    I received PC from RN CM pt. Is to DC prior to HD chair time at 1:45pm today, I updated HD clinic to expect patient this afternoon.     Zulema Hubbard- Dialysis Coordinator Ph# [phone]  [facility]

## 2021-07-07 NOTE — DISCHARGE PLANNING
Received Choice form at 0814  Agency/Facility Name: [facility]  Referral sent per Choice form @ 0861     @0976  Agency/Facility Name: [facility]   Spoke To: Ana Paula  Outcome: Pt has been accepted back to facility, [facility] will transport pt for dialysis at 1345 and [facility] will pickup pt from dialysis appointment. Notified PRATIBHA Hammond.    Received Transport Form @ 0925  Spoke to Wyatt @ [facility]    Transport is scheduled for 7/7 between [id number] going to [facility].

## 2021-07-12 NOTE — DOCUMENTATION QUERY
[facility]                                                                       Query Response Note      PATIENT:               DARRELL KOWALSKI  ACCT #:                  [id number]  MRN:                     [id number]  :                      1950  ADMIT DATE:       2021 8:43 PM  DISCH DATE:        2021 4:31 PM  RESPONDING  PROVIDER #:        [id number]           QUERY TEXT:    Uncontrolled diabetes is documented in the medical record.  There is no code in ICD 10 for uncontrolled diabetes.  Further clarification is needed:      NOTE:  If an appropriate response is not listed below, please respond with a new note.                The patient's Clinical Indicators include:  Clinical indicators  H&P- Uncontrolled type 2 diabetes mellitus   Recent Labs  21  GLUCOSE 106*  PN - Hx of DM2, admission A1c of 5.3, previously on insulin      Treatment or Monitoring  A1c    Risk Factors  ESRD, Advanced age, Hx DM II    [email or web address]  Options provided:   -- Uncontrolled diabetes meaning with hypoglycemia   -- Uncontrolled diabetes meaning with hyperglycemia   -- Findings of no clinical significance   -- Unable to determine      Query created by: Tab Forde on 2021 6:17 AM    RESPONSE TEXT:    Findings of no clinical significance          Electronically signed by:  DARIEL BURNETT MD 2021 12:39 PM

## 2021-07-15 ENCOUNTER — HOME HEALTH ADMISSION (OUTPATIENT)
Dept: HOME HEALTH SERVICES | Facility: HOME HEALTHCARE | Age: 71
End: 2021-07-15
Payer: MEDICARE

## 2021-07-25 NOTE — CARE PLAN
[facility] Medicine Services  PROGRESS NOTE    Patient Name: Nancy Goodman  : 1939  MRN: [id number]    Date of Admission: 2021  Primary Care Physician: Eyal Cervantes MD    Subjective     CC: f/u NSTEMI, choledocholithiasis    HPI:  Patient resting in bed. Family at bedside. To go for lap CCY today.    ROS:  Gen- No fevers, chills  CV- No chest pain, palpitations  Resp- No cough, dyspnea  GI- No N/V/D, abd pain    Objective     Vital Signs:   Temp:  [97.6 °F (36.4 °C)-98.5 °F (36.9 °C)] 97.7 °F (36.5 °C)  Heart Rate:  [51-80] 74  Resp:  [16-20] 16  BP: (108-187)/(69-92) 124/81  Flow (L/min):  [2-4] 2     Physical Exam:   Constitutional: No acute distress, awake, alert  HENT: NCAT, mucous membranes moist  Respiratory: Clear to auscultation bilaterally, respiratory effort normal   Cardiovascular: RRR, no murmurs, rubs, or gallops  Gastrointestinal: Positive bowel sounds, soft, nontender, nondistended  Musculoskeletal: No bilateral ankle edema  Psychiatric: Appropriate affect, cooperative  Neurologic: Oriented x 3, strength symmetric in all extremities, Cranial Nerves grossly intact to confrontation, speech clear  Skin: No rashes        Results Reviewed:  LAB RESULTS:      Lab 21  0833 21  0821 21  0500   WBC 8.01 5.93 4.85   HEMOGLOBIN 13.5 12.8 11.5*   HEMATOCRIT 40.7 38.5 35.7   PLATELETS 190 142 88*   MCV 90.8 91.9 94.7   PROTIME  --   --  13.3         Lab 21  1339 21  0500 21  1352   SODIUM 136 135* 138   POTASSIUM 4.0 3.6 3.7   CHLORIDE 103 104 105   CO2 23.0 23.0 20.0*   ANION GAP 10.0 8.0 13.0   BUN 13 11 13   CREATININE 1.00 1.02* 1.01*   GLUCOSE 198* 171* 96   CALCIUM 9.1 8.9 8.3*   HEMOGLOBIN A1C  --  7.10*  --          Lab 21  1339 21  0500 21  1352   TOTAL PROTEIN 6.4 5.8* 4.8*   ALBUMIN 3.30* 3.10* 3.00*   GLOBULIN 3.1 2.7 1.8   ALT (SGPT) 142* 206* 249*   AST (SGOT) 54* 116* 173*   BILIRUBIN 1.3* 2.1*  Problem: Safety  Goal: Will remain free from injury  Outcome: PROGRESSING AS EXPECTED  Calls for assistance when needing to mobilizes. Utilizes safe transfer techniques.       3.1*   ALK PHOS 113 101 77   LIPASE  --   --  38         Lab 07/23/21  0500   PROTIME 13.3   INR 1.04         Lab 07/23/21  0500   CHOLESTEROL 190   LDL CHOL 96   HDL CHOL 17*   TRIGLYCERIDES 461*     Brief Urine Lab Results  (Last result in the past 365 days)      Color   Clarity   Blood   Leuk Est   Nitrite   Protein   CREAT   Urine HCG        11/16/20 0939             100           Microbiology Results Abnormal     Procedure Component Value - Date/Time    COVID PRE-OP / PRE-PROCEDURE SCREENING ORDER (NO ISOLATION) - Swab, Nasopharynx [[id number]]  (Normal) Collected: 07/22/21 1645    Lab Status: Final result Specimen: Swab from Nasopharynx Updated: 07/22/21 1813    Narrative:      The following orders were created for panel order COVID PRE-OP / PRE-PROCEDURE SCREENING ORDER (NO ISOLATION) - Swab, Nasopharynx.  Procedure                               Abnormality         Status                     ---------                               -----------         ------                     COVID-19,CEPHEID,SEAN IN-...[[id number]]  Normal              Final result                 Please view results for these tests on the individual orders.    COVID-19,CEPHEID,SEAN IN-HOUSE(OR EMERGENT/ADD-ON),NP SWAB IN TRANSPORT MEDIA 3-4 HR TAT - Swab, Nasopharynx [[id number]]  (Normal) Collected: 07/22/21 1645    Lab Status: Final result Specimen: Swab from Nasopharynx Updated: 07/22/21 1813     COVID19 Not Detected    Narrative:      Fact sheet for providers: https://www.fda.gov/media/434035/download     Fact sheet for patients: https://www.fda.gov/media/879220/download  Fact sheet for providers: https://www.fda.gov/media/602662/download     Fact sheet for patients: https://www.fda.gov/media/521904/download        FL ERCP pancreatic and biliary ducts    Result Date: 7/24/2021  EXAMINATION: FL ERCP PANCREATIC AND BILIARY DUCTS - 07/24/2021  INDICATION: K80.50-Calculus of bile duct without cholangitis or cholecystitis without obstruction;  K80.50-Calculus of bile duct without cholangitis or cholecystitis without obstruction. ERCP.  COMPARISON: None.  FINDINGS: Intraoperative fluoroscopy for improved localization and treatment planning with total fluoroscopic time usage 7.2 minutes and 6 images saved during ERCP.      Impression: Intraoperative fluoroscopy was during ERCP. Please see procedure report for full details.  DICTATED:   07/24/2021 EDITED/ls :   07/24/2021   This report was finalized on 7/24/2021 4:36 PM by Dr. Christopher Landis.      MRI abdomen wo contrast mrcp    Result Date: 7/23/2021  EXAMINATION: MRI ABDOMEN WO CONTRAST MRCP-  INDICATION: Cholelithiasis; K80.50-Calculus of bile duct without cholangitis or cholecystitis without obstruction; K80.50-Calculus of bile duct without cholangitis or cholecystitis without obstruction  COMPARISON: NONE  TECHNIQUE:Multiplanar multisequence MRI of the abdomen performed without IV contrast, including dedicated heavily T2-weighted thin cut MRCP sequences.  FINDINGS: The lung bases demonstrate trace effusions. The body wall soft tissues are grossly unremarkable. The spleen, pancreas and bilateral adrenal glands demonstrate homogeneous attenuation. The kidneys are unremarkable. In and out of phase imaging demonstrates no significant steatosis component. The liver parenchyma is homogeneous without suspicious focal T2 hyperintense lesion. Dedicated MRCP sequences demonstrate filling defects as proximal as the left intrahepatic ducts, with prominent filling defects noted in the dilated extrahepatic common duct. The common duct is dilated measuring up to 15 mm. Bile duct stones measure up to 13 mm. No definite cholelithiasis or acute findings of cholecystitis.      Impression: Choledocholithiasis with prominent obstructing common duct stones measuring up to 13 mm.  This report was finalized on 7/23/2021 2:39 PM by Kyree Bateman.      Results for orders placed during the hospital encounter of 06/03/20    Adult  Transthoracic Echo Complete W/ Cont if Necessary Per Protocol    Interpretation Summary  · Estimated EF = 70%.  · Normal function of TAVR    I have reviewed the medications:  Scheduled Meds:aspirin, 81 mg, Oral, QAM  famotidine, 20 mg, Intravenous, Once  famotidine, 20 mg, Oral, Once  insulin lispro, 0-7 Units, Subcutaneous, TID AC  losartan, 50 mg, Oral, BID  melatonin, 5 mg, Oral, Nightly  metoprolol tartrate, 50 mg, Oral, BID  pantoprazole, 40 mg, Oral, QAM  piperacillin-tazobactam, 3.375 g, Intravenous, Q8H  rosuvastatin, 5 mg, Oral, Nightly  sodium chloride, 10 mL, Intravenous, Q12H      Continuous Infusions:lactated ringers, 9 mL/hr      PRN Meds:.•  acetaminophen  •  ALPRAZolam  •  bupivacaine-EPINEPHrine PF  •  dextrose  •  dextrose  •  diphenhydrAMINE  •  furosemide  •  glucagon (human recombinant)  •  hydrALAZINE  •  lidocaine PF 1%  •  midazolam  •  sodium chloride    Assessment & Plan     Active Hospital Problems    Diagnosis  POA   • Colic, biliary [K80.50]  Yes   • Diastolic CHF (CMS/HCC) [I50.30]  Yes   • Aortic stenosis, severe [I35.0]  Yes   • CAD (coronary artery disease) [I25.10]  Yes   • Essential hypertension [I10]  Yes   • Mixed hyperlipidemia [E78.2]  Yes   • Type 2 diabetes mellitus without complication, with long-term current use of insulin (CMS/[facility]) [E11.9, Z79.4]  Not Applicable      Resolved Hospital Problems   No resolved problems to display.     Brief Hospital Course to date:  Nancy Goodman is a 81 y.o. female with PMH significant for HTN, HLD, CAD, diastolic CHF, severe aortic stenosis and insulin-dependent DMII. She was transferred to [facility] from [facility] for evaluation of choledocholithiasis and NSTEMI. She underwent LHC by Dr. Valdez - she was found to have occluded LAD with patent LIMA graft, widely patent SVG to diagonal and L circumflex, occluded R PDA and occluded vein graft to the R PDA. 95% proliferative in-stent segment re-stenosis in the R  posterolateral branch. Recommended optimize medical management. Hospital medicine consulted due to choledocholithiasis. GI following. MRCP revealed 13mm CBD stone. ERCP planned.     NSTEMI  MVCAD s/p CABG   History of Aortic valve Stenosis s/p TAVR   Chronic Diastolic HF  - s/p C 7/22 by Dr. Valdez. Results/recommendations as above  - Continue ASA / statin / BB  - Plavix on hold for lap CCY today    Choledocholithiasis   - CT at OSH with 2 stones noted in distal CBD, mild GB wall thickening  - MRCP revealed 13mm CBD stone   - s/p ERCP with sphincterotomy and biliary stent placement on 7/24. Will need repeat ERCP in 2-4 weeks for removal of stent and extraction of remaining stones  - plan for lap CCY today  - Continue IV Zosyn      HLD  - Continue high-intensity statin  - Total cholesterol 190, triglycerides 461 and HDL 17  - Needs further optimization. On Crestor 5mg daily, per records review she previously had side effects at higher dose     HTN  - Continue Metoprolol 25mg BID  -  Losartan 50mg daily   - Takes Lasix 20mg PO daily PRN for edema     DM2  - Holding Metformin due to contrast with C on 7/22  - Hgb A1c 7.1%  - Continue SSI only     DVT prophylaxis:No DVT prophylaxis order currently exists.     Disposition: I expect the patient to be discharged 1-2 days, lap CCY today.    Mireya Arias,   07/25/21

## 2022-12-10 ENCOUNTER — HOSPITAL ENCOUNTER (OUTPATIENT)
Facility: MEDICAL CENTER | Age: 72
End: 2022-12-12
Attending: EMERGENCY MEDICINE | Admitting: STUDENT IN AN ORGANIZED HEALTH CARE EDUCATION/TRAINING PROGRAM
Payer: MEDICARE

## 2022-12-10 ENCOUNTER — APPOINTMENT (OUTPATIENT)
Dept: RADIOLOGY | Facility: MEDICAL CENTER | Age: 72
End: 2022-12-10
Attending: EMERGENCY MEDICINE
Payer: MEDICARE

## 2022-12-10 DIAGNOSIS — R41.82 ALTERED MENTAL STATUS, UNSPECIFIED ALTERED MENTAL STATUS TYPE: ICD-10-CM

## 2022-12-10 DIAGNOSIS — D72.829 LEUKOCYTOSIS, UNSPECIFIED TYPE: ICD-10-CM

## 2022-12-10 DIAGNOSIS — R53.1 GENERALIZED WEAKNESS: ICD-10-CM

## 2022-12-10 PROBLEM — M25.569 KNEE PAIN: Status: ACTIVE | Noted: 2022-12-10

## 2022-12-10 LAB
ALBUMIN SERPL BCP-MCNC: 3.6 G/DL (ref 3.2–4.9)
ALBUMIN/GLOB SERPL: 1.1 G/DL
ALP SERPL-CCNC: 69 U/L (ref 30–99)
ALT SERPL-CCNC: 21 U/L (ref 2–50)
ANION GAP SERPL CALC-SCNC: 12 MMOL/L (ref 7–16)
AST SERPL-CCNC: 83 U/L (ref 12–45)
BASOPHILS # BLD AUTO: 0.2 % (ref 0–1.8)
BASOPHILS # BLD: 0.03 K/UL (ref 0–0.12)
BILIRUB SERPL-MCNC: 0.6 MG/DL (ref 0.1–1.5)
BUN SERPL-MCNC: 34 MG/DL (ref 8–22)
BURR CELLS/RBC NFR CSF MANUAL: 0 %
CALCIUM SERPL-MCNC: 8.9 MG/DL (ref 8.5–10.5)
CHLORIDE SERPL-SCNC: 94 MMOL/L (ref 96–112)
CK SERPL-CCNC: 3564 U/L (ref 0–154)
CLARITY CSF: CLEAR
CO2 SERPL-SCNC: 29 MMOL/L (ref 20–33)
COLOR CSF: COLORLESS
COLOR SPUN CSF: COLORLESS
CREAT SERPL-MCNC: 5.51 MG/DL (ref 0.5–1.4)
EOSINOPHIL # BLD AUTO: 0.09 K/UL (ref 0–0.51)
EOSINOPHIL NFR BLD: 0.6 % (ref 0–6.9)
ERYTHROCYTE [DISTWIDTH] IN BLOOD BY AUTOMATED COUNT: 45.4 FL (ref 35.9–50)
FLUAV RNA SPEC QL NAA+PROBE: NEGATIVE
FLUBV RNA SPEC QL NAA+PROBE: NEGATIVE
GFR SERPLBLD CREATININE-BSD FMLA CKD-EPI: 10 ML/MIN/1.73 M 2
GLOBULIN SER CALC-MCNC: 3.3 G/DL (ref 1.9–3.5)
GLUCOSE CSF-MCNC: 96 MG/DL (ref 40–80)
GLUCOSE SERPL-MCNC: 182 MG/DL (ref 65–99)
GRAM STN SPEC: NORMAL
HCT VFR BLD AUTO: 27.7 % (ref 42–52)
HGB BLD-MCNC: 9.4 G/DL (ref 14–18)
IMM GRANULOCYTES # BLD AUTO: 0.09 K/UL (ref 0–0.11)
IMM GRANULOCYTES NFR BLD AUTO: 0.6 % (ref 0–0.9)
LACTATE SERPL-SCNC: 1.1 MMOL/L (ref 0.5–2)
LYMPHOCYTES # BLD AUTO: 1 K/UL (ref 1–4.8)
LYMPHOCYTES NFR BLD: 6.1 % (ref 22–41)
MAGNESIUM SERPL-MCNC: 2 MG/DL (ref 1.5–2.5)
MCH RBC QN AUTO: 33.7 PG (ref 27–33)
MCHC RBC AUTO-ENTMCNC: 33.9 G/DL (ref 33.7–35.3)
MCV RBC AUTO: 99.3 FL (ref 81.4–97.8)
MONOCYTES # BLD AUTO: 0.73 K/UL (ref 0–0.85)
MONOCYTES NFR BLD AUTO: 4.5 % (ref 0–13.4)
NEUTROPHILS # BLD AUTO: 14.4 K/UL (ref 1.82–7.42)
NEUTROPHILS NFR BLD: 88 % (ref 44–72)
NRBC # BLD AUTO: 0 K/UL
NRBC BLD-RTO: 0 /100 WBC
PLATELET # BLD AUTO: 174 K/UL (ref 164–446)
PMV BLD AUTO: 11.4 FL (ref 9–12.9)
POTASSIUM SERPL-SCNC: 3.8 MMOL/L (ref 3.6–5.5)
PROCALCITONIN SERPL-MCNC: 3.66 NG/ML
PROT CSF-MCNC: 90 MG/DL (ref 15–45)
PROT SERPL-MCNC: 6.9 G/DL (ref 6–8.2)
RBC # BLD AUTO: 2.79 M/UL (ref 4.7–6.1)
RBC # CSF: 1 CELLS/UL
RSV RNA SPEC QL NAA+PROBE: NEGATIVE
SARS-COV-2 RNA RESP QL NAA+PROBE: NOTDETECTED
SIGNIFICANT IND 70042: NORMAL
SITE SITE: NORMAL
SODIUM SERPL-SCNC: 135 MMOL/L (ref 135–145)
SOURCE SOURCE: NORMAL
SPECIMEN SOURCE: NORMAL
SPECIMEN VOL CSF: 4 ML
TUBE # CSF: 3
TUBE # CSF: 4
WBC # BLD AUTO: 16.3 K/UL (ref 4.8–10.8)
WBC # CSF: 2 CELLS/UL (ref 0–10)

## 2022-12-10 PROCEDURE — 96374 THER/PROPH/DIAG INJ IV PUSH: CPT

## 2022-12-10 PROCEDURE — 87205 SMEAR GRAM STAIN: CPT

## 2022-12-10 PROCEDURE — 36415 COLL VENOUS BLD VENIPUNCTURE: CPT

## 2022-12-10 PROCEDURE — G0378 HOSPITAL OBSERVATION PER HR: HCPCS

## 2022-12-10 PROCEDURE — 71045 X-RAY EXAM CHEST 1 VIEW: CPT

## 2022-12-10 PROCEDURE — 700102 HCHG RX REV CODE 250 W/ 637 OVERRIDE(OP): Performed by: STUDENT IN AN ORGANIZED HEALTH CARE EDUCATION/TRAINING PROGRAM

## 2022-12-10 PROCEDURE — 99220 PR INITIAL OBSERVATION CARE,LEVL III: CPT | Performed by: STUDENT IN AN ORGANIZED HEALTH CARE EDUCATION/TRAINING PROGRAM

## 2022-12-10 PROCEDURE — A9270 NON-COVERED ITEM OR SERVICE: HCPCS | Performed by: STUDENT IN AN ORGANIZED HEALTH CARE EDUCATION/TRAINING PROGRAM

## 2022-12-10 PROCEDURE — 89051 BODY FLUID CELL COUNT: CPT

## 2022-12-10 PROCEDURE — 0241U HCHG SARS-COV-2 COVID-19 NFCT DS RESP RNA 4 TRGT MIC: CPT

## 2022-12-10 PROCEDURE — 83735 ASSAY OF MAGNESIUM: CPT

## 2022-12-10 PROCEDURE — 73564 X-RAY EXAM KNEE 4 OR MORE: CPT | Mod: RT

## 2022-12-10 PROCEDURE — 84157 ASSAY OF PROTEIN OTHER: CPT

## 2022-12-10 PROCEDURE — 700111 HCHG RX REV CODE 636 W/ 250 OVERRIDE (IP): Performed by: EMERGENCY MEDICINE

## 2022-12-10 PROCEDURE — 700111 HCHG RX REV CODE 636 W/ 250 OVERRIDE (IP): Performed by: STUDENT IN AN ORGANIZED HEALTH CARE EDUCATION/TRAINING PROGRAM

## 2022-12-10 PROCEDURE — 62270 DX LMBR SPI PNXR: CPT

## 2022-12-10 PROCEDURE — 700101 HCHG RX REV CODE 250: Performed by: EMERGENCY MEDICINE

## 2022-12-10 PROCEDURE — 82550 ASSAY OF CK (CPK): CPT

## 2022-12-10 PROCEDURE — 84145 PROCALCITONIN (PCT): CPT

## 2022-12-10 PROCEDURE — 82945 GLUCOSE OTHER FLUID: CPT

## 2022-12-10 PROCEDURE — 80053 COMPREHEN METABOLIC PANEL: CPT

## 2022-12-10 PROCEDURE — 96372 THER/PROPH/DIAG INJ SC/IM: CPT | Mod: XU

## 2022-12-10 PROCEDURE — 70450 CT HEAD/BRAIN W/O DYE: CPT

## 2022-12-10 PROCEDURE — 85025 COMPLETE CBC W/AUTO DIFF WBC: CPT

## 2022-12-10 PROCEDURE — 99285 EMERGENCY DEPT VISIT HI MDM: CPT

## 2022-12-10 PROCEDURE — 87040 BLOOD CULTURE FOR BACTERIA: CPT

## 2022-12-10 PROCEDURE — C9803 HOPD COVID-19 SPEC COLLECT: HCPCS | Performed by: EMERGENCY MEDICINE

## 2022-12-10 PROCEDURE — 87070 CULTURE OTHR SPECIMN AEROBIC: CPT | Mod: XU

## 2022-12-10 PROCEDURE — 83605 ASSAY OF LACTIC ACID: CPT

## 2022-12-10 RX ORDER — CHLORAL HYDRATE 500 MG
1000 CAPSULE ORAL DAILY
Status: SHIPPED | COMMUNITY
End: 2024-01-10

## 2022-12-10 RX ORDER — GABAPENTIN 100 MG/1
200 CAPSULE ORAL 3 TIMES DAILY
Status: SHIPPED | COMMUNITY
End: 2024-01-10

## 2022-12-10 RX ORDER — LISINOPRIL 20 MG/1
40 TABLET ORAL DAILY
Status: DISCONTINUED | OUTPATIENT
Start: 2022-12-10 | End: 2022-12-12 | Stop reason: HOSPADM

## 2022-12-10 RX ORDER — DIPYRIDAMOLE 25 MG
25 TABLET ORAL 2 TIMES DAILY
Status: SHIPPED | COMMUNITY
End: 2024-02-11

## 2022-12-10 RX ORDER — FAMOTIDINE 20 MG/1
20 TABLET, FILM COATED ORAL 2 TIMES DAILY
Status: DISCONTINUED | OUTPATIENT
Start: 2022-12-10 | End: 2022-12-12 | Stop reason: HOSPADM

## 2022-12-10 RX ORDER — LABETALOL HYDROCHLORIDE 5 MG/ML
10 INJECTION, SOLUTION INTRAVENOUS EVERY 4 HOURS PRN
Status: DISCONTINUED | OUTPATIENT
Start: 2022-12-10 | End: 2022-12-12 | Stop reason: HOSPADM

## 2022-12-10 RX ORDER — FERROUS SULFATE 325(65) MG
325 TABLET ORAL DAILY
Status: SHIPPED | COMMUNITY
End: 2024-01-10

## 2022-12-10 RX ORDER — SEVELAMER HYDROCHLORIDE 800 MG/1
1600 TABLET, FILM COATED ORAL
COMMUNITY

## 2022-12-10 RX ORDER — ASCORBIC ACID 500 MG
500 TABLET ORAL DAILY
Status: DISCONTINUED | OUTPATIENT
Start: 2022-12-10 | End: 2022-12-12 | Stop reason: HOSPADM

## 2022-12-10 RX ORDER — CEFTRIAXONE 2 G/1
2000 INJECTION, POWDER, FOR SOLUTION INTRAMUSCULAR; INTRAVENOUS ONCE
Status: COMPLETED | OUTPATIENT
Start: 2022-12-10 | End: 2022-12-10

## 2022-12-10 RX ORDER — GABAPENTIN 100 MG/1
200 CAPSULE ORAL 3 TIMES DAILY
Status: DISCONTINUED | OUTPATIENT
Start: 2022-12-10 | End: 2022-12-12 | Stop reason: HOSPADM

## 2022-12-10 RX ORDER — FERROUS SULFATE 325(65) MG
325 TABLET ORAL DAILY
Status: DISCONTINUED | OUTPATIENT
Start: 2022-12-10 | End: 2022-12-12 | Stop reason: HOSPADM

## 2022-12-10 RX ORDER — DIPYRIDAMOLE 25 MG
25 TABLET ORAL 2 TIMES DAILY
Status: DISCONTINUED | OUTPATIENT
Start: 2022-12-10 | End: 2022-12-12 | Stop reason: HOSPADM

## 2022-12-10 RX ORDER — FELODIPINE 10 MG/1
10 TABLET, EXTENDED RELEASE ORAL DAILY
Status: DISCONTINUED | OUTPATIENT
Start: 2022-12-10 | End: 2022-12-12 | Stop reason: HOSPADM

## 2022-12-10 RX ORDER — ACETAMINOPHEN 325 MG/1
650 TABLET ORAL EVERY 6 HOURS PRN
Status: DISCONTINUED | OUTPATIENT
Start: 2022-12-10 | End: 2022-12-12 | Stop reason: HOSPADM

## 2022-12-10 RX ORDER — FELODIPINE 10 MG/1
10 TABLET, EXTENDED RELEASE ORAL DAILY
Status: SHIPPED | COMMUNITY
End: 2024-01-10

## 2022-12-10 RX ORDER — HEPARIN SODIUM 5000 [USP'U]/ML
5000 INJECTION, SOLUTION INTRAVENOUS; SUBCUTANEOUS EVERY 8 HOURS
Status: DISCONTINUED | OUTPATIENT
Start: 2022-12-10 | End: 2022-12-12 | Stop reason: HOSPADM

## 2022-12-10 RX ORDER — ATORVASTATIN CALCIUM 20 MG/1
20 TABLET, FILM COATED ORAL EVERY EVENING
Status: DISCONTINUED | OUTPATIENT
Start: 2022-12-10 | End: 2022-12-12 | Stop reason: HOSPADM

## 2022-12-10 RX ADMIN — FELODIPINE 10 MG: 10 TABLET, EXTENDED RELEASE ORAL at 17:54

## 2022-12-10 RX ADMIN — LISINOPRIL 40 MG: 20 TABLET ORAL at 17:55

## 2022-12-10 RX ADMIN — ATORVASTATIN CALCIUM 20 MG: 20 TABLET, FILM COATED ORAL at 17:54

## 2022-12-10 RX ADMIN — FAMOTIDINE 20 MG: 20 TABLET, FILM COATED ORAL at 17:55

## 2022-12-10 RX ADMIN — OXYCODONE HYDROCHLORIDE AND ACETAMINOPHEN 500 MG: 500 TABLET ORAL at 17:55

## 2022-12-10 RX ADMIN — DIPYRIDAMOLE 25 MG: 25 TABLET, FILM COATED ORAL at 17:54

## 2022-12-10 RX ADMIN — ASPIRIN 81 MG: 81 TABLET, COATED ORAL at 17:55

## 2022-12-10 RX ADMIN — CEFTRIAXONE SODIUM 2000 MG: 2 INJECTION, POWDER, FOR SOLUTION INTRAMUSCULAR; INTRAVENOUS at 14:44

## 2022-12-10 RX ADMIN — HEPARIN SODIUM 5000 UNITS: 5000 INJECTION, SOLUTION INTRAVENOUS; SUBCUTANEOUS at 17:56

## 2022-12-10 RX ADMIN — GABAPENTIN 200 MG: 100 CAPSULE ORAL at 23:21

## 2022-12-10 RX ADMIN — GABAPENTIN 200 MG: 100 CAPSULE ORAL at 17:54

## 2022-12-10 RX ADMIN — FERROUS SULFATE TAB 325 MG (65 MG ELEMENTAL FE) 325 MG: 325 (65 FE) TAB at 17:55

## 2022-12-10 RX ADMIN — LIDOCAINE HYDROCHLORIDE 20 ML: 10 INJECTION, SOLUTION INFILTRATION; PERINEURAL at 14:00

## 2022-12-10 ASSESSMENT — ENCOUNTER SYMPTOMS
NERVOUS/ANXIOUS: 0
SENSORY CHANGE: 0
HEADACHES: 1
CHILLS: 0
COUGH: 0
SHORTNESS OF BREATH: 0
MYALGIAS: 1
FEVER: 0
MEMORY LOSS: 0
VOMITING: 0
BLURRED VISION: 0
SPUTUM PRODUCTION: 0
DIZZINESS: 0
DIARRHEA: 1
DOUBLE VISION: 0
SPEECH CHANGE: 0
SORE THROAT: 0
WEAKNESS: 1
NAUSEA: 0

## 2022-12-10 ASSESSMENT — LIFESTYLE VARIABLES
HOW MANY TIMES IN THE PAST YEAR HAVE YOU HAD 5 OR MORE DRINKS IN A DAY: 0
CONSUMPTION TOTAL: NEGATIVE
EVER FELT BAD OR GUILTY ABOUT YOUR DRINKING: NO
TOTAL SCORE: 0
HAVE YOU EVER FELT YOU SHOULD CUT DOWN ON YOUR DRINKING: NO
ON A TYPICAL DAY WHEN YOU DRINK ALCOHOL HOW MANY DRINKS DO YOU HAVE: 0
DOES PATIENT WANT TO STOP DRINKING: NO
DOES PATIENT WANT TO STOP DRINKING: NO
AVERAGE NUMBER OF DAYS PER WEEK YOU HAVE A DRINK CONTAINING ALCOHOL: 0
ALCOHOL_USE: NO
TOTAL SCORE: 0
TOTAL SCORE: 0
SUBSTANCE_ABUSE: 0
EVER HAD A DRINK FIRST THING IN THE MORNING TO STEADY YOUR NERVES TO GET RID OF A HANGOVER: NO
HAVE PEOPLE ANNOYED YOU BY CRITICIZING YOUR DRINKING: NO

## 2022-12-10 ASSESSMENT — FIBROSIS 4 INDEX
FIB4 SCORE: 7.39
FIB4 SCORE: 2.13

## 2022-12-10 NOTE — ED TRIAGE NOTES
"Chief Complaint   Patient presents with    Knee Pain     Patient reports he has R knee pain/weakness. Patient states he went to the bathroom and was unable to stand up from toilet.     Weakness     Patient reports generalized weakness, worsening yesterday       72 yo male to triage for above complaint. Patient reports he has a history of R knee \"problems\" but states last night when he went to the bathroom he was unable to get up.    Patient is MWF dialysis patient, last dialysis was Friday.    Pt is alert and oriented, speaking in full sentences, follows commands and responds appropriately to questions.     Patient placed back in lobby and educated on triage process. Asked to inform RN of any changes.    /54   Pulse 92   Temp 37.6 °C (99.6 °F) (Temporal)   Resp 18   Ht 1.727 m (5' 8\")   Wt 68 kg (150 lb)   SpO2 96%   BMI 22.81 kg/m²     "

## 2022-12-10 NOTE — ED NOTES
Med rec partial as unable to verify Levemir dosing. All other meds pulled from clinic dispense report as patient is unsure of medications and doses.    No abx in the last 30 days.    Allergies reviewed.    Home pharmacy is [facility].

## 2022-12-10 NOTE — H&P
"Hospital Medicine History & Physical Note    Date of Service  12/10/2022    Primary Care Physician  Pcp Not In Computer    Consultants  NA    Specialist Names: NA    Code Status  Full Code    Chief Complaint  Chief Complaint   Patient presents with    Knee Pain     Patient reports he has R knee pain/weakness. Patient states he went to the bathroom and was unable to stand up from toilet.     Weakness     Patient reports generalized weakness, worsening yesterday       History of Presenting Illness  Jean Barboza is a 71 y.o. male with past medical history of ESRD on MWF HD, hypertension, hx of DM treated with diet (now with normal Ha1c) who presented 12/10/2022 with several days of weakness and general malaise. States he was using the restroom today and was unable to get up from the toilet and was stuck there for 4 hours. Notes some intermittent diarrhea and flu like symptoms over the last several days and states he may not be eating/drinking as well as normal. Per family at bedside, he has been confused about time and events and just not \"quite himself\". Denies any specific complaints to me currently.     Temp 99.6, HR 92, RR 18, /54, did have an O2 drop to 85% on was placed on 2L.   Labs are remarkable for WBC 16.3, H/H 9.4/27.7, Cl 94, Glucose 182, BUN/Cr 34/5.51, AST 83, LA 1.1.   Viral panel was negative, xray was unremarkable   LP was done by ERP due to his confusion, elevated WBC and c/o headache. Fluid is pending.    I discussed the plan of care with patient and family.    Review of Systems  Review of Systems   Constitutional:  Positive for malaise/fatigue. Negative for chills and fever.   HENT:  Negative for congestion and sore throat.    Eyes:  Negative for blurred vision and double vision.   Respiratory:  Negative for cough, sputum production and shortness of breath.    Cardiovascular:  Negative for chest pain and leg swelling.   Gastrointestinal:  Positive for diarrhea (few episodes, not " consistent). Negative for nausea and vomiting.   Genitourinary:  Negative for dysuria.        Anuric   Musculoskeletal:  Positive for myalgias. Negative for joint pain.   Neurological:  Positive for weakness and headaches. Negative for dizziness, sensory change and speech change.   Psychiatric/Behavioral:  Negative for memory loss and substance abuse. The patient is not nervous/anxious.      Past Medical History   has a past medical history of CATARACT, Diabetes (2005), Hyperlipidemia, Hypertension, Seizure (HCC) (1987), Seizure disorder (HCC), Snoring, and Stroke (HCC).    Surgical History   has a past surgical history that includes other (2003); other (2011); cataract phaco with iol (4/20/2011); cataract phaco with iol (5/4/2011); and cataract extraction with iol (2011).     Family History  family history includes Diabetes in his mother; Heart Disease in his father.   Family history reviewed with patient. There is no family history that is pertinent to the chief complaint.     Social History   reports that he quit smoking about 37 years ago. His smoking use included cigarettes. He has a 2.00 pack-year smoking history. He has never used smokeless tobacco. He reports that he does not drink alcohol and does not use drugs.    Allergies  Allergies   Allergen Reactions    Penicillin G Rash     Rxn - Exacerbated a rash on his legs  Early 2000's         Medications  Prior to Admission Medications   Prescriptions Last Dose Informant Patient Reported? Taking?   Ascorbic Acid (VITAMIN C) 500 MG Cap UNKN at UNKN MAR from Other Facility Yes No   Sig: Take 500 mg by mouth every day.   SITagliptin (JANUVIA) 25 MG Tab Not Taking  No No   Sig: Take 1 tablet by mouth every day.   Patient not taking: Reported on 12/10/2022   aspirin EC (ECOTRIN) 81 MG Tablet Delayed Response UNKN at UNKN MAR from Other Facility Yes No   Sig: Take 81 mg by mouth every day.   atorvastatin (LIPITOR) 20 MG Tab UNKN at UNKN MAR from Other Facility Yes No    Sig: Take 20 mg by mouth every evening. Indications: Cerebrovascular Accident or Stroke   dipyridamole (PERSANTINE) 25 MG Tab UNKN at UNKN  Yes Yes   Sig: Take 25 mg by mouth 2 times a day.   famotidine (PEPCID) 20 MG Tab  MAR from Other Facility Yes No   Sig: Take 20 mg by mouth 2 times a day.   felodipine ER (PLENDIL) 10 MG TABLET SR 24 HR UNKN at UNKN  Yes Yes   Sig: Take 10 mg by mouth every day.   ferrous sulfate 325 (65 Fe) MG tablet UNKN at UNKN  Yes Yes   Sig: Take 325 mg by mouth every day.   gabapentin (NEURONTIN) 100 MG Cap UNKN at UNKN  Yes Yes   Sig: Take 200 mg by mouth 3 times a day.   insulin detemir (LEVEMIR) 100 UNIT/ML Solution UNKN at UNKN  Yes Yes   Sig: Inject  under the skin every evening.   lisinopril (PRINIVIL) 40 MG tablet UNKN at UNKN MAR from Other Facility Yes No   Sig: Take 40 mg by mouth every day.   meclizine (ANTIVERT) 25 MG Tab UNKN at UNKN MAR from Other Facility Yes No   Sig: Take 25 mg by mouth 3 times a day as needed for Nausea/Vomiting.   therapeutic multivitamin-minerals (THERAGRAN-M) Tab UNKN at UNKN MAR from Other Facility Yes No   Sig: Take 1 tablet by mouth every day.      Facility-Administered Medications: None       Physical Exam  Temp:  [37.2 °C (99 °F)-37.6 °C (99.6 °F)] 37.2 °C (99 °F)  Pulse:  [77-92] 83  Resp:  [18-20] 18  BP: (104-127)/(50-59) 127/59  SpO2:  [85 %-100 %] 97 %  Blood Pressure : 115/58   Temperature: 37.2 °C (99 °F)   Pulse: 79   Respiration: 18   Pulse Oximetry: 100 %       Physical Exam  Vitals and nursing note reviewed.   Constitutional:       Appearance: Normal appearance. He is not ill-appearing or toxic-appearing.   HENT:      Head: Normocephalic and atraumatic.      Mouth/Throat:      Mouth: Mucous membranes are moist.      Pharynx: Oropharynx is clear.   Eyes:      Extraocular Movements: Extraocular movements intact.      Conjunctiva/sclera: Conjunctivae normal.      Comments: Wears corrective lenses    Cardiovascular:      Rate and Rhythm:  Normal rate and regular rhythm.      Heart sounds: Murmur (systolic murmur) heard.   Pulmonary:      Effort: No respiratory distress.      Breath sounds: No wheezing.   Abdominal:      General: Bowel sounds are normal. There is no distension.      Palpations: Abdomen is soft.      Tenderness: There is no abdominal tenderness. There is no guarding.   Musculoskeletal:         General: Normal range of motion.      Cervical back: Normal range of motion and neck supple. No rigidity.      Right lower leg: No edema.      Left lower leg: No edema.   Skin:     General: Skin is warm and dry.   Neurological:      General: No focal deficit present.      Mental Status: He is alert and oriented to person, place, and time.      Cranial Nerves: No cranial nerve deficit.      Sensory: No sensory deficit.      Motor: No weakness.   Psychiatric:      Comments: Flat but appropriate and pleasant, answering appropriately        Laboratory:  Recent Labs     12/10/22  1215   WBC 16.3*   RBC 2.79*   HEMOGLOBIN 9.4*   HEMATOCRIT 27.7*   MCV 99.3*   MCH 33.7*   MCHC 33.9   RDW 45.4   PLATELETCT 174   MPV 11.4     Recent Labs     12/10/22  1215   SODIUM 135   POTASSIUM 3.8   CHLORIDE 94*   CO2 29   GLUCOSE 182*   BUN 34*   CREATININE 5.51*   CALCIUM 8.9     Recent Labs     12/10/22  1215   ALTSGPT 21   ASTSGOT 83*   ALKPHOSPHAT 69   TBILIRUBIN 0.6   GLUCOSE 182*         No results for input(s): NTPROBNP in the last 72 hours.      No results for input(s): TROPONINT in the last 72 hours.    Imaging:  CT-HEAD W/O   Final Result      1.  No CT evidence of acute infarct, hemorrhage or mass.   2.  Old right occipital infarct.         DX-CHEST-PORTABLE (1 VIEW)   Final Result      Left basilar atelectasis or scarring.      DX-KNEE COMPLETE 4+ RIGHT   Final Result      Unremarkable knee series.                  INTERPRETING LOCATION:  [address] [place] [address], [id number]          X-Ray:  I have personally reviewed the images and compared with prior  images.    Assessment/Plan:  Justification for Admission Status  I anticipate this patient is appropriate for observation status at this time because confusion per family, general malaise, concern for brewing infection, pt will be monitored overnight and cultures and CSF will be monitored.     Patient will need a Med/Surg bed on MEDICAL service .  The need is secondary to AMS, flu like symptoms. .    * Altered mental status- (present on admission)  Assessment & Plan  Some confusion noted by family, pt not quite himself, is A& O x 3 and is appropriate   - CT head without acute pathology, old right occipital infarction   - given flu like symptoms, headache and confusion, ERP did complete and LP   - CSF analysis given   - no significant signs of sepsis, may be dehydration or viral infection ?   - monitor closely    - supportive care   - delirium precautions     Knee pain- (present on admission)  Assessment & Plan  Rt knee pain on admission, now improved   Xray negative, no exam findings to suggest infection   Supportive care    Weakness- (present on admission)  Assessment & Plan  Weakness with associated flu like symptoms  - check CPK   - viral panel neg, monitor for other source of infection   - encourage oral  Hydration/food intake   - monitor   - if continues may need PT/OT evaluation     Leukocytosis- (present on admission)  Assessment & Plan  WBC 16.3, unclear etiology, does not appear septic   CXR clear, anuric, LP done pending CSF analysis   - received 1 dose of rocephin in ER, hold on further abx as source of infection is unclear, question viral given symptoms   - monitor     ESRD (end stage renal disease) on dialysis (HCC)- (present on admission)  Assessment & Plan  ESRD on MWF HD, HD yesterday without issues   - no need for urgent HD   - if pt remains hospitalized, will need to consulted nephro for inpatient HD Monday  - monitor electrolytes      Hypertension- (present on admission)  Assessment &  Plan  History of, stable   Continue home medications       VTE prophylaxis: heparin ppx

## 2022-12-10 NOTE — ASSESSMENT & PLAN NOTE
Some confusion noted by family, pt not quite himself, is A& O x 3 and is appropriate  -No signs of confusion on examination, no fever noted  - CT head without acute pathology, old right occipital infarction   - given flu like symptoms, headache and confusion, ERP did complete and LP   - CSF analysis given   - no significant signs of sepsis, may be dehydration or viral infection ?   - monitor closely    - supportive care   - delirium precautions

## 2022-12-10 NOTE — ASSESSMENT & PLAN NOTE
Weakness with associated flu like symptoms  - check CPK   - viral panel neg, monitor for other source of infection   - encourage oral  Hydration/food intake   - monitor   - if continues may need PT/OT evaluation

## 2022-12-10 NOTE — ED PROVIDER NOTES
ED Provider Note        Primary care provider: Antione Coley M.D. (Inactive)    I verified that the patient was wearing a mask and I was wearing appropriate PPE every time I entered the room. The patient's mask was on the patient at all times during my encounter except for a brief view of the oropharynx.      CHIEF COMPLAINT  Chief Complaint   Patient presents with    Knee Pain     Patient reports he has R knee pain/weakness. Patient states he went to the bathroom and was unable to stand up from toilet.     Weakness     Patient reports generalized weakness, worsening yesterday       HPI  Jean Barboza is a 71 y.o. male who presents to the Emergency Department with chief complaint of weakness and right knee pain.  Patient stated that he went to the bathroom today and then was so weak that he was unable to get off the toilet for approximately 4 hours and was having severe pain in his right knee.  States several injuries to the right knee but none of them recently.  He is also felt very rundown and weak over the last couple days.  Family members report concern for altered mental status saying that his story is frequently changing this morning.  Patient reports that he has had some slight cough some slight chills minor nausea without emesis.  Patient is dialysis dependent on Monday Wednesday Friday schedule and has been compliant.  No abdominal pain no problems with bowel movements he does not make any urine no other acute symptom changes or concerns at this time somewhat limited by minor altered mental status.    REVIEW OF SYSTEMS  As per HPI otherwise limited by altered mental status    PAST MEDICAL HISTORY   has a past medical history of CATARACT, Diabetes (2005), Hyperlipidemia, Hypertension, Seizure (HCC) (1987), Seizure disorder ([facility]), Snoring, and Stroke (HCC).    SURGICAL HISTORY   has a past surgical history that includes other (2003); other (2011); cataract phaco with iol (4/20/2011); cataract phaco  "with iol (2011); and cataract extraction with iol ().    SOCIAL HISTORY  Social History     Tobacco Use    Smoking status: Former     Packs/day: 0.50     Years: 4.00     Pack years: 2.00     Types: Cigarettes     Quit date: 1985     Years since quittin.8    Smokeless tobacco: Never   Substance Use Topics    Alcohol use: No    Drug use: No      Social History     Substance and Sexual Activity   Drug Use No       FAMILY HISTORY  Non-Contributory    CURRENT MEDICATIONS  Home Medications       Reviewed by Shannon Ramos R.N. (Registered Nurse) on 12/10/22 at 1135  Med List Status: Not Addressed     Medication Last Dose Status   Ascorbic Acid (VITAMIN C) 1000 MG Tab  Active   aspirin EC (ECOTRIN) 81 MG Tablet Delayed Response  Active   atorvastatin (LIPITOR) 40 MG Tab  Active   Cholecalciferol (VITAMIN D3) 125 MCG (5000 UT) Cap  Active   doxazosin (CARDURA) 8 MG tablet  Active   famotidine (PEPCID) 20 MG Tab  Active   hydrALAZINE (APRESOLINE) 10 MG Tab  Active   insulin regular (NOVOLIN R) 100 Unit/mL Solution  Active   lisinopril (PRINIVIL) 40 MG tablet  Active   meclizine (ANTIVERT) 25 MG Tab  Active   QUERCETIN PO  Active   SITagliptin (JANUVIA) 25 MG Tab  Active   therapeutic multivitamin-minerals (THERAGRAN-M) Tab  Active   Zinc 50 MG Tab  Active                    ALLERGIES  Allergies   Allergen Reactions    Penicillin G Rash     Rxn - Exacerbated a rash on his legs  Early          PHYSICAL EXAM  VITAL SIGNS: /50   Pulse 92   Temp 37.6 °C (99.6 °F) (Temporal)   Resp 18   Ht 1.727 m (5' 8\")   Wt 68 kg (150 lb)   SpO2 93%   BMI 22.81 kg/m²   Pulse ox interpretation: I interpret this pulse ox as normal.  Constitutional: Alert and oriented x3 but somewhat confused to events of the day and presentation  HEENT: Atraumatic normocephalic, pupils are equal round, extraocular movements are intact. The nares is clear, external ears are normal, mouth shows moist mucous membranes  Neck: no " obvious JVD or tracheal deviation  Cardiovascular: Regular rate and rhythm no murmur rub or gallop   Thorax & Lungs: No respiratory distress, no wheezes rales or rhonchi, No chest tenderness.   GI: Soft nontender nondistended positive bowel sounds, no peritoneal signs  Skin: Warm dry no obvious acute rash or lesion  Musculoskeletal: Moving all extremities with normal range strength, no acute  deformity Minor tenderness with flexion extension of the right knee  Neurologic: Cranial nerves III through XII are grossly intact, no sensory deficit, no cerebellar dysfunction   Psychiatric: Appropriate affect for situation at this time      DIAGNOSTIC STUDIES / PROCEDURES  LABS      Results for orders placed or performed during the hospital encounter of 12/10/22   LACTIC ACID   Result Value Ref Range    Lactic Acid 1.1 0.5 - 2.0 mmol/L   CBC WITH DIFFERENTIAL   Result Value Ref Range    WBC 16.3 (H) 4.8 - 10.8 K/uL    RBC 2.79 (L) 4.70 - 6.10 M/uL    Hemoglobin 9.4 (L) 14.0 - 18.0 g/dL    Hematocrit 27.7 (L) 42.0 - 52.0 %    MCV 99.3 (H) 81.4 - 97.8 fL    MCH 33.7 (H) 27.0 - 33.0 pg    MCHC 33.9 33.7 - 35.3 g/dL    RDW 45.4 35.9 - 50.0 fL    Platelet Count 174 164 - 446 K/uL    MPV 11.4 9.0 - 12.9 fL    Neutrophils-Polys 88.00 (H) 44.00 - 72.00 %    Lymphocytes 6.10 (L) 22.00 - 41.00 %    Monocytes 4.50 0.00 - 13.40 %    Eosinophils 0.60 0.00 - 6.90 %    Basophils 0.20 0.00 - 1.80 %    Immature Granulocytes 0.60 0.00 - 0.90 %    Nucleated RBC 0.00 /100 WBC    Neutrophils (Absolute) 14.40 (H) 1.82 - 7.42 K/uL    Lymphs (Absolute) 1.00 1.00 - 4.80 K/uL    Monos (Absolute) 0.73 0.00 - 0.85 K/uL    Eos (Absolute) 0.09 0.00 - 0.51 K/uL    Baso (Absolute) 0.03 0.00 - 0.12 K/uL    Immature Granulocytes (abs) 0.09 0.00 - 0.11 K/uL    NRBC (Absolute) 0.00 K/uL   COMP METABOLIC PANEL   Result Value Ref Range    Sodium 135 135 - 145 mmol/L    Potassium 3.8 3.6 - 5.5 mmol/L    Chloride 94 (L) 96 - 112 mmol/L    Co2 29 20 - 33 mmol/L     Anion Gap 12.0 7.0 - 16.0    Glucose 182 (H) 65 - 99 mg/dL    Bun 34 (H) 8 - 22 mg/dL    Creatinine 5.51 (HH) 0.50 - 1.40 mg/dL    Calcium 8.9 8.5 - 10.5 mg/dL    AST(SGOT) 83 (H) 12 - 45 U/L    ALT(SGPT) 21 2 - 50 U/L    Alkaline Phosphatase 69 30 - 99 U/L    Total Bilirubin 0.6 0.1 - 1.5 mg/dL    Albumin 3.6 3.2 - 4.9 g/dL    Total Protein 6.9 6.0 - 8.2 g/dL    Globulin 3.3 1.9 - 3.5 g/dL    A-G Ratio 1.1 g/dL   CoV-2, FLU A/B, and RSV by PCR (2-4 Hours CEPHEID) : Collect NP swab in VTM    Specimen: Nasal; Respirate   Result Value Ref Range    Influenza virus A RNA Negative Negative    Influenza virus B, PCR Negative Negative    RSV, PCR Negative Negative    SARS-CoV-2 by PCR NotDetected     SARS-CoV-2 Source NP Swab    ESTIMATED GFR   Result Value Ref Range    GFR (CKD-EPI) 10 (A) >60 mL/min/1.73 m 2       All labs reviewed by me.      RADIOLOGY  DX-CHEST-PORTABLE (1 VIEW)   Final Result      Left basilar atelectasis or scarring.      DX-KNEE COMPLETE 4+ RIGHT   Final Result      Unremarkable knee series.                  INTERPRETING LOCATION:  [address] [place] [address], [facility]      CT-HEAD W/O    (Results Pending)         COURSE & MEDICAL DECISION MAKING  Pertinent Labs & Imaging studies reviewed. (See chart for details)    11:54 AM - Patient seen and examined at bedside.   Lumbar Puncture Procedure Note    Indication: to obtain spinal fluid for diagnostic testing    Consent: The patient was counseled regarding the procedure, it's indications, risks, potential complications and alternatives and any questions were answered. Consent was obtained.    Procedure: The patient was placed in the sitting, supported by bed side stand, position and the appropriate landmarks were identified. The area was prepped and draped in the usual sterile fashion. Anesthesia was obtained using 4 cc of 1% Lidocaine without epinephrine. A spinal needle was inserted at the L3- L4 level with the stylet in place until spinal fluid was returned.  "Opening pressure was not measured. At this point 5.0 cc of clear cerebral spinal fluid was obtained and sent for appropriate testing. The stylet was then replaced and the needle was withdrawn. A sterile dressing was placed over the site and the patient was placed in the supine position.    The patient tolerated the procedure well.    Complications: None     Medical Decision Making: Patient presents for weakness inability to ambulate some knee pain.  History of dialysis Monday Wednesday Friday has been compliant.  Patient was tachycardic at arrival here borderline febrile without developing a full fever here however patient is very weak he is unable to ambulate on his own and suspect probable infectious source with a leukocytosis and left shift and some fluctuating level of consciousness throughout his time in the emergency department.  Chest x-ray is clear the patient does not make urine his viral respiratory swab is negative.  Patient is requiring 2 L of oxygen by nasal cannula to maintain sats above 89%.  He was also complaining of headache neck pain and lower back pain throughout his time in the emergency department stay which raise question possible meningitis.  CSF was obtained as above pending results at this time however patient was given 2 g of Rocephin on for suspicion of possible sepsis.  No hypotension no lactic acidosis no indication for large-volume 30 cc/kg bolus however I do feel as though the patient will require ongoing evaluation and treatment I discussed this with the hospitalist and is admitted in guarded condition.    /50   Pulse 92   Temp 37.6 °C (99.6 °F) (Temporal)   Resp 18   Ht 1.727 m (5' 8\")   Wt 68 kg (150 lb)   SpO2 93%   BMI 22.81 kg/m²         FINAL IMPRESSION  1. Altered mental status, unspecified altered mental status type    2.  Headache  3.  Back pain  4.  Leukocytosis with left shift  5.  Lumbar puncture by ERP  6.  Weakness  7.  Right knee pain.      This dictation " has been created using voice recognition software and/or scribes. The accuracy of the dictation is limited by the abilities of the software and the expertise of the scribes. I expect there may be some errors of grammar and possibly content. I made every attempt to manually correct the errors within my dictation. However, errors related to voice recognition software and/or scribes may still exist and should be interpreted within the appropriate context.

## 2022-12-10 NOTE — ASSESSMENT & PLAN NOTE
Rt knee pain on admission, now improved   Xray negative, no exam findings to suggest infection   Supportive care

## 2022-12-10 NOTE — ED NOTES
Report from Bunny MCKINNON. Pt is dialysis pt with increased weakness and confusion, pt does dialysis M,W,F and has not missed any appointments. Pt per fistula is in Rt. F/A . (+) bruit and (+) thrill noted. Pt denies any complications noted with fistula. Per pt's family pt does not make his own urine much anymore. ERP notified of this. Will cont to monitor pt

## 2022-12-10 NOTE — ASSESSMENT & PLAN NOTE
ESRD on MWF HD, HD yesterday without issues   -Consulted nephrology, Dr. Avelar  - monitor electrolytes

## 2022-12-10 NOTE — ED NOTES
ERP in to see pt et family members. POC update for pt to have head CT and then possibly lumbar puncture to assist In r/o of infection.

## 2022-12-10 NOTE — ASSESSMENT & PLAN NOTE
WBC 16.3, unclear etiology, does not appear septic   CXR clear, anuric, LP done pending CSF analysis   - received 1 dose of rocephin in ER, hold on further abx as source of infection is unclear, question viral given symptoms   - monitor

## 2022-12-11 PROBLEM — R19.7 DIARRHEA: Status: ACTIVE | Noted: 2022-12-11

## 2022-12-11 LAB
ANION GAP SERPL CALC-SCNC: 13 MMOL/L (ref 7–16)
BASOPHILS # BLD AUTO: 0.1 % (ref 0–1.8)
BASOPHILS # BLD: 0.02 K/UL (ref 0–0.12)
BUN SERPL-MCNC: 40 MG/DL (ref 8–22)
C DIFF DNA SPEC QL NAA+PROBE: NEGATIVE
C DIFF TOX GENS STL QL NAA+PROBE: NEGATIVE
CALCIUM SERPL-MCNC: 8.8 MG/DL (ref 8.5–10.5)
CHLORIDE SERPL-SCNC: 94 MMOL/L (ref 96–112)
CO2 SERPL-SCNC: 28 MMOL/L (ref 20–33)
CREAT SERPL-MCNC: 6.46 MG/DL (ref 0.5–1.4)
EOSINOPHIL # BLD AUTO: 0.22 K/UL (ref 0–0.51)
EOSINOPHIL NFR BLD: 1.6 % (ref 0–6.9)
ERYTHROCYTE [DISTWIDTH] IN BLOOD BY AUTOMATED COUNT: 46.3 FL (ref 35.9–50)
GFR SERPLBLD CREATININE-BSD FMLA CKD-EPI: 9 ML/MIN/1.73 M 2
GLUCOSE SERPL-MCNC: 159 MG/DL (ref 65–99)
HCT VFR BLD AUTO: 27.9 % (ref 42–52)
HGB BLD-MCNC: 9.3 G/DL (ref 14–18)
IMM GRANULOCYTES # BLD AUTO: 0.06 K/UL (ref 0–0.11)
IMM GRANULOCYTES NFR BLD AUTO: 0.4 % (ref 0–0.9)
LYMPHOCYTES # BLD AUTO: 0.76 K/UL (ref 1–4.8)
LYMPHOCYTES NFR BLD: 5.5 % (ref 22–41)
MCH RBC QN AUTO: 33.3 PG (ref 27–33)
MCHC RBC AUTO-ENTMCNC: 33.3 G/DL (ref 33.7–35.3)
MCV RBC AUTO: 100 FL (ref 81.4–97.8)
MONOCYTES # BLD AUTO: 0.57 K/UL (ref 0–0.85)
MONOCYTES NFR BLD AUTO: 4.1 % (ref 0–13.4)
NEUTROPHILS # BLD AUTO: 12.12 K/UL (ref 1.82–7.42)
NEUTROPHILS NFR BLD: 88.3 % (ref 44–72)
NRBC # BLD AUTO: 0 K/UL
NRBC BLD-RTO: 0 /100 WBC
PLATELET # BLD AUTO: 166 K/UL (ref 164–446)
PMV BLD AUTO: 11.9 FL (ref 9–12.9)
POTASSIUM SERPL-SCNC: 3.7 MMOL/L (ref 3.6–5.5)
RBC # BLD AUTO: 2.79 M/UL (ref 4.7–6.1)
SODIUM SERPL-SCNC: 135 MMOL/L (ref 135–145)
WBC # BLD AUTO: 13.8 K/UL (ref 4.8–10.8)

## 2022-12-11 PROCEDURE — 80048 BASIC METABOLIC PNL TOTAL CA: CPT

## 2022-12-11 PROCEDURE — 85025 COMPLETE CBC W/AUTO DIFF WBC: CPT

## 2022-12-11 PROCEDURE — 700102 HCHG RX REV CODE 250 W/ 637 OVERRIDE(OP): Performed by: STUDENT IN AN ORGANIZED HEALTH CARE EDUCATION/TRAINING PROGRAM

## 2022-12-11 PROCEDURE — 96372 THER/PROPH/DIAG INJ SC/IM: CPT

## 2022-12-11 PROCEDURE — A9270 NON-COVERED ITEM OR SERVICE: HCPCS | Performed by: STUDENT IN AN ORGANIZED HEALTH CARE EDUCATION/TRAINING PROGRAM

## 2022-12-11 PROCEDURE — 87493 C DIFF AMPLIFIED PROBE: CPT

## 2022-12-11 PROCEDURE — 700111 HCHG RX REV CODE 636 W/ 250 OVERRIDE (IP): Performed by: STUDENT IN AN ORGANIZED HEALTH CARE EDUCATION/TRAINING PROGRAM

## 2022-12-11 PROCEDURE — G0378 HOSPITAL OBSERVATION PER HR: HCPCS

## 2022-12-11 PROCEDURE — 99225 PR SUBSEQUENT OBSERVATION CARE,LEVEL II: CPT | Performed by: NURSE PRACTITIONER

## 2022-12-11 RX ADMIN — ACETAMINOPHEN 650 MG: 325 TABLET, FILM COATED ORAL at 08:07

## 2022-12-11 RX ADMIN — GABAPENTIN 200 MG: 100 CAPSULE ORAL at 08:09

## 2022-12-11 RX ADMIN — OXYCODONE HYDROCHLORIDE AND ACETAMINOPHEN 500 MG: 500 TABLET ORAL at 05:08

## 2022-12-11 RX ADMIN — GABAPENTIN 200 MG: 100 CAPSULE ORAL at 16:07

## 2022-12-11 RX ADMIN — LISINOPRIL 40 MG: 20 TABLET ORAL at 05:08

## 2022-12-11 RX ADMIN — FELODIPINE 10 MG: 10 TABLET, EXTENDED RELEASE ORAL at 05:09

## 2022-12-11 RX ADMIN — DIPYRIDAMOLE 25 MG: 25 TABLET, FILM COATED ORAL at 17:41

## 2022-12-11 RX ADMIN — FAMOTIDINE 20 MG: 20 TABLET, FILM COATED ORAL at 16:06

## 2022-12-11 RX ADMIN — ASPIRIN 81 MG: 81 TABLET, COATED ORAL at 05:08

## 2022-12-11 RX ADMIN — HEPARIN SODIUM 5000 UNITS: 5000 INJECTION, SOLUTION INTRAVENOUS; SUBCUTANEOUS at 17:41

## 2022-12-11 RX ADMIN — FERROUS SULFATE TAB 325 MG (65 MG ELEMENTAL FE) 325 MG: 325 (65 FE) TAB at 05:07

## 2022-12-11 RX ADMIN — FAMOTIDINE 20 MG: 20 TABLET, FILM COATED ORAL at 05:07

## 2022-12-11 RX ADMIN — DIPYRIDAMOLE 25 MG: 25 TABLET, FILM COATED ORAL at 05:08

## 2022-12-11 RX ADMIN — HEPARIN SODIUM 5000 UNITS: 5000 INJECTION, SOLUTION INTRAVENOUS; SUBCUTANEOUS at 10:44

## 2022-12-11 RX ADMIN — ATORVASTATIN CALCIUM 20 MG: 20 TABLET, FILM COATED ORAL at 17:41

## 2022-12-11 ASSESSMENT — PAIN DESCRIPTION - PAIN TYPE
TYPE: ACUTE PAIN

## 2022-12-11 ASSESSMENT — ENCOUNTER SYMPTOMS
MYALGIAS: 1
CHILLS: 1
CARDIOVASCULAR NEGATIVE: 1
PSYCHIATRIC NEGATIVE: 1
FEVER: 0
WEAKNESS: 1
GASTROINTESTINAL NEGATIVE: 1
RESPIRATORY NEGATIVE: 1
EYES NEGATIVE: 1

## 2022-12-11 NOTE — PROGRESS NOTES
Received RN shift report. Patient alert, oriented to person, place and situation. Patient complaining of generalized weakness and fatigue. Patient in no acute distress at this time.

## 2022-12-11 NOTE — PROGRESS NOTES
RN obtained stool sample, due to pt's frequent  episodes of diarrhea. Notified covering KATIE Bergman.

## 2022-12-11 NOTE — HOSPITAL COURSE
". Jean Barboza is a 71 y.o. male with past medical history of ESRD on MWF HD, hypertension, hx of DM treated with diet (now with normal Ha1c) who presented 12/10/2022 with several days of weakness and general malaise.     Patient states he was using the restroom today and was unable to get up from the toilet and was stuck there for 4 hours. Notes some intermittent diarrhea and flu like symptoms over the last several days and states he may not be eating/drinking as well as normal. Per family at bedside, he has been confused about time and events and just not \"quite himself\". Denies any specific complaints to me currently.      In ER, patient's vitals noted temp 99.6, HR 92, RR 18, /54, did have an O2 drop to 85% on was placed on 2L. Labs are remarkable for WBC 16.3, H/H 9.4/27.7, Cl 94, Glucose 182, BUN/Cr 34/5.51, AST 83, LA 1.1.  Viral panel was negative, xray was unremarkable  LP was done by ERP due to his confusion, elevated WBC and c/o headache on admission.  CT of the head without contrast noted no evidence of acute infarct, hemorrhage or mass with an old right occipital infarct.  Patient admitted into the observation unit for further evaluation and treatment.    Patient was monitored overnight with no episodes noted.  Patient was still noted to have diarrhea of which he stool sample was sent for C. difficile.  CSF fluid obtained results noted mildly elevated glucose at 96 and protein at 90.  Patient's mentation has improved overnight.  On examination, patient alert and oriented with only complaints of generalized weakness and fatigue.    Patient underwent a dialysis treatment during this hospitalization.  Patient's mentation continually improved since admission with no signs of confusion, or are told mentation.  Patient however still complains of right knee pain of which patient was given a front wheel walker to help with his ambulation.  At this time, discussed with patient possibility of possible " confusion at home which likely might be related to his kidney failure.  After dialysis, patient has no signs of altered mentation.  Patient has recommended to follow-up with PCP as indicated.  Patient to continue establish dialysis treatment.  Patient to resume all home medications.  All questions and concerns answered prior to being discharged.  Patient discharged home.

## 2022-12-11 NOTE — CARE PLAN
The patient is Stable - Low risk of patient condition declining or worsening    Shift Goals  Clinical Goals: monitor for s/s of infection  Patient Goals: rest  Family Goals: bettie    Progress made toward(s) clinical / shift goals:    Problem: Knowledge Deficit - Standard  Goal: Patient and family/care givers will demonstrate understanding of plan of care, disease process/condition, diagnostic tests and medications  Outcome: Progressing     Problem: Fall Risk  Goal: Patient will remain free from falls  Outcome: Progressing       Patient is not progressing towards the following goals:

## 2022-12-11 NOTE — PROGRESS NOTES
4 Eyes Skin Assessment Completed by SHARLENE Art, RN and WERNER Awan, RN.    Head WDL  Ears WDL  Nose WDL  Mouth WDL  Neck WDL  Breast/Chest WDL  Shoulder Blades WDL  Spine WDL  (R) Arm/Elbow/Hand scab  (L) Arm/Elbow/Hand WDL  Abdomen WDL  Groin WDL  Scrotum/Coccyx/Buttocks WDL  (R) Leg WDL  (L) Leg WDL  (R) Heel/Foot/Toe WDL  (L) Heel/Foot/Toe WDL          Devices In Places Pulse Ox      Interventions In Place Heels Loaded W/Pillows    Possible Skin Injury No    Pictures Uploaded Into Epic N/A  Wound Consult Placed N/A  RN Wound Prevention Protocol Ordered No

## 2022-12-11 NOTE — PROGRESS NOTES
"Hospital Medicine Daily Progress Note    Date of Service  12/11/2022    Chief Complaint  Jean Barboza is a 71 y.o. male admitted 12/10/2022 with generalized weakness, malaise, and knee pain    Hospital Course  Mr. Jean Barboza is a 71 y.o. male with past medical history of ESRD on MWF HD, hypertension, hx of DM treated with diet (now with normal Ha1c) who presented 12/10/2022 with several days of weakness and general malaise.     Patient states he was using the restroom today and was unable to get up from the toilet and was stuck there for 4 hours. Notes some intermittent diarrhea and flu like symptoms over the last several days and states he may not be eating/drinking as well as normal. Per family at bedside, he has been confused about time and events and just not \"quite himself\". Denies any specific complaints to me currently.      In ER, patient's vitals noted temp 99.6, HR 92, RR 18, /54, did have an O2 drop to 85% on was placed on 2L. Labs are remarkable for WBC 16.3, H/H 9.4/27.7, Cl 94, Glucose 182, BUN/Cr 34/5.51, AST 83, LA 1.1.  Viral panel was negative, xray was unremarkable  LP was done by ERP due to his confusion, elevated WBC and c/o headache on admission.  CT of the head without contrast noted no evidence of acute infarct, hemorrhage or mass with an old right occipital infarct.  Patient admitted into the observation unit for further evaluation and treatment.    Patient was monitored overnight with no episodes noted.  Patient was still noted to have diarrhea of which he stool sample was sent for C. difficile.  CSF fluid obtained results noted mildly elevated glucose at 96 and protein at 90.  Patient's mentation has improved overnight.  On examination, patient alert and oriented with only complaints of generalized weakness and fatigue.    Interval Problem Update  -Patient seen and examined.  Patient alert and oriented x4 with only complaints of generalized weakness and malaise.  Discussed " results from lumbar puncture.  Patient denies any fever, no chills, no rigors.  -Plan of care: Rule out for C. Difficile; consulted nephrology for possible treatment during this hospitalization  -Disposition: Anticipated to stay overnight until C. difficile test has been resulted, and patient gets dialysis treatment  -Lab work: Reviewed; expected  -VSS at this time    I have discussed this patient's plan of care and discharge plan at IDT rounds today with Case Management, Nursing, Nursing leadership, and other members of the IDT team.    Consultants/Specialty  nephrology -- Avelar    Code Status  Full Code    Disposition  Patient is not medically cleared for discharge.   Anticipate discharge to to home with close outpatient follow-up.  I have placed the appropriate orders for post-discharge needs.    Review of Systems  Review of Systems   Constitutional:  Positive for chills and malaise/fatigue. Negative for fever.   HENT: Negative.     Eyes: Negative.    Respiratory: Negative.     Cardiovascular: Negative.    Gastrointestinal: Negative.    Genitourinary: Negative.    Musculoskeletal:  Positive for myalgias.   Skin: Negative.    Neurological:  Positive for weakness.   Endo/Heme/Allergies: Negative.    Psychiatric/Behavioral: Negative.        Physical Exam  Temp:  [36.6 °C (97.8 °F)-37.3 °C (99.1 °F)] 37.3 °C (99.1 °F)  Pulse:  [77-87] 84  Resp:  [16-20] 16  BP: ()/(53-65) 93/53  SpO2:  [85 %-100 %] 90 %    Physical Exam  Vitals and nursing note reviewed.   HENT:      Head: Normocephalic.      Nose: Nose normal.      Mouth/Throat:      Mouth: Mucous membranes are moist.      Pharynx: Oropharynx is clear.   Eyes:      Pupils: Pupils are equal, round, and reactive to light.   Cardiovascular:      Rate and Rhythm: Normal rate and regular rhythm.      Pulses: Normal pulses.      Heart sounds: Normal heart sounds.   Pulmonary:      Effort: Pulmonary effort is normal.      Breath sounds: Normal breath sounds.    Abdominal:      General: Bowel sounds are normal.      Palpations: Abdomen is soft.   Musculoskeletal:         General: Tenderness present.      Cervical back: Normal range of motion and neck supple.   Skin:     General: Skin is dry.      Capillary Refill: Capillary refill takes 2 to 3 seconds.   Neurological:      Mental Status: He is alert. Mental status is at baseline.      Motor: Weakness present.       Fluids  No intake or output data in the 24 hours ending 12/11/22 1150    Laboratory  Recent Labs     12/10/22  1215 12/11/22  0127   WBC 16.3* 13.8*   RBC 2.79* 2.79*   HEMOGLOBIN 9.4* 9.3*   HEMATOCRIT 27.7* 27.9*   MCV 99.3* 100.0*   MCH 33.7* 33.3*   MCHC 33.9 33.3*   RDW 45.4 46.3   PLATELETCT 174 166   MPV 11.4 11.9     Recent Labs     12/10/22  1215 12/11/22  0127   SODIUM 135 135   POTASSIUM 3.8 3.7   CHLORIDE 94* 94*   CO2 29 28   GLUCOSE 182* 159*   BUN 34* 40*   CREATININE 5.51* 6.46*   CALCIUM 8.9 8.8                   Imaging  CT-HEAD W/O   Final Result      1.  No CT evidence of acute infarct, hemorrhage or mass.   2.  Old right occipital infarct.         DX-CHEST-PORTABLE (1 VIEW)   Final Result      Left basilar atelectasis or scarring.      DX-KNEE COMPLETE 4+ RIGHT   Final Result      Unremarkable knee series.                  INTERPRETING LOCATION:  [address] [place] [address], [facility]           Assessment/Plan  * Altered mental status- (present on admission)  Assessment & Plan  Some confusion noted by family, pt not quite himself, is A& O x 3 and is appropriate  -No signs of confusion on examination, no fever noted  - CT head without acute pathology, old right occipital infarction   - given flu like symptoms, headache and confusion, ERP did complete and LP   - CSF analysis given   - no significant signs of sepsis, may be dehydration or viral infection ?   - monitor closely    - supportive care   - delirium precautions     Diarrhea  Assessment & Plan  - Noted diarrhea since admission  -C. difficile  rule out  -Hold antidiarrhea medication until C. difficile has been ruled out    Knee pain- (present on admission)  Assessment & Plan  Rt knee pain on admission, now improved   Xray negative, no exam findings to suggest infection   Supportive care    Weakness- (present on admission)  Assessment & Plan  Weakness with associated flu like symptoms  - check CPK   - viral panel neg, monitor for other source of infection   - encourage oral  Hydration/food intake   - monitor   - if continues may need PT/OT evaluation     Leukocytosis- (present on admission)  Assessment & Plan  WBC 16.3, unclear etiology, does not appear septic   CXR clear, anuric, LP done pending CSF analysis   - received 1 dose of rocephin in ER, hold on further abx as source of infection is unclear, question viral given symptoms   - monitor     ESRD (end stage renal disease) on dialysis (HCC)- (present on admission)  Assessment & Plan  ESRD on MWF HD, HD yesterday without issues   -Consulted nephrology, Dr. Avelar  - monitor electrolytes      Hypertension- (present on admission)  Assessment & Plan  History of, stable   Continue home medications        VTE prophylaxis: heparin ppx    I have performed a physical exam and reviewed and updated ROS and Plan today (12/11/2022). In review of yesterday's note (12/10/2022), there are no changes except as documented above.    ================================================================================================================================================================================================================  Please note that this dictation was created using voice recognition software. I have made every reasonable attempt to correct obvious errors, but there may be errors of grammar and possibly content that I did not discover before finalizing the note.    Electronically signed by:  Dr. KAI Mohamud, DNP, APRN, FNP-C  Hospitalist Services  [facility]  ([phone]  [phone]  [email or web address]  12/11/22                 1214

## 2022-12-11 NOTE — PROGRESS NOTES
Assumed care. Pt AOX4, RA, vitals WNL, ambulates with assistance, tolerates diet. BM. Abdomen tender. 5/10 back LP site.

## 2022-12-11 NOTE — CONSULTS
"[facility] -  CONSULTATION NOTE               Author: BRANDO Flor Date & Time: 2022  12:21 PM       REASON FOR CONSULTATION:   Inpatient hemodialysis management.    CHIEF COMPLAINT:   \"weakness\"    HISTORY OF PRESENT ILLNESS:    Jean Barboza is a 71 y.o. male with past medical history of ESRD on MWF HD at [facility], hypertension, hx of DM, who presented 12/10/2022 with several days of weakness and general malaise, a few days of diarrhea.  CPK 3564.  Lytes stable. Viral panel was negative, xray was unremarkable.  LP was done and fluid is pending.  His last HD was [facility] and was uneventful.        No F/C/N/V/CP/SOB.  No melena, hematochezia, hematemesis.  No  visual changes, or abdominal pain.  +headache +occasional diarrhea     REVIEW OF SYSTEMS:    10 point ROS was performed and is as per HPI or otherwise negative.    PAST MEDICAL HISTORY:   Past Medical History:   Diagnosis Date    CATARACT     Diabetes     oral meds    Hyperlipidemia     Hypertension     Seizure (HCC) 1987    Seizure disorder (HCC)     Snoring     Stroke (HCC)        PAST SURGICAL HISTORY:   Past Surgical History:   Procedure Laterality Date    CATARACT PHACO WITH IOL  2011    Performed by ENRIQUETA MABRY at SURGERY SAME DAY [facility]S    CATARACT PHACO WITH IOL  2011    Performed by ENRIQUETA MABRY at SURGERY SAME DAY [facility] ORS    OTHER      left eye cataract    CATARACT EXTRACTION WITH IOL      OTHER      oral surgery       FAMILY HISTORY:   Family History   Problem Relation Age of Onset    Diabetes Mother     Heart Disease Father        SOCIAL HISTORY:   Social History     Tobacco Use   Smoking Status Former    Packs/day: 0.50    Years: 4.00    Pack years: 2.00    Types: Cigarettes    Quit date: 1985    Years since quittin.8   Smokeless Tobacco Never     Social History     Substance and Sexual Activity   Alcohol Use No     Social History " "    Substance and Sexual Activity   Drug Use No       HOME MEDICATIONS:   Reviewed and documented in chart.    LABORATORY STUDIES:   Recent Labs     12/10/22  1215 12/11/22  0127   SODIUM 135 135   POTASSIUM 3.8 3.7   CHLORIDE 94* 94*   CO2 29 28   GLUCOSE 182* 159*   BUN 34* 40*   CREATININE 5.51* 6.46*   CALCIUM 8.9 8.8       ALLERGIES:  Penicillin g    VS:  BP (!) 93/53   Pulse 84   Temp 37.3 °C (99.1 °F) (Temporal)   Resp 16   Ht 1.727 m (5' 8\")   Wt 80.7 kg (177 lb 14.6 oz)   SpO2 90%   BMI 27.05 kg/m²     Physical Exam  Vitals and nursing note reviewed.   Constitutional:       Appearance: Normal appearance.   HENT:      Head: Normocephalic.   Eyes:      General: No scleral icterus.     Pupils: Pupils are equal, round, and reactive to light.   Cardiovascular:      Rate and Rhythm: Normal rate.      Comments: No edema      RFA AVF +b/t      PIV in R AC  Pulmonary:      Effort: Pulmonary effort is normal.   Abdominal:      General: Abdomen is flat. Bowel sounds are normal.      Palpations: Abdomen is soft.   Musculoskeletal:         General: No deformity.   Skin:     General: Skin is warm and dry.      Findings: No rash.   Neurological:      General: No focal deficit present.      Mental Status: He is alert and oriented to person, place, and time.   Psychiatric:         Mood and Affect: Mood normal.         Behavior: Behavior normal.         Judgment: Judgment normal.          FLUID BALANCE:  No intake/output data recorded.    IMAGING:  All imaging reviewed from admission to present day    IMPRESSION:  # ESRD  - Etiology likely 2/2  HTN DM  - MWF HD via RFA AVF   # HTN  - Goal BP < 140/90  - at goal  # Anemia of CKD  - Goal Hgb 10-11  - Below goal  # CKD-MBD  - PTH pending  - Vit D pen  - Ca 8.8  - PO4 pen  #weakness    PLAN:  - MWF iHD during stay, next treatment tomorrow (MON)  - UF as tolerated, he had had cramping in his outpt unit so will aim for gently UF  - Discussed IV with RN, requested ultrasound " guided PIV placement avoiding RUE and removal of R AC PIV as soon as access is established to minimize risk of clotting his R Forearm AVF  - No dietary protein restrictions  - Dose all meds per ESRD  - Obtain iron panel and MBD labs    Thank you for the consultation!

## 2022-12-11 NOTE — ASSESSMENT & PLAN NOTE
- Noted diarrhea since admission  -C. difficile rule out  -Hold antidiarrhea medication until C. difficile has been ruled out

## 2022-12-11 NOTE — CARE PLAN
The patient is Stable - Low risk of patient condition declining or worsening    Shift Goals  Clinical Goals: use the IS, manage pain  Patient Goals: manage pain  Family Goals: bettie    Progress made toward(s) clinical / shift goals:    Problem: Knowledge Deficit - Standard  Goal: Patient and family/care givers will demonstrate understanding of plan of care, disease process/condition, diagnostic tests and medications  Outcome: Progressing     Problem: Fall Risk  Goal: Patient will remain free from falls  Outcome: Progressing     Problem: Pain - Standard  Goal: Alleviation of pain or a reduction in pain to the patient’s comfort goal  Outcome: Progressing       Patient is not progressing towards the following goals:  Use the IS to improve O2 sats. Sit at the edge of the bed or recliner for lunch and dinner. Continue to manage pain

## 2022-12-12 ENCOUNTER — APPOINTMENT (OUTPATIENT)
Dept: RADIOLOGY | Facility: MEDICAL CENTER | Age: 72
End: 2022-12-12
Attending: NURSE PRACTITIONER
Payer: MEDICARE

## 2022-12-12 VITALS
RESPIRATION RATE: 16 BRPM | DIASTOLIC BLOOD PRESSURE: 66 MMHG | WEIGHT: 177.91 LBS | HEART RATE: 77 BPM | TEMPERATURE: 97 F | HEIGHT: 68 IN | OXYGEN SATURATION: 94 % | SYSTOLIC BLOOD PRESSURE: 128 MMHG | BODY MASS INDEX: 26.96 KG/M2

## 2022-12-12 PROBLEM — R19.7 DIARRHEA: Status: RESOLVED | Noted: 2022-12-11 | Resolved: 2022-12-12

## 2022-12-12 PROBLEM — D72.829 LEUKOCYTOSIS: Status: RESOLVED | Noted: 2022-12-10 | Resolved: 2022-12-12

## 2022-12-12 PROBLEM — R53.1 WEAKNESS: Status: RESOLVED | Noted: 2022-12-10 | Resolved: 2022-12-12

## 2022-12-12 PROBLEM — R41.82 ALTERED MENTAL STATUS: Status: RESOLVED | Noted: 2022-12-10 | Resolved: 2022-12-12

## 2022-12-12 LAB
ALBUMIN SERPL BCP-MCNC: 3.2 G/DL (ref 3.2–4.9)
ALBUMIN/GLOB SERPL: 1 G/DL
ALP SERPL-CCNC: 74 U/L (ref 30–99)
ALT SERPL-CCNC: 23 U/L (ref 2–50)
ANION GAP SERPL CALC-SCNC: 16 MMOL/L (ref 7–16)
AST SERPL-CCNC: 39 U/L (ref 12–45)
BILIRUB SERPL-MCNC: 0.2 MG/DL (ref 0.1–1.5)
BUN SERPL-MCNC: 56 MG/DL (ref 8–22)
CALCIUM SERPL-MCNC: 8.2 MG/DL (ref 8.5–10.5)
CHLORIDE SERPL-SCNC: 94 MMOL/L (ref 96–112)
CO2 SERPL-SCNC: 26 MMOL/L (ref 20–33)
CREAT SERPL-MCNC: 8.19 MG/DL (ref 0.5–1.4)
ERYTHROCYTE [DISTWIDTH] IN BLOOD BY AUTOMATED COUNT: 43.5 FL (ref 35.9–50)
FERRITIN SERPL-MCNC: 1705 NG/ML (ref 22–322)
GFR SERPLBLD CREATININE-BSD FMLA CKD-EPI: 6 ML/MIN/1.73 M 2
GLOBULIN SER CALC-MCNC: 3.2 G/DL (ref 1.9–3.5)
GLUCOSE SERPL-MCNC: 153 MG/DL (ref 65–99)
HAV IGM SERPL QL IA: NORMAL
HBV CORE IGM SER QL: NORMAL
HBV SURFACE AB SERPL IA-ACNC: 1992 MIU/ML (ref 0–10)
HBV SURFACE AG SER QL: NORMAL
HCT VFR BLD AUTO: 25.1 % (ref 42–52)
HCV AB SER QL: NORMAL
HGB BLD-MCNC: 8.6 G/DL (ref 14–18)
IRON SATN MFR SERPL: 27 % (ref 15–55)
IRON SERPL-MCNC: 34 UG/DL (ref 50–180)
MCH RBC QN AUTO: 33.3 PG (ref 27–33)
MCHC RBC AUTO-ENTMCNC: 34.3 G/DL (ref 33.7–35.3)
MCV RBC AUTO: 97.3 FL (ref 81.4–97.8)
PHOSPHATE SERPL-MCNC: 4.2 MG/DL (ref 2.5–4.5)
PLATELET # BLD AUTO: 179 K/UL (ref 164–446)
PMV BLD AUTO: 11.5 FL (ref 9–12.9)
POTASSIUM SERPL-SCNC: 3.6 MMOL/L (ref 3.6–5.5)
PROT SERPL-MCNC: 6.4 G/DL (ref 6–8.2)
PTH-INTACT SERPL-MCNC: 164 PG/ML (ref 14–72)
RBC # BLD AUTO: 2.58 M/UL (ref 4.7–6.1)
SODIUM SERPL-SCNC: 136 MMOL/L (ref 135–145)
TIBC SERPL-MCNC: 127 UG/DL (ref 250–450)
UIBC SERPL-MCNC: 93 UG/DL (ref 110–370)
WBC # BLD AUTO: 9.2 K/UL (ref 4.8–10.8)

## 2022-12-12 PROCEDURE — 86706 HEP B SURFACE ANTIBODY: CPT

## 2022-12-12 PROCEDURE — G0378 HOSPITAL OBSERVATION PER HR: HCPCS

## 2022-12-12 PROCEDURE — 700111 HCHG RX REV CODE 636 W/ 250 OVERRIDE (IP): Performed by: STUDENT IN AN ORGANIZED HEALTH CARE EDUCATION/TRAINING PROGRAM

## 2022-12-12 PROCEDURE — 96372 THER/PROPH/DIAG INJ SC/IM: CPT

## 2022-12-12 PROCEDURE — 83550 IRON BINDING TEST: CPT

## 2022-12-12 PROCEDURE — 99217 PR OBSERVATION CARE DISCHARGE: CPT | Performed by: NURSE PRACTITIONER

## 2022-12-12 PROCEDURE — 80053 COMPREHEN METABOLIC PANEL: CPT

## 2022-12-12 PROCEDURE — 83970 ASSAY OF PARATHORMONE: CPT

## 2022-12-12 PROCEDURE — 82728 ASSAY OF FERRITIN: CPT

## 2022-12-12 PROCEDURE — 85027 COMPLETE CBC AUTOMATED: CPT

## 2022-12-12 PROCEDURE — 700101 HCHG RX REV CODE 250

## 2022-12-12 PROCEDURE — 36415 COLL VENOUS BLD VENIPUNCTURE: CPT

## 2022-12-12 PROCEDURE — 83540 ASSAY OF IRON: CPT

## 2022-12-12 PROCEDURE — 700102 HCHG RX REV CODE 250 W/ 637 OVERRIDE(OP): Performed by: STUDENT IN AN ORGANIZED HEALTH CARE EDUCATION/TRAINING PROGRAM

## 2022-12-12 PROCEDURE — 90935 HEMODIALYSIS ONE EVALUATION: CPT

## 2022-12-12 PROCEDURE — 84100 ASSAY OF PHOSPHORUS: CPT

## 2022-12-12 PROCEDURE — 80074 ACUTE HEPATITIS PANEL: CPT

## 2022-12-12 PROCEDURE — A9270 NON-COVERED ITEM OR SERVICE: HCPCS | Performed by: STUDENT IN AN ORGANIZED HEALTH CARE EDUCATION/TRAINING PROGRAM

## 2022-12-12 PROCEDURE — 700111 HCHG RX REV CODE 636 W/ 250 OVERRIDE (IP)

## 2022-12-12 PROCEDURE — 96375 TX/PRO/DX INJ NEW DRUG ADDON: CPT

## 2022-12-12 RX ORDER — LIDOCAINE HYDROCHLORIDE 10 MG/ML
INJECTION, SOLUTION INFILTRATION; PERINEURAL
Status: DISCONTINUED
Start: 2022-12-12 | End: 2022-12-12 | Stop reason: HOSPADM

## 2022-12-12 RX ORDER — HEPARIN SODIUM 1000 [USP'U]/ML
1750 INJECTION, SOLUTION INTRAVENOUS; SUBCUTANEOUS
Status: DISCONTINUED | OUTPATIENT
Start: 2022-12-12 | End: 2022-12-12 | Stop reason: HOSPADM

## 2022-12-12 RX ORDER — HEPARIN SODIUM 1000 [USP'U]/ML
INJECTION, SOLUTION INTRAVENOUS; SUBCUTANEOUS
Status: COMPLETED
Start: 2022-12-12 | End: 2022-12-12

## 2022-12-12 RX ADMIN — HEPARIN SODIUM 1800 UNITS: 1000 INJECTION, SOLUTION INTRAVENOUS; SUBCUTANEOUS at 11:49

## 2022-12-12 RX ADMIN — ASPIRIN 81 MG: 81 TABLET, COATED ORAL at 05:27

## 2022-12-12 RX ADMIN — FAMOTIDINE 20 MG: 20 TABLET, FILM COATED ORAL at 05:25

## 2022-12-12 RX ADMIN — DIPYRIDAMOLE 25 MG: 25 TABLET, FILM COATED ORAL at 05:28

## 2022-12-12 RX ADMIN — HEPARIN SODIUM 5000 UNITS: 5000 INJECTION, SOLUTION INTRAVENOUS; SUBCUTANEOUS at 10:39

## 2022-12-12 RX ADMIN — OXYCODONE HYDROCHLORIDE AND ACETAMINOPHEN 500 MG: 500 TABLET ORAL at 05:27

## 2022-12-12 RX ADMIN — Medication 1 ML: at 11:40

## 2022-12-12 RX ADMIN — LISINOPRIL 40 MG: 20 TABLET ORAL at 05:27

## 2022-12-12 RX ADMIN — FERROUS SULFATE TAB 325 MG (65 MG ELEMENTAL FE) 325 MG: 325 (65 FE) TAB at 05:27

## 2022-12-12 RX ADMIN — LIDOCAINE HYDROCHLORIDE 1 ML: 10 INJECTION, SOLUTION INFILTRATION; PERINEURAL at 11:40

## 2022-12-12 RX ADMIN — FELODIPINE 10 MG: 10 TABLET, EXTENDED RELEASE ORAL at 05:28

## 2022-12-12 RX ADMIN — FAMOTIDINE 20 MG: 20 TABLET, FILM COATED ORAL at 17:28

## 2022-12-12 RX ADMIN — GABAPENTIN 200 MG: 100 CAPSULE ORAL at 09:08

## 2022-12-12 RX ADMIN — GABAPENTIN 200 MG: 100 CAPSULE ORAL at 17:29

## 2022-12-12 ASSESSMENT — PAIN DESCRIPTION - PAIN TYPE: TYPE: CHRONIC PAIN

## 2022-12-12 NOTE — DISCHARGE INSTRUCTIONS
FOLLOW UP ITEMS POST DISCHARGE  Please call [phone] to schedule PCP appointment for patient.    Required specialty appointments include:         Discharge Instructions per LINDY OrtegaPDiRDiNDi     -Follow-up with PCP as indicated  -Continue with established dialysis treatment on MWF  -Resume all home medication     DIET: Renal, cardiac, diabetic     ACTIVITY: As tolerated     DIAGNOSIS: Generalized weakness and malaise     Return to ER if symptoms persist, chest pain, palpitations, shortness of breath, numbness, tingling, weakness, and high fevers.

## 2022-12-12 NOTE — CARE PLAN
The patient is Stable - Low risk of patient condition declining or worsening    Shift Goals  Clinical Goals: use the IS, manage pain  Patient Goals: manage pain  Family Goals: bettie    Progress made toward(s) clinical / shift goals:    Problem: Knowledge Deficit - Standard  Goal: Patient and family/care givers will demonstrate understanding of plan of care, disease process/condition, diagnostic tests and medications  Outcome: Progressing     Problem: Fall Risk  Goal: Patient will remain free from falls  Outcome: Progressing     Problem: Pain - Standard  Goal: Alleviation of pain or a reduction in pain to the patient’s comfort goal  Outcome: Progressing       Patient is not progressing towards the following goals:

## 2022-12-12 NOTE — PROGRESS NOTES
Pt had HD today and was seen   labs/meds and vitals were reviewed  see run sheet for full details   Pt to DC after HD

## 2022-12-12 NOTE — DISCHARGE PLANNING
Outpatient Dialysis Note     Confirmed patient is active at:     [facility]  [address] [place] [address] [place] [address]     Schedule: Mon, Wed, Fri   Time: 10:45 AM     Patient is seen by Dr. Cole in HD clinic.     Spoke with Wyatt at facility who confirmed patient information.   Forwarded records for review.     Xitlaly Reyes   Dialysis Coordinator / [facility]  Ph: [phone]

## 2022-12-12 NOTE — DISCHARGE SUMMARY
"Discharge Summary    CHIEF COMPLAINT ON ADMISSION  Chief Complaint   Patient presents with    Knee Pain     Patient reports he has R knee pain/weakness. Patient states he went to the bathroom and was unable to stand up from toilet.     Weakness     Patient reports generalized weakness, worsening yesterday       Reason for Admission  R knee pain, weakness     Admission Date  12/10/2022    CODE STATUS  Full Code    HPI & HOSPITAL COURSE    Mr. Jean Barboza is a 71 y.o. male with past medical history of ESRD on MWF HD, hypertension, hx of DM treated with diet (now with normal Ha1c) who presented 12/10/2022 with several days of weakness and general malaise.     Patient states he was using the restroom today and was unable to get up from the toilet and was stuck there for 4 hours. Notes some intermittent diarrhea and flu like symptoms over the last several days and states he may not be eating/drinking as well as normal. Per family at bedside, he has been confused about time and events and just not \"quite himself\". Denies any specific complaints to me currently.      In ER, patient's vitals noted temp 99.6, HR 92, RR 18, /54, did have an O2 drop to 85% on was placed on 2L. Labs are remarkable for WBC 16.3, H/H 9.4/27.7, Cl 94, Glucose 182, BUN/Cr 34/5.51, AST 83, LA 1.1.  Viral panel was negative, xray was unremarkable  LP was done by ERP due to his confusion, elevated WBC and c/o headache on admission.  CT of the head without contrast noted no evidence of acute infarct, hemorrhage or mass with an old right occipital infarct.  Patient admitted into the observation unit for further evaluation and treatment.    Patient was monitored overnight with no episodes noted.  Patient was still noted to have diarrhea of which he stool sample was sent for C. difficile.  CSF fluid obtained results noted mildly elevated glucose at 96 and protein at 90.  Patient's mentation has improved overnight.  On examination, patient alert " and oriented with only complaints of generalized weakness and fatigue.    Patient underwent a dialysis treatment during this hospitalization.  Patient's mentation continually improved since admission with no signs of confusion, or are told mentation.  Patient however still complains of right knee pain of which patient was given a front wheel walker to help with his ambulation.  At this time, discussed with patient possibility of possible confusion at home which likely might be related to his kidney failure.  After dialysis, patient has no signs of altered mentation.  Patient has recommended to follow-up with PCP as indicated.  Patient to continue establish dialysis treatment.  Patient to resume all home medications.  All questions and concerns answered prior to being discharged.  Patient discharged home.    Therefore, he is discharged in good and stable condition to home with close outpatient follow-up.    The patient recovered much more quickly than anticipated on admission.    Discharge Date  12/12/22      FOLLOW UP ITEMS POST DISCHARGE  Please call [phone] to schedule PCP appointment for patient.    Required specialty appointments include:       Discharge Instructions per Shannon Mohamud, A.P.R.N.    -Follow-up with PCP as indicated  -Continue with established dialysis treatment on MW  -Resume all home medication    DIET: Renal, cardiac, diabetic    ACTIVITY: As tolerated    DIAGNOSIS: Generalized weakness and malaise    Return to ER if symptoms persist, chest pain, palpitations, shortness of breath, numbness, tingling, weakness, and high fevers.      DISCHARGE DIAGNOSES  Principal Problem (Resolved):    Altered mental status POA: Yes  Active Problems:    Hypertension POA: Yes    ESRD (end stage renal disease) on dialysis (HCC) POA: Yes    Knee pain POA: Yes  Resolved Problems:    Leukocytosis POA: Yes    Weakness POA: Yes    Diarrhea POA: Unknown      FOLLOW UP    [facility]  [place] [address]  [phone]    Schedule an appointment as soon as possible for a visit in 1 week(s)        MEDICATIONS ON DISCHARGE     Medication List        CONTINUE taking these medications        Instructions   aspirin EC 81 MG Tbec  Commonly known as: ECOTRIN   Take 81 mg by mouth every day.  Dose: 81 mg     atorvastatin 20 MG Tabs  Commonly known as: LIPITOR   Take 20 mg by mouth every evening. Indications: Cerebrovascular Accident or Stroke  Dose: 20 mg     dipyridamole 25 MG Tabs  Commonly known as: PERSANTINE   Take 25 mg by mouth 2 times a day.  Dose: 25 mg     famotidine 20 MG Tabs  Commonly known as: PEPCID   Take 20 mg by mouth 2 times a day.  Dose: 20 mg     felodipine ER 10 MG Tb24  Commonly known as: Plendil   Take 10 mg by mouth every day.  Dose: 10 mg     ferrous sulfate 325 (65 Fe) MG tablet   Take 325 mg by mouth every day.  Dose: 325 mg     gabapentin 100 MG Caps  Commonly known as: NEURONTIN   Take 200 mg by mouth 3 times a day.  Dose: 200 mg     insulin detemir 100 UNIT/ML Soln  Commonly known as: Levemir   Inject  under the skin every evening.     Januvia 25 MG Tabs  Generic drug: SITagliptin   Take 1 tablet by mouth every day.  Dose: 25 mg     lisinopril 40 MG tablet  Commonly known as: PRINIVIL   Take 40 mg by mouth every day.  Dose: 40 mg     meclizine 25 MG Tabs  Commonly known as: ANTIVERT   Take 25 mg by mouth 3 times a day as needed for Nausea/Vomiting.  Dose: 25 mg     Omega-3 1000 MG Caps   Take 1,000 mg by mouth every day.  Dose: 1,000 mg     sevelamer 800 MG Tabs  Commonly known as: RENAGEL   Take 800 mg by mouth 3 times a day with meals.  Dose: 800 mg     therapeutic multivitamin-minerals Tabs   Take 1 tablet by mouth every day.  Dose: 1 Tablet     Vitamin C 500 MG Caps   Take 500 mg by mouth every day.  Dose: 500 mg              Allergies  Allergies   Allergen Reactions    Penicillin G Rash     Rxn - Exacerbated a rash on his legs  Early 2000's         DIET  Orders Placed This  Encounter   Procedures    Diet Order Diet: Renal     Standing Status:   Standing     Number of Occurrences:   1     Order Specific Question:   Diet:     Answer:   Renal [8]       ACTIVITY  As tolerated.  Weight bearing as tolerated    CONSULTATIONS  Nephrology    PROCEDURES  NONE    IMAGING  CT-HEAD W/O   Final Result      1.  No CT evidence of acute infarct, hemorrhage or mass.   2.  Old right occipital infarct.         DX-CHEST-PORTABLE (1 VIEW)   Final Result      Left basilar atelectasis or scarring.      DX-KNEE COMPLETE 4+ RIGHT   Final Result      Unremarkable knee series.                  INTERPRETING LOCATION:  [facility], [facility], [address]      IR-US GUIDED PIV    (Results Pending)         LABORATORY  Lab Results   Component Value Date    SODIUM 136 12/12/2022    POTASSIUM 3.6 12/12/2022    CHLORIDE 94 (L) 12/12/2022    CO2 26 12/12/2022    GLUCOSE 153 (H) 12/12/2022    BUN 56 (H) 12/12/2022    CREATININE 8.19 (HH) 12/12/2022        Lab Results   Component Value Date    WBC 9.2 12/12/2022    HEMOGLOBIN 8.6 (L) 12/12/2022    HEMATOCRIT 25.1 (L) 12/12/2022    PLATELETCT 179 12/12/2022        Total time of the discharge process took 36 minutes.    ================================================================================================================================================================================================================  Please note that this dictation was created using voice recognition software. I have made every reasonable attempt to correct obvious errors, but there may be errors of grammar and possibly content that I did not discover before finalizing the note.    Electronically signed by:  Dr. KAI Mohamud, DNP, APRN, FNP-C  Hospitalist Services  [facility]  [phone]  [email or web address]  12/12/22                    9830

## 2022-12-12 NOTE — PROGRESS NOTES
Received RN shift report. Patient AAOx4 and speaking in complete sentences without difficulty. Plan for dialysis tomorrow and possible dc. Patient in no acute distress.

## 2022-12-12 NOTE — PROGRESS NOTES
Pt aox4, 2L O2, IS educated, given IS, Vitals WNL, tolerates diet, ambulates.  3/10 back pain.  Pt will have dialysis 12/12/22. US PIV ordered.

## 2022-12-12 NOTE — PROGRESS NOTES
Assumed care, aox4, 2L O2, vitals WNL, DM diet. Dialysis schedule for this morning. No pain noted while in bed.

## 2022-12-13 LAB
BACTERIA CSF CULT: NORMAL
GRAM STN SPEC: NORMAL
SIGNIFICANT IND 70042: NORMAL
SITE SITE: NORMAL
SOURCE SOURCE: NORMAL

## 2022-12-13 NOTE — PROGRESS NOTES
[facility] Services Progress Note     Hemodialysis treatment x 3.5 hours completed as ordered per Dr. Lopez.  Treatment performed in dialysis MTR started at 1149 and ended at 1520.       Net UF Removed:  2,000 mL     Procedure explained, verbalized understanding, consent for treatment obtained  Patient and access assessed pre and post treatment.  Patient tolerated treatment well. UF goal reached as tolerated  Patient/VS stable overall during and post treatment, no complaints.   See dialysis electronic flow sheets under media for details.     Post tx access: 15 g cannulation needles removed from RUE forearm AVF access sites, hemostasis established on each insertion site within 5 minutes each; verified no bleeding. (+) bruit/thrill post treatment. Covered with clean gauze dressings and secured with tape. Mild swelling to upper/venous cannulation site, patient denies pain/discomfort, cold pack applied to swollen area, patient educated.     Please monitor for RUE AVF access bleeding. Should any breakthrough bleeding occur, please apply gauze and firm pressure on site until bleeding resolves then cover with gauze dressing and tape. Please observe RUE precautions- NO taking of BPs or blood draws/needle sticks/IVs to RUE    Please notify Nephrologist/Dialysis for follow-up.    1615 Report given to primary care nurse JEROD Becerra RN.    1653 Patient transported back to room via bed by CDU charge RN. Patient awake, alert and stable.

## 2022-12-15 LAB
BACTERIA BLD CULT: NORMAL
BACTERIA BLD CULT: NORMAL
SIGNIFICANT IND 70042: NORMAL
SIGNIFICANT IND 70042: NORMAL
SITE SITE: NORMAL
SITE SITE: NORMAL
SOURCE SOURCE: NORMAL
SOURCE SOURCE: NORMAL

## 2022-12-21 PROBLEM — I50.30 DIASTOLIC CHF (HCC): Chronic | Status: ACTIVE | Noted: 2022-12-21

## 2022-12-21 PROBLEM — I35.0 AORTIC STENOSIS: Chronic | Status: ACTIVE | Noted: 2021-06-22

## 2022-12-21 PROBLEM — I10 HYPERTENSION: Chronic | Status: ACTIVE | Noted: 2017-09-29

## 2022-12-21 PROBLEM — I50.30 DIASTOLIC CHF (HCC): Status: ACTIVE | Noted: 2022-12-21

## 2022-12-21 PROBLEM — I27.20 PULMONARY HYPERTENSION (HCC): Chronic | Status: ACTIVE | Noted: 2021-06-22

## 2022-12-21 PROBLEM — I50.9 ACUTE EXACERBATION OF CHF (CONGESTIVE HEART FAILURE) (HCC): Status: RESOLVED | Noted: 2021-06-21 | Resolved: 2022-12-21

## 2022-12-21 PROBLEM — E11.9 TYPE 2 DIABETES MELLITUS (HCC): Chronic | Status: ACTIVE | Noted: 2017-09-22

## 2022-12-21 PROBLEM — N18.6 ESRD (END STAGE RENAL DISEASE) ON DIALYSIS (HCC): Chronic | Status: ACTIVE | Noted: 2021-07-05

## 2022-12-21 PROBLEM — Z99.2 ESRD (END STAGE RENAL DISEASE) ON DIALYSIS (HCC): Chronic | Status: ACTIVE | Noted: 2021-07-05

## 2022-12-25 ENCOUNTER — TELEPHONE (OUTPATIENT)
Dept: MEDICAL GROUP | Facility: PHYSICIAN GROUP | Age: 72
End: 2022-12-25
Payer: MEDICARE

## 2023-06-29 NOTE — CARE PLAN
Problem: Communication  Goal: The ability to communicate needs accurately and effectively will improve  Outcome: PROGRESSING AS EXPECTED  Pt calls for assistance appropriately, communicates needs with help of family members.       .

## 2024-01-10 ENCOUNTER — APPOINTMENT (OUTPATIENT)
Dept: RADIOLOGY | Facility: MEDICAL CENTER | Age: 74
DRG: 177 | End: 2024-01-10
Attending: EMERGENCY MEDICINE
Payer: MEDICARE

## 2024-01-10 ENCOUNTER — HOSPITAL ENCOUNTER (INPATIENT)
Facility: MEDICAL CENTER | Age: 74
LOS: 1 days | DRG: 177 | End: 2024-01-11
Attending: EMERGENCY MEDICINE | Admitting: INTERNAL MEDICINE
Payer: MEDICARE

## 2024-01-10 DIAGNOSIS — J12.82 PNEUMONIA DUE TO COVID-19 VIRUS: ICD-10-CM

## 2024-01-10 DIAGNOSIS — R09.02 HYPOXIA: ICD-10-CM

## 2024-01-10 DIAGNOSIS — U07.1 PNEUMONIA DUE TO COVID-19 VIRUS: ICD-10-CM

## 2024-01-10 PROBLEM — Z71.89 ACP (ADVANCE CARE PLANNING): Status: ACTIVE | Noted: 2024-01-10

## 2024-01-10 LAB
ALBUMIN SERPL BCP-MCNC: 3.5 G/DL (ref 3.2–4.9)
ALBUMIN/GLOB SERPL: 1.1 G/DL
ALP SERPL-CCNC: 54 U/L (ref 30–99)
ALT SERPL-CCNC: 23 U/L (ref 2–50)
ANION GAP SERPL CALC-SCNC: 12 MMOL/L (ref 7–16)
AST SERPL-CCNC: 24 U/L (ref 12–45)
BASOPHILS # BLD AUTO: 0.5 % (ref 0–1.8)
BASOPHILS # BLD: 0.03 K/UL (ref 0–0.12)
BILIRUB SERPL-MCNC: 0.4 MG/DL (ref 0.1–1.5)
BUN SERPL-MCNC: 33 MG/DL (ref 8–22)
CALCIUM ALBUM COR SERPL-MCNC: 8.7 MG/DL (ref 8.5–10.5)
CALCIUM SERPL-MCNC: 8.3 MG/DL (ref 8.5–10.5)
CHLORIDE SERPL-SCNC: 95 MMOL/L (ref 96–112)
CO2 SERPL-SCNC: 32 MMOL/L (ref 20–33)
CREAT SERPL-MCNC: 7.21 MG/DL (ref 0.5–1.4)
EKG IMPRESSION: NORMAL
EOSINOPHIL # BLD AUTO: 0.01 K/UL (ref 0–0.51)
EOSINOPHIL NFR BLD: 0.2 % (ref 0–6.9)
ERYTHROCYTE [DISTWIDTH] IN BLOOD BY AUTOMATED COUNT: 55.2 FL (ref 35.9–50)
FLUAV RNA SPEC QL NAA+PROBE: NEGATIVE
FLUBV RNA SPEC QL NAA+PROBE: NEGATIVE
GFR SERPLBLD CREATININE-BSD FMLA CKD-EPI: 7 ML/MIN/1.73 M 2
GLOBULIN SER CALC-MCNC: 3.2 G/DL (ref 1.9–3.5)
GLUCOSE SERPL-MCNC: 110 MG/DL (ref 65–99)
HAV IGM SERPL QL IA: NORMAL
HBV CORE IGM SER QL: NORMAL
HBV SURFACE AB SERPL IA-ACNC: 825 MIU/ML (ref 0–10)
HBV SURFACE AG SER QL: NORMAL
HCT VFR BLD AUTO: 31.8 % (ref 42–52)
HCV AB SER QL: NORMAL
HGB BLD-MCNC: 10 G/DL (ref 14–18)
IMM GRANULOCYTES # BLD AUTO: 0.02 K/UL (ref 0–0.11)
IMM GRANULOCYTES NFR BLD AUTO: 0.3 % (ref 0–0.9)
LACTATE SERPL-SCNC: 0.8 MMOL/L (ref 0.5–2)
LACTATE SERPL-SCNC: 1.7 MMOL/L (ref 0.5–2)
LYMPHOCYTES # BLD AUTO: 0.73 K/UL (ref 1–4.8)
LYMPHOCYTES NFR BLD: 11.2 % (ref 22–41)
MCH RBC QN AUTO: 32.6 PG (ref 27–33)
MCHC RBC AUTO-ENTMCNC: 31.4 G/DL (ref 32.3–36.5)
MCV RBC AUTO: 103.6 FL (ref 81.4–97.8)
MONOCYTES # BLD AUTO: 0.57 K/UL (ref 0–0.85)
MONOCYTES NFR BLD AUTO: 8.7 % (ref 0–13.4)
NEUTROPHILS # BLD AUTO: 5.16 K/UL (ref 1.82–7.42)
NEUTROPHILS NFR BLD: 79.1 % (ref 44–72)
NRBC # BLD AUTO: 0 K/UL
NRBC BLD-RTO: 0 /100 WBC (ref 0–0.2)
PLATELET # BLD AUTO: 173 K/UL (ref 164–446)
PMV BLD AUTO: 11.4 FL (ref 9–12.9)
POTASSIUM SERPL-SCNC: 4.5 MMOL/L (ref 3.6–5.5)
PROT SERPL-MCNC: 6.7 G/DL (ref 6–8.2)
RBC # BLD AUTO: 3.07 M/UL (ref 4.7–6.1)
RSV RNA SPEC QL NAA+PROBE: NEGATIVE
SARS-COV-2 RNA RESP QL NAA+PROBE: DETECTED
SODIUM SERPL-SCNC: 139 MMOL/L (ref 135–145)
WBC # BLD AUTO: 6.5 K/UL (ref 4.8–10.8)

## 2024-01-10 PROCEDURE — 83605 ASSAY OF LACTIC ACID: CPT | Mod: 91

## 2024-01-10 PROCEDURE — 93005 ELECTROCARDIOGRAM TRACING: CPT | Performed by: EMERGENCY MEDICINE

## 2024-01-10 PROCEDURE — 700111 HCHG RX REV CODE 636 W/ 250 OVERRIDE (IP): Performed by: EMERGENCY MEDICINE

## 2024-01-10 PROCEDURE — 700111 HCHG RX REV CODE 636 W/ 250 OVERRIDE (IP): Performed by: INTERNAL MEDICINE

## 2024-01-10 PROCEDURE — 0241U HCHG SARS-COV-2 COVID-19 NFCT DS RESP RNA 4 TRGT ED POC: CPT

## 2024-01-10 PROCEDURE — 80053 COMPREHEN METABOLIC PANEL: CPT

## 2024-01-10 PROCEDURE — 71045 X-RAY EXAM CHEST 1 VIEW: CPT

## 2024-01-10 PROCEDURE — 700105 HCHG RX REV CODE 258: Performed by: EMERGENCY MEDICINE

## 2024-01-10 PROCEDURE — 86706 HEP B SURFACE ANTIBODY: CPT

## 2024-01-10 PROCEDURE — 700102 HCHG RX REV CODE 250 W/ 637 OVERRIDE(OP): Performed by: INTERNAL MEDICINE

## 2024-01-10 PROCEDURE — A9270 NON-COVERED ITEM OR SERVICE: HCPCS | Performed by: INTERNAL MEDICINE

## 2024-01-10 PROCEDURE — 36415 COLL VENOUS BLD VENIPUNCTURE: CPT

## 2024-01-10 PROCEDURE — 99497 ADVNCD CARE PLAN 30 MIN: CPT | Performed by: INTERNAL MEDICINE

## 2024-01-10 PROCEDURE — 87040 BLOOD CULTURE FOR BACTERIA: CPT | Mod: 91

## 2024-01-10 PROCEDURE — 99223 1ST HOSP IP/OBS HIGH 75: CPT | Mod: 25,AI | Performed by: INTERNAL MEDICINE

## 2024-01-10 PROCEDURE — 80074 ACUTE HEPATITIS PANEL: CPT

## 2024-01-10 PROCEDURE — 96375 TX/PRO/DX INJ NEW DRUG ADDON: CPT

## 2024-01-10 PROCEDURE — 770001 HCHG ROOM/CARE - MED/SURG/GYN PRIV*

## 2024-01-10 PROCEDURE — 96372 THER/PROPH/DIAG INJ SC/IM: CPT

## 2024-01-10 PROCEDURE — 99285 EMERGENCY DEPT VISIT HI MDM: CPT

## 2024-01-10 PROCEDURE — 96365 THER/PROPH/DIAG IV INF INIT: CPT

## 2024-01-10 PROCEDURE — 85025 COMPLETE CBC W/AUTO DIFF WBC: CPT

## 2024-01-10 PROCEDURE — 93005 ELECTROCARDIOGRAM TRACING: CPT

## 2024-01-10 PROCEDURE — 90935 HEMODIALYSIS ONE EVALUATION: CPT

## 2024-01-10 RX ORDER — HEPARIN SODIUM 5000 [USP'U]/ML
5000 INJECTION, SOLUTION INTRAVENOUS; SUBCUTANEOUS EVERY 8 HOURS
Status: DISCONTINUED | OUTPATIENT
Start: 2024-01-10 | End: 2024-01-11 | Stop reason: HOSPADM

## 2024-01-10 RX ORDER — ASPIRIN 81 MG/1
81 TABLET, CHEWABLE ORAL DAILY
Status: DISCONTINUED | OUTPATIENT
Start: 2024-01-10 | End: 2024-01-11

## 2024-01-10 RX ORDER — DOXAZOSIN 2 MG/1
2 TABLET ORAL NIGHTLY
COMMUNITY

## 2024-01-10 RX ORDER — GUAIFENESIN 600 MG/1
600 TABLET, EXTENDED RELEASE ORAL 2 TIMES DAILY PRN
Status: DISCONTINUED | OUTPATIENT
Start: 2024-01-10 | End: 2024-01-11 | Stop reason: HOSPADM

## 2024-01-10 RX ORDER — POLYETHYLENE GLYCOL 3350 17 G/17G
1 POWDER, FOR SOLUTION ORAL
Status: DISCONTINUED | OUTPATIENT
Start: 2024-01-10 | End: 2024-01-11 | Stop reason: HOSPADM

## 2024-01-10 RX ORDER — LISINOPRIL 20 MG/1
40 TABLET ORAL DAILY
Status: DISCONTINUED | OUTPATIENT
Start: 2024-01-10 | End: 2024-01-11

## 2024-01-10 RX ORDER — AMOXICILLIN 250 MG
2 CAPSULE ORAL 2 TIMES DAILY
Status: DISCONTINUED | OUTPATIENT
Start: 2024-01-10 | End: 2024-01-11 | Stop reason: HOSPADM

## 2024-01-10 RX ORDER — SEVELAMER CARBONATE 800 MG/1
1600 TABLET, FILM COATED ORAL
Status: DISCONTINUED | OUTPATIENT
Start: 2024-01-10 | End: 2024-01-11 | Stop reason: HOSPADM

## 2024-01-10 RX ORDER — ASPIRIN 81 MG/1
81 TABLET ORAL DAILY
Status: DISCONTINUED | OUTPATIENT
Start: 2024-01-10 | End: 2024-01-11 | Stop reason: HOSPADM

## 2024-01-10 RX ORDER — CEFEPIME HYDROCHLORIDE 2 G/1
2 INJECTION, POWDER, FOR SOLUTION INTRAVENOUS ONCE
Status: COMPLETED | OUTPATIENT
Start: 2024-01-10 | End: 2024-01-10

## 2024-01-10 RX ORDER — FUROSEMIDE 80 MG
80 TABLET ORAL PRN
COMMUNITY

## 2024-01-10 RX ORDER — HEPARIN SODIUM 1000 [USP'U]/ML
1500 INJECTION, SOLUTION INTRAVENOUS; SUBCUTANEOUS PRN
Status: DISCONTINUED | OUTPATIENT
Start: 2024-01-10 | End: 2024-01-11 | Stop reason: HOSPADM

## 2024-01-10 RX ORDER — FELODIPINE 5 MG/1
10 TABLET, EXTENDED RELEASE ORAL DAILY
Status: DISCONTINUED | OUTPATIENT
Start: 2024-01-10 | End: 2024-01-10

## 2024-01-10 RX ORDER — DOXAZOSIN 2 MG/1
2 TABLET ORAL NIGHTLY
Status: DISCONTINUED | OUTPATIENT
Start: 2024-01-10 | End: 2024-01-11

## 2024-01-10 RX ORDER — ATORVASTATIN CALCIUM 20 MG/1
20 TABLET, FILM COATED ORAL EVERY EVENING
Status: DISCONTINUED | OUTPATIENT
Start: 2024-01-10 | End: 2024-01-11 | Stop reason: HOSPADM

## 2024-01-10 RX ORDER — LOPERAMIDE HYDROCHLORIDE 2 MG/1
2-4 CAPSULE ORAL 4 TIMES DAILY PRN
Status: ON HOLD | COMMUNITY
End: 2024-01-11

## 2024-01-10 RX ORDER — ASPIRIN 81 MG/1
81 TABLET, CHEWABLE ORAL DAILY
COMMUNITY

## 2024-01-10 RX ORDER — ACETAMINOPHEN 325 MG/1
650 TABLET ORAL EVERY 6 HOURS PRN
Status: DISCONTINUED | OUTPATIENT
Start: 2024-01-10 | End: 2024-01-11 | Stop reason: HOSPADM

## 2024-01-10 RX ORDER — CALCITRIOL 0.25 UG/1
0.25 CAPSULE, LIQUID FILLED ORAL PRN
COMMUNITY

## 2024-01-10 RX ORDER — BISACODYL 10 MG
10 SUPPOSITORY, RECTAL RECTAL
Status: DISCONTINUED | OUTPATIENT
Start: 2024-01-10 | End: 2024-01-11 | Stop reason: HOSPADM

## 2024-01-10 RX ORDER — VITAMIN B COMPLEX
2000 TABLET ORAL DAILY
Status: DISCONTINUED | OUTPATIENT
Start: 2024-01-10 | End: 2024-01-11 | Stop reason: HOSPADM

## 2024-01-10 RX ORDER — DIPYRIDAMOLE 25 MG
25 TABLET ORAL 2 TIMES DAILY
Status: DISCONTINUED | OUTPATIENT
Start: 2024-01-10 | End: 2024-01-11 | Stop reason: HOSPADM

## 2024-01-10 RX ORDER — CHOLECALCIFEROL (VITAMIN D3) 50 MCG
2000 TABLET ORAL DAILY
COMMUNITY
End: 2024-02-11

## 2024-01-10 RX ORDER — ONDANSETRON 4 MG/1
4 TABLET, ORALLY DISINTEGRATING ORAL EVERY 6 HOURS PRN
Status: DISCONTINUED | OUTPATIENT
Start: 2024-01-10 | End: 2024-01-11 | Stop reason: HOSPADM

## 2024-01-10 RX ORDER — ONDANSETRON 2 MG/ML
4 INJECTION INTRAMUSCULAR; INTRAVENOUS EVERY 6 HOURS PRN
Status: DISCONTINUED | OUTPATIENT
Start: 2024-01-10 | End: 2024-01-11 | Stop reason: HOSPADM

## 2024-01-10 RX ADMIN — VANCOMYCIN HYDROCHLORIDE 2000 MG: 5 INJECTION, POWDER, LYOPHILIZED, FOR SOLUTION INTRAVENOUS at 13:05

## 2024-01-10 RX ADMIN — ATORVASTATIN CALCIUM 20 MG: 20 TABLET, FILM COATED ORAL at 22:51

## 2024-01-10 RX ADMIN — Medication 2000 UNITS: at 15:43

## 2024-01-10 RX ADMIN — HEPARIN SODIUM 5000 UNITS: 5000 INJECTION, SOLUTION INTRAVENOUS; SUBCUTANEOUS at 22:51

## 2024-01-10 RX ADMIN — HEPARIN SODIUM 5000 UNITS: 5000 INJECTION, SOLUTION INTRAVENOUS; SUBCUTANEOUS at 15:19

## 2024-01-10 RX ADMIN — CEFEPIME 2 G: 2 INJECTION, POWDER, FOR SOLUTION INTRAVENOUS at 11:59

## 2024-01-10 RX ADMIN — DIPYRIDAMOLE 25 MG: 25 TABLET, FILM COATED ORAL at 22:51

## 2024-01-10 RX ADMIN — LISINOPRIL 40 MG: 20 TABLET ORAL at 15:19

## 2024-01-10 RX ADMIN — ASPIRIN 81 MG: 81 TABLET, COATED ORAL at 15:18

## 2024-01-10 RX ADMIN — HEPARIN SODIUM 1500 UNITS: 1000 INJECTION, SOLUTION INTRAVENOUS; SUBCUTANEOUS at 19:00

## 2024-01-10 RX ADMIN — DOXAZOSIN 2 MG: 2 TABLET ORAL at 22:51

## 2024-01-10 ASSESSMENT — ENCOUNTER SYMPTOMS
NECK PAIN: 0
VOMITING: 0
COUGH: 1
HEMOPTYSIS: 0
DIARRHEA: 0
WEIGHT LOSS: 0
MYALGIAS: 0
NAUSEA: 0
DIZZINESS: 0
PALPITATIONS: 0
ABDOMINAL PAIN: 0
CHILLS: 0
CLAUDICATION: 0
WEAKNESS: 0
SHORTNESS OF BREATH: 1
ORTHOPNEA: 0
SPEECH CHANGE: 0
BLURRED VISION: 0
FEVER: 0
CONSTIPATION: 0
PHOTOPHOBIA: 0
DOUBLE VISION: 0

## 2024-01-10 ASSESSMENT — PATIENT HEALTH QUESTIONNAIRE - PHQ9
SUM OF ALL RESPONSES TO PHQ9 QUESTIONS 1 AND 2: 0
1. LITTLE INTEREST OR PLEASURE IN DOING THINGS: NOT AT ALL
2. FEELING DOWN, DEPRESSED, IRRITABLE, OR HOPELESS: NOT AT ALL

## 2024-01-10 ASSESSMENT — LIFESTYLE VARIABLES
CONSUMPTION TOTAL: NEGATIVE
TOTAL SCORE: 0
HAVE YOU EVER FELT YOU SHOULD CUT DOWN ON YOUR DRINKING: NO
DOES PATIENT WANT TO STOP DRINKING: NO
HOW MANY TIMES IN THE PAST YEAR HAVE YOU HAD 5 OR MORE DRINKS IN A DAY: 0
EVER HAD A DRINK FIRST THING IN THE MORNING TO STEADY YOUR NERVES TO GET RID OF A HANGOVER: NO
EVER FELT BAD OR GUILTY ABOUT YOUR DRINKING: NO
ALCOHOL_USE: NO
ON A TYPICAL DAY WHEN YOU DRINK ALCOHOL HOW MANY DRINKS DO YOU HAVE: 0
HAVE PEOPLE ANNOYED YOU BY CRITICIZING YOUR DRINKING: NO
TOTAL SCORE: 0
AVERAGE NUMBER OF DAYS PER WEEK YOU HAVE A DRINK CONTAINING ALCOHOL: 0
TOTAL SCORE: 0

## 2024-01-10 ASSESSMENT — FIBROSIS 4 INDEX: FIB4 SCORE: 3.32

## 2024-01-10 ASSESSMENT — PAIN DESCRIPTION - PAIN TYPE: TYPE: ACUTE PAIN

## 2024-01-10 NOTE — ED NOTES
Pt moved to [place] [id number] with belongings and chart by ED tech on transport zoll.   Phlebotomy notified of pt moving to new room so second set of blood cultures can be drawn.

## 2024-01-10 NOTE — ASSESSMENT & PLAN NOTE
Patient is on dialysis for 2 years 3 times a week  Nephrologist was consulted, appreciate the recommendations  Chronic anemia   check phosphorus and magnesium  Dialysis as scheduled

## 2024-01-10 NOTE — ED NOTES
Dr. Guevara updated that one set of blood cultures drawn, phlebotomy needs to wait 30min for next set of blood cultures and antibiotics still not given. Per Dr. Guevara, okay to give antibiotics now.

## 2024-01-10 NOTE — ED NOTES
Phlebotomy at bedside to draw first set of blood cultures and redraw green top and lactic.  Per phlebotomy, they need to wait 30min until they can draw second set. Charge RN updated that pt positive for covid. Mask on pt and family members.

## 2024-01-10 NOTE — H&P
Hospital Medicine History & Physical Note    Date of Service  1/10/2024    Primary Care Physician  Pcp Not In Computer    Consultants  Nephrologist    Code Status  Full Code    Chief Complaint  Chief Complaint   Patient presents with    Dizziness     Pt BIB [facility] from home. Pt states he's felt sick since dialysis on Monday 1/8/24, he has a cough and dizziness.        History of Presenting Illness    73-year-old male with history of diabetes, end-stage kidney disease on dialysis for 2 years, hypertension and seizure with a stroke who presented 1/10 with generalized weakness.  Patient has had weakness for last couple days, getting worse, no significant fever however had chills also had a dry coughing, no urinary or bowel symptoms.  On admission patient needed 4 L nasal cannula, got worse to 6 L on oxy mask, chest x-ray showed bilateral infiltration possible pneumonia, COVID test was positive, labs did not show leukocytosis.  Patient he missed his dialysis today, dexamethasone was initiated.  Patient will be admitted to hospital for acute respiratory failure for end-stage kidney disease over volume and COVID infection/pneumonia.    Case was discussed in detail with the patient and his son, answered all their questions, discussed the CODE STATUS and the patient wants to be full code.    I discussed the plan of care with patient, family, bedside RN, and nephrologist and ED physician .    Review of Systems  Review of Systems   Constitutional:  Positive for malaise/fatigue. Negative for chills, fever and weight loss.   HENT:  Negative for ear pain, hearing loss and tinnitus.    Eyes:  Negative for blurred vision, double vision and photophobia.   Respiratory:  Positive for cough and shortness of breath. Negative for hemoptysis.    Cardiovascular:  Negative for chest pain, palpitations, orthopnea and claudication.   Gastrointestinal:  Negative for abdominal pain, constipation, diarrhea, nausea and vomiting.    Genitourinary:  Negative for dysuria, frequency and urgency.   Musculoskeletal:  Negative for myalgias and neck pain.   Skin:  Negative for rash.   Neurological:  Negative for dizziness, speech change and weakness.       Past Medical History   has a past medical history of CATARACT, Diabetes (2005), Hyperlipidemia, Hypertension, Seizure (HCC) (1987), Seizure disorder (HCC), Snoring, and Stroke (HCC).    Surgical History   has a past surgical history that includes other (2003); other (2011); cataract phaco with iol (4/20/2011); cataract phaco with iol (5/4/2011); and cataract extraction with iol (2011).     Family History  family history includes Diabetes in his mother; Heart Disease in his father.   Family history reviewed with patient. There is no family history that is pertinent to the chief complaint.     Social History   reports that he quit smoking about 38 years ago. His smoking use included cigarettes. He started smoking about 42 years ago. He has a 2.0 pack-year smoking history. He has never used smokeless tobacco. He reports that he does not drink alcohol and does not use drugs.    Allergies  Allergies   Allergen Reactions    Penicillin G Rash     Rxn - Exacerbated a rash on his legs  Early 2000's         Medications  Prior to Admission Medications   Prescriptions Last Dose Informant Patient Reported? Taking?   Ascorbic Acid (VITAMIN C) 500 MG Cap  MAR from Other Facility Yes No   Sig: Take 500 mg by mouth every day.   Omega-3 1000 MG Cap   Yes No   Sig: Take 1,000 mg by mouth every day.   SITagliptin (JANUVIA) 25 MG Tab   No No   Sig: Take 1 tablet by mouth every day.   aspirin EC (ECOTRIN) 81 MG Tablet Delayed Response  MAR from Other Facility Yes No   Sig: Take 81 mg by mouth every day.   atorvastatin (LIPITOR) 20 MG Tab  MAR from Other Facility Yes No   Sig: Take 20 mg by mouth every evening. Indications: Cerebrovascular Accident or Stroke   dipyridamole (PERSANTINE) 25 MG Tab   Yes No   Sig: Take 25  mg by mouth 2 times a day.   famotidine (PEPCID) 20 MG Tab  MAR from Other Facility Yes No   Sig: Take 20 mg by mouth 2 times a day.   felodipine ER (PLENDIL) 10 MG TABLET SR 24 HR   Yes No   Sig: Take 10 mg by mouth every day.   ferrous sulfate 325 (65 Fe) MG tablet   Yes No   Sig: Take 325 mg by mouth every day.   gabapentin (NEURONTIN) 100 MG Cap   Yes No   Sig: Take 200 mg by mouth 3 times a day.   insulin detemir (LEVEMIR) 100 UNIT/ML Solution   Yes No   Sig: Inject  under the skin every evening.   lisinopril (PRINIVIL) 40 MG tablet  MAR from Other Facility Yes No   Sig: Take 40 mg by mouth every day.   meclizine (ANTIVERT) 25 MG Tab  MAR from Other Facility Yes No   Sig: Take 25 mg by mouth 3 times a day as needed for Nausea/Vomiting.   sevelamer (RENAGEL) 800 MG Tab   Yes No   Sig: Take 800 mg by mouth 3 times a day with meals.   therapeutic multivitamin-minerals (THERAGRAN-M) Tab  MAR from Other Facility Yes No   Sig: Take 1 tablet by mouth every day.      Facility-Administered Medications: None       Physical Exam  Temp:  [37.2 °C (99 °F)] 37.2 °C (99 °F)  Pulse:  [84-93] 84  Resp:  [14-16] 16  BP: (114-131)/(72-80) 114/72  SpO2:  [92 %-94 %] 94 %  Blood Pressure : 122/80   Temperature: 37.2 °C (99 °F)   Pulse: 86   Respiration: 14   Pulse Oximetry: 92 %       Physical Exam  Constitutional:       General: He is not in acute distress.     Appearance: He is ill-appearing.   Eyes:      General: No scleral icterus.  Cardiovascular:      Rate and Rhythm: Normal rate.      Heart sounds: No murmur heard.  Pulmonary:      Effort: Respiratory distress present.      Breath sounds: Rales present. No wheezing.   Abdominal:      General: There is no distension.      Tenderness: There is no abdominal tenderness. There is no right CVA tenderness, left CVA tenderness or guarding.   Musculoskeletal:      Right lower leg: No edema.      Left lower leg: No edema.   Lymphadenopathy:      Cervical: No cervical adenopathy.  "  Skin:     Coloration: Skin is not jaundiced.      Findings: No bruising, lesion or rash.   Neurological:      General: No focal deficit present.      Mental Status: He is alert and oriented to person, place, and time. Mental status is at baseline.      Cranial Nerves: No cranial nerve deficit.      Motor: No weakness.      Gait: Gait normal.         Laboratory:  Recent Labs     01/10/24  1106   WBC 6.5   RBC 3.07*   HEMOGLOBIN 10.0*   HEMATOCRIT 31.8*   .6*   MCH 32.6   MCHC 31.4*   RDW 55.2*   PLATELETCT 173   MPV 11.4     Recent Labs     01/10/24  1253   SODIUM 139   POTASSIUM 4.5   CHLORIDE 95*   CO2 32   GLUCOSE 110*   BUN 33*   CREATININE 7.21*   CALCIUM 8.3*     Recent Labs     01/10/24  1253   ALTSGPT 23   ASTSGOT 24   ALKPHOSPHAT 54   TBILIRUBIN 0.4   GLUCOSE 110*         No results for input(s): \"NTPROBNP\" in the last 72 hours.      No results for input(s): \"TROPONINT\" in the last 72 hours.    Imaging:  DX-CHEST-PORTABLE (1 VIEW)   Final Result      1.  Findings moderately suspicious for RIGHT pneumonia   2.  Persistently enlarged cardiac silhouette   3.  Atherosclerosis          X-Ray:  I have personally reviewed the images and compared with prior images.  EKG:  I have personally reviewed the images and compared with prior images.    Assessment/Plan:  Justification for Admission Status  I anticipate this patient will require at least two midnights for appropriate medical management, necessitating inpatient admission because acute respiratory failure    * Pneumonia due to COVID-19 virus- (present on admission)  Assessment & Plan  Came with generalized weakness and chills with shortness of breath  Patient needed oxy mask  Chest x-ray showed infiltration possible pneumonia  Started with dexamethasone 6 mg daily for 10 days  Oxygen therapy  Dialysis  No need for antibiotics    Acute respiratory failure with hypoxia (HCC)- (present on admission)  Assessment & Plan  Likely related to over volume and " COVID infection  Patient needed oxy mask 6 L, his baseline is room air  Dose of dexamethasone   Inhalers as needed  Weaning her from oxygen as possible  RT    ACP (advance care planning)  Assessment & Plan  1/10, long discussion was done with the patient and his son, discussed the plan of care, discussed the goal of care also discussed the CODE STATUS, patient wants to be full code.  Time 25 minutes    ESRD (end stage renal disease) on dialysis ([facility])- (present on admission)  Assessment & Plan  Patient is on dialysis for 2 years 3 times a week  Nephrologist was consulted, appreciate the recommendations  Chronic anemia   check phosphorus and magnesium      Pulmonary hypertension (HCC)- (present on admission)  Assessment & Plan  Dialysis    History of CVA (cerebrovascular accident)- (present on admission)  Assessment & Plan  Continue aspirin and statin   PT and OT for weakness      Hypertension- (present on admission)  Assessment & Plan  Continue home dose of lisinopril    Type 2 diabetes mellitus (HCC)- (present on admission)  Assessment & Plan  Sliding scale  Check A1c        VTE prophylaxis: SCDs/TEDs and heparin ppx

## 2024-01-10 NOTE — ED PROVIDER NOTES
ER Provider Note    Scribed for Dr. Jean Guevara M.D. by Francia Sibley. 1/10/2024  11:23 AM    Primary Care Provider: Pcp Not In Computer    CHIEF COMPLAINT  Chief Complaint   Patient presents with    Dizziness     Pt BIB [facility] from home. Pt states he's felt sick since dialysis on Monday 1/8/24, he has a cough and dizziness.        EXTERNAL RECORDS REVIEWED  Inpatient Notes The patient was admitted 12/10/23 for altered mental status.    HPI/ROS    Jean Barboza is a 73 y.o. male who presents to the ED for dizziness onset 2 days ago. The patient explains that he has been feeling dizzy and weak since his dialysis appointment 2 days ago. The patient adds that he had bleeding from dialysis location following appointment. There are no known alleviating or exacerbating factors.  He is allergic to penicillin.    PAST MEDICAL HISTORY  Past Medical History:   Diagnosis Date    CATARACT     Diabetes 2005    oral meds    Hyperlipidemia     Hypertension     Seizure (HCC) 1987    Seizure disorder (HCC)     Snoring     Stroke (HCC)        SURGICAL HISTORY  Past Surgical History:   Procedure Laterality Date    CATARACT PHACO WITH IOL  5/4/2011    Performed by ENRIQUETA MABRY at SURGERY SAME DAY [facility] ORS    CATARACT PHACO WITH IOL  4/20/2011    Performed by ENRIQUETA MABRY at [facility]GERY [facility]    OTHER  2011    left eye cataract    CATARACT EXTRACTION WITH IOL  2011    OTHER  2003    oral surgery       FAMILY HISTORY  Family History   Problem Relation Age of Onset    Diabetes Mother     Heart Disease Father        SOCIAL HISTORY   reports that he quit smoking about 38 years ago. His smoking use included cigarettes. He started smoking about 42 years ago. He has a 2.0 pack-year smoking history. He has never used smokeless tobacco. He reports that he does not drink alcohol and does not use drugs.    CURRENT MEDICATIONS  Current Outpatient Medications   Medication Instructions    aspirin EC  "(ECOTRIN) 81 mg, Oral, DAILY    atorvastatin (LIPITOR) 20 mg, Oral, EVERY EVENING    dipyridamole (PERSANTINE) 25 mg, Oral, 2 TIMES DAILY    famotidine (PEPCID) 20 mg, Oral, 2 TIMES DAILY    felodipine ER (PLENDIL) 10 mg, Oral, DAILY    ferrous sulfate 325 mg, Oral, DAILY    gabapentin (NEURONTIN) 200 mg, Oral, 3 TIMES DAILY    insulin detemir (LEVEMIR) 100 UNIT/ML Solution Subcutaneous, NIGHTLY    Januvia 25 mg, Oral, DAILY    lisinopril (PRINIVIL) 40 mg, Oral, DAILY    meclizine (ANTIVERT) 25 mg, Oral, 3 TIMES DAILY PRN    Omega-3 1,000 mg, Oral, DAILY    sevelamer (RENAGEL) 800 mg, Oral, 3 TIMES DAILY WITH MEALS    therapeutic multivitamin-minerals (THERAGRAN-M) Tab 1 Tablet, Oral, DAILY    Vitamin C 500 mg, Oral, DAILY      ALLERGIES  Penicillin g    PHYSICAL EXAM  /74   Pulse 93   Temp 37.2 °C (99 °F) (Temporal)   Resp 16   Ht 1.727 m (5' 8\")   Wt 79.4 kg (175 lb)   SpO2 94%   BMI 26.61 kg/m²   Constitutional: Alert in no apparent distress.  HENT: No signs of trauma, Bilateral external ears normal, Nose normal.   Eyes: Pupils are equal and reactive, Conjunctiva normal, Non-icteric.   Neck: Normal range of motion, No tenderness, Supple,   Cardiovascular: Regular rate and rhythm, no murmurs. Palpable pulses on radial bilaterally.   Thorax & Lungs: Diminished breath sounds in the bilateral bases, No respiratory distress, No wheezing, No chest tenderness.   Abdomen: Bowel sounds normal, Soft, No tenderness, No masses, No pulsatile masses. No peritoneal signs.  Skin: Warm, Dry, No erythema, No rash.   Back: No bony tenderness, No CVA tenderness.   Extremities: Intact distal pulses, No edema, No tenderness, No cyanosis, no tenderness  Neurologic: Alert , Decreased sensation to ulnar distribution on the right, sensation is intact, Normal motor function, Normal sensory function, No focal deficits noted.   Psychiatric: Affect normal     DIAGNOSTIC STUDIES & PROCEDURES    Labs:   Labs Reviewed   CBC WITH " "DIFFERENTIAL - Abnormal; Notable for the following components:       Result Value    RBC 3.07 (*)     Hemoglobin 10.0 (*)     Hematocrit 31.8 (*)     .6 (*)     MCHC 31.4 (*)     RDW 55.2 (*)     Neutrophils-Polys 79.10 (*)     Lymphocytes 11.20 (*)     Lymphs (Absolute) 0.73 (*)     All other components within normal limits   COMP METABOLIC PANEL - Abnormal; Notable for the following components:    Chloride 95 (*)     Glucose 110 (*)     Bun 33 (*)     Creatinine 7.21 (*)     Calcium 8.3 (*)     All other components within normal limits    Narrative:     SPECIMEN IS A RECOLLECT   ESTIMATED GFR - Abnormal; Notable for the following components:    GFR (CKD-EPI) 7 (*)     All other components within normal limits    Narrative:     SPECIMEN IS A RECOLLECT   POC COV-2, FLU A/B, RSV BY PCR - Abnormal; Notable for the following components:    POC SARS-CoV-2, PCR DETECTED (*)     All other components within normal limits   LACTIC ACID   BLOOD CULTURE    Narrative:     1 of 2 for Blood Culture x 2 sites order. Per Hospital  Policy: Only change Specimen Src: to \"Line\" if specified by  physician order.   BLOOD CULTURE   LACTIC ACID   LACTIC ACID   POCT COV-2, FLU A/B, RSV BY PCR      All labs reviewed by me.    EKG Interpretation:  Interpreted by me    Results for orders placed or performed during the hospital encounter of 01/10/24   EKG (NOW)   Result Value Ref Range    Report       [facility] Emergency Dept.    Test Date:  2024-01-10  Pt Name:    DARRELL KOWALSKI                  Department: ER  MRN:        [id number]                      Room:       RD 12 H  Gender:     Male                         Technician:  :        1950                   Requested By:ER TRIAGE PROTOCOL  Order #:    [id number]                    Reading MD:    Measurements  Intervals                                Axis  Rate:       93                           P:          55  MA:         161                          QRS:      "   50  QRSD:       91                           T:          70  QT:         355  QTc:        442    Interpretive Statements  Sinus rhythm  Anteroseptal infarct, old  Nonspecific repol abnormality, lateral leads  Compared to ECG 06/22/2021 02:07:16  Early repolarization now present  Sinus tachycardia no longer present  T-wave abnormality no longer present  Myocardial infarct finding still present        Radiology:   The attending Emergency Physician has independently interpreted the diagnostic imaging associated with this visit and is awaiting the final reading from the radiologist, which will be displayed below.    Preliminary interpretation is a follows: Opacity noted on the right side concerning for pneumonia.  Radiologist interpretation:     DX-CHEST-PORTABLE (1 VIEW)   Final Result      1.  Findings moderately suspicious for RIGHT pneumonia   2.  Persistently enlarged cardiac silhouette   3.  Atherosclerosis           COURSE & MEDICAL DECISION MAKING    ED Observation Status? No; Patient does not meet criteria for ED Observation.     INITIAL ASSESSMENT AND PLAN  Care Narrative:       11:23 AM - Patient seen and evaluated at bedside. Discussed plan of care, including labs. Patient agrees to plan of care. Patient will be treated with 2,000 mg Vancocin and 2 g Maxipime for his symptoms. Ordered DX- Chest, blood culture Lactic acid, POCT CoV-2, Flu A/B, RSV by PCR, and CBC with diff to evaluate.    12:36 PM - The patient had the opportunity to ask any questions. The plan for hospitalization was discussed with the patient given their current presentation and diagnostic study results. The patient is understanding and agreeable to the plan for hospitalization.      12:49 PM I discussed the patient's case and the above findings with Dr. Menjivar (Hospitalist) who agrees to evaluate the patient for hospitalization.     ADDITIONAL PROBLEM LIST AND DISPOSITION  Patient with weakness in the context of dialysis and  respiratory symptoms.  Ultimately the patient has COVID.  Patient has no emergent indication for dialysis at this time.  Patient is anemic but at baseline.  The patient is hypoxic and has a nonischemic EKG.  There is pneumonia on chest x-ray and antibiotics given.  Will meet the patient to the hospital in guarded condition.               DISPOSITION AND DISCUSSIONS  I have discussed management of the patient with the following physicians and TINY's: Dr. Menjivar (Hospitalist)    Discussion of management with other QHP or appropriate source(s): None     Barriers to care at this time, including but not limited to: Patient does not have established PCP.     Decision tools and prescription drugs considered including, but not limited to: Antibiotics for potential pneumonia. .    DISPOSITION:  Patient will be hospitalized by Dr. Menjivar in guarded condition.     FINAL IMPRESSION   1. Pneumonia due to COVID-19 virus    2. Hypoxia         Francia AMAYA (Keron), am scribing for, and in the presence of, Jean Guevara M.D..    Electronically signed by: Francia Sibley (Keron), 1/10/2024    IJean M.D. personally performed the services described in this documentation, as scribed by Francia Sibley in my presence, and it is both accurate and complete.    The note accurately reflects work and decisions made by me.  Jean Guevara M.D.  1/10/2024  2:07 PM

## 2024-01-10 NOTE — ASSESSMENT & PLAN NOTE
1/10, long discussion was done with the patient and his son, discussed the plan of care, discussed the goal of care also discussed the CODE STATUS, patient wants to be full code.  Time 25 minutes

## 2024-01-10 NOTE — CONSULTS
[facility] -  CONSULTATION NOTE               Author: Nayla Gan M.D. Date & Time: 1/10/2024  3:02 PM       REASON FOR CONSULTATION:   Inpatient hemodialysis management.    HISTORY OF PRESENT ILLNESS:    73yoM with PMH significant for ESRD on HD MWF via right arm AVF, HTN, DM II, Seizure disorder, h/o CVA, anemia secondary to CKD, CKD bone mineral disorder, admitted with complaints of worsening cough and dizziness over the past 3 days and found to be COVID-positive.  Patient reports that he normally dialyzes MWF via a right arm AV fistula and he has been compliant with his outpatient dialysis treatments with his last treatment being on 1/8/2024.  Since then he has had increasing weakness as well as worsening cough and dizziness and so he was brought to the ER by EMS and in the ER COVID test was positive.  His chest x-ray also showed possible right pneumonia and he is hypoxic on room air and requiring 6 L NC O2 to maintain his sats.  Due to coming to the ER he did miss his outpatient dialysis treatment and is due for dialysis today.    He denies any F/C/N/V/CP/LE edema, +/SOB and nonproductive cough, no abdominal pain/diarrhea/bloody BMs.  + Dizziness, no HA/visual changes.    REVIEW OF SYSTEMS:    10 point ROS was performed and is as per HPI or otherwise negative.    PAST MEDICAL HISTORY:   Past Medical History:   Diagnosis Date    CATARACT     Diabetes 2005    oral meds    Hyperlipidemia     Hypertension     Seizure (HCC) 1987    Seizure disorder (HCC)     Snoring     Stroke (HCC)        PAST SURGICAL HISTORY:   Past Surgical History:   Procedure Laterality Date    CATARACT PHACO WITH IOL  5/4/2011    Performed by ENRIQUETA MABRY at SURGERY SAME DAY [facility]    CATARACT PHACO WITH IOL  4/20/2011    Performed by ENRIQUETA MABRY at SURGERY SAME DAY [facility]    OTHER  2011    left eye cataract    CATARACT EXTRACTION WITH IOL  2011 OTHER 2003    oral surgery   Right  "forearm AV fistula    FAMILY HISTORY:   Family History   Problem Relation Age of Onset    Diabetes Mother     Heart Disease Father        SOCIAL HISTORY:   Social History     Tobacco Use   Smoking Status Former    Current packs/day: 0.00    Average packs/day: 0.5 packs/day for 4.0 years (2.0 ttl pk-yrs)    Types: Cigarettes    Start date: 1981    Quit date: 1985    Years since quittin.9   Smokeless Tobacco Never     Social History     Substance and Sexual Activity   Alcohol Use No     Social History     Substance and Sexual Activity   Drug Use No       HOME MEDICATIONS:   Reviewed and documented in chart.    ALLERGIES:  Penicillin g    VS:  /71   Pulse 86   Temp 37.2 °C (99 °F) (Temporal)   Resp 16   Ht 1.727 m (5' 8\")   Wt 79.4 kg (175 lb)   SpO2 95%   BMI 26.61 kg/m²     Physical Exam  Vitals and nursing note reviewed.   Constitutional:       Appearance: Normal appearance.   HENT:      Head: Normocephalic and atraumatic.      Mouth/Throat:      Mouth: Mucous membranes are dry.   Eyes:      General: No scleral icterus.     Extraocular Movements: Extraocular movements intact.   Cardiovascular:      Rate and Rhythm: Normal rate and regular rhythm.   Pulmonary:      Effort: Pulmonary effort is normal. No respiratory distress.      Breath sounds: Examination of the right-lower field reveals decreased breath sounds. Examination of the left-lower field reveals decreased breath sounds. Decreased breath sounds present.   Abdominal:      General: Bowel sounds are normal. There is no distension.      Palpations: Abdomen is soft.   Musculoskeletal:         General: No deformity.      Cervical back: Normal range of motion and neck supple.      Right lower leg: No edema.      Left lower leg: No edema.      Comments: +Right FA AVF with thrill/bruit   Skin:     General: Skin is warm and dry.      Findings: No rash.   Neurological:      General: No focal deficit present.      Mental Status: He is alert " and oriented to person, place, and time.   Psychiatric:         Mood and Affect: Mood normal.         Behavior: Behavior normal. Behavior is cooperative.          LABORATORY STUDIES:   Recent Labs     01/10/24  1253   SODIUM 139   POTASSIUM 4.5   CHLORIDE 95*   CO2 32   GLUCOSE 110*   BUN 33*   CREATININE 7.21*   CALCIUM 8.3*       FLUID BALANCE:  No intake/output data recorded.    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

## 2024-01-10 NOTE — ASSESSMENT & PLAN NOTE
Likely related to over volume and COVID infection  Patient needed oxy mask 6 L, his baseline is room air  Dose of dexamethasone   Inhalers as needed  Weaning her from oxygen as possible  RT

## 2024-01-10 NOTE — ASSESSMENT & PLAN NOTE
Came with generalized weakness and chills with shortness of breath  Patient still requiring 6L oxymask  Chest x-ray showed infiltration possible pneumonia on right  Started with dexamethasone 6 mg daily for 10 days  Oxygen therapy  Dialysis  No need for antibiotics  Normal white count  Patient reports that he has been vaccinated against COVID

## 2024-01-10 NOTE — ED NOTES
Bedside report from PRATIBHA Strickland.  Pt on 4L NC connected to O2 tank.  Pt connected to vitals machine.  Waiting for phlebotomy to draw blood cultures prior to antibiotics given.

## 2024-01-10 NOTE — ED TRIAGE NOTES
Jean Barboza   73 y.o.     Chief Complaint   Patient presents with    Dizziness     Pt BIB EMS [facility] from home. Pt states he's felt sick since dialysis on Monday 1/8/24, he has a cough and dizziness.        Pt is A&O x 4. Pt has a hx of DMT2, pt has dialysis on MWF.     EMS stated pt's oxygen was in the 60s on RA, pt was placed on 6 L O2 via nasal cannula. Pt is on 4 L now at 93% O2.         Vitals:    01/10/24 1102   BP: 131/74   Pulse: 93   Resp: 16   Temp: 37.2 °C (99 °F)   SpO2: 94%

## 2024-01-10 NOTE — ED NOTES
Unable to complete per pt and family. Pt fills medications at the [facility]- they are closed. Per pt, takes aspirin and can not recall the last time he took his medications since he doesn't know what day it is

## 2024-01-10 NOTE — ED NOTES
Obtained some medication history from previous dispense history from [facility], dialysis and [facility]. Unable to verify with the patient who does not know their medications.    Dorene Pisano, PharmD

## 2024-01-11 VITALS
DIASTOLIC BLOOD PRESSURE: 54 MMHG | SYSTOLIC BLOOD PRESSURE: 103 MMHG | HEIGHT: 68 IN | OXYGEN SATURATION: 93 % | WEIGHT: 181.44 LBS | RESPIRATION RATE: 18 BRPM | HEART RATE: 71 BPM | TEMPERATURE: 97.9 F | BODY MASS INDEX: 27.5 KG/M2

## 2024-01-11 LAB — LACTATE SERPL-SCNC: 1.5 MMOL/L (ref 0.5–2)

## 2024-01-11 PROCEDURE — A9270 NON-COVERED ITEM OR SERVICE: HCPCS

## 2024-01-11 PROCEDURE — 700102 HCHG RX REV CODE 250 W/ 637 OVERRIDE(OP): Performed by: INTERNAL MEDICINE

## 2024-01-11 PROCEDURE — A9270 NON-COVERED ITEM OR SERVICE: HCPCS | Performed by: INTERNAL MEDICINE

## 2024-01-11 PROCEDURE — 700111 HCHG RX REV CODE 636 W/ 250 OVERRIDE (IP): Performed by: INTERNAL MEDICINE

## 2024-01-11 PROCEDURE — 700102 HCHG RX REV CODE 250 W/ 637 OVERRIDE(OP)

## 2024-01-11 PROCEDURE — 36415 COLL VENOUS BLD VENIPUNCTURE: CPT

## 2024-01-11 PROCEDURE — 83605 ASSAY OF LACTIC ACID: CPT

## 2024-01-11 PROCEDURE — 99239 HOSP IP/OBS DSCHRG MGMT >30: CPT | Mod: GC | Performed by: STUDENT IN AN ORGANIZED HEALTH CARE EDUCATION/TRAINING PROGRAM

## 2024-01-11 RX ORDER — IPRATROPIUM BROMIDE AND ALBUTEROL SULFATE 2.5; .5 MG/3ML; MG/3ML
3 SOLUTION RESPIRATORY (INHALATION)
Status: DISCONTINUED | OUTPATIENT
Start: 2024-01-11 | End: 2024-01-11 | Stop reason: HOSPADM

## 2024-01-11 RX ORDER — CARBOXYMETHYLCELLULOSE SODIUM 5 MG/ML
1 SOLUTION/ DROPS OPHTHALMIC 4 TIMES DAILY
COMMUNITY
Start: 2023-10-10 | End: 2024-02-11

## 2024-01-11 RX ORDER — GABAPENTIN 100 MG/1
100-300 CAPSULE ORAL
COMMUNITY

## 2024-01-11 RX ORDER — FERROUS SULFATE 325(65) MG
325 TABLET ORAL DAILY
COMMUNITY
End: 2024-02-11

## 2024-01-11 RX ORDER — DEXAMETHASONE 6 MG/1
6 TABLET ORAL DAILY
Qty: 8 TABLET | Refills: 0 | Status: SHIPPED | OUTPATIENT
Start: 2024-01-12 | End: 2024-01-20

## 2024-01-11 RX ORDER — HYDROXYZINE HYDROCHLORIDE 25 MG/1
25-50 TABLET, FILM COATED ORAL
COMMUNITY
End: 2024-02-11

## 2024-01-11 RX ORDER — ASCORBIC ACID 500 MG
500 TABLET ORAL DAILY
COMMUNITY
End: 2024-02-11

## 2024-01-11 RX ORDER — METHOCARBAMOL 500 MG/1
500 TABLET, FILM COATED ORAL 2 TIMES DAILY
COMMUNITY
End: 2024-02-11

## 2024-01-11 RX ORDER — LOPERAMIDE HYDROCHLORIDE 2 MG/1
2-4 CAPSULE ORAL PRN
COMMUNITY
Start: 2023-11-02 | End: 2024-02-11

## 2024-01-11 RX ORDER — ACETAMINOPHEN 500 MG
1000 TABLET ORAL EVERY 6 HOURS PRN
COMMUNITY
Start: 2024-01-11 | End: 2024-02-11

## 2024-01-11 RX ORDER — DEXAMETHASONE 6 MG/1
6 TABLET ORAL DAILY
Status: DISCONTINUED | OUTPATIENT
Start: 2024-01-11 | End: 2024-01-11 | Stop reason: HOSPADM

## 2024-01-11 RX ORDER — FELODIPINE 10 MG/1
10 TABLET, EXTENDED RELEASE ORAL DAILY
COMMUNITY
End: 2024-02-11

## 2024-01-11 RX ORDER — GUAIFENESIN 600 MG/1
600 TABLET, EXTENDED RELEASE ORAL 2 TIMES DAILY PRN
Qty: 28 TABLET | COMMUNITY
Start: 2024-01-11 | End: 2024-02-11

## 2024-01-11 RX ORDER — GABAPENTIN 100 MG/1
200 CAPSULE ORAL 3 TIMES DAILY
COMMUNITY
End: 2024-02-11

## 2024-01-11 RX ORDER — BENZONATATE 100 MG/1
100 CAPSULE ORAL 3 TIMES DAILY PRN
Qty: 60 CAPSULE | Refills: 0 | Status: SHIPPED | OUTPATIENT
Start: 2024-01-11 | End: 2024-02-11

## 2024-01-11 RX ADMIN — ATORVASTATIN CALCIUM 20 MG: 20 TABLET, FILM COATED ORAL at 17:10

## 2024-01-11 RX ADMIN — ASPIRIN 81 MG: 81 TABLET, COATED ORAL at 05:24

## 2024-01-11 RX ADMIN — Medication 2000 UNITS: at 05:24

## 2024-01-11 RX ADMIN — MINERAL OIL, AND WHITE PETROLATUM 1 APPLICATION: 425; 573 OINTMENT OPHTHALMIC at 09:00

## 2024-01-11 RX ADMIN — SEVELAMER CARBONATE 1600 MG: 800 TABLET, FILM COATED ORAL at 17:10

## 2024-01-11 RX ADMIN — HEPARIN SODIUM 5000 UNITS: 5000 INJECTION, SOLUTION INTRAVENOUS; SUBCUTANEOUS at 05:24

## 2024-01-11 RX ADMIN — SEVELAMER CARBONATE 1600 MG: 800 TABLET, FILM COATED ORAL at 11:45

## 2024-01-11 RX ADMIN — DEXAMETHASONE 6 MG: 6 TABLET ORAL at 09:01

## 2024-01-11 RX ADMIN — MINERAL OIL, AND WHITE PETROLATUM 1 APPLICATION: 425; 573 OINTMENT OPHTHALMIC at 17:00

## 2024-01-11 RX ADMIN — SEVELAMER CARBONATE 1600 MG: 800 TABLET, FILM COATED ORAL at 09:01

## 2024-01-11 RX ADMIN — DIPYRIDAMOLE 25 MG: 25 TABLET, FILM COATED ORAL at 17:10

## 2024-01-11 RX ADMIN — DIPYRIDAMOLE 25 MG: 25 TABLET, FILM COATED ORAL at 05:24

## 2024-01-11 ASSESSMENT — PAIN DESCRIPTION - PAIN TYPE: TYPE: ACUTE PAIN

## 2024-01-11 ASSESSMENT — COGNITIVE AND FUNCTIONAL STATUS - GENERAL
SUGGESTED CMS G CODE MODIFIER MOBILITY: CK
HELP NEEDED FOR BATHING: A LITTLE
SUGGESTED CMS G CODE MODIFIER DAILY ACTIVITY: CJ
MOVING FROM LYING ON BACK TO SITTING ON SIDE OF FLAT BED: A LITTLE
DAILY ACTIVITIY SCORE: 22
WALKING IN HOSPITAL ROOM: A LITTLE
STANDING UP FROM CHAIR USING ARMS: A LOT
MOBILITY SCORE: 16
MOVING TO AND FROM BED TO CHAIR: A LITTLE
CLIMB 3 TO 5 STEPS WITH RAILING: A LOT
DRESSING REGULAR LOWER BODY CLOTHING: A LITTLE
TURNING FROM BACK TO SIDE WHILE IN FLAT BAD: A LITTLE

## 2024-01-11 ASSESSMENT — FIBROSIS 4 INDEX: FIB4 SCORE: 2.11

## 2024-01-11 NOTE — ED NOTES
Bedside report received from off going RN Roseline, assumed care of patient.  POC discussed with patient. Call light within reach, all needs addressed at this time.       Fall risk interventions in place: Move the patient closer to the nurse's station, Patient's personal possessions are with in their safe reach, Place socks on patient, and Keep floor surfaces clean and dry (all applicable per [place] Fall risk assessment)   Continuous monitoring: Cardiac Leads, Pulse Ox, or Blood Pressure  IVF/IV medications: Not Applicable   Oxygen: How many liters oxymas 6L  Bedside sitter: Not Applicable   Isolation: Not Applicable

## 2024-01-11 NOTE — ED NOTES
Pt with ongoing HD @ bedside.  Alert and oriented.    Pt connected to monitor,  with unlabored respirations. Vital signs is stable.   Denied any new complaints. No current needs identified.  Gurney in low position, side rail up for pt safety. Call light within reach.

## 2024-01-11 NOTE — PROGRESS NOTES
"[facility] -  PROGRESS NOTE               Author: Nayla Gan M.D. Date & Time: 1/11/2024  6:53 AM     HPI:  73yoM with PMH significant for ESRD on HD MWF via right arm AVF, HTN, DM II, Seizure disorder, h/o CVA, anemia secondary to CKD, CKD bone mineral disorder, admitted with complaints of worsening cough and dizziness over the past 3 days and found to be COVID-positive.  Patient reports that he normally dialyzes MWF via a right arm AV fistula and he has been compliant with his outpatient dialysis treatments with his last treatment being on 1/8/2024.  Since then he has had increasing weakness as well as worsening cough and dizziness and so he was brought to the ER by EMS and in the ER COVID test was positive.  His chest x-ray also showed possible right pneumonia and he is hypoxic on room air and requiring 6 L NC O2 to maintain his sats.  Due to coming to the ER he did miss his outpatient dialysis treatment and is due for dialysis today.    He denies any F/C/N/V/CP/LE edema, +/SOB and nonproductive cough, no abdominal pain/diarrhea/bloody BMs.  +Dizziness, no HA/visual changes.    DAILY NEPHROLOGY SUMMARY:  1/11: No events, tolerated HD with 2.6L UF with mult episodes of diarrhea during treatment, BP borderline low this am, on 6L oxymask O2 this am but now on 2L, reports feeling a little better, still weak but SOB improved, no diarrhea today, denies any CP/LE edema/abd pain     REVIEW OF SYSTEMS:    10 point ROS reviewed and is as per HPI/daily summary or otherwise negative    PMH/PSH/SH/FH:   Reviewed and unchanged since admission note    CURRENT MEDICATIONS:   Reviewed from admission to present day    VS:  BP 90/45   Pulse 75   Temp 36.6 °C (97.9 °F) (Temporal)   Resp 18   Ht 1.727 m (5' 8\")   Wt 82.3 kg (181 lb 7 oz)   SpO2 94%   BMI 27.59 kg/m²     Physical Exam  Vitals and nursing note reviewed.   Constitutional:       General: He is not in acute distress.     " Appearance: He is ill-appearing.   HENT:      Head: Normocephalic and atraumatic.   Eyes:      General: No scleral icterus.     Extraocular Movements: Extraocular movements intact.   Cardiovascular:      Rate and Rhythm: Normal rate and regular rhythm.   Pulmonary:      Effort: Pulmonary effort is normal. No respiratory distress.      Breath sounds: Examination of the right-lower field reveals decreased breath sounds. Examination of the left-lower field reveals decreased breath sounds. Decreased breath sounds present.   Abdominal:      General: Bowel sounds are normal. There is no distension.      Palpations: Abdomen is soft.   Musculoskeletal:         General: No deformity.      Cervical back: Normal range of motion and neck supple.      Right lower leg: No edema.      Left lower leg: No edema.      Comments: +Right FA AVF with thrill/bruit   Skin:     General: Skin is warm and dry.      Findings: No rash.   Neurological:      General: No focal deficit present.      Mental Status: He is alert and oriented to person, place, and time.   Psychiatric:         Mood and Affect: Mood normal.         Behavior: Behavior normal. Behavior is cooperative.         Fluids:  No intake/output data recorded.    LABS:  Recent Labs     01/10/24  1253   SODIUM 139   POTASSIUM 4.5   CHLORIDE 95*   CO2 32   GLUCOSE 110*   BUN 33*   CREATININE 7.21*   CALCIUM 8.3*       IMAGING:   All imaging reviewed from admission to present day    IMPRESSION:  # ESRD  -On HD MWF via right arm aVF  -Missed outpatient HD today due to COVID viral illness  #Acute hypoxic respiratory failure  -Secondary to COVID viral illness and pneumonia  # HTN  - Goal BP < 140/90  - At goal  # Anemia of CKD  - Goal Hgb 10-11  - At goal  # CKD Mineral Bone Disorder--followed at outpt HD  - Corrected Ca 8.7  - PO4 pen  - Pt cannot remember which phosphorus binder he takes at home  # DM II--per primary svc  # Seizure Disorder--per primary svc     PLAN:  - No HD today  (THURS), continue MWF iHD during stay  - BP borderline low this am, BP meds this am held  - Will dc home BP meds for now  - PRN NS boluses for symptomatic hypotension  - Continue supplemental O2 and wean as tolerated  - On COVID-19 protocol meds per primary svc  - No need for NAFISA  - Check phos level  - No dietary protein restrictions  - Dose all meds per ESRD  - Other management per primary svc

## 2024-01-11 NOTE — PROGRESS NOTES
[facility] Progress Note     Hemodialysis treatment ordered today per Dr. Gan x 3 hours. Treatment initiated at 1725 and ended at 2133.     Pt tolerated treatment; see paper flow sheets for details.  With episode of clotted dialyzer x2  Episode of diarrhea x 3 during dialysis     Net UF 2600mL     Access + Bruit,+ Thrill. Applied pressure to stop bleeding on both venous and arterial side of the AVF x 10 mins.  Pressure dressing applied to site (-)bleeding.     Please monitor for any signs of bleeding from graft.      Report given to Primary PRATIBHA Gilliam

## 2024-01-11 NOTE — ED NOTES
Dialysis done with 2600 L removed per Dialysis staff.   Perineal care rendered. Pt noted semi-formed stool brownish in color in moderate amount.  Full linen changed and applied new linen.

## 2024-01-11 NOTE — FACE TO FACE
"Face to Face Note  -  Durable Medical Equipment    Sharath Clement M.D. - NPI: [id number]  I certify that this patient is under my care and that they had a durable medical equipment(DME)face to face encounter by myself that meets the physician DME face-to-face encounter requirements with this patient on:    Date of encounter:   Patient:                    MRN:                       YOB: 2024  Jean Barboza  [id number]  1950     The encounter with the patient was in whole, or in part, for the following medical condition, which is the primary reason for durable medical equipment:  Covid-19 Infection    I certify that, based on my findings, the following durable medical equipment is medically necessary:    Oxygen   HOME O2 Saturation Measurements:(Values must be present for Home Oxygen orders)  Room air sat at rest: 88  Room air sat with amb: 87  With liters of O2: 2, O2 sat at rest with O2: 94  With Liters of O2: 2, O2 sat with amb with O2 : 93  Is the patient mobile?: Yes  If patient feels more short of breath, they can go up to 6 liters per minute and contact healthcare provider.    Supporting Symptoms: The patient requires supplemental oxygen, as the following interventions have been tried with limited or no improvement: \"Bronchodilators and/or steroid inhalers, \"Oral and/or IV steroids, \"Ambulation with oximetry, and \"Incentive spirometry.    My Clinical findings support the need for the above equipment due to:  Hypoxia  "

## 2024-01-11 NOTE — PROGRESS NOTES
Med rec complete per Dennis Alcaraz [facility]. Dialysis meds per Gladis Coleman.  Med list in media faxed yesterday.

## 2024-01-11 NOTE — ED NOTES
Checked on bed, connected to monitor,  with unlabored respirations. Vital signs is stable.   Denied any new complaints. Stii dialysis @ bedside.    Gurney in low position, side rail up for pt safety. Call light within reach.

## 2024-01-11 NOTE — PROGRESS NOTES
[facility]    Patient seen and examined on hemodialysis  VSS--see dialysis treatment sheet for full details  Labs reviewed

## 2024-01-11 NOTE — ED NOTES
Checked on bed, connected to monitor,  with unlabored respirations. Vital signs monitored.   Pt almost done with dialysis.  Denied any new complaints. No current needs identified.  Gurney in low position, side rail up for pt safety. Call light within reach.

## 2024-01-11 NOTE — ED NOTES
Called [phone] x [phone].  Per Staff will call back to get report.    Transport @ bedside aware Pt ongoing dialysis.

## 2024-01-11 NOTE — CARE PLAN
The patient is Stable - Low risk of patient condition declining or worsening    Shift Goals  Clinical Goals: O2, dialysis  Patient Goals: rest  Family Goals: bettie    Progress made toward(s) clinical / shift goals:    Problem: Knowledge Deficit - Standard  Goal: Patient and family/care givers will demonstrate understanding of plan of care, disease process/condition, diagnostic tests and medications  1/11/2024 0239 by Jeaneth Queen R.N.  Outcome: Progressing  Note: Pt actively participates in POC. Pt and board updated, all questions and concerns answered. Pt encouraged to voice all questions and concerns.    Problem: Respiratory  Goal: Patient will achieve/maintain optimum respiratory ventilation and gas exchange  Outcome: Progressing  Note: Patient remained on 6 liters via OxyMask to main adequate oxygen saturation. Was not able to wean patient without his O2 dropping.

## 2024-01-11 NOTE — DISCHARGE PLANNING
Case Management Discharge Planning    Admission Date: 1/10/2024  GMLOS: 5  ALOS: 1    6-Clicks ADL Score: 22  6-Clicks Mobility Score: 16  PT and/or OT Eval ordered: No  Post-acute Referrals Ordered: No  Post-acute Choice Obtained: No  Has referral(s) been sent to post-acute provider:  No      Anticipated Discharge Dispo: Discharge Disposition: Discharged to home/self care (01)  Discharge Address: [address] [place], [place] [address] [place] [address]    DME Needed: Yes    DME Ordered: Yes    Action(s) Taken: Choice form obtained for [facility] for home O2 and referral sent.    ELLIOTTW spoke with dialysis coordinator, Sherita, who reported pt will just need to wear a mask at dialysis if covid positive and she will notify his dialysis center.    Addendum @7650  BUFFY spoke with Naomi at [facility] who reported O2 will be delivered in an hour.    Escalations Completed: None    Medically Clear: Yes    Next Steps: No further CM needs identified at this time    Barriers to Discharge: Oxygen Delivery    Is the patient up for discharge tomorrow: No-anticipated to DC home today

## 2024-01-11 NOTE — DISCHARGE PLANNING
Care Transition Team Assessment    LSW met with pt at bedside to complete assessment. Pt verified the information on the face sheet. Pt lives with his brother in a single story house that has three steps to enter. Prior to this hospitalization pt was independent with all ADLs and IADLs. Pt manages his own medications and does not use any DME at baseline, however does have a cane at home. Pt reported he no longer drives, however family provides transportation for him as needed. Pt has good family support from his brother, nieces, and sister. Pt is retired and denies any financial, SA, or MH concerns. Pt does not have an advance directive and declined AD packet. Pt confirmed that his brother will be able to provide transportation home upon DC.    Information Source  Information Given By: Patient  Informant's Name: Jean Luaamrit  Who is responsible for making decisions for patient? : Patient    Readmission Evaluation  Is this a readmission?: No    Elopement Risk  Legal Hold: No  Ambulatory or Self Mobile in Wheelchair: No-Not an Elopement Risk  Elopement Risk: Not at Risk for Elopement    Interdisciplinary Discharge Planning  Lives with - Patient's Self Care Capacity: Sibling  Patient or legal guardian wants to designate a caregiver: No  Support Systems: Family Member(s), Friends / Neighbors  Housing / Facility: 1 Story House  Durable Medical Equipment: Not Applicable    Discharge Preparedness  What is your plan after discharge?: Home health care  What are your discharge supports?: Sibling, Other (comment) (nieces)  Prior Functional Level: Ambulatory, Independent with Activities of Daily Living, Independent with Medication Management  Difficulity with ADLs: None  Difficulity with IADLs: Driving  Difficulity with IADL Comments: Family provides transportation    Functional Assesment  Prior Functional Level: Ambulatory, Independent with Activities of Daily Living, Independent with Medication  Management    Finances  Financial Barriers to Discharge: No    Vision / Hearing Impairment  Vision Impairment : Yes (reading glasses)  Hearing Impairment : Yes  Hearing Impairment: Both Ears  Does Pt Need Special Equipment for the Hearing Impaired?: No    Advance Directive  Advance Directive?: None  Advance Directive offered?: AD Booklet refused    Domestic Abuse  Have you ever been the victim of abuse or violence?: No  Physical Abuse or Sexual Abuse: No  Verbal Abuse or Emotional Abuse: No  Possible Abuse/Neglect Reported to:: Not Applicable    Psychological Assessment  History of Substance Abuse: None  History of Psychiatric Problems: No  Non-compliant with Treatment: No  Newly Diagnosed Illness: No    Discharge Risks or Barriers  Discharge risks or barriers?: No    Anticipated Discharge Information  Discharge Disposition: D/T to home under HHA care in anticipation of covered skilled care (06)  Discharge Address: [address] [place] [address]

## 2024-01-11 NOTE — PROGRESS NOTES
.4 Eyes Skin Assessment Completed by PRATIBHA Eric and Josue RN.    Head WDL  Ears WDL  Nose WDL  Mouth WDL  Neck WDL  Breast/Chest WDL  Shoulder Blades WDL  Spine Redness  (R) Arm/Elbow/Hand Bruising  (L) Arm/Elbow/Hand Bruising  Abdomen Distended  Groin WDL  Scrotum/Coccyx/Buttocks Redness  (R) Leg Bruising  (L) Leg Bruising  (R) Heel/Foot/Toe WDL  (L) Heel/Foot/Toe WDL          Devices In Places Pulse Ox, OxyMask      Interventions In Place Waffle Overlay and Pillows    Possible Skin Injury No    Pictures Uploaded Into Epic N/A  Wound Consult Placed N/A  RN Wound Prevention Protocol Ordered No

## 2024-01-11 NOTE — DISCHARGE SUMMARY
[facility] Internal Medicine Discharge Summary    Attending: Sharath Clement M.d.  Senior Resident: Dr. Mcguire  Intern:  Dr. Marquez  Contact Number: [phone]    CHIEF COMPLAINT ON ADMISSION  Chief Complaint   Patient presents with    Dizziness     Pt BIB EMS [facility] from home. Pt states he's felt sick since dialysis on Monday 1/8/24, he has a cough and dizziness.        Reason for Admission  EMS     Admission Date  1/10/2024    CODE STATUS  Full Code    HPI & HOSPITAL COURSE  73-year-old male with history of diabetes, end-stage kidney disease on dialysis for 2 years, hypertension and seizure with a stroke, who presented 1/10 with generalized weakness, chills, dry cough, no urinary or bowel symptoms.  On admission patient needed 4-5 L nasal cannula, got worse to 6 L on oxy mask, chest x-ray showed bilateral infiltration possible pneumonia, COVID test was positive, labs did not show leukocytosis.  Patient had missed dialysis on day on admission. Patient admitted for acute respiratory failure secondary to COVID infection/pneumonia and missed dialysis. Patient was started on dexamethasone and taken to dialysis. No concern for superimposed bacterial pneumonia. Patient feels improved, remained stable, home O2 eval showed needs of 2 L at rest and 2 L on ambulation.  Therefore, he is discharged with supplemental oxygen in fair and stable condition to home with close outpatient follow-up.  She is discharged with full course of steroids for treatment for his COVID pneumonia.    The patient recovered much more quickly than anticipated on admission.    Discharge Date  1/11/24    Physical Exam on Day of Discharge  Physical Exam  Vitals reviewed.   Constitutional:       Appearance: Normal appearance. He is normal weight.   HENT:      Head: Normocephalic and atraumatic.      Nose: Nose normal.      Mouth/Throat:      Mouth: Mucous membranes are moist.   Eyes:      Extraocular Movements: Extraocular movements intact.       Conjunctiva/sclera: Conjunctivae normal.      Pupils: Pupils are equal, round, and reactive to light.   Cardiovascular:      Rate and Rhythm: Normal rate and regular rhythm.      Pulses: Normal pulses.      Heart sounds: Murmur (In aortic and mitral points.) heard.   Pulmonary:      Effort: Pulmonary effort is normal. No respiratory distress.   Abdominal:      General: Abdomen is flat. Bowel sounds are normal.      Palpations: Abdomen is soft.      Tenderness: There is no abdominal tenderness. There is no guarding or rebound.   Musculoskeletal:         General: Normal range of motion.      Cervical back: Normal range of motion and neck supple.   Skin:     General: Skin is warm.      Capillary Refill: Capillary refill takes less than 2 seconds.   Neurological:      General: No focal deficit present.      Mental Status: He is alert and oriented to person, place, and time. Mental status is at baseline.   Psychiatric:         Mood and Affect: Mood normal.         FOLLOW UP ITEMS POST DISCHARGE  Steroid therapy for COVID     DISCHARGE DIAGNOSES  Principal Problem:    Pneumonia due to COVID-19 virus (POA: Yes)  Active Problems:    Type 2 diabetes mellitus (HCC) (Chronic) (POA: Yes)    Hypertension (Chronic) (POA: Yes)    History of CVA (cerebrovascular accident) (POA: Yes)    Acute respiratory failure with hypoxia (HCC) (POA: Yes)    Pulmonary hypertension (HCC) (Chronic) (POA: Yes)    ESRD (end stage renal disease) on dialysis (HCC) (Chronic) (POA: Yes)    ACP (advance care planning) (POA: Unknown)  Resolved Problems:    * No resolved hospital problems. *      FOLLOW UP  No future appointments.  No follow-up provider specified.    MEDICATIONS ON DISCHARGE     Medication List        ASK your doctor about these medications        Instructions   ascorbic acid 500 MG tablet  Commonly known as: Vitamin C  Ask about: Which instructions should I use?   Take 500 mg by mouth every day.  Dose: 500 mg     aspirin 81 MG Chew  chewable tablet  Commonly known as: Asa  Ask about: Which instructions should I use?   Chew 81 mg every day.  Dose: 81 mg     atorvastatin 20 MG Tabs  Commonly known as: Lipitor   Take 20 mg by mouth every evening. Indications: Cerebrovascular Accident or Stroke  Dose: 20 mg     calcitRIOL 0.25 MCG Caps  Commonly known as: Rocaltrol   Take 0.25 mcg by mouth as needed. At dialysis  Dose: 0.25 mcg     diclofenac sodium 1 % Gel  Commonly known as: Voltaren   Apply 4 g topically 4 times a day as needed (knee pain).  Dose: 4 g     dipyridamole 25 MG Tabs  Commonly known as: Persantine   Take 25 mg by mouth 2 times a day. Indications: blood thinner  Dose: 25 mg     doxazosin 2 MG Tabs  Commonly known as: Cardura   Take 2 mg by mouth every evening.  Dose: 2 mg     felodipine ER 10 MG Tb24  Commonly known as: Plendil  Ask about: Which instructions should I use?   Take 10 mg by mouth every day. Indications: High Blood Pressure Disorder  Dose: 10 mg     ferrous sulfate 325 (65 Fe) MG tablet  Ask about: Which instructions should I use?   Take 325 mg by mouth every day. Indications: Anemia From Inadequate Iron in the Body  Dose: 325 mg     FISH OIL PO  Ask about: Which instructions should I use?   Take 300-1,000 mg by mouth every day.  Dose: 300-1,000 mg     furosemide 80 MG Tabs  Commonly known as: Lasix   Take 80 mg by mouth as needed. At dialysis  Dose: 80 mg     * gabapentin 100 MG Caps  Commonly known as: Neurontin  Ask about: Which instructions should I use?   Take 200 mg by mouth 3 times a day.  Dose: 200 mg     * gabapentin 100 MG Caps  Commonly known as: Neurontin  Ask about: Which instructions should I use?   Take 100-300 mg by mouth at bedtime.  Dose: 100-300 mg     hydrOXYzine HCl 25 MG Tabs  Commonly known as: Atarax   Take 25-50 mg by mouth every evening as needed (Insomnia).  Dose: 25-50 mg     lisinopril 40 MG tablet  Commonly known as: Prinivil   Take 40 mg by mouth every day. Indications: High Blood Pressure  Disorder  Dose: 40 mg     loperamide 2 MG Caps  Commonly known as: Imodium  Ask about: Which instructions should I use?   Take 2-4 mg by mouth as needed for Diarrhea. 2 capsules after first loose stool followed by 1 capsule after each subsequent loose stool. Max 16 mg/day  Dose: 2-4 mg     methocarbamol 500 MG Tabs  Commonly known as: Robaxin   Take 500 mg by mouth 2 times a day. Neck muscles  Dose: 500 mg     MULTIVITAMINS PO   Take 1 Tablet by mouth every day.  Dose: 1 Tablet     Refresh Tears 0.5 % Soln  Generic drug: carboxymethylcellulose   Administer 1 Drop into both eyes 4 times a day.  Dose: 1 Drop     sevelamer 800 MG Tabs  Commonly known as: Renagel   Take 1,600 mg by mouth 3 times a day with meals.  Dose: 1,600 mg     vitamin D 2000 UNIT Tabs   Take 2,000 Units by mouth every day.  Dose: 2,000 Units           * This list has 2 medication(s) that are the same as other medications prescribed for you. Read the directions carefully, and ask your doctor or other care provider to review them with you.                  Allergies  Allergies   Allergen Reactions    Penicillin G Rash     Rxn - Exacerbated a rash on his legs  Early 2000's         DIET  Orders Placed This Encounter   Procedures    Diet Order Diet: Renal     Standing Status:   Standing     Number of Occurrences:   1     Order Specific Question:   Diet:     Answer:   Renal [8]       ACTIVITY  As tolerated.  Weight bearing as tolerated    CONSULTATIONS  Nephrology    PROCEDURES  Scheduled Hemodialysis    LABORATORY  Lab Results   Component Value Date    SODIUM 139 01/10/2024    POTASSIUM 4.5 01/10/2024    CHLORIDE 95 (L) 01/10/2024    CO2 32 01/10/2024    GLUCOSE 110 (H) 01/10/2024    BUN 33 (H) 01/10/2024    CREATININE 7.21 (HH) 01/10/2024        Lab Results   Component Value Date    WBC 6.5 01/10/2024    HEMOGLOBIN 10.0 (L) 01/10/2024    HEMATOCRIT 31.8 (L) 01/10/2024    PLATELETCT 173 01/10/2024        Total time of the discharge process exceeds 46  minutes.

## 2024-02-11 ENCOUNTER — APPOINTMENT (OUTPATIENT)
Dept: CARDIOLOGY | Facility: MEDICAL CENTER | Age: 74
DRG: 177 | End: 2024-02-11
Attending: HOSPITALIST
Payer: MEDICARE

## 2024-02-11 ENCOUNTER — HOSPITAL ENCOUNTER (INPATIENT)
Facility: MEDICAL CENTER | Age: 74
LOS: 3 days | DRG: 177 | End: 2024-02-14
Attending: EMERGENCY MEDICINE | Admitting: HOSPITALIST
Payer: MEDICARE

## 2024-02-11 ENCOUNTER — APPOINTMENT (OUTPATIENT)
Dept: RADIOLOGY | Facility: MEDICAL CENTER | Age: 74
DRG: 177 | End: 2024-02-11
Payer: MEDICARE

## 2024-02-11 ENCOUNTER — APPOINTMENT (OUTPATIENT)
Dept: RADIOLOGY | Facility: MEDICAL CENTER | Age: 74
DRG: 177 | End: 2024-02-11
Attending: EMERGENCY MEDICINE
Payer: MEDICARE

## 2024-02-11 DIAGNOSIS — Z99.2 ESRD (END STAGE RENAL DISEASE) ON DIALYSIS (HCC): Chronic | ICD-10-CM

## 2024-02-11 DIAGNOSIS — R53.1 WEAKNESS: ICD-10-CM

## 2024-02-11 DIAGNOSIS — U07.1 PNEUMONIA DUE TO COVID-19 VIRUS: ICD-10-CM

## 2024-02-11 DIAGNOSIS — I35.0 SEVERE AORTIC STENOSIS: ICD-10-CM

## 2024-02-11 DIAGNOSIS — N18.6 ESRD (END STAGE RENAL DISEASE) ON DIALYSIS (HCC): Chronic | ICD-10-CM

## 2024-02-11 DIAGNOSIS — J12.82 PNEUMONIA DUE TO COVID-19 VIRUS: ICD-10-CM

## 2024-02-11 DIAGNOSIS — R09.02 HYPOXIA: ICD-10-CM

## 2024-02-11 PROBLEM — R19.7 DIARRHEA OF PRESUMED INFECTIOUS ORIGIN: Status: ACTIVE | Noted: 2024-02-11

## 2024-02-11 LAB
ALBUMIN SERPL BCP-MCNC: 3.9 G/DL (ref 3.2–4.9)
ALBUMIN/GLOB SERPL: 1.3 G/DL
ALP SERPL-CCNC: 60 U/L (ref 30–99)
ALT SERPL-CCNC: 14 U/L (ref 2–50)
ANION GAP SERPL CALC-SCNC: 13 MMOL/L (ref 7–16)
AST SERPL-CCNC: 21 U/L (ref 12–45)
BASE EXCESS BLDA CALC-SCNC: 8 MMOL/L (ref -4–3)
BASOPHILS # BLD AUTO: 0.4 % (ref 0–1.8)
BASOPHILS # BLD: 0.02 K/UL (ref 0–0.12)
BILIRUB SERPL-MCNC: 0.6 MG/DL (ref 0.1–1.5)
BODY TEMPERATURE: 36.3 CENTIGRADE
BUN SERPL-MCNC: 16 MG/DL (ref 8–22)
CALCIUM ALBUM COR SERPL-MCNC: 8.9 MG/DL (ref 8.5–10.5)
CALCIUM SERPL-MCNC: 8.8 MG/DL (ref 8.5–10.5)
CHLORIDE SERPL-SCNC: 97 MMOL/L (ref 96–112)
CO2 SERPL-SCNC: 31 MMOL/L (ref 20–33)
CREAT SERPL-MCNC: 5.82 MG/DL (ref 0.5–1.4)
EKG IMPRESSION: NORMAL
EOSINOPHIL # BLD AUTO: 0.24 K/UL (ref 0–0.51)
EOSINOPHIL NFR BLD: 4.3 % (ref 0–6.9)
ERYTHROCYTE [DISTWIDTH] IN BLOOD BY AUTOMATED COUNT: 60.7 FL (ref 35.9–50)
GFR SERPLBLD CREATININE-BSD FMLA CKD-EPI: 10 ML/MIN/1.73 M 2
GLOBULIN SER CALC-MCNC: 3 G/DL (ref 1.9–3.5)
GLUCOSE BLD STRIP.AUTO-MCNC: 144 MG/DL (ref 65–99)
GLUCOSE BLD STRIP.AUTO-MCNC: 92 MG/DL (ref 65–99)
GLUCOSE SERPL-MCNC: 119 MG/DL (ref 65–99)
HCO3 BLDA-SCNC: 32 MMOL/L (ref 17–25)
HCT VFR BLD AUTO: 27.5 % (ref 42–52)
HGB BLD-MCNC: 8.8 G/DL (ref 14–18)
IMM GRANULOCYTES # BLD AUTO: 0.01 K/UL (ref 0–0.11)
IMM GRANULOCYTES NFR BLD AUTO: 0.2 % (ref 0–0.9)
INHALED O2 FLOW RATE: ABNORMAL L/MIN
LACTATE SERPL-SCNC: 0.9 MMOL/L (ref 0.5–2)
LYMPHOCYTES # BLD AUTO: 1.09 K/UL (ref 1–4.8)
LYMPHOCYTES NFR BLD: 19.4 % (ref 22–41)
MCH RBC QN AUTO: 32 PG (ref 27–33)
MCHC RBC AUTO-ENTMCNC: 32 G/DL (ref 32.3–36.5)
MCV RBC AUTO: 100 FL (ref 81.4–97.8)
MONOCYTES # BLD AUTO: 0.46 K/UL (ref 0–0.85)
MONOCYTES NFR BLD AUTO: 8.2 % (ref 0–13.4)
NEUTROPHILS # BLD AUTO: 3.8 K/UL (ref 1.82–7.42)
NEUTROPHILS NFR BLD: 67.5 % (ref 44–72)
NRBC # BLD AUTO: 0 K/UL
NRBC BLD-RTO: 0 /100 WBC (ref 0–0.2)
O2/TOTAL GAS SETTING VFR VENT: 100 % (ref 30–60)
PCO2 BLDA: 45.4 MMHG (ref 26–37)
PCO2 TEMP ADJ BLDA: 44 MMHG (ref 26–37)
PH BLDA: 7.47 [PH] (ref 7.4–7.5)
PH TEMP ADJ BLDA: 7.48 [PH] (ref 7.4–7.5)
PLATELET # BLD AUTO: 177 K/UL (ref 164–446)
PMV BLD AUTO: 11 FL (ref 9–12.9)
PO2 BLDA: 61.1 MMHG (ref 64–87)
PO2 TEMP ADJ BLDA: 58.2 MMHG (ref 64–87)
POTASSIUM SERPL-SCNC: 4.1 MMOL/L (ref 3.6–5.5)
PROT SERPL-MCNC: 6.9 G/DL (ref 6–8.2)
RBC # BLD AUTO: 2.75 M/UL (ref 4.7–6.1)
SAO2 % BLDA: 89.2 % (ref 93–99)
SARS-COV-2 RNA RESP QL NAA+PROBE: DETECTED
SODIUM SERPL-SCNC: 141 MMOL/L (ref 135–145)
SPECIMEN SOURCE: ABNORMAL
TROPONIN T SERPL-MCNC: 407 NG/L (ref 6–19)
TROPONIN T SERPL-MCNC: 433 NG/L (ref 6–19)
TROPONIN T SERPL-MCNC: 492 NG/L (ref 6–19)
VIT B12 SERPL-MCNC: 726 PG/ML (ref 211–911)
WBC # BLD AUTO: 5.6 K/UL (ref 4.8–10.8)

## 2024-02-11 PROCEDURE — 99285 EMERGENCY DEPT VISIT HI MDM: CPT

## 2024-02-11 PROCEDURE — 80053 COMPREHEN METABOLIC PANEL: CPT

## 2024-02-11 PROCEDURE — A9270 NON-COVERED ITEM OR SERVICE: HCPCS | Mod: UD | Performed by: EMERGENCY MEDICINE

## 2024-02-11 PROCEDURE — 8E0ZXY6 ISOLATION: ICD-10-PCS | Performed by: HOSPITALIST

## 2024-02-11 PROCEDURE — 700111 HCHG RX REV CODE 636 W/ 250 OVERRIDE (IP): Mod: JZ

## 2024-02-11 PROCEDURE — 700105 HCHG RX REV CODE 258

## 2024-02-11 PROCEDURE — 700101 HCHG RX REV CODE 250: Performed by: HOSPITALIST

## 2024-02-11 PROCEDURE — A9270 NON-COVERED ITEM OR SERVICE: HCPCS | Performed by: HOSPITALIST

## 2024-02-11 PROCEDURE — 84484 ASSAY OF TROPONIN QUANT: CPT

## 2024-02-11 PROCEDURE — 87040 BLOOD CULTURE FOR BACTERIA: CPT | Mod: 91

## 2024-02-11 PROCEDURE — 36415 COLL VENOUS BLD VENIPUNCTURE: CPT

## 2024-02-11 PROCEDURE — 770020 HCHG ROOM/CARE - TELE (206)

## 2024-02-11 PROCEDURE — 71045 X-RAY EXAM CHEST 1 VIEW: CPT

## 2024-02-11 PROCEDURE — 83605 ASSAY OF LACTIC ACID: CPT

## 2024-02-11 PROCEDURE — 82803 BLOOD GASES ANY COMBINATION: CPT

## 2024-02-11 PROCEDURE — 94640 AIRWAY INHALATION TREATMENT: CPT

## 2024-02-11 PROCEDURE — 96372 THER/PROPH/DIAG INJ SC/IM: CPT

## 2024-02-11 PROCEDURE — 82962 GLUCOSE BLOOD TEST: CPT

## 2024-02-11 PROCEDURE — 85025 COMPLETE CBC W/AUTO DIFF WBC: CPT

## 2024-02-11 PROCEDURE — 94669 MECHANICAL CHEST WALL OSCILL: CPT

## 2024-02-11 PROCEDURE — 700102 HCHG RX REV CODE 250 W/ 637 OVERRIDE(OP): Performed by: HOSPITALIST

## 2024-02-11 PROCEDURE — 700102 HCHG RX REV CODE 250 W/ 637 OVERRIDE(OP): Mod: UD | Performed by: EMERGENCY MEDICINE

## 2024-02-11 PROCEDURE — 93306 TTE W/DOPPLER COMPLETE: CPT

## 2024-02-11 PROCEDURE — 700111 HCHG RX REV CODE 636 W/ 250 OVERRIDE (IP): Performed by: HOSPITALIST

## 2024-02-11 PROCEDURE — 87641 MR-STAPH DNA AMP PROBE: CPT

## 2024-02-11 PROCEDURE — 99223 1ST HOSP IP/OBS HIGH 75: CPT | Mod: AI | Performed by: HOSPITALIST

## 2024-02-11 PROCEDURE — 82607 VITAMIN B-12: CPT

## 2024-02-11 PROCEDURE — 87635 SARS-COV-2 COVID-19 AMP PRB: CPT

## 2024-02-11 PROCEDURE — 93005 ELECTROCARDIOGRAM TRACING: CPT | Performed by: EMERGENCY MEDICINE

## 2024-02-11 RX ORDER — LINEZOLID 2 MG/ML
600 INJECTION, SOLUTION INTRAVENOUS EVERY 12 HOURS
Status: DISCONTINUED | OUTPATIENT
Start: 2024-02-11 | End: 2024-02-12

## 2024-02-11 RX ORDER — DEXTROSE MONOHYDRATE 25 G/50ML
25 INJECTION, SOLUTION INTRAVENOUS
Status: DISCONTINUED | OUTPATIENT
Start: 2024-02-11 | End: 2024-02-14 | Stop reason: HOSPADM

## 2024-02-11 RX ORDER — POLYETHYLENE GLYCOL 3350 17 G/17G
1 POWDER, FOR SOLUTION ORAL
Status: DISCONTINUED | OUTPATIENT
Start: 2024-02-11 | End: 2024-02-11

## 2024-02-11 RX ORDER — FUROSEMIDE 10 MG/ML
40 INJECTION INTRAMUSCULAR; INTRAVENOUS
Status: DISCONTINUED | OUTPATIENT
Start: 2024-02-12 | End: 2024-02-11

## 2024-02-11 RX ORDER — MULTIVITAMIN
1 CAPSULE ORAL DAILY
Status: DISCONTINUED | OUTPATIENT
Start: 2024-02-11 | End: 2024-02-11

## 2024-02-11 RX ORDER — SEVELAMER CARBONATE 800 MG/1
1600 TABLET, FILM COATED ORAL
Status: DISCONTINUED | OUTPATIENT
Start: 2024-02-11 | End: 2024-02-14 | Stop reason: HOSPADM

## 2024-02-11 RX ORDER — HEPARIN SODIUM 5000 [USP'U]/ML
5000 INJECTION, SOLUTION INTRAVENOUS; SUBCUTANEOUS EVERY 8 HOURS
Status: DISCONTINUED | OUTPATIENT
Start: 2024-02-11 | End: 2024-02-14 | Stop reason: HOSPADM

## 2024-02-11 RX ORDER — AMOXICILLIN 250 MG
2 CAPSULE ORAL EVERY EVENING
Status: DISCONTINUED | OUTPATIENT
Start: 2024-02-11 | End: 2024-02-11

## 2024-02-11 RX ORDER — ALBUTEROL SULFATE 90 UG/1
2 AEROSOL, METERED RESPIRATORY (INHALATION)
Status: COMPLETED | OUTPATIENT
Start: 2024-02-11 | End: 2024-02-11

## 2024-02-11 RX ORDER — FUROSEMIDE 10 MG/ML
80 INJECTION INTRAMUSCULAR; INTRAVENOUS ONCE
Status: COMPLETED | OUTPATIENT
Start: 2024-02-11 | End: 2024-02-11

## 2024-02-11 RX ORDER — IPRATROPIUM BROMIDE AND ALBUTEROL SULFATE 2.5; .5 MG/3ML; MG/3ML
3 SOLUTION RESPIRATORY (INHALATION)
Status: DISCONTINUED | OUTPATIENT
Start: 2024-02-11 | End: 2024-02-12

## 2024-02-11 RX ORDER — IPRATROPIUM BROMIDE AND ALBUTEROL SULFATE 2.5; .5 MG/3ML; MG/3ML
3 SOLUTION RESPIRATORY (INHALATION)
Status: DISCONTINUED | OUTPATIENT
Start: 2024-02-11 | End: 2024-02-14 | Stop reason: HOSPADM

## 2024-02-11 RX ORDER — CHLORAL HYDRATE 500 MG
1000 CAPSULE ORAL DAILY
Status: DISCONTINUED | OUTPATIENT
Start: 2024-02-11 | End: 2024-02-14 | Stop reason: HOSPADM

## 2024-02-11 RX ORDER — FELODIPINE 5 MG/1
10 TABLET, EXTENDED RELEASE ORAL DAILY
Status: DISCONTINUED | OUTPATIENT
Start: 2024-02-11 | End: 2024-02-14 | Stop reason: HOSPADM

## 2024-02-11 RX ORDER — GABAPENTIN 100 MG/1
100 CAPSULE ORAL
Status: DISCONTINUED | OUTPATIENT
Start: 2024-02-11 | End: 2024-02-14 | Stop reason: HOSPADM

## 2024-02-11 RX ORDER — FELODIPINE 10 MG/1
10 TABLET, EXTENDED RELEASE ORAL DAILY
COMMUNITY

## 2024-02-11 RX ORDER — FUROSEMIDE 40 MG/1
80 TABLET ORAL
Status: DISCONTINUED | OUTPATIENT
Start: 2024-02-11 | End: 2024-02-14 | Stop reason: HOSPADM

## 2024-02-11 RX ORDER — CALCITRIOL 0.25 UG/1
0.25 CAPSULE, LIQUID FILLED ORAL
Status: DISCONTINUED | OUTPATIENT
Start: 2024-02-12 | End: 2024-02-14 | Stop reason: HOSPADM

## 2024-02-11 RX ORDER — DOXAZOSIN 2 MG/1
2 TABLET ORAL NIGHTLY
Status: DISCONTINUED | OUTPATIENT
Start: 2024-02-11 | End: 2024-02-14 | Stop reason: HOSPADM

## 2024-02-11 RX ORDER — ASPIRIN 81 MG/1
81 TABLET, CHEWABLE ORAL DAILY
Status: DISCONTINUED | OUTPATIENT
Start: 2024-02-12 | End: 2024-02-14 | Stop reason: HOSPADM

## 2024-02-11 RX ORDER — DEXAMETHASONE SODIUM PHOSPHATE 4 MG/ML
6 INJECTION, SOLUTION INTRA-ARTICULAR; INTRALESIONAL; INTRAMUSCULAR; INTRAVENOUS; SOFT TISSUE DAILY
Status: DISCONTINUED | OUTPATIENT
Start: 2024-02-11 | End: 2024-02-13

## 2024-02-11 RX ORDER — LISINOPRIL 20 MG/1
40 TABLET ORAL DAILY
Status: DISCONTINUED | OUTPATIENT
Start: 2024-02-12 | End: 2024-02-14 | Stop reason: HOSPADM

## 2024-02-11 RX ORDER — ATORVASTATIN CALCIUM 20 MG/1
20 TABLET, FILM COATED ORAL EVERY EVENING
Status: DISCONTINUED | OUTPATIENT
Start: 2024-02-11 | End: 2024-02-14 | Stop reason: HOSPADM

## 2024-02-11 RX ADMIN — SEVELAMER CARBONATE 1600 MG: 800 TABLET, FILM COATED ORAL at 17:42

## 2024-02-11 RX ADMIN — FUROSEMIDE 80 MG: 10 INJECTION, SOLUTION INTRAVENOUS at 21:03

## 2024-02-11 RX ADMIN — DEXAMETHASONE SODIUM PHOSPHATE 6 MG: 4 INJECTION INTRA-ARTICULAR; INTRALESIONAL; INTRAMUSCULAR; INTRAVENOUS; SOFT TISSUE at 22:36

## 2024-02-11 RX ADMIN — THERA TABS 1 TABLET: TAB at 16:01

## 2024-02-11 RX ADMIN — IPRATROPIUM BROMIDE AND ALBUTEROL SULFATE 3 ML: 2.5; .5 SOLUTION RESPIRATORY (INHALATION) at 19:48

## 2024-02-11 RX ADMIN — ATORVASTATIN CALCIUM 20 MG: 20 TABLET, FILM COATED ORAL at 17:42

## 2024-02-11 RX ADMIN — FUROSEMIDE 80 MG: 40 TABLET ORAL at 16:01

## 2024-02-11 RX ADMIN — HEPARIN SODIUM 5000 UNITS: 5000 INJECTION, SOLUTION INTRAVENOUS; SUBCUTANEOUS at 16:01

## 2024-02-11 RX ADMIN — CEFEPIME 1 G: 1 INJECTION, POWDER, FOR SOLUTION INTRAMUSCULAR; INTRAVENOUS at 23:19

## 2024-02-11 RX ADMIN — OMEGA-3 FATTY ACIDS CAP 1000 MG 1000 MG: 1000 CAP at 17:41

## 2024-02-11 RX ADMIN — HEPARIN SODIUM 5000 UNITS: 5000 INJECTION, SOLUTION INTRAVENOUS; SUBCUTANEOUS at 21:05

## 2024-02-11 RX ADMIN — DOXAZOSIN 2 MG: 2 TABLET ORAL at 21:05

## 2024-02-11 RX ADMIN — ALBUTEROL SULFATE 2 PUFF: 90 AEROSOL, METERED RESPIRATORY (INHALATION) at 12:39

## 2024-02-11 RX ADMIN — GABAPENTIN 100 MG: 100 CAPSULE ORAL at 21:05

## 2024-02-11 ASSESSMENT — ENCOUNTER SYMPTOMS
BLURRED VISION: 0
VOMITING: 0
HEARTBURN: 0
ABDOMINAL PAIN: 0
NAUSEA: 0
DIARRHEA: 1
FOCAL WEAKNESS: 0
SORE THROAT: 0
MYALGIAS: 0
DEPRESSION: 0
BLOOD IN STOOL: 0
DIZZINESS: 0
HEADACHES: 0
NEUROLOGICAL NEGATIVE: 1
WEIGHT LOSS: 0
WEAKNESS: 0
EYES NEGATIVE: 1
COUGH: 0
PALPITATIONS: 0
FEVER: 0
CHILLS: 0
DIAPHORESIS: 0
MUSCULOSKELETAL NEGATIVE: 1
PSYCHIATRIC NEGATIVE: 1
CARDIOVASCULAR NEGATIVE: 1
SHORTNESS OF BREATH: 1
BRUISES/BLEEDS EASILY: 0

## 2024-02-11 ASSESSMENT — COPD QUESTIONNAIRES
HAVE YOU SMOKED AT LEAST 100 CIGARETTES IN YOUR ENTIRE LIFE: YES
DO YOU EVER COUGH UP ANY MUCUS OR PHLEGM?: NO/ONLY WITH OCCASIONAL COLDS OR INFECTIONS
DURING THE PAST 4 WEEKS HOW MUCH DID YOU FEEL SHORT OF BREATH: NONE/LITTLE OF THE TIME
COPD SCREENING SCORE: 4

## 2024-02-11 ASSESSMENT — LIFESTYLE VARIABLES
TOTAL SCORE: 0
DO YOU DRINK ALCOHOL: NO
SUBSTANCE_ABUSE: 0
EVER FELT BAD OR GUILTY ABOUT YOUR DRINKING: NO
HAVE PEOPLE ANNOYED YOU BY CRITICIZING YOUR DRINKING: NO
EVER HAD A DRINK FIRST THING IN THE MORNING TO STEADY YOUR NERVES TO GET RID OF A HANGOVER: NO
TOTAL SCORE: 0
HOW MANY TIMES IN THE PAST YEAR HAVE YOU HAD 5 OR MORE DRINKS IN A DAY: 0
HAVE YOU EVER FELT YOU SHOULD CUT DOWN ON YOUR DRINKING: NO
TOTAL SCORE: 0
ON A TYPICAL DAY WHEN YOU DRINK ALCOHOL HOW MANY DRINKS DO YOU HAVE: 0
AVERAGE NUMBER OF DAYS PER WEEK YOU HAVE A DRINK CONTAINING ALCOHOL: 0
CONSUMPTION TOTAL: NEGATIVE

## 2024-02-11 ASSESSMENT — FIBROSIS 4 INDEX
FIB4 SCORE: 2.11
FIB4 SCORE: 2.31

## 2024-02-11 ASSESSMENT — PAIN DESCRIPTION - PAIN TYPE
TYPE: ACUTE PAIN
TYPE: ACUTE PAIN

## 2024-02-11 NOTE — ED PROVIDER NOTES
"  CHIEF COMPLAINT  Chief Complaint   Patient presents with    Weakness     Generalized weakness x 2 days with diarrhea. Upon EMS arrival pt found to be hypoxic in 80's on RA. Pt believes that his concentrator ran out. Pt states that he wasn't educated well when he received oxygen in 01/24 post COVID. Pt has hx of dialysis (M,W,F) with last being on Friday, DM, and HTN. BS: 164    Shortness of Breath     Pt found to be 74% on RA on arrival. 3 L applied via n/c, pt up to 91%         LIMITATION TO HISTORY   Patient appears to be a poor historian.  Not exactly certain why.  I reviewed the notes and there is no history of being a poor historian on discharge.    HPI    Jean Barboza is a 73 y.o. male \who called the ambulance because \"I was not feeling well\".  He is not certain exactly how to describe it except for he just feels weak.  There is generalized.  There is no alleviating factors.  There was some associated diarrhea.  Patient states that it was better after he got on oxygen by EMS.  Associate with EMS found to be hypoxic in the 80s.  There is been no associated nausea vomiting fevers or chills.    Patient was noted to be hypoxic.  Patient apparently has not been on oxygen except for this hospitalization.  I reviewed the chart    Patient has a history of chronic end-stage Monday Wednesday Friday dialysis he did not miss dialysis on Friday.  In fact he said the nurse there told him that if he did not feel better he was to call the ambulance.  Apparently was admitted for COVID-pneumonia.    OUTSIDE HISTORIAN(S):  None.    EXTERNAL RECORDS REVIEWED       I reviewed the chart.  The patient at this point was admitted to the hospital.  See above.  COVID-pneumonia.     Reason for Admission  EMS      Admission Date  1/10/2024     CODE STATUS  Full Code     HPI & HOSPITAL COURSE  73-year-old male with history of diabetes, end-stage kidney disease on dialysis for 2 years, hypertension and seizure with a stroke, who " presented 1/10 with generalized weakness, chills, dry cough, no urinary or bowel symptoms.  On admission patient needed 4-5 L nasal cannula, got worse to 6 L on oxy mask, chest x-ray showed bilateral infiltration possible pneumonia, COVID test was positive, labs did not show leukocytosis.  Patient had missed dialysis on day on admission. Patient admitted for acute respiratory failure secondary to COVID infection/pneumonia and missed dialysis. Patient was started on dexamethasone and taken to dialysis. No concern for superimposed bacterial pneumonia. Patient feels improved, remained stable, home O2 eval showed needs of 2 L at rest and 2 L on ambulation.  Therefore, he is discharged with supplemental oxygen in fair and stable condition to home with close outpatient follow-up.  She is discharged with full course of steroids for treatment for his COVID pneumonia.     The patient recovered much more quickly than anticipated on admission.     Discharge Date    REVIEW OF SYSTEMS      PAST MEDICAL HISTORY  Past Medical History:   Diagnosis Date    CATARACT     Diabetes 2005    oral meds    Hyperlipidemia     Hypertension     Seizure (HCC) 1987    Seizure disorder (HCC)     Snoring     Stroke (HCC)        FAMILY HISTORY  Family History   Problem Relation Age of Onset    Diabetes Mother     Heart Disease Father        SOCIAL HISTORY  Social History     Tobacco Use    Smoking status: Former     Current packs/day: 0.00     Average packs/day: 0.5 packs/day for 4.0 years (2.0 ttl pk-yrs)     Types: Cigarettes     Start date: 1981     Quit date: 1985     Years since quittin.0    Smokeless tobacco: Never   Substance Use Topics    Alcohol use: No    Drug use: No     Social History     Substance and Sexual Activity   Drug Use No       SURGICAL HISTORY  Past Surgical History:   Procedure Laterality Date    CATARACT PHACO WITH IOL  2011    Performed by ENRIQUETA MABRY at SURGERY SAME DAY [facility]    CATARACT PHACO  WITH IOL  4/20/2011    Performed by ENRIQUETA MABRY at SURGERY SAME DAY [facility]    OTHER  2011    left eye cataract    CATARACT EXTRACTION WITH IOL  2011    OTHER  2003    oral surgery       CURRENT MEDICATIONS  No current facility-administered medications for this encounter.    Current Outpatient Medications:     ascorbic acid (VITAMIN C) 500 MG tablet, Take 500 mg by mouth every day., Disp: , Rfl:     diclofenac sodium (VOLTAREN) 1 % Gel, Apply 4 g topically 4 times a day as needed (knee pain)., Disp: , Rfl:     felodipine ER (PLENDIL) 10 MG TABLET SR 24 HR, Take 10 mg by mouth every day. Indications: High Blood Pressure Disorder, Disp: , Rfl:     Omega-3 Fatty Acids (FISH OIL PO), Take 300-1,000 mg by mouth every day., Disp: , Rfl:     gabapentin (NEURONTIN) 100 MG Cap, Take 200 mg by mouth 3 times a day., Disp: , Rfl:     gabapentin (NEURONTIN) 100 MG Cap, Take 100-300 mg by mouth at bedtime., Disp: , Rfl:     hydrOXYzine HCl (ATARAX) 25 MG Tab, Take 25-50 mg by mouth every evening as needed (Insomnia)., Disp: , Rfl:     ferrous sulfate 325 (65 Fe) MG tablet, Take 325 mg by mouth every day. Indications: Anemia From Inadequate Iron in the Body, Disp: , Rfl:     methocarbamol (ROBAXIN) 500 MG Tab, Take 500 mg by mouth 2 times a day. Neck muscles, Disp: , Rfl:     Multiple Vitamin (MULTIVITAMINS PO), Take 1 Tablet by mouth every day., Disp: , Rfl:     carboxymethylcellulose (REFRESH TEARS) 0.5 % Solution, Administer 1 Drop into both eyes 4 times a day., Disp: , Rfl:     loperamide (IMODIUM) 2 MG Cap, Take 2-4 mg by mouth as needed for Diarrhea. 2 capsules after first loose stool followed by 1 capsule after each subsequent loose stool. Max 16 mg/day, Disp: , Rfl:     acetaminophen (TYLENOL) 500 MG Tab, Take 2 Tablets by mouth every 6 hours as needed for Mild Pain., Disp: , Rfl:     guaiFENesin ER (MUCINEX) 600 MG TABLET SR 12 HR, Take 1 Tablet by mouth 2 times a day as needed for Cough (productive cough).,  "Disp: 28 Tablet, Rfl:     benzonatate (TESSALON) 100 MG Cap, Take 1 Capsule by mouth 3 times a day as needed for Cough., Disp: 60 Capsule, Rfl: 0    doxazosin (CARDURA) 2 MG Tab, Take 2 mg by mouth every evening., Disp: , Rfl:     furosemide (LASIX) 80 MG Tab, Take 80 mg by mouth as needed. At dialysis, Disp: , Rfl:     calcitRIOL (ROCALTROL) 0.25 MCG Cap, Take 0.25 mcg by mouth as needed. At dialysis, Disp: , Rfl:     Cholecalciferol (VITAMIN D) 2000 UNIT Tab, Take 2,000 Units by mouth every day., Disp: , Rfl:     aspirin (ASA) 81 MG Chew Tab chewable tablet, Chew 81 mg every day., Disp: , Rfl:     dipyridamole (PERSANTINE) 25 MG Tab, Take 25 mg by mouth 2 times a day. Indications: blood thinner, Disp: , Rfl:     sevelamer (RENAGEL) 800 MG Tab, Take 1,600 mg by mouth 3 times a day with meals., Disp: , Rfl:     lisinopril (PRINIVIL) 40 MG tablet, Take 40 mg by mouth every day. Indications: High Blood Pressure Disorder, Disp: , Rfl:     atorvastatin (LIPITOR) 20 MG Tab, Take 20 mg by mouth every evening. Indications: Cerebrovascular Accident or Stroke, Disp: , Rfl:     ALLERGIES  Allergies   Allergen Reactions    Penicillin G Rash     Rxn - Exacerbated a rash on his legs  Early 2000's         PHYSICAL EXAM  VITAL SIGNS: BP (!) 154/71   Pulse 85   Temp 36.8 °C (98.2 °F) (Temporal)   Resp 18   Ht 1.702 m (5' 7\")   Wt 82.1 kg (181 lb)   SpO2 91%   BMI 28.35 kg/m²   Reviewed and noted  Constitutional: Well developed, Well nourished, tired appearing.  HENT: Normocephalic, atraumatic, bilateral external ears normal, No intraoral erythema, edema, exudate  Eyes: PERRLA, conjunctiva pink, no scleral icterus.   Cardiovascular: Regular rate and rhythm. No murmurs, rubs or gallops.  No dependent edema or calf tenderness  Respiratory: Lungs clear to auscultation bilaterally. No wheezes, rales, or rhonchi.  Abdominal:  Abdomen soft, non-tender, non distended. No rebound, or guarding.    Skin: No erythema, no rash. No wounds " or bruising.  Genitourinary: No costovertebral angle tenderness.   Musculoskeletal: no deformities.   Neurologic: Alert, no facial droop noted. All extra ocular muscles intact. Moves all extremities with out weakness noted  Psychiatric: Affect normal, Judgment normal, Mood normal.         MEDICAL DECISION MAKING:  PROBLEMS EVALUATED THIS VISIT:  Hypoxia.  Patient to be hypoxic.  Patient informed that he is did not really know how to use his concentrator.  May be a communication issue functional issue.  Patient at this point has no signs of rhonchi rales.  4 L appears to be improved.  Recent COVID-pneumonia.  Patient now hypoxic.  Patient otherwise has no cough.  No chest pain.  Does not appear to be clinically in florid edema.  Difficulty understanding concentrator.  This may be the cause but the patient states that when he got instructions he was unsure exactly how to use it and at this point feels better after given oxygen.         PLAN:  X-ray to look for new infiltrates  Troponin look for cardiac cause  CBC metabolic panel for metabolic hematologic causes  No COVID testing at this time as patient recently COVID  Likely admission as the patient has difficulty with concentrator.    RISK:  Patient is at high risk with his hypoxia decompensating at home he will be needed to the hospital for oxygenation, pulmonary evaluation and, dialysis tomorrow      RESULTS    LABS Ordered and Reviewed by Me:  Results for orders placed or performed during the hospital encounter of 02/11/24   Comp Metabolic Panel   Result Value Ref Range    Sodium 141 135 - 145 mmol/L    Potassium 4.1 3.6 - 5.5 mmol/L    Chloride 97 96 - 112 mmol/L    Co2 31 20 - 33 mmol/L    Anion Gap 13.0 7.0 - 16.0    Glucose 119 (H) 65 - 99 mg/dL    Bun 16 8 - 22 mg/dL    Creatinine 5.82 (HH) 0.50 - 1.40 mg/dL    Calcium 8.8 8.5 - 10.5 mg/dL    Correct Calcium 8.9 8.5 - 10.5 mg/dL    AST(SGOT) 21 12 - 45 U/L    ALT(SGPT) 14 2 - 50 U/L    Alkaline Phosphatase 60  30 - 99 U/L    Total Bilirubin 0.6 0.1 - 1.5 mg/dL    Albumin 3.9 3.2 - 4.9 g/dL    Total Protein 6.9 6.0 - 8.2 g/dL    Globulin 3.0 1.9 - 3.5 g/dL    A-G Ratio 1.3 g/dL   LACTIC ACID   Result Value Ref Range    Lactic Acid 0.9 0.5 - 2.0 mmol/L   TROPONIN   Result Value Ref Range    Troponin T 492 (H) 6 - 19 ng/L   CBC WITH DIFFERENTIAL   Result Value Ref Range    WBC 5.6 4.8 - 10.8 K/uL    RBC 2.75 (L) 4.70 - 6.10 M/uL    Hemoglobin 8.8 (L) 14.0 - 18.0 g/dL    Hematocrit 27.5 (L) 42.0 - 52.0 %    .0 (H) 81.4 - 97.8 fL    MCH 32.0 27.0 - 33.0 pg    MCHC 32.0 (L) 32.3 - 36.5 g/dL    RDW 60.7 (H) 35.9 - 50.0 fL    Platelet Count 177 164 - 446 K/uL    MPV 11.0 9.0 - 12.9 fL    Neutrophils-Polys 67.50 44.00 - 72.00 %    Lymphocytes 19.40 (L) 22.00 - 41.00 %    Monocytes 8.20 0.00 - 13.40 %    Eosinophils 4.30 0.00 - 6.90 %    Basophils 0.40 0.00 - 1.80 %    Immature Granulocytes 0.20 0.00 - 0.90 %    Nucleated RBC 0.00 0.00 - 0.20 /100 WBC    Neutrophils (Absolute) 3.80 1.82 - 7.42 K/uL    Lymphs (Absolute) 1.09 1.00 - 4.80 K/uL    Monos (Absolute) 0.46 0.00 - 0.85 K/uL    Eos (Absolute) 0.24 0.00 - 0.51 K/uL    Baso (Absolute) 0.02 0.00 - 0.12 K/uL    Immature Granulocytes (abs) 0.01 0.00 - 0.11 K/uL    NRBC (Absolute) 0.00 K/uL   ESTIMATED GFR   Result Value Ref Range    GFR (CKD-EPI) 10 (A) >60 mL/min/1.73 m 2   EKG   Result Value Ref Range    Report       [facility] Emergency Dept.    Test Date:  2024  Pt Name:    DARRELL KOWALSKI                  Department: ER  MRN:        [id number]                      Room:        16  Gender:     Male                         Technician: [id number]  :        1950                   Requested By:ER TRIAGE PROTOCOL  Order #:    [id number]                    Reading MD: Good COPPOLA MD    Measurements  Intervals                                Axis  Rate:       84                           P:          60  SC:         186                           QRS:        107  QRSD:       98                           T:          19  QT:         422  QTc:        499    Interpretive Statements  Sinus rhythm  Rate of 84  Intervals normal VT, portal QRS  QTc increased  Axis right axis deviation  QRS waves noted in V1 and V2 which appear to be old  Some diffuse ST segment depressions in the lateral leads appear to be  unchanged  Abnormal with no acute obvious ischemic findings  Electronic ally Signed On 02- 13:20:11 PST by Good COPPOLA MD           RADIOLOGY      Radiologist interpretation:   DX-CHEST-PORTABLE (1 VIEW)   Final Result      1.  Pulmonary opacities which may be due to CHF and/or bibasilar pulmonary infiltrates. Correlate clinically.            ED COURSE:    ED Observation Status? No   No noted need for observation for developing issue    INTERVENTIONS BY ME:  Medications   albuterol inhaler 2 Puff (2 Puffs Inhalation Given 2/11/24 2037)           CONSULTANTS/OTHER GROUPS CONTACTED    Hospitalist was contacted   was also contacted regarding patient's poor compliance with oxygenation.  She recommended social evaluation for therapy at home    FINAL DISPO PLAN   Admit to the hospital for oxygen therapy  COVID/flu/RSV screening.  Dialysis tomorrow  Patient is admitted in guarded condition    FINAL IMPRESSION  1. Weakness    2. Hypoxia

## 2024-02-11 NOTE — ED NOTES
Med rec completed per pt. Doses confirmed with pharm records or historically (felodipine)   Allergies reviewed with pt  - Uncertain about PCN allergy. Believes it was a rash occurring as a young child.   Home antibiotics in past 30 days: none   Anticoagulants/antiplatelets in past 14 days:   - Aspirin 81mg daily, most recent dose today AM.   - Has records of dipyridamole, but per patient only takes aspirin for blood thinner. Removed from med rec.    Khushi Mcfadden, Pharmacy Intern

## 2024-02-11 NOTE — H&P
"Hospital Medicine History & Physical Note    Date of Service  2/11/2024    Primary Care Physician  Clyde Sung M.D.    Consultants  [facility]    Code Status  DNAR/DNI    Chief Complaint  Chief Complaint   Patient presents with    Weakness     Generalized weakness x 2 days with diarrhea. Upon EMS arrival pt found to be hypoxic in 80's on RA. Pt believes that his concentrator ran out. Pt states that he wasn't educated well when he received oxygen in 01/24 post COVID. Pt has hx of dialysis (M,W,F) with last being on Friday, DM, and HTN. BS: 164    Shortness of Breath     Pt found to be 74% on RA on arrival. 3 L applied via n/c, pt up to 91%         History of Presenting Illness  Jean Barboza is a 73 y.o. male who presented to [facility] on 2/11/2024 with weakness and diarrhea. He has a history of ESRD on HD MWF, HTN, DM II, Seizure disorder, h/o CVA, anemia secondary to CKD, CKD bone mineral disorder, and recent hospitalization for covid pneumonia. He was discharged on 1/11/24 on 2 L of home oxygen.   He reports he was initially doing well but then he felt like the home concentrator \"was not working\". He states he was also supposed to have his medications refilled last week by his pcp at the [facility] and the clinic has yet to refill them so he has been off of his medications for a week.  Als also reports diarrhea x 1 day with no associated n/v, abdominal pain or fever. He denies cough or  chest pain. He reported sob off of oxygen, he was noted to be 80 % on room air, currently he is at 93% on 4L with a bp of 178/83, trop of 402 (baseline low 300's) and evidence of mild fluid overload and pulmonary edema.     Goals of care and code status were discussed and he would like to be a DNR/ DNI    I discussed the plan of care with patient, nursing, ERP and Dr. Ovieod    Review of Systems  Review of Systems   Constitutional:  Positive for malaise/fatigue. Negative for chills, diaphoresis, fever and " weight loss.   HENT: Negative.  Negative for sore throat.    Eyes: Negative.  Negative for blurred vision.   Respiratory:  Positive for shortness of breath. Negative for cough.    Cardiovascular: Negative.  Negative for chest pain, palpitations and leg swelling.   Gastrointestinal:  Positive for diarrhea. Negative for abdominal pain, blood in stool, heartburn, melena, nausea and vomiting.   Genitourinary: Negative.  Negative for dysuria.   Musculoskeletal: Negative.  Negative for myalgias.   Skin: Negative.  Negative for itching and rash.   Neurological: Negative.  Negative for dizziness, focal weakness, weakness and headaches.   Endo/Heme/Allergies: Negative.  Does not bruise/bleed easily.   Psychiatric/Behavioral: Negative.  Negative for depression, substance abuse and suicidal ideas.    All other systems reviewed and are negative.      Past Medical History   has a past medical history of CATARACT, Diabetes (2005), Hyperlipidemia, Hypertension, Seizure (HCC) (1987), Seizure disorder (HCC), Snoring, and Stroke (HCC).    Surgical History   has a past surgical history that includes other (2003); other (2011); cataract phaco with iol (4/20/2011); cataract phaco with iol (5/4/2011); and cataract extraction with iol (2011).     Family History  family history includes Diabetes in his mother; Heart Disease in his father.   Family history reviewed with patient. There is no family history that is pertinent to the chief complaint.     Social History   reports that he quit smoking about 39 years ago. His smoking use included cigarettes. He started smoking about 43 years ago. He has a 2.0 pack-year smoking history. He has never used smokeless tobacco. He reports that he does not drink alcohol and does not use drugs.    Allergies  Allergies   Allergen Reactions    Penicillin G Rash     Rxn - Exacerbated a rash on his legs  Rash as a child          Medications  Prior to Admission Medications   Prescriptions Last Dose Informant  Patient Reported? Taking?   Cholecalciferol (VITAMIN D) 2000 UNIT Tab  Historical, Patient's Home Pharmacy Yes No   Sig: Take 2,000 Units by mouth every day.   Multiple Vitamin (MULTIVITAMINS PO)  Patient's Home Pharmacy Yes No   Sig: Take 1 Tablet by mouth every day.   Omega-3 Fatty Acids (FISH OIL PO)  Patient's Home Pharmacy Yes No   Sig: Take 300-1,000 mg by mouth every day.   acetaminophen (TYLENOL) 500 MG Tab   No No   Sig: Take 2 Tablets by mouth every 6 hours as needed for Mild Pain.   ascorbic acid (VITAMIN C) 500 MG tablet  Patient's Home Pharmacy Yes No   Sig: Take 500 mg by mouth every day.   aspirin (ASA) 81 MG Chew Tab chewable tablet  Patient's Home Pharmacy Yes No   Sig: Chew 81 mg every day.   atorvastatin (LIPITOR) 20 MG Tab  Historical, Patient's Home Pharmacy Yes No   Sig: Take 20 mg by mouth every evening. Indications: Cerebrovascular Accident or Stroke   benzonatate (TESSALON) 100 MG Cap   No No   Sig: Take 1 Capsule by mouth 3 times a day as needed for Cough.   calcitRIOL (ROCALTROL) 0.25 MCG Cap  Historical, Patient's Home Pharmacy Yes No   Sig: Take 0.25 mcg by mouth as needed. At dialysis   carboxymethylcellulose (REFRESH TEARS) 0.5 % Solution  Patient's Home Pharmacy Yes No   Sig: Administer 1 Drop into both eyes 4 times a day.   diclofenac sodium (VOLTAREN) 1 % Gel  Patient's Home Pharmacy Yes No   Sig: Apply 4 g topically 4 times a day as needed (knee pain).   dipyridamole (PERSANTINE) 25 MG Tab  Historical, Patient's Home Pharmacy Yes No   Sig: Take 25 mg by mouth 2 times a day. Indications: blood thinner   doxazosin (CARDURA) 2 MG Tab  Historical, Patient's Home Pharmacy Yes No   Sig: Take 2 mg by mouth every evening.   felodipine ER (PLENDIL) 10 MG TABLET SR 24 HR  Patient's Home Pharmacy Yes No   Sig: Take 10 mg by mouth every day. Indications: High Blood Pressure Disorder   ferrous sulfate 325 (65 Fe) MG tablet  Patient's Home Pharmacy Yes No   Sig: Take 325 mg by mouth every day.  Indications: Anemia From Inadequate Iron in the Body   furosemide (LASIX) 80 MG Tab  Historical, Patient's Home Pharmacy Yes No   Sig: Take 80 mg by mouth as needed. At dialysis   gabapentin (NEURONTIN) 100 MG Cap  Patient's Home Pharmacy Yes No   Sig: Take 200 mg by mouth 3 times a day.   gabapentin (NEURONTIN) 100 MG Cap  Patient's Home Pharmacy Yes No   Sig: Take 100-300 mg by mouth at bedtime.   guaiFENesin ER (MUCINEX) 600 MG TABLET SR 12 HR   No No   Sig: Take 1 Tablet by mouth 2 times a day as needed for Cough (productive cough).   hydrOXYzine HCl (ATARAX) 25 MG Tab  Patient's Home Pharmacy Yes No   Sig: Take 25-50 mg by mouth every evening as needed (Insomnia).   lisinopril (PRINIVIL) 40 MG tablet  Historical, Patient's Home Pharmacy Yes No   Sig: Take 40 mg by mouth every day. Indications: High Blood Pressure Disorder   loperamide (IMODIUM) 2 MG Cap  Patient's Home Pharmacy Yes No   Sig: Take 2-4 mg by mouth as needed for Diarrhea. 2 capsules after first loose stool followed by 1 capsule after each subsequent loose stool. Max 16 mg/day   methocarbamol (ROBAXIN) 500 MG Tab  Patient's Home Pharmacy Yes No   Sig: Take 500 mg by mouth 2 times a day. Neck muscles   sevelamer (RENAGEL) 800 MG Tab  Historical, Patient's Home Pharmacy Yes No   Sig: Take 1,600 mg by mouth 3 times a day with meals.      Facility-Administered Medications: None       Physical Exam  Temp:  [36.8 °C (98.2 °F)] 36.8 °C (98.2 °F)  Pulse:  [81-85] 81  Resp:  [16-18] 16  BP: (154-178)/(71-86) 178/86  SpO2:  [91 %-93 %] 93 %  Blood Pressure : (!) 178/86   Temperature: 36.8 °C (98.2 °F)   Pulse: 81   Respiration: 16   Pulse Oximetry: 93 %       Physical Exam  Vitals and nursing note reviewed. Exam conducted with a chaperone present.   Constitutional:       General: He is not in acute distress.     Appearance: Normal appearance. He is not diaphoretic.   HENT:      Head: Normocephalic.      Nose: Nose normal.      Mouth/Throat:      Mouth:  "Mucous membranes are moist.   Eyes:      Pupils: Pupils are equal, round, and reactive to light.   Cardiovascular:      Rate and Rhythm: Normal rate and regular rhythm.      Pulses: Normal pulses.      Heart sounds: Normal heart sounds.   Pulmonary:      Effort: Pulmonary effort is normal.      Breath sounds: Rales present.   Abdominal:      General: Abdomen is flat. Bowel sounds are normal.      Palpations: Abdomen is soft.   Musculoskeletal:         General: No swelling or deformity. Normal range of motion.      Comments: RUE fistula CDI, + thrill    Skin:     General: Skin is warm and dry.      Capillary Refill: Capillary refill takes less than 2 seconds.   Neurological:      General: No focal deficit present.      Mental Status: He is alert and oriented to person, place, and time.      Cranial Nerves: No cranial nerve deficit.   Psychiatric:         Mood and Affect: Mood normal.         Behavior: Behavior normal.         Laboratory:  Recent Labs     02/11/24  1308   WBC 5.6   RBC 2.75*   HEMOGLOBIN 8.8*   HEMATOCRIT 27.5*   .0*   MCH 32.0   MCHC 32.0*   RDW 60.7*   PLATELETCT 177   MPV 11.0     Recent Labs     02/11/24  1227   SODIUM 141   POTASSIUM 4.1   CHLORIDE 97   CO2 31   GLUCOSE 119*   BUN 16   CREATININE 5.82*   CALCIUM 8.8     Recent Labs     02/11/24  1227   ALTSGPT 14   ASTSGOT 21   ALKPHOSPHAT 60   TBILIRUBIN 0.6   GLUCOSE 119*         No results for input(s): \"NTPROBNP\" in the last 72 hours.      Recent Labs     02/11/24  1227 02/11/24  1454   TROPONINT 492* 433*       Imaging:  DX-CHEST-PORTABLE (1 VIEW)   Final Result      1.  Pulmonary opacities which may be due to CHF and/or bibasilar pulmonary infiltrates. Correlate clinically.      EC-ECHOCARDIOGRAM COMPLETE W/O CONT    (Results Pending)       EKG:  I have personally reviewed the images and compared with prior images.nsr 84 qtc 499, no acute ischemic changes    Assessment/Plan:  Justification for Admission Status  I anticipate this " patient will require at least two midnights for appropriate medical management, necessitating inpatient admission because of above    Patient will need a tele bed on EMERGENCY service .  The need is secondary to elevated trops.    * Acute respiratory failure with hypoxia (HCC)- (present on admission)  Assessment & Plan  Likely multifactorial, related to long covid and acute pulm edema, missing a week of diuretics likely contributed - will resume these  Discussed with nephrology who will eval, does not appear to require acute HD at this time, did have session on Friday    Diarrhea of presumed infectious origin  Assessment & Plan  Present on admission  Will check c dif and fecal wbc  following    Pneumonia due to COVID-19 virus- (present on admission)  Assessment & Plan  Recent  Query long covid  Will repeat covid testing     Diastolic CHF (HCC)- (present on admission)  Assessment & Plan  With acute on chronic exacerbation, resume diuretics  Following  Echo pending  tele    ESRD (end stage renal disease) on dialysis (HCC)- (present on admission)  Assessment & Plan  Discussed with nephrology who is following    Aortic stenosis- (present on admission)  Assessment & Plan  Echo pending    History of CVA (cerebrovascular accident)- (present on admission)  Assessment & Plan  No acute findings  Supportive care   following    Elevated troponin- (present on admission)  Assessment & Plan  No chest pain  H/o chronically elevated trops related to decreased renal clearance  Above baseline, acute hypoxia could be contributing to this  Tele  Echo   Serial trops  If any acute clinical changes will consider cardiology consult    Type 2 diabetes mellitus (HCC)- (present on admission)  Assessment & Plan  SSI  Diabetic diet  following        VTE prophylaxis: heparin ppx

## 2024-02-11 NOTE — ED TRIAGE NOTES
"Chief Complaint   Patient presents with    Weakness     Generalized weakness x 2 days with diarrhea. Upon EMS arrival pt found to be hypoxic in 80's on RA. Pt believes that his concentrator ran out. Pt states that he wasn't educated well when he received oxygen in 01/24 post COVID. Pt has hx of dialysis (M,W,F) with last being on Friday, DM, and HTN. BS: 164    Shortness of Breath     Pt found to be 74% on RA on arrival. 3 L applied via n/c, pt up to 91%           BP (!) 154/71   Pulse 85   Temp 36.8 °C (98.2 °F) (Temporal)   Resp 18   Ht 1.702 m (5' 7\")   Wt 82.1 kg (181 lb)   SpO2 91%     "

## 2024-02-12 PROBLEM — I50.9 ACUTE HEART FAILURE (HCC): Status: ACTIVE | Noted: 2022-12-21

## 2024-02-12 LAB
ANION GAP SERPL CALC-SCNC: 14 MMOL/L (ref 7–16)
BASOPHILS # BLD AUTO: 0.1 % (ref 0–1.8)
BASOPHILS # BLD: 0.01 K/UL (ref 0–0.12)
BUN SERPL-MCNC: 11 MG/DL (ref 8–22)
CALCIUM SERPL-MCNC: 8.5 MG/DL (ref 8.5–10.5)
CHLORIDE SERPL-SCNC: 96 MMOL/L (ref 96–112)
CO2 SERPL-SCNC: 29 MMOL/L (ref 20–33)
CREAT SERPL-MCNC: 4.11 MG/DL (ref 0.5–1.4)
EOSINOPHIL # BLD AUTO: 0 K/UL (ref 0–0.51)
EOSINOPHIL NFR BLD: 0 % (ref 0–6.9)
ERYTHROCYTE [DISTWIDTH] IN BLOOD BY AUTOMATED COUNT: 58.8 FL (ref 35.9–50)
GFR SERPLBLD CREATININE-BSD FMLA CKD-EPI: 15 ML/MIN/1.73 M 2
GLUCOSE BLD STRIP.AUTO-MCNC: 120 MG/DL (ref 65–99)
GLUCOSE BLD STRIP.AUTO-MCNC: 148 MG/DL (ref 65–99)
GLUCOSE BLD STRIP.AUTO-MCNC: 164 MG/DL (ref 65–99)
GLUCOSE BLD STRIP.AUTO-MCNC: 169 MG/DL (ref 65–99)
GLUCOSE SERPL-MCNC: 169 MG/DL (ref 65–99)
HCT VFR BLD AUTO: 31 % (ref 42–52)
HGB BLD-MCNC: 10.2 G/DL (ref 14–18)
IMM GRANULOCYTES # BLD AUTO: 0.03 K/UL (ref 0–0.11)
IMM GRANULOCYTES NFR BLD AUTO: 0.4 % (ref 0–0.9)
LV EJECT FRACT  99904: 55
LYMPHOCYTES # BLD AUTO: 0.32 K/UL (ref 1–4.8)
LYMPHOCYTES NFR BLD: 4.6 % (ref 22–41)
MCH RBC QN AUTO: 32.4 PG (ref 27–33)
MCHC RBC AUTO-ENTMCNC: 32.9 G/DL (ref 32.3–36.5)
MCV RBC AUTO: 98.4 FL (ref 81.4–97.8)
MONOCYTES # BLD AUTO: 0.13 K/UL (ref 0–0.85)
MONOCYTES NFR BLD AUTO: 1.9 % (ref 0–13.4)
NEUTROPHILS # BLD AUTO: 6.42 K/UL (ref 1.82–7.42)
NEUTROPHILS NFR BLD: 93 % (ref 44–72)
NRBC # BLD AUTO: 0 K/UL
NRBC BLD-RTO: 0 /100 WBC (ref 0–0.2)
PLATELET # BLD AUTO: 187 K/UL (ref 164–446)
PMV BLD AUTO: 11 FL (ref 9–12.9)
POTASSIUM SERPL-SCNC: 3.7 MMOL/L (ref 3.6–5.5)
PROCALCITONIN SERPL-MCNC: 0.16 NG/ML
RBC # BLD AUTO: 3.15 M/UL (ref 4.7–6.1)
SCCMEC + MECA PNL NOSE NAA+PROBE: NEGATIVE
SODIUM SERPL-SCNC: 139 MMOL/L (ref 135–145)
WBC # BLD AUTO: 6.9 K/UL (ref 4.8–10.8)

## 2024-02-12 PROCEDURE — 90935 HEMODIALYSIS ONE EVALUATION: CPT

## 2024-02-12 PROCEDURE — 94640 AIRWAY INHALATION TREATMENT: CPT

## 2024-02-12 PROCEDURE — 36415 COLL VENOUS BLD VENIPUNCTURE: CPT

## 2024-02-12 PROCEDURE — 99233 SBSQ HOSP IP/OBS HIGH 50: CPT | Performed by: STUDENT IN AN ORGANIZED HEALTH CARE EDUCATION/TRAINING PROGRAM

## 2024-02-12 PROCEDURE — 700111 HCHG RX REV CODE 636 W/ 250 OVERRIDE (IP): Mod: JZ,JG | Performed by: STUDENT IN AN ORGANIZED HEALTH CARE EDUCATION/TRAINING PROGRAM

## 2024-02-12 PROCEDURE — 80048 BASIC METABOLIC PNL TOTAL CA: CPT

## 2024-02-12 PROCEDURE — 93306 TTE W/DOPPLER COMPLETE: CPT | Mod: 26 | Performed by: INTERNAL MEDICINE

## 2024-02-12 PROCEDURE — 5A1D70Z PERFORMANCE OF URINARY FILTRATION, INTERMITTENT, LESS THAN 6 HOURS PER DAY: ICD-10-PCS | Performed by: STUDENT IN AN ORGANIZED HEALTH CARE EDUCATION/TRAINING PROGRAM

## 2024-02-12 PROCEDURE — A9270 NON-COVERED ITEM OR SERVICE: HCPCS | Performed by: HOSPITALIST

## 2024-02-12 PROCEDURE — 84145 PROCALCITONIN (PCT): CPT

## 2024-02-12 PROCEDURE — 94669 MECHANICAL CHEST WALL OSCILL: CPT

## 2024-02-12 PROCEDURE — 700111 HCHG RX REV CODE 636 W/ 250 OVERRIDE (IP): Performed by: HOSPITALIST

## 2024-02-12 PROCEDURE — 770020 HCHG ROOM/CARE - TELE (206)

## 2024-02-12 PROCEDURE — 99222 1ST HOSP IP/OBS MODERATE 55: CPT | Mod: GC | Performed by: INTERNAL MEDICINE

## 2024-02-12 PROCEDURE — 82962 GLUCOSE BLOOD TEST: CPT

## 2024-02-12 PROCEDURE — 85025 COMPLETE CBC W/AUTO DIFF WBC: CPT

## 2024-02-12 PROCEDURE — 700111 HCHG RX REV CODE 636 W/ 250 OVERRIDE (IP)

## 2024-02-12 PROCEDURE — 700101 HCHG RX REV CODE 250: Performed by: INTERNAL MEDICINE

## 2024-02-12 PROCEDURE — 700111 HCHG RX REV CODE 636 W/ 250 OVERRIDE (IP): Performed by: INTERNAL MEDICINE

## 2024-02-12 PROCEDURE — 700102 HCHG RX REV CODE 250 W/ 637 OVERRIDE(OP): Performed by: HOSPITALIST

## 2024-02-12 RX ORDER — HEPARIN SODIUM 1000 [USP'U]/ML
1500 INJECTION, SOLUTION INTRAVENOUS; SUBCUTANEOUS
Status: DISCONTINUED | OUTPATIENT
Start: 2024-02-12 | End: 2024-02-14 | Stop reason: HOSPADM

## 2024-02-12 RX ORDER — CEFAZOLIN SODIUM 1 G/50ML
1 INJECTION, SOLUTION INTRAVENOUS EVERY 24 HOURS
Status: DISCONTINUED | OUTPATIENT
Start: 2024-02-12 | End: 2024-02-13

## 2024-02-12 RX ADMIN — DOXAZOSIN 2 MG: 2 TABLET ORAL at 21:24

## 2024-02-12 RX ADMIN — HEPARIN SODIUM 5000 UNITS: 5000 INJECTION, SOLUTION INTRAVENOUS; SUBCUTANEOUS at 06:16

## 2024-02-12 RX ADMIN — FUROSEMIDE 80 MG: 40 TABLET ORAL at 06:12

## 2024-02-12 RX ADMIN — ASPIRIN 81 MG 81 MG: 81 TABLET ORAL at 06:12

## 2024-02-12 RX ADMIN — FELODIPINE 10 MG: 5 TABLET, EXTENDED RELEASE ORAL at 06:13

## 2024-02-12 RX ADMIN — CALCITRIOL CAPSULES 0.25 MCG 0.25 MCG: 0.25 CAPSULE ORAL at 06:12

## 2024-02-12 RX ADMIN — SEVELAMER CARBONATE 1600 MG: 800 TABLET, FILM COATED ORAL at 17:24

## 2024-02-12 RX ADMIN — CEFAZOLIN SODIUM 1 G: 1 INJECTION, SOLUTION INTRAVENOUS at 17:28

## 2024-02-12 RX ADMIN — LINEZOLID 600 MG: 2 INJECTION, SOLUTION INTRAVENOUS at 00:04

## 2024-02-12 RX ADMIN — GABAPENTIN 100 MG: 100 CAPSULE ORAL at 21:24

## 2024-02-12 RX ADMIN — HEPARIN SODIUM 5000 UNITS: 5000 INJECTION, SOLUTION INTRAVENOUS; SUBCUTANEOUS at 13:47

## 2024-02-12 RX ADMIN — ATORVASTATIN CALCIUM 20 MG: 20 TABLET, FILM COATED ORAL at 17:24

## 2024-02-12 RX ADMIN — OMEGA-3 FATTY ACIDS CAP 1000 MG 1000 MG: 1000 CAP at 06:11

## 2024-02-12 RX ADMIN — LINEZOLID 600 MG: 2 INJECTION, SOLUTION INTRAVENOUS at 07:49

## 2024-02-12 RX ADMIN — SEVELAMER CARBONATE 1600 MG: 800 TABLET, FILM COATED ORAL at 07:46

## 2024-02-12 RX ADMIN — LIDOCAINE HYDROCHLORIDE 1 ML: 10 INJECTION, SOLUTION EPIDURAL; INFILTRATION; INTRACAUDAL; PERINEURAL at 01:00

## 2024-02-12 RX ADMIN — LISINOPRIL 40 MG: 20 TABLET ORAL at 06:12

## 2024-02-12 RX ADMIN — INSULIN HUMAN 1 UNITS: 100 INJECTION, SOLUTION PARENTERAL at 12:15

## 2024-02-12 RX ADMIN — INSULIN HUMAN 1 UNITS: 100 INJECTION, SOLUTION PARENTERAL at 07:55

## 2024-02-12 RX ADMIN — HEPARIN SODIUM 1500 UNITS: 1000 INJECTION, SOLUTION INTRAVENOUS; SUBCUTANEOUS at 01:05

## 2024-02-12 RX ADMIN — SEVELAMER CARBONATE 1600 MG: 800 TABLET, FILM COATED ORAL at 12:13

## 2024-02-12 RX ADMIN — THERA TABS 1 TABLET: TAB at 06:11

## 2024-02-12 RX ADMIN — HEPARIN SODIUM 5000 UNITS: 5000 INJECTION, SOLUTION INTRAVENOUS; SUBCUTANEOUS at 21:24

## 2024-02-12 ASSESSMENT — ENCOUNTER SYMPTOMS
COUGH: 1
SPUTUM PRODUCTION: 0

## 2024-02-12 ASSESSMENT — FIBROSIS 4 INDEX: FIB4 SCORE: 2.31

## 2024-02-12 NOTE — PROGRESS NOTES
NOC APRN CROSS COVER NOTE      Notified by nursing for increasing oxygen demand. Orders placed for high flow nasal canula and 80 mg of IV lasix once.    Nursing requesting ABG. Per RN, patient on 60L/100% with mild increased work of breathing. Ordered per RN request.     Additional orders for 4 mg IV decadron given active Covid with hypoxia.     Zulema Shelley ACNPC-AG, NOC Hospitalist APRN

## 2024-02-12 NOTE — CONSULTS
"[facility] -  CONSULTATION NOTE               Author: Amrita Avelar M.D. Date & Time: 2024  1:03 PM       REASON FOR CONSULTATION:   Inpatient hemodialysis management.    CHIEF COMPLAINT:   \"sob\"    HISTORY OF PRESENT ILLNESS:    73 y.o. male who presented to [facility] on 2024 with weakness and diarrhea. He has a history of ESRD on HD MWF, HTN, DM II, Seizure disorder, h/o CVA, anemia secondary to CKD, CKD bone mineral disorder, and recent hospitalization for covid pneumonia p/w SOB, was found to have Pulm edema associated with weakness and diarrhea.   No F/C/N/V.No melena, hematochezia, hematemesis.  No HA, visual changes, or abdominal pain.    REVIEW OF SYSTEMS:    10 point ROS was performed and is as per HPI or otherwise negative.    PAST MEDICAL HISTORY:   Past Medical History:   Diagnosis Date    CATARACT     Diabetes 2005    oral meds    Hyperlipidemia     Hypertension     Seizure (HCC) 1987    Seizure disorder (HCC)     Snoring     Stroke (HCC)        PAST SURGICAL HISTORY:   Past Surgical History:   Procedure Laterality Date    CATARACT PHACO WITH IOL  2011    Performed by ENRIQUETA MABRY at [facility]    CATARACT PHACO WITH IOL  2011    Performed by ENRIQUETA MABRY at [facility]    OTHER      left eye cataract    CATARACT EXTRACTION WITH IOL      OTHER      oral surgery       FAMILY HISTORY:   Family History   Problem Relation Age of Onset    Diabetes Mother     Heart Disease Father        SOCIAL HISTORY:   Social History     Tobacco Use   Smoking Status Former    Current packs/day: 0.00    Average packs/day: 0.5 packs/day for 4.0 years (2.0 ttl pk-yrs)    Types: Cigarettes    Start date: 1981    Quit date: 1985    Years since quittin.0   Smokeless Tobacco Never     Social History     Substance and Sexual Activity   Alcohol Use No     Social History     Substance and Sexual Activity   Drug Use No " "      HOME MEDICATIONS:   Reviewed and documented in chart.    LABORATORY STUDIES:   Recent Labs     02/11/24  1227 02/12/24  0546   SODIUM 141 139   POTASSIUM 4.1 3.7   CHLORIDE 97 96   CO2 31 29   GLUCOSE 119* 169*   BUN 16 11   CREATININE 5.82* 4.11*   CALCIUM 8.8 8.5       ALLERGIES:  Penicillin g    VS:  /62   Pulse 79   Temp 36.7 °C (98.1 °F) (Temporal)   Resp 16   Ht 1.702 m (5' 7\")   Wt 79.3 kg (174 lb 13.2 oz)   SpO2 94%   BMI 27.38 kg/m²     Physical Exam  Vitals and nursing note reviewed.   Constitutional:       General: He is not in acute distress.     Appearance: He is not ill-appearing.   HENT:      Head: Normocephalic and atraumatic.   Eyes:      General: No scleral icterus.  Cardiovascular:      Rate and Rhythm: Normal rate and regular rhythm.      Heart sounds: No murmur heard.     No friction rub. No gallop.   Pulmonary:      Effort: No respiratory distress.      Breath sounds: Rales present. No wheezing.   Abdominal:      General: There is no distension.      Palpations: There is no mass.      Tenderness: There is no abdominal tenderness. There is no guarding.      Hernia: No hernia is present.   Musculoskeletal:         General: No swelling, tenderness, deformity or signs of injury.   Skin:     Coloration: Skin is not jaundiced.      Findings: No bruising.   Neurological:      Mental Status: Mental status is at baseline.   Psychiatric:         Mood and Affect: Mood normal.            FLUID BALANCE:  In: 620 [P.O.:120; Dialysis:500]  Out: 3450     IMAGING:  All imaging reviewed from admission to present day    IMPRESSION:  # ESRD  -On HD MWF via right arm aVF  #Acute hypoxic respiratory failure  -Secondary to COVID viral illness and pneumonia +/- fluid overload   # HTN  - Goal BP < 140/90  - not at the goal   # Anemia of CKD  - Goal Hgb 10-11  - At goal  # CKD Mineral Bone Disorder--followed at outpt HD  # DM II--per primary svc  # Seizure Disorder--per primary svc     PLAN:  - urgent " HD tonight, extra HD tomorrow for fluid removal and then c/w  MWF iHD during stay  - PRN NS boluses for symptomatic hypotension  - Continue supplemental O2 and wean as tolerated  - No need for NAFISA  - Check phos level  - No dietary protein restrictions  - Dose all meds per ESRD  - Other management per primary svc    Thank you for the consultation!

## 2024-02-12 NOTE — PROGRESS NOTES
4 Eyes Skin Assessment Completed by PRATIBHA Orr and PRATIBHA Schneider.    Head WDL  Ears WDL  Nose WDL  Mouth WDL  Neck WDL  Breast/Chest WDL  Shoulder Blades WDL  Spine WDL  (R) Arm/Elbow/Hand Scab AVF  (L) Arm/Elbow/Hand WDL  Abdomen WDL  Groin WDL  Scrotum/Coccyx/Buttocks WDL  (R) Leg Scab  (L) Leg Scab  (R) Heel/Foot/Toe Discoloration  (L) Heel/Foot/Toe Discoloration          Devices In Places Oxy Mask      Interventions In Place Pillows    Possible Skin Injury No    Pictures Uploaded Into Epic N/A  Wound Consult Placed N/A  RN Wound Prevention Protocol Ordered No      weakness

## 2024-02-12 NOTE — HOSPITAL COURSE
73 male with past medical history of ESRD on hemodialysis MWF, hypertension, type 2 diabetes, seizure, history of CVA, anemia, aortic stenosis presented with generalized weakness and shortness of breath.  Patient was recently  discharged on 1/11/24 on 2 L oxygen COVID-19 infection.  On arrival to ED he was requiring 4 L oxygen.  Overnight there was increase in  oxygen requirement and was started on high flow nasal cannula.  Eventually his oxygen requirement improved back to 8 L.  Patient had session of hemodialysis on 12/11 for concern of volume overload.  Cardiology was consulted for severe aortic stenosis and elevated troponin for further evaluation.

## 2024-02-12 NOTE — ASSESSMENT & PLAN NOTE
Echocardiogram noted with EF 55%, no wall motion abnormalities, severe aortic valve stenosis.    Case was discussed with cardiology Dr. Conklin.  Will evaluate for  further recommendation

## 2024-02-12 NOTE — CONSULTS
CARDIOLOGY CONSULTATION NOTE      Date of Consultation: 2/12/2024  Consulting Provider: Aba Rock M.d.    Patient Name: Jean Barboza  YOB: 1950  MRN: [id number]    Reason for Consultation:   Patient worsening hypoxia, appears volume overload.  Assessment for heart failure with concerns for valvular pathology.    Assessment:   #Moderate to severe aortic stenosis,   #Heart failure with preserved ejection fraction,    #Diabetes mellitus type 2,   #hypertension,    Recommendations:   -Appreciate nephrology consult.  Continue dialysis as able for volume control  -Heart failure does not appear to be in acute exacerbation.  No plans for valvular surgical repair at this time.    Disposition:   Pending    History of Present Illness:   Jean Barboza is a 73 year-old male with a past medical history of End-stage renal disease on hemodialysis Monday Wednesday Friday (with fistula in right upper extremity), hypertension, diabetes mellitus type 2, seizure disorder, history of CVA, anemia and bone mineral disease both associated with end-stage renal disease.  He has a recent past medical history significant for COVID 19 infection. briefly, he presents February 11 with several days of worsening fatigue, diarrhea, generalized weakness, shortness of breath.  H&P reference nonadherence due to unable to get medications refilled, but he notes adherence to Lasix doses into dialysis but notes that he missed dialysis approximately 3 weeks ago.  Reports worsening shortness of breath and nonproductive cough.  Denies wheezes, shortness of breath, palpitations, worsening swelling, fevers, change in vision or hearing.  Reports worsening lightheadedness and dizziness in the mornings prior to dialysis appointments.   Troponin stable at approximately 400's, no BNP available.  Patient positive for COVID-19.  Transthoracic echocardiogram significant for ejection fraction 55%, severely dilated left atrium with severe  mitral regurgitation and reversal seen, severe aortic valve stenosis, moderate aortic insufficiency.  Valvular abnormalities seen worse compared to prior echocardiogram completed in 2021.      Subjective/Events:    -Significant event of hypoxia, worsening oxygen demand overnight including high flow 40 L/min.  Overnight providers gave him doses of Lasix, and contact nephrology for emergent dialysis which was completed approximately 1 AM with approximately 2.7 L removed.   -Patient notes general improvement in shortness of breath, cough since dialysis.  Noted lower oxygen demands at this time.    Medical History:     Past Medical History:   Diagnosis Date    CATARACT     Diabetes 2005    oral meds    Hyperlipidemia     Hypertension     Seizure (HCC) 1987    Seizure disorder (HCC)     Snoring     Stroke (HCC)        Surgical History:     Past Surgical History:   Procedure Laterality Date    CATARACT PHACO WITH IOL  5/4/2011    Performed by ENRIQUETA MABRY at SURGERY SAME DAY [facility] ORS    CATARACT PHACO WITH IOL  4/20/2011    Performed by ENRIQUETA MABRY at SURGERY SAME DAY [facility] ORS    OTHER  2011    left eye cataract    CATARACT EXTRACTION WITH IOL  2011    OTHER  2003    oral surgery       Family History:     Family History   Problem Relation Age of Onset    Diabetes Mother     Heart Disease Father        Social History:   The patient is a non-smoker noted to quit approximately 40 years ago.  1-2 pack-year history.  Denies alcohol or other recreational drug use.    Medications and Allergies:     Current Facility-Administered Medications   Medication Dose Route Frequency Provider Last Rate Last Admin    heparin injection 1,500 Units  1,500 Units Intravenous ACUTE DIALYSIS PRN Kinsey Oviedo M.D.   1,500 Units at 02/12/24 0105    lidocaine (Xylocaine) 1 % injection 1 mL  1 mL Infiltration ACUTE DIALYSIS PRN Kinsey Oviedo M.D.   1 mL at 02/12/24 0100    Respiratory Therapy Consult   Nebulization Continuous RT Yazmin ACÑUA  AJAY Mora        heparin injection 5,000 Units  5,000 Units Subcutaneous Q8HRS Yazmin Mora M.D.   5,000 Units at 02/12/24 0616    aspirin (Asa) chewable tab 81 mg  81 mg Oral DAILY Yazmin Mora M.D.   81 mg at 02/12/24 0612    atorvastatin (Lipitor) tablet 20 mg  20 mg Oral Q EVENING Yazmin Mora M.D.   20 mg at 02/11/24 1742    calcitRIOL (Rocaltrol) capsule 0.25 mcg  0.25 mcg Oral MO, WE + FR Yazmin Mora M.D.   0.25 mcg at 02/12/24 0612    doxazosin (Cardura) tablet 2 mg  2 mg Oral Nightly Yazmin Mora M.D.   2 mg at 02/11/24 2105    felodipine ER (Plendil) tablet 10 mg  10 mg Oral DAILY Yazmin Mora M.D.   10 mg at 02/12/24 0613    furosemide (Lasix) tablet 80 mg  80 mg Oral Q DAY Yazmin Mora M.D.   80 mg at 02/12/24 0612    gabapentin (Neurontin) capsule 100 mg  100 mg Oral QHS Yazmin Mora M.D.   100 mg at 02/11/24 2105    lisinopril (Prinivil) tablet 40 mg  40 mg Oral DAILY Yazmin Mora M.D.   40 mg at 02/12/24 0612    fish oil capsule 1,000 mg  1,000 mg Oral DAILY Yazmin Mora M.D.   1,000 mg at 02/12/24 0611    sevelamer carbonate (Renvela) tablet 1,600 mg  1,600 mg Oral TID WITH MEALS Yazmin Mora M.D.   1,600 mg at 02/12/24 0746    multivitamin tablet 1 Tablet  1 Tablet Oral DAILY Yazmin Mora M.D.   1 Tablet at 02/12/24 0611    insulin regular (HumuLIN R,NovoLIN R) injection  1-6 Units Subcutaneous 4X/DAY ACHS Yazmin Mora M.D.   1 Units at 02/12/24 0755    And    dextrose 50% (D50W) injection 25 g  25 g Intravenous Q15 MIN PRN Yazmin Mora M.D.        Pharmacy Consult Request - C. difficile consult  1 Each Other PHARMACY TO DOSE Yazmin Mora M.D.        ipratropium-albuterol (DUONEB) nebulizer solution  3 mL Nebulization Q2HRS PRN (RT) Yazmin Mora M.D.        dexamethasone (Decadron) injection 6 mg  6 mg Intravenous DAILY ORLANDO Watts.P.R.N.   6 mg at 02/11/24 2236    cefepime (Maxipime) 1 g in  mL IVPB  1 g Intravenous Q24HRS [place], A.P.R.N.   Stopped at  "02/11/24 2349    Linezolid (Zyvox) premix 600 mg  600 mg Intravenous Q12HRS TONO Fagan.   Stopped at 02/12/24 0849       Allergies   Allergen Reactions    Penicillin G Rash     Rxn - Exacerbated a rash on his legs  Rash as a child   Tolerates ceftriaxone and cefepime       Review of Systems:   A pertinent review of systems was performed and was unremarkable except as per HPI above.    Vital Signs:   /64   Pulse 80   Temp 36.6 °C (97.9 °F) (Temporal)   Resp 16   Ht 1.702 m (5' 7\")   Wt 79.3 kg (174 lb 13.2 oz)   SpO2 95%   BMI 27.38 kg/m²   Vitals:    02/12/24 0349 02/12/24 0609 02/12/24 0701 02/12/24 0711   BP:  122/62  125/64   Pulse: 75 75 70 80   Resp: 20  16 16   Temp:  36.3 °C (97.3 °F)  36.6 °C (97.9 °F)   TempSrc:  Temporal  Temporal   SpO2: 95%  94% 95%   Weight:  79.3 kg (174 lb 13.2 oz)     Height:         Body mass index is 27.38 kg/m².  Oxygen Therapy:  Pulse Oximetry: 95 %, O2 (LPM): 8, O2 Delivery Device: High Flow Nasal Cannula    Physical Examination:   General: Well-appearing, no acute distress  Eyes: Extraocular movements intact, anicteric  HENT: neck full range of motion, no jugular venous distension  Pulmonary: Normal respiratory effort, clear to auscultation bilaterally.  No wheezes, rales, rhonchi noted.  Deep breathing induces cough several times throughout physical examination.  Cardiovascular: Regular rate and rhythm, clear systolic ejection murmur, with holosystolic component present grade 3 out of 6.  No thrill unable to appreciate any change in point of maximal impact.  Trace to 1+ peripheral edema noted.  Gastrointestinal: Soft, non-tender, no guarding, or rebound tenderness.  Extremities: Warm and well perfused, no lower extremity edema  Neurological: Alert and oriented, no gross focal motor deficits  Psychiatric: Normal affect, mood    Laboratories:   Estimated Creatinine Clearance: 15 mL/min (A) (by C-G formula based on SCr of 4.11 mg/dL (H)).  Recent Labs     " 02/11/24  1227 02/12/24  0546   CREATININE 5.82* 4.11*   BUN 16 11   POTASSIUM 4.1 3.7   SODIUM 141 139   CALCIUM 8.8 8.5   CO2 31 29   ALBUMIN 3.9  --      Recent Labs     02/11/24  1227 02/12/24  0546   GLUCOSE 119* 169*     Recent Labs     02/11/24  1227   ASTSGOT 21   ALTSGPT 14   ALKPHOSPHAT 60     Recent Labs     02/11/24  1308 02/12/24  0546   WBC 5.6 6.9   HEMOGLOBIN 8.8* 10.2*   PLATELETCT 177 187     Recent Labs     02/11/24  1227 02/11/24  1454 02/11/24  2147   TROPONINT 492* 433* 407*     Lab Results   Component Value Date/Time    LDL 42 09/22/2017 0200     Lab Results   Component Value Date/Time    HDL 30 (A) 09/22/2017 0200       Lab Results   Component Value Date/Time    TRIGLYCERIDE 109 09/22/2017 0200       Lab Results   Component Value Date/Time    CHOLSTRLTOT 94 (L) 09/22/2017 0200       Studies:   Echocardiography  Moderate concentric left ventricular hypertrophy. Appears normal left ventricular systolic function with EF~55%   The right ventricle is mildly dilated. Normal right ventricular systolic function.  Severely dilated left atrium with Severe mitral valve regurgitation with reversal seen.  Severe aortic valve stenosis. Moderate aortic insufficiency.  Estimated right ventricular systolic pressure is 75 mmHg    Coronary Angiography/Cardiac Catheterization  N/a    Stress Testing  N/a    X-ray/CT/MRI  Pulmonary edema/infiltrates.  Consistent with volume overload.  Cardiomegaly and atherosclerosis noted.    Ultrasound  N/A    Electrophysiology  N/A

## 2024-02-12 NOTE — DISCHARGE PLANNING
Case Management Discharge Planning    Admission Date: 2/11/2024  GMLOS: 5  ALOS: 1    6-Clicks ADL Score:    6-Clicks Mobility Score:    PT and/or OT Eval ordered: No  Post-acute Referrals Ordered: No  Post-acute Choice Obtained: No  Has referral(s) been sent to post-acute provider:  No      Anticipated Discharge Dispo: Discharge Disposition: Discharged to home/self care (01)    DME Needed: No    Action(s) Taken: Updated Provider/Nurse on Discharge Plan and DC Assessment Complete (See below)    Escalations Completed: None    Medically Clear: No    Next Steps: Pt established with [facility] for HD M-W-F at 1000, S transports pt. Has home 02 from [facility], they will be sending tech to home to check unit ton ensure it's functioning.     Barriers to Discharge: Medical clearance    Is the patient up for discharge tomorrow: No

## 2024-02-12 NOTE — CARE PLAN
The patient is Stable - Low risk of patient condition declining or worsening    Shift Goals  Clinical Goals: dialysis, ABX, diuresis, steriods  Patient Goals: get better  Family Goals: MALINI    Progress made toward(s) clinical / shift goals:  rest    Problem: Knowledge Deficit - Standard  Goal: Patient and family/care givers will demonstrate understanding of plan of care, disease process/condition, diagnostic tests and medications  Outcome: Progressing  Note: Pt aware of POC: dialysis, ABX, diuresis, steroids. Goal is for pt to remain involved in POC and to be updated in the moment.      Problem: Respiratory  Goal: Patient will achieve/maintain optimum respiratory ventilation and gas exchange  Outcome: Progressing  Note: Pt O2 demand continues to decrease, pt received emergent dialysis, RT titrated pt from HHFNC to HFNC, pt is receiving ABX. Goal is for pt to return to baseline O2 of RA.        Patient is not progressing towards the following goals:

## 2024-02-12 NOTE — PROGRESS NOTES
Pt arrived by rodriguez. Transferred by self to bed after getting standing weight. Alert and oriented. On 10L oxymask due to being mouth breather. Unclear on what he was wearing at home. Son updated over phone. Declines need for urinal. Refuses bed alarm.

## 2024-02-12 NOTE — ASSESSMENT & PLAN NOTE
Elevated troponin likely in setting of demand ischemia and CKD   unlikely ACS  Denies chest pain,       2/12/2024  Continue with telemetry monitoring  significant elevated troponin trending around 400, EKG with prolonged QTc, noted ST changes.  Echocardiogram noted with EF 55%, no wall motion abnormalities, severe aortic valve stenosis.  Case was discussed with cardiology Dr. Conklin.  Will evaluate for  further recommendation

## 2024-02-12 NOTE — PROGRESS NOTES
Received report, assumed pt care. Pt awake without any signs of distress. Resting comfortably in bed with call light, bedside table in reach. No c/o at this time. Side rails up 2. Instructed to use call light when needing to get OOB verbalized understanding. Bed alarm on, bed in low position. Will continue to monitor.

## 2024-02-12 NOTE — PROGRESS NOTES
NOC CROSSCOVER      I was notified that this patient was still hypoxic after being placed on HFNC and then was maxed on 60L /100% while only satting high 80's. Of note the patient was on 4 L NC when admitted from the ED. I evaluated the patient at bedside who had mild - mod increase in work of breathing with some tachypnea. I ordered a stat CXR to evaluate his resp status, ordered blood cultures, and placed him on abx coverage for the potential of a superimposed bacterial PNA.     The patients CXR revealed a significant increase in his pulmonary edema and infiltrates.  I contacted his nephrology office for the potential of emergent dialysis secondary to his increased pulmonary edema and resp failure. Nephrologist jena was agreeable and ordered emergent dialysis to be completed tonight.     I believe his resp failure is likely more related to his pulmonary edema and volume overload. I ordered a procal to evaluate for continued necessity of abx. If negative and clinical condition improves following dialysis I recommend de escalation as appropriate. I will closely follow him tonight for clinical improvement.        [place]

## 2024-02-12 NOTE — CARE PLAN
The patient is Stable - Low risk of patient condition declining or worsening    Shift Goals  Clinical Goals: O2 management  Patient Goals: feel better    Progress made toward(s) clinical / shift goals:      Problem: Knowledge Deficit - Standard  Goal: Patient and family/care givers will demonstrate understanding of plan of care, disease process/condition, diagnostic tests and medications  Outcome: Progressing     Problem: Pain - Standard  Goal: Alleviation of pain or a reduction in pain to the patient’s comfort goal  Outcome: Progressing       Patient is not progressing towards the following goals:      Problem: Respiratory  Goal: Patient will achieve/maintain optimum respiratory ventilation and gas exchange  Outcome: Not Progressing

## 2024-02-12 NOTE — ASSESSMENT & PLAN NOTE
Continue on Decadron  High flow nasal cannula discontinued  Now on 8 L oxygen  Continue wean of oxygen  Monitor oxygen requirement    He was started on Zyvox and cefepime for concern of pneumonia.  His procalcitonin remain negative  De-escalate antibiotic for now  Plan to repeat chest x-ray in a.m.  If blood cultures remain negative and x-ray unremarkable antibiotic can be discontinued

## 2024-02-12 NOTE — DISCHARGE PLANNING
"In the case of an emergency, pt's legal NOK is brother, Antione Barboza     RNCM met with pt at bedside and obtained the information used in this assessment. Pt verified accuracy of facesheet. Pt lives in a single story home with brother in [place]..  Pt uses [facility] pharmacy. Prior to current hospitalization, pt was completely independent in ADLS/IADLS. Pt drives and is able to attend necessary MD appointments. Pt has a good/limited support system. Pt denies/admits any hx of substance use and denies any dx of mh.Pt states he owns cane, walking stick, FWW, W/C. Has home 02 from [facility] but states \"the fire dept said the concentrator wasn't working.\"  Called [facility], spoke to [facility] who will contact pt's brother and send tech out to check the concentrator.  Concentrator is small portable unit on wheels that can be brought to bedside for transport if destination is home. [facility] and [facility] provides transport.  Good support from brother at bedside.    Care Transition Team Assessment    Information Source:pt  Orientation Level: Oriented X4  Information Given By: Patient  Informant's Name: Jean  Who is responsible for making decisions for patient? : Patient         Elopement Risk  Legal Hold: No  Ambulatory or Self Mobile in Wheelchair: Yes  Disoriented: No  Psychiatric Symptoms: None  History of Wandering: No  Elopement this Admit: No  Vocalizing Wanting to Leave: No  Displays Behaviors, Body Language Wanting to Leave: No-Not at Risk for Elopement  Elopement Risk: Not at Risk for Elopement    Interdisciplinary Discharge Planning  Does Admitting Nurse Feel This Could be a Complex Discharge?: No  Primary Care Physician: Pineda  Lives with - Patient's Self Care Capacity: Sibling  Patient or legal guardian wants to designate a caregiver: No  Support Systems: Family Member(s)  Housing / Facility: 1 Story House  Do You Take your Prescribed Medications Regularly: Yes  Mobility Issues: Yes  Prior Services: " None  Durable Medical Equipment: Home Oxygen  DME Provider / Phone: [facility]    Discharge Preparedness  What is your plan after discharge?: Home with help  What are your discharge supports?: Sibling  Prior Functional Level: Ambulatory, Drives Self, Independent with Activities of Daily Living, Independent with Medication Management  Difficulity with ADLs: None  Difficulity with IADLs: None    Functional Assesment  Prior Functional Level: Ambulatory, Drives Self, Independent with Activities of Daily Living, Independent with Medication Management    Finances  Prescription Coverage: Yes    Vision / Hearing Impairment  Vision Impairment : Yes  Right Eye Vision: Impaired, Wears Glasses  Left Eye Vision: Impaired, Wears Glasses  Hearing Impairment : No              Domestic Abuse  Have you ever been the victim of abuse or violence?: No  Physical Abuse or Sexual Abuse: No  Verbal Abuse or Emotional Abuse: No  Possible Abuse/Neglect Reported to:: Not Applicable    Psychological Assessment  History of Substance Abuse: None  History of Psychiatric Problems: No         Anticipated Discharge Information  Discharge Disposition: Discharged to home/self care (01)

## 2024-02-12 NOTE — ASSESSMENT & PLAN NOTE
Telemetry monitoring  He had session of hemodialysis on 2/11  Echocardiogram noted with EF 55%, no wall motion abnormalities, severe aortic valve stenosis.    Continue on Lasix 80 mg daily  Monitor intake output  Cardiology been consulted

## 2024-02-12 NOTE — PROGRESS NOTES
[facility] Progress Note     STAT Hemodialysis treatment ordered today per Dr. Kinsey Oviedo x 3 hours due to hypoxia / increased oxygen demand.  Received at bedside; pt awake and alert oriented x 4; on high flow nasal cannula at 100 % fio2; 50 flow; c/o of SOB; saturating at 86-92 %;  Consent for treatment signed by patient   Treatment initiated at 0110 completed at 0410.      Patient tolerated tx; saturation improved to  %  see electronic flow sheet for details.      Net UF 2700 mL.   Limited by lower leg cramping towards last 30 minutes of tx  Improved with minimal UF     Needles removed from access site. Dressings applied and sites held x 10 minutes; verified no bleeding. Positive bruit/thrill post tx.   Staff RN to monitor AVF/G for breakthrough bleeding. Should breakthrough bleeding occur, staff RN to apply pressure to access sites until bleeding resolved.   Notify Dialysis and Nephrologist for follow-up.     Report given to Primary RN Mary Jane Funes.

## 2024-02-12 NOTE — ASSESSMENT & PLAN NOTE
Likely multifactorial, related to long covid and acute pulm edema, missing a week of diuretics likely contributed   S/p hemodialysis  Continue on Decadron, Lasix  Monitor oxygen requirement  Nephrology following

## 2024-02-12 NOTE — PROGRESS NOTES
Hospital Medicine Daily Progress Note    Date of Service  2/12/2024    Chief Complaint  Jean Barboza is a 73 y.o. male admitted 2/11/2024 with shortness of breath    Hospital Course    73 male with past medical history of ESRD on hemodialysis MWF, hypertension, type 2 diabetes, seizure, history of CVA, anemia, aortic stenosis presented with generalized weakness and shortness of breath.  Patient was recently  discharged on 1/11/24 on 2 L oxygen COVID-19 infection.  On arrival to ED he was requiring 4 L oxygen.  Overnight there was increase in  oxygen requirement and was started on high flow nasal cannula.  Eventually his oxygen requirement improved back to 8 L.  Patient had session of hemodialysis on 12/11 for concern of volume overload.  Cardiology was consulted for severe aortic stenosis and elevated troponin for further evaluation.        Interval Problem Update    2/12/2024  Patient seen and examined at bedside  Not in acute state  Denies any discomfort  Currently on 8 L oxygen saturating 90%    Labs reviewed noted normal white count 6.9, hemoglobin 10.2, chemistry consistent with ESRD, significant elevated troponin trending around 400, EKG with prolonged QTc, noted ST changes.    Chest x-ray noted to have pulmonary edema/infiltrate.    Echocardiogram noted with EF 55%, no wall motion abnormalities, severe aortic valve stenosis.    Case was discussed with cardiology Dr. Conklin.  Will evaluate for  further recommendation        Continue with telemetry monitoring  Continue aspirin, Lipitor, Lasix, lisinopril  He was started on Zyvox and cefepime for concern of pneumonia.  His procalcitonin remain negative  De-escalate antibiotic for now  Plan to repeat chest x-ray in a.m.  If blood cultures remain negative and x-ray unremarkable antibiotic can be discontinued    Continue on Decadron  Monitor oxygen requirement closely  Nephrology Dr. Oviedo is following for hemodialysis  Repeat BMP in a.m. to monitor  electrolytes and renal function  Repeat CBC in a.m. to monitor white count and hemoglobin    Need close monitoring of blood counts, electrolytes and renal function due to potential of organ dysfunction due to volume overload and COVID-19 pneumonia.      High risk of deterioration into worsening respiratory failure and likely need upgrade to high flow nasal cannula.  Need close oxygen monitoring    Need close telemetry monitoring for arrhythmia for significant elevated troponin, aortic stenosis, pulm overload      Patient has a high medical complexity, complex decision making and is at high risk of complication, morbidity and mortality            I have discussed this patient's plan of care and discharge plan at IDT rounds today with Case Management, Nursing, Nursing leadership, and other members of the IDT team.    Consultants/Specialty  cardiology and nephrology    Code Status  DNAR/DNI    Disposition  The patient is not medically cleared for discharge to home or a post-acute facility.  Anticipate discharge to: home with close outpatient follow-up    I have placed the appropriate orders for post-discharge needs.    Review of Systems  Review of Systems   Constitutional:  Positive for malaise/fatigue.   Respiratory:  Positive for cough. Negative for sputum production.    Cardiovascular:  Negative for chest pain.        Physical Exam  Temp:  [36.3 °C (97.3 °F)-37 °C (98.6 °F)] 36.7 °C (98.1 °F)  Pulse:  [70-96] 79  Resp:  [16-24] 16  BP: (121-178)/(60-86) 121/62  SpO2:  [89 %-95 %] 94 %    Physical Exam  Constitutional:       Appearance: He is ill-appearing.   Cardiovascular:      Rate and Rhythm: Normal rate.      Pulses: Normal pulses.   Pulmonary:      Effort: Pulmonary effort is normal. No respiratory distress.   Neurological:      General: No focal deficit present.      Mental Status: He is alert and oriented to person, place, and time.   Psychiatric:         Mood and Affect: Mood normal.          Fluids    Intake/Output Summary (Last 24 hours) at 2/12/2024 1239  Last data filed at 2/12/2024 0400  Gross per 24 hour   Intake 620 ml   Output 3450 ml   Net -2830 ml       Laboratory  Recent Labs     02/11/24  1308 02/12/24  0546   WBC 5.6 6.9   RBC 2.75* 3.15*   HEMOGLOBIN 8.8* 10.2*   HEMATOCRIT 27.5* 31.0*   .0* 98.4*   MCH 32.0 32.4   MCHC 32.0* 32.9   RDW 60.7* 58.8*   PLATELETCT 177 187   MPV 11.0 11.0     Recent Labs     02/11/24  1227 02/12/24  0546   SODIUM 141 139   POTASSIUM 4.1 3.7   CHLORIDE 97 96   CO2 31 29   GLUCOSE 119* 169*   BUN 16 11   CREATININE 5.82* 4.11*   CALCIUM 8.8 8.5                   Imaging  DX-CHEST-PORTABLE (1 VIEW)   Final Result         1.  Pulmonary edema and/or infiltrates are identified, which appear somewhat increased since the prior exam.   2.  Cardiomegaly   3.  Atherosclerosis      EC-ECHOCARDIOGRAM COMPLETE W/O CONT   Final Result      DX-CHEST-PORTABLE (1 VIEW)   Final Result      1.  Pulmonary opacities which may be due to CHF and/or bibasilar pulmonary infiltrates. Correlate clinically.           Assessment/Plan  * Acute respiratory failure with hypoxia (HCC)- (present on admission)  Assessment & Plan  Likely multifactorial, related to long covid and acute pulm edema, missing a week of diuretics likely contributed   S/p hemodialysis  Continue on Decadron, Lasix  Monitor oxygen requirement  Nephrology following      Diarrhea of presumed infectious origin  Assessment & Plan  Present on admission  Will check c dif and fecal wbc  following    Pneumonia due to COVID-19 virus- (present on admission)  Assessment & Plan  Continue on Decadron  High flow nasal cannula discontinued  Now on 8 L oxygen  Continue wean of oxygen  Monitor oxygen requirement    He was started on Zyvox and cefepime for concern of pneumonia.  His procalcitonin remain negative  De-escalate antibiotic for now  Plan to repeat chest x-ray in a.m.  If blood cultures remain negative and x-ray  unremarkable antibiotic can be discontinued      Acute heart failure (HCC)- (present on admission)  Assessment & Plan  Telemetry monitoring  He had session of hemodialysis on 2/11  Echocardiogram noted with EF 55%, no wall motion abnormalities, severe aortic valve stenosis.    Continue on Lasix 80 mg daily  Monitor intake output  Cardiology been consulted      ESRD (end stage renal disease) on dialysis (HCC)- (present on admission)  Assessment & Plan  Patient has send hemodialysis today for concern of pulm overload  Nephrology Dr. Oviedo following    Severe aortic stenosis- (present on admission)  Assessment & Plan  Echocardiogram noted with EF 55%, no wall motion abnormalities, severe aortic valve stenosis.    Case was discussed with cardiology Dr. Conklin.  Will evaluate for  further recommendation    History of CVA (cerebrovascular accident)- (present on admission)  Assessment & Plan  No acute findings  Supportive care   following    Elevated troponin- (present on admission)  Assessment & Plan  Elevated troponin likely in setting of demand ischemia and CKD   unlikely ACS  Denies chest pain,       2/12/2024  Continue with telemetry monitoring  significant elevated troponin trending around 400, EKG with prolonged QTc, noted ST changes.  Echocardiogram noted with EF 55%, no wall motion abnormalities, severe aortic valve stenosis.  Case was discussed with cardiology Dr. Conklin.  Will evaluate for  further recommendation      Type 2 diabetes mellitus (HCC)- (present on admission)  Assessment & Plan  SSI  Diabetic diet  following         VTE prophylaxis: Heparin SC     I have performed a physical exam and reviewed and updated ROS and Plan today (2/12/2024). In review of yesterday's note (2/11/2024), there are no changes except as documented above.

## 2024-02-13 LAB
ALBUMIN SERPL BCP-MCNC: 3.5 G/DL (ref 3.2–4.9)
ALBUMIN/GLOB SERPL: 1.2 G/DL
ALP SERPL-CCNC: 56 U/L (ref 30–99)
ALT SERPL-CCNC: 6 U/L (ref 2–50)
ANION GAP SERPL CALC-SCNC: 11 MMOL/L (ref 7–16)
AST SERPL-CCNC: 15 U/L (ref 12–45)
BASOPHILS # BLD AUTO: 0.4 % (ref 0–1.8)
BASOPHILS # BLD: 0.03 K/UL (ref 0–0.12)
BILIRUB SERPL-MCNC: 0.3 MG/DL (ref 0.1–1.5)
BUN SERPL-MCNC: 24 MG/DL (ref 8–22)
C DIFF DNA SPEC QL NAA+PROBE: NEGATIVE
C DIFF TOX GENS STL QL NAA+PROBE: NEGATIVE
CALCIUM ALBUM COR SERPL-MCNC: 9 MG/DL (ref 8.5–10.5)
CALCIUM SERPL-MCNC: 8.6 MG/DL (ref 8.5–10.5)
CHLORIDE SERPL-SCNC: 95 MMOL/L (ref 96–112)
CO2 SERPL-SCNC: 31 MMOL/L (ref 20–33)
CREAT SERPL-MCNC: 6.16 MG/DL (ref 0.5–1.4)
EOSINOPHIL # BLD AUTO: 0.34 K/UL (ref 0–0.51)
EOSINOPHIL NFR BLD: 4.5 % (ref 0–6.9)
ERYTHROCYTE [DISTWIDTH] IN BLOOD BY AUTOMATED COUNT: 59.7 FL (ref 35.9–50)
GFR SERPLBLD CREATININE-BSD FMLA CKD-EPI: 9 ML/MIN/1.73 M 2
GLOBULIN SER CALC-MCNC: 2.9 G/DL (ref 1.9–3.5)
GLUCOSE BLD STRIP.AUTO-MCNC: 165 MG/DL (ref 65–99)
GLUCOSE BLD STRIP.AUTO-MCNC: 182 MG/DL (ref 65–99)
GLUCOSE BLD STRIP.AUTO-MCNC: 183 MG/DL (ref 65–99)
GLUCOSE SERPL-MCNC: 130 MG/DL (ref 65–99)
HCT VFR BLD AUTO: 27.2 % (ref 42–52)
HGB BLD-MCNC: 9 G/DL (ref 14–18)
IMM GRANULOCYTES # BLD AUTO: 0.01 K/UL (ref 0–0.11)
IMM GRANULOCYTES NFR BLD AUTO: 0.1 % (ref 0–0.9)
LACTOFERRIN STL QL IA: POSITIVE
LYMPHOCYTES # BLD AUTO: 1.25 K/UL (ref 1–4.8)
LYMPHOCYTES NFR BLD: 16.5 % (ref 22–41)
MAGNESIUM SERPL-MCNC: 2.1 MG/DL (ref 1.5–2.5)
MCH RBC QN AUTO: 32.7 PG (ref 27–33)
MCHC RBC AUTO-ENTMCNC: 33.1 G/DL (ref 32.3–36.5)
MCV RBC AUTO: 98.9 FL (ref 81.4–97.8)
MONOCYTES # BLD AUTO: 0.5 K/UL (ref 0–0.85)
MONOCYTES NFR BLD AUTO: 6.6 % (ref 0–13.4)
NEUTROPHILS # BLD AUTO: 5.45 K/UL (ref 1.82–7.42)
NEUTROPHILS NFR BLD: 71.9 % (ref 44–72)
NRBC # BLD AUTO: 0 K/UL
NRBC BLD-RTO: 0 /100 WBC (ref 0–0.2)
PHOSPHATE SERPL-MCNC: 3 MG/DL (ref 2.5–4.5)
PLATELET # BLD AUTO: 178 K/UL (ref 164–446)
PMV BLD AUTO: 11.1 FL (ref 9–12.9)
POTASSIUM SERPL-SCNC: 3.8 MMOL/L (ref 3.6–5.5)
PROT SERPL-MCNC: 6.4 G/DL (ref 6–8.2)
RBC # BLD AUTO: 2.75 M/UL (ref 4.7–6.1)
SODIUM SERPL-SCNC: 137 MMOL/L (ref 135–145)
WBC # BLD AUTO: 7.6 K/UL (ref 4.8–10.8)

## 2024-02-13 PROCEDURE — 700102 HCHG RX REV CODE 250 W/ 637 OVERRIDE(OP): Performed by: HOSPITALIST

## 2024-02-13 PROCEDURE — A9270 NON-COVERED ITEM OR SERVICE: HCPCS | Performed by: STUDENT IN AN ORGANIZED HEALTH CARE EDUCATION/TRAINING PROGRAM

## 2024-02-13 PROCEDURE — 83630 LACTOFERRIN FECAL (QUAL): CPT

## 2024-02-13 PROCEDURE — 36415 COLL VENOUS BLD VENIPUNCTURE: CPT

## 2024-02-13 PROCEDURE — 87493 C DIFF AMPLIFIED PROBE: CPT

## 2024-02-13 PROCEDURE — 90935 HEMODIALYSIS ONE EVALUATION: CPT

## 2024-02-13 PROCEDURE — 97163 PT EVAL HIGH COMPLEX 45 MIN: CPT

## 2024-02-13 PROCEDURE — 80053 COMPREHEN METABOLIC PANEL: CPT

## 2024-02-13 PROCEDURE — 700101 HCHG RX REV CODE 250: Performed by: INTERNAL MEDICINE

## 2024-02-13 PROCEDURE — A9270 NON-COVERED ITEM OR SERVICE: HCPCS | Performed by: HOSPITALIST

## 2024-02-13 PROCEDURE — 700111 HCHG RX REV CODE 636 W/ 250 OVERRIDE (IP): Performed by: STUDENT IN AN ORGANIZED HEALTH CARE EDUCATION/TRAINING PROGRAM

## 2024-02-13 PROCEDURE — 85025 COMPLETE CBC W/AUTO DIFF WBC: CPT

## 2024-02-13 PROCEDURE — 700111 HCHG RX REV CODE 636 W/ 250 OVERRIDE (IP)

## 2024-02-13 PROCEDURE — 700102 HCHG RX REV CODE 250 W/ 637 OVERRIDE(OP): Performed by: STUDENT IN AN ORGANIZED HEALTH CARE EDUCATION/TRAINING PROGRAM

## 2024-02-13 PROCEDURE — 84100 ASSAY OF PHOSPHORUS: CPT

## 2024-02-13 PROCEDURE — 700111 HCHG RX REV CODE 636 W/ 250 OVERRIDE (IP): Performed by: HOSPITALIST

## 2024-02-13 PROCEDURE — 770020 HCHG ROOM/CARE - TELE (206)

## 2024-02-13 PROCEDURE — 99232 SBSQ HOSP IP/OBS MODERATE 35: CPT | Performed by: STUDENT IN AN ORGANIZED HEALTH CARE EDUCATION/TRAINING PROGRAM

## 2024-02-13 PROCEDURE — 83735 ASSAY OF MAGNESIUM: CPT

## 2024-02-13 PROCEDURE — 82962 GLUCOSE BLOOD TEST: CPT | Mod: 91

## 2024-02-13 RX ORDER — HEPARIN SODIUM 1000 [USP'U]/ML
1000 INJECTION, SOLUTION INTRAVENOUS; SUBCUTANEOUS
Status: COMPLETED | OUTPATIENT
Start: 2024-02-13 | End: 2024-02-13

## 2024-02-13 RX ORDER — ACETAMINOPHEN 500 MG
1000 TABLET ORAL 3 TIMES DAILY
Status: DISCONTINUED | OUTPATIENT
Start: 2024-02-13 | End: 2024-02-14 | Stop reason: HOSPADM

## 2024-02-13 RX ORDER — DEXAMETHASONE 4 MG/1
6 TABLET ORAL DAILY
Status: DISCONTINUED | OUTPATIENT
Start: 2024-02-14 | End: 2024-02-14 | Stop reason: HOSPADM

## 2024-02-13 RX ADMIN — GABAPENTIN 100 MG: 100 CAPSULE ORAL at 21:01

## 2024-02-13 RX ADMIN — SEVELAMER CARBONATE 1600 MG: 800 TABLET, FILM COATED ORAL at 12:11

## 2024-02-13 RX ADMIN — INSULIN HUMAN 1 UNITS: 100 INJECTION, SOLUTION PARENTERAL at 17:30

## 2024-02-13 RX ADMIN — INSULIN HUMAN 1 UNITS: 100 INJECTION, SOLUTION PARENTERAL at 12:13

## 2024-02-13 RX ADMIN — THERA TABS 1 TABLET: TAB at 06:16

## 2024-02-13 RX ADMIN — LIDOCAINE HYDROCHLORIDE 1 ML: 10 INJECTION, SOLUTION EPIDURAL; INFILTRATION; INTRACAUDAL; PERINEURAL at 09:14

## 2024-02-13 RX ADMIN — FUROSEMIDE 80 MG: 40 TABLET ORAL at 06:16

## 2024-02-13 RX ADMIN — DOXAZOSIN 2 MG: 2 TABLET ORAL at 21:01

## 2024-02-13 RX ADMIN — ASPIRIN 81 MG 81 MG: 81 TABLET ORAL at 06:16

## 2024-02-13 RX ADMIN — SEVELAMER CARBONATE 1600 MG: 800 TABLET, FILM COATED ORAL at 17:29

## 2024-02-13 RX ADMIN — FELODIPINE 10 MG: 5 TABLET, EXTENDED RELEASE ORAL at 06:16

## 2024-02-13 RX ADMIN — SEVELAMER CARBONATE 1600 MG: 800 TABLET, FILM COATED ORAL at 07:35

## 2024-02-13 RX ADMIN — HEPARIN SODIUM 1000 UNITS: 1000 INJECTION, SOLUTION INTRAVENOUS; SUBCUTANEOUS at 09:15

## 2024-02-13 RX ADMIN — OMEGA-3 FATTY ACIDS CAP 1000 MG 1000 MG: 1000 CAP at 06:15

## 2024-02-13 RX ADMIN — INSULIN HUMAN 1 UNITS: 100 INJECTION, SOLUTION PARENTERAL at 21:09

## 2024-02-13 RX ADMIN — HEPARIN SODIUM 5000 UNITS: 5000 INJECTION, SOLUTION INTRAVENOUS; SUBCUTANEOUS at 21:01

## 2024-02-13 RX ADMIN — DEXAMETHASONE SODIUM PHOSPHATE 6 MG: 4 INJECTION INTRA-ARTICULAR; INTRALESIONAL; INTRAMUSCULAR; INTRAVENOUS; SOFT TISSUE at 06:17

## 2024-02-13 RX ADMIN — LISINOPRIL 40 MG: 20 TABLET ORAL at 06:16

## 2024-02-13 RX ADMIN — ACETAMINOPHEN 1000 MG: 500 TABLET ORAL at 17:29

## 2024-02-13 RX ADMIN — ATORVASTATIN CALCIUM 20 MG: 20 TABLET, FILM COATED ORAL at 17:29

## 2024-02-13 RX ADMIN — ACETAMINOPHEN 1000 MG: 500 TABLET ORAL at 12:11

## 2024-02-13 RX ADMIN — HEPARIN SODIUM 5000 UNITS: 5000 INJECTION, SOLUTION INTRAVENOUS; SUBCUTANEOUS at 14:51

## 2024-02-13 RX ADMIN — HEPARIN SODIUM 5000 UNITS: 5000 INJECTION, SOLUTION INTRAVENOUS; SUBCUTANEOUS at 06:18

## 2024-02-13 ASSESSMENT — COGNITIVE AND FUNCTIONAL STATUS - GENERAL
MOBILITY SCORE: 21
SUGGESTED CMS G CODE MODIFIER DAILY ACTIVITY: CI
CLIMB 3 TO 5 STEPS WITH RAILING: A LITTLE
SUGGESTED CMS G CODE MODIFIER MOBILITY: CJ
MOVING FROM LYING ON BACK TO SITTING ON SIDE OF FLAT BED: A LITTLE
DAILY ACTIVITIY SCORE: 23
WALKING IN HOSPITAL ROOM: A LITTLE
MOBILITY SCORE: 20
DRESSING REGULAR LOWER BODY CLOTHING: A LITTLE
STANDING UP FROM CHAIR USING ARMS: A LITTLE
WALKING IN HOSPITAL ROOM: A LITTLE
MOVING TO AND FROM BED TO CHAIR: A LITTLE
CLIMB 3 TO 5 STEPS WITH RAILING: A LITTLE
SUGGESTED CMS G CODE MODIFIER MOBILITY: CJ

## 2024-02-13 ASSESSMENT — GAIT ASSESSMENTS
ASSISTIVE DEVICE: FRONT WHEEL WALKER
GAIT LEVEL OF ASSIST: CONTACT GUARD ASSIST
DISTANCE (FEET): 25

## 2024-02-13 ASSESSMENT — ENCOUNTER SYMPTOMS
SPUTUM PRODUCTION: 0
COUGH: 1

## 2024-02-13 ASSESSMENT — FIBROSIS 4 INDEX: FIB4 SCORE: 2.19

## 2024-02-13 NOTE — PROGRESS NOTES
".[facility] -  PROGRESS NOTE               Author: BRANDO Fernandes Date & Time: 2/13/2024  11:53 AM     HPI:  73 y.o. male who presented to [facility] on 2/11/2024 with weakness and diarrhea. He has a history of ESRD on HD MWF, HTN, DM II, Seizure disorder, h/o CVA, anemia secondary to CKD, CKD bone mineral disorder, and recent hospitalization for covid pneumonia p/w SOB, was found to have Pulm edema associated with weakness and diarrhea.   No F/C/N/V.No melena, hematochezia, hematemesis.  No HA, visual changes, or abdominal pain.    DAILY NEPHROLOGY SUMMARY:  2/12: consult done  2/13: sitting up in bed, just completing PUF tx, feels improved, + dry cough, appetite improving, no edema     REVIEW OF SYSTEMS:    10 point ROS reviewed and is as per HPI/daily summary or otherwise negative    PMH/PSH/SH/FH: Reviewed and unchanged since admission note  CURRENT MEDICATIONS: Reviewed from admission to present day    VS:  /56   Pulse 72   Temp 36.5 °C (97.7 °F) (Temporal)   Resp 16   Ht 1.702 m (5' 7\")   Wt 78.7 kg (173 lb 8 oz)   SpO2 98%   BMI 27.17 kg/m²   Physical Exam  Vitals and nursing note reviewed.   Constitutional:       Appearance: Normal appearance. He is normal weight.   HENT:      Head: Normocephalic and atraumatic.      Nose: Nose normal.      Mouth/Throat:      Mouth: Mucous membranes are dry.      Pharynx: Oropharynx is clear.   Eyes:      Extraocular Movements: Extraocular movements intact.      Conjunctiva/sclera: Conjunctivae normal.      Pupils: Pupils are equal, round, and reactive to light.   Cardiovascular:      Rate and Rhythm: Normal rate and regular rhythm.      Comments: RUE AVF   Pulmonary:      Effort: Pulmonary effort is normal.      Breath sounds: Normal breath sounds.      Comments: Diminished in bases   Abdominal:      General: Abdomen is flat. Bowel sounds are normal.      Palpations: Abdomen is soft.   Musculoskeletal:         General: Normal " range of motion.      Cervical back: Normal range of motion and neck supple.      Right lower leg: No edema.      Left lower leg: No edema.   Skin:     General: Skin is warm and dry.   Neurological:      General: No focal deficit present.      Mental Status: He is alert and oriented to person, place, and time. Mental status is at baseline.   Psychiatric:         Mood and Affect: Mood normal.         Behavior: Behavior normal.         Thought Content: Thought content normal.         Judgment: Judgment normal.         Fluids:  In: 50 [P.O.:50]  Out: -     LABS:  Recent Labs     02/11/24  1227 02/12/24  0546 02/13/24  0331   SODIUM 141 139 137   POTASSIUM 4.1 3.7 3.8   CHLORIDE 97 96 95*   CO2 31 29 31   GLUCOSE 119* 169* 130*   BUN 16 11 24*   CREATININE 5.82* 4.11* 6.16*   CALCIUM 8.8 8.5 8.6       IMPRESSION:  # ESRD  -On HD MWF via right arm aVF  - Gladis Coleman  #Acute hypoxic respiratory failure, improving   -Secondary to COVID viral illness and pneumonia +/- fluid overload   # HTN, controlled   - Goal BP < 140/90  - felodipine, lasix, doxazosin, lisinopril   # Anemia of CKD  - Goal Hgb 10-11  - At goal  # CKD Mineral Bone Disorder  --followed at outpt HD  - phos: 3.0  # DM II  --per primary svc  # Seizure Disorder  --per primary svc     PLAN:  - PUF today for volume, then resume MWF tx   - PRN NS boluses for symptomatic hypotension  - Continue supplemental O2 and wean as tolerated  - No need for NAFISA  - No dietary protein restrictions  - Dose all meds per ESRD  - Other management per primary svc

## 2024-02-13 NOTE — CARE PLAN
The patient is Stable - Low risk of patient condition declining or worsening    Shift Goals  Clinical Goals: monitor O2, monitor labs  Patient Goals: rest, sleep  Family Goals: MALINI    Progress made toward(s) clinical / shift goals:      Problem: Knowledge Deficit - Standard  Goal: Patient and family/care givers will demonstrate understanding of plan of care, disease process/condition, diagnostic tests and medications  Outcome: Progressing     Problem: Respiratory  Goal: Patient will achieve/maintain optimum respiratory ventilation and gas exchange  Outcome: Progressing     Problem: Pain - Standard  Goal: Alleviation of pain or a reduction in pain to the patient’s comfort goal  Outcome: Progressing       Patient is not progressing towards the following goals:

## 2024-02-13 NOTE — PROGRESS NOTES
Monitor Summary:     Rhythm: SR  Rate: 70-88  Ectopy: (R)PVC, (R)Trig  Measurements: .15/.10/.38                 12 Hour Chart Check

## 2024-02-13 NOTE — PROGRESS NOTES
[facility] Progress Note     PUF treatment ordered today per Dr. Amrita Avelar x 2  hours.   Treatment initiated at 0920 completed at 1120.      See paper flow sheet for details.      Net UF 2500 mL.   Limited by drop in SBP at 90's  Seen and examined by YESSI Alonzo post HD     Needles removed from access site. Dressings applied and sites held x 10 minutes; verified no bleeding. Positive bruit/thrill post tx.   Staff RN to monitor AVF/G for breakthrough bleeding. Should breakthrough bleeding occur, staff RN to apply pressure to access sites until bleeding resolved.   Notify Dialysis and Nephrologist for follow-up.     Report given to Primary RN Chantel Campuzano.

## 2024-02-13 NOTE — PROGRESS NOTES
Hospital Medicine Daily Progress Note    Date of Service  2/13/2024    Chief Complaint  Jean Barboza is a 73 y.o. male admitted 2/11/2024 with shortness of breath    Hospital Course    73 male with past medical history of ESRD on hemodialysis MWF, hypertension, type 2 diabetes, seizure, history of CVA, anemia, aortic stenosis presented with generalized weakness and shortness of breath.  Patient was recently  discharged on 1/11/24 on 2 L oxygen COVID-19 infection.  On arrival to ED he was requiring 4 L oxygen.  Overnight there was increase in  oxygen requirement and was started on high flow nasal cannula.  Eventually his oxygen requirement improved back to 8 L.    Patient had session of hemodialysis on 12/11 for concern of volume overload.  Cardiology was consulted for severe aortic stenosis and elevated troponin for further evaluation.        Interval Problem Update  Patient seen and examined at bedside  Reporting improvement with dyspnea  Down to 8 L nasal cannula from high flow, continue to titrate  Procalcitonin low  Continue Decadron for COVID  Hold off on antibiotics for now  Nephrology following, patient for HD today  Frequent incentive spirometer and pronation  Up to chair for all meals  Titrate O2 as tolerable  Labs on a.m.    I have discussed this patient's plan of care and discharge plan at IDT rounds today with Case Management, Nursing, Nursing leadership, and other members of the IDT team.    Consultants/Specialty  cardiology and nephrology    Code Status  DNAR/DNI    Disposition  The patient is not medically cleared for discharge to home or a post-acute facility.  Anticipate discharge to: home with close outpatient follow-up    I have placed the appropriate orders for post-discharge needs.    Review of Systems  Review of Systems   Constitutional:  Positive for malaise/fatigue.   Respiratory:  Positive for cough. Negative for sputum production.    Cardiovascular:  Negative for chest pain.         Physical Exam  Temp:  [36.3 °C (97.3 °F)-37.1 °C (98.7 °F)] 36.5 °C (97.7 °F)  Pulse:  [77-88] 80  Resp:  [16-18] 16  BP: (120-125)/(62-75) 125/75  SpO2:  [92 %-95 %] 95 %    Physical Exam  Constitutional:       Appearance: He is ill-appearing.   Cardiovascular:      Rate and Rhythm: Normal rate.      Pulses: Normal pulses.   Pulmonary:      Effort: Pulmonary effort is normal. No respiratory distress.   Neurological:      General: No focal deficit present.      Mental Status: He is alert and oriented to person, place, and time.   Psychiatric:         Mood and Affect: Mood normal.       Fluids    Intake/Output Summary (Last 24 hours) at 2/13/2024 0935  Last data filed at 2/12/2024 2200  Gross per 24 hour   Intake 50 ml   Output --   Net 50 ml         Laboratory  Recent Labs     02/11/24  1308 02/12/24  0546 02/13/24  0331   WBC 5.6 6.9 7.6   RBC 2.75* 3.15* 2.75*   HEMOGLOBIN 8.8* 10.2* 9.0*   HEMATOCRIT 27.5* 31.0* 27.2*   .0* 98.4* 98.9*   MCH 32.0 32.4 32.7   MCHC 32.0* 32.9 33.1   RDW 60.7* 58.8* 59.7*   PLATELETCT 177 187 178   MPV 11.0 11.0 11.1       Recent Labs     02/11/24  1227 02/12/24  0546 02/13/24  0331   SODIUM 141 139 137   POTASSIUM 4.1 3.7 3.8   CHLORIDE 97 96 95*   CO2 31 29 31   GLUCOSE 119* 169* 130*   BUN 16 11 24*   CREATININE 5.82* 4.11* 6.16*   CALCIUM 8.8 8.5 8.6                     Imaging  DX-CHEST-PORTABLE (1 VIEW)   Final Result         1.  Pulmonary edema and/or infiltrates are identified, which appear somewhat increased since the prior exam.   2.  Cardiomegaly   3.  Atherosclerosis      EC-ECHOCARDIOGRAM COMPLETE W/O CONT   Final Result      DX-CHEST-PORTABLE (1 VIEW)   Final Result      1.  Pulmonary opacities which may be due to CHF and/or bibasilar pulmonary infiltrates. Correlate clinically.           Assessment/Plan  * Acute respiratory failure with hypoxia (HCC)- (present on admission)  Assessment & Plan  Likely multifactorial, related to long covid and acute pulm  edema, missing a week of diuretics likely contributed   S/p hemodialysis  Continue on Decadron, Lasix  Monitor oxygen requirement  Nephrology following      Diarrhea of presumed infectious origin  Assessment & Plan  Present on admission  Will check c dif and fecal wbc  following    Pneumonia due to COVID-19 virus- (present on admission)  Assessment & Plan  Continue on Decadron  High flow nasal cannula discontinued  Now on 8 L oxygen  Continue wean of oxygen  Monitor oxygen requirement    He was started on Zyvox and cefepime for concern of pneumonia.  His procalcitonin remain negative  De-escalate antibiotic for now  Plan to repeat chest x-ray in a.m.  If blood cultures remain negative and x-ray unremarkable antibiotic can be discontinued      Acute heart failure (HCC)- (present on admission)  Assessment & Plan  Telemetry monitoring  He had session of hemodialysis on 2/11  Echocardiogram noted with EF 55%, no wall motion abnormalities, severe aortic valve stenosis.    Continue on Lasix 80 mg daily  Monitor intake output  Cardiology been consulted      ESRD (end stage renal disease) on dialysis (HCC)- (present on admission)  Assessment & Plan  Patient has send hemodialysis today for concern of pulm overload  Nephrology Dr. Oviedo following    Severe aortic stenosis- (present on admission)  Assessment & Plan  Echocardiogram noted with EF 55%, no wall motion abnormalities, severe aortic valve stenosis.    Case was discussed with cardiology Dr. Conklin.  Will evaluate for  further recommendation    History of CVA (cerebrovascular accident)- (present on admission)  Assessment & Plan  No acute findings  Supportive care   following    Elevated troponin- (present on admission)  Assessment & Plan  Elevated troponin likely in setting of demand ischemia and CKD   unlikely ACS  Denies chest pain,       2/12/2024  Continue with telemetry monitoring  significant elevated troponin trending around 400, EKG with prolonged QTc, noted ST  changes.  Echocardiogram noted with EF 55%, no wall motion abnormalities, severe aortic valve stenosis.  Case was discussed with cardiology Dr. Conklin.  Will evaluate for  further recommendation      Type 2 diabetes mellitus (HCC)- (present on admission)  Assessment & Plan  SSI  Diabetic diet  following       VTE prophylaxis: Heparin SC     I have performed a physical exam and reviewed and updated ROS and Plan today (2/13/2024). In review of yesterday's note (2/12/2024), there are no changes except as documented above.

## 2024-02-14 ENCOUNTER — PATIENT OUTREACH (OUTPATIENT)
Dept: SCHEDULING | Facility: IMAGING CENTER | Age: 74
End: 2024-02-14
Payer: MEDICARE

## 2024-02-14 VITALS
SYSTOLIC BLOOD PRESSURE: 113 MMHG | TEMPERATURE: 97.3 F | HEIGHT: 67 IN | RESPIRATION RATE: 16 BRPM | BODY MASS INDEX: 26.51 KG/M2 | HEART RATE: 69 BPM | WEIGHT: 168.87 LBS | DIASTOLIC BLOOD PRESSURE: 55 MMHG | OXYGEN SATURATION: 97 %

## 2024-02-14 LAB
ALBUMIN SERPL BCP-MCNC: 3.4 G/DL (ref 3.2–4.9)
ALBUMIN/GLOB SERPL: 1.2 G/DL
ALP SERPL-CCNC: 56 U/L (ref 30–99)
ALT SERPL-CCNC: 6 U/L (ref 2–50)
ANION GAP SERPL CALC-SCNC: 14 MMOL/L (ref 7–16)
AST SERPL-CCNC: 12 U/L (ref 12–45)
BASOPHILS # BLD AUTO: 0.1 % (ref 0–1.8)
BASOPHILS # BLD: 0.01 K/UL (ref 0–0.12)
BILIRUB SERPL-MCNC: 0.3 MG/DL (ref 0.1–1.5)
BUN SERPL-MCNC: 42 MG/DL (ref 8–22)
CALCIUM ALBUM COR SERPL-MCNC: 8.8 MG/DL (ref 8.5–10.5)
CALCIUM SERPL-MCNC: 8.3 MG/DL (ref 8.5–10.5)
CHLORIDE SERPL-SCNC: 95 MMOL/L (ref 96–112)
CO2 SERPL-SCNC: 27 MMOL/L (ref 20–33)
CREAT SERPL-MCNC: 7.82 MG/DL (ref 0.5–1.4)
EOSINOPHIL # BLD AUTO: 0.07 K/UL (ref 0–0.51)
EOSINOPHIL NFR BLD: 1 % (ref 0–6.9)
ERYTHROCYTE [DISTWIDTH] IN BLOOD BY AUTOMATED COUNT: 58.4 FL (ref 35.9–50)
GFR SERPLBLD CREATININE-BSD FMLA CKD-EPI: 7 ML/MIN/1.73 M 2
GLOBULIN SER CALC-MCNC: 2.9 G/DL (ref 1.9–3.5)
GLUCOSE BLD STRIP.AUTO-MCNC: 146 MG/DL (ref 65–99)
GLUCOSE BLD STRIP.AUTO-MCNC: 184 MG/DL (ref 65–99)
GLUCOSE SERPL-MCNC: 122 MG/DL (ref 65–99)
HCT VFR BLD AUTO: 28 % (ref 42–52)
HGB BLD-MCNC: 9.1 G/DL (ref 14–18)
IMM GRANULOCYTES # BLD AUTO: 0.02 K/UL (ref 0–0.11)
IMM GRANULOCYTES NFR BLD AUTO: 0.3 % (ref 0–0.9)
LYMPHOCYTES # BLD AUTO: 1.08 K/UL (ref 1–4.8)
LYMPHOCYTES NFR BLD: 15.7 % (ref 22–41)
MAGNESIUM SERPL-MCNC: 2.2 MG/DL (ref 1.5–2.5)
MCH RBC QN AUTO: 32.2 PG (ref 27–33)
MCHC RBC AUTO-ENTMCNC: 32.5 G/DL (ref 32.3–36.5)
MCV RBC AUTO: 98.9 FL (ref 81.4–97.8)
MONOCYTES # BLD AUTO: 0.57 K/UL (ref 0–0.85)
MONOCYTES NFR BLD AUTO: 8.3 % (ref 0–13.4)
NEUTROPHILS # BLD AUTO: 5.13 K/UL (ref 1.82–7.42)
NEUTROPHILS NFR BLD: 74.6 % (ref 44–72)
NRBC # BLD AUTO: 0 K/UL
NRBC BLD-RTO: 0 /100 WBC (ref 0–0.2)
PHOSPHATE SERPL-MCNC: 3.3 MG/DL (ref 2.5–4.5)
PLATELET # BLD AUTO: 183 K/UL (ref 164–446)
PMV BLD AUTO: 11.4 FL (ref 9–12.9)
POTASSIUM SERPL-SCNC: 4 MMOL/L (ref 3.6–5.5)
PROT SERPL-MCNC: 6.3 G/DL (ref 6–8.2)
RBC # BLD AUTO: 2.83 M/UL (ref 4.7–6.1)
SODIUM SERPL-SCNC: 136 MMOL/L (ref 135–145)
WBC # BLD AUTO: 6.9 K/UL (ref 4.8–10.8)

## 2024-02-14 PROCEDURE — 85025 COMPLETE CBC W/AUTO DIFF WBC: CPT

## 2024-02-14 PROCEDURE — 700111 HCHG RX REV CODE 636 W/ 250 OVERRIDE (IP): Performed by: HOSPITALIST

## 2024-02-14 PROCEDURE — 700102 HCHG RX REV CODE 250 W/ 637 OVERRIDE(OP): Performed by: HOSPITALIST

## 2024-02-14 PROCEDURE — 82962 GLUCOSE BLOOD TEST: CPT | Mod: 91

## 2024-02-14 PROCEDURE — 700102 HCHG RX REV CODE 250 W/ 637 OVERRIDE(OP): Performed by: STUDENT IN AN ORGANIZED HEALTH CARE EDUCATION/TRAINING PROGRAM

## 2024-02-14 PROCEDURE — 80053 COMPREHEN METABOLIC PANEL: CPT

## 2024-02-14 PROCEDURE — 90935 HEMODIALYSIS ONE EVALUATION: CPT

## 2024-02-14 PROCEDURE — A9270 NON-COVERED ITEM OR SERVICE: HCPCS | Performed by: STUDENT IN AN ORGANIZED HEALTH CARE EDUCATION/TRAINING PROGRAM

## 2024-02-14 PROCEDURE — 84100 ASSAY OF PHOSPHORUS: CPT

## 2024-02-14 PROCEDURE — 83735 ASSAY OF MAGNESIUM: CPT

## 2024-02-14 PROCEDURE — 36415 COLL VENOUS BLD VENIPUNCTURE: CPT

## 2024-02-14 PROCEDURE — A9270 NON-COVERED ITEM OR SERVICE: HCPCS | Performed by: HOSPITALIST

## 2024-02-14 PROCEDURE — 99239 HOSP IP/OBS DSCHRG MGMT >30: CPT | Performed by: STUDENT IN AN ORGANIZED HEALTH CARE EDUCATION/TRAINING PROGRAM

## 2024-02-14 RX ORDER — ACETAMINOPHEN 500 MG
1000 TABLET ORAL 3 TIMES DAILY
Qty: 30 TABLET | Refills: 0 | Status: SHIPPED | OUTPATIENT
Start: 2024-02-14

## 2024-02-14 RX ORDER — DEXAMETHASONE 6 MG/1
6 TABLET ORAL DAILY
Qty: 5 TABLET | Refills: 0 | Status: SHIPPED | OUTPATIENT
Start: 2024-02-15 | End: 2024-02-20

## 2024-02-14 RX ADMIN — SEVELAMER CARBONATE 1600 MG: 800 TABLET, FILM COATED ORAL at 07:55

## 2024-02-14 RX ADMIN — FUROSEMIDE 80 MG: 40 TABLET ORAL at 05:08

## 2024-02-14 RX ADMIN — ACETAMINOPHEN 1000 MG: 500 TABLET ORAL at 05:12

## 2024-02-14 RX ADMIN — HEPARIN SODIUM 5000 UNITS: 5000 INJECTION, SOLUTION INTRAVENOUS; SUBCUTANEOUS at 05:10

## 2024-02-14 RX ADMIN — HEPARIN SODIUM 1500 UNITS: 1000 INJECTION, SOLUTION INTRAVENOUS; SUBCUTANEOUS at 11:03

## 2024-02-14 RX ADMIN — THERA TABS 1 TABLET: TAB at 05:08

## 2024-02-14 RX ADMIN — OMEGA-3 FATTY ACIDS CAP 1000 MG 1000 MG: 1000 CAP at 05:09

## 2024-02-14 RX ADMIN — CALCITRIOL CAPSULES 0.25 MCG 0.25 MCG: 0.25 CAPSULE ORAL at 05:08

## 2024-02-14 RX ADMIN — FELODIPINE 10 MG: 5 TABLET, EXTENDED RELEASE ORAL at 05:15

## 2024-02-14 RX ADMIN — INSULIN HUMAN 1 UNITS: 100 INJECTION, SOLUTION PARENTERAL at 11:50

## 2024-02-14 RX ADMIN — ASPIRIN 81 MG 81 MG: 81 TABLET ORAL at 05:09

## 2024-02-14 RX ADMIN — DEXAMETHASONE 6 MG: 4 TABLET ORAL at 05:09

## 2024-02-14 RX ADMIN — LISINOPRIL 40 MG: 20 TABLET ORAL at 05:08

## 2024-02-14 RX ADMIN — SEVELAMER CARBONATE 1600 MG: 800 TABLET, FILM COATED ORAL at 11:48

## 2024-02-14 ASSESSMENT — FIBROSIS 4 INDEX
FIB4 SCORE: 1.95
FIB4 SCORE: 2.51

## 2024-02-14 NOTE — DISCHARGE SUMMARY
Discharge Summary    CHIEF COMPLAINT ON ADMISSION  Chief Complaint   Patient presents with    Weakness     Generalized weakness x 2 days with diarrhea. Upon EMS arrival pt found to be hypoxic in 80's on RA. Pt believes that his concentrator ran out. Pt states that he wasn't educated well when he received oxygen in 01/24 post COVID. Pt has hx of dialysis (M,W,F) with last being on Friday, DM, and HTN. BS: 164    Shortness of Breath     Pt found to be 74% on RA on arrival. 3 L applied via n/c, pt up to 91%         Reason for Admission  ems     Admission Date  2/11/2024    CODE STATUS  DNAR/DNI    HPI & HOSPITAL COURSE  73 male with past medical history of ESRD on hemodialysis MWF, hypertension, type 2 diabetes, seizure, history of CVA, anemia, and aortic stenosis was admitted on 2/7/2024 for acute hypoxic respiratory failure secondary to COVID-pneumonia.  Patient was started on Decadron and was requiring high flow nasal cannula.  Nephrology following for which patient underwent multiple sessions of hemodialysis with marked improvement of hypoxia.  Patient down to 1 L nasal cannula at rest and requiring 4 L on ambulation as per home oxygen evaluation.  Stable patient with chronic condition was discharged home on Decadron and with home oxygen and instructed to follow-up with his primary care provider in the outpatient setting.  All results and plan of action discussed with the patient for she voiced understanding and agreement with the primary care team.  Patient was instructed to return to emergency department symptoms were to worsen.    Therefore, he is discharged in good and stable condition to home with close outpatient follow-up.    The patient met 2-midnight criteria for an inpatient stay at the time of discharge.    Discharge Date  2/14/2024    FOLLOW UP ITEMS POST DISCHARGE  Primary care provider follow posthospital discharge care    DISCHARGE DIAGNOSES  Principal Problem:    Acute respiratory failure with  hypoxia (HCC) (POA: Yes)  Active Problems:    Type 2 diabetes mellitus (HCC) (Chronic) (POA: Yes)    Elevated troponin (POA: Yes)    History of CVA (cerebrovascular accident) (POA: Yes)    Severe aortic stenosis (POA: Yes)    ESRD (end stage renal disease) on dialysis (HCC) (Chronic) (POA: Yes)    Acute heart failure (HCC) (POA: Yes)    Pneumonia due to COVID-19 virus (POA: Yes)    Diarrhea of presumed infectious origin (POA: Unknown)  Resolved Problems:    * No resolved hospital problems. *      FOLLOW UP  No future appointments.  No follow-up provider specified.    MEDICATIONS ON DISCHARGE     Medication List        START taking these medications        Instructions   acetaminophen 500 MG Tabs  Commonly known as: Tylenol   Take 2 Tablets by mouth 3 times a day.  Dose: 1,000 mg     dexamethasone 6 MG Tabs  Start taking on: February 15, 2024  Commonly known as: Decadron   Take 1 Tablet by mouth every day for 5 days.  Dose: 6 mg            CONTINUE taking these medications        Instructions   aspirin 81 MG Chew chewable tablet  Commonly known as: Asa   Chew 81 mg every day.  Dose: 81 mg     atorvastatin 20 MG Tabs  Commonly known as: Lipitor   Take 20 mg by mouth every evening. Indications: Cerebrovascular Accident or Stroke  Dose: 20 mg     calcitRIOL 0.25 MCG Caps  Commonly known as: Rocaltrol   Take 0.25 mcg by mouth as needed. At dialysis  Dose: 0.25 mcg     doxazosin 2 MG Tabs  Commonly known as: Cardura   Take 2 mg by mouth every evening.  Dose: 2 mg     felodipine ER 10 MG Tb24  Commonly known as: Plendil   Take 10 mg by mouth every day.  Dose: 10 mg     FISH OIL PO   Take 1 Capsule by mouth every day.  Dose: 1 Capsule     furosemide 80 MG Tabs  Commonly known as: Lasix   Take 80 mg by mouth as needed. At dialysis  Dose: 80 mg     gabapentin 100 MG Caps  Commonly known as: Neurontin   Take 100-300 mg by mouth at bedtime.  Dose: 100-300 mg     lisinopril 40 MG tablet  Commonly known as: Prinivil   Take 40 mg  by mouth every day. Indications: High Blood Pressure Disorder  Dose: 40 mg     MULTIVITAMINS PO   Take 1 Tablet by mouth every day.  Dose: 1 Tablet     sevelamer 800 MG Tabs  Commonly known as: Renagel   Take 1,600 mg by mouth 3 times a day with meals.  Dose: 1,600 mg              Allergies  Allergies   Allergen Reactions    Penicillin G Rash     Rxn - Exacerbated a rash on his legs  Rash as a child   Tolerates ceftriaxone and cefepime       DIET  Orders Placed This Encounter   Procedures    Diet Order Diet: Cardiac; Second Modifier: (optional): Consistent CHO (Diabetic); Fluid modifications: (optional): 1200 ml Fluid Restriction     Standing Status:   Standing     Number of Occurrences:   1     Order Specific Question:   Diet:     Answer:   Cardiac [6]     Order Specific Question:   Second Modifier: (optional)     Answer:   Consistent CHO (Diabetic) [4]     Order Specific Question:   Fluid modifications: (optional)     Answer:   1200 ml Fluid Restriction [8]       ACTIVITY  As tolerated.  Weight bearing as tolerated    CONSULTATIONS  Cardiology  Nephrology    PROCEDURES  None    LABORATORY  Lab Results   Component Value Date    SODIUM 136 02/14/2024    POTASSIUM 4.0 02/14/2024    CHLORIDE 95 (L) 02/14/2024    CO2 27 02/14/2024    GLUCOSE 122 (H) 02/14/2024    BUN 42 (H) 02/14/2024    CREATININE 7.82 (HH) 02/14/2024        Lab Results   Component Value Date    WBC 6.9 02/14/2024    HEMOGLOBIN 9.1 (L) 02/14/2024    HEMATOCRIT 28.0 (L) 02/14/2024    PLATELETCT 183 02/14/2024        Total time of the discharge process exceeds 32 minutes.

## 2024-02-14 NOTE — DISCHARGE PLANNING
Outpatient Dialysis Note     Confirmed patient is active at:     [facility]  [address] [place] [address]     Schedule: Mon, Wed, Fri  Time: 10:30 AM    **Confirmed patient can treat at above schedule while COVID +. Patient must wear a N-95 mask at all times.     Patient is followed by Dr. Cole.     Spoke with Fidelia at facility who confirmed patient information.   Forwarded records for review.     Xitlaly Reyes   Dialysis Coordinator / [facility]  Ph: [phone]

## 2024-02-14 NOTE — PROGRESS NOTES
Tele box removed. Follow up appointment scheduled. Discharge education completed. PIV removed. Waiting for ride to discharge home. Patient to  prescription at outside pharmacy.

## 2024-02-14 NOTE — PROGRESS NOTES
[facility] Progress Note      HD ordered by JOSE LUIS Brooke. Treatment started at 1107 and ended at 1407.     Total Net UF 3000mL.    Pt tolerated treatment well with no issues. See flow sheet for details. Cannulation needles taken out, held pressure for 10 min and placed gauze dressing with no bleeding. Dressing CDI post dialysis, primary RN instructed to monitor for bleeding. RFA AVF + for bruit/thrill. Report given to PRATIBHA Oglesby.

## 2024-02-14 NOTE — DISCHARGE PLANNING
1043  Received Choice form at 1040  Agency/Facility Name: [facility]   Referral sent per Choice form @ [phone]  Agency[facility]Facility Name: [facility]   Spoke To: Aras[facility]   Outcome: DPA called regarding O2, per Aras[facility] order received and has been processed. [facility] does not have ETA of delivery.

## 2024-02-14 NOTE — PROGRESS NOTES
Monitor summary:        Rhythm: Sinus rhythm  Rate: 64-72  Ectopy: (r)PVC  Measurements: .13/.11/.46        12hr chart check

## 2024-02-14 NOTE — PROGRESS NOTES
Assumed care of PT A&O 4. Pt resting in bed with no signs of labored breathing. On 8L. Tele monitor in place, cardiac rhythm being monitored. Call light within reach, bed in lowest position, upper bed rails up. Pt was updated on plan of care for the day.

## 2024-02-14 NOTE — PROGRESS NOTES
Bedside report received. Pt A&Ox4. No pain. POC discussed with pt. Pt verbalizes understanding. Call light and belongings within reach. Bed locked and in lowest position. fall precautions in place.

## 2024-02-14 NOTE — PROGRESS NOTES
Bedside report received from day RN; assumed pt care. Pt assessment complete. Pt AxO4. Reviewed plan of care with pt. Pt tele monitored with sinus rhythm 63. Chart and labs reviewed. Bed in lowest position, and 2 side rails up. Pt educated on call light; call light with in reach.

## 2024-02-14 NOTE — DISCHARGE PLANNING
Addendum:  2-14-24/1140  DC and home oxygen orders placed by Dr. Denisse Hastings.    RN JONA met with patient at bedside.  Pt receiving O2 1LNC.  Pt stated that his home oxygen was not working the day he was admitted.  He is agreeable to home oxygen.  His DME company is [employer].  He signed the choice form and it was faxed to [facility].  We will follow up with [facility].  Dialysis coordinatorSherita telephoned and stated that patient is confirmed at [facility] and he can go to outpatient dialysis will need to wear a N95 mask during transport and during dialysis.   Pt's brother will drive patient home today after hemodialysis and oxygen delivery.          Case Management Discharge Planning    Admission Date: 2/11/2024  GMLOS: 5  ALOS: 3    6-Clicks ADL Score: 23  6-Clicks Mobility Score: 20      Anticipated Discharge Dispo: Discharge Disposition: Discharged to home/self care (01)    DME Needed: home oxygen, on service with [facility]    Action(s) Taken:   Per chart review, patient is Covid + down to O2 1LNC, receives outpatient HD MWF.  Pt has inpatient HD orders for today.  PT no needs.  RN CM lvm for dialysis coordinatorSherita with request to return call.    Medically Clear: No    Next Steps:   Follow up with dialysis coordinator regarding Covid + and outpatient hemodialysis process.    Barriers to Discharge: Medical clearance and Dialysis

## 2024-02-14 NOTE — CARE PLAN
Problem: Knowledge Deficit - Standard  Goal: Patient and family/care givers will demonstrate understanding of plan of care, disease process/condition, diagnostic tests and medications  Outcome: Progressing     Problem: Respiratory  Goal: Patient will achieve/maintain optimum respiratory ventilation and gas exchange  Outcome: Progressing     Problem: Fall Risk  Goal: Patient will remain free from falls  Outcome: Progressing   The patient is Stable - Low risk of patient condition declining or worsening    Shift Goals  Clinical Goals: DC  Patient Goals: home  Family Goals: bettie    Progress made toward(s) clinical / shift goals:  yes    Patient is not progressing towards the following goals:

## 2024-02-14 NOTE — CARE PLAN
The patient is Stable - Low risk of patient condition declining or worsening    Shift Goals  Clinical Goals: Wean O2  Patient Goals: Rest  Family Goals: MALINI    Progress made toward(s) clinical / shift goals:    Problem: Knowledge Deficit - Standard  Goal: Patient and family/care givers will demonstrate understanding of plan of care, disease process/condition, diagnostic tests and medications  Outcome: Progressing     Problem: Respiratory  Goal: Patient will achieve/maintain optimum respiratory ventilation and gas exchange  Outcome: Progressing     Problem: Pain - Standard  Goal: Alleviation of pain or a reduction in pain to the patient’s comfort goal  Outcome: Progressing       Patient is not progressing towards the following goals:

## 2024-02-14 NOTE — PROGRESS NOTES
".[facility] -  PROGRESS NOTE  THIS NOTE IS FOR EDUCATIONAL PURPOSES ONLY, PLEASE SEE NEPHROLOGY APRN NOTE FOR RECOMMENDATIONS             Author: Aj Hayward, Student Date & Time: 2/14/2024  11:33 AM     HPI:  73 y.o. male who presented to [facility] on 2/11/2024 with weakness and diarrhea. He has a history of ESRD on HD MWF, HTN, DM II, Seizure disorder, h/o CVA, anemia secondary to CKD, CKD bone mineral disorder, and recent hospitalization for covid pneumonia p/w SOB, was found to have Pulm edema associated with weakness and diarrhea.   No F/C/N/V.No melena, hematochezia, hematemesis. No HA, visual changes, or abdominal pain.    DAILY NEPHROLOGY SUMMARY:  2/12: consult done  2/13: sitting up in bed, just completing PUF tx, feels improved, + dry cough, appetite improving, no edema   02/14: seen in HD, complaints of lightheadedness while ambulating and generalized fatigue, denies CP, SOB or dizziness. BP stable, no edema. Tolerated PUF yesterday, net UF 2.5L.    REVIEW OF SYSTEMS:    10 point ROS reviewed and is as per HPI/daily summary or otherwise negative    PMH/PSH/SH/FH: Reviewed and unchanged since admission note  CURRENT MEDICATIONS: Reviewed from admission to present day    VS:  /55   Pulse 69   Temp 36.3 °C (97.3 °F) (Temporal)   Resp 16   Ht 1.702 m (5' 7\")   Wt 76.6 kg (168 lb 14 oz)   SpO2 97%   BMI 26.45 kg/m²   Physical Exam  Vitals and nursing note reviewed.   Constitutional:       General: He is not in acute distress.     Appearance: Normal appearance. He is not ill-appearing or toxic-appearing.   HENT:      Head: Normocephalic and atraumatic.   Eyes:      Pupils: Pupils are equal, round, and reactive to light.   Cardiovascular:      Rate and Rhythm: Normal rate and regular rhythm.      Pulses: Normal pulses.      Heart sounds: Normal heart sounds. No murmur heard.     No friction rub. No gallop.   Pulmonary:      Effort: Pulmonary effort is normal. No respiratory " distress.      Breath sounds: Normal breath sounds. No stridor. No wheezing, rhonchi or rales.   Abdominal:      General: Bowel sounds are normal. There is distension.      Tenderness: There is no abdominal tenderness.   Musculoskeletal:         General: No swelling.   Skin:     General: Skin is warm and dry.      Coloration: Skin is not jaundiced.      Comments: Right Arm Fistula, +Bruit/thrill   Neurological:      General: No focal deficit present.      Mental Status: He is alert and oriented to person, place, and time.         Fluids:  In: 740 [P.O.:240; Dialysis:500]  Out: 3000     LABS:  Recent Labs     02/12/24  0546 02/13/24  0331 02/14/24  0325   SODIUM 139 137 136   POTASSIUM 3.7 3.8 4.0   CHLORIDE 96 95* 95*   CO2 29 31 27   GLUCOSE 169* 130* 122*   BUN 11 24* 42*   CREATININE 4.11* 6.16* 7.82*   CALCIUM 8.5 8.6 8.3*       IMPRESSION:  # ESRD  -Receiving HD today prior to discharge  -Will continue HD outpatient MWF via right arm aVF at [facility]  #Acute hypoxic respiratory failure, improving   -Secondary to COVID viral illness and pneumonia +/- fluid overload   # HTN, controlled   - Goal BP < 140/90  - continue felodipine, lasix, doxazosin, lisinopril   # Anemia of CKD  - Goal Hgb 10-11  - At goal  #Hypocalcemia   -8.3, corrected: 8.8   -continue calcitriol MWF  # CKD Mineral Bone Disorder  --followed at outpt HD  - phos: 3.3  # DM II  --per primary svc  # Seizure Disorder  --per primary svc     PLAN:  - iHD today (WED)   - PRN NS boluses for symptomatic hypotension  - Continue supplemental O2 and wean as tolerated  - No need for NAFISA  - No dietary protein restrictions  - Dose all meds per ESRD  - Other management per primary svc

## 2024-02-14 NOTE — FACE TO FACE
"Face to Face Note  -  Durable Medical Equipment    Bennie Hastings M.D. - NPI: [id number]  I certify that this patient is under my care and that they had a durable medical equipment(DME)face to face encounter by myself that meets the physician DME face-to-face encounter requirements with this patient on:    Date of encounter:   Patient:                    MRN:                       YOB: 2024  Jean Barboza  [id number]  1950     The encounter with the patient was in whole, or in part, for the following medical condition, which is the primary reason for durable medical equipment:  Covid-19 Infection    I certify that, based on my findings, the following durable medical equipment is medically necessary:    Oxygen   HOME O2 Saturation Measurements:(Values must be present for Home Oxygen orders)  Room air sat at rest: 87  Room air sat with amb: 84  With liters of O2: 1, O2 sat at rest with O2: 94  With Liters of O2: 4, O2 sat with amb with O2 : 91  Is the patient mobile?: Yes  If patient feels more short of breath, they can go up to 6 liters per minute and contact healthcare provider.    Supporting Symptoms: The patient requires supplemental oxygen, as the following interventions have been tried with limited or no improvement: \"Oral and/or IV steroids, \"Ambulation with oximetry, and \"Incentive spirometry.    My Clinical findings support the need for the above equipment due to:  Hypoxia  "

## 2024-02-15 ENCOUNTER — TELEPHONE (OUTPATIENT)
Dept: CARDIOLOGY | Facility: MEDICAL CENTER | Age: 74
End: 2024-02-15
Payer: MEDICARE

## 2024-02-15 NOTE — TELEPHONE ENCOUNTER
Referral from: Dr. Conklin for Severe AS    Patient called on 2/15/2024.    Attempted to reach patient several times, but recording indicated that the number had been changed, disconnected, or was no longer in service. Called and spoke to patients son He who provided updated phone number ([phone]). He recommends leaving patient a message as he does not answer unknown numbers.    Attempted to reach patient, but unable to leave message as voicemail box was full. Called and spoke to He again. Requested he pass message along to patient. Phone number for [facility] given to He who verbalizes understanding.       First attempt

## 2024-02-15 NOTE — LETTER
February 26, 2024        Jean Barboza  [facility] [place] [address]        Dear Jean,    We have been unable to reach you regarding a referral to our structural heart program for evaluation of your severe cardiac valvular disease, placed during your recent hospitalization.      Please call our office at [phone] as soon as possible to discuss and schedule a consultation.           Sincerely,           ROSIO Nicholson, RN  [facility] Nurse Coordinator

## 2024-02-16 LAB — GLUCOSE BLD STRIP.AUTO-MCNC: 147 MG/DL (ref 65–99)

## 2024-02-23 NOTE — TELEPHONE ENCOUNTER
Attempted to contact patient. Unable to leave message as voicemail box was full. Will try again at a later time.      Second attempt

## 2024-02-26 NOTE — TELEPHONE ENCOUNTER
Attempted to reach patient again. Unable to leave message as voicemail box remains full.     Letter generated and placed in mail to patient along with echo report.        Third attempt

## 2024-03-20 ENCOUNTER — TELEPHONE (OUTPATIENT)
Dept: HEALTH INFORMATION MANAGEMENT | Facility: OTHER | Age: 74
End: 2024-03-20
Payer: MEDICARE

## 2024-03-26 ENCOUNTER — HOSPITAL ENCOUNTER (EMERGENCY)
Facility: MEDICAL CENTER | Age: 74
End: 2024-03-26
Attending: EMERGENCY MEDICINE
Payer: MEDICARE

## 2024-03-26 ENCOUNTER — APPOINTMENT (OUTPATIENT)
Dept: RADIOLOGY | Facility: MEDICAL CENTER | Age: 74
End: 2024-03-26
Attending: EMERGENCY MEDICINE
Payer: MEDICARE

## 2024-03-26 ENCOUNTER — TELEPHONE (OUTPATIENT)
Dept: CARDIOLOGY | Facility: MEDICAL CENTER | Age: 74
End: 2024-03-26
Payer: MEDICARE

## 2024-03-26 VITALS
TEMPERATURE: 98 F | SYSTOLIC BLOOD PRESSURE: 122 MMHG | HEART RATE: 80 BPM | DIASTOLIC BLOOD PRESSURE: 66 MMHG | OXYGEN SATURATION: 97 % | HEIGHT: 67 IN | RESPIRATION RATE: 15 BRPM | WEIGHT: 169.31 LBS | BODY MASS INDEX: 26.57 KG/M2

## 2024-03-26 DIAGNOSIS — M54.50 LUMBAR BACK PAIN: ICD-10-CM

## 2024-03-26 PROCEDURE — 72100 X-RAY EXAM L-S SPINE 2/3 VWS: CPT

## 2024-03-26 PROCEDURE — 99284 EMERGENCY DEPT VISIT MOD MDM: CPT

## 2024-03-26 ASSESSMENT — FIBROSIS 4 INDEX: FIB4 SCORE: 1.95

## 2024-03-26 NOTE — DISCHARGE INSTRUCTIONS
X-rays show no fracture for your pain today.  You can continue to take Tylenol as needed for your pain and follow-up with primary care, seek more immediate medical attention for any weakness or numbness or incontinence or other concerns

## 2024-03-26 NOTE — ED TRIAGE NOTES
Chief Complaint   Patient presents with    Low Back Pain     Fall yesterday at 3pm while walking on driveway. Patient reports he tripped over the curb and landed on back.

## 2024-03-26 NOTE — TELEPHONE ENCOUNTER
STRUCTURAL HEART REFERRAL    Caller: Jean Barboza    Topic/issue: REFERRAL ON FILE    Callback Number: [phone] (home)

## 2024-03-26 NOTE — ED NOTES
Pt ambulated to YEL 58 from lobby with steady gait.  On monitor, call light in reach. Chart up for ERP.  Pt is on 3L nc at baseline. Pt states he fell to his bottom then leaned against the house, pt denies head strike or LOC.

## 2024-03-26 NOTE — ED PROVIDER NOTES
ED Provider Note    CHIEF COMPLAINT  Chief Complaint   Patient presents with    Low Back Pain     Fall yesterday at 3pm while walking on driveway. Patient reports he tripped over the curb and landed on back.        EXTERNAL RECORDS REVIEWED  Inpatient Notes patient admitted 2024 for shortness of breath was found to be hypoxemic, secondary to COVID19, noted history of end-stage renal disease hypertension diabetes CVA    HPI/ROS  LIMITATION TO HISTORY   Select: : None  OUTSIDE HISTORIAN(S):  none    Jean Barboza is a 73 y.o. male who presents with low back pain.  Patient reports that last night after he came home from dialysis he tripped on a curb landing he thinks on his buttocks on his driveway.  He reports he has had some low back pain ever since.  He reports no other focal areas of pain, chest pain abdominal pain, leg pain, arm pain or headache.  He did not hit his head.  He reports no focal weakness numbness or tingling.  No bowel or bladder incontinence or retention.  No recent illness fevers or chills    PAST MEDICAL HISTORY   has a past medical history of CATARACT, Diabetes (), Hyperlipidemia, Hypertension, Seizure (HCC) (), Seizure disorder (), Snoring, and Stroke ().    SURGICAL HISTORY   has a past surgical history that includes other (); other (); cataract phaco with iol (2011); cataract phaco with iol (2011); and cataract extraction with iol ().    FAMILY HISTORY  Family History   Problem Relation Age of Onset    Diabetes Mother     Heart Disease Father        SOCIAL HISTORY  Social History     Tobacco Use    Smoking status: Former     Current packs/day: 0.00     Average packs/day: 0.5 packs/day for 4.0 years (2.0 ttl pk-yrs)     Types: Cigarettes     Start date: 1981     Quit date: 1985     Years since quittin.1    Smokeless tobacco: Never   Vaping Use    Vaping Use: Never used   Substance and Sexual Activity    Alcohol use: No    Drug use: No  "   Sexual activity: Not on file       CURRENT MEDICATIONS  Home Medications       Reviewed by Amanda Gonzalez R.N. (Registered Nurse) on 03/26/24 at 1146  Med List Status: Partial     Medication Last Dose Status   acetaminophen (TYLENOL) 500 MG Tab  Active   aspirin (ASA) 81 MG Chew Tab chewable tablet  Active   atorvastatin (LIPITOR) 20 MG Tab  Active   calcitRIOL (ROCALTROL) 0.25 MCG Cap  Active   doxazosin (CARDURA) 2 MG Tab  Active   felodipine ER (PLENDIL) 10 MG TABLET SR 24 HR  Active   furosemide (LASIX) 80 MG Tab  Active   gabapentin (NEURONTIN) 100 MG Cap  Active   lisinopril (PRINIVIL) 40 MG tablet  Active   Multiple Vitamin (MULTIVITAMINS PO)  Active   Omega-3 Fatty Acids (FISH OIL PO)  Active   sevelamer (RENAGEL) 800 MG Tab  Active                    ALLERGIES  Allergies   Allergen Reactions    Penicillin G Rash     Rxn - Exacerbated a rash on his legs  Rash as a child   Tolerates ceftriaxone and cefepime       PHYSICAL EXAM  VITAL SIGNS: /57   Pulse 88   Temp 36.9 °C (98.5 °F)   Resp 18   Ht 1.702 m (5' 7\")   Wt 76.8 kg (169 lb 5 oz)   SpO2 96%   BMI 26.52 kg/m²      Pulse ox interpretation: I interpret this pulse ox as normal.  Constitutional: Alert in no apparent distress.  HENT: No signs of trauma, Bilateral external ears normal, Nose normal.   Eyes: Pupils are equal and reactive, Conjunctiva normal, Non-icteric.   Neck: Normal range of motion, No tenderness, Supple, No stridor.   Cardiovascular: Regular rate and rhythm, no murmurs.   Thorax & Lungs: Normal breath sounds, No respiratory distress, No wheezing, No chest tenderness.   Abdomen: Bowel sounds normal, Soft, No tenderness, No masses, No pulsatile masses. No peritoneal signs.  Skin: Warm, Dry, No erythema, No rash.   Back: Tenderness to the low L-spine without deformity or step-off, No CVA tenderness.   Extremities: Fistula to the right upper extremity intact distal pulses, No edema, No tenderness, No cyanosis,  Musculoskeletal: " Good range of motion in all major joints. No tenderness to palpation or major deformities noted.   Neurologic: Alert , strength is 5 out of 5 with bilateral lower extremities, flexion and extension at the hips, knees and ankles, sensation is intact to light touch throughout, normal motor function, Normal sensory function, No focal deficits noted.   Psychiatric: Affect normal, Judgment normal, Mood normal.               DIAGNOSTIC STUDIES / PROCEDURES      RADIOLOGY  I have independently interpreted the diagnostic imaging associated with this visit and am waiting the final reading from the radiologist.   My preliminary interpretation is as follows: No fracture  Radiologist interpretation:   DX-LUMBAR SPINE-2 OR 3 VIEWS   Final Result      1.  No lumbar spine fracture or subluxation.   2.  Mild degenerative changes.            COURSE & MEDICAL DECISION MAKING        INITIAL ASSESSMENT, COURSE AND PLAN  Care Narrative: 12:35 PM  Patient is evaluated at the bedside and chart is reviewed, differential diagnosis is considered as below.  I have ordered for x-ray, he declines need for pain medication    Patient reevaluated, he is still comfortable, updated on all results and he is agreeable with discharge          PROBLEM LIST  # Low back pain.  Present after fall, there is no evidence of displaced fracture he is neurologically intact without findings to suggest spinal compression syndrome either by symptoms or on exam.  No other focal complaints of pain or tenderness on exam to suggest other traumatic injury.  He has been comfortable with Tylenol      DISPOSITION AND DISCUSSIONS      Barriers to care at this time, including but not limited to:  none .     Decision tools and prescription drugs considered including, but not limited to: Pain Medications patient has been comfortable with Tylenol .    The patient will return for new or worsening symptoms and is stable at the time of discharge.    The patient is referred to a  primary physician for blood pressure management, diabetic screening, and for all other preventative health concerns.        DISPOSITION:  Patient will be discharged home in stable condition.    FOLLOW UP:  Clyde Sung M.D.  [address] [place] [id number] [phone]            OUTPATIENT MEDICATIONS:  New Prescriptions    No medications on file         FINAL DIAGNOSIS  1. Lumbar back pain           Electronically signed by: Josue Monroe M.D., 3/26/2024 12:35 PM

## 2024-03-26 NOTE — ED NOTES
Pt discharged to lobby with steady gait on personal O2. GCS 15.Pt in possession of belongings. Pt provided discharge education and information pertaining to medications and follow up appointments. Pt received copy of discharge instructions and verbalized understanding.     Vitals:    03/26/24 1414   BP: 122/66   Pulse: 80   Resp: 15   Temp: 36.7 °C (98 °F)   SpO2: 97%

## 2024-03-28 ENCOUNTER — TELEPHONE (OUTPATIENT)
Dept: CARDIOLOGY | Facility: MEDICAL CENTER | Age: 74
End: 2024-03-28
Payer: MEDICARE

## 2024-03-28 NOTE — TELEPHONE ENCOUNTER
Referral previously received from: Dr. Conklin for Severe AS/MR    Patient called on 3/28/2024.    Attempted to reach patient. Unable to leave message as voicemail box was full. MyChart not set up.       First attempt

## 2024-05-23 ENCOUNTER — TELEPHONE (OUTPATIENT)
Dept: HEALTH INFORMATION MANAGEMENT | Facility: OTHER | Age: 74
End: 2024-05-23
Payer: MEDICARE

## 2024-05-28 ENCOUNTER — HOSPITAL ENCOUNTER (EMERGENCY)
Facility: MEDICAL CENTER | Age: 74
End: 2024-05-28
Attending: EMERGENCY MEDICINE
Payer: MEDICARE

## 2024-05-28 VITALS
BODY MASS INDEX: 26.24 KG/M2 | WEIGHT: 167.55 LBS | SYSTOLIC BLOOD PRESSURE: 125 MMHG | HEART RATE: 82 BPM | TEMPERATURE: 98.6 F | RESPIRATION RATE: 15 BRPM | OXYGEN SATURATION: 93 % | DIASTOLIC BLOOD PRESSURE: 60 MMHG

## 2024-05-28 DIAGNOSIS — R04.0 EPISTAXIS: ICD-10-CM

## 2024-05-28 PROCEDURE — 700102 HCHG RX REV CODE 250 W/ 637 OVERRIDE(OP): Performed by: EMERGENCY MEDICINE

## 2024-05-28 PROCEDURE — 303620 HCHG EPISTAXIS CONTROL

## 2024-05-28 PROCEDURE — 700101 HCHG RX REV CODE 250: Performed by: EMERGENCY MEDICINE

## 2024-05-28 PROCEDURE — 99284 EMERGENCY DEPT VISIT MOD MDM: CPT

## 2024-05-28 PROCEDURE — A9270 NON-COVERED ITEM OR SERVICE: HCPCS | Performed by: EMERGENCY MEDICINE

## 2024-05-28 RX ORDER — LIDOCAINE HYDROCHLORIDE AND EPINEPHRINE 10; 10 MG/ML; UG/ML
20 INJECTION, SOLUTION INFILTRATION; PERINEURAL ONCE
Status: COMPLETED | OUTPATIENT
Start: 2024-05-28 | End: 2024-05-28

## 2024-05-28 RX ADMIN — PHENYLEPHRINE HYDROCHLORIDE 1 SPRAY: 0.25 SPRAY NASAL at 12:40

## 2024-05-28 RX ADMIN — LIDOCAINE HYDROCHLORIDE AND EPINEPHRINE 20 ML: 10; 10 INJECTION, SOLUTION INFILTRATION; PERINEURAL at 13:15

## 2024-05-28 ASSESSMENT — FIBROSIS 4 INDEX: FIB4 SCORE: 1.95

## 2024-05-28 NOTE — ED PROVIDER NOTES
ER Provider Note    Scribed for Paulo Friend M.D. by Rebecca Cuellar. 5/28/2024   12:33 PM    Primary Care Provider: Clyde Sung M.D.    CHIEF COMPLAINT  Chief Complaint   Patient presents with    Nose Bleed     EXTERNAL RECORDS REVIEWED  Outpatient Notes Patient has a history of ESRD on HD.     HPI/ROS  LIMITATION TO HISTORY   Select: : None  OUTSIDE HISTORIAN(S):  None    Jean Barboza is a 73 y.o. male with a history of ESRD, hypertension and hyperlipidemia who presents to the ED for evaluation of a nose bleed onset last night. He states his nose began to bleed last night since 5 pm. Patient's last round of dialysis was yesterday. Patient was seen at Urgent Care this morning and was told to come to the ED for further evaluation. He reports having an episode with his nose bleeding in the past.  Patient takes baby aspirin daily. No alleviating or exacerbating factors were noted.    PAST MEDICAL HISTORY  Past Medical History:   Diagnosis Date    CATARACT     Diabetes 2005    oral meds    Hyperlipidemia     Hypertension     Seizure (HCC) 1987    Seizure disorder (HCC)     Snoring     Stroke (HCC)      SURGICAL HISTORY  Past Surgical History:   Procedure Laterality Date    CATARACT PHACO WITH IOL  5/4/2011    Performed by ENRIQUETA MABRY at [facility]    CATARACT PHACO WITH IOL  4/20/2011    Performed by ENRIQUETA MABRY at [facility]    OTHER  2011    left eye cataract    CATARACT EXTRACTION WITH IOL  2011    OTHER  2003    oral surgery     FAMILY HISTORY  Family History   Problem Relation Age of Onset    Diabetes Mother     Heart Disease Father      SOCIAL HISTORY   reports that he quit smoking about 39 years ago. His smoking use included cigarettes. He started smoking about 43 years ago. He has a 2 pack-year smoking history. He has never used smokeless tobacco. He reports that he does not drink alcohol and does not use drugs.    CURRENT MEDICATIONS  Previous  Medications    ACETAMINOPHEN (TYLENOL) 500 MG TAB    Take 2 Tablets by mouth 3 times a day.    ASPIRIN (ASA) 81 MG CHEW TAB CHEWABLE TABLET    Chew 81 mg every day.    ATORVASTATIN (LIPITOR) 20 MG TAB    Take 20 mg by mouth every evening. Indications: Cerebrovascular Accident or Stroke    CALCITRIOL (ROCALTROL) 0.25 MCG CAP    Take 0.25 mcg by mouth as needed. At dialysis    DOXAZOSIN (CARDURA) 2 MG TAB    Take 2 mg by mouth every evening.    FELODIPINE ER (PLENDIL) 10 MG TABLET SR 24 HR    Take 10 mg by mouth every day.    FUROSEMIDE (LASIX) 80 MG TAB    Take 80 mg by mouth as needed. At dialysis    GABAPENTIN (NEURONTIN) 100 MG CAP    Take 100-300 mg by mouth at bedtime.    LISINOPRIL (PRINIVIL) 40 MG TABLET    Take 40 mg by mouth every day. Indications: High Blood Pressure Disorder    MULTIPLE VITAMIN (MULTIVITAMINS PO)    Take 1 Tablet by mouth every day.    OMEGA-3 FATTY ACIDS (FISH OIL PO)    Take 1 Capsule by mouth every day.    SEVELAMER (RENAGEL) 800 MG TAB    Take 1,600 mg by mouth 3 times a day with meals.     ALLERGIES  Allergies   Allergen Reactions    Penicillin G Rash     Rxn - Exacerbated a rash on his legs  Rash as a child   Tolerates ceftriaxone and cefepime      PHYSICAL EXAM  /64   Pulse 79   Temp 37.1 °C (98.7 °F) (Temporal)   Resp 17   Wt 76 kg (167 lb 8.8 oz)   SpO2 92%   BMI 26.24 kg/m²    Constitutional: Mild distress   HENT: Normocephalic, Atraumatic. Oropharynx moist. Packing in the right nare, large clot came out when pulled. Slight oozing of blood in the right nare, no blood vessel seen   Eyes: EOMI, Conjunctiva normal, No discharge.   CV: Good pulses  Thorax & Lungs: No respiratory distress.   Skin: Warm, Dry, No rash noted    Musculoskeletal: No major deformities noted.   Neurologic: Awake, alert. Moves all extremities spontaneously.  Psychiatric: Affect normal, Mood normal.      COURSE & MEDICAL DECISION MAKING     INITIAL ASSESSMENT, COURSE AND PLAN  Care Narrative:  Patient with epistaxis, ultimately had to pack the patient.  Tried multiple other modalities prior to that.  The patient will return in 3 days for packing removal.  Patient will return with worsening symptoms.    12:34 PM - Patient was seen and evaluated at bedside. Patient presents to the ED for a nose bleed.  After my exam, I discussed with the patient the plan of care, which includes treating the patient with medication for their symptoms. Patient understands and verbalizes agreement to plan of care. Patient was treated with Afrin nasal spray.    12:55 PM - Patient was reevaluated at bedside. Patient's nare has stopped bleeding at this time. Discussed with patient plan to control bleeding at home. I then informed the patient of my plan for discharge, which includes strict return precautions for any new or worsening symptoms. Patient understands and verbalizes agreement to plan of care. Patient is comfortable going home at this time.      1:11 PM - Patient was reevaluated at bedside. Patient has a slight nose bleed on right nare. Put more phen phenylephrine and nasal clamp     1:16 PM - Patient was reevaluated at bedside. Patient treated with lidocaine epinephrine 1% to control bleeding.     1:30 PM - Patient was reevaluated at bedside.     2:05 PM - Patient was reevaluated at bedside. Balloon placed at this time. I then informed the patient of my plan for discharge, which includes returning to ED for balloon removal in 3 days. Patient understands and verbalizes agreement to plan of care. Patient is comfortable going home at this time.     DISPOSITION AND DISCUSSIONS    I have discussed management of the patient with the following physicians and TINY's: None    Discussion of management with other QHP or appropriate source(s): None     Escalation of care considered, and ultimately not performed: diagnostic imaging.    Barriers to care at this time, including but not limited to: None.     Decision tools and  prescription drugs considered including, but not limited to: Antibiotics   .    The patient will return for new or worsening symptoms and is stable at the time of discharge.    DISPOSITION:  Patient will be discharged home in stable condition.    FOLLOW UP:  [facility], Emergency Dept  [address] [facility] [id number] [phone]    3 days for packing removal.    FINAL DIAGNOSIS  1. Epistaxis         Rebecca AMAYA (Scribe), am scribing for, and in the presence of, Paulo Friend M.D..    Electronically signed by: Rebecca Cuellar (Scribe), 5/28/2024    IPaulo M.D. personally performed the services described in this documentation, as scribed by Rebecca Cuellar in my presence, and it is both accurate and complete.      The note accurately reflects work and decisions made by me.  Paulo Friend M.D.  5/28/2024  8:09 PM

## 2024-05-28 NOTE — ED NOTES
Pt discharged to lobby with steady gait, education provided for follow up to remove packing and inflating/deflating packing as needed, pt and son were able to demonstrate education. GCS 15. IV discontinued and gauze placed, pt in possession of belongings. Pt provided discharge education and information pertaining to medications and follow up appointments. Pt received copy of discharge instructions and verbalized understanding.     Vitals:    05/28/24 1406   BP:    Pulse: 82   Resp: 15   Temp: 37 °C (98.6 °F)   SpO2: 93%

## 2024-05-28 NOTE — ED TRIAGE NOTES
Pt to triage c/o nose bleed that began last night pt states went to urgent care this am and told to come to ED. Pt has some sort of packing partially in nose and states does not want to take out to use nose clamp. Bleeding appears controlled. Nad. vss

## 2024-05-28 NOTE — ED NOTES
Bedside report from PRATIBHA Wise. Pt resting in rney comfortably, on monitor, call light in reach.  Pt is on RA, GCS 15.  Necessary fall precautions in place.

## 2024-05-31 ENCOUNTER — APPOINTMENT (OUTPATIENT)
Dept: RADIOLOGY | Facility: MEDICAL CENTER | Age: 74
DRG: 150 | End: 2024-05-31
Attending: EMERGENCY MEDICINE
Payer: MEDICARE

## 2024-05-31 ENCOUNTER — HOSPITAL ENCOUNTER (INPATIENT)
Facility: MEDICAL CENTER | Age: 74
LOS: 1 days | DRG: 150 | End: 2024-06-03
Attending: EMERGENCY MEDICINE | Admitting: STUDENT IN AN ORGANIZED HEALTH CARE EDUCATION/TRAINING PROGRAM
Payer: MEDICARE

## 2024-05-31 DIAGNOSIS — J18.9 PNEUMONIA OF RIGHT LOWER LOBE DUE TO INFECTIOUS ORGANISM: ICD-10-CM

## 2024-05-31 DIAGNOSIS — D64.9 ANEMIA, UNSPECIFIED TYPE: ICD-10-CM

## 2024-05-31 DIAGNOSIS — R50.9 FEVER, UNSPECIFIED FEVER CAUSE: ICD-10-CM

## 2024-05-31 DIAGNOSIS — R04.0 EPISTAXIS: ICD-10-CM

## 2024-05-31 PROBLEM — D62 ACUTE BLOOD LOSS ANEMIA: Status: ACTIVE | Noted: 2024-05-31

## 2024-05-31 LAB
ABO GROUP BLD: NORMAL
ALBUMIN SERPL BCP-MCNC: 4.1 G/DL (ref 3.2–4.9)
ALBUMIN/GLOB SERPL: 1.1 G/DL
ALP SERPL-CCNC: 65 U/L (ref 30–99)
ALT SERPL-CCNC: 7 U/L (ref 2–50)
ANION GAP SERPL CALC-SCNC: 15 MMOL/L (ref 7–16)
APTT PPP: 27.9 SEC (ref 24.7–36)
AST SERPL-CCNC: 19 U/L (ref 12–45)
BARCODED ABORH UBTYP: 6200
BARCODED ABORH UBTYP: 6200
BARCODED PRD CODE UBPRD: NORMAL
BARCODED PRD CODE UBPRD: NORMAL
BARCODED UNIT NUM UBUNT: NORMAL
BARCODED UNIT NUM UBUNT: NORMAL
BASOPHILS # BLD AUTO: 0.6 % (ref 0–1.8)
BASOPHILS # BLD: 0.06 K/UL (ref 0–0.12)
BILIRUB SERPL-MCNC: 0.5 MG/DL (ref 0.1–1.5)
BLD GP AB SCN SERPL QL: NORMAL
BUN SERPL-MCNC: 12 MG/DL (ref 8–22)
CALCIUM ALBUM COR SERPL-MCNC: 8.8 MG/DL (ref 8.5–10.5)
CALCIUM SERPL-MCNC: 8.9 MG/DL (ref 8.5–10.5)
CHLORIDE SERPL-SCNC: 93 MMOL/L (ref 96–112)
CO2 SERPL-SCNC: 30 MMOL/L (ref 20–33)
COMPONENT R 8504R: NORMAL
COMPONENT R 8504R: NORMAL
CREAT SERPL-MCNC: 2.55 MG/DL (ref 0.5–1.4)
EOSINOPHIL # BLD AUTO: 0.38 K/UL (ref 0–0.51)
EOSINOPHIL NFR BLD: 4.1 % (ref 0–6.9)
ERYTHROCYTE [DISTWIDTH] IN BLOOD BY AUTOMATED COUNT: 61.1 FL (ref 35.9–50)
FERRITIN SERPL-MCNC: 1065 NG/ML (ref 22–322)
FLUAV RNA SPEC QL NAA+PROBE: NEGATIVE
FLUBV RNA SPEC QL NAA+PROBE: NEGATIVE
GFR SERPLBLD CREATININE-BSD FMLA CKD-EPI: 26 ML/MIN/1.73 M 2
GLOBULIN SER CALC-MCNC: 3.6 G/DL (ref 1.9–3.5)
GLUCOSE BLD STRIP.AUTO-MCNC: 144 MG/DL (ref 65–99)
GLUCOSE SERPL-MCNC: 116 MG/DL (ref 65–99)
HCT VFR BLD AUTO: 22.3 % (ref 42–52)
HGB BLD-MCNC: 7.2 G/DL (ref 14–18)
IMM GRANULOCYTES # BLD AUTO: 0.04 K/UL (ref 0–0.11)
IMM GRANULOCYTES NFR BLD AUTO: 0.4 % (ref 0–0.9)
INR PPP: 1.16 (ref 0.87–1.13)
IRON SATN MFR SERPL: 16 % (ref 15–55)
IRON SERPL-MCNC: 32 UG/DL (ref 50–180)
LACTATE SERPL-SCNC: 1.7 MMOL/L (ref 0.5–2)
LYMPHOCYTES # BLD AUTO: 0.91 K/UL (ref 1–4.8)
LYMPHOCYTES NFR BLD: 9.8 % (ref 22–41)
MCH RBC QN AUTO: 31.9 PG (ref 27–33)
MCHC RBC AUTO-ENTMCNC: 32.3 G/DL (ref 32.3–36.5)
MCV RBC AUTO: 98.7 FL (ref 81.4–97.8)
MONOCYTES # BLD AUTO: 0.68 K/UL (ref 0–0.85)
MONOCYTES NFR BLD AUTO: 7.3 % (ref 0–13.4)
NEUTROPHILS # BLD AUTO: 7.25 K/UL (ref 1.82–7.42)
NEUTROPHILS NFR BLD: 77.8 % (ref 44–72)
NRBC # BLD AUTO: 0 K/UL
NRBC BLD-RTO: 0 /100 WBC (ref 0–0.2)
PLATELET # BLD AUTO: 202 K/UL (ref 164–446)
PMV BLD AUTO: 10.7 FL (ref 9–12.9)
POTASSIUM SERPL-SCNC: 3.7 MMOL/L (ref 3.6–5.5)
PRODUCT TYPE UPROD: NORMAL
PRODUCT TYPE UPROD: NORMAL
PROT SERPL-MCNC: 7.7 G/DL (ref 6–8.2)
PROTHROMBIN TIME: 14.9 SEC (ref 12–14.6)
RBC # BLD AUTO: 2.26 M/UL (ref 4.7–6.1)
RH BLD: NORMAL
RSV RNA SPEC QL NAA+PROBE: NEGATIVE
SARS-COV-2 RNA RESP QL NAA+PROBE: NOTDETECTED
SODIUM SERPL-SCNC: 138 MMOL/L (ref 135–145)
TIBC SERPL-MCNC: 206 UG/DL (ref 250–450)
UIBC SERPL-MCNC: 174 UG/DL (ref 110–370)
UNIT STATUS USTAT: NORMAL
UNIT STATUS USTAT: NORMAL
WBC # BLD AUTO: 9.3 K/UL (ref 4.8–10.8)

## 2024-05-31 PROCEDURE — 80053 COMPREHEN METABOLIC PANEL: CPT

## 2024-05-31 PROCEDURE — 83540 ASSAY OF IRON: CPT

## 2024-05-31 PROCEDURE — 36415 COLL VENOUS BLD VENIPUNCTURE: CPT

## 2024-05-31 PROCEDURE — 700102 HCHG RX REV CODE 250 W/ 637 OVERRIDE(OP)

## 2024-05-31 PROCEDURE — 83550 IRON BINDING TEST: CPT

## 2024-05-31 PROCEDURE — 82962 GLUCOSE BLOOD TEST: CPT

## 2024-05-31 PROCEDURE — 83605 ASSAY OF LACTIC ACID: CPT

## 2024-05-31 PROCEDURE — A9270 NON-COVERED ITEM OR SERVICE: HCPCS | Performed by: EMERGENCY MEDICINE

## 2024-05-31 PROCEDURE — 36430 TRANSFUSION BLD/BLD COMPNT: CPT

## 2024-05-31 PROCEDURE — 84443 ASSAY THYROID STIM HORMONE: CPT

## 2024-05-31 PROCEDURE — 700102 HCHG RX REV CODE 250 W/ 637 OVERRIDE(OP): Performed by: EMERGENCY MEDICINE

## 2024-05-31 PROCEDURE — 99223 1ST HOSP IP/OBS HIGH 75: CPT | Mod: AI,GC | Performed by: STUDENT IN AN ORGANIZED HEALTH CARE EDUCATION/TRAINING PROGRAM

## 2024-05-31 PROCEDURE — 85610 PROTHROMBIN TIME: CPT

## 2024-05-31 PROCEDURE — 86900 BLOOD TYPING SEROLOGIC ABO: CPT

## 2024-05-31 PROCEDURE — 30233N1 TRANSFUSION OF NONAUTOLOGOUS RED BLOOD CELLS INTO PERIPHERAL VEIN, PERCUTANEOUS APPROACH: ICD-10-PCS

## 2024-05-31 PROCEDURE — 86923 COMPATIBILITY TEST ELECTRIC: CPT

## 2024-05-31 PROCEDURE — 71045 X-RAY EXAM CHEST 1 VIEW: CPT

## 2024-05-31 PROCEDURE — 86850 RBC ANTIBODY SCREEN: CPT

## 2024-05-31 PROCEDURE — 86901 BLOOD TYPING SEROLOGIC RH(D): CPT

## 2024-05-31 PROCEDURE — 99285 EMERGENCY DEPT VISIT HI MDM: CPT

## 2024-05-31 PROCEDURE — 85730 THROMBOPLASTIN TIME PARTIAL: CPT

## 2024-05-31 PROCEDURE — 0241U HCHG SARS-COV-2 COVID-19 NFCT DS RESP RNA 4 TRGT ED POC: CPT

## 2024-05-31 PROCEDURE — 82728 ASSAY OF FERRITIN: CPT

## 2024-05-31 PROCEDURE — G0378 HOSPITAL OBSERVATION PER HR: HCPCS

## 2024-05-31 PROCEDURE — A9270 NON-COVERED ITEM OR SERVICE: HCPCS

## 2024-05-31 PROCEDURE — 700105 HCHG RX REV CODE 258: Performed by: EMERGENCY MEDICINE

## 2024-05-31 PROCEDURE — P9016 RBC LEUKOCYTES REDUCED: HCPCS

## 2024-05-31 PROCEDURE — 85025 COMPLETE CBC W/AUTO DIFF WBC: CPT

## 2024-05-31 PROCEDURE — 87040 BLOOD CULTURE FOR BACTERIA: CPT

## 2024-05-31 RX ORDER — SODIUM CHLORIDE 9 MG/ML
INJECTION, SOLUTION INTRAVENOUS CONTINUOUS
Status: DISCONTINUED | OUTPATIENT
Start: 2024-05-31 | End: 2024-06-01

## 2024-05-31 RX ORDER — GABAPENTIN 100 MG/1
200 CAPSULE ORAL ONCE
Status: COMPLETED | OUTPATIENT
Start: 2024-05-31 | End: 2024-05-31

## 2024-05-31 RX ORDER — ACETAMINOPHEN 325 MG/1
650 TABLET ORAL EVERY 6 HOURS PRN
Status: DISCONTINUED | OUTPATIENT
Start: 2024-05-31 | End: 2024-06-03 | Stop reason: HOSPADM

## 2024-05-31 RX ORDER — ASPIRIN 81 MG/1
81 TABLET ORAL DAILY
Status: DISCONTINUED | OUTPATIENT
Start: 2024-06-01 | End: 2024-06-01

## 2024-05-31 RX ORDER — DOXYCYCLINE 100 MG/1
100 TABLET ORAL ONCE
Status: COMPLETED | OUTPATIENT
Start: 2024-05-31 | End: 2024-05-31

## 2024-05-31 RX ORDER — LISINOPRIL 5 MG/1
2.5 TABLET ORAL DAILY
Status: DISCONTINUED | OUTPATIENT
Start: 2024-06-01 | End: 2024-06-01

## 2024-05-31 RX ADMIN — DOXYCYCLINE 100 MG: 100 TABLET, FILM COATED ORAL at 22:30

## 2024-05-31 RX ADMIN — SODIUM CHLORIDE: 9 INJECTION, SOLUTION INTRAVENOUS at 22:22

## 2024-05-31 RX ADMIN — GABAPENTIN 200 MG: 100 CAPSULE ORAL at 23:37

## 2024-05-31 SDOH — ECONOMIC STABILITY: TRANSPORTATION INSECURITY
IN THE PAST 12 MONTHS, HAS LACK OF RELIABLE TRANSPORTATION KEPT YOU FROM MEDICAL APPOINTMENTS, MEETINGS, WORK OR FROM GETTING THINGS NEEDED FOR DAILY LIVING?: NO

## 2024-05-31 ASSESSMENT — LIFESTYLE VARIABLES
HAVE YOU EVER FELT YOU SHOULD CUT DOWN ON YOUR DRINKING: NO
TOTAL SCORE: 0
ALCOHOL_USE: NO
CONSUMPTION TOTAL: NEGATIVE
DOES PATIENT WANT TO STOP DRINKING: NO
AVERAGE NUMBER OF DAYS PER WEEK YOU HAVE A DRINK CONTAINING ALCOHOL: 0
ON A TYPICAL DAY WHEN YOU DRINK ALCOHOL HOW MANY DRINKS DO YOU HAVE: 0
TOTAL SCORE: 0
HOW MANY TIMES IN THE PAST YEAR HAVE YOU HAD 5 OR MORE DRINKS IN A DAY: 0
EVER FELT BAD OR GUILTY ABOUT YOUR DRINKING: NO
HAVE PEOPLE ANNOYED YOU BY CRITICIZING YOUR DRINKING: NO
EVER HAD A DRINK FIRST THING IN THE MORNING TO STEADY YOUR NERVES TO GET RID OF A HANGOVER: NO
TOTAL SCORE: 0

## 2024-05-31 ASSESSMENT — COGNITIVE AND FUNCTIONAL STATUS - GENERAL
MOVING FROM LYING ON BACK TO SITTING ON SIDE OF FLAT BED: A LITTLE
SUGGESTED CMS G CODE MODIFIER DAILY ACTIVITY: CI
DAILY ACTIVITIY SCORE: 23
DRESSING REGULAR UPPER BODY CLOTHING: A LITTLE
MOBILITY SCORE: 22
SUGGESTED CMS G CODE MODIFIER MOBILITY: CJ
TURNING FROM BACK TO SIDE WHILE IN FLAT BAD: A LITTLE

## 2024-05-31 ASSESSMENT — SOCIAL DETERMINANTS OF HEALTH (SDOH)
WITHIN THE LAST YEAR, HAVE YOU BEEN KICKED, HIT, SLAPPED, OR OTHERWISE PHYSICALLY HURT BY YOUR PARTNER OR EX-PARTNER?: NO
IN THE PAST 12 MONTHS, HAS THE ELECTRIC, GAS, OIL, OR WATER COMPANY THREATENED TO SHUT OFF SERVICE IN YOUR HOME?: NO
WITHIN THE LAST YEAR, HAVE YOU BEEN AFRAID OF YOUR PARTNER OR EX-PARTNER?: NO
WITHIN THE PAST 12 MONTHS, YOU WORRIED THAT YOUR FOOD WOULD RUN OUT BEFORE YOU GOT THE MONEY TO BUY MORE: NEVER TRUE
WITHIN THE PAST 12 MONTHS, THE FOOD YOU BOUGHT JUST DIDN'T LAST AND YOU DIDN'T HAVE MONEY TO GET MORE: NEVER TRUE
WITHIN THE LAST YEAR, HAVE TO BEEN RAPED OR FORCED TO HAVE ANY KIND OF SEXUAL ACTIVITY BY YOUR PARTNER OR EX-PARTNER?: NO
WITHIN THE LAST YEAR, HAVE YOU BEEN HUMILIATED OR EMOTIONALLY ABUSED IN OTHER WAYS BY YOUR PARTNER OR EX-PARTNER?: NO

## 2024-05-31 ASSESSMENT — PAIN DESCRIPTION - PAIN TYPE: TYPE: CHRONIC PAIN

## 2024-05-31 ASSESSMENT — ENCOUNTER SYMPTOMS
PALPITATIONS: 0
CHILLS: 0
FEVER: 0
CARDIOVASCULAR NEGATIVE: 1
NEUROLOGICAL NEGATIVE: 1
GASTROINTESTINAL NEGATIVE: 1
EYES NEGATIVE: 1
RESPIRATORY NEGATIVE: 1
MUSCULOSKELETAL NEGATIVE: 1
PSYCHIATRIC NEGATIVE: 1

## 2024-05-31 ASSESSMENT — FIBROSIS 4 INDEX
FIB4 SCORE: 1.95
FIB4 SCORE: 2.6

## 2024-05-31 NOTE — ED TRIAGE NOTES
Chief Complaint   Patient presents with    Abnormal Labs     Sent my MD for abnormal labs. Hgb 7.5. Pt has had a recurrent bloody nose since Wednesday. Denies history of blood transfusion.       Dialysis patient M/W/F - had dialysis this morning.       Patient wheeled to triage for above complaint. Patient A&Ox4, GCS 15, patient speaking in full sentences. Equal and unlabored respirations. Patient educated on triage process and encouraged to notify staff if condition worsens. Appropriate protocols ordered. Patient returned to the lobby in stable condition.

## 2024-05-31 NOTE — ED PROVIDER NOTES
ER Provider Note    Scribed for Paulo Friend M.D. by Cornel Thurman. 5/31/2024   4:58 PM    Primary Care Provider: Clyde Sung M.D.    CHIEF COMPLAINT  Chief Complaint   Patient presents with    Abnormal Labs     Sent my MD for abnormal labs. Hgb 7.5. Pt has had a recurrent bloody nose since Wednesday. Denies history of blood transfusion.      EXTERNAL RECORDS REVIEWED  Outpatient Notes Patient has a history of ESRD on HD. Seen three days ago for nosebleed, packing was placed. Hemoglobin usually between 7-10    HPI/ROS    LIMITATION TO HISTORY   Select: : None    Jean Barboza is a 73 y.o. male who presents to the ED for evaluation of abnormal labs onset prior to arrival. He has a history of ESRD and is on dialysis Monday/Wednesday/Friday. He was recently seen here for a nosebleed where packing was placed, he endorses intermittent bleeds since then. He was sent here today from his dialysis clinic as his hemoglobin was 7.5. He has felt fairly normal today, he denies any cough, abdominal pain, nausea, vomiting, shortness of breath, but does have some weakness and fatigue. He has not required a transfusion before.     PAST MEDICAL HISTORY  Past Medical History:   Diagnosis Date    CATARACT     Diabetes 2005    oral meds    Hyperlipidemia     Hypertension     Seizure (HCC) 1987    Seizure disorder (HCC)     Snoring     Stroke (HCC)        SURGICAL HISTORY  Past Surgical History:   Procedure Laterality Date    CATARACT PHACO WITH IOL  5/4/2011    Performed by ENRIQUETA MABRY at [facility]    CATARACT PHACO WITH IOL  4/20/2011    Performed by ENRIQUETA MABRY at [facility]    OTHER  2011    left eye cataract    CATARACT EXTRACTION WITH IOL  2011    OTHER  2003    oral surgery       FAMILY HISTORY  Family History   Problem Relation Age of Onset    Diabetes Mother     Heart Disease Father        SOCIAL HISTORY   reports that he quit smoking about 39 years ago. His smoking  "use included cigarettes. He started smoking about 43 years ago. He has a 2 pack-year smoking history. He has never used smokeless tobacco. He reports that he does not drink alcohol and does not use drugs.    CURRENT MEDICATIONS  Previous Medications    ASPIRIN 81 MG EC TABLET    Take 81 mg by mouth every day.    LISINOPRIL PO    Take 1 Tablet by mouth every day. Indications: High Blood Pressure Disorder       ALLERGIES  Allergies   Allergen Reactions    Penicillin G Rash     Rxn - Exacerbated a rash on his legs  Rash as a child   Tolerates ceftriaxone and cefepime        PHYSICAL EXAM  BP 98/56   Pulse 98   Temp (!) 38.1 °C (100.6 °F) (Temporal)   Resp 18   Ht 1.702 m (5' 7\")   Wt 77.1 kg (170 lb)   SpO2 91%   BMI 26.63 kg/m²    Constitutional: Well developed, ill appearing, No distress,    HENT: Normocephalic, Atraumatic. Packing in place in right nare, no blood in posterior oropharynx.  Eyes: PERRLA, EOMI, Conjunctiva normal, No discharge.   Neck: No tenderness, Supple, No stridor.   Cardiovascular: Normal heart rate, Normal rhythm. Right arm with AV fistula, good thrill  Thorax & Lungs: Decreased breath sounds throughout, No respiratory distress.   Abdomen: Soft, No tenderness, No masses.   Skin: Warm, Dry, No rash.    Musculoskeletal: No major deformities noted.  Neurologic: Awake, alert. Moves all extremities spontaneously.  Psychiatric: Affect normal, Judgment normal, Mood normal.      DIAGNOSTIC STUDIES    Labs:   Results for orders placed or performed during the hospital encounter of 05/31/24   Lactic Acid   Result Value Ref Range    Lactic Acid 1.7 0.5 - 2.0 mmol/L   CBC with Differential   Result Value Ref Range    WBC 9.3 4.8 - 10.8 K/uL    RBC 2.26 (L) 4.70 - 6.10 M/uL    Hemoglobin 7.2 (L) 14.0 - 18.0 g/dL    Hematocrit 22.3 (L) 42.0 - 52.0 %    MCV 98.7 (H) 81.4 - 97.8 fL    MCH 31.9 27.0 - 33.0 pg    MCHC 32.3 32.3 - 36.5 g/dL    RDW 61.1 (H) 35.9 - 50.0 fL    Platelet Count 202 164 - 446 K/uL "    MPV 10.7 9.0 - 12.9 fL    Neutrophils-Polys 77.80 (H) 44.00 - 72.00 %    Lymphocytes 9.80 (L) 22.00 - 41.00 %    Monocytes 7.30 0.00 - 13.40 %    Eosinophils 4.10 0.00 - 6.90 %    Basophils 0.60 0.00 - 1.80 %    Immature Granulocytes 0.40 0.00 - 0.90 %    Nucleated RBC 0.00 0.00 - 0.20 /100 WBC    Neutrophils (Absolute) 7.25 1.82 - 7.42 K/uL    Lymphs (Absolute) 0.91 (L) 1.00 - 4.80 K/uL    Monos (Absolute) 0.68 0.00 - 0.85 K/uL    Eos (Absolute) 0.38 0.00 - 0.51 K/uL    Baso (Absolute) 0.06 0.00 - 0.12 K/uL    Immature Granulocytes (abs) 0.04 0.00 - 0.11 K/uL    NRBC (Absolute) 0.00 K/uL   Complete Metabolic Panel   Result Value Ref Range    Sodium 138 135 - 145 mmol/L    Potassium 3.7 3.6 - 5.5 mmol/L    Chloride 93 (L) 96 - 112 mmol/L    Co2 30 20 - 33 mmol/L    Anion Gap 15.0 7.0 - 16.0    Glucose 116 (H) 65 - 99 mg/dL    Bun 12 8 - 22 mg/dL    Creatinine 2.55 (H) 0.50 - 1.40 mg/dL    Calcium 8.9 8.5 - 10.5 mg/dL    Correct Calcium 8.8 8.5 - 10.5 mg/dL    AST(SGOT) 19 12 - 45 U/L    ALT(SGPT) 7 2 - 50 U/L    Alkaline Phosphatase 65 30 - 99 U/L    Total Bilirubin 0.5 0.1 - 1.5 mg/dL    Albumin 4.1 3.2 - 4.9 g/dL    Total Protein 7.7 6.0 - 8.2 g/dL    Globulin 3.6 (H) 1.9 - 3.5 g/dL    A-G Ratio 1.1 g/dL   COD - Adult (Type and Screen)   Result Value Ref Range    ABO Grouping Only A     Rh Grouping Only POS     Antibody Screen-Cod NEG     Component R       R99                 Red Cells, LR       [id number]   transfused   05/31/24   19:32      Product Type R99     Dispense Status transfused     Unit Number (Barcoded) [id number]     Product Code (Barcoded) O7271X21     Blood Type (Barcoded) 6200    ESTIMATED GFR   Result Value Ref Range    GFR (CKD-EPI) 26 (A) >60 mL/min/1.73 m 2   FERRITIN   Result Value Ref Range    Ferritin 1065.0 (H) 22.0 - 322.0 ng/mL   IRON/TOTAL IRON BIND   Result Value Ref Range    Iron 32 (L) 50 - 180 ug/dL    Total Iron Binding 206 (L) 250 - 450 ug/dL    Unsat Iron Binding 174 110  - 370 ug/dL    % Saturation 16 15 - 55 %   Prothrombin Time   Result Value Ref Range    PT 14.9 (H) 12.0 - 14.6 sec    INR 1.16 (H) 0.87 - 1.13   APTT   Result Value Ref Range    APTT 27.9 24.7 - 36.0 sec   POC CoV-2, FLU A/B, RSV by PCR   Result Value Ref Range    POC Influenza A RNA, PCR Negative Negative    POC Influenza B RNA, PCR Negative Negative    POC RSV, by PCR Negative Negative    POC SARS-CoV-2, PCR NotDetected      All labs reviewed by me.     Radiology:   This attending emergency physician has independently interpreted the diagnostic imaging associated with this visit and is awaiting the final reading from the radiologist.   Preliminary interpretation is a follows: No pneumonia  Radiologist interpretation:   DX-CHEST-PORTABLE (1 VIEW)   Final Result      Cardiomegaly and possible mild vascular congestion and trace right pleural fluid.         COURSE & MEDICAL DECISION MAKING     ED OBS: No; Patient does not meet criteria for ED Observation.   Sepsis: Infection was suspected 5:00 PM (Time). Sepsis pathway was initiated. Fluids not needed (no hypotension or lactate greater than or equal to 4). Antibiotics were given per protocol.    INITIAL ASSESSMENT, COURSE AND PLAN  Differential diagnoses include but not limited to: sepsis, epistaxis, pneumonia, influenza     Care Narrative: Patient is coming in secondary to anemia, the patient also has a fever here.  Evaluation for sepsis, the patient does have respiratory failure with hypoxemia.  Give the patient supplemental oxygen, patient has a right lower lobe effusion questionable pneumonia, give the patient doxycycline.  Check of the patient's packing there is no more residual bleeding.  Patient will need to be hospitalized for anemia.  Consent to the patient to give the patient a unit of blood.  Patient will be admitted to the hospital.    4:58 PM - Patient was evaluated at bedside. Ordered for DX-chest, viral swab, COD adult, lactic acid, CBC w/ diff, CMP,  UA w/ culture, and blood culture x2 to evaluate. Patient verbalizes understanding and support with my plan of care. Nasal packing was removed and right nare was flushed with saline. After removal there was no active bleeding noted.     I have explained to the patient the risks and benefits of transfusion of blood products.  This includes, as appropriate, the risk of mild allergic reaction, hemolytic reaction, transfusion-associated lung injury, febrile reactions, circulatory or iron overload, and infection.    We discussed possible alternatives and their risks, including directed donation, autologous transfusion, and no transfusion, including IV or oral iron supplementation, as appropriate.  I believe the patient understands the risks and benefits and was able to express understanding.     DISPOSITION AND DISCUSSIONS    I have discussed management of the patient with the following physicians and TINY's: Hospitalist    Discussion of management with other Hospitals in [place] or appropriate source(s): Pharmacy regarding doxycycline      CRITICAL CARE  The very real possibilty of a deterioration of this patient's condition required the highest level of my preparedness for sudden, emergent intervention.  I provided critical care services, which included medication orders, frequent reevaluations of the patient's condition and response to treatment, ordering and reviewing test results, and discussing the case with various consultants.  The critical care time associated with the care of the patient was 35 minutes. Review chart for interventions. This time is exclusive of any other billable procedures.      DISPOSITION:  Patient will be hospitalized by the hospitalist in guarded condition.    FINAL DIAGNOSIS  1. Anemia, unspecified type    2. Fever, unspecified fever cause    3. Pneumonia of right lower lobe due to infectious organism    4.  Respiratory failure with hypoxemia   I, Cornel Thurman (Keron), am scribing for, and in the presence  ofPaulo M.D..    Electronically signed by: Cornel Thurman (Scribe), 5/31/2024    I, Paulo Friend M.D. personally performed the services described in this documentation, as scribed by Cornel Thurman in my presence, and it is both accurate and complete.      The note accurately reflects work and decisions made by me.  Paulo Friend M.D.  5/31/2024  9:37 PM

## 2024-05-31 NOTE — ED NOTES
Pt to room by w/c. Reports seen at [facility] today and had poc hgb done resulting low. Pt received dialysis today. IV placed to left fa and labs collected.

## 2024-06-01 PROBLEM — R50.9 FEVER: Status: ACTIVE | Noted: 2024-06-01

## 2024-06-01 LAB
ALBUMIN SERPL BCP-MCNC: 3.3 G/DL (ref 3.2–4.9)
ALBUMIN/GLOB SERPL: 1.1 G/DL
ALP SERPL-CCNC: 52 U/L (ref 30–99)
ALT SERPL-CCNC: 6 U/L (ref 2–50)
ANION GAP SERPL CALC-SCNC: 13 MMOL/L (ref 7–16)
AST SERPL-CCNC: 12 U/L (ref 12–45)
BASOPHILS # BLD AUTO: 0.8 % (ref 0–1.8)
BASOPHILS # BLD: 0.05 K/UL (ref 0–0.12)
BILIRUB SERPL-MCNC: 0.7 MG/DL (ref 0.1–1.5)
BUN SERPL-MCNC: 18 MG/DL (ref 8–22)
CALCIUM ALBUM COR SERPL-MCNC: 8.8 MG/DL (ref 8.5–10.5)
CALCIUM SERPL-MCNC: 8.2 MG/DL (ref 8.5–10.5)
CHLORIDE SERPL-SCNC: 94 MMOL/L (ref 96–112)
CO2 SERPL-SCNC: 27 MMOL/L (ref 20–33)
CREAT SERPL-MCNC: 3.55 MG/DL (ref 0.5–1.4)
EOSINOPHIL # BLD AUTO: 0.57 K/UL (ref 0–0.51)
EOSINOPHIL NFR BLD: 8.7 % (ref 0–6.9)
ERYTHROCYTE [DISTWIDTH] IN BLOOD BY AUTOMATED COUNT: 64.3 FL (ref 35.9–50)
GFR SERPLBLD CREATININE-BSD FMLA CKD-EPI: 17 ML/MIN/1.73 M 2
GLOBULIN SER CALC-MCNC: 2.9 G/DL (ref 1.9–3.5)
GLUCOSE BLD STRIP.AUTO-MCNC: 115 MG/DL (ref 65–99)
GLUCOSE BLD STRIP.AUTO-MCNC: 127 MG/DL (ref 65–99)
GLUCOSE BLD STRIP.AUTO-MCNC: 153 MG/DL (ref 65–99)
GLUCOSE SERPL-MCNC: 121 MG/DL (ref 65–99)
HCT VFR BLD AUTO: 21.6 % (ref 42–52)
HCT VFR BLD AUTO: 24.2 % (ref 42–52)
HCT VFR BLD AUTO: 24.7 % (ref 42–52)
HGB BLD-MCNC: 7.3 G/DL (ref 14–18)
HGB BLD-MCNC: 7.6 G/DL (ref 14–18)
HGB BLD-MCNC: 7.8 G/DL (ref 14–18)
IMM GRANULOCYTES # BLD AUTO: 0.02 K/UL (ref 0–0.11)
IMM GRANULOCYTES NFR BLD AUTO: 0.3 % (ref 0–0.9)
LYMPHOCYTES # BLD AUTO: 1.02 K/UL (ref 1–4.8)
LYMPHOCYTES NFR BLD: 15.5 % (ref 22–41)
MCH RBC QN AUTO: 31.3 PG (ref 27–33)
MCHC RBC AUTO-ENTMCNC: 33.8 G/DL (ref 32.3–36.5)
MCV RBC AUTO: 92.7 FL (ref 81.4–97.8)
MONOCYTES # BLD AUTO: 0.57 K/UL (ref 0–0.85)
MONOCYTES NFR BLD AUTO: 8.7 % (ref 0–13.4)
NEUTROPHILS # BLD AUTO: 4.34 K/UL (ref 1.82–7.42)
NEUTROPHILS NFR BLD: 66 % (ref 44–72)
NRBC # BLD AUTO: 0 K/UL
NRBC BLD-RTO: 0 /100 WBC (ref 0–0.2)
PLATELET # BLD AUTO: 156 K/UL (ref 164–446)
PMV BLD AUTO: 11.2 FL (ref 9–12.9)
POTASSIUM SERPL-SCNC: 3.6 MMOL/L (ref 3.6–5.5)
PROT SERPL-MCNC: 6.2 G/DL (ref 6–8.2)
RBC # BLD AUTO: 2.33 M/UL (ref 4.7–6.1)
SODIUM SERPL-SCNC: 134 MMOL/L (ref 135–145)
TSH SERPL DL<=0.005 MIU/L-ACNC: 2.37 UIU/ML (ref 0.38–5.33)
WBC # BLD AUTO: 6.6 K/UL (ref 4.8–10.8)

## 2024-06-01 PROCEDURE — 700101 HCHG RX REV CODE 250: Performed by: GENERAL PRACTICE

## 2024-06-01 PROCEDURE — 85018 HEMOGLOBIN: CPT

## 2024-06-01 PROCEDURE — 700102 HCHG RX REV CODE 250 W/ 637 OVERRIDE(OP)

## 2024-06-01 PROCEDURE — 80053 COMPREHEN METABOLIC PANEL: CPT

## 2024-06-01 PROCEDURE — 36415 COLL VENOUS BLD VENIPUNCTURE: CPT

## 2024-06-01 PROCEDURE — G0378 HOSPITAL OBSERVATION PER HR: HCPCS

## 2024-06-01 PROCEDURE — 82962 GLUCOSE BLOOD TEST: CPT

## 2024-06-01 PROCEDURE — 700105 HCHG RX REV CODE 258: Performed by: GENERAL PRACTICE

## 2024-06-01 PROCEDURE — 85025 COMPLETE CBC W/AUTO DIFF WBC: CPT

## 2024-06-01 PROCEDURE — 96375 TX/PRO/DX INJ NEW DRUG ADDON: CPT

## 2024-06-01 PROCEDURE — 700111 HCHG RX REV CODE 636 W/ 250 OVERRIDE (IP): Mod: JZ | Performed by: GENERAL PRACTICE

## 2024-06-01 PROCEDURE — 96372 THER/PROPH/DIAG INJ SC/IM: CPT

## 2024-06-01 PROCEDURE — A9270 NON-COVERED ITEM OR SERVICE: HCPCS

## 2024-06-01 PROCEDURE — 700102 HCHG RX REV CODE 250 W/ 637 OVERRIDE(OP): Performed by: GENERAL PRACTICE

## 2024-06-01 PROCEDURE — 99233 SBSQ HOSP IP/OBS HIGH 50: CPT | Performed by: GENERAL PRACTICE

## 2024-06-01 PROCEDURE — A9270 NON-COVERED ITEM OR SERVICE: HCPCS | Performed by: GENERAL PRACTICE

## 2024-06-01 PROCEDURE — 96365 THER/PROPH/DIAG IV INF INIT: CPT

## 2024-06-01 PROCEDURE — 85014 HEMATOCRIT: CPT

## 2024-06-01 RX ORDER — DEXTROSE MONOHYDRATE 25 G/50ML
25 INJECTION, SOLUTION INTRAVENOUS
Status: DISCONTINUED | OUTPATIENT
Start: 2024-06-01 | End: 2024-06-03 | Stop reason: HOSPADM

## 2024-06-01 RX ORDER — OXYMETAZOLINE HYDROCHLORIDE 0.05 G/100ML
2 SPRAY NASAL 2 TIMES DAILY
Status: DISCONTINUED | OUTPATIENT
Start: 2024-06-01 | End: 2024-06-01

## 2024-06-01 RX ORDER — GABAPENTIN 300 MG/1
300 CAPSULE ORAL EVERY EVENING
Status: DISCONTINUED | OUTPATIENT
Start: 2024-06-01 | End: 2024-06-03 | Stop reason: HOSPADM

## 2024-06-01 RX ORDER — ATORVASTATIN CALCIUM 20 MG/1
20 TABLET, FILM COATED ORAL EVERY EVENING
Status: DISCONTINUED | OUTPATIENT
Start: 2024-06-01 | End: 2024-06-03 | Stop reason: HOSPADM

## 2024-06-01 RX ORDER — DOXAZOSIN MESYLATE 1 MG/1
2 TABLET ORAL
Status: DISCONTINUED | OUTPATIENT
Start: 2024-06-01 | End: 2024-06-03 | Stop reason: HOSPADM

## 2024-06-01 RX ORDER — POLYETHYLENE GLYCOL 3350 17 G/17G
1 POWDER, FOR SOLUTION ORAL DAILY
Status: DISCONTINUED | OUTPATIENT
Start: 2024-06-01 | End: 2024-06-01

## 2024-06-01 RX ORDER — FELODIPINE 5 MG/1
10 TABLET, EXTENDED RELEASE ORAL
Status: DISCONTINUED | OUTPATIENT
Start: 2024-06-01 | End: 2024-06-03 | Stop reason: HOSPADM

## 2024-06-01 RX ORDER — OXYMETAZOLINE HYDROCHLORIDE 0.05 G/100ML
2 SPRAY NASAL 2 TIMES DAILY
Status: DISCONTINUED | OUTPATIENT
Start: 2024-06-01 | End: 2024-06-03 | Stop reason: HOSPADM

## 2024-06-01 RX ORDER — SEVELAMER CARBONATE 800 MG/1
1600 TABLET, FILM COATED ORAL
Status: DISCONTINUED | OUTPATIENT
Start: 2024-06-01 | End: 2024-06-03 | Stop reason: HOSPADM

## 2024-06-01 RX ADMIN — SODIUM CHLORIDE 1000 MG: 900 INJECTION INTRAVENOUS at 13:44

## 2024-06-01 RX ADMIN — OXYMETAZOLINE HCL 2 SPRAY: 0.05 SPRAY NASAL at 18:19

## 2024-06-01 RX ADMIN — SEVELAMER CARBONATE 1600 MG: 800 TABLET, FILM COATED ORAL at 13:28

## 2024-06-01 RX ADMIN — INSULIN HUMAN 2 UNITS: 100 INJECTION, SOLUTION PARENTERAL at 13:26

## 2024-06-01 RX ADMIN — LISINOPRIL 2.5 MG: 5 TABLET ORAL at 05:48

## 2024-06-01 RX ADMIN — ATORVASTATIN CALCIUM 20 MG: 20 TABLET, FILM COATED ORAL at 18:20

## 2024-06-01 RX ADMIN — FELODIPINE 10 MG: 5 TABLET, EXTENDED RELEASE ORAL at 09:17

## 2024-06-01 RX ADMIN — SEVELAMER CARBONATE 1600 MG: 800 TABLET, FILM COATED ORAL at 18:19

## 2024-06-01 RX ADMIN — GABAPENTIN 300 MG: 300 CAPSULE ORAL at 18:19

## 2024-06-01 RX ADMIN — SEVELAMER CARBONATE 1600 MG: 800 TABLET, FILM COATED ORAL at 09:17

## 2024-06-01 RX ADMIN — OXYMETAZOLINE HCL 2 SPRAY: 0.05 SPRAY NASAL at 13:28

## 2024-06-01 RX ADMIN — SODIUM CHLORIDE 250 MG: 9 INJECTION, SOLUTION INTRAVENOUS at 18:21

## 2024-06-01 ASSESSMENT — PAIN DESCRIPTION - PAIN TYPE: TYPE: ACUTE PAIN

## 2024-06-01 NOTE — ED NOTES
I unit PRBCs started, verified with Maverick RN. Pt signed consent form prior to start of transfusion, verbalized understanding of blood transfusion education provided. This RN to remain at pts bedside for first 15-20 mins of transfusion.

## 2024-06-01 NOTE — ASSESSMENT & PLAN NOTE
-patient with anemia secondary to CKD, rule out acute blood loss anemia in the setting of epistaxis  -hemodynamically stable, asymptomatic  -Trend hemoglobin  -Rule out other causes of possible bleeding, patient with no other signs and symptoms of active bleeding  -Status post 1 unit packed RBC  -Pending iron studies/ferritin  -Pending PT PTT

## 2024-06-01 NOTE — H&P
"[facility] History & Physical Note    Date of Service  6/1/2024    Attending: Driss Freeman M.d.  Resident: Dr. Araujo  Contact Number: [phone]    Primary Care Physician  Clyde Sung M.D.    Consultants  N/A    Specialist Names:     Code Status  DNAR/DNI    Chief Complaint  Chief Complaint   Patient presents with    Abnormal Labs     Sent my MD for abnormal labs. Hgb 7.5. Pt has had a recurrent bloody nose since Wednesday. Denies history of blood transfusion.        History of Presenting Illness (HPI): .Jean Barboza is a 73 y.o. male with a history of ESRD, hypertension and hyperlipidemia who presents to the emergency department for evaluation of epistaxis.  Patient was recently seen at the emergency department last 5/28/24 for the same complaint, underwent nasal packing, was instructed to call back for worsening of symptoms.     Today, patient states that he was sent by his provider due to hemoglobin of 7.5(baseline Hgb 9-10).  Patient otherwise denies any lightheadedness, denies dizziness, denies any fall or syncopal episode.  Denies easy bruisability.  Patient describes his epistaxis to be happening almost daily, with just minimal blood from his nose, but states that 1 month ago he had gushing of blood which resolved.  Patient states that the amount of blood per day would not amount to 1 cup.  He has never had any blood transfusion in the past.     He denies any other possible sources of bleeding, denies any hematemesis denies any change in stool color.  No recent surgery. Patient denies any family history of bleeding disorder, patient also denies any previous history of coagulopathy.  Patient is only on aspirin daily.  Patient states that he has been having nosebleeding for \" a couple of months now\" unable to say how many months, states there was a plan for referral to ENT as outpatient by his PCP however this was not yet done.  Does not recall any other workup done.     Patient " denies fever, chills, cough, denies nausea/ vomiting.  States he has history of heart murmur, always been asymptomatic, denies chest pain shortness of breath, palpitations.     Patient has hemodialysis Monday Wednesday Friday, he does not recall whether or not he receives erythropoietin. Also is not sure whether or not he takes iron supplement, review of records show that he is not on any ferrous supplementation.     In the emergency department, patient noted to be febrile with 38.1 which resolved, pulse rate 98, nontachypneic, blood pressure 98/56 responded to fluids, BP to 120/70.  Saturating 91% on room air. Patient with noted hemoglobin of 7.2. Lactic acid normal, potassium 3.7.  Patient received doxycycline, blood cultures drawn. 1 unit of packed RBC ordered.  Patient to be admitted for observation for close monitoring and blood transfusion.     I discussed the plan of care with patient, family, and bedside RN.    Review of Systems  Review of Systems   Constitutional:  Negative for chills and fever.   HENT: Negative.     Eyes: Negative.    Respiratory: Negative.     Cardiovascular: Negative.  Negative for chest pain and palpitations.   Gastrointestinal: Negative.    Genitourinary: Negative.    Musculoskeletal: Negative.    Skin: Negative.    Neurological: Negative.    Psychiatric/Behavioral: Negative.         Past Medical History   has a past medical history of CATARACT, Diabetes (2005), Hyperlipidemia, Hypertension, Seizure (HCC) (1987), Seizure disorder ([facility]), Snoring, and Stroke (HCC).    Surgical History   has a past surgical history that includes other (2003); other (2011); cataract phaco with iol (4/20/2011); cataract phaco with iol (5/4/2011); and cataract extraction with iol (2011).     Family History  family history includes Diabetes in his mother; Heart Disease in his father.   Family history reviewed with patient.     Social History  Tobacco: Denies tobacco use  Alcohol: Denies alcohol  use  Recreational drugs (illegal or prescription): Denies illicit drug use   employment: Retired.  Living Situation: Lives with family  Recent Travel: No recent travel.  Primary Care Provider: Reviewed      Allergies  Allergies   Allergen Reactions    Penicillin G Rash     Rxn - Exacerbated a rash on his legs  Rash as a child   Tolerates ceftriaxone and cefepime       Medications  Prior to Admission Medications   Prescriptions Last Dose Informant Patient Reported? Taking?   LISINOPRIL PO 5/31/2024 at AM Patient's Home Pharmacy, Patient Yes Yes   Sig: Take 1 Tablet by mouth every day. Indications: High Blood Pressure Disorder   aspirin 81 MG EC tablet 5/30/2024 at AM Patient Yes Yes   Sig: Take 81 mg by mouth every day.      Facility-Administered Medications: None       Physical Exam  Temp:  [36.7 °C (98 °F)-38.1 °C (100.6 °F)] 36.7 °C (98 °F)  Pulse:  [86-98] 91  Resp:  [13-21] 18  BP: ()/(56-68) 104/67  SpO2:  [86 %-98 %] 95 %  Blood Pressure : 131/59   Temperature: 36.7 °C (98 °F)   Pulse: 89   Respiration: 16   Pulse Oximetry: 95 %       Physical Exam  Constitutional:       Appearance: Normal appearance.   HENT:      Head: Normocephalic.      Mouth/Throat:      Mouth: Mucous membranes are moist.   Eyes:      Pupils: Pupils are equal, round, and reactive to light.   Cardiovascular:      Heart sounds: Murmur (Positive holosystolic murmur) heard.   Pulmonary:      Effort: Pulmonary effort is normal.      Breath sounds: Normal breath sounds. No wheezing or rales.   Abdominal:      Palpations: Abdomen is soft.   Genitourinary:     Comments: Not assessed  Musculoskeletal:         General: Normal range of motion.      Cervical back: Normal range of motion and neck supple.      Comments: Right Arm Fistula   Skin:     General: Skin is warm and dry.      Capillary Refill: Capillary refill takes less than 2 seconds.   Neurological:      General: No focal deficit present.      Mental Status: He is alert and oriented to  "person, place, and time.   Psychiatric:         Mood and Affect: Mood normal.         Behavior: Behavior normal.         Laboratory:  Recent Labs     05/31/24  1652   WBC 9.3   RBC 2.26*   HEMOGLOBIN 7.2*   HEMATOCRIT 22.3*   MCV 98.7*   MCH 31.9   MCHC 32.3   RDW 61.1*   PLATELETCT 202   MPV 10.7     Recent Labs     05/31/24  1652   SODIUM 138   POTASSIUM 3.7   CHLORIDE 93*   CO2 30   GLUCOSE 116*   BUN 12   CREATININE 2.55*   CALCIUM 8.9     Recent Labs     05/31/24  1652   ALTSGPT 7   ASTSGOT 19   ALKPHOSPHAT 65   TBILIRUBIN 0.5   GLUCOSE 116*     Recent Labs     05/31/24  1652   APTT 27.9   INR 1.16*     No results for input(s): \"NTPROBNP\" in the last 72 hours.      No results for input(s): \"TROPONINT\" in the last 72 hours.    Imaging:  DX-CHEST-PORTABLE (1 VIEW)   Final Result      Cardiomegaly and possible mild vascular congestion and trace right pleural fluid.          X-Ray:  I have personally reviewed the images and compared with prior images.    Assessment/Plan:    I anticipate this patient is appropriate for observation status at this time because transfusion/telemetry monitoring    Patient will need a Med/Surg bed on MEDICAL service .  The need is secondary to transfusion/ telemetry monitoring.    * Epistaxis  Assessment & Plan  -Status postnasal packing during previous episodes  -Patient needs ENT referral for evaluation/management , consider as outpatient vs in patient, no indication for urgent ENT consult currently  -hemodynamically stable    Anemia- (present on admission)  Assessment & Plan  -patient with anemia secondary to CKD, rule out acute blood loss anemia in the setting of epistaxis  -hemodynamically stable, asymptomatic  -Trend hemoglobin  -Rule out other causes of possible bleeding, patient with no other signs and symptoms of active bleeding  -Status post 1 unit packed RBC  -Pending iron studies/ferritin  -Pending PT PTT      Fever  Assessment & Plan  -1 episode, resolved  -No recurrence of " febrile episodes  -Checking TSH to assess for hyperthyroidism  -Otherwise patient with negative review of systems, no signs indicative of ongoing infection  -Blood culture ordered in the ED,will follow    ESRD (end stage renal disease) on dialysis (HCC)- (present on admission)  Assessment & Plan  -Patient on hemodialysis Monday Wednesday Friday  -Next hemodialysis on Monday  -Renal diet        VTE prophylaxis: SCDs/TEDs

## 2024-06-01 NOTE — ASSESSMENT & PLAN NOTE
Secondary to epistaxis  2 unit of PRBCs transfused  Serial CBC    Hemoglobin 7.5, 1 unit of PRBCs ordered.  Serial CBC.

## 2024-06-01 NOTE — ED NOTES
Pts spo2 86-88% while connected to 2lpm O2 via NC. Intermittent epistaxis noted. Pt placed on oxymask at 6lpm.

## 2024-06-01 NOTE — ED NOTES
Bedside report received from Lashanda MCKINNON. Pt on cardiac monitor, pulse ox, and automatic BP. Fall precautions in place. Call light within reach. Pt connected to 2lpm O2 via NC

## 2024-06-01 NOTE — PROGRESS NOTES
Patient came to floor at 2149. Nose actively bleeding. Patient ambulated from [facility] to hospital bed. Currently on 6 liters of O2 via oxygen mask. Saturation 95%. Vitals stable. Patient was hungry. Gave patient lean cuisine mac and cheese. Patient had 50%. Started patient on 30mL ns continuous per order. IV patent and good blood return. While performing assessment and completing admission charting patient informed me that he has 4/10 pain in feet and legs. He states he takes gabapentin at home but unsure of the amount. Sent message to the ED Hospitalist Heather Araujo M.D. Order has been placed. Patient resting comfortably. Head of bed at an incline. All needs currently met at this time.

## 2024-06-01 NOTE — PROGRESS NOTES
4 Eyes Skin Assessment Completed by Jennifer Schoening, RN and Roseline Mejia, RN.    Head WDL  Ears WDL  Nose bleeding  Mouth WDL  Neck WDL  Breast/Chest WDL  Shoulder Blades WDL  Spine WDL  (R) Arm/Elbow/Hand WDL, fistula             (L) Arm/Elbow/Hand WDL  Abdomen WDL  Groin WDL  Scrotum/Coccyx/Buttocks WDL  (R) Leg WDL  (L) Leg WDL, small bruise below knee  (R) Heel/Foot/Toe WDL  (L) Heel/Foot/Toe WDL          Devices In Places Oxy Mask      Interventions In Place N/A    Possible Skin Injury No    Pictures Uploaded Into Epic Yes  Wound Consult Placed N/A  RN Wound Prevention Protocol Ordered No

## 2024-06-01 NOTE — ED NOTES
Med rec is partially complete per patient at bedside. Patient is unable to state names of medications that he takes aside from aspirin and lisinopril. Patient is unsure of the strength of his lisinopril. Patient states that he receives his medications from the [facility] ([phone]); unable to verify medication dispense history with pharmacy as the pharmacy is closed at this time, and does not reopen until Monday at 09:00. Patient also states that he is on dialysis, and that he receives dialysis at [facility] ([phone]). Per automated phone system, this facility is open from 05:00 - 20:00 on Monday, Wednesday, and Friday, and from 05:00 - 17:00 on Tuesday, Thursday, and Saturday, however multiple attempts to contact staff at facility prior to 20:00 with no answer; unable to verify any medications that patient may receive at dialysis, e.g. Mircera.    Allergies reviewed with patient.

## 2024-06-01 NOTE — CARE PLAN
The patient is Stable - Low risk of patient condition declining or worsening    Shift Goals  Clinical Goals: Patient to ambulate around hallway once by end of shift.  Patient Goals: To control bleeding.  Family Goals: MALINI    Progress made toward(s) clinical / shift goals:  Patient got out of bed and ambulated around room. Will continue to encourage to walk in the hallways.

## 2024-06-01 NOTE — HOSPITAL COURSE
This is a 73-year-old male with past medical history of hypertension, type 2 diabetes, ESRD on HD MWF, seizure disorder, pulmonary hypertension, and severe aortic stenosis who was admitted on 5/31/2024 with recurrent epistaxis.    Patient was seen earlier in the week for epistaxis, nasal packing was applied in the ED and patient was provided with a referral for ENT, patient has not been able to facilitate this.  Patient comes back with recurrent epistaxis, nasal packing in place, no evidence of further bleeding.  Hemoglobin 7.2 on admission, transfused 2 units PRBCs.  Patient not a candidate for surgical intervention due to comorbidities of severe AS and severe pulmonary hypertension.  Treated conservatively with Afrin, TXA.  Patient provided with referral and contact information for ENT, he is to present to their office with recurrence of bleeding.

## 2024-06-01 NOTE — ASSESSMENT & PLAN NOTE
-1 episode, resolved  -No recurrence of febrile episodes  -Otherwise patient with negative review of systems, no signs indicative of ongoing infection  -Blood culture pending

## 2024-06-01 NOTE — ASSESSMENT & PLAN NOTE
Patient was seen earlier in the week for epistaxis, nasal packing was applied in the ED and patient was provided with a referral for ENT, patient has not been able to facilitate this.      Patient continues with epistaxis, case discussed today with ENT, Afrin and TXA ordered, if continues bleeding may require packing by ENT.  Hemoglobin 7.2 on admit, baseline 9-10, s/p 1 unit PRBCs, hemoglobin 7.8, iron infusion, serial CBC.    Patient continues with epistaxis, case discussed today with ENT, not a candidate for operative repair given comorbidities.  Patient to continue with Afrin and TXA every 8, monitor overnight.    Hemoglobin 7.5, 1 unit of PRBCs ordered.  Serial CBC.

## 2024-06-01 NOTE — PROGRESS NOTES
[facility] Medicine Daily Progress Note    Date of Service  6/1/2024    Chief Complaint  Jean Barboza is a 73 y.o. male admitted 5/31/2024 with epistaxis    Hospital Course  This is a 73-year-old male with past medical history of hypertension, type 2 diabetes, ESRD on HD MWF, seizure disorder and aortic stenosis who was admitted on 5/31/2024 with recurrent epistaxis.    Patient was seen earlier in the week for epistaxis, nasal packing was applied in the ED and patient was provided with a referral for ENT, patient has not been able to facilitate this.  Patient comes back with recurrent epistaxis, nasal packing in place, no evidence of further bleeding.  Hemoglobin 7.2 on admission, transfuse 1 unit of PRBCs.  Referral placed for ENT, patient provided with contact information for ENT, to call Monday morning to facilitate follow-up.    Interval Problem Update  Patient continues with epistaxis, case discussed today with ENT, Afrin and TXA ordered, if continues bleeding may require packing by ENT.    Hemoglobin 7.2 on admit, baseline 9-10, s/p 1 unit PRBCs, hemoglobin 7.8, iron infusion, serial CBC.    I have discussed this patient's plan of care and discharge plan at IDT rounds today with Case Management, Nursing, Nursing leadership, and other members of the IDT team.    Consultants/Specialty  ENT    Code Status  DNAR/DNI    Disposition  The patient is not medically cleared for discharge to home or a post-acute facility.  Anticipate discharge to: home with close outpatient follow-up    I have placed the appropriate orders for post-discharge needs.    Review of Systems  Review of Systems   All other systems reviewed and are negative.       Physical Exam  Temp:  [36.6 °C (97.9 °F)-38.1 °C (100.6 °F)] 36.8 °C (98.2 °F)  Pulse:  [82-98] 82  Resp:  [13-21] 17  BP: ()/(56-68) 104/57  SpO2:  [86 %-98 %] 94 %    Physical Exam  Vitals and nursing note reviewed.   Constitutional:       General: He is not in acute  distress.     Appearance: Normal appearance.   HENT:      Head: Normocephalic and atraumatic.      Nose:      Comments: Right nostril with dried clots  Eyes:      Extraocular Movements: Extraocular movements intact.      Conjunctiva/sclera: Conjunctivae normal.      Pupils: Pupils are equal, round, and reactive to light.   Cardiovascular:      Rate and Rhythm: Normal rate and regular rhythm.      Pulses: Normal pulses.      Heart sounds: No murmur heard.     No friction rub. No gallop.   Pulmonary:      Effort: Pulmonary effort is normal. No respiratory distress.      Breath sounds: Normal breath sounds. No wheezing, rhonchi or rales.   Abdominal:      General: Bowel sounds are normal. There is no distension.      Palpations: Abdomen is soft.      Tenderness: There is no abdominal tenderness.   Musculoskeletal:         General: No swelling or tenderness. Normal range of motion.      Cervical back: Normal range of motion and neck supple. No muscular tenderness.      Right lower leg: No edema.      Left lower leg: No edema.   Skin:     General: Skin is warm and dry.      Capillary Refill: Capillary refill takes less than 2 seconds.      Findings: No bruising, erythema or rash.   Neurological:      General: No focal deficit present.      Mental Status: He is alert and oriented to person, place, and time.         Fluids    Intake/Output Summary (Last 24 hours) at 6/1/2024 1218  Last data filed at 6/1/2024 1000  Gross per 24 hour   Intake 360.83 ml   Output --   Net 360.83 ml       Laboratory  Recent Labs     05/31/24 1652 06/01/24  0313 06/01/24  0908   WBC 9.3 6.6  --    RBC 2.26* 2.33*  --    HEMOGLOBIN 7.2* 7.3* 7.8*   HEMATOCRIT 22.3* 21.6* 24.7*   MCV 98.7* 92.7  --    MCH 31.9 31.3  --    MCHC 32.3 33.8  --    RDW 61.1* 64.3*  --    PLATELETCT 202 156*  --    MPV 10.7 11.2  --      Recent Labs     05/31/24 1652 06/01/24  0313   SODIUM 138 134*   POTASSIUM 3.7 3.6   CHLORIDE 93* 94*   CO2 30 27   GLUCOSE 116*  121*   BUN 12 18   CREATININE 2.55* 3.55*   CALCIUM 8.9 8.2*     Recent Labs     05/31/24  1652   APTT 27.9   INR 1.16*               Imaging  DX-CHEST-PORTABLE (1 VIEW)   Final Result      Cardiomegaly and possible mild vascular congestion and trace right pleural fluid.           Assessment/Plan  * Epistaxis  Assessment & Plan  Patient was seen earlier in the week for epistaxis, nasal packing was applied in the ED and patient was provided with a referral for ENT, patient has not been able to facilitate this.      Patient continues with epistaxis, case discussed today with ENT, Afrin and TXA ordered, if continues bleeding may require packing by ENT.  Hemoglobin 7.2 on admit, baseline 9-10, s/p 1 unit PRBCs, hemoglobin 7.8, iron infusion, serial CBC.    Acute blood loss anemia- (present on admission)  Assessment & Plan  Secondary to epistaxis  1 unit of PRBCs transfused  Serial CBC    Fever  Assessment & Plan  -1 episode, resolved  -No recurrence of febrile episodes  -Otherwise patient with negative review of systems, no signs indicative of ongoing infection  -Blood culture pending    ESRD (end stage renal disease) on dialysis (HCC)- (present on admission)  Assessment & Plan  -Patient on hemodialysis Monday Wednesday Friday  -Next hemodialysis on Monday  -Renal diet    Type 2 diabetes mellitus (HCC)- (present on admission)  Assessment & Plan  ISS with hypoglycemia protocol in place         VTE prophylaxis: SCDs    I have performed a physical exam and reviewed and updated ROS and Plan today (6/1/2024). In review of yesterday's note (5/31/2024), there are no changes except as documented above.    Greater than 51 minutes spent prepping to see patient (e.g. reviewing EMR) obtaining and/or reviewing separately obtained history. Performing a medically appropriate examination and evaluation. Independently interpreting results and communicating results to patient/family/caregiver. Counseling and educating the  patient/family/caregiver. Ordering medications, tests, and/or procedures. Referring and communicating with other health care professionals.  Documenting clinical information in EPIC. Care coordination.

## 2024-06-02 ENCOUNTER — ANESTHESIA EVENT (OUTPATIENT)
Dept: SURGERY | Facility: MEDICAL CENTER | Age: 74
DRG: 150 | End: 2024-06-02
Payer: MEDICARE

## 2024-06-02 ENCOUNTER — APPOINTMENT (OUTPATIENT)
Dept: CARDIOLOGY | Facility: MEDICAL CENTER | Age: 74
DRG: 150 | End: 2024-06-02
Attending: GENERAL PRACTICE
Payer: MEDICARE

## 2024-06-02 ENCOUNTER — ANESTHESIA (OUTPATIENT)
Dept: SURGERY | Facility: MEDICAL CENTER | Age: 74
DRG: 150 | End: 2024-06-02
Payer: MEDICARE

## 2024-06-02 LAB
ANION GAP SERPL CALC-SCNC: 13 MMOL/L (ref 7–16)
BUN SERPL-MCNC: 37 MG/DL (ref 8–22)
CALCIUM SERPL-MCNC: 8.3 MG/DL (ref 8.5–10.5)
CHLORIDE SERPL-SCNC: 93 MMOL/L (ref 96–112)
CO2 SERPL-SCNC: 28 MMOL/L (ref 20–33)
CREAT SERPL-MCNC: 6.18 MG/DL (ref 0.5–1.4)
ERYTHROCYTE [DISTWIDTH] IN BLOOD BY AUTOMATED COUNT: 66.5 FL (ref 35.9–50)
GFR SERPLBLD CREATININE-BSD FMLA CKD-EPI: 9 ML/MIN/1.73 M 2
GLUCOSE BLD STRIP.AUTO-MCNC: 108 MG/DL (ref 65–99)
GLUCOSE BLD STRIP.AUTO-MCNC: 111 MG/DL (ref 65–99)
GLUCOSE BLD STRIP.AUTO-MCNC: 153 MG/DL (ref 65–99)
GLUCOSE BLD STRIP.AUTO-MCNC: 97 MG/DL (ref 65–99)
GLUCOSE BLD STRIP.AUTO-MCNC: 99 MG/DL (ref 65–99)
GLUCOSE SERPL-MCNC: 112 MG/DL (ref 65–99)
HCT VFR BLD AUTO: 23.3 % (ref 42–52)
HGB BLD-MCNC: 7.5 G/DL (ref 14–18)
LV EJECT FRACT MOD 2C 99903: 38.68
LV EJECT FRACT MOD 4C 99902: 34.96
LV EJECT FRACT MOD BP 99901: 36.32
MCH RBC QN AUTO: 31 PG (ref 27–33)
MCHC RBC AUTO-ENTMCNC: 32.2 G/DL (ref 32.3–36.5)
MCV RBC AUTO: 96.3 FL (ref 81.4–97.8)
PLATELET # BLD AUTO: 171 K/UL (ref 164–446)
PMV BLD AUTO: 11.2 FL (ref 9–12.9)
POTASSIUM SERPL-SCNC: 4.2 MMOL/L (ref 3.6–5.5)
RBC # BLD AUTO: 2.42 M/UL (ref 4.7–6.1)
SODIUM SERPL-SCNC: 134 MMOL/L (ref 135–145)
WBC # BLD AUTO: 9.5 K/UL (ref 4.8–10.8)

## 2024-06-02 PROCEDURE — A9270 NON-COVERED ITEM OR SERVICE: HCPCS | Performed by: GENERAL PRACTICE

## 2024-06-02 PROCEDURE — 86923 COMPATIBILITY TEST ELECTRIC: CPT

## 2024-06-02 PROCEDURE — 700101 HCHG RX REV CODE 250: Performed by: GENERAL PRACTICE

## 2024-06-02 PROCEDURE — 770006 HCHG ROOM/CARE - MED/SURG/GYN SEMI*

## 2024-06-02 PROCEDURE — 36430 TRANSFUSION BLD/BLD COMPNT: CPT

## 2024-06-02 PROCEDURE — 93308 TTE F-UP OR LMTD: CPT | Mod: 26 | Performed by: INTERNAL MEDICINE

## 2024-06-02 PROCEDURE — 5A1D70Z PERFORMANCE OF URINARY FILTRATION, INTERMITTENT, LESS THAN 6 HOURS PER DAY: ICD-10-PCS | Performed by: INTERNAL MEDICINE

## 2024-06-02 PROCEDURE — 96366 THER/PROPH/DIAG IV INF ADDON: CPT

## 2024-06-02 PROCEDURE — RXMED WILLOW AMBULATORY MEDICATION CHARGE: Performed by: GENERAL PRACTICE

## 2024-06-02 PROCEDURE — 99233 SBSQ HOSP IP/OBS HIGH 50: CPT | Performed by: GENERAL PRACTICE

## 2024-06-02 PROCEDURE — 93325 DOPPLER ECHO COLOR FLOW MAPG: CPT | Mod: 26 | Performed by: INTERNAL MEDICINE

## 2024-06-02 PROCEDURE — 85027 COMPLETE CBC AUTOMATED: CPT

## 2024-06-02 PROCEDURE — 80048 BASIC METABOLIC PNL TOTAL CA: CPT

## 2024-06-02 PROCEDURE — 700111 HCHG RX REV CODE 636 W/ 250 OVERRIDE (IP): Mod: JZ | Performed by: GENERAL PRACTICE

## 2024-06-02 PROCEDURE — 700101 HCHG RX REV CODE 250: Performed by: OTOLARYNGOLOGY

## 2024-06-02 PROCEDURE — 93321 DOPPLER ECHO F-UP/LMTD STD: CPT

## 2024-06-02 PROCEDURE — P9016 RBC LEUKOCYTES REDUCED: HCPCS

## 2024-06-02 PROCEDURE — 700105 HCHG RX REV CODE 258: Performed by: GENERAL PRACTICE

## 2024-06-02 PROCEDURE — 700102 HCHG RX REV CODE 250 W/ 637 OVERRIDE(OP): Performed by: GENERAL PRACTICE

## 2024-06-02 PROCEDURE — 82962 GLUCOSE BLOOD TEST: CPT

## 2024-06-02 RX ORDER — OXYMETAZOLINE HYDROCHLORIDE 0.05 G/100ML
2 SPRAY NASAL 2 TIMES DAILY
Qty: 30 ML | Refills: 0 | Status: SHIPPED | OUTPATIENT
Start: 2024-06-02 | End: 2024-08-17

## 2024-06-02 RX ADMIN — ATORVASTATIN CALCIUM 20 MG: 20 TABLET, FILM COATED ORAL at 17:42

## 2024-06-02 RX ADMIN — SEVELAMER CARBONATE 1600 MG: 800 TABLET, FILM COATED ORAL at 08:05

## 2024-06-02 RX ADMIN — INSULIN HUMAN 2 UNITS: 100 INJECTION, SOLUTION PARENTERAL at 17:28

## 2024-06-02 RX ADMIN — SODIUM CHLORIDE 250 MG: 9 INJECTION, SOLUTION INTRAVENOUS at 22:50

## 2024-06-02 RX ADMIN — SODIUM CHLORIDE 250 MG: 9 INJECTION, SOLUTION INTRAVENOUS at 05:17

## 2024-06-02 RX ADMIN — TRANEXAMIC ACID 1000 MG: 100 INJECTION, SOLUTION INTRAVENOUS at 14:19

## 2024-06-02 RX ADMIN — TRANEXAMIC ACID 1000 MG: 100 INJECTION, SOLUTION INTRAVENOUS at 22:01

## 2024-06-02 RX ADMIN — OXYMETAZOLINE HCL 2 SPRAY: 0.05 SPRAY NASAL at 17:31

## 2024-06-02 RX ADMIN — OXYMETAZOLINE HCL 2 SPRAY: 0.05 SPRAY NASAL at 05:10

## 2024-06-02 RX ADMIN — GABAPENTIN 300 MG: 300 CAPSULE ORAL at 17:41

## 2024-06-02 RX ADMIN — MUPIROCIN 2 %: 20 OINTMENT TOPICAL at 17:31

## 2024-06-02 RX ADMIN — SEVELAMER CARBONATE 1600 MG: 800 TABLET, FILM COATED ORAL at 17:30

## 2024-06-02 RX ADMIN — FELODIPINE 10 MG: 5 TABLET, EXTENDED RELEASE ORAL at 05:09

## 2024-06-02 ASSESSMENT — PAIN DESCRIPTION - PAIN TYPE
TYPE: ACUTE PAIN
TYPE: ACUTE PAIN

## 2024-06-02 NOTE — PROGRESS NOTES
[facility] Medicine Daily Progress Note    Date of Service  6/2/2024    Chief Complaint  Jean Barboza is a 73 y.o. male admitted 5/31/2024 with epistaxis    Hospital Course  This is a 73-year-old male with past medical history of hypertension, type 2 diabetes, ESRD on HD MWF, seizure disorder, pulmonary hypertension, and severe aortic stenosis who was admitted on 5/31/2024 with recurrent epistaxis.    Patient was seen earlier in the week for epistaxis, nasal packing was applied in the ED and patient was provided with a referral for ENT, patient has not been able to facilitate this.  Patient comes back with recurrent epistaxis, nasal packing in place, no evidence of further bleeding.  Hemoglobin 7.2 on admission, transfused 2 units PRBCs. Referral placed for ENT, patient provided with contact information for ENT, to call Monday morning to facilitate follow-up.    Interval Problem Update  Patient continues with epistaxis, case discussed today with ENT, not a candidate for operative repair given comorbidities. Patient to continue with Afrin and TXA every 8, monitor overnight.    Hemoglobin 7.5, 1 unit of PRBCs ordered.  Serial CBC.    I have discussed this patient's plan of care and discharge plan at IDT rounds today with Case Management, Nursing, Nursing leadership, and other members of the IDT team.    Consultants/Specialty  ENT    Code Status  DNAR/DNI    Disposition  The patient is not medically cleared for discharge to home or a post-acute facility.  Anticipate discharge to: home with close outpatient follow-up    I have placed the appropriate orders for post-discharge needs.    Review of Systems  Review of Systems   All other systems reviewed and are negative.       Physical Exam  Temp:  [36.3 °C (97.3 °F)-37 °C (98.6 °F)] 36.4 °C (97.6 °F)  Pulse:  [80-84] 84  Resp:  [17-19] 18  BP: ()/(54-64) 128/64  SpO2:  [90 %-100 %] 91 %    Physical Exam  Vitals and nursing note reviewed.   Constitutional:        General: He is not in acute distress.     Appearance: Normal appearance.   HENT:      Head: Normocephalic and atraumatic.      Nose:      Comments: Right nostril with dried clots  Eyes:      Extraocular Movements: Extraocular movements intact.      Conjunctiva/sclera: Conjunctivae normal.      Pupils: Pupils are equal, round, and reactive to light.   Cardiovascular:      Rate and Rhythm: Normal rate and regular rhythm.      Pulses: Normal pulses.      Heart sounds: No murmur heard.     No friction rub. No gallop.   Pulmonary:      Effort: Pulmonary effort is normal. No respiratory distress.      Breath sounds: Normal breath sounds. No wheezing, rhonchi or rales.   Abdominal:      General: Bowel sounds are normal. There is no distension.      Palpations: Abdomen is soft.      Tenderness: There is no abdominal tenderness.   Musculoskeletal:         General: No swelling or tenderness. Normal range of motion.      Cervical back: Normal range of motion and neck supple. No muscular tenderness.      Right lower leg: No edema.      Left lower leg: No edema.   Skin:     General: Skin is warm and dry.      Capillary Refill: Capillary refill takes less than 2 seconds.      Findings: No bruising, erythema or rash.   Neurological:      General: No focal deficit present.      Mental Status: He is alert and oriented to person, place, and time.         Fluids    Intake/Output Summary (Last 24 hours) at 6/2/2024 1229  Last data filed at 6/1/2024 1932  Gross per 24 hour   Intake 220 ml   Output --   Net 220 ml       Laboratory  Recent Labs     05/31/24  1652 06/01/24  0313 06/01/24  0908 06/01/24  1754 06/02/24  0215   WBC 9.3 6.6  --   --  9.5   RBC 2.26* 2.33*  --   --  2.42*   HEMOGLOBIN 7.2* 7.3* 7.8* 7.6* 7.5*   HEMATOCRIT 22.3* 21.6* 24.7* 24.2* 23.3*   MCV 98.7* 92.7  --   --  96.3   MCH 31.9 31.3  --   --  31.0   MCHC 32.3 33.8  --   --  32.2*   RDW 61.1* 64.3*  --   --  66.5*   PLATELETCT 202 156*  --   --  171   MPV 10.7  11.2  --   --  11.2     Recent Labs     05/31/24  1652 06/01/24  0313 06/02/24  0215   SODIUM 138 134* 134*   POTASSIUM 3.7 3.6 4.2   CHLORIDE 93* 94* 93*   CO2 30 27 28   GLUCOSE 116* 121* 112*   BUN 12 18 37*   CREATININE 2.55* 3.55* 6.18*   CALCIUM 8.9 8.2* 8.3*     Recent Labs     05/31/24  1652   APTT 27.9   INR 1.16*               Imaging  DX-CHEST-PORTABLE (1 VIEW)   Final Result      Cardiomegaly and possible mild vascular congestion and trace right pleural fluid.           Assessment/Plan  * Epistaxis  Assessment & Plan  Patient was seen earlier in the week for epistaxis, nasal packing was applied in the ED and patient was provided with a referral for ENT, patient has not been able to facilitate this.      Patient continues with epistaxis, case discussed today with ENT, Afrin and TXA ordered, if continues bleeding may require packing by ENT.  Hemoglobin 7.2 on admit, baseline 9-10, s/p 1 unit PRBCs, hemoglobin 7.8, iron infusion, serial CBC.    Patient continues with epistaxis, case discussed today with ENT, not a candidate for operative repair given comorbidities.  Patient to continue with Afrin and TXA every 8, monitor overnight.    Hemoglobin 7.5, 1 unit of PRBCs ordered.  Serial CBC.    Acute blood loss anemia- (present on admission)  Assessment & Plan  Secondary to epistaxis  2 unit of PRBCs transfused  Serial CBC    Hemoglobin 7.5, 1 unit of PRBCs ordered.  Serial CBC.    Fever  Assessment & Plan  -1 episode, resolved  -No recurrence of febrile episodes  -Otherwise patient with negative review of systems, no signs indicative of ongoing infection  -Blood culture pending    ESRD (end stage renal disease) on dialysis (HCC)- (present on admission)  Assessment & Plan  -Patient on hemodialysis Monday Wednesday Friday  -Next hemodialysis on Monday  -Renal diet    Type 2 diabetes mellitus (HCC)- (present on admission)  Assessment & Plan  ISS with hypoglycemia protocol in place         VTE prophylaxis:  SCDs    I have performed a physical exam and reviewed and updated ROS and Plan today (6/2/2024). In review of yesterday's note (6/1/2024), there are no changes except as documented above.    Greater than 55 minutes spent prepping to see patient (e.g. reviewing EMR) obtaining and/or reviewing separately obtained history. Performing a medically appropriate examination and evaluation. Independently interpreting results and communicating results to patient/family/caregiver. Counseling and educating the patient/family/caregiver. Ordering medications, tests, and/or procedures. Referring and communicating with other health care professionals.  Documenting clinical information in EPIC. Care coordination.

## 2024-06-02 NOTE — H&P
Surgery Otolaryngology History & Physical Note    Date  6/2/2024    Primary Care Physician  Clyde Sung M.D.    CC  Epistaxis     HPI  This is a 73 y.o. male who presented with epistaxis from the right nostril that was worst 2 weeks ago.  He describes it as gushing and this stopped with use of Afrin and pressure.  He had packing placed twice.  He has had intermittent repeat bleeding and the last packing was removed from the right nostril on Friday 5/31.  I spoke with his hospitalist who indicated he was bleeding enough to require multiple transfusions over the last 48 hours (2 units PRBCs).  We dicussed conservative measures including TXA, Afrin, humidified oxygen to prevent repeat bleeding.  He has ESRD and is on dialysis.     Past Medical History:   Diagnosis Date    CATARACT     Diabetes 2005    oral meds    Hyperlipidemia     Hypertension     Seizure (HCC) 1987    Seizure disorder (HCC)     Snoring     Stroke (HCC)        Past Surgical History:   Procedure Laterality Date    CATARACT PHACO WITH IOL  5/4/2011    Performed by ENRIQUETA MABRY at SURGERY SAME DAY [facility]    CATARACT PHACO WITH IOL  4/20/2011    Performed by ENRIQUETA MABRY at SURGERY SAME DAY [facility]    OTHER  2011    left eye cataract    CATARACT EXTRACTION WITH IOL  2011    OTHER  2003    oral surgery       Current Facility-Administered Medications   Medication Dose Route Frequency Provider Last Rate Last Admin    [MAR Hold] atorvastatin (Lipitor) tablet 20 mg  20 mg Oral Q EVENING Karly Fabjeanine, D.O.   20 mg at 06/01/24 1820    [MAR Hold] gabapentin (Neurontin) capsule 300 mg  300 mg Oral Q EVENING Karly Fabara, D.O.   300 mg at 06/01/24 1819    [MAR Hold] sevelamer carbonate (Renvela) tablet 1,600 mg  1,600 mg Oral TID WITH MEALS Karly Fabara, D.O.   1,600 mg at 06/02/24 0805    [MAR Hold] felodipine ER (Plendil) tablet 10 mg  10 mg Oral Q DAY Karly Fabara, D.O.   10 mg at 06/02/24 0509    [MAR Hold] doxazosin (Cardura)  tablet 2 mg  2 mg Oral QHS Karly Bauman D.O.        [MAR Hold] insulin regular (HumuLIN R,NovoLIN R) injection  2-9 Units Subcutaneous 4X/DAY ACHS Karly Bauman D.O.   2 Units at 24 1326    And    [MAR Hold] dextrose 50% (D50W) injection 25 g  25 g Intravenous Q15 MIN PRN LASHAWN Hancock.O.        [MAR Hold] ferric gluconate complex (Ferrlecit) 250 mg in  mL IVPB  250 mg Intravenous BID Karly Bauman D.O.   Stopped at 24 0617    [MAR Hold] oxymetazoline (Afrin) 0.05 % nasal spray 2 Spray  2 Spray Nasal BID Karly Bauman D.O.   2 [place] at 24 0510    [MAR Hold] acetaminophen (Tylenol) tablet 650 mg  650 mg Oral Q6HRS PRN Heather Araujo M.D.           Social History     Socioeconomic History    Marital status:      Spouse name: Not on file    Number of children: Not on file    Years of education: Not on file    Highest education level: Not on file   Occupational History    Not on file   Tobacco Use    Smoking status: Former     Current packs/day: 0.00     Average packs/day: 0.5 packs/day for 4.0 years (2.0 ttl pk-yrs)     Types: Cigarettes     Start date: 1981     Quit date: 1985     Years since quittin.3    Smokeless tobacco: Never   Vaping Use    Vaping status: Never Used   Substance and Sexual Activity    Alcohol use: No    Drug use: No    Sexual activity: Not on file   Other Topics Concern    Not on file   Social History Narrative    Not on file     Social Determinants of Health     Financial Resource Strain: Low Risk  (2021)    Overall Financial Resource Strain (CARDIA)     Difficulty of Paying Living Expenses: Not hard at all   Food Insecurity: No Food Insecurity (2024)    Hunger Vital Sign     Worried About Running Out of Food in the Last Year: Never true     Ran Out of Food in the Last Year: Never true   Transportation Needs: No Transportation Needs (2024)    PRAPARE - Transportation     Lack of Transportation (Medical): No     Lack of  Transportation (Non-Medical): No   Physical Activity: Not on file   Stress: Not on file   Social Connections: Not on file   Intimate Partner Violence: Not At Risk (5/31/2024)    Humiliation, Afraid, Rape, and Kick questionnaire     Fear of Current or Ex-Partner: No     Emotionally Abused: No     Physically Abused: No     Sexually Abused: No   Housing Stability: Low Risk  (5/31/2024)    Housing Stability Vital Sign     Unable to Pay for Housing in the Last Year: No     Number of Places Lived in the Last Year: 1     Unstable Housing in the Last Year: No       Family History   Problem Relation Age of Onset    Diabetes Mother     Heart Disease Father        Allergies  Penicillin g    Review of Systems  Negative except for as per HPI    Physical Exam  Constitutional:       General: He is not in acute distress.     Appearance: Normal appearance. He is obese. He is not toxic-appearing.      Comments: Oxy mask   HENT:      Head: Normocephalic and atraumatic.      Right Ear: External ear normal.      Left Ear: External ear normal.      Nose:      Comments: Dried blood in right nostril, no active bleeding     Mouth/Throat:      Mouth: Mucous membranes are moist.      Comments: Trace blood in posterior pharynx  Neurological:      Mental Status: He is alert.         Vital Signs  Blood Pressure : 128/64   Temperature: 36.4 °C (97.6 °F)   Pulse: 84   Respiration: 18   Pulse Oximetry: 91 %       Labs:  Recent Labs     05/31/24 1652 06/01/24 0313 06/01/24  0908 06/01/24  1754 06/02/24  0215   WBC 9.3 6.6  --   --  9.5   RBC 2.26* 2.33*  --   --  2.42*   HEMOGLOBIN 7.2* 7.3* 7.8* 7.6* 7.5*   HEMATOCRIT 22.3* 21.6* 24.7* 24.2* 23.3*   MCV 98.7* 92.7  --   --  96.3   MCH 31.9 31.3  --   --  31.0   MCHC 32.3 33.8  --   --  32.2*   RDW 61.1* 64.3*  --   --  66.5*   PLATELETCT 202 156*  --   --  171   MPV 10.7 11.2  --   --  11.2     Recent Labs     05/31/24 1652 06/01/24 0313 06/02/24  0215   SODIUM 138 134* 134*   POTASSIUM 3.7 3.6  4.2   CHLORIDE 93* 94* 93*   CO2 30 27 28   GLUCOSE 116* 121* 112*   BUN 12 18 37*   CREATININE 2.55* 3.55* 6.18*   CALCIUM 8.9 8.2* 8.3*     Recent Labs     05/31/24  1652   APTT 27.9   INR 1.16*     Recent Labs     05/31/24  1652 06/01/24  0313   ASTSGOT 19 12   ALTSGPT 7 6   TBILIRUBIN 0.5 0.7   ALKPHOSPHAT 65 52   GLOBULIN 3.6* 2.9   INR 1.16*  --        Radiology:  DX-CHEST-PORTABLE (1 VIEW)   Final Result      Cardiomegaly and possible mild vascular congestion and trace right pleural fluid.            Assessment/Plan:  Recurrent epistaxis from the right nostril.  Reported history of high volume bleeding overnight.  Pt did not notice bleeding and slept through it.  He is unaware of his poor cardiopulmonary health.  After discussion with Dr. Fitzgerald, I informed him of the risks of cardiopulmonary disease progression to the point of cardiac arrest, prolonged intubation, prolonged mechanical ventilation associated with intervention including general anesthesia.  He is also felt to be a poor anesthetic risk for an interventional radiology intervention.  There is also no guarantee that measures in the OR to more definitively control his intermittent epistaxis would even result in complete cessation of bleeding.      After discussion of R/B/A to surgery.  I recommended to the patient that we continue interventions for control of bleeding including transfusion of PRBCs and platelets, TXA, and afrin, humidification of his oxygen, and use of mupirocin ointment.  He should have a new Echocardiogram to re-assess his AS/AR/AI and pulmonary hypertension.  If his cardiopulmonary health is significantly better than we currently believe and he has repeat high volume bleeding, then I would reconsider a surgical intervention including cauterization and/or SPA ligation.

## 2024-06-02 NOTE — CONSULTS
[facility] -  CONSULTATION NOTE               Author: Juvenal Scruggs M.D. Date & Time: 2024  12:50 PM       REASON FOR CONSULTATION:   Inpatient hemodialysis management.    CHIEF COMPLAINT:   Nose bleed    HISTORY OF PRESENT ILLNESS:    This is a 73-year-old male with past medical history of hypertension, type 2 diabetes, ESRD on HD MWF, seizure disorder, pulmonary hypertension, and severe aortic stenosis who was admitted on 2024 with recurrent epistaxis. He normally dialyzed at [facility] under the care of Dr. Cole. Nephrology was asked to see him for his ESRD and HD needs.     No F/C/N/V/CP/SOB.  No melena, hematochezia, hematemesis.  No HA, visual changes, or abdominal pain. + Epistaxis.    REVIEW OF SYSTEMS:    10 point ROS was performed and is as per HPI or otherwise negative.    PAST MEDICAL HISTORY:   Past Medical History:   Diagnosis Date    CATARACT     Diabetes 2005    oral meds    Hyperlipidemia     Hypertension     Seizure (HCC) 1987    Seizure disorder (HCC)     Snoring     Stroke (HCC)        PAST SURGICAL HISTORY:   Past Surgical History:   Procedure Laterality Date    CATARACT PHACO WITH IOL  2011    Performed by ENRIQUETA MABRY at [facility]    CATARACT PHACO WITH IOL  2011    Performed by ENRIQUETA MABRY at [facility]    OTHER      left eye cataract    CATARACT EXTRACTION WITH IOL      OTHER      oral surgery       FAMILY HISTORY:   Family History   Problem Relation Age of Onset    Diabetes Mother     Heart Disease Father        SOCIAL HISTORY:   Social History     Tobacco Use   Smoking Status Former    Current packs/day: 0.00    Average packs/day: 0.5 packs/day for 4.0 years (2.0 ttl pk-yrs)    Types: Cigarettes    Start date: 1981    Quit date: 1985    Years since quittin.3   Smokeless Tobacco Never     Social History     Substance and Sexual Activity   Alcohol Use No     Social History  "    Substance and Sexual Activity   Drug Use No       HOME MEDICATIONS:   Reviewed and documented in chart.    LABORATORY STUDIES:   Recent Labs     05/31/24  1652 06/01/24  0313 06/02/24  0215   SODIUM 138 134* 134*   POTASSIUM 3.7 3.6 4.2   CHLORIDE 93* 94* 93*   CO2 30 27 28   GLUCOSE 116* 121* 112*   BUN 12 18 37*   CREATININE 2.55* 3.55* 6.18*   CALCIUM 8.9 8.2* 8.3*       ALLERGIES:  Penicillin g    VS:  /64   Pulse 84   Temp 36.4 °C (97.6 °F) (Temporal)   Resp 18   Ht 1.702 m (5' 7\")   Wt 73.7 kg (162 lb 7.7 oz)   SpO2 91%   BMI 25.45 kg/m²     Physical Exam  Vitals and nursing note reviewed.   Constitutional:       Appearance: Normal appearance. He is normal weight.   HENT:      Head: Normocephalic and atraumatic.      Mouth/Throat:      Mouth: Mucous membranes are moist.      Pharynx: Oropharynx is clear.   Eyes:      Extraocular Movements: Extraocular movements intact.      Conjunctiva/sclera: Conjunctivae normal.   Cardiovascular:      Rate and Rhythm: Normal rate and regular rhythm.      Pulses: Normal pulses.      Heart sounds: Normal heart sounds.   Pulmonary:      Effort: Pulmonary effort is normal.      Breath sounds: Normal breath sounds.   Abdominal:      General: Abdomen is flat. Bowel sounds are normal.      Palpations: Abdomen is soft.   Musculoskeletal:         General: Normal range of motion.      Cervical back: Normal range of motion and neck supple.      Right lower leg: No edema.      Left lower leg: No edema.   Skin:     General: Skin is warm.      Capillary Refill: Capillary refill takes less than 2 seconds.   Neurological:      General: No focal deficit present.      Mental Status: He is alert and oriented to person, place, and time. Mental status is at baseline.   Psychiatric:         Mood and Affect: Mood normal.         Behavior: Behavior normal.         Thought Content: Thought content normal.         Judgment: Judgment normal.            FLUID BALANCE:  In: 460 " [P.O.:460]  Out: -     IMAGING:  All imaging reviewed from admission to present day    IMPRESSION:  # ESRD  # HTN  - Goal BP < 140/90  # Anemia of CKD  - Goal Hgb 10-11  # CKD-MBD  # Epistaxis  # DM  # HLD  # Sz do    PLAN:  - No acute need for HD today.  - Will arrange for HD tomorrow. MWF iHD during stay. No heparin with HD tomorrow.  - UF as tolerated  - No dietary protein restrictions  - Dose all meds per ESRD  - Transfuse if Hgb </= to 7.0    Thank you for the consultation!

## 2024-06-02 NOTE — DIETARY
"Nutrition services: Day 0 of admit.  Jean Barboza is a 73 y.o. male with admitting DX of epistaxis.    Consult received for wt loss (2-13 lbs in <1 week), poor PO per admit. Met with pt at bedside. Pt was sitting up at the edge of bed, he appeared nourished. Pt stated a good appetite, he was eating normally at home PTA. Pt shared that his UBW is ~ 171-178 lbs. He hasn't weighed himself recently but thinks he has lost weight. Pt's preferences for dinner were taken (was not able to eat much of lunch), menu to be adjusted accordingly.    Assessment:  Height: 170.2 cm (5' 7\")  Weight: 73.7 kg (162 lb 7.7 oz)  Body mass index is 25.45 kg/m²., BMI classification: overweight  Diet/Intake: Renal, Consistent CHO (Diabetic); PO >50%    Evaluation:   Sent by MD for abnormal labs.  PMH: Diabetes, ESRD, Hyperlipidemia, Hypertension, Seizure, Seizure disorder, Snoring, and Stroke.  Sodium 134, Glucose 112, BUN 37, Creatinine 6.18  Pt received HD M/W/F.  Wt hx per chart review: 174 lbs 2/12/24, 177 lbs 12/10/22. Wt loss of 7% in 3.5 months is not significant, but noteworthy.    Malnutrition Risk: Does not meet criteria per ASPEN guidelines at this time.    Recommendations/Plan:  Encourage intake of meals.  Document intake of all meals as % taken in ADLs to provide interdisciplinary communication across all shifts.   Monitor weight.  Nutrition rep will continue to see patient for ongoing meal and snack preferences.     RD will follow per dept guidelines.       "

## 2024-06-02 NOTE — CARE PLAN
The patient is Watcher - Medium risk of patient condition declining or worsening    Shift Goals  Clinical Goals: Patient will not have increased oxygen needs this shift; pt will maintain MAP above 65.  Patient Goals: Patient wishes to rest uninterrupted.  Family Goals: Not present    Progress made toward(s) clinical / shift goals:  MAP above 65, patient not requiring more O2    Patient is not progressing towards the following goals:      Problem: Respiratory  Goal: Patient will achieve/maintain optimum respiratory ventilation and gas exchange  Description: Target End Date:  Prior to discharge or change in level of care    Document on Assessment flowsheet    1.  Assess and monitor rate, rhythm, depth and effort of respiration  2.  Breath sounds assessed qshift and/or as needed  3.  Assess O2 saturation, administer/titrate oxygen as ordered  4.  Position patient for maximum ventilatory efficiency  5.  Turn, cough, and deep breath with splinting to improve effectiveness  6.  Collaborate with RT to administer medication/treatments per order  7.  Encourage use of incentive spirometer and encourage patient to cough after use and utilize splinting techniques if applicable  8.  Airway suctioning  9.  Monitor sputum production for changes in color, consistency and frequency  10. Perform frequent oral hygiene  11. Alternate physical activity with rest periods  Outcome: Not Progressing

## 2024-06-02 NOTE — PROGRESS NOTES
Assumed care of patient. Assessment performed. Patient reports no pain, numbness, or tingling, but does report feeling fatigued. Call light and belongings within reach. All needs met at this time.

## 2024-06-03 ENCOUNTER — PHARMACY VISIT (OUTPATIENT)
Dept: PHARMACY | Facility: MEDICAL CENTER | Age: 74
End: 2024-06-03
Payer: COMMERCIAL

## 2024-06-03 VITALS
WEIGHT: 162.48 LBS | BODY MASS INDEX: 25.5 KG/M2 | RESPIRATION RATE: 18 BRPM | HEIGHT: 67 IN | OXYGEN SATURATION: 93 % | HEART RATE: 72 BPM | TEMPERATURE: 99.1 F | DIASTOLIC BLOOD PRESSURE: 54 MMHG | SYSTOLIC BLOOD PRESSURE: 103 MMHG

## 2024-06-03 LAB
ERYTHROCYTE [DISTWIDTH] IN BLOOD BY AUTOMATED COUNT: 64.7 FL (ref 35.9–50)
GLUCOSE BLD STRIP.AUTO-MCNC: 118 MG/DL (ref 65–99)
GLUCOSE BLD STRIP.AUTO-MCNC: 87 MG/DL (ref 65–99)
GLUCOSE BLD STRIP.AUTO-MCNC: 94 MG/DL (ref 65–99)
HCT VFR BLD AUTO: 24.5 % (ref 42–52)
HGB BLD-MCNC: 8 G/DL (ref 14–18)
MCH RBC QN AUTO: 31 PG (ref 27–33)
MCHC RBC AUTO-ENTMCNC: 32.7 G/DL (ref 32.3–36.5)
MCV RBC AUTO: 95 FL (ref 81.4–97.8)
PLATELET # BLD AUTO: 179 K/UL (ref 164–446)
PMV BLD AUTO: 11 FL (ref 9–12.9)
RBC # BLD AUTO: 2.58 M/UL (ref 4.7–6.1)
WBC # BLD AUTO: 12.3 K/UL (ref 4.8–10.8)

## 2024-06-03 PROCEDURE — 700101 HCHG RX REV CODE 250: Performed by: GENERAL PRACTICE

## 2024-06-03 PROCEDURE — 700111 HCHG RX REV CODE 636 W/ 250 OVERRIDE (IP): Mod: JZ | Performed by: GENERAL PRACTICE

## 2024-06-03 PROCEDURE — 82962 GLUCOSE BLOOD TEST: CPT | Mod: 91

## 2024-06-03 PROCEDURE — 85027 COMPLETE CBC AUTOMATED: CPT

## 2024-06-03 PROCEDURE — 700102 HCHG RX REV CODE 250 W/ 637 OVERRIDE(OP): Performed by: GENERAL PRACTICE

## 2024-06-03 PROCEDURE — A9270 NON-COVERED ITEM OR SERVICE: HCPCS | Performed by: GENERAL PRACTICE

## 2024-06-03 PROCEDURE — 99239 HOSP IP/OBS DSCHRG MGMT >30: CPT | Performed by: GENERAL PRACTICE

## 2024-06-03 PROCEDURE — 90935 HEMODIALYSIS ONE EVALUATION: CPT

## 2024-06-03 PROCEDURE — 700105 HCHG RX REV CODE 258: Performed by: GENERAL PRACTICE

## 2024-06-03 RX ORDER — METHOXY POLYETHYLENE GLYCOL-EPOETIN BETA 120 UG/.3ML
120 INJECTION, SOLUTION INTRAVENOUS
Status: ON HOLD | COMMUNITY
End: 2024-06-03

## 2024-06-03 RX ORDER — SODIUM CHLORIDE 9 MG/ML
250 INJECTION, SOLUTION INTRAVENOUS
Status: DISCONTINUED | OUTPATIENT
Start: 2024-06-03 | End: 2024-06-03 | Stop reason: HOSPADM

## 2024-06-03 RX ADMIN — TRANEXAMIC ACID 1000 MG: 100 INJECTION, SOLUTION INTRAVENOUS at 13:22

## 2024-06-03 RX ADMIN — SODIUM CHLORIDE 250 MG: 9 INJECTION, SOLUTION INTRAVENOUS at 06:05

## 2024-06-03 RX ADMIN — MUPIROCIN 2 %: 20 OINTMENT TOPICAL at 04:36

## 2024-06-03 RX ADMIN — OXYMETAZOLINE HCL 2 SPRAY: 0.05 SPRAY NASAL at 04:35

## 2024-06-03 RX ADMIN — FELODIPINE 10 MG: 5 TABLET, EXTENDED RELEASE ORAL at 04:35

## 2024-06-03 RX ADMIN — MUPIROCIN 2 %: 20 OINTMENT TOPICAL at 13:20

## 2024-06-03 RX ADMIN — SEVELAMER CARBONATE 1600 MG: 800 TABLET, FILM COATED ORAL at 13:29

## 2024-06-03 RX ADMIN — TRANEXAMIC ACID 1000 MG: 100 INJECTION, SOLUTION INTRAVENOUS at 04:39

## 2024-06-03 RX ADMIN — SEVELAMER CARBONATE 1600 MG: 800 TABLET, FILM COATED ORAL at 07:08

## 2024-06-03 ASSESSMENT — PATIENT HEALTH QUESTIONNAIRE - PHQ9
1. LITTLE INTEREST OR PLEASURE IN DOING THINGS: NOT AT ALL
SUM OF ALL RESPONSES TO PHQ9 QUESTIONS 1 AND 2: 0
2. FEELING DOWN, DEPRESSED, IRRITABLE, OR HOPELESS: NOT AT ALL

## 2024-06-03 ASSESSMENT — PAIN DESCRIPTION - PAIN TYPE: TYPE: ACUTE PAIN

## 2024-06-03 NOTE — PROGRESS NOTES
"[facility] -  PROGRESS NOTE               Author: Abe Hummel M.D. Date & Time: 6/3/2024  8:42 AM     HPI:  This is a 73-year-old male with past medical history of hypertension, type 2 diabetes, ESRD on HD MWF, seizure disorder, pulmonary hypertension, and severe aortic stenosis who was admitted on 5/31/2024 with recurrent epistaxis. He normally dialyzed at [facility] under the care of Dr. Cole. Nephrology was asked to see him for his ESRD and HD needs     DAILY NEPHROLOGY SUMMARY:  6/2: Consult done  6/3: got PRBC transfusion, seen on HD-tolerating procedure, pending discharge after HD today    PAST FAMILY HISTORY: Reviewed and Unchanged  SOCIAL HISTORY: Reviewed and Unchanged  CURRENT MEDICATIONS: Reviewed  IMAGING STUDIES: Reviewed    ROS  Deferred    PHYSICAL EXAM  VS:  /54   Pulse 72   Temp 37.3 °C (99.1 °F) (Temporal)   Resp 18   Ht 1.702 m (5' 7\")   Wt 73.7 kg (162 lb 7.7 oz)   SpO2 93%   BMI 25.45 kg/m²   Deferred    Fluids:  In: 999 [P.O.:720; Blood:279]  Out: -     LABS:  Recent Results (from the past 24 hour(s))   POCT glucose device results    Collection Time: 06/02/24 10:16 AM   Result Value Ref Range    POC Glucose, Blood 97 65 - 99 mg/dL   POCT glucose device results    Collection Time: 06/02/24 12:36 PM   Result Value Ref Range    POC Glucose, Blood 99 65 - 99 mg/dL   EC-ECHOCARDIOGRAM LTD W/O CONT    Collection Time: 06/02/24  4:40 PM   Result Value Ref Range    Eject.Frac. MOD BP 36.32     Eject.Frac. MOD 4C 34.96     Eject.Frac. MOD 2C 38.68    POCT glucose device results    Collection Time: 06/02/24  5:26 PM   Result Value Ref Range    POC Glucose, Blood 153 (H) 65 - 99 mg/dL   POCT glucose device results    Collection Time: 06/02/24  9:16 PM   Result Value Ref Range    POC Glucose, Blood 111 (H) 65 - 99 mg/dL   CBC WITHOUT DIFFERENTIAL    Collection Time: 06/03/24  1:53 AM   Result Value Ref Range    WBC 12.3 (H) 4.8 - 10.8 K/uL    RBC 2.58 (L) 4.70 - " 6.10 M/uL    Hemoglobin 8.0 (L) 14.0 - 18.0 g/dL    Hematocrit 24.5 (L) 42.0 - 52.0 %    MCV 95.0 81.4 - 97.8 fL    MCH 31.0 27.0 - 33.0 pg    MCHC 32.7 32.3 - 36.5 g/dL    RDW 64.7 (H) 35.9 - 50.0 fL    Platelet Count 179 164 - 446 K/uL    MPV 11.0 9.0 - 12.9 fL   POCT glucose device results    Collection Time: 06/03/24  7:05 AM   Result Value Ref Range    POC Glucose, Blood 87 65 - 99 mg/dL       (click the triangle to expand results)      ASSESSMENT:  # ESRD  # HTN  - Goal BP < 140/90  # Anemia of CKD  - Goal Hgb 10-11  # CKD-MBD  # Epistaxis  # DM  # HLD  # Sz do     PLAN:  - HD today and to continue MWF iHD during stay. No heparin with HD today  - UF as tolerated  - No dietary protein restrictions  - Dose all meds per ESRD  - Transfuse if Hgb </= to 7.0    OK for discharge home from nephrology standpoint once medically cleared

## 2024-06-03 NOTE — PROGRESS NOTES
Received bedside report from previous RN, patient is alert and oriented x4.  On O2 at 5L via oxymask with o2 sat 94% at this time. With ongoing Blood transfusion at 120 ml/hr infusing well. Fall precautions in place and call light within reach. All other needs met at this time.

## 2024-06-03 NOTE — PROGRESS NOTES
Blood transfusion ended   VS:   T= 37.6  BP= 119/63  HR= 94 bpm  RR= 19 cpm  No  signs of adverse reactions throughout the transfusion. Patient advised to report any delayed reactions.

## 2024-06-03 NOTE — DOCUMENTATION QUERY
"                                                                         [facility]                                                                       Query Response Note      PATIENT:               DARRELL KOWALSKI  ACCT #:                  [id number]  MRN:                     [id number]  :                      1950  ADMIT DATE:       2024 4:28 PM  DISCH DATE:          RESPONDING  PROVIDER #:        [id number]           QUERY TEXT:    \"Respiratory failure with hypoxemia is noted in the ED note, but not subsequently.  Please clarify the diagnosis and specify acuity , if known:      The patient's Clinical Indicators include:  Clinical Findings:   ED note- Dr. Friend-   \"Decreased breath sounds throughout, No respiratory distress\"  \"Evaluation for sepsis, the patient does have respiratory failure with hypoxemia.  Give the patient supplemental oxygen, patient has a right lower lobe effusion  questionable pneumonia, give the patient doxycycline.\"     ENT note- 'continue interventions for control of bleeding including transfusion of PRBCs and platelets, TXA, and afrin, humidification of his oxygen, and use of mupirocin ointment.\"     CXR- \"Cardiomegaly and possible mild vascular congestion and trace right pleural fluid.\"     Echo-  \"Mildly reduced left ventricular systolic function. The left ventricular ejection fraction is visually estimated to be 45%\"   \"Right heart pressures are consistent with severe pulmonary hypertension.\"  \"aortic stenosis remains severe. Mitral regurgitation remains severe.\"    per Nursing flowsheet:  O2 at 2-6L/oxymask  O2 sat range: 86%- 100%  Respiratory rate range: 13- 21   1710- O2 sat 87% on RA, O2 at 2L/NC on  2117- O2 sat 87% on 4L/NC O2 increased O2 to 6L/oxymask    Risk factors: pneumonia, ESRD, epistaxis, packing, acute blood loss anemia, blood transfusion    Treatment: O2 at 2-6L/NC and oxymask;  ENT consult, echo, CXR    Note: If you agree with a " diagnosis listed above, please remember to include it in your concurrent daily documentation and onto the Discharge Summary.    Please contact me with any questions:    Yazmin AVELAR RN CCDS  [facility]  [email or web address]  Yazmin Walter via Voalte  Options provided:   -- Respiratory failure with hypoxemia exists, (please document additional clinical indicators and specify acuity of respiratory failure)   -- Condition of respiratory failure with hypoxemia ruled out, hypoxia only   -- Other explanation, (please specify other explanation)      Query created by: Yazmin Walter on 6/3/2024 9:50 AM    RESPONSE TEXT:    Other explanation - chronic          Electronically signed by:  FRANCO LOPEZ MD 6/3/2024 10:17 AM

## 2024-06-03 NOTE — DISCHARGE SUMMARY
Discharge Summary    CHIEF COMPLAINT ON ADMISSION  Chief Complaint   Patient presents with    Abnormal Labs     Sent my MD for abnormal labs. Hgb 7.5. Pt has had a recurrent bloody nose since Wednesday. Denies history of blood transfusion.        Reason for Admission  abnormal labs     Admission Date  5/31/2024    CODE STATUS  DNAR/DNI    HPI & HOSPITAL COURSE  This is a 73-year-old male with past medical history of hypertension, type 2 diabetes, ESRD on HD MWF, seizure disorder, pulmonary hypertension, and severe aortic stenosis who was admitted on 5/31/2024 with recurrent epistaxis.    Patient was seen earlier in the week for epistaxis, nasal packing was applied in the ED and patient was provided with a referral for ENT, patient has not been able to facilitate this.  Patient comes back with recurrent epistaxis, nasal packing in place, no evidence of further bleeding.  Hemoglobin 7.2 on admission, transfused 2 units PRBCs.  Patient not a candidate for surgical intervention due to comorbidities of severe AS and severe pulmonary hypertension.  Treated conservatively with Afrin, TXA.  Patient provided with referral and contact information for ENT, he is to present to their office with recurrence of bleeding.    Therefore, he is discharged in good and stable condition to home with close outpatient follow-up.    The patient met 2-midnight criteria for an inpatient stay at the time of discharge.    Discharge Date  6/3/2024    FOLLOW UP ITEMS POST DISCHARGE  Primary care physician  ENT    DISCHARGE DIAGNOSES  Principal Problem:    Epistaxis (POA: Unknown)  Active Problems:    Acute blood loss anemia (POA: Yes)    Type 2 diabetes mellitus (HCC) (Chronic) (POA: Yes)    Severe aortic stenosis (POA: Yes)    Pulmonary hypertension (HCC) (Chronic) (POA: Yes)    ESRD (end stage renal disease) on dialysis (HCC) (Chronic) (POA: Yes)    Fever (POA: Unknown)  Resolved Problems:    * No resolved hospital problems. *      FOLLOW  UP  Clyde Sung M.D.  [address]   [place] [address] [phone]    Follow up      Madeleine Barnett M.D.  [address] [facility] [id number] [phone]    Follow up        MEDICATIONS ON DISCHARGE     Medication List        START taking these medications        Instructions   oxymetazoline 0.05 % Soln  Commonly known as: Afrin   Administer 2 Sprays into affected nostril(S) 2 times a day for 7 days.  Dose: 2 Spray            CONTINUE taking these medications        Instructions   LISINOPRIL PO   Take 1 Tablet by mouth every day. Indications: High Blood Pressure Disorder  Dose: 1 Tablet            STOP taking these medications      aspirin 81 MG EC tablet            ASK your doctor about these medications        Instructions   Mircera 120 MCG/0.3ML Sosy  Generic drug: Methoxy PEG-Epoetin Beta   Infuse 120 mg into a venous catheter every 14 days.  Dose: 120 mg              Allergies  Allergies   Allergen Reactions    Penicillin G Rash     Rxn - Exacerbated a rash on his legs  Rash as a child   Tolerates ceftriaxone and cefepime       DIET  Orders Placed This Encounter   Procedures    Diet Order Diet: Renal; Second Modifier: (optional): Consistent CHO (Diabetic)     Standing Status:   Standing     Number of Occurrences:   1     Order Specific Question:   Diet:     Answer:   Renal [8]     Order Specific Question:   Second Modifier: (optional)     Answer:   Consistent CHO (Diabetic) [4]       ACTIVITY  As tolerated.  Weight bearing as tolerated    CONSULTATIONS  ENT    PROCEDURES  None    LABORATORY  Lab Results   Component Value Date    SODIUM 134 (L) 06/02/2024    POTASSIUM 4.2 06/02/2024    CHLORIDE 93 (L) 06/02/2024    CO2 28 06/02/2024    GLUCOSE 112 (H) 06/02/2024    BUN 37 (H) 06/02/2024    CREATININE 6.18 (HH) 06/02/2024        Lab Results   Component Value Date    WBC 12.3 (H) 06/03/2024    HEMOGLOBIN 8.0 (L) 06/03/2024    HEMATOCRIT 24.5 (L) 06/03/2024    PLATELETCT 179 06/03/2024       EC-ECHOCARDIOGRAM LTD W/O CONT   Final Result      DX-CHEST-PORTABLE (1 VIEW)   Final Result      Cardiomegaly and possible mild vascular congestion and trace right pleural fluid.         Total time of the discharge process exceeds 45 minutes.

## 2024-06-03 NOTE — CARE PLAN
The patient is Stable - Low risk of patient condition declining or worsening    Shift Goals  Clinical Goals: Pt to remain free from falls during shift.  Patient Goals: Discharge home.  Family Goals: Rest and comfort.    Progress made toward(s) clinical / shift goals:  Pt remained free from falls during shift.

## 2024-06-03 NOTE — DISCHARGE INSTRUCTIONS
Nosebleed, Adult  A nosebleed is when blood comes out of the nose. Nosebleeds are common and can be caused by many things. They are usually not a sign of a serious medical problem.  Follow these instructions at home:  When you have a nosebleed:    Sit down.  Tilt your head forward a little.  Follow these steps:  Pinch your nose with a clean towel or tissue.  Keep pinching your nose for 5 minutes. Do not let go.  After 5 minutes, let go of your nose.  Keep doing these steps until the bleeding stops.  Do not put tissues or other things in your nose to stop the bleeding.  Avoid lying down or putting your head back.  Use a nose spray decongestant as told by your doctor.  After a nosebleed:  Try not to blow your nose or sniffle for several hours.  Try not to strain, lift, or bend at the waist for several days.  Aspirin and medicines that thin your blood make bleeding more likely. If you take these medicines:  Ask your doctor if you should stop taking them or if you should change how much you take.  Do not stop taking the medicine unless your doctor tells you to.  If your nosebleed was caused by dryness, use over-the-counter saline nasal spray or gel and a humidifier as told by your doctor. This will keep the inside of your nose moist and allow it to heal. If you need to use nasal spray or gel:  Choose one that is water-soluble.  Use only as much as you need and use it only as often as needed.  Do not lie down right away after you use it.  If you get nosebleeds often, talk with your doctor about treatments. These may include:  Nasal cautery. A chemical swab or electrical device is used to lightly burn tiny blood vessels inside the nose. This helps stop or prevent nosebleeds.  Nasal packing. A gauze or other material is placed in the nose to keep constant pressure on the bleeding area.  Contact a doctor if:  You have a fever.  You get nosebleeds often.  You get nosebleeds more often than usual.  You bruise very  easily.  You have something stuck in your nose.  You are bleeding in your mouth.  You vomit or cough up brown material.  You get a nosebleed after you start a new medicine.  Get help right away if:  You have a nosebleed after you fall or hurt your head.  Your nosebleed does not go away after 20 minutes.  You feel dizzy or weak.  You have unusual bleeding from other parts of your body.  You have unusual bruising on other parts of your body.  You get sweaty.  You vomit blood.  Summary  Nosebleeds are common. They are usually not a sign of a serious medical problem.  When you have a nosebleed, sit down and tilt your head a little forward. Pinch your nose with a clean tissue for 5 minutes.  Use saline spray or saline gel and a humidifier as told by your doctor.  Get help right away if your nosebleed does not go away after 20 minutes.  This information is not intended to replace advice given to you by your health care provider. Make sure you discuss any questions you have with your health care provider.  Document Revised: 12/27/2022 Document Reviewed: 12/27/2022  Elsevier Patient Education © 2023 [employer]

## 2024-06-03 NOTE — PROGRESS NOTES
3hr HD started @ 0950 and ended @ 1250,soft bp during tx,asymptomatic.Net UF = 1500ml,RFAAVF + B/T,cannulation sites covered with DD,CDI.Report given to Swati Jones RN.

## 2024-06-03 NOTE — PROGRESS NOTES
"Pt had dialysis today. Fistula not bleeding and pressure dressing in place. Pt state he feel \"fine.\" Pt cleared for discharge.   BS taken upon return from dialysis; no coverage needed.  PIV removed. VSS checked prior to discharge. 114/65, 81, 16, 97.8 oxygen 93%.  Pt will have family member come and pick him up for transportation. The family will bring oxygen tank for pt.    "

## 2024-06-03 NOTE — PROGRESS NOTES
Patient to be discharged today - patient aware and agreeable to plan. D/c instructions reviewed with patient - ?'s/concerns answered. No piv present, meds to beds already delivered to patient.

## 2024-06-03 NOTE — DISCHARGE PLANNING
Care Transition Team Assessment  This RN CM completed assessment at bedside and confirmed that patient is A/Ox4. Jean lives in a 1 story house with no stairs into the house. Pt is independent with all ADLs and IADLs but has a cane, FWW, and wheelchair if needed. Pt wears 4l oxygen at baseline thru [place].     Patient's main support is sister Gila, Pt attends [facility] HD clinic M,W,F at 10am. The clinic assists with rides for HD treatments.     Information Source  Orientation Level: Oriented X4  Information Given By: Patient  Informant's Name: jean  Who is responsible for making decisions for patient? : Patient    Readmission Evaluation  Is this a readmission?: No    Elopement Risk  Legal Hold: No  Ambulatory or Self Mobile in Wheelchair: Yes  Disoriented: No  Psychiatric Symptoms: None  History of Wandering: No  Elopement this Admit: No  Vocalizing Wanting to Leave: No  Displays Behaviors, Body Language Wanting to Leave: No-Not at Risk for Elopement  Elopement Risk: Not at Risk for Elopement    Interdisciplinary Discharge Planning  Lives with - Patient's Self Care Capacity: Sibling  Patient or legal guardian wants to designate a caregiver: No  Support Systems: Family Member(s)  Housing / Facility: 1 Story House    Discharge Preparedness  What is your plan after discharge?: Home with help  What are your discharge supports?: Sibling  Prior Functional Level: Ambulatory  Difficulity with ADLs: None  Difficulity with IADLs: Driving  Difficulity with IADL Comments: sister helps with rides    Functional Assesment  Prior Functional Level: Ambulatory    Finances  Financial Barriers to Discharge: No  Prescription Coverage: Yes    Vision / Hearing Impairment  Vision Impairment : Yes  Right Eye Vision: Impaired, Wears Glasses  Left Eye Vision: Impaired, Wears Glasses  Hearing Impairment : No    Advance Directive  Advance Directive?: None    Domestic Abuse  Have you ever been the victim of abuse or violence?: No  Possible  Abuse/Neglect Reported to:: Not Applicable    Psychological Assessment  History of Substance Abuse: None  History of Psychiatric Problems: No  Non-compliant with Treatment: No  Newly Diagnosed Illness: No    Discharge Risks or Barriers  Discharge risks or barriers?: Complex medical needs  Patient risk factors: Vulnerable adult    Anticipated Discharge Information  Discharge Disposition: Discharged to home/self care (01)  Discharge Address: [address] [place] [address]  Discharge Contact Phone Number: [phone]

## 2024-06-03 NOTE — PROGRESS NOTES
1800- Packed RBCs initiated. Re timed ferric gluconate for 2200 as blood was initiated first. Will notify RN night shift.

## 2024-06-03 NOTE — PROGRESS NOTES
Hourly rounds performed. Patient is resting with even respirations. Denies pain at this time. Fall precautions continued and call light within reach. All other needs met at this time.

## 2024-06-12 ENCOUNTER — HOSPITAL ENCOUNTER (EMERGENCY)
Facility: MEDICAL CENTER | Age: 74
End: 2024-06-12
Attending: EMERGENCY MEDICINE
Payer: MEDICARE

## 2024-06-12 VITALS
SYSTOLIC BLOOD PRESSURE: 138 MMHG | TEMPERATURE: 97.8 F | HEART RATE: 80 BPM | OXYGEN SATURATION: 98 % | RESPIRATION RATE: 16 BRPM | DIASTOLIC BLOOD PRESSURE: 64 MMHG | HEIGHT: 67 IN | BODY MASS INDEX: 26.06 KG/M2 | WEIGHT: 166.01 LBS

## 2024-06-12 DIAGNOSIS — Z48.00 ENCOUNTER FOR REMOVAL OF NASAL PACKING: ICD-10-CM

## 2024-06-12 DIAGNOSIS — R04.0 EPISTAXIS: ICD-10-CM

## 2024-06-12 PROCEDURE — A9270 NON-COVERED ITEM OR SERVICE: HCPCS | Performed by: EMERGENCY MEDICINE

## 2024-06-12 PROCEDURE — 700102 HCHG RX REV CODE 250 W/ 637 OVERRIDE(OP): Performed by: EMERGENCY MEDICINE

## 2024-06-12 PROCEDURE — 99284 EMERGENCY DEPT VISIT MOD MDM: CPT

## 2024-06-12 RX ORDER — OXYMETAZOLINE HYDROCHLORIDE 0.05 G/100ML
2 SPRAY NASAL ONCE
Status: COMPLETED | OUTPATIENT
Start: 2024-06-12 | End: 2024-06-12

## 2024-06-12 RX ADMIN — OXYMETAZOLINE HCL 2 SPRAY: 0.05 SPRAY NASAL at 16:20

## 2024-06-12 ASSESSMENT — FIBROSIS 4 INDEX: FIB4 SCORE: 2

## 2024-06-12 NOTE — ED NOTES
Pt found to be hypoxic on RA. Pt reports he is supposed to be wearing 4LPM but didn't want to carry his O2 with him. Pt placed on 4LPM via oxymask due to rhino rocket placement. SpO2 now 95%.

## 2024-06-12 NOTE — ED TRIAGE NOTES
"Chief Complaint   Patient presents with    Epistaxis     Pt seen at [facility] ER on Saturday for nose bleed. Pt had rhino rocket placed to R nare. Pt here to get it removed. Pt has some blood draining from R nare. Pt on baby ASA     Pt ambulatory into triage for above. Pt was told to come here to have it removed. Pt aox4, gcs 15      /68   Pulse 87   Temp 36.8 °C (98.2 °F) (Temporal)   Resp 16   Ht 1.702 m (5' 7\")   Wt 75.3 kg (166 lb 0.1 oz)   SpO2 97%   BMI 26.00 kg/m²     "

## 2024-06-12 NOTE — ED PROVIDER NOTES
ED Provider Note    CHIEF COMPLAINT  Chief Complaint   Patient presents with    Epistaxis     Pt seen at [facility] ER on Saturday for nose bleed. Pt had rhino rocket placed to R nare. Pt here to get it removed. Pt has some blood draining from R nare. Pt on baby ASA       EXTERNAL RECORDS REVIEWED  [facility] ER 2024, epistaxis, Rhino Rocket    HPI/ROS  Jean Barboza is a 73 y.o. male who presents for nasal packing removal.  Had epistaxis 3 days ago, was packed at [facility] and was instructed to come here for packing removal.  Has not had bleeding since.  He does have a history of chronic respiratory failure on oxygen, has been on a facemask since the packing.  He offers no other acute complaints.    PAST MEDICAL HISTORY   has a past medical history of CATARACT, Diabetes (), Hyperlipidemia, Hypertension, Seizure (HCC) (), Seizure disorder ([facility]), Snoring, and Stroke ([facility]).    SURGICAL HISTORY   has a past surgical history that includes other (); other (); cataract phaco with iol (2011); cataract phaco with iol (2011); and cataract extraction with iol ().    FAMILY HISTORY  Family History   Problem Relation Age of Onset    Diabetes Mother     Heart Disease Father        SOCIAL HISTORY  Social History     Tobacco Use    Smoking status: Former     Current packs/day: 0.00     Average packs/day: 0.5 packs/day for 4.0 years (2.0 ttl pk-yrs)     Types: Cigarettes     Start date: 1981     Quit date: 1985     Years since quittin.3    Smokeless tobacco: Never   Vaping Use    Vaping status: Never Used   Substance and Sexual Activity    Alcohol use: No    Drug use: No    Sexual activity: Not on file       CURRENT MEDICATIONS  Home Medications       Reviewed by Florence Jo R.N. (Registered Nurse) on 24 at 1543  Med List Status: Complete     Medication Last Dose Status   LISINOPRIL PO  Active   oxymetazoline (AFRIN) 0.05 % Solution  Active  "                   ALLERGIES  Allergies   Allergen Reactions    Penicillin G Rash     Rxn - Exacerbated a rash on his legs  Rash as a child   Tolerates ceftriaxone and cefepime       PHYSICAL EXAM  VITAL SIGNS: /63   Pulse 84   Temp 36.8 °C (98.2 °F) (Temporal)   Resp 16   Ht 1.702 m (5' 7\")   Wt 75.3 kg (166 lb 0.1 oz)   SpO2 94%   BMI 26.00 kg/m²    Constitutional: Alert in no apparent distress.  HENT: Inflatable nasal catheter in the right nostril.  No active bleeding.  Once removed, nasal speculum examination reveals no active bleeding.  Eyes: Conjunctiva normal, non-icteric.   Chest: Normal nonlabored respirations  Skin: No appreciable rash on the exposed skin  Musculoskeletal: No obvious acute trauma appreciated  Neurologic: Alert, no obvious focal deficits noted.        RADIOLOGY/PROCEDURES       Nasal catheter removal note: Patient was placed in the appropriate position.  I used Afrin to help lubricate the catheter.  Once the catheter was deflated of all pressure it was removed gently.  No active bleeding.      COURSE & MEDICAL DECISION MAKING    ASSESSMENT, COURSE AND PLAN  Care Narrative: 73-year-old male here for nasal packing removal.  Placed 3 days ago at an outside facility.  No active bleeding.  Removed here in the emergency department without recurrence of epistaxis.  Discharged home in stable condition to follow-up with his ENT physician.    DISPOSITION AND DISCUSSIONS    Escalation of care considered, and ultimately not performed: Patient did have recent admission for anemia secondary to epistaxis, today he is not tachycardic, he is well-appearing, although I did consider obtaining blood work I did not feel it would be warranted      FINAL DIAGNOSIS  1. Encounter for removal of nasal packing    2. Epistaxis           Electronically signed by: Mukesh Fan M.D., 6/12/2024 4:32 PM      "

## 2024-06-16 ENCOUNTER — HOSPITAL ENCOUNTER (EMERGENCY)
Facility: MEDICAL CENTER | Age: 74
End: 2024-06-16
Attending: EMERGENCY MEDICINE
Payer: MEDICARE

## 2024-06-16 VITALS
BODY MASS INDEX: 23.72 KG/M2 | WEIGHT: 156.53 LBS | TEMPERATURE: 97.9 F | HEIGHT: 68 IN | OXYGEN SATURATION: 92 % | HEART RATE: 94 BPM | DIASTOLIC BLOOD PRESSURE: 62 MMHG | SYSTOLIC BLOOD PRESSURE: 130 MMHG | RESPIRATION RATE: 16 BRPM

## 2024-06-16 DIAGNOSIS — R04.0 EPISTAXIS: ICD-10-CM

## 2024-06-16 PROCEDURE — 700102 HCHG RX REV CODE 250 W/ 637 OVERRIDE(OP): Performed by: EMERGENCY MEDICINE

## 2024-06-16 PROCEDURE — 99284 EMERGENCY DEPT VISIT MOD MDM: CPT

## 2024-06-16 PROCEDURE — 303620 HCHG EPISTAXIS CONTROL

## 2024-06-16 PROCEDURE — A9270 NON-COVERED ITEM OR SERVICE: HCPCS | Performed by: EMERGENCY MEDICINE

## 2024-06-16 RX ORDER — DOXYCYCLINE 100 MG/1
100 CAPSULE ORAL 2 TIMES DAILY
Qty: 14 CAPSULE | Refills: 0 | Status: ACTIVE | OUTPATIENT
Start: 2024-06-16 | End: 2024-06-23

## 2024-06-16 RX ORDER — OXYMETAZOLINE HYDROCHLORIDE 0.05 G/100ML
2 SPRAY NASAL ONCE
Status: COMPLETED | OUTPATIENT
Start: 2024-06-16 | End: 2024-06-16

## 2024-06-16 RX ADMIN — OXYMETAZOLINE HCL 2 SPRAY: 0.05 SPRAY NASAL at 19:00

## 2024-06-16 ASSESSMENT — FIBROSIS 4 INDEX: FIB4 SCORE: 2

## 2024-06-17 ENCOUNTER — HOSPITAL ENCOUNTER (EMERGENCY)
Facility: MEDICAL CENTER | Age: 74
End: 2024-06-17
Attending: EMERGENCY MEDICINE
Payer: MEDICARE

## 2024-06-17 VITALS
DIASTOLIC BLOOD PRESSURE: 60 MMHG | SYSTOLIC BLOOD PRESSURE: 136 MMHG | HEART RATE: 90 BPM | RESPIRATION RATE: 20 BRPM | OXYGEN SATURATION: 91 % | TEMPERATURE: 98.4 F | BODY MASS INDEX: 25.39 KG/M2 | WEIGHT: 167 LBS

## 2024-06-17 DIAGNOSIS — R04.0 EPISTAXIS: ICD-10-CM

## 2024-06-17 LAB
ERYTHROCYTE [DISTWIDTH] IN BLOOD BY AUTOMATED COUNT: 62.6 FL (ref 35.9–50)
HCT VFR BLD AUTO: 30.8 % (ref 42–52)
HGB BLD-MCNC: 9.7 G/DL (ref 14–18)
MCH RBC QN AUTO: 30.7 PG (ref 27–33)
MCHC RBC AUTO-ENTMCNC: 31.5 G/DL (ref 32.3–36.5)
MCV RBC AUTO: 97.5 FL (ref 81.4–97.8)
PLATELET # BLD AUTO: 213 K/UL (ref 164–446)
PMV BLD AUTO: 11 FL (ref 9–12.9)
RBC # BLD AUTO: 3.16 M/UL (ref 4.7–6.1)
WBC # BLD AUTO: 10.9 K/UL (ref 4.8–10.8)

## 2024-06-17 PROCEDURE — 36415 COLL VENOUS BLD VENIPUNCTURE: CPT

## 2024-06-17 PROCEDURE — 85027 COMPLETE CBC AUTOMATED: CPT

## 2024-06-17 PROCEDURE — 99284 EMERGENCY DEPT VISIT MOD MDM: CPT

## 2024-06-17 ASSESSMENT — FIBROSIS 4 INDEX: FIB4 SCORE: 2

## 2024-06-17 NOTE — ED NOTES
Bedside report received from off going RN Jean, assumed care of patient.  POC discussed with patient. Call light within reach, all needs addressed at this time.     Fall risk interventions in place: Patient's personal possessions are with in their safe reach, Place fall risk sign on patient's door, and Keep floor surfaces clean and dry (all applicable per [place] Fall risk assessment)   Continuous monitoring: Pulse Ox or Blood Pressure  IVF/IV medications: Not Applicable   Oxygen: How many liters 4L oxymask  Bedside sitter: Not Applicable   Isolation: Not Applicable

## 2024-06-17 NOTE — ED TRIAGE NOTES
Chief Complaint   Patient presents with    Nose Bleed     74 y/o male BIB EMS from home for a nose bleed that started ~0930 today. No trauma. Nose clamp in place. Pt takes aspirin every other day. HD M/W/F. Pt on 4L NC baseline.

## 2024-06-17 NOTE — ED NOTES
Written and verbal instructions provided to pt. Pt instructed to follow up with ENT and  antibiotics from pharmacy. Pt instructed to return to emergency department for new or worsening symptoms. Pt verbalized understanding of discharge instructions. [facility] at bedside to transport pt home. Pt alert and oriented with even and regular respirations on 4L oxymask. Pt with all belongings and discharge paperwork.

## 2024-06-17 NOTE — ED PROVIDER NOTES
ED Provider Note    CHIEF COMPLAINT  Chief Complaint   Patient presents with    Nose Bleed       EXTERNAL RECORDS REVIEWED  Discharge summary 6/3/2024, anemia secondary to epistaxis    2024, otolaryngology consultation, Dr. Barnett, epistaxis: He was not taken to the OR due to poor general health, not a good anesthesia candidate    MAURISIO/KADI    Jean Barboza is a 73 y.o. male who presents for recurrence of right-sided epistaxis.  He was admitted to the hospital earlier this month with the same, ultimately was not brought to the operating room because he is a poor candidate for anesthesia.  He was ultimately discharged.  Then had a reoccurrence of bleeding and went to [facility] where he was packed.  He then presented here 4 days ago for packing removal.  Now the patient reports he has been completely without bleeding until this morning when he just felt his nose dripping again.  No lightheadedness.  No shortness of breath or vomit is normal.  No chest pain.  His extensive medical history including CVA, diabetes, he is on aspirin.  He also has chronic respiratory failure on home O2 via facemask.    PAST MEDICAL HISTORY   has a past medical history of CATARACT, Diabetes (), Hyperlipidemia, Hypertension, Seizure (HCC) (), Seizure disorder ([facility]), Snoring, and Stroke ().    SURGICAL HISTORY   has a past surgical history that includes other (); other (); cataract phaco with iol (2011); cataract phaco with iol (2011); and cataract extraction with iol ().    FAMILY HISTORY  Family History   Problem Relation Age of Onset    Diabetes Mother     Heart Disease Father        SOCIAL HISTORY  Social History     Tobacco Use    Smoking status: Former     Current packs/day: 0.00     Average packs/day: 0.5 packs/day for 4.0 years (2.0 ttl pk-yrs)     Types: Cigarettes     Start date: 1981     Quit date: 1985     Years since quittin.3    Smokeless tobacco: Never  "  Vaping Use    Vaping status: Never Used   Substance and Sexual Activity    Alcohol use: No    Drug use: No    Sexual activity: Not on file       CURRENT MEDICATIONS  Home Medications    **Home medications have not yet been reviewed for this encounter**         ALLERGIES  Allergies   Allergen Reactions    Penicillin G Rash     Rxn - Exacerbated a rash on his legs  Rash as a child   Tolerates ceftriaxone and cefepime       PHYSICAL EXAM  VITAL SIGNS: BP (!) 142/65   Pulse 79   Temp 36.6 °C (97.9 °F) (Temporal)   Resp 18   Ht 1.727 m (5' 8\")   Wt 71 kg (156 lb 8.4 oz)   SpO2 98%   BMI 23.80 kg/m²    Constitutional: Chronically ill-appearing but no acute distress  HENT: Mild active epistaxis right nostril  Eyes: No scleral icterus. Normal conjunctiva   Thorax & Lungs: Normal nonlabored respirations. I appreciate no wheezing, rhonchi or rales. There is normal air movement.  Upon cardiac ascultation I appreciate a regular heart rhythm and a normal rate.   Skin: The exposed portions of skin reveal no obvious rash or other abnormalities.      RADIOLOGY/PROCEDURES         Epistaxis Procedure Note    Indication: Bleeding    Procedure: The patient was positioned appropriately and the nares were cleared as well as possible. The bleeding site was unable to be visualized but was tamponaded with a Merocel nasal tampon.  Hemostasis was obtained.    The patient tolerated the procedure well.    Complications: None       COURSE & MEDICAL DECISION MAKING    ASSESSMENT, COURSE AND PLAN  Care Narrative: This is a generally unhealthy 73-year-old male with reoccurring epistaxis, many ER visits and hospitalizations this month already, comes in with a recurrence of his right-sided epistaxis.  Could not visualize the etiology of bleeding so Merocel packing was inserted into the nostril with successful hemostasis.  At this point, as he has already been evaluated by Dr. Barnett this month as an inpatient consultation, I have " instructed him to follow-up with her sometime this upcoming week for packing removal and further treatment.  I will put him on antibiotics as long as the packing is in place, will again choose doxycycline given his penicillin allergy.  At this time he is discharged home in stable condition  Prescription for doxycycline    DISPOSITION AND DISCUSSIONS    Escalation of care considered, and ultimately not performed: I did consider obtaining blood work to evaluate his hemoglobin given his recent hospitalization for transfusions because of epistaxis but he has had no bleeding over the past several days, he is not tachycardic.  He does not have any symptoms of anemia so no blood work has been obtained    FINAL DIAGNOSIS  1. Epistaxis           Electronically signed by: Mukesh Fan M.D., 6/16/2024 7:18 PM

## 2024-06-17 NOTE — ED TRIAGE NOTES
Chief Complaint   Patient presents with    Epistaxis     Nose packing yesterday. Pt reports nose still bleeding. Hx of epistaxis.       Dried blood around nares, bleeding controlled, pt speaking in full sentences.     /68   Pulse 100   Temp 36.8 °C (98.3 °F) (Temporal)   Resp 18   SpO2 94%

## 2024-06-17 NOTE — DISCHARGE PLANNING
Pt being discharged home. Spoke to Fidelia at [facility]. Pt scheduled to be transported at 2125.

## 2024-06-17 NOTE — ED NOTES
Pt medically cleared for discharge. Pt requires oxygen at baseline and ride home does not have access to oxygen for transport. Social work contacted for safe transportation home. Pt aware.

## 2024-06-18 NOTE — ED NOTES
Pt discharged home as ordered by erp. Pt instructed to follow up with their PCP and return here as need.. Pt verbalized understanding and left in a wheelchair to his brother

## 2024-06-18 NOTE — ED PROVIDER NOTES
ED Provider Note    CHIEF COMPLAINT  Chief Complaint   Patient presents with    Epistaxis     Nose packing yesterday. Pt reports nose still bleeding. Hx of epistaxis.        EXTERNAL RECORDS REVIEWED  ENT consultation note 6/2/2024, recurrent epistaxis from the right nostril, recommended conservative interventions as he is a high risk for anesthesia and surgical intervention    HPI/ROS    Jean Barboza is a 73 y.o. male who returns again after failing to be able to make an appointment with Dr. Barnett, ENT.  I evaluated the patient here yesterday, packed his nose.  The patient was evaluated by Dr. Chevalier on 6/2/2024, he had epistaxis at that time and the discussion was whether or not he was going to go to the operating room for surgical intervention.  However was decided against.  During the hospitalization he did require transfusion of 2 units PRBCs.  Has had a recurrence of his bleeding multiple times since then, most recently yesterday when he was evaluated here and I packed him.  As instructed, he contacted Dr. Chevalier's office and was refused an appointment.  At this point the patient went to his regular dialysis appointment, his nephrologist became aware of his situation and she attempted to speak with the office staff at Dr. Barnett's office and unfortunately was also unsuccessful at helping this patient get an appointment.  So he returns here with no ENT follow-up, he has had a little bit of dripping through the nasal packing.    PAST MEDICAL HISTORY   has a past medical history of CATARACT, Diabetes (2005), Hyperlipidemia, Hypertension, Seizure (HCC) (1987), Seizure disorder (HCC), Snoring, and Stroke (HCC).    SURGICAL HISTORY   has a past surgical history that includes other (2003); other (2011); cataract phaco with iol (4/20/2011); cataract phaco with iol (5/4/2011); and cataract extraction with iol (2011).    FAMILY HISTORY  Family History   Problem Relation Age of Onset    Diabetes Mother      Heart Disease Father        SOCIAL HISTORY  Social History     Tobacco Use    Smoking status: Former     Current packs/day: 0.00     Average packs/day: 0.5 packs/day for 4.0 years (2.0 ttl pk-yrs)     Types: Cigarettes     Start date: 1981     Quit date: 1985     Years since quittin.3    Smokeless tobacco: Never   Vaping Use    Vaping status: Never Used   Substance and Sexual Activity    Alcohol use: No    Drug use: No    Sexual activity: Not on file       CURRENT MEDICATIONS  Home Medications    **Home medications have not yet been reviewed for this encounter**         ALLERGIES  Allergies   Allergen Reactions    Penicillin G Rash     Rxn - Exacerbated a rash on his legs  Rash as a child   Tolerates ceftriaxone and cefepime       PHYSICAL EXAM  VITAL SIGNS: /68   Pulse 100   Temp 36.8 °C (98.3 °F) (Temporal)   Resp 18   Wt 75.8 kg (167 lb)   SpO2 94%   BMI 25.39 kg/m²    Constitutional: Chronically ill-appearing, awake, alert  HENT: Mirasol right nostril.  Pink-tinged but no active bleeding  Eyes: Conjunctiva normal, non-icteric.   Chest: Normal nonlabored respirations  Skin: No appreciable rash on the exposed skin  Musculoskeletal: No obvious acute trauma appreciated  Neurologic: Alert, no obvious focal deficits noted.        EKG/LABS  Results for orders placed or performed during the hospital encounter of 24   CBC WITHOUT DIFFERENTIAL   Result Value Ref Range    WBC 10.9 (H) 4.8 - 10.8 K/uL    RBC 3.16 (L) 4.70 - 6.10 M/uL    Hemoglobin 9.7 (L) 14.0 - 18.0 g/dL    Hematocrit 30.8 (L) 42.0 - 52.0 %    MCV 97.5 81.4 - 97.8 fL    MCH 30.7 27.0 - 33.0 pg    MCHC 31.5 (L) 32.3 - 36.5 g/dL    RDW 62.6 (H) 35.9 - 50.0 fL    Platelet Count 213 164 - 446 K/uL    MPV 11.0 9.0 - 12.9 fL      COURSE & MEDICAL DECISION MAKING    ASSESSMENT, COURSE AND PLAN  Care Narrative: 73-year-old male with reoccurring epistaxis, comes back in again today with multiple failed attempts at trying to get an  appointment with the ENT physician who evaluated him 2 weeks ago in the hospital.  His nephrologist actually contacted me today as she tried to get him an appointment with ENT physician as well, unsuccessfully.  The on-call physician for ENT is Dr. Hurtado, the same practice as Dr. Chevalier, I will reach out to him to discuss the case.  CBC will be obtained just to exclude anemia    CBC is reassuring, hemoglobin is 9.7 which is significantly improved from all recent prior values.  I was able to discuss the case with Dr. Hurtado ENT, he will discuss the case personally with Dr. Barnett and his office staff to help facilitate follow-up this week.  At this time the patient is discharged home in stable condition with strict return instructions provided        DISPOSITION AND DISCUSSIONS  I have discussed management of the patient with the following physicians and TINY's: RACHEL Valle      FINAL DIAGNOSIS  1. Epistaxis           Electronically signed by: Mukesh Fan M.D., 6/17/2024 5:03 PM

## 2024-07-09 ENCOUNTER — HOSPITAL ENCOUNTER (EMERGENCY)
Facility: MEDICAL CENTER | Age: 74
End: 2024-07-10
Attending: STUDENT IN AN ORGANIZED HEALTH CARE EDUCATION/TRAINING PROGRAM
Payer: MEDICARE

## 2024-07-09 DIAGNOSIS — D64.9 ACUTE ON CHRONIC ANEMIA: ICD-10-CM

## 2024-07-09 DIAGNOSIS — R04.0 EPISTAXIS: ICD-10-CM

## 2024-07-09 LAB
ABO GROUP BLD: NORMAL
BASOPHILS # BLD AUTO: 0.6 % (ref 0–1.8)
BASOPHILS # BLD: 0.05 K/UL (ref 0–0.12)
BLD GP AB SCN SERPL QL: NORMAL
EOSINOPHIL # BLD AUTO: 0.37 K/UL (ref 0–0.51)
EOSINOPHIL NFR BLD: 4.2 % (ref 0–6.9)
ERYTHROCYTE [DISTWIDTH] IN BLOOD BY AUTOMATED COUNT: 61.7 FL (ref 35.9–50)
HCT VFR BLD AUTO: 26.6 % (ref 42–52)
HGB BLD-MCNC: 8.2 G/DL (ref 14–18)
IMM GRANULOCYTES # BLD AUTO: 0.04 K/UL (ref 0–0.11)
IMM GRANULOCYTES NFR BLD AUTO: 0.5 % (ref 0–0.9)
LYMPHOCYTES # BLD AUTO: 0.91 K/UL (ref 1–4.8)
LYMPHOCYTES NFR BLD: 10.4 % (ref 22–41)
MCH RBC QN AUTO: 30.5 PG (ref 27–33)
MCHC RBC AUTO-ENTMCNC: 30.8 G/DL (ref 32.3–36.5)
MCV RBC AUTO: 98.9 FL (ref 81.4–97.8)
MONOCYTES # BLD AUTO: 0.64 K/UL (ref 0–0.85)
MONOCYTES NFR BLD AUTO: 7.3 % (ref 0–13.4)
NEUTROPHILS # BLD AUTO: 6.78 K/UL (ref 1.82–7.42)
NEUTROPHILS NFR BLD: 77 % (ref 44–72)
NRBC # BLD AUTO: 0 K/UL
NRBC BLD-RTO: 0 /100 WBC (ref 0–0.2)
PLATELET # BLD AUTO: 159 K/UL (ref 164–446)
PMV BLD AUTO: 10.9 FL (ref 9–12.9)
RBC # BLD AUTO: 2.69 M/UL (ref 4.7–6.1)
RH BLD: NORMAL
WBC # BLD AUTO: 8.8 K/UL (ref 4.8–10.8)

## 2024-07-09 PROCEDURE — 86900 BLOOD TYPING SEROLOGIC ABO: CPT

## 2024-07-09 PROCEDURE — A9270 NON-COVERED ITEM OR SERVICE: HCPCS | Performed by: STUDENT IN AN ORGANIZED HEALTH CARE EDUCATION/TRAINING PROGRAM

## 2024-07-09 PROCEDURE — 85025 COMPLETE CBC W/AUTO DIFF WBC: CPT

## 2024-07-09 PROCEDURE — 700102 HCHG RX REV CODE 250 W/ 637 OVERRIDE(OP): Performed by: STUDENT IN AN ORGANIZED HEALTH CARE EDUCATION/TRAINING PROGRAM

## 2024-07-09 PROCEDURE — 99284 EMERGENCY DEPT VISIT MOD MDM: CPT

## 2024-07-09 PROCEDURE — 86850 RBC ANTIBODY SCREEN: CPT

## 2024-07-09 PROCEDURE — 86901 BLOOD TYPING SEROLOGIC RH(D): CPT

## 2024-07-09 RX ORDER — OXYMETAZOLINE HYDROCHLORIDE 0.05 G/100ML
2 SPRAY NASAL ONCE
Status: COMPLETED | OUTPATIENT
Start: 2024-07-09 | End: 2024-07-09

## 2024-07-09 RX ADMIN — OXYMETAZOLINE HCL 2 SPRAY: 0.05 SPRAY NASAL at 22:55

## 2024-07-09 ASSESSMENT — FIBROSIS 4 INDEX: FIB4 SCORE: 1.68

## 2024-07-10 ENCOUNTER — PHARMACY VISIT (OUTPATIENT)
Dept: PHARMACY | Facility: MEDICAL CENTER | Age: 74
End: 2024-07-10
Payer: COMMERCIAL

## 2024-07-10 VITALS
DIASTOLIC BLOOD PRESSURE: 51 MMHG | SYSTOLIC BLOOD PRESSURE: 95 MMHG | RESPIRATION RATE: 15 BRPM | HEIGHT: 70 IN | TEMPERATURE: 98.2 F | WEIGHT: 165.34 LBS | OXYGEN SATURATION: 92 % | HEART RATE: 70 BPM | BODY MASS INDEX: 23.67 KG/M2

## 2024-07-10 PROCEDURE — 700101 HCHG RX REV CODE 250: Performed by: STUDENT IN AN ORGANIZED HEALTH CARE EDUCATION/TRAINING PROGRAM

## 2024-07-10 PROCEDURE — A9270 NON-COVERED ITEM OR SERVICE: HCPCS | Performed by: STUDENT IN AN ORGANIZED HEALTH CARE EDUCATION/TRAINING PROGRAM

## 2024-07-10 PROCEDURE — 36415 COLL VENOUS BLD VENIPUNCTURE: CPT

## 2024-07-10 PROCEDURE — 700102 HCHG RX REV CODE 250 W/ 637 OVERRIDE(OP): Performed by: STUDENT IN AN ORGANIZED HEALTH CARE EDUCATION/TRAINING PROGRAM

## 2024-07-10 PROCEDURE — RXMED WILLOW AMBULATORY MEDICATION CHARGE: Performed by: STUDENT IN AN ORGANIZED HEALTH CARE EDUCATION/TRAINING PROGRAM

## 2024-07-10 PROCEDURE — 303620 HCHG EPISTAXIS CONTROL

## 2024-07-10 RX ORDER — TRANEXAMIC ACID 100 MG/ML
3 INJECTION, SOLUTION INTRAVENOUS ONCE
Status: COMPLETED | OUTPATIENT
Start: 2024-07-10 | End: 2024-07-10

## 2024-07-10 RX ORDER — AMOXICILLIN AND CLAVULANATE POTASSIUM 875; 125 MG/1; MG/1
1 TABLET, FILM COATED ORAL 2 TIMES DAILY
Qty: 10 TABLET | Refills: 0 | Status: ACTIVE | OUTPATIENT
Start: 2024-07-10 | End: 2024-07-15

## 2024-07-10 RX ORDER — AMOXICILLIN AND CLAVULANATE POTASSIUM 875; 125 MG/1; MG/1
1 TABLET, FILM COATED ORAL ONCE
Status: COMPLETED | OUTPATIENT
Start: 2024-07-10 | End: 2024-07-10

## 2024-07-10 RX ADMIN — AMOXICILLIN AND CLAVULANATE POTASSIUM 1 TABLET: 875; 125 TABLET, FILM COATED ORAL at 01:31

## 2024-07-10 RX ADMIN — TRANEXAMIC ACID 300 MG: 100 INJECTION, SOLUTION INTRAVENOUS at 00:59

## 2024-07-17 ENCOUNTER — HOSPITAL ENCOUNTER (EMERGENCY)
Facility: MEDICAL CENTER | Age: 74
End: 2024-07-17
Payer: MEDICARE

## 2024-07-17 VITALS
BODY MASS INDEX: 24.09 KG/M2 | HEART RATE: 95 BPM | WEIGHT: 166.01 LBS | OXYGEN SATURATION: 94 % | SYSTOLIC BLOOD PRESSURE: 118 MMHG | TEMPERATURE: 97.3 F | RESPIRATION RATE: 16 BRPM | DIASTOLIC BLOOD PRESSURE: 69 MMHG

## 2024-07-17 PROCEDURE — 302449 STATCHG TRIAGE ONLY (STATISTIC)

## 2024-07-17 ASSESSMENT — FIBROSIS 4 INDEX: FIB4 SCORE: 2.25

## 2024-07-19 ENCOUNTER — ANESTHESIA (OUTPATIENT)
Dept: RADIOLOGY | Facility: MEDICAL CENTER | Age: 74
DRG: 143 | End: 2024-07-19
Payer: MEDICARE

## 2024-07-19 ENCOUNTER — ANESTHESIA EVENT (OUTPATIENT)
Dept: RADIOLOGY | Facility: MEDICAL CENTER | Age: 74
DRG: 143 | End: 2024-07-19
Payer: MEDICARE

## 2024-07-19 ENCOUNTER — APPOINTMENT (OUTPATIENT)
Dept: RADIOLOGY | Facility: MEDICAL CENTER | Age: 74
DRG: 143 | End: 2024-07-19
Attending: EMERGENCY MEDICINE
Payer: MEDICARE

## 2024-07-19 ENCOUNTER — HOSPITAL ENCOUNTER (INPATIENT)
Facility: MEDICAL CENTER | Age: 74
LOS: 6 days | DRG: 143 | End: 2024-07-25
Attending: EMERGENCY MEDICINE | Admitting: HOSPITALIST
Payer: MEDICARE

## 2024-07-19 DIAGNOSIS — I35.0 SEVERE AORTIC STENOSIS: ICD-10-CM

## 2024-07-19 DIAGNOSIS — N18.6 ESRD (END STAGE RENAL DISEASE) ON DIALYSIS (HCC): Chronic | ICD-10-CM

## 2024-07-19 DIAGNOSIS — R04.0 EPISTAXIS REQUIRING CAUTERIZATION: ICD-10-CM

## 2024-07-19 DIAGNOSIS — U07.1 PNEUMONIA DUE TO COVID-19 VIRUS: ICD-10-CM

## 2024-07-19 DIAGNOSIS — S81.809D MULTIPLE OPENS WOUND OF LOWER EXTREMITY, UNSPECIFIED LATERALITY, SUBSEQUENT ENCOUNTER: ICD-10-CM

## 2024-07-19 DIAGNOSIS — J96.22 ACUTE ON CHRONIC RESPIRATORY FAILURE WITH HYPOXIA AND HYPERCAPNIA (HCC): ICD-10-CM

## 2024-07-19 DIAGNOSIS — J96.21 ACUTE ON CHRONIC RESPIRATORY FAILURE WITH HYPOXIA AND HYPERCAPNIA (HCC): ICD-10-CM

## 2024-07-19 DIAGNOSIS — R04.0 EPISTAXIS: ICD-10-CM

## 2024-07-19 DIAGNOSIS — Z78.9 PROBLEM WITH VASCULAR ACCESS: ICD-10-CM

## 2024-07-19 DIAGNOSIS — Z99.2 ESRD (END STAGE RENAL DISEASE) ON DIALYSIS (HCC): Chronic | ICD-10-CM

## 2024-07-19 DIAGNOSIS — Z86.73 H/O: CVA (CEREBROVASCULAR ACCIDENT): ICD-10-CM

## 2024-07-19 DIAGNOSIS — J12.82 PNEUMONIA DUE TO COVID-19 VIRUS: ICD-10-CM

## 2024-07-19 DIAGNOSIS — I27.20 PULMONARY HYPERTENSION (HCC): Chronic | ICD-10-CM

## 2024-07-19 LAB
ALBUMIN SERPL BCP-MCNC: 4.1 G/DL (ref 3.2–4.9)
ALBUMIN/GLOB SERPL: 1.1 G/DL
ALP SERPL-CCNC: 76 U/L (ref 30–99)
ALT SERPL-CCNC: 13 U/L (ref 2–50)
ANION GAP SERPL CALC-SCNC: 16 MMOL/L (ref 7–16)
AST SERPL-CCNC: 27 U/L (ref 12–45)
BASOPHILS # BLD AUTO: 0.7 % (ref 0–1.8)
BASOPHILS # BLD: 0.04 K/UL (ref 0–0.12)
BILIRUB SERPL-MCNC: 0.5 MG/DL (ref 0.1–1.5)
BUN SERPL-MCNC: 26 MG/DL (ref 8–22)
CALCIUM ALBUM COR SERPL-MCNC: 8.7 MG/DL (ref 8.5–10.5)
CALCIUM SERPL-MCNC: 8.8 MG/DL (ref 8.5–10.5)
CHLORIDE SERPL-SCNC: 96 MMOL/L (ref 96–112)
CO2 SERPL-SCNC: 27 MMOL/L (ref 20–33)
CREAT SERPL-MCNC: 5.92 MG/DL (ref 0.5–1.4)
EOSINOPHIL # BLD AUTO: 0.36 K/UL (ref 0–0.51)
EOSINOPHIL NFR BLD: 6.1 % (ref 0–6.9)
ERYTHROCYTE [DISTWIDTH] IN BLOOD BY AUTOMATED COUNT: 57.6 FL (ref 35.9–50)
GFR SERPLBLD CREATININE-BSD FMLA CKD-EPI: 9 ML/MIN/1.73 M 2
GLOBULIN SER CALC-MCNC: 3.7 G/DL (ref 1.9–3.5)
GLUCOSE SERPL-MCNC: 122 MG/DL (ref 65–99)
HCT VFR BLD AUTO: 27.3 % (ref 42–52)
HGB BLD-MCNC: 8.4 G/DL (ref 14–18)
IMM GRANULOCYTES # BLD AUTO: 0.01 K/UL (ref 0–0.11)
IMM GRANULOCYTES NFR BLD AUTO: 0.2 % (ref 0–0.9)
INR PPP: 1.2 (ref 0.87–1.13)
LYMPHOCYTES # BLD AUTO: 0.78 K/UL (ref 1–4.8)
LYMPHOCYTES NFR BLD: 13.2 % (ref 22–41)
MCH RBC QN AUTO: 29.5 PG (ref 27–33)
MCHC RBC AUTO-ENTMCNC: 30.8 G/DL (ref 32.3–36.5)
MCV RBC AUTO: 95.8 FL (ref 81.4–97.8)
MONOCYTES # BLD AUTO: 0.44 K/UL (ref 0–0.85)
MONOCYTES NFR BLD AUTO: 7.4 % (ref 0–13.4)
NEUTROPHILS # BLD AUTO: 4.3 K/UL (ref 1.82–7.42)
NEUTROPHILS NFR BLD: 72.4 % (ref 44–72)
NRBC # BLD AUTO: 0 K/UL
NRBC BLD-RTO: 0 /100 WBC (ref 0–0.2)
PLATELET # BLD AUTO: 181 K/UL (ref 164–446)
PMV BLD AUTO: 11.1 FL (ref 9–12.9)
POTASSIUM SERPL-SCNC: 5 MMOL/L (ref 3.6–5.5)
PROT SERPL-MCNC: 7.8 G/DL (ref 6–8.2)
PROTHROMBIN TIME: 15.4 SEC (ref 12–14.6)
RBC # BLD AUTO: 2.85 M/UL (ref 4.7–6.1)
SODIUM SERPL-SCNC: 139 MMOL/L (ref 135–145)
WBC # BLD AUTO: 5.9 K/UL (ref 4.8–10.8)

## 2024-07-19 PROCEDURE — A9270 NON-COVERED ITEM OR SERVICE: HCPCS | Performed by: EMERGENCY MEDICINE

## 2024-07-19 PROCEDURE — 03LM3DZ OCCLUSION OF RIGHT EXTERNAL CAROTID ARTERY WITH INTRALUMINAL DEVICE, PERCUTANEOUS APPROACH: ICD-10-PCS | Performed by: RADIOLOGY

## 2024-07-19 PROCEDURE — 700101 HCHG RX REV CODE 250: Performed by: EMERGENCY MEDICINE

## 2024-07-19 PROCEDURE — 160002 HCHG RECOVERY MINUTES (STAT)

## 2024-07-19 PROCEDURE — 770000 HCHG ROOM/CARE - INTERMEDIATE ICU *

## 2024-07-19 PROCEDURE — 303620 HCHG EPISTAXIS CONTROL

## 2024-07-19 PROCEDURE — 160035 HCHG PACU - 1ST 60 MINS PHASE I

## 2024-07-19 PROCEDURE — 36227 PLACE CATH XTRNL CAROTID: CPT

## 2024-07-19 PROCEDURE — 5A1D70Z PERFORMANCE OF URINARY FILTRATION, INTERMITTENT, LESS THAN 6 HOURS PER DAY: ICD-10-PCS | Performed by: INTERNAL MEDICINE

## 2024-07-19 PROCEDURE — 700111 HCHG RX REV CODE 636 W/ 250 OVERRIDE (IP): Mod: JZ | Performed by: ANESTHESIOLOGY

## 2024-07-19 PROCEDURE — 75898 FOLLOW-UP ANGIOGRAPHY: CPT

## 2024-07-19 PROCEDURE — 700102 HCHG RX REV CODE 250 W/ 637 OVERRIDE(OP): Performed by: ANESTHESIOLOGY

## 2024-07-19 PROCEDURE — 85025 COMPLETE CBC W/AUTO DIFF WBC: CPT

## 2024-07-19 PROCEDURE — 700117 HCHG RX CONTRAST REV CODE 255: Performed by: RADIOLOGY

## 2024-07-19 PROCEDURE — 700105 HCHG RX REV CODE 258: Performed by: ANESTHESIOLOGY

## 2024-07-19 PROCEDURE — 36415 COLL VENOUS BLD VENIPUNCTURE: CPT

## 2024-07-19 PROCEDURE — 160036 HCHG PACU - EA ADDL 30 MINS PHASE I

## 2024-07-19 PROCEDURE — A9270 NON-COVERED ITEM OR SERVICE: HCPCS | Performed by: ANESTHESIOLOGY

## 2024-07-19 PROCEDURE — 700102 HCHG RX REV CODE 250 W/ 637 OVERRIDE(OP): Performed by: EMERGENCY MEDICINE

## 2024-07-19 PROCEDURE — 80053 COMPREHEN METABOLIC PANEL: CPT

## 2024-07-19 PROCEDURE — B31C1ZZ FLUOROSCOPY OF BILATERAL EXTERNAL CAROTID ARTERIES USING LOW OSMOLAR CONTRAST: ICD-10-PCS | Performed by: RADIOLOGY

## 2024-07-19 PROCEDURE — 85610 PROTHROMBIN TIME: CPT

## 2024-07-19 PROCEDURE — 99223 1ST HOSP IP/OBS HIGH 75: CPT | Mod: AI | Performed by: HOSPITALIST

## 2024-07-19 PROCEDURE — 03LN3DZ OCCLUSION OF LEFT EXTERNAL CAROTID ARTERY WITH INTRALUMINAL DEVICE, PERCUTANEOUS APPROACH: ICD-10-PCS | Performed by: RADIOLOGY

## 2024-07-19 PROCEDURE — 99291 CRITICAL CARE FIRST HOUR: CPT

## 2024-07-19 PROCEDURE — 700101 HCHG RX REV CODE 250: Performed by: ANESTHESIOLOGY

## 2024-07-19 RX ORDER — ACETAMINOPHEN 500 MG
1000 TABLET ORAL EVERY 4 HOURS PRN
Status: DISCONTINUED | OUTPATIENT
Start: 2024-07-19 | End: 2024-07-19 | Stop reason: HOSPADM

## 2024-07-19 RX ORDER — ALBUTEROL SULFATE 5 MG/ML
2.5 SOLUTION RESPIRATORY (INHALATION)
Status: DISCONTINUED | OUTPATIENT
Start: 2024-07-19 | End: 2024-07-19 | Stop reason: HOSPADM

## 2024-07-19 RX ORDER — SEVELAMER CARBONATE 800 MG/1
1600 TABLET, FILM COATED ORAL
COMMUNITY
Start: 2024-06-17

## 2024-07-19 RX ORDER — HEPARIN SODIUM 1000 [USP'U]/ML
INJECTION, SOLUTION INTRAVENOUS; SUBCUTANEOUS
Status: COMPLETED
Start: 2024-07-19 | End: 2024-07-19

## 2024-07-19 RX ORDER — ONDANSETRON 2 MG/ML
INJECTION INTRAMUSCULAR; INTRAVENOUS PRN
Status: DISCONTINUED | OUTPATIENT
Start: 2024-07-19 | End: 2024-07-19 | Stop reason: SURG

## 2024-07-19 RX ORDER — LISINOPRIL 40 MG/1
40 TABLET ORAL DAILY
COMMUNITY
Start: 2024-06-18

## 2024-07-19 RX ORDER — ECHINACEA PURPUREA EXTRACT 125 MG
4-6 TABLET ORAL EVERY 4 HOURS PRN
COMMUNITY

## 2024-07-19 RX ORDER — FELODIPINE 5 MG/1
10 TABLET, EXTENDED RELEASE ORAL DAILY
Status: DISCONTINUED | OUTPATIENT
Start: 2024-07-20 | End: 2024-07-20

## 2024-07-19 RX ORDER — OXYCODONE HCL 5 MG/5 ML
5 SOLUTION, ORAL ORAL
Status: COMPLETED | OUTPATIENT
Start: 2024-07-19 | End: 2024-07-19

## 2024-07-19 RX ORDER — GABAPENTIN 100 MG/1
100-300 CAPSULE ORAL
Status: DISCONTINUED | OUTPATIENT
Start: 2024-07-19 | End: 2024-07-20

## 2024-07-19 RX ORDER — ATORVASTATIN CALCIUM 20 MG/1
20 TABLET, FILM COATED ORAL EVERY EVENING
Status: DISCONTINUED | OUTPATIENT
Start: 2024-07-19 | End: 2024-07-25 | Stop reason: HOSPADM

## 2024-07-19 RX ORDER — DIPYRIDAMOLE 25 MG
25 TABLET ORAL 2 TIMES DAILY
Status: ON HOLD | COMMUNITY
Start: 2024-06-17 | End: 2024-07-25

## 2024-07-19 RX ORDER — HEPARIN SODIUM,PORCINE 1000/ML
VIAL (ML) INJECTION PRN
Status: DISCONTINUED | OUTPATIENT
Start: 2024-07-19 | End: 2024-07-19 | Stop reason: SURG

## 2024-07-19 RX ORDER — HYDROMORPHONE HYDROCHLORIDE 1 MG/ML
0.25 INJECTION, SOLUTION INTRAMUSCULAR; INTRAVENOUS; SUBCUTANEOUS
Status: DISCONTINUED | OUTPATIENT
Start: 2024-07-19 | End: 2024-07-20

## 2024-07-19 RX ORDER — SEVELAMER CARBONATE 800 MG/1
1600 TABLET, FILM COATED ORAL
Status: DISCONTINUED | OUTPATIENT
Start: 2024-07-19 | End: 2024-07-22

## 2024-07-19 RX ORDER — DIPHENHYDRAMINE HYDROCHLORIDE 50 MG/ML
12.5 INJECTION INTRAMUSCULAR; INTRAVENOUS
Status: DISCONTINUED | OUTPATIENT
Start: 2024-07-19 | End: 2024-07-19 | Stop reason: HOSPADM

## 2024-07-19 RX ORDER — HYDRALAZINE HYDROCHLORIDE 20 MG/ML
5 INJECTION INTRAMUSCULAR; INTRAVENOUS
Status: DISCONTINUED | OUTPATIENT
Start: 2024-07-19 | End: 2024-07-19 | Stop reason: HOSPADM

## 2024-07-19 RX ORDER — HYDRALAZINE HYDROCHLORIDE 20 MG/ML
20 INJECTION INTRAMUSCULAR; INTRAVENOUS EVERY 6 HOURS PRN
Status: DISCONTINUED | OUTPATIENT
Start: 2024-07-19 | End: 2024-07-25 | Stop reason: HOSPADM

## 2024-07-19 RX ORDER — OXYCODONE HYDROCHLORIDE 5 MG/1
5 TABLET ORAL
Status: DISCONTINUED | OUTPATIENT
Start: 2024-07-19 | End: 2024-07-20

## 2024-07-19 RX ORDER — METOCLOPRAMIDE HYDROCHLORIDE 5 MG/ML
INJECTION INTRAMUSCULAR; INTRAVENOUS PRN
Status: DISCONTINUED | OUTPATIENT
Start: 2024-07-19 | End: 2024-07-19 | Stop reason: SURG

## 2024-07-19 RX ORDER — ATORVASTATIN CALCIUM 20 MG/1
20 TABLET, FILM COATED ORAL DAILY
COMMUNITY
Start: 2024-06-17

## 2024-07-19 RX ORDER — IODIXANOL 270 MG/ML
80 INJECTION, SOLUTION INTRAVASCULAR ONCE
Status: COMPLETED | OUTPATIENT
Start: 2024-07-19 | End: 2024-07-19

## 2024-07-19 RX ORDER — SODIUM CHLORIDE 9 MG/ML
100 INJECTION, SOLUTION INTRAVENOUS
Status: DISCONTINUED | OUTPATIENT
Start: 2024-07-19 | End: 2024-07-25 | Stop reason: HOSPADM

## 2024-07-19 RX ORDER — LISINOPRIL 20 MG/1
40 TABLET ORAL EVERY EVENING
Status: DISCONTINUED | OUTPATIENT
Start: 2024-07-19 | End: 2024-07-20

## 2024-07-19 RX ORDER — POLYETHYLENE GLYCOL 3350 17 G/17G
1 POWDER, FOR SOLUTION ORAL
Status: DISCONTINUED | OUTPATIENT
Start: 2024-07-19 | End: 2024-07-25 | Stop reason: HOSPADM

## 2024-07-19 RX ORDER — OXYCODONE HCL 5 MG/5 ML
10 SOLUTION, ORAL ORAL
Status: COMPLETED | OUTPATIENT
Start: 2024-07-19 | End: 2024-07-19

## 2024-07-19 RX ORDER — HALOPERIDOL 5 MG/ML
1 INJECTION INTRAMUSCULAR
Status: DISCONTINUED | OUTPATIENT
Start: 2024-07-19 | End: 2024-07-19 | Stop reason: HOSPADM

## 2024-07-19 RX ORDER — FELODIPINE 10 MG/1
10 TABLET, EXTENDED RELEASE ORAL DAILY
COMMUNITY

## 2024-07-19 RX ORDER — SODIUM CHLORIDE 9 MG/ML
INJECTION, SOLUTION INTRAVENOUS CONTINUOUS
Status: DISCONTINUED | OUTPATIENT
Start: 2024-07-19 | End: 2024-07-19 | Stop reason: HOSPADM

## 2024-07-19 RX ORDER — OXYCODONE HYDROCHLORIDE 5 MG/1
2.5 TABLET ORAL
Status: DISCONTINUED | OUTPATIENT
Start: 2024-07-19 | End: 2024-07-20

## 2024-07-19 RX ORDER — AMOXICILLIN 250 MG
2 CAPSULE ORAL EVERY EVENING
Status: DISCONTINUED | OUTPATIENT
Start: 2024-07-19 | End: 2024-07-25 | Stop reason: HOSPADM

## 2024-07-19 RX ORDER — LABETALOL HYDROCHLORIDE 5 MG/ML
10 INJECTION, SOLUTION INTRAVENOUS EVERY 4 HOURS PRN
Status: DISCONTINUED | OUTPATIENT
Start: 2024-07-19 | End: 2024-07-25 | Stop reason: HOSPADM

## 2024-07-19 RX ORDER — SODIUM CHLORIDE 9 MG/ML
INJECTION, SOLUTION INTRAVENOUS
Status: DISCONTINUED | OUTPATIENT
Start: 2024-07-19 | End: 2024-07-19 | Stop reason: SURG

## 2024-07-19 RX ORDER — EPHEDRINE SULFATE 50 MG/ML
5 INJECTION, SOLUTION INTRAVENOUS
Status: DISCONTINUED | OUTPATIENT
Start: 2024-07-19 | End: 2024-07-19 | Stop reason: HOSPADM

## 2024-07-19 RX ORDER — NEOMYCIN/POLYMYXIN B/PRAMOXINE 3.5-10K-1
180 CREAM (GRAM) TOPICAL DAILY
COMMUNITY

## 2024-07-19 RX ORDER — ONDANSETRON 2 MG/ML
4 INJECTION INTRAMUSCULAR; INTRAVENOUS EVERY 4 HOURS PRN
Status: DISCONTINUED | OUTPATIENT
Start: 2024-07-19 | End: 2024-07-20

## 2024-07-19 RX ORDER — HEPARIN SODIUM,PORCINE 1000/ML
VIAL (ML) INJECTION PRN
Status: DISCONTINUED | OUTPATIENT
Start: 2024-07-19 | End: 2024-07-19

## 2024-07-19 RX ORDER — FERROUS SULFATE 325(65) MG
325 TABLET ORAL DAILY
COMMUNITY

## 2024-07-19 RX ORDER — PHENYLEPHRINE HYDROCHLORIDE 10 MG/ML
INJECTION, SOLUTION INTRAMUSCULAR; INTRAVENOUS; SUBCUTANEOUS PRN
Status: DISCONTINUED | OUTPATIENT
Start: 2024-07-19 | End: 2024-07-19 | Stop reason: SURG

## 2024-07-19 RX ORDER — ETOMIDATE 2 MG/ML
INJECTION INTRAVENOUS PRN
Status: DISCONTINUED | OUTPATIENT
Start: 2024-07-19 | End: 2024-07-19 | Stop reason: SURG

## 2024-07-19 RX ORDER — TRANEXAMIC ACID 100 MG/ML
3 INJECTION, SOLUTION INTRAVENOUS ONCE
Status: COMPLETED | OUTPATIENT
Start: 2024-07-19 | End: 2024-07-19

## 2024-07-19 RX ORDER — GABAPENTIN 100 MG/1
100-300 CAPSULE ORAL
COMMUNITY

## 2024-07-19 RX ORDER — OXYCODONE HYDROCHLORIDE 5 MG/1
5 TABLET ORAL ONCE
Status: COMPLETED | OUTPATIENT
Start: 2024-07-19 | End: 2024-07-19

## 2024-07-19 RX ORDER — DIPYRIDAMOLE 25 MG
25 TABLET ORAL 2 TIMES DAILY
Status: DISCONTINUED | OUTPATIENT
Start: 2024-07-20 | End: 2024-07-25

## 2024-07-19 RX ORDER — ONDANSETRON 4 MG/1
4 TABLET, ORALLY DISINTEGRATING ORAL EVERY 4 HOURS PRN
Status: DISCONTINUED | OUTPATIENT
Start: 2024-07-19 | End: 2024-07-20

## 2024-07-19 RX ORDER — MEPERIDINE HYDROCHLORIDE 25 MG/ML
12.5 INJECTION INTRAMUSCULAR; INTRAVENOUS; SUBCUTANEOUS
Status: DISCONTINUED | OUTPATIENT
Start: 2024-07-19 | End: 2024-07-19 | Stop reason: HOSPADM

## 2024-07-19 RX ORDER — ONDANSETRON 2 MG/ML
4 INJECTION INTRAMUSCULAR; INTRAVENOUS
Status: DISCONTINUED | OUTPATIENT
Start: 2024-07-19 | End: 2024-07-19 | Stop reason: HOSPADM

## 2024-07-19 RX ORDER — ACETAMINOPHEN 325 MG/1
650 TABLET ORAL EVERY 6 HOURS PRN
Status: DISCONTINUED | OUTPATIENT
Start: 2024-07-19 | End: 2024-07-25 | Stop reason: HOSPADM

## 2024-07-19 RX ORDER — ASPIRIN 81 MG/1
81 TABLET ORAL DAILY
Status: ON HOLD | COMMUNITY
End: 2024-07-25

## 2024-07-19 RX ORDER — DOXYCYCLINE 100 MG/1
100 CAPSULE ORAL 2 TIMES DAILY
Status: ON HOLD | COMMUNITY
Start: 2024-06-17 | End: 2024-07-25

## 2024-07-19 RX ORDER — LOPERAMIDE HCL 2 MG
2-4 CAPSULE ORAL PRN
Status: ON HOLD | COMMUNITY
Start: 2024-05-07 | End: 2024-07-25

## 2024-07-19 RX ORDER — METOPROLOL TARTRATE 1 MG/ML
1 INJECTION, SOLUTION INTRAVENOUS
Status: DISCONTINUED | OUTPATIENT
Start: 2024-07-19 | End: 2024-07-19 | Stop reason: HOSPADM

## 2024-07-19 RX ADMIN — HEPARIN SODIUM 3000 UNITS: 1000 INJECTION, SOLUTION INTRAVENOUS; SUBCUTANEOUS at 16:46

## 2024-07-19 RX ADMIN — ONDANSETRON 4 MG: 2 INJECTION INTRAMUSCULAR; INTRAVENOUS at 16:22

## 2024-07-19 RX ADMIN — PHENYLEPHRINE HYDROCHLORIDE 200 MCG: 10 INJECTION INTRAVENOUS at 17:20

## 2024-07-19 RX ADMIN — SILVER NITRATE 1 APPLICATOR: 38.21; 12.74 STICK TOPICAL at 11:45

## 2024-07-19 RX ADMIN — SUGAMMADEX 200 MG: 100 INJECTION, SOLUTION INTRAVENOUS at 17:55

## 2024-07-19 RX ADMIN — METOCLOPRAMIDE 10 MG: 5 INJECTION, SOLUTION INTRAMUSCULAR; INTRAVENOUS at 16:20

## 2024-07-19 RX ADMIN — OXYCODONE HYDROCHLORIDE 10 MG: 5 SOLUTION ORAL at 21:11

## 2024-07-19 RX ADMIN — TRANEXAMIC ACID 300 MG: 100 INJECTION, SOLUTION INTRAVENOUS at 12:00

## 2024-07-19 RX ADMIN — OXYCODONE 5 MG: 5 TABLET ORAL at 13:13

## 2024-07-19 RX ADMIN — FENTANYL CITRATE 50 MCG: 50 INJECTION, SOLUTION INTRAMUSCULAR; INTRAVENOUS at 19:01

## 2024-07-19 RX ADMIN — SODIUM CHLORIDE: 9 INJECTION, SOLUTION INTRAVENOUS at 15:19

## 2024-07-19 RX ADMIN — ROCURONIUM BROMIDE 30 MG: 10 INJECTION, SOLUTION INTRAVENOUS at 15:47

## 2024-07-19 RX ADMIN — IODIXANOL 80 ML: 270 INJECTION, SOLUTION INTRAVASCULAR at 19:15

## 2024-07-19 RX ADMIN — PHENYLEPHRINE HYDROCHLORIDE 200 MCG: 10 INJECTION INTRAVENOUS at 17:40

## 2024-07-19 RX ADMIN — FENTANYL CITRATE 50 MCG: 50 INJECTION, SOLUTION INTRAMUSCULAR; INTRAVENOUS at 19:55

## 2024-07-19 RX ADMIN — IOHEXOL 100 ML: 300 INJECTION, SOLUTION INTRAVENOUS at 19:15

## 2024-07-19 RX ADMIN — FENTANYL CITRATE 50 MCG: 50 INJECTION, SOLUTION INTRAMUSCULAR; INTRAVENOUS at 15:41

## 2024-07-19 RX ADMIN — FENTANYL CITRATE 150 MCG: 50 INJECTION, SOLUTION INTRAMUSCULAR; INTRAVENOUS at 15:47

## 2024-07-19 RX ADMIN — ETOMIDATE 12 MG: 2 INJECTION, SOLUTION INTRAVENOUS at 15:47

## 2024-07-19 ASSESSMENT — ENCOUNTER SYMPTOMS
SENSORY CHANGE: 0
CHILLS: 0
ABDOMINAL PAIN: 0
BRUISES/BLEEDS EASILY: 0
COUGH: 1
FALLS: 0
EYE REDNESS: 0
PND: 0
DIAPHORESIS: 0
TINGLING: 0
DEPRESSION: 0
INSOMNIA: 1
POLYDIPSIA: 0
ORTHOPNEA: 0
HEADACHES: 1
FLANK PAIN: 0
EYE DISCHARGE: 0
BLOOD IN STOOL: 0
MEMORY LOSS: 1
MYALGIAS: 0
WHEEZING: 0
BACK PAIN: 0
STRIDOR: 0
HEARTBURN: 0
SINUS PAIN: 0
PHOTOPHOBIA: 0
MUSCULOSKELETAL NEGATIVE: 1
FOCAL WEAKNESS: 0
GASTROINTESTINAL NEGATIVE: 1
FEVER: 0
EYES NEGATIVE: 1
SHORTNESS OF BREATH: 1
DOUBLE VISION: 0
SPUTUM PRODUCTION: 0
SEIZURES: 0
DIZZINESS: 1
CARDIOVASCULAR NEGATIVE: 1

## 2024-07-19 ASSESSMENT — PAIN DESCRIPTION - PAIN TYPE
TYPE: SURGICAL PAIN
TYPE: ACUTE PAIN
TYPE: SURGICAL PAIN

## 2024-07-19 ASSESSMENT — FIBROSIS 4 INDEX: FIB4 SCORE: 2.25

## 2024-07-19 ASSESSMENT — LIFESTYLE VARIABLES: SUBSTANCE_ABUSE: 0

## 2024-07-19 ASSESSMENT — PAIN SCALES - GENERAL: PAIN_LEVEL: 0

## 2024-07-19 NOTE — ED NOTES
Med rec completed per medication dispense history per [facility] ([phone]) and [facility] on [facility], and Lida Coleman ([phone]) to verify patient's Mircera. Patient is unable to verify the medications that he is using at home. Unable to confirm times of last doses for home medications, or recent over-the-counter medications.    Allergies reviewed with patient.    Outpatient antibiotics within the last 30 days: Patient was dispensed a 7 day course of doxycycline on 6/17/2024, and a 5 day course of amoxicillin-clavulanate on 7/10/2024. Unable to verify whether patient took/finished these antibiotics.    ANTICOAGULANTS: none, however patient is prescribed antiplatelet medications, aspirin and dipyridamole.

## 2024-07-19 NOTE — ASSESSMENT & PLAN NOTE
Monitor H&H if drops below 7 or 21 transfuse  Hgb 7.1, relatively stable with baseline, appears to fluctuate 7-9  No further signs of bleeding  Receiving IV iron   Check CBC in am

## 2024-07-19 NOTE — ED PROVIDER NOTES
ED Provider Note    CHIEF COMPLAINT  Chief Complaint   Patient presents with    Epistaxis     Since this morning. Denies ASA or thinners, nose clamp in place.        EXTERNAL RECORDS REVIEWED  Inpatient Notes patient most recent inpatient note, 2024, patient admitted for transfusion for acute blood loss anemia secondary to nosebleed    HPI/ROS      Jean Barboza is a 73 y.o. male who returns for epistaxis. Pt seen relatively regularly for recurrent nose bleeds. Pt was seen 7/10 and had packing placed. Packing removed yesterday by his PCP. Bleeding started this AM. Pt denies any trauma. Pt reports need for transfusion in the past. Last seen by ENT about 3 weeks ago when the bleed was cauterized at bedside.  Of note patient is ESRD .  He also has a history of severe pulmonary hypertension.    PAST MEDICAL HISTORY   has a past medical history of CATARACT, Diabetes (), Hyperlipidemia, Hypertension, Seizure (HCC) (), Seizure disorder (), Snoring, and Stroke ().    SURGICAL HISTORY   has a past surgical history that includes other (); other (); cataract phaco with iol (2011); cataract phaco with iol (2011); and cataract extraction with iol ().    FAMILY HISTORY  Family History   Problem Relation Age of Onset    Diabetes Mother     Heart Disease Father        SOCIAL HISTORY  Social History     Tobacco Use    Smoking status: Former     Current packs/day: 0.00     Average packs/day: 0.5 packs/day for 4.0 years (2.0 ttl pk-yrs)     Types: Cigarettes     Start date: 1981     Quit date: 1985     Years since quittin.4    Smokeless tobacco: Never   Vaping Use    Vaping status: Never Used   Substance and Sexual Activity    Alcohol use: No    Drug use: No    Sexual activity: Not on file       CURRENT MEDICATIONS  Home Medications       Reviewed by Hoa Dumont R.N. (Registered Nurse) on 24 at 1012  Med List Status: Partial     Medication Last  Dose Status   LISINOPRIL PO  Active   oxymetazoline (AFRIN) 0.05 % Solution  Active                    ALLERGIES  Allergies   Allergen Reactions    Penicillin G Rash     Rxn - Exacerbated a rash on his legs  Rash as a child   Tolerates ceftriaxone and cefepime       PHYSICAL EXAM  VITAL SIGNS: /62   Pulse 85   Temp 36.4 °C (97.6 °F) (Temporal)   Resp 16   Wt 76.8 kg (169 lb 5 oz)   SpO2 92%   BMI 24.57 kg/m²    Physical Exam  Constitutional:       Appearance: Normal appearance.   HENT:      Head:      Comments: Left nare with brisk bleeding, small area on lateral aspect with bleeding. Questionable proximal polyp     Mouth/Throat:      Mouth: Mucous membranes are moist.   Pulmonary:      Effort: Pulmonary effort is normal.   Neurological:      General: No focal deficit present.      Mental Status: He is alert and oriented to person, place, and time.   Psychiatric:         Mood and Affect: Mood normal.           EKG/LABS  Results for orders placed or performed during the hospital encounter of 07/19/24   CBC WITH DIFFERENTIAL   Result Value Ref Range    WBC 5.9 4.8 - 10.8 K/uL    RBC 2.85 (L) 4.70 - 6.10 M/uL    Hemoglobin 8.4 (L) 14.0 - 18.0 g/dL    Hematocrit 27.3 (L) 42.0 - 52.0 %    MCV 95.8 81.4 - 97.8 fL    MCH 29.5 27.0 - 33.0 pg    MCHC 30.8 (L) 32.3 - 36.5 g/dL    RDW 57.6 (H) 35.9 - 50.0 fL    Platelet Count 181 164 - 446 K/uL    MPV 11.1 9.0 - 12.9 fL    Neutrophils-Polys 72.40 (H) 44.00 - 72.00 %    Lymphocytes 13.20 (L) 22.00 - 41.00 %    Monocytes 7.40 0.00 - 13.40 %    Eosinophils 6.10 0.00 - 6.90 %    Basophils 0.70 0.00 - 1.80 %    Immature Granulocytes 0.20 0.00 - 0.90 %    Nucleated RBC 0.00 0.00 - 0.20 /100 WBC    Neutrophils (Absolute) 4.30 1.82 - 7.42 K/uL    Lymphs (Absolute) 0.78 (L) 1.00 - 4.80 K/uL    Monos (Absolute) 0.44 0.00 - 0.85 K/uL    Eos (Absolute) 0.36 0.00 - 0.51 K/uL    Baso (Absolute) 0.04 0.00 - 0.12 K/uL    Immature Granulocytes (abs) 0.01 0.00 - 0.11 K/uL    NRBC  (Absolute) 0.00 K/uL   Comp Metabolic Panel   Result Value Ref Range    Sodium 139 135 - 145 mmol/L    Potassium 5.0 3.6 - 5.5 mmol/L    Chloride 96 96 - 112 mmol/L    Co2 27 20 - 33 mmol/L    Anion Gap 16.0 7.0 - 16.0    Glucose 122 (H) 65 - 99 mg/dL    Bun 26 (H) 8 - 22 mg/dL    Creatinine 5.92 (HH) 0.50 - 1.40 mg/dL    Calcium 8.8 8.5 - 10.5 mg/dL    Correct Calcium 8.7 8.5 - 10.5 mg/dL    AST(SGOT) 27 12 - 45 U/L    ALT(SGPT) 13 2 - 50 U/L    Alkaline Phosphatase 76 30 - 99 U/L    Total Bilirubin 0.5 0.1 - 1.5 mg/dL    Albumin 4.1 3.2 - 4.9 g/dL    Total Protein 7.8 6.0 - 8.2 g/dL    Globulin 3.7 (H) 1.9 - 3.5 g/dL    A-G Ratio 1.1 g/dL   Prothrombin Time   Result Value Ref Range    PT 15.4 (H) 12.0 - 14.6 sec    INR 1.20 (H) 0.87 - 1.13   ESTIMATED GFR   Result Value Ref Range    GFR (CKD-EPI) 9 (A) >60 mL/min/1.73 m 2             COURSE & MEDICAL DECISION MAKING    ASSESSMENT, COURSE AND PLAN  Care Narrative: pt here with ongoing nasal bleeding. 1 day s/p nasal packing removal. Attempted cautery wtihout resolution of bleeding. TXA applied. Nasal clamp placed  Given hx of anemia, checking CBC  CBC is reassuring.  Unfortunately despite cautery and application of TXA patient's nosebleed persist.  Therefore tamponade balloon was placed.  Patient tolerated well.  Following this bleeding resolved.  Following the tamponade bleeding bleeding has stopped.  I discussed the case with ENT who requested the patient actually stay in the hospital for IR embolization of the bleed given its recurrence.  Discussed the case with IR who is comfortable with this.  Basic labs to help facilitate.  Patient does have a history of ESRD, labs are reflective of this but patient does not appear volume overloaded and labs did not indicate any emergent dialysis.  Will discuss the case with nephrology to ensure they can coordinate close follow-up for dialysis either later today or tomorrow if patient can be discharged after the procedure.   Patient will need anesthesia for the procedure and will end up being admitted for at least a few hours after the procedure.  I discussed the case with hospitalist who has agreed to admit.            DISPOSITION AND DISCUSSIONS  I have discussed management of the patient with the following physicians and TINY's: Nephrology, interventional radiology, ENT, hospitalist      Escalation of care considered, and ultimately not performed:diagnostic imaging was deferred, my suspicion of deep space abscess or highly complex complicating anatomical etiology was thought to be low      FINAL DIAGNOSIS  No diagnosis found.       Electronically signed by: Josue Valdes M.D., 7/19/2024 11:23 AM

## 2024-07-19 NOTE — ASSESSMENT & PLAN NOTE
Dialysis per [facility]  Continue sevelamer    Strict I/Os  Renally dosed medications  Avoid nephrotoxic agents as able  Trend

## 2024-07-19 NOTE — ASSESSMENT & PLAN NOTE
Patient at home was taking Imodium this is probably not the greatest idea we will need to at this point check stool studies

## 2024-07-19 NOTE — ED NOTES
Pt escorted from lobby to room 61. Pt is now sitting up in bed on monitor with even and unlabored breaths, in no apparent distress at this time. Chart up for ERP.

## 2024-07-19 NOTE — ASSESSMENT & PLAN NOTE
Profound epistaxis that is been ongoing for several months according to the patient.  He says initially he thinks it was caused by the nasal cannula that lacerated something in his nose and since then they have not been able to cauterize it.  Patient is on fish oil as well as aspirin and these will need to be stopped as they will cause coagulation problems will need to obtain a TEG level  The plan at this point is for radiology to cauterize this and ENT will take the patient to surgery after it has been stabilized.

## 2024-07-19 NOTE — ASSESSMENT & PLAN NOTE
Patient is hypoxic on room air is requiring oxygen support by facemask as he is unable to utilize currently his nasal cannula due to habitus taxis.  Keep oxygen saturations above 90% titrate oxygen

## 2024-07-19 NOTE — PROGRESS NOTES
Pt presents to IR2. Pt was consented by MD at bedside, confirmed by this RN and consent at bedside. Anesthesia consent confirmed and at bedside. Anesthesia care provided by . Pt transferred to IR table in supine position. Patient underwent a Extracranial angiogram with embolization by Dr. Ojeda. Pt placed on monitor, prepped and draped in a sterile fashion. All vital signs monitoring and medication administration by anesthesia services - See anesthesia flowsheet for details.     8100 Transfer of care from PRATIBHA Negron to PRATIBHA Ren.    [facility] [phone] UM  Bilateral  REF: [id number]  LOT: [id number]  EXP: 12/11/2026

## 2024-07-19 NOTE — ED NOTES
Medication bedside, ERP notified. Phlebotomy bedside for blood draw. Pt resting with even chest rise and fall, reports no needs at this time, call light available and in reach.

## 2024-07-19 NOTE — H&P
Hospital Medicine History & Physical Note    Date of Service  7/19/2024    Primary Care Physician  Clyde Sung M.D.    Consultants  ENT      Code Status  Full Code    Chief Complaint  Chief Complaint   Patient presents with    Epistaxis     Since this morning. Denies ASA or thinners, nose clamp in place.        History of Presenting Illness  Jean Barboza is a 73 y.o. male who presented 7/19/2024 with uncontrolled epistaxis that is been ongoing for about 2 months.  The patient says initially it happened because oxygen tubing scratched up his nose when he was first put on oxygen.  He also dried out his nose and has not been able to stop bleeding.  Patient has had multiple nasal packings unsuccessfully and today comes in and is still oozing from his nose and radiology at this point is planning on doing interventional radiological cauterization.  Afterwards surgery, ENT is planning on taking him for definitive surgical management.  The patient will continue with oxygen support to keep oxygen saturations above 90%.  He does have end-stage renal failure on hemodialysis and [facility] will need to continue with his hemodialysis..    I discussed the plan of care with patient, family, bedside RN, and emergency room physician .    Review of Systems  Review of Systems   Constitutional:  Positive for malaise/fatigue. Negative for chills, diaphoresis and fever.   HENT:  Positive for nosebleeds. Negative for sinus pain.    Eyes: Negative.  Negative for double vision, photophobia, discharge and redness.   Respiratory:  Positive for cough and shortness of breath. Negative for sputum production, wheezing and stridor.    Cardiovascular: Negative.  Negative for chest pain, orthopnea, leg swelling and PND.   Gastrointestinal: Negative.  Negative for abdominal pain, blood in stool and heartburn.   Genitourinary: Negative.  Negative for dysuria, flank pain and frequency.   Musculoskeletal: Negative.  Negative  for back pain, falls and myalgias.   Skin: Negative.  Negative for itching.   Neurological:  Positive for dizziness and headaches. Negative for tingling, sensory change, focal weakness and seizures.   Endo/Heme/Allergies: Negative.  Negative for polydipsia. Does not bruise/bleed easily.   Psychiatric/Behavioral:  Positive for memory loss. Negative for depression, substance abuse and suicidal ideas. The patient has insomnia.    All other systems reviewed and are negative.      Past Medical History   has a past medical history of CATARACT, Diabetes (2005), Hyperlipidemia, Hypertension, Seizure (HCC) (1987), Seizure disorder (HCC), Snoring, and Stroke ([facility]).    Surgical History   has a past surgical history that includes other (2003); other (2011); cataract phaco with iol (4/20/2011); cataract phaco with iol (5/4/2011); and cataract extraction with iol (2011).     Family History  family history includes Diabetes in his mother; Heart Disease in his father.   Family history reviewed with patient. There is no family history that is pertinent to the chief complaint.     Social History   reports that he quit smoking about 39 years ago. His smoking use included cigarettes. He started smoking about 43 years ago. He has a 2 pack-year smoking history. He has never used smokeless tobacco. He reports that he does not drink alcohol and does not use drugs.    Allergies  Allergies   Allergen Reactions    Penicillin G Rash     Rxn - Exacerbated a rash on his legs  Rash as a child   Tolerates ceftriaxone and cefepime       Medications  Prior to Admission Medications   Prescriptions Last Dose Informant Patient Reported? Taking?   Methoxy PEG-Epoetin Beta (MIRCERA) 150 MCG/0.3ML Solution Prefilled Syringe 7/8/2024 at [facility] Other Facility Yes Yes   Sig: Inject 150 mcg as directed every 14 days. Administered at dialysis (Lida Coleman [phone]).   Omega-3 Fatty Acids (FISH OIL PEARLS) 180 MG Cap UNK at [facility] Patient's Home Pharmacy  Yes Yes   Sig: Take 180 mg by mouth every day.   amoxicillin-clavulanate (AUGMENTIN) 875-125 MG Tab UNK at [facility] Patient's Home Pharmacy Yes Yes   Sig: Take 1 Tablet by mouth 2 times a day. 5 day course prescribed 7/10/2024.   aspirin 81 MG EC tablet UNK at [facility] Patient's Home Pharmacy Yes Yes   Sig: Take 81 mg by mouth every day.   atorvastatin (LIPITOR) 20 MG Tab UNK at [facility] Patient's Home Pharmacy Yes Yes   Sig: Take 20 mg by mouth every day.   dipyridamole (PERSANTINE) 25 MG Tab UNK at [facility] Patient's Home Pharmacy Yes Yes   Sig: Take 25 mg by mouth 2 times a day.   doxycycline (MONODOX) 100 MG capsule UNK at [facility] Patient's Home Pharmacy Yes Yes   Sig: Take 100 mg by mouth 2 times a day. 5 day course prescribed 6/17/2024.   felodipine ER (PLENDIL) 10 MG TABLET SR 24 HR UNK at [facility] Patient's Home Pharmacy Yes Yes   Sig: Take 10 mg by mouth every day.   ferrous sulfate 325 (65 Fe) MG tablet UNK at [facility] Patient's Home Pharmacy Yes Yes   Sig: Take 325 mg by mouth every day.   gabapentin (NEURONTIN) 100 MG Cap UNK at [facility] Patient's Home Pharmacy Yes No   Sig: Take 100-300 mg by mouth at bedtime as needed (Neuropathy). 1 to 3 capsules = 100 to 300 mg.   lisinopril (PRINIVIL) 40 MG tablet UNK at [facility] Patient's Home Pharmacy Yes Yes   Sig: Take 40 mg by mouth every day.   loperamide (IMODIUM) 2 MG Cap UNK at [facility] Patient's Home Pharmacy Yes Yes   Sig: Take 2-4 mg by mouth as needed for Diarrhea. Take 2 capsules (4 mg) at onset, and an additional 1 capsule (2 mg) after each subsequent loose stool, up to a maximum of 8 capsules within 24 hours.   oxymetazoline (AFRIN) 0.05 % Solution UNK at [facility] Patient's Home Pharmacy No No   Sig: Administer 2 Sprays into affected nostril(S) 2 times a day for 7 days.   sevelamer carbonate (RENVELA) 800 MG Tab tablet UNK at [facility] Patient's Home Pharmacy Yes Yes   Sig: Take 1,600 mg by mouth 3 times a day with meals. 2 tablets = 1,600 mg.   sodium chloride (OCEAN NASAL SPRAY) 0.65 % Solution UNK at [facility]  Patient's Home Pharmacy Yes Yes   Sig: Administer 4-6 Sprays into affected nostril(S) every four hours as needed (Congestion).      Facility-Administered Medications: None       Physical Exam  Temp:  [36.4 °C (97.6 °F)] 36.4 °C (97.6 °F)  Pulse:  [80-89] 89  Resp:  [14-17] 14  BP: (113-142)/(58-69) 142/68  SpO2:  [89 %-95 %] 89 %  Blood Pressure : (!) 142/68   Temperature: 36.4 °C (97.6 °F)   Pulse: 89   Respiration: 14   Pulse Oximetry: 89 %       Physical Exam  Vitals and nursing note reviewed. Exam conducted with a chaperone present.   Constitutional:       General: He is not in acute distress.     Appearance: Normal appearance. He is well-developed and normal weight. He is ill-appearing and diaphoretic. He is not toxic-appearing.   HENT:      Head: Normocephalic and atraumatic.      Right Ear: External ear normal.      Left Ear: External ear normal.      Nose:      Right Nostril: Epistaxis present.      Mouth/Throat:      Mouth: Mucous membranes are moist.      Pharynx: Oropharynx is clear.   Eyes:      Extraocular Movements: Extraocular movements intact.      Conjunctiva/sclera: Conjunctivae normal.      Pupils: Pupils are equal, round, and reactive to light.   Neck:      Thyroid: No thyromegaly.      Vascular: No JVD.   Cardiovascular:      Rate and Rhythm: Regular rhythm. Tachycardia present.      Pulses: Normal pulses.      Heart sounds: Normal heart sounds.   Pulmonary:      Effort: Pulmonary effort is normal.      Breath sounds: Normal breath sounds.   Chest:      Chest wall: No tenderness.   Abdominal:      General: Abdomen is flat. Bowel sounds are normal. There is no distension.      Palpations: Abdomen is soft. There is no mass.      Tenderness: There is no abdominal tenderness. There is no guarding or rebound.   Musculoskeletal:         General: Normal range of motion.      Cervical back: Normal range of motion and neck supple.      Right lower leg: No edema.      Left lower leg: No edema.       "Comments: Right forearm AV fistula with good bruit   Lymphadenopathy:      Cervical: No cervical adenopathy.   Skin:     General: Skin is warm.      Capillary Refill: Capillary refill takes more than 3 seconds.      Findings: No lesion or rash.   Neurological:      General: No focal deficit present.      Mental Status: He is alert and oriented to person, place, and time. Mental status is at baseline.      GCS: GCS eye subscore is 4. GCS verbal subscore is 5. GCS motor subscore is 6.      Cranial Nerves: No cranial nerve deficit.      Deep Tendon Reflexes: Reflexes are normal and symmetric.   Psychiatric:         Attention and Perception: He is inattentive.         Mood and Affect: Affect is flat.         Speech: Speech is delayed.         Behavior: Behavior is slowed.         Cognition and Memory: Cognition is impaired. Memory is impaired.         Judgment: Judgment is not impulsive.         Laboratory:  Recent Labs     07/19/24  1156   WBC 5.9   RBC 2.85*   HEMOGLOBIN 8.4*   HEMATOCRIT 27.3*   MCV 95.8   MCH 29.5   MCHC 30.8*   RDW 57.6*   PLATELETCT 181   MPV 11.1     Recent Labs     07/19/24  1400   SODIUM 139   POTASSIUM 5.0   CHLORIDE 96   CO2 27   GLUCOSE 122*   BUN 26*   CREATININE 5.92*   CALCIUM 8.8     Recent Labs     07/19/24  1400   ALTSGPT 13   ASTSGOT 27   ALKPHOSPHAT 76   TBILIRUBIN 0.5   GLUCOSE 122*     Recent Labs     07/19/24  1400   INR 1.20*     No results for input(s): \"NTPROBNP\" in the last 72 hours.      No results for input(s): \"TROPONINT\" in the last 72 hours.    Imaging:  IR-EMBOLIZATION    (Results Pending)       X-Ray:  I have personally reviewed the images and compared with prior images.  EKG:  I have personally reviewed the images and compared with prior images.    Assessment/Plan:  Justification for Admission Status  I anticipate this patient will require at least two midnights for appropriate medical management, necessitating inpatient admission because patient is acute hypoxic " respiratory failure with ongoing epistaxis and required this point surgical intervention to control the epistaxis and require at least 48 hours of inpatient management    Patient will need a Med/Surg bed on MEDICAL service .  The need is secondary to uncontrolled epistaxis.    * Epistaxis requiring cauterization- (present on admission)  Assessment & Plan  Profound epistaxis that is been ongoing for several months according to the patient.  He says initially he thinks it was caused by the nasal cannula that lacerated something in his nose and since then they have not been able to cauterize it.  Patient is on fish oil as well as aspirin and these will need to be stopped as they will cause coagulation problems will need to obtain a TEG level  The plan at this point is for radiology to cauterize this and ENT will take the patient to surgery after it has been stabilized.    Acute respiratory failure with hypoxia (HCC)- (present on admission)  Assessment & Plan  Patient is hypoxic on room air is requiring oxygen support by facemask as he is unable to utilize currently his nasal cannula due to habitus taxis.  Keep oxygen saturations above 90% titrate oxygen    ESRD (end stage renal disease) on dialysis (HCC)- (present on admission)  Assessment & Plan  Patient is very confused and is not able to tell me who he sees for nephrology but it appears that [facility] and they will need to be consulted to continue his hemodialysis.  He has an AV fistula on his right forearm which at this point has good bruit.    Hypertension- (present on admission)  Assessment & Plan  Optimize blood pressure management keep systolic blood pressure less than 140 diastolic under 90  Continue with lisinopril 40 mg daily and as needed labetalol.    Type 2 diabetes mellitus (HCC)- (present on admission)  Assessment & Plan  -accus with sliding scale coverage  -diabetic diet  -diabetic education  -follow  glycohemoglobin levels long term, most recent hemoglobin A1c is 5.3  -monitor for hypoglycemic episodes and adjust control if he should get low    Acute blood loss anemia- (present on admission)  Assessment & Plan  Monitor H&H if drops below 7 or 21 transfuse    Diarrhea of presumed infectious origin- (present on admission)  Assessment & Plan  Patient at home was taking Imodium this is probably not the greatest idea we will need to at this point check stool studies        VTE prophylaxis: SCDs/TEDs

## 2024-07-19 NOTE — CONSULTS
"[facility] -  CONSULTATION NOTE      DATE & TIME:   7/20/2024  10:58AM               AUTHOR:  Abdulkadir Ramos D.O.      REASON FOR CONSULTATION:   - Inpatient hemodialysis management.      CHIEF COMPLAINT:   -  \"Nose bleed  \"      HISTORY OF PRESENT ILLNESS:    73Y M w ESRD on MWF HD ([place] w Dr Cole) and recent epistaxis returns with new nose bleed on 7/19. Per H&P, pt had reported he has been having nose bleed issues for about 2 months. Pt reported that he developed another nose bleed and has not been able to stop it.  ER Labwork showed tolerable electrolytes and per ER MD note, pt did not appear volume overloaded. He was taken taken to IR for embolization where b/l maxillary arteries were embolized. Anesthesia post-procedure note indicated pt was euvolemic. In PACU pt was noted to require additional O2, and was placed on non-rebreather mask around 2000. Pt continued to worsen overnight and was upgraded to ICU and required intubation around 0400. Nephrology was not paged overnight. Pt seen and examined on AM of 7/20 and is tolerating dialysis.       REVIEW OF SYSTEMS:    UTO due to intubation.      PAST MEDICAL HISTORY:   - ESRD  - CKD-MBD  Past Medical History:   Diagnosis Date    CATARACT     Diabetes 2005    oral meds    Hyperlipidemia     Hypertension     Seizure (HCC) 1987    Seizure disorder (HCC)     Snoring     Stroke (HCC)         PAST SURGICAL HISTORY:   - Dialysis Access Surgery  Past Surgical History:   Procedure Laterality Date    CATARACT PHACO WITH IOL  5/4/2011    Performed by ENRIQUETA MABRY at [facility]    CATARACT PHACO WITH IOL  4/20/2011    Performed by ENRIQUETA MABRY at [facility]    OTHER  2011    left eye cataract    CATARACT EXTRACTION WITH IOL  2011    OTHER  2003    oral surgery       FAMILY HISTORY:   Family History   Problem Relation Age of Onset    Diabetes Mother     Heart Disease Father           SOCIAL HISTORY: "   Social History     Tobacco Use    Smoking status: Former     Current packs/day: 0.00     Average packs/day: 0.5 packs/day for 4.0 years (2.0 ttl pk-yrs)     Types: Cigarettes     Start date: 1981     Quit date: 1985     Years since quittin.4    Smokeless tobacco: Never   Vaping Use    Vaping status: Never Used   Substance Use Topics    Alcohol use: No    Drug use: No         HOME MEDICATIONS:   - Reviewed and documented in chart  Home Medications    Medication Sig Taking? Last Dose Authorizing Provider   atorvastatin (LIPITOR) 20 MG Tab Take 20 mg by mouth every day. Yes UNK at [facility]   dipyridamole (PERSANTINE) 25 MG Tab Take 25 mg by mouth 2 times a day. Yes UNK at [facility]   loperamide (IMODIUM) 2 MG Cap Take 2-4 mg by mouth as needed for Diarrhea. Take 2 capsules (4 mg) at onset, and an additional 1 capsule (2 mg) after each subsequent loose stool, up to a maximum of 8 capsules within 24 hours. Yes UNK at [facility]   sevelamer carbonate (RENVELA) 800 MG Tab tablet Take 1,600 mg by mouth 3 times a day with meals. 2 tablets = 1,600 mg. Yes UNK at [facility]   lisinopril (PRINIVIL) 40 MG tablet Take 40 mg by mouth every day. Yes UNK at [facility]   Methoxy PEG-Epoetin Beta (MIRCERA) 150 MCG/0.3ML Solution Prefilled Syringe Inject 150 mcg as directed every 14 days. Administered at dialysis ([facility] [phone]). Yes 2024 at [facility]   ferrous sulfate 325 (65 Fe) MG tablet Take 325 mg by mouth every day. Yes UNK at [facility]   sodium chloride (OCEAN NASAL SPRAY) 0.65 % Solution Administer 4-6 Sprays into affected nostril(S) every four hours as needed (Congestion). Yes UNK at [facility]   felodipine ER (PLENDIL) 10 MG TABLET SR 24 HR Take 10 mg by mouth every day. Yes UNK at [facility]   Omega-3 Fatty Acids (FISH OIL PEARLS) 180 MG Cap Take 180 mg by  mouth every day. Yes UNK at [facility]   aspirin 81 MG EC tablet Take 81 mg by mouth every day. Yes UNK at [facility]   doxycycline (MONODOX) 100 MG capsule Take 100 mg by mouth 2 times a day. 5 day course prescribed 6/17/2024. Yes UNK at [facility]   amoxicillin-clavulanate (AUGMENTIN) 875-125 MG Tab Take 1 Tablet by mouth 2 times a day. 5 day course prescribed 7/10/2024. Yes UNK at [facility]   gabapentin (NEURONTIN) 100 MG Cap Take 100-300 mg by mouth at bedtime as needed (Neuropathy). 1 to 3 capsules = 100 to 300 mg.  UNK at [facility]   oxymetazoline (AFRIN) 0.05 % Solution Administer 2 Sprays into affected nostril(S) 2 times a day for 7 days.  UNK at [facility] Karly Bauman D.O.         ALLERGIES:  Penicillin g      VITAL SIGNS:  BP (!) 142/68   Pulse 89   Temp 36.4 °C (97.6 °F) (Temporal)   Resp 14   Wt 76.8 kg (169 lb 5 oz)   SpO2 89%   BMI 24.57 kg/m²     Physical Exam  HENT:      Head: Normocephalic.      Nose:      Comments: Packing around nares     Mouth/Throat:      Comments: intubated  Cardiovascular:      Rate and Rhythm: Normal rate and regular rhythm.   Pulmonary:      Comments: Ventilator breath sounds  Abdominal:      General: There is no distension.   Musculoskeletal:         General: Swelling present.      Right lower leg: Edema present.      Left lower leg: Edema present.   Skin:     General: Skin is warm.   Neurological:      Comments: sedated       Access: R AVF      FLUID BALANCE:  No intake/output data recorded.      LABS:  Recent Labs     07/19/24  1400   SODIUM 139   POTASSIUM 5.0   CHLORIDE 96   CO2 27   GLUCOSE 122*   BUN 26*   CREATININE 5.92*   CALCIUM 8.8      Recent Labs     07/19/24  1400   SODIUM 139   POTASSIUM 5.0   CHLORIDE 96   CO2 27   GLUCOSE 122*   BUN 26*   CREATININE 5.92*          IMAGING:  - All imaging reviewed from admission to present day      ASSESSMENTS:    -ESRD     Outpt HD Center: [facility]      Maintenance dialysis qMWF and as needed     Via R arm AVF  -Hypotension     Hold HTN meds until BP improves  -Hypervolemia     Volume off with dialysis as BP tolerates  - Anemia     goal Hgb 10-11   -CKD-MBD  -Acute Epistaxis    -s/p IR maxillary embolization 7/19  -Acute Respiratory Failure    -pt decompensated after IR procedure    -intubated 7/20       PLAN:     - pt unfortunately had respiratory decompensation after IR procedure   - pt seen on dialysis this AM and tolerating  - Volume off with dialysis as BP tolerates  - will eval for additional HD needs tomorrow  - EPO [id number] IV with HD to keep hgb 10-11  - Transfuse PRN hgb < 7  - PTH, Vit D at goal at goal  - No dietary protein restrictions, use renal diet     Goal: 1.5g Protein/kg/day  - Please dose all meds for ESRD  - Follow labs with further recommendations to follow    Please page nephrology with any questions or concerns

## 2024-07-20 ENCOUNTER — APPOINTMENT (OUTPATIENT)
Dept: RADIOLOGY | Facility: MEDICAL CENTER | Age: 74
DRG: 143 | End: 2024-07-20
Attending: INTERNAL MEDICINE
Payer: MEDICARE

## 2024-07-20 ENCOUNTER — APPOINTMENT (OUTPATIENT)
Dept: RADIOLOGY | Facility: MEDICAL CENTER | Age: 74
DRG: 143 | End: 2024-07-20
Attending: HOSPITALIST
Payer: MEDICARE

## 2024-07-20 PROBLEM — I34.0 MITRAL REGURGITATION: Status: ACTIVE | Noted: 2024-07-20

## 2024-07-20 PROBLEM — J96.22 ACUTE ON CHRONIC RESPIRATORY FAILURE WITH HYPOXIA AND HYPERCAPNIA (HCC): Status: ACTIVE | Noted: 2024-07-20

## 2024-07-20 PROBLEM — J96.21 ACUTE ON CHRONIC RESPIRATORY FAILURE WITH HYPOXIA AND HYPERCAPNIA (HCC): Status: ACTIVE | Noted: 2024-07-20

## 2024-07-20 LAB
25(OH)D3 SERPL-MCNC: 45 NG/ML (ref 30–100)
ANION GAP SERPL CALC-SCNC: 18 MMOL/L (ref 7–16)
BASE EXCESS BLDA CALC-SCNC: 0 MMOL/L (ref -4–3)
BASE EXCESS BLDA CALC-SCNC: 5 MMOL/L (ref -4–3)
BODY TEMPERATURE: ABNORMAL DEGREES
BODY TEMPERATURE: ABNORMAL DEGREES
BREATHS SETTING VENT: 24
BUN SERPL-MCNC: 27 MG/DL (ref 8–22)
CALCIUM SERPL-MCNC: 8.1 MG/DL (ref 8.5–10.5)
CFT BLD TEG: 4.7 MIN (ref 4.6–9.1)
CFT P HPASE BLD TEG: 3.9 MIN (ref 4.3–8.3)
CHLORIDE SERPL-SCNC: 99 MMOL/L (ref 96–112)
CLOT ANGLE BLD TEG: 80.3 DEGREES (ref 63–78)
CLOT LYSIS 30M P MA LENFR BLD TEG: 0 % (ref 0–2.6)
CO2 BLDA-SCNC: 29 MMOL/L (ref 20–33)
CO2 BLDA-SCNC: 29 MMOL/L (ref 20–33)
CO2 SERPL-SCNC: 25 MMOL/L (ref 20–33)
CREAT SERPL-MCNC: 6.55 MG/DL (ref 0.5–1.4)
CT.EXTRINSIC BLD ROTEM: 0.7 MIN (ref 0.8–2.1)
DELSYS IDSYS: ABNORMAL
DELSYS IDSYS: ABNORMAL
ERYTHROCYTE [DISTWIDTH] IN BLOOD BY AUTOMATED COUNT: 60.9 FL (ref 35.9–50)
FERRITIN SERPL-MCNC: 771 NG/ML (ref 22–322)
GFR SERPLBLD CREATININE-BSD FMLA CKD-EPI: 8 ML/MIN/1.73 M 2
GLUCOSE BLD STRIP.AUTO-MCNC: 146 MG/DL (ref 65–99)
GLUCOSE BLD STRIP.AUTO-MCNC: 149 MG/DL (ref 65–99)
GLUCOSE BLD STRIP.AUTO-MCNC: 153 MG/DL (ref 65–99)
GLUCOSE SERPL-MCNC: 165 MG/DL (ref 65–99)
GRAM STN SPEC: NORMAL
HCO3 BLDA-SCNC: 27.2 MMOL/L (ref 17–25)
HCO3 BLDA-SCNC: 27.9 MMOL/L (ref 17–25)
HCT VFR BLD AUTO: 28.1 % (ref 42–52)
HGB BLD-MCNC: 8.6 G/DL (ref 14–18)
HOLDING TUBE BB 8507: NORMAL
HOROWITZ INDEX BLDA+IHG-RTO: 470 MM[HG]
HOROWITZ INDEX BLDA+IHG-RTO: 56 MM[HG]
IRON SATN MFR SERPL: 10 % (ref 15–55)
IRON SERPL-MCNC: 20 UG/DL (ref 50–180)
LACTATE BLD-SCNC: 2 MMOL/L (ref 0.5–2)
LACTATE BLD-SCNC: 2 MMOL/L (ref 0.5–2)
LPM ILPM: 1 LPM
MCF BLD TEG: 68.7 MM (ref 52–69)
MCF.PLATELET INHIB BLD ROTEM: 39 MM (ref 15–32)
MCH RBC QN AUTO: 30.1 PG (ref 27–33)
MCHC RBC AUTO-ENTMCNC: 30.6 G/DL (ref 32.3–36.5)
MCV RBC AUTO: 98.3 FL (ref 81.4–97.8)
MODE IMODE: ABNORMAL
O2/TOTAL GAS SETTING VFR VENT: 10 %
O2/TOTAL GAS SETTING VFR VENT: 100 %
PA AA BLD-ACNC: 2.9 % (ref 0–11)
PA ADP BLD-ACNC: 14.1 % (ref 0–17)
PCO2 BLDA: 35.4 MMHG (ref 26–37)
PCO2 BLDA: 56.6 MMHG (ref 26–37)
PCO2 TEMP ADJ BLDA: 36.1 MMHG (ref 26–37)
PEEP END EXPIRATORY PRESSURE IPEEP: 12 CMH20
PH BLDA: 7.29 [PH] (ref 7.4–7.5)
PH BLDA: 7.5 [PH] (ref 7.4–7.5)
PH TEMP ADJ BLDA: 7.5 [PH] (ref 7.4–7.5)
PHOSPHATE SERPL-MCNC: 6.9 MG/DL (ref 2.5–4.5)
PLATELET # BLD AUTO: 199 K/UL (ref 164–446)
PMV BLD AUTO: 11 FL (ref 9–12.9)
PO2 BLDA: 47 MMHG (ref 64–87)
PO2 BLDA: 56 MMHG (ref 64–87)
PO2 TEMP ADJ BLDA: 57 MMHG (ref 64–87)
POTASSIUM SERPL-SCNC: 4.4 MMOL/L (ref 3.6–5.5)
PTH-INTACT SERPL-MCNC: 307 PG/ML (ref 14–72)
RBC # BLD AUTO: 2.86 M/UL (ref 4.7–6.1)
SAO2 % BLDA: 76 % (ref 93–99)
SAO2 % BLDA: 91 % (ref 93–99)
SIGNIFICANT IND 70042: NORMAL
SITE SITE: NORMAL
SODIUM SERPL-SCNC: 142 MMOL/L (ref 135–145)
SOURCE SOURCE: NORMAL
SPECIMEN DRAWN FROM PATIENT: ABNORMAL
SPECIMEN DRAWN FROM PATIENT: ABNORMAL
TEG ALGORITHM TGALG: ABNORMAL
TIBC SERPL-MCNC: 196 UG/DL (ref 250–450)
TIDAL VOLUME IVT: 430 ML
UIBC SERPL-MCNC: 176 UG/DL (ref 110–370)
WBC # BLD AUTO: 17.3 K/UL (ref 4.8–10.8)

## 2024-07-20 PROCEDURE — 302978 HCHG BRONCHOSCOPY-DIAGNOSTIC

## 2024-07-20 PROCEDURE — 94150 VITAL CAPACITY TEST: CPT

## 2024-07-20 PROCEDURE — 90935 HEMODIALYSIS ONE EVALUATION: CPT

## 2024-07-20 PROCEDURE — 82803 BLOOD GASES ANY COMBINATION: CPT

## 2024-07-20 PROCEDURE — 82962 GLUCOSE BLOOD TEST: CPT | Mod: 91

## 2024-07-20 PROCEDURE — 31645 BRNCHSC W/THER ASPIR 1ST: CPT | Performed by: INTERNAL MEDICINE

## 2024-07-20 PROCEDURE — 99292 CRITICAL CARE ADDL 30 MIN: CPT | Mod: 25 | Performed by: STUDENT IN AN ORGANIZED HEALTH CARE EDUCATION/TRAINING PROGRAM

## 2024-07-20 PROCEDURE — 36556 INSERT NON-TUNNEL CV CATH: CPT | Performed by: NURSE PRACTITIONER

## 2024-07-20 PROCEDURE — 05HY33Z INSERTION OF INFUSION DEVICE INTO UPPER VEIN, PERCUTANEOUS APPROACH: ICD-10-PCS | Performed by: INTERNAL MEDICINE

## 2024-07-20 PROCEDURE — 31500 INSERT EMERGENCY AIRWAY: CPT | Performed by: NURSE PRACTITIONER

## 2024-07-20 PROCEDURE — 5A1935Z RESPIRATORY VENTILATION, LESS THAN 24 CONSECUTIVE HOURS: ICD-10-PCS | Performed by: INTERNAL MEDICINE

## 2024-07-20 PROCEDURE — 94640 AIRWAY INHALATION TREATMENT: CPT

## 2024-07-20 PROCEDURE — 0BH17EZ INSERTION OF ENDOTRACHEAL AIRWAY INTO TRACHEA, VIA NATURAL OR ARTIFICIAL OPENING: ICD-10-PCS | Performed by: INTERNAL MEDICINE

## 2024-07-20 PROCEDURE — 85347 COAGULATION TIME ACTIVATED: CPT

## 2024-07-20 PROCEDURE — 700111 HCHG RX REV CODE 636 W/ 250 OVERRIDE (IP): Mod: JZ

## 2024-07-20 PROCEDURE — 0B9D8ZX DRAINAGE OF RIGHT MIDDLE LUNG LOBE, VIA NATURAL OR ARTIFICIAL OPENING ENDOSCOPIC, DIAGNOSTIC: ICD-10-PCS | Performed by: INTERNAL MEDICINE

## 2024-07-20 PROCEDURE — C1751 CATH, INF, PER/CENT/MIDLINE: HCPCS

## 2024-07-20 PROCEDURE — 94002 VENT MGMT INPAT INIT DAY: CPT

## 2024-07-20 PROCEDURE — 31624 DX BRONCHOSCOPE/LAVAGE: CPT | Performed by: INTERNAL MEDICINE

## 2024-07-20 PROCEDURE — 700105 HCHG RX REV CODE 258: Performed by: INTERNAL MEDICINE

## 2024-07-20 PROCEDURE — 700101 HCHG RX REV CODE 250

## 2024-07-20 PROCEDURE — 700111 HCHG RX REV CODE 636 W/ 250 OVERRIDE (IP): Performed by: HOSPITALIST

## 2024-07-20 PROCEDURE — 71045 X-RAY EXAM CHEST 1 VIEW: CPT

## 2024-07-20 PROCEDURE — 84100 ASSAY OF PHOSPHORUS: CPT

## 2024-07-20 PROCEDURE — 700111 HCHG RX REV CODE 636 W/ 250 OVERRIDE (IP): Mod: JZ | Performed by: INTERNAL MEDICINE

## 2024-07-20 PROCEDURE — 82306 VITAMIN D 25 HYDROXY: CPT

## 2024-07-20 PROCEDURE — 36556 INSERT NON-TUNNEL CV CATH: CPT

## 2024-07-20 PROCEDURE — 700102 HCHG RX REV CODE 250 W/ 637 OVERRIDE(OP): Performed by: HOSPITALIST

## 2024-07-20 PROCEDURE — 31500 INSERT EMERGENCY AIRWAY: CPT

## 2024-07-20 PROCEDURE — 80048 BASIC METABOLIC PNL TOTAL CA: CPT

## 2024-07-20 PROCEDURE — 770022 HCHG ROOM/CARE - ICU (200)

## 2024-07-20 PROCEDURE — 99292 CRITICAL CARE ADDL 30 MIN: CPT | Mod: 25 | Performed by: INTERNAL MEDICINE

## 2024-07-20 PROCEDURE — 85384 FIBRINOGEN ACTIVITY: CPT

## 2024-07-20 PROCEDURE — 37799 UNLISTED PX VASCULAR SURGERY: CPT

## 2024-07-20 PROCEDURE — 99291 CRITICAL CARE FIRST HOUR: CPT | Mod: 25 | Performed by: INTERNAL MEDICINE

## 2024-07-20 PROCEDURE — 92950 HEART/LUNG RESUSCITATION CPR: CPT

## 2024-07-20 PROCEDURE — 99291 CRITICAL CARE FIRST HOUR: CPT | Performed by: HOSPITALIST

## 2024-07-20 PROCEDURE — 94799 UNLISTED PULMONARY SVC/PX: CPT

## 2024-07-20 PROCEDURE — 87205 SMEAR GRAM STAIN: CPT

## 2024-07-20 PROCEDURE — 85027 COMPLETE CBC AUTOMATED: CPT

## 2024-07-20 PROCEDURE — 82728 ASSAY OF FERRITIN: CPT

## 2024-07-20 PROCEDURE — 700101 HCHG RX REV CODE 250: Performed by: INTERNAL MEDICINE

## 2024-07-20 PROCEDURE — 99152 MOD SED SAME PHYS/QHP 5/>YRS: CPT

## 2024-07-20 PROCEDURE — 83540 ASSAY OF IRON: CPT

## 2024-07-20 PROCEDURE — 87070 CULTURE OTHR SPECIMN AEROBIC: CPT

## 2024-07-20 PROCEDURE — 85576 BLOOD PLATELET AGGREGATION: CPT | Mod: 91

## 2024-07-20 PROCEDURE — 83550 IRON BINDING TEST: CPT

## 2024-07-20 PROCEDURE — 83970 ASSAY OF PARATHORMONE: CPT

## 2024-07-20 PROCEDURE — 700101 HCHG RX REV CODE 250: Performed by: HOSPITALIST

## 2024-07-20 PROCEDURE — A9270 NON-COVERED ITEM OR SERVICE: HCPCS | Performed by: HOSPITALIST

## 2024-07-20 PROCEDURE — 83605 ASSAY OF LACTIC ACID: CPT | Mod: 91

## 2024-07-20 RX ORDER — FUROSEMIDE 10 MG/ML
80 INJECTION INTRAMUSCULAR; INTRAVENOUS ONCE
Status: COMPLETED | OUTPATIENT
Start: 2024-07-20 | End: 2024-07-20

## 2024-07-20 RX ORDER — FAMOTIDINE 20 MG/1
20 TABLET, FILM COATED ORAL DAILY
Status: DISCONTINUED | OUTPATIENT
Start: 2024-07-21 | End: 2024-07-20

## 2024-07-20 RX ORDER — FUROSEMIDE 10 MG/ML
INJECTION INTRAMUSCULAR; INTRAVENOUS
Status: COMPLETED
Start: 2024-07-20 | End: 2024-07-20

## 2024-07-20 RX ORDER — NALOXONE HYDROCHLORIDE 0.4 MG/ML
INJECTION, SOLUTION INTRAMUSCULAR; INTRAVENOUS; SUBCUTANEOUS
Status: COMPLETED
Start: 2024-07-20 | End: 2024-07-20

## 2024-07-20 RX ORDER — DEXMEDETOMIDINE HYDROCHLORIDE 4 UG/ML
0-.7 INJECTION, SOLUTION INTRAVENOUS CONTINUOUS
Status: DISCONTINUED | OUTPATIENT
Start: 2024-07-20 | End: 2024-07-20

## 2024-07-20 RX ORDER — DEXTROSE MONOHYDRATE 25 G/50ML
25 INJECTION, SOLUTION INTRAVENOUS
Status: DISCONTINUED | OUTPATIENT
Start: 2024-07-20 | End: 2024-07-25 | Stop reason: HOSPADM

## 2024-07-20 RX ORDER — IPRATROPIUM BROMIDE AND ALBUTEROL SULFATE 2.5; .5 MG/3ML; MG/3ML
SOLUTION RESPIRATORY (INHALATION)
Status: COMPLETED
Start: 2024-07-20 | End: 2024-07-20

## 2024-07-20 RX ORDER — NOREPINEPHRINE BITARTRATE 0.03 MG/ML
0-1 INJECTION, SOLUTION INTRAVENOUS CONTINUOUS
Status: DISCONTINUED | OUTPATIENT
Start: 2024-07-20 | End: 2024-07-21

## 2024-07-20 RX ORDER — ROCURONIUM BROMIDE 10 MG/ML
100 INJECTION, SOLUTION INTRAVENOUS ONCE
Status: COMPLETED | OUTPATIENT
Start: 2024-07-20 | End: 2024-07-20

## 2024-07-20 RX ORDER — METHYLPREDNISOLONE SODIUM SUCCINATE 40 MG/ML
40 INJECTION, POWDER, LYOPHILIZED, FOR SOLUTION INTRAMUSCULAR; INTRAVENOUS ONCE
Status: COMPLETED | OUTPATIENT
Start: 2024-07-20 | End: 2024-07-20

## 2024-07-20 RX ORDER — IPRATROPIUM BROMIDE AND ALBUTEROL SULFATE 2.5; .5 MG/3ML; MG/3ML
3 SOLUTION RESPIRATORY (INHALATION)
Status: DISCONTINUED | OUTPATIENT
Start: 2024-07-20 | End: 2024-07-25 | Stop reason: HOSPADM

## 2024-07-20 RX ORDER — ETOMIDATE 2 MG/ML
20 INJECTION INTRAVENOUS ONCE
Status: COMPLETED | OUTPATIENT
Start: 2024-07-20 | End: 2024-07-20

## 2024-07-20 RX ORDER — FUROSEMIDE 10 MG/ML
80 INJECTION INTRAMUSCULAR; INTRAVENOUS
Status: DISCONTINUED | OUTPATIENT
Start: 2024-07-20 | End: 2024-07-20

## 2024-07-20 RX ORDER — FAMOTIDINE 20 MG/1
20 TABLET, FILM COATED ORAL EVERY 12 HOURS
Status: DISCONTINUED | OUTPATIENT
Start: 2024-07-20 | End: 2024-07-20

## 2024-07-20 RX ADMIN — METHYLPREDNISOLONE SODIUM SUCCINATE 40 MG: 40 INJECTION, POWDER, FOR SOLUTION INTRAMUSCULAR; INTRAVENOUS at 03:07

## 2024-07-20 RX ADMIN — ATORVASTATIN CALCIUM 20 MG: 20 TABLET, FILM COATED ORAL at 17:26

## 2024-07-20 RX ADMIN — FENTANYL CITRATE 50 MCG: 50 INJECTION, SOLUTION INTRAMUSCULAR; INTRAVENOUS at 03:35

## 2024-07-20 RX ADMIN — NOREPINEPHRINE BITARTRATE 0.05 MCG/KG/MIN: 1 INJECTION INTRAVENOUS at 03:28

## 2024-07-20 RX ADMIN — ETOMIDATE INJECTION 20 MG: 2 SOLUTION INTRAVENOUS at 03:29

## 2024-07-20 RX ADMIN — IPRATROPIUM BROMIDE AND ALBUTEROL SULFATE 3 ML: 2.5; .5 SOLUTION RESPIRATORY (INHALATION) at 02:37

## 2024-07-20 RX ADMIN — FAMOTIDINE 20 MG: 10 INJECTION, SOLUTION INTRAVENOUS at 05:28

## 2024-07-20 RX ADMIN — NALOXONE HYDROCHLORIDE 0.4 MG: 0.4 INJECTION, SOLUTION INTRAMUSCULAR; INTRAVENOUS; SUBCUTANEOUS at 03:16

## 2024-07-20 RX ADMIN — ROCURONIUM BROMIDE 100 MG: 50 INJECTION, SOLUTION INTRAVENOUS at 03:30

## 2024-07-20 RX ADMIN — DEXMEDETOMIDINE HYDROCHLORIDE 0.2 MCG/KG/HR: 100 INJECTION, SOLUTION INTRAVENOUS at 04:30

## 2024-07-20 RX ADMIN — SENNOSIDES AND DOCUSATE SODIUM 2 TABLET: 50; 8.6 TABLET ORAL at 17:27

## 2024-07-20 RX ADMIN — FUROSEMIDE 80 MG: 10 INJECTION INTRAMUSCULAR; INTRAVENOUS at 03:06

## 2024-07-20 RX ADMIN — FUROSEMIDE 80 MG: 10 INJECTION, SOLUTION INTRAVENOUS at 03:06

## 2024-07-20 RX ADMIN — LIDOCAINE HYDROCHLORIDE 1 ML: 10 INJECTION, SOLUTION EPIDURAL; INFILTRATION; INTRACAUDAL at 08:35

## 2024-07-20 RX ADMIN — CEFTRIAXONE SODIUM 2000 MG: 10 INJECTION, POWDER, FOR SOLUTION INTRAVENOUS at 03:09

## 2024-07-20 RX ADMIN — SEVELAMER CARBONATE 1600 MG: 800 TABLET, FILM COATED ORAL at 17:26

## 2024-07-20 ASSESSMENT — COPD QUESTIONNAIRES
DO YOU EVER COUGH UP ANY MUCUS OR PHLEGM?: NO/ONLY WITH OCCASIONAL COLDS OR INFECTIONS
COPD SCREENING SCORE: 3
HAVE YOU SMOKED AT LEAST 100 CIGARETTES IN YOUR ENTIRE LIFE: NO/DON'T KNOW
DURING THE PAST 4 WEEKS HOW MUCH DID YOU FEEL SHORT OF BREATH: SOME OF THE TIME

## 2024-07-20 ASSESSMENT — LIFESTYLE VARIABLES
ON A TYPICAL DAY WHEN YOU DRINK ALCOHOL HOW MANY DRINKS DO YOU HAVE: 0
EVER FELT BAD OR GUILTY ABOUT YOUR DRINKING: NO
TOTAL SCORE: 0
TOTAL SCORE: 0
EVER HAD A DRINK FIRST THING IN THE MORNING TO STEADY YOUR NERVES TO GET RID OF A HANGOVER: NO
SUBSTANCE_ABUSE: 0
HAVE PEOPLE ANNOYED YOU BY CRITICIZING YOUR DRINKING: NO
TOTAL SCORE: 0
ALCOHOL_USE: NO
AVERAGE NUMBER OF DAYS PER WEEK YOU HAVE A DRINK CONTAINING ALCOHOL: 0
DOES PATIENT WANT TO STOP DRINKING: NO
CONSUMPTION TOTAL: NEGATIVE
HOW MANY TIMES IN THE PAST YEAR HAVE YOU HAD 5 OR MORE DRINKS IN A DAY: 0
HAVE YOU EVER FELT YOU SHOULD CUT DOWN ON YOUR DRINKING: NO

## 2024-07-20 ASSESSMENT — ENCOUNTER SYMPTOMS
PALPITATIONS: 0
CONSTIPATION: 0
WHEEZING: 0
EYE PAIN: 0
NECK PAIN: 0
SPUTUM PRODUCTION: 0
BLOOD IN STOOL: 0
PHOTOPHOBIA: 0
POLYDIPSIA: 0
DEPRESSION: 0
HEMOPTYSIS: 0
COUGH: 1
FLANK PAIN: 0
MYALGIAS: 0
HEARTBURN: 0
BACK PAIN: 0
DIARRHEA: 0
ORTHOPNEA: 0
DIAPHORESIS: 0
TINGLING: 0
BRUISES/BLEEDS EASILY: 0
PND: 0
DIZZINESS: 1
ABDOMINAL PAIN: 0
HEADACHES: 0
BLURRED VISION: 0
NAUSEA: 0
FEVER: 0
CHILLS: 0
HALLUCINATIONS: 0
FALLS: 0
DOUBLE VISION: 0
SINUS PAIN: 0
CLAUDICATION: 0
WEAKNESS: 0
VOMITING: 0
SORE THROAT: 0
TREMORS: 0
SHORTNESS OF BREATH: 1
STRIDOR: 0

## 2024-07-20 ASSESSMENT — COGNITIVE AND FUNCTIONAL STATUS - GENERAL
EATING MEALS: TOTAL
WALKING IN HOSPITAL ROOM: A LOT
PERSONAL GROOMING: A LOT
TURNING FROM BACK TO SIDE WHILE IN FLAT BAD: A LOT
STANDING UP FROM CHAIR USING ARMS: A LOT
TOILETING: A LOT
DRESSING REGULAR LOWER BODY CLOTHING: A LOT
DAILY ACTIVITIY SCORE: 11
DRESSING REGULAR UPPER BODY CLOTHING: A LOT
CLIMB 3 TO 5 STEPS WITH RAILING: A LOT
SUGGESTED CMS G CODE MODIFIER MOBILITY: CL
MOVING FROM LYING ON BACK TO SITTING ON SIDE OF FLAT BED: A LOT
HELP NEEDED FOR BATHING: A LOT
MOVING TO AND FROM BED TO CHAIR: A LOT
MOBILITY SCORE: 12
SUGGESTED CMS G CODE MODIFIER DAILY ACTIVITY: CL

## 2024-07-20 ASSESSMENT — PAIN DESCRIPTION - PAIN TYPE
TYPE: ACUTE PAIN;SURGICAL PAIN
TYPE: ACUTE PAIN

## 2024-07-20 ASSESSMENT — SOCIAL DETERMINANTS OF HEALTH (SDOH)
WITHIN THE PAST 12 MONTHS, YOU WORRIED THAT YOUR FOOD WOULD RUN OUT BEFORE YOU GOT THE MONEY TO BUY MORE: NEVER TRUE
WITHIN THE LAST YEAR, HAVE YOU BEEN AFRAID OF YOUR PARTNER OR EX-PARTNER?: NO
WITHIN THE LAST YEAR, HAVE TO BEEN RAPED OR FORCED TO HAVE ANY KIND OF SEXUAL ACTIVITY BY YOUR PARTNER OR EX-PARTNER?: NO
WITHIN THE PAST 12 MONTHS, THE FOOD YOU BOUGHT JUST DIDN'T LAST AND YOU DIDN'T HAVE MONEY TO GET MORE: NEVER TRUE
WITHIN THE LAST YEAR, HAVE YOU BEEN KICKED, HIT, SLAPPED, OR OTHERWISE PHYSICALLY HURT BY YOUR PARTNER OR EX-PARTNER?: NO
WITHIN THE LAST YEAR, HAVE YOU BEEN HUMILIATED OR EMOTIONALLY ABUSED IN OTHER WAYS BY YOUR PARTNER OR EX-PARTNER?: NO
IN THE PAST 12 MONTHS, HAS THE ELECTRIC, GAS, OIL, OR WATER COMPANY THREATENED TO SHUT OFF SERVICE IN YOUR HOME?: NO

## 2024-07-20 ASSESSMENT — FIBROSIS 4 INDEX
FIB4 SCORE: 3.02
FIB4 SCORE: 2.75
FIB4 SCORE: 3.02

## 2024-07-20 ASSESSMENT — PULMONARY FUNCTION TESTS
FVC: 987
FVC: 1062

## 2024-07-20 NOTE — PROGRESS NOTES
0407: Time out with Madelinejade BARAKAT for left IJ CVC    0411: Wire out     0413: Chest Xray ordered for placement confirmation

## 2024-07-20 NOTE — OR NURSING
2230  Patient turned to right side    2300  Unable to wean O2 from non rebreather mask; attempted to change to oxymask at 10 l/m and O2 saturation dropped to 88%.  Rebreather mask reapplied  Taking small sips of water occasionally; no complaints of nausea  Patient complains of pain in nose; returns to sleep prior to receiving medication  Groin site checked as directed; scant pink drainage on dressing; area soft and no signs of ecchymosis    2349  Transferred to room via bed  O2 tank full  Pertinent post op orders reviewed with receiving RN

## 2024-07-20 NOTE — PROCEDURES
Intubation    Date/Time: 7/20/2024 3:31 AM    Performed by: Madeline Collado  Authorized by: Madeline Collado    Consent:     Consent obtained:  Emergent situation    Consent given by: unable 2/2 pt condition.    Risks discussed:  Aspiration, brain injury, dental trauma, laryngeal injury, bleeding, death, hypoxia and pneumothorax    Alternatives discussed:  No treatment and delayed treatment  Universal protocol:     Patient states understanding of procedure being performed: unable 2/2 pt condition.      Relevant documents present and verified: unable 2/2 pt condition.      Test results available and properly labeled: yes      Imaging studies available: yes      Required blood products, implants, devices, and special equipment available: yes      Site/side marked: yes      Immediately prior to procedure, a time out was called: yes      Patient identity confirmed:  Arm band and hospital-assigned identification number  Pre-procedure details:     Patient status:  Altered mental status    Mallampati score: unable to cooperate.    Pretreatment meds: see mar.    Paralytic: see mar.  Procedure details:     Preoxygenation:  Bag valve mask    CPR in progress: no      Intubation method:  Oral    Oral intubation technique:  Video-assisted    Laryngoscope type:  GlideScope    Laryngoscope blade:  Mac 4    Cormack-Lehane Classification:  Grade 2a    Tube size (mm):  7.5    Tube type:  Cuffed    Number of attempts:  1    Ventilation between attempts: n/a.      Cricoid pressure: no      Tube visualized through cords: yes    Placement assessment:     ETT to teeth:  24    Tube secured with:  Adhesive tape and ETT cardoso    Breath sounds:  Equal and absent over the epigastrium    Placement verification: chest rise, condensation, CXR verification, direct visualization, equal breath sounds, ETCO2 detector and tube exhalation      Placement verification comment:  Dr. Huizar performed bronchoscopy immediately after intubation    CXR  findings:  ETT in proper place  Post-procedure details:     Patient tolerance of procedure:  Tolerated well, no immediate complications  Comments:      The patient tolerated the procedure well. Dr. Huizar supervised the entirety of the procedure and performed a bronchoscopy/BAL immediately after - see his note for details.

## 2024-07-20 NOTE — OR NURSING
2045  Dr Trevino at bedside; notified of patient's status.  Notified of need for non rebreather mask; ok for transfer to IMC if unable to wean to lower concentration of O2

## 2024-07-20 NOTE — CONSULTS
Critical Care Consultation    Date of consult: 7/20/2024    Referring Physician  Steph Vivar M.D.    Reason for Consultation  Respiratory failure    History of Presenting Illness  73 y.o. male with a history of ESRD MWF (missed 7/19), severe AS and MR, PAH, HTN, seizure disorder, DM who presented 7/19/2024 with recurrent epistaxis. He was initially stable, went for bilateral maxillary artery embolization but his respiratory status continued to decline post operatively with hypoxic and hypercapnic respiratory failure despite hemostasis.    CCM was consulted - initially his SpO2 was in the 70s on a NRB and he was quite ill appearing. He was given narcan and placed on BiPAP and while more alert his SpO2 remained low 80s. CXR with bilateral opacities, ABG with hypoxia and hypercapnea. He was listed as full code but previously had been DNR. I asked him about this matter and he wanted me to call his brother. While Mr. Barboza was on BiPAP we had a discussion via speaker phone with his brother included and he was clear in his desire to be placed on the ventilator in line with his prior comments on admission when he decided against prior DNR and to be full code. He was then intubated and subsequently bronched without incident.     SpO2 in the 90s on the ventilator, no life threatening acidosis or electrolyte disturbances so will plan for urgent dialysis first thing in the AM. I cannot see where the admitting team has contacted nephro yet, he follows with [facility].     Code Status  Full Code    Review of Systems  Review of Systems   Unable to perform ROS: Critical illness       Past Medical History   has a past medical history of CATARACT, Diabetes (2005), Hyperlipidemia, Hypertension, Seizure (HCC) (1987), Seizure disorder ([facility]), Snoring, and Stroke ([facility]).    Surgical History   has a past surgical history that includes other (2003); other (2011); cataract phaco with iol (4/20/2011); cataract phaco with iol  (5/4/2011); and cataract extraction with iol (2011).    Family History  family history includes Diabetes in his mother; Heart Disease in his father.    Social History   reports that he quit smoking about 39 years ago. His smoking use included cigarettes. He started smoking about 43 years ago. He has a 2 pack-year smoking history. He has never used smokeless tobacco. He reports that he does not drink alcohol and does not use drugs.    Medications  Home Medications       Reviewed by Steph Vivar M.D. (Physician) on 07/19/24 at 1539  Med List Status: Complete     Medication Last Dose Status   amoxicillin-clavulanate (AUGMENTIN) 875-125 MG Tab UNK Active   aspirin 81 MG EC tablet UNK Active   atorvastatin (LIPITOR) 20 MG Tab UNK Active   dipyridamole (PERSANTINE) 25 MG Tab UNK Active   doxycycline (MONODOX) 100 MG capsule UNK Active   felodipine ER (PLENDIL) 10 MG TABLET SR 24 HR UNK Active   ferrous sulfate 325 (65 Fe) MG tablet UNK Active   gabapentin (NEURONTIN) 100 MG Cap UNK Active   lisinopril (PRINIVIL) 40 MG tablet UNK Active   loperamide (IMODIUM) 2 MG Cap UNK Active   Methoxy PEG-Epoetin Beta (MIRCERA) 150 MCG/0.3ML Solution Prefilled Syringe 7/8/2024 Active   Omega-3 Fatty Acids (FISH OIL PEARLS) 180 MG Cap UNK Active   oxymetazoline (AFRIN) 0.05 % Solution UNK Active   sevelamer carbonate (RENVELA) 800 MG Tab tablet UNK Active   sodium chloride (OCEAN NASAL SPRAY) 0.65 % Solution UNK Active                  Audit from Redirected Encounters    **Home medications have not yet been reviewed for this encounter**       Current Facility-Administered Medications   Medication Dose Route Frequency Provider Last Rate Last Admin    cefTRIAXone (Rocephin) syringe 2,000 mg  2,000 mg Intravenous Q24HRS Kaushik Landaverde M.D.   2,000 mg at 07/20/24 0309    norepinephrine (Levophed) 8 mg in 250 mL NS infusion (premix)  0-1 mcg/kg/min (Ideal) Intravenous Continuous Joe Huizar M.D. 2.7 mL/hr at 07/20/24 0405 0.02  mcg/kg/min at 07/20/24 0405    atorvastatin (Lipitor) tablet 20 mg  20 mg Oral Q EVENING Steph Vivar M.D.        dipyridamole (Persantine) tablet 25 mg  25 mg Oral BID Steph Vivar M.D.        felodipine ER (Plendil) tablet 10 mg  10 mg Oral DAILY Steph Vivar M.D.        gabapentin (Neurontin) capsule 100-300 mg  100-300 mg Oral QHS PRN Steph Vivar M.D.        lisinopril (Prinivil) tablet 40 mg  40 mg Oral Q EVENING Steph Vivar M.D.        sevelamer carbonate (Renvela) tablet 1,600 mg  1,600 mg Oral TID WITH MEALS Steph Vivar M.D.        acetaminophen (Tylenol) tablet 650 mg  650 mg Oral Q6HRS PRN Steph Vivar M.D.        Pharmacy Consult Request ...Pain Management Review 1 Each  1 Each Other PHARMACY TO DOSE Steph Vivar M.D.        oxyCODONE immediate-release (Roxicodone) tablet 2.5 mg  2.5 mg Oral Q3HRS PRN Steph Vivar M.D.        Or    oxyCODONE immediate-release (Roxicodone) tablet 5 mg  5 mg Oral Q3HRS PRN Steph Vivar M.D.        Or    HYDROmorphone (Dilaudid) injection 0.25 mg  0.25 mg Intravenous Q3HRS PRN Steph Vivar M.D.        senna-docusate (Pericolace Or Senokot S) 8.6-50 MG per tablet 2 Tablet  2 Tablet Oral Q EVENING Steph Vivar M.D.        And    polyethylene glycol/lytes (Miralax) Packet 1 Packet  1 Packet Oral QDAY PRN Steph Vivar M.D.        labetalol (Normodyne/Trandate) injection 10 mg  10 mg Intravenous Q4HRS PRN Steph Vivar M.D.        ondansetron (Zofran) syringe/vial injection 4 mg  4 mg Intravenous Q4HRS PRN Steph Vivar M.D.        ondansetron (Zofran ODT) dispertab 4 mg  4 mg Oral Q4HRS PRN Steph Vivar M.D.        NS (Bolus) 0.9 % infusion 100 mL  100 mL Intravenous DIALYSIS PRN Abdulkadir Ramos D.O.        hydrALAZINE (Apresoline) injection 20 mg  20 mg Intravenous Q6HRS PRN Jeremiah Ojeda M.D.           Allergies  Allergies   Allergen Reactions    Penicillin G Rash     Rxn - Exacerbated a rash on his legs  Rash as a child   Tolerates  ceftriaxone and cefepime       Vital Signs last 24 hours  Temp:  [36.4 °C (97.6 °F)-36.9 °C (98.5 °F)] 36.9 °C (98.5 °F)  Pulse:  [] 112  Resp:  [12-38] 38  BP: (113-147)/(58-77) 129/69  SpO2:  [79 %-100 %] 79 %    Physical Exam  Physical Exam  Vitals and nursing note reviewed.   Constitutional:       General: He is in acute distress.      Appearance: He is toxic-appearing.   HENT:      Head: Normocephalic and atraumatic.      Nose:      Comments: Packing in nare; no anterior or posterior bleeding     Mouth/Throat:      Mouth: Mucous membranes are moist.      Pharynx: Oropharynx is clear.   Eyes:      Pupils: Pupils are equal, round, and reactive to light.   Cardiovascular:      Rate and Rhythm: Normal rate.      Pulses: Normal pulses.   Pulmonary:      Comments: Severe respiratory distress, poor air movement   Abdominal:      General: Abdomen is flat. There is no distension.      Palpations: Abdomen is soft.      Tenderness: There is no abdominal tenderness. There is no guarding or rebound.   Musculoskeletal:      Right lower leg: Edema present.      Left lower leg: Edema present.      Comments: RUE AVF   Skin:     General: Skin is warm and dry.      Capillary Refill: Capillary refill takes less than 2 seconds.   Neurological:      Mental Status: He is alert.      Comments: Awake, alert, appropriate, clear speech, moves extremities, follows         Fluids    Intake/Output Summary (Last 24 hours) at 7/20/2024 0410  Last data filed at 7/20/2024 0026  Gross per 24 hour   Intake 455.04 ml   Output --   Net 455.04 ml       Laboratory  Recent Results (from the past 48 hour(s))   CBC WITH DIFFERENTIAL    Collection Time: 07/19/24 11:56 AM   Result Value Ref Range    WBC 5.9 4.8 - 10.8 K/uL    RBC 2.85 (L) 4.70 - 6.10 M/uL    Hemoglobin 8.4 (L) 14.0 - 18.0 g/dL    Hematocrit 27.3 (L) 42.0 - 52.0 %    MCV 95.8 81.4 - 97.8 fL    MCH 29.5 27.0 - 33.0 pg    MCHC 30.8 (L) 32.3 - 36.5 g/dL    RDW 57.6 (H) 35.9 - 50.0 fL     Platelet Count 181 164 - 446 K/uL    MPV 11.1 9.0 - 12.9 fL    Neutrophils-Polys 72.40 (H) 44.00 - 72.00 %    Lymphocytes 13.20 (L) 22.00 - 41.00 %    Monocytes 7.40 0.00 - 13.40 %    Eosinophils 6.10 0.00 - 6.90 %    Basophils 0.70 0.00 - 1.80 %    Immature Granulocytes 0.20 0.00 - 0.90 %    Nucleated RBC 0.00 0.00 - 0.20 /100 WBC    Neutrophils (Absolute) 4.30 1.82 - 7.42 K/uL    Lymphs (Absolute) 0.78 (L) 1.00 - 4.80 K/uL    Monos (Absolute) 0.44 0.00 - 0.85 K/uL    Eos (Absolute) 0.36 0.00 - 0.51 K/uL    Baso (Absolute) 0.04 0.00 - 0.12 K/uL    Immature Granulocytes (abs) 0.01 0.00 - 0.11 K/uL    NRBC (Absolute) 0.00 K/uL   Comp Metabolic Panel    Collection Time: 07/19/24  2:00 PM   Result Value Ref Range    Sodium 139 135 - 145 mmol/L    Potassium 5.0 3.6 - 5.5 mmol/L    Chloride 96 96 - 112 mmol/L    Co2 27 20 - 33 mmol/L    Anion Gap 16.0 7.0 - 16.0    Glucose 122 (H) 65 - 99 mg/dL    Bun 26 (H) 8 - 22 mg/dL    Creatinine 5.92 (HH) 0.50 - 1.40 mg/dL    Calcium 8.8 8.5 - 10.5 mg/dL    Correct Calcium 8.7 8.5 - 10.5 mg/dL    AST(SGOT) 27 12 - 45 U/L    ALT(SGPT) 13 2 - 50 U/L    Alkaline Phosphatase 76 30 - 99 U/L    Total Bilirubin 0.5 0.1 - 1.5 mg/dL    Albumin 4.1 3.2 - 4.9 g/dL    Total Protein 7.8 6.0 - 8.2 g/dL    Globulin 3.7 (H) 1.9 - 3.5 g/dL    A-G Ratio 1.1 g/dL   Prothrombin Time    Collection Time: 07/19/24  2:00 PM   Result Value Ref Range    PT 15.4 (H) 12.0 - 14.6 sec    INR 1.20 (H) 0.87 - 1.13   ESTIMATED GFR    Collection Time: 07/19/24  2:00 PM   Result Value Ref Range    GFR (CKD-EPI) 9 (A) >60 mL/min/1.73 m 2   PLATELET MAPPING WITH BASIC TEG    Collection Time: 07/20/24  2:30 AM   Result Value Ref Range    Reaction Time Initial-R 4.7 4.6 - 9.1 min    React Time Initial Hep 3.9 (L) 4.3 - 8.3 min    Clot Kinetics-K 0.7 (L) 0.8 - 2.1 min    Clot Angle-Angle 80.3 (H) 63.0 - 78.0 degrees    Maximum Clot Strength-MA 68.7 52.0 - 69.0 mm    TEG Functional Fibrinogen(MA) 39.0 (H) 15.0 - 32.0 mm     Lysis 30 minutes-LY30 0.0 0.0 - 2.6 %    % Inhibition ADP 14.1 0.0 - 17.0 %    % Inhibition AA 2.9 0.0 - 11.0 %    TEG Algorithm Link Algorithm    HOLD BLOOD BANK SPECIMEN (NOT TESTED)    Collection Time: 07/20/24  2:30 AM   Result Value Ref Range    Holding Tube - Bb DONE    CBC without Differential    Collection Time: 07/20/24  2:35 AM   Result Value Ref Range    WBC 17.3 (H) 4.8 - 10.8 K/uL    RBC 2.86 (L) 4.70 - 6.10 M/uL    Hemoglobin 8.6 (L) 14.0 - 18.0 g/dL    Hematocrit 28.1 (L) 42.0 - 52.0 %    MCV 98.3 (H) 81.4 - 97.8 fL    MCH 30.1 27.0 - 33.0 pg    MCHC 30.6 (L) 32.3 - 36.5 g/dL    RDW 60.9 (H) 35.9 - 50.0 fL    Platelet Count 199 164 - 446 K/uL    MPV 11.0 9.0 - 12.9 fL   PHOSPHORUS    Collection Time: 07/20/24  2:35 AM   Result Value Ref Range    Phosphorus 6.9 (H) 2.5 - 4.5 mg/dL   PTH INTACT (PTH ONLY)    Collection Time: 07/20/24  2:35 AM   Result Value Ref Range    Pth, Intact 307.0 (H) 14.0 - 72.0 pg/mL   VITAMIN D,25 HYDROXY (DEFICIENCY)    Collection Time: 07/20/24  2:35 AM   Result Value Ref Range    25-Hydroxy   Vitamin D 25 45 30 - 100 ng/mL   FERRITIN    Collection Time: 07/20/24  2:35 AM   Result Value Ref Range    Ferritin 771.0 (H) 22.0 - 322.0 ng/mL   IRON/TOTAL IRON BIND    Collection Time: 07/20/24  2:35 AM   Result Value Ref Range    Iron 20 (L) 50 - 180 ug/dL    Total Iron Binding 196 (L) 250 - 450 ug/dL    Unsat Iron Binding 176 110 - 370 ug/dL    % Saturation 10 (L) 15 - 55 %   Basic Metabolic Panel    Collection Time: 07/20/24  2:35 AM   Result Value Ref Range    Sodium 142 135 - 145 mmol/L    Potassium 4.4 3.6 - 5.5 mmol/L    Chloride 99 96 - 112 mmol/L    Co2 25 20 - 33 mmol/L    Glucose 165 (H) 65 - 99 mg/dL    Bun 27 (H) 8 - 22 mg/dL    Creatinine 6.55 (HH) 0.50 - 1.40 mg/dL    Calcium 8.1 (L) 8.5 - 10.5 mg/dL    Anion Gap 18.0 (H) 7.0 - 16.0   ESTIMATED GFR    Collection Time: 07/20/24  2:35 AM   Result Value Ref Range    GFR (CKD-EPI) 8 (A) >60 mL/min/1.73 m 2        Imaging  DX-CHEST-LIMITED (1 VIEW)   Final Result      1.  Mixed interstitial and airspace disease, greater on the right.   2.  Nodular opacity at the right lower lung measuring 3.6 cm. Recommend follow-up CT chest.      IR-EMBOLIZATION    (Results Pending)       Assessment/Plan  * Acute on chronic respiratory failure with hypoxia and hypercapnia (HCC)  Assessment & Plan  Multifactorial: aspiration of blood, cardiogenic and non-cardiogenic pulmonary edema    07/20/24 intubated   Lasix + iHDS for volume removal  Empiric antibiotics   Follow cultures   Lung protective ventilation strategies  Optimize oxygenation, ventilation, and acid base balance  ABCDEF Bundle     Mitral regurgitation  Assessment & Plan  Maintain euvolemia with iHDS  Optimize filling pressures      Epistaxis- (present on admission)  Assessment & Plan  07/20/24 IR performed b/l maxillary artery embolization   Packing management per ENT  Monitor for hemorrhage     ESRD (end stage renal disease) on dialysis (HCC)- (present on admission)  Assessment & Plan  Nephro will be consulted first thing in the AM    RRT per nephrology  Strict I/Os  Renally dosed medications  Avoid nephrotoxic agents as able  Trend     Pulmonary hypertension (HCC)- (present on admission)  Assessment & Plan  RVSP 68    Avoid acidosis, hypercapnia, and hypoxia as able  Maintain euvolemia       Severe aortic stenosis- (present on admission)  Assessment & Plan  Maintain euvolemia with iHDS  Optimize filling pressures    Hypertension- (present on admission)  Assessment & Plan  Reintroduce oral antihypertensives when appropriate      Type 2 diabetes mellitus (HCC)- (present on admission)  Assessment & Plan  Goal glucose 140-180  SQ insulin  Hypoglycemic protocols         Discussed patient condition and risk of morbidity and/or mortality with RN, RT, Pharmacy, Charge nurse / hot rounds, and Patient.    The patient remains critically ill.  Critical care time = 108 minutes in  directly providing and coordinating critical care and extensive data review.  No time overlap and excludes procedures.

## 2024-07-20 NOTE — PROGRESS NOTES
0327: Time out with Dr. Huizar and Madeline BARAKAT for intubation    0328: Levo started at 0.05 per Bhupendra     0329: 20 Etomidate  given    0330: 100 Rocuronium given     0331: Intubated, positive color change, positive bilateral lung sounds    7.5 tube placed: 23 at the teeth    0335: 50 of fentanyl given     0336: Levo at 0.01    0339: Time out for bronch with Dr. Huizar    0340: Bronch performed, cultures collected

## 2024-07-20 NOTE — CARE PLAN
The patient is Watcher - Medium risk of patient condition declining or worsening    Shift Goals  Clinical Goals: Wean Levophed, Pain Management  Patient Goals: Unable to Assess  Family Goals: Unable to Assess    Progress made toward(s) clinical / shift goals:  Met      Problem: Pain - Standard  Goal: Alleviation of pain or a reduction in pain to the patient’s comfort goal  Note: Pain assessed q4hrs and PRN.       Problem: Mechanical Ventilation  Goal: Safe management of artificial airway and ventilation  Note: Oral care, suctioning once or twice per encounter, minimal secretions, HOB at 30 degrees, ambu bag at bedside, ventilator plugged into red outlet.       Problem: Safety - Medical Restraint  Goal: Remains free of injury from restraints (Restraint for Interference with Medical Device)  Note: Assessment of need for restraints completed, order verified, use of restraints explained to pt, no evidence of learning, turns, mouth care, skin assessment every 2 hours.

## 2024-07-20 NOTE — PROGRESS NOTES
Report and transfer of care from PRATIBHA Negron.    All vital signs and medication administration by anesthesia services.    Left Maxillary Artery Gelfoam embolization:  Surgifoam  REF: [id number]  LOT: [id number]  EXP: 2027-11-13    RIGHT Femoral Artery Perclose:  REF: [id number]  LOT: [id number]  EXP: 2026-04-30    Transported monitored pt to [facility] with Dr. Trevino, O2 via mask @ 8 lpm. Report and transfer of care to  PRATIBHA Weinstein.

## 2024-07-20 NOTE — PROGRESS NOTES
IMCU Cross Cover     Pt was admitted by Dr. Vivar earlier in the day for uncontrolled epistaxis.     Neuro IR and nephrology consulted     Pt underwent Extracranial B/L Maxillary artery embolization for epistaxis with Dr. Ojeda.     Pt tolerated the procedure well and no complication.     Given his advanced aged and co-morbidities, critical care medicine Dr. Torey lloyd was requested by Neuro IR.     Deemed appropriate for IMCU admission with q4hrs neuro watch and vitals as per unit policy.

## 2024-07-20 NOTE — OR SURGEON
Immediate Post- Operative Note        Findings: Bilateral maxillary artery angiogram did not show active bleeding source. 300-500 uM particles used to embolize b/l maxillary arteries. Gelfoam also used on left side.      Procedure(s): Extracranial B/L Maxillary artery embolization for epistaxis      Estimated Blood Loss:  ~20 ml      Complications: None        7/19/2024     5:51 PM     Jeremiah Ojeda M.D.

## 2024-07-20 NOTE — PROGRESS NOTES
Patient transferred from T630 to T627. Assessment completed upon arrival. L brachial arterial line not sutured and taped in place. Per report, patient received 80 mg of lasix but had no urine. Patient told his nurse at the time that he does make urine. Bladder scan conducted upon arrival: 82 mL. OG insertion unsuccessful. Per rubens Wisdom to not place OG or iris as patient may be able to extubate post dialysis. Information given to dayshift RN in bedside report.

## 2024-07-20 NOTE — H&P
Surgery Otolaryngology History & Physical Note    Date  7/20/2024    Primary Care Physician  Clyde Sung M.D.    CC  Epistaxis    HPI  This is a 73 y.o. male who presented with recurrent episodes of epistaxis previously managed with stopping ASA, TXA, packing, cauterization.  He had recurrent high volume bleeding.  He was initially seen inpt on 6/2.  There was suspicion for SPA bleed given repeated high volume bleeding requiring a transfusion.  Patient was not felt to be sufficiently healthy to undergo surgical management of this issue due to his severe AS and AR.  Both myself and Dr. Fitzgerald spoke with him about his cardiac comorbidities and why we felt surgery was risky.  He was seemingly unaware of his cardiac.  I recommend TXA and monitoring, pt was discharged the following day and told to stop ASA.  He had recurrent bleeding and went the ED where he was packed.  He was seen in the office and packing removed with cauterization.  He was strongly counseled to stop aspirin and told that further high volume bleeding would be best managed by IR embolization if this could be accomplished with pt awake under local.  This was discussed with Dr. Lake via Vo[employer] and pt was given a note to take to the ED if he had further bleeding.      He presented to the ED on 7/19 with recurrent bleeding.  I discussed with ERP that surgical intervention would be risky due to need for anesthesia and previous plan was for IR to attempt embo/coiling under local.      Past Medical History:   Diagnosis Date    CATARACT     Diabetes 2005    oral meds    Hyperlipidemia     Hypertension     Seizure (HCC) 1987    Seizure disorder (HCC)     Snoring     Stroke (HCC)        Past Surgical History:   Procedure Laterality Date    CATARACT PHACO WITH IOL  5/4/2011    Performed by ENRIQUETA MABRY at SURGERY SAME DAY [facility]    CATARACT PHACO WITH IOL  4/20/2011    Performed by ENRIQUETA MABRY at SURGERY SAME DAY [facility]     OTHER  2011    left eye cataract    CATARACT EXTRACTION WITH IOL  2011    OTHER  2003    oral surgery       Current Facility-Administered Medications   Medication Dose Route Frequency Provider Last Rate Last Admin    norepinephrine (Levophed) 8 mg in 250 mL NS infusion (premix)  0-1 mcg/kg/min (Ideal) Intravenous Continuous Joe Huizar M.D. 2.7 mL/hr at 07/20/24 0933 0.02 mcg/kg/min at 07/20/24 0933    Respiratory Therapy Consult   Nebulization Continuous RT Joe Huizar M.D.        insulin regular (HumuLIN R,NovoLIN R) injection  1-6 Units Subcutaneous Q6HRS Joe Huizar M.D.        And    dextrose 50% (D50W) injection 25 g  25 g Intravenous Q15 MIN PRN Joe Huizar M.D.        lidocaine (Xylocaine) 1 % injection 2 mL  2 mL Tracheal Tube Q30 MIN PRN Joe Huizar M.D.        fentaNYL (Sublimaze) injection 50 mcg  50 mcg Intravenous Q15 MIN PRN Joe Huizar M.D.        And    fentaNYL (Sublimaze) injection 100 mcg  100 mcg Intravenous Q15 MIN PRN Joe Huizar M.D.        And    fentaNYL (SUBLIMAZE) 50 mcg/mL in 50mL (Continuous Infusion)   Intravenous Continuous Joe Huizar M.D.   Held at 07/20/24 0430    And    dexmedetomidine (Precedex) 400 mcg/100mL infusion  0-0.7 mcg/kg/hr (Ideal) Intravenous Continuous Joe Huizar M.D. 7.1 mL/hr at 07/20/24 0521 0.4 mcg/kg/hr at 07/20/24 0521    ipratropium-albuterol (DUONEB) nebulizer solution  3 mL Nebulization Q2HRS PRN (RT) Joe Huizar M.D.        [START ON 7/21/2024] famotidine (Pepcid) tablet 20 mg  20 mg Enteral Tube DAILY Antione Bains M.D.        Or    [START ON 7/21/2024] famotidine (Pepcid) injection 20 mg  20 mg Intravenous DAILY Antione Bains M.D.        [START ON 7/22/2024] epoetin (Retacrit) injection (Dialysis Use Only) 4,000 Units  4,000 Units Intravenous MO, WE + FR Abdulkadir Ramos D.O.        atorvastatin (Lipitor) tablet 20 mg  20 mg Oral Q EVENING Steph Vivar M.D.        [Held by provider] dipyridamole  (Persantine) tablet 25 mg  25 mg Oral BID Steph Vivar M.D.        sevelamer carbonate (Renvela) tablet 1,600 mg  1,600 mg Oral TID WITH MEALS Steph Vivar M.D.        acetaminophen (Tylenol) tablet 650 mg  650 mg Oral Q6HRS PRN Steph Vivar M.D.        senna-docusate (Pericolace Or Senokot S) 8.6-50 MG per tablet 2 Tablet  2 Tablet Oral Q EVENING Steph Vivar M.D.        And    polyethylene glycol/lytes (Miralax) Packet 1 Packet  1 Packet Oral QDAY PRN Steph Vivar M.D.        labetalol (Normodyne/Trandate) injection 10 mg  10 mg Intravenous Q4HRS PRN Steph Vivar M.D.        ondansetron (Zofran) syringe/vial injection 4 mg  4 mg Intravenous Q4HRS PRN Steph Vivar M.D.        ondansetron (Zofran ODT) dispertab 4 mg  4 mg Oral Q4HRS PRN Steph Vivar M.D.        NS (Bolus) 0.9 % infusion 100 mL  100 mL Intravenous DIALYSIS PRN Abdulkadir Ramos D.O.        hydrALAZINE (Apresoline) injection 20 mg  20 mg Intravenous Q6HRS PRN Jeremiah Ojeda M.D.           Social History     Socioeconomic History    Marital status:      Spouse name: Not on file    Number of children: Not on file    Years of education: Not on file    Highest education level: Not on file   Occupational History    Not on file   Tobacco Use    Smoking status: Former     Current packs/day: 0.00     Average packs/day: 0.5 packs/day for 4.0 years (2.0 ttl pk-yrs)     Types: Cigarettes     Start date: 1981     Quit date: 1985     Years since quittin.4    Smokeless tobacco: Never   Vaping Use    Vaping status: Never Used   Substance and Sexual Activity    Alcohol use: No    Drug use: No    Sexual activity: Not on file   Other Topics Concern    Not on file   Social History Narrative    Not on file     Social Determinants of Health     Financial Resource Strain: Low Risk  (2021)    Overall Financial Resource Strain (CARDIA)     Difficulty of Paying Living Expenses: Not hard at all   Food Insecurity: No Food Insecurity  (7/20/2024)    Hunger Vital Sign     Worried About Running Out of Food in the Last Year: Never true     Ran Out of Food in the Last Year: Never true   Transportation Needs: No Transportation Needs (7/20/2024)    PRAPARE - Transportation     Lack of Transportation (Medical): No     Lack of Transportation (Non-Medical): No   Physical Activity: Not on file   Stress: Not on file   Social Connections: Not on file   Intimate Partner Violence: Not At Risk (7/20/2024)    Humiliation, Afraid, Rape, and Kick questionnaire     Fear of Current or Ex-Partner: No     Emotionally Abused: No     Physically Abused: No     Sexually Abused: No   Housing Stability: Low Risk  (7/20/2024)    Housing Stability Vital Sign     Unable to Pay for Housing in the Last Year: No     Number of Places Lived in the Last Year: 1     Unstable Housing in the Last Year: No       Family History   Problem Relation Age of Onset    Diabetes Mother     Heart Disease Father        Allergies  Penicillin g    Review of Systems  Unable to obtain, pt is sedated and intubated    Physical Exam  Constitutional:       Comments: Intubated and sedated   HENT:      Head: Normocephalic and atraumatic.      Nose:      Comments: Right rapid rhino in place        Vital Signs  Blood Pressure : 99/55   Temperature: 36.6 °C (97.8 °F)   Pulse: 69   Respiration: (!) 21   Pulse Oximetry: 100 %       Labs:  Recent Labs     07/19/24  1156 07/20/24  0235   WBC 5.9 17.3*   RBC 2.85* 2.86*   HEMOGLOBIN 8.4* 8.6*   HEMATOCRIT 27.3* 28.1*   MCV 95.8 98.3*   MCH 29.5 30.1   MCHC 30.8* 30.6*   RDW 57.6* 60.9*   PLATELETCT 181 199   MPV 11.1 11.0     Recent Labs     07/19/24  1400 07/20/24  0235   SODIUM 139 142   POTASSIUM 5.0 4.4   CHLORIDE 96 99   CO2 27 25   GLUCOSE 122* 165*   BUN 26* 27*   CREATININE 5.92* 6.55*   CALCIUM 8.8 8.1*     Recent Labs     07/19/24  1400   INR 1.20*     Recent Labs     07/19/24  1400   ASTSGOT 27   ALTSGPT 13   TBILIRUBIN 0.5   ALKPHOSPHAT 76   GLOBULIN  3.7*   INR 1.20*       Radiology:  DX-CHEST-LIMITED (1 VIEW)   Final Result         1. Endotracheal tube and central line appear adequate. No pneumothorax.   2. Progressive diffuse airspace disease.      DX-ABDOMEN FOR TUBE PLACEMENT   Final Result      Nasogastric tube is coiled within the pharynx. Technologist states that the patient's nurse is aware and the tube was subsequently removed.      DX-CHEST-LIMITED (1 VIEW)   Final Result      1.  Mixed interstitial and airspace disease, greater on the right.   2.  Nodular opacity at the right lower lung measuring 3.6 cm. Recommend follow-up CT chest.      IR-EMBOLIZATION    (Results Pending)         Assessment/Plan:  Recurrent epistaxis from the right side likely from SPA source.  Bleeding controlled with combination of right sided packing and bilateral iMax embo by Dr. Ojeda.  Pt had respiratory decompensation following the procedure possibly related to hypervolemia and is now intubated and sedated.  Recommend packing be left in place for 7 days or until hospital discharge (whichever is sooner) to minimize the risk of rebleeding.

## 2024-07-20 NOTE — ASSESSMENT & PLAN NOTE
07/20/24 IR performed b/l maxillary artery embolization   Packing management per ENT  Monitor for hemorrhage     Keep nasal packing in 5 days or take out prior to DC per ENT

## 2024-07-20 NOTE — PROCEDURES
Central Line Insertion    Date/Time: 7/20/2024 4:10 AM    Performed by: Madeline Collado  Authorized by: Madeline Collado    Consent:     Consent obtained:  Emergent situation    Consent given by: unable 2/2 pt condition.    Risks discussed:  Arterial puncture, incorrect placement, nerve damage, bleeding, infection and pneumothorax    Alternatives discussed:  No treatment and delayed treatment  Pre-procedure details:     Hand hygiene: Hand hygiene performed prior to insertion      Sterile barrier technique: All elements of maximal sterile technique followed      Skin preparation:  ChloraPrep    Skin preparation agent: Skin preparation agent completely dried prior to procedure    Sedation:     Sedation type: see mar.  Anesthesia:     Anesthesia method:  None  Procedure details:     Location:  L internal jugular    Patient position:  Trendelenburg    Procedural supplies:  Triple lumen    Catheter size:  7 Fr    Landmarks identified: yes      Ultrasound guidance: yes      Sterile ultrasound techniques: Sterile gel and sterile probe covers were used      Number of attempts:  1    Successful placement: yes    Post-procedure details:     Post-procedure:  Dressing applied and line sutured    Guidewire: guidewire removal confirmed      Assessment:  Blood return through all ports, no pneumothorax on x-ray and free fluid flow    Patient tolerance of procedure:  Tolerated well, no immediate complications  Comments:      The wire is accounted for. The patient tolerated the procedure well. The line is ok to use.

## 2024-07-20 NOTE — PROGRESS NOTES
4 Eyes Skin Assessment Completed by PRATIBHA freeman and PRATIBHA thornton.    Head WDL  Ears WDL  Nose Redness  Mouth WDL  Neck WDL  Breast/Chest WDL  Shoulder Blades WDL  Spine WDL  (R) Arm/Elbow/Hand Discoloration  (L) Arm/Elbow/Hand Discoloration  Abdomen WDL  Groin R socorro site / L groin discoloration  Scrotum/Coccyx/Buttocks Discoloration  (R) Leg WDL  (L) Leg WDL  (R) Heel/Foot/Toe Discoloration - 2nd toe  (L) Heel/Foot/Toe Discoloration - 3rd toe          Devices In Places ECG, Blood Pressure Cuff, Pulse Ox, Arterial Line, and Oxy Mask      Interventions In Place Pillows, Low Air Loss Mattress, Heels Loaded W/Pillows, and Pressure Redistribution Mattress    Possible Skin Injury Yes    Pictures Uploaded Into Epic Yes  Wound Consult Placed N/A  RN Wound Prevention Protocol Ordered Yes

## 2024-07-20 NOTE — PROGRESS NOTES
The nurse informing that the patient was found to be in respiratory distress.  He was saturating 71% maxed out on nonrebreather at 15 L.  Respiratory rate was 35.  Heart rate 110 sinus tachycardia.  Blood pressure was 140/77.  Chest x-ray revealed right-sided infiltrate and bilateral pulmonary edema.  Repeat CBC and ABG were drawn.  Patient did miss his session of dialysis on Friday.  I tried giving him 80 mg of Lasix, DuoNebs, IV Solu-Medrol, IV ceftriaxone but it was ineffective in relieving his distress.  Patient has nasal packing and high flow oxygen cannot be ordered.  BiPAP was ordered.  Intensivist was consulted for further management and patient will be transferred to the ICU.      Review of Systems   Constitutional:  Negative for chills, diaphoresis, fever and malaise/fatigue.   HENT:  Positive for nosebleeds. Negative for congestion, ear discharge, ear pain, hearing loss, sinus pain, sore throat and tinnitus.    Eyes:  Negative for blurred vision, double vision, photophobia and pain.   Respiratory:  Positive for cough and shortness of breath. Negative for hemoptysis, sputum production, wheezing and stridor.    Cardiovascular:  Negative for chest pain, palpitations, orthopnea, claudication, leg swelling and PND.   Gastrointestinal:  Negative for abdominal pain, blood in stool, constipation, diarrhea, heartburn, melena, nausea and vomiting.   Genitourinary:  Negative for dysuria, flank pain, frequency, hematuria and urgency.   Musculoskeletal:  Negative for back pain, falls, joint pain, myalgias and neck pain.   Skin:  Negative for itching and rash.   Neurological:  Positive for dizziness. Negative for tingling, tremors, weakness and headaches.   Endo/Heme/Allergies:  Negative for environmental allergies and polydipsia. Does not bruise/bleed easily.   Psychiatric/Behavioral:  Negative for depression, hallucinations, substance abuse and suicidal ideas.        Physical Exam  Vitals and nursing note reviewed.    Constitutional:       General: He is in acute distress.      Appearance: Normal appearance. He is ill-appearing. He is not toxic-appearing or diaphoretic.   HENT:      Head: Normocephalic and atraumatic.      Nose: No congestion or rhinorrhea.      Mouth/Throat:      Pharynx: No posterior oropharyngeal erythema.   Eyes:      General: No scleral icterus.        Right eye: No discharge.   Neck:      Vascular: Hepatojugular reflux and JVD present.   Cardiovascular:      Rate and Rhythm: Regular rhythm. Tachycardia present.      Pulses: Normal pulses.      Heart sounds: No murmur heard.     No friction rub. No gallop.   Pulmonary:      Effort: Respiratory distress present.      Breath sounds: No stridor. Rhonchi and rales present. No wheezing.   Abdominal:      General: There is distension.      Tenderness: There is no abdominal tenderness.   Musculoskeletal:         General: No swelling, tenderness, deformity or signs of injury.      Cervical back: Normal range of motion.      Right lower leg: Edema present.      Left lower leg: Edema present.   Skin:     Capillary Refill: Capillary refill takes more than 3 seconds.      Coloration: Skin is not jaundiced or pale.      Findings: No bruising, erythema, lesion or rash.   Neurological:      General: No focal deficit present.      Mental Status: He is alert and oriented to person, place, and time.           Patient is critically ill.   The patient continues to have: Respiratory distress  The vital organ system that is affected is the: Respiratory and cardiac  If untreated there is a high chance of deterioration into: Respiratory arrest and cardiac arrest  And eventually death.   The critical care that I am providing today is: Noninvasive ventilation with BiPAP, upgrade to ICU and evaluation for intubation and ventilator support.  The critical that has been undertaken is medically complex.   There has been no overlap in critical care time.   Critical Care Time not  including procedures: 60 minutes

## 2024-07-20 NOTE — PROGRESS NOTES
Critical Care Progress Note    Date of admission  7/19/2024    Chief Complaint  73 y.o. male with a history of ESRD on MWF dialysis through a fistula (missed 7/19), severe aortic stenosis and MR, pulmonary hypertension, HTN, seizure disorder and diabetes who was admitted on 7/19 with recurrent epistaxis.  Initially stable, he went for bilateral maxillary artery embolization but his respiratory status declined significantly postoperatively.  He was started on BiPAP which quickly failed and after goals of care discussion he was intubated and started on mechanical ventilation.    Hospital Course  7/20 - worsening respiratory status, last night had bilateral maxillary artery embolizations for epistaxis.  Goals of care discussions, intubated and started on mechanical ventilation.    Interval Problem Update  Reviewed last 24 hour events:  Tmax: Afebrile  Diet: N.p.o. for now  Vasopressors: None    Infusions:   Precedex    Antibx:   Ceftriaxone 7/20-present    BAL 7/20 - pending    Intake / Output  Urine Output past 24 hours: 400mL    Lab Trends  WBC 6 --> 17  Hgb stable ~8    AG 16 --> 18  Creatinine 6 --> 6.5        Review of Systems  Review of Systems   Unable to perform ROS: Critical illness        Vital Signs for last 24 hours   Temp:  [36.4 °C (97.6 °F)-37.4 °C (99.4 °F)] 37.4 °C (99.4 °F)  Pulse:  [] 70  Resp:  [12-38] 24  BP: (112-147)/(58-77) 117/66  SpO2:  [79 %-100 %] 100 %    Hemodynamic parameters for last 24 hours       Respiratory Information for the last 24 hours  Vent Mode: APVCMV  Rate (breaths/min): 24  Vt Target (mL): 430  PEEP/CPAP: 14  MAP: 20  Control VTE (exp VT): 432    Physical Exam   Physical Exam  Vitals and nursing note reviewed. Exam conducted with a chaperone present.   Constitutional:       Appearance: He is ill-appearing.      Interventions: He is sedated and intubated.   HENT:      Head: Normocephalic.      Mouth/Throat:      Mouth: Mucous membranes are moist.   Eyes:       Extraocular Movements: Extraocular movements intact.   Cardiovascular:      Rate and Rhythm: Normal rate and regular rhythm.      Pulses: Normal pulses.   Pulmonary:      Effort: Pulmonary effort is normal. No respiratory distress. He is intubated.      Breath sounds: Rhonchi present.   Abdominal:      General: There is no distension.      Palpations: Abdomen is soft.      Tenderness: There is no abdominal tenderness. There is no guarding or rebound.   Musculoskeletal:         General: Normal range of motion.      Cervical back: Normal range of motion and neck supple.   Skin:     General: Skin is warm and dry.      Capillary Refill: Capillary refill takes less than 2 seconds.   Neurological:      General: No focal deficit present.         Medications  Current Facility-Administered Medications   Medication Dose Route Frequency Provider Last Rate Last Admin    cefTRIAXone (Rocephin) syringe 2,000 mg  2,000 mg Intravenous Q24HRS Kaushik Landaverde M.D.   2,000 mg at 07/20/24 0309    norepinephrine (Levophed) 8 mg in 250 mL NS infusion (premix)  0-1 mcg/kg/min (Ideal) Intravenous Continuous Joe Huizar M.D.   Stopped at 07/20/24 0440    Respiratory Therapy Consult   Nebulization Continuous RT Joe Huizar M.D.        famotidine (Pepcid) tablet 20 mg  20 mg Enteral Tube Q12HRS Joe Huizar M.D.        Or    famotidine (Pepcid) injection 20 mg  20 mg Intravenous Q12HRS Joe Huizar M.D.   20 mg at 07/20/24 0528    insulin regular (HumuLIN R,NovoLIN R) injection  1-6 Units Subcutaneous Q6HRS Joe Huizar M.D.        And    dextrose 50% (D50W) injection 25 g  25 g Intravenous Q15 MIN PRN Joe Huizar M.D.        MD Alert...ICU Electrolyte Replacement per Pharmacy   Other PHARMACY TO DOSE Joe Huizar M.D.        lidocaine (Xylocaine) 1 % injection 2 mL  2 mL Tracheal Tube Q30 MIN PRN Joe Huizar M.D.        fentaNYL (Sublimaze) injection 50 mcg  50 mcg Intravenous Q15 MIN PRN Joe HARDIN  AJAY Huizar        And    fentaNYL (Sublimaze) injection 100 mcg  100 mcg Intravenous Q15 MIN PRN Joe Huizar M.D.        And    fentaNYL (SUBLIMAZE) 50 mcg/mL in 50mL (Continuous Infusion)   Intravenous Continuous Joe Huizar M.D.   Held at 07/20/24 0430    And    dexmedetomidine (Precedex) 400 mcg/100mL infusion  0-0.7 mcg/kg/hr (Ideal) Intravenous Continuous Joe Huizar M.D. 7.1 mL/hr at 07/20/24 0521 0.4 mcg/kg/hr at 07/20/24 0521    ipratropium-albuterol (DUONEB) nebulizer solution  3 mL Nebulization Q2HRS PRN (RT) Joe Huizar M.D.        furosemide (Lasix) injection 80 mg  80 mg Intravenous BID DIURETIC Joe Huizar M.D.        atorvastatin (Lipitor) tablet 20 mg  20 mg Oral Q EVENING Steph Vivar M.D.        dipyridamole (Persantine) tablet 25 mg  25 mg Oral BID Steph Vivar M.D.        sevelamer carbonate (Renvela) tablet 1,600 mg  1,600 mg Oral TID WITH MEALS Steph Vivar M.D.        acetaminophen (Tylenol) tablet 650 mg  650 mg Oral Q6HRS PRN Steph Vivar M.D.        Pharmacy Consult Request ...Pain Management Review 1 Each  1 Each Other PHARMACY TO DOSE Steph Vivar M.D.        senna-docusate (Pericolace Or Senokot S) 8.6-50 MG per tablet 2 Tablet  2 Tablet Oral Q EVENING Steph Vivar M.D.        And    polyethylene glycol/lytes (Miralax) Packet 1 Packet  1 Packet Oral QDAY PRN Steph Vivar M.D.        labetalol (Normodyne/Trandate) injection 10 mg  10 mg Intravenous Q4HRS PRN Steph Vivar M.D.        ondansetron (Zofran) syringe/vial injection 4 mg  4 mg Intravenous Q4HRS PRN Steph Vivar M.D.        ondansetron (Zofran ODT) dispertab 4 mg  4 mg Oral Q4HRS PRN Steph Vivar M.D.        NS (Bolus) 0.9 % infusion 100 mL  100 mL Intravenous DIALYSIS PRN Abdulkadir Ramos D.O.        hydrALAZINE (Apresoline) injection 20 mg  20 mg Intravenous Q6HRS PRN Jeremiah Ojeda M.D.           Fluids    Intake/Output Summary (Last 24 hours) at 7/20/2024 0740  Last data  filed at 7/20/2024 0600  Gross per 24 hour   Intake 462.53 ml   Output --   Net 462.53 ml       Laboratory  Recent Labs     07/20/24  0253 07/20/24  0452   ISTATAPH 7.290* 7.505*   ISTATAPCO2 56.6* 35.4   ISTATAPO2 47* 56*   ISTATATCO2 29 29   IDWYHTE8UPL 76* 91*   ISTATARTHCO3 27.2* 27.9*   ISTATARTBE 0 5*   ISTATTEMP see below 99.4 F   ISTATFIO2 10 100   ISTATSPEC Arterial Arterial   ISTATAPHTC  --  7.498   HCRMXMSD1AV  --  57*         Recent Labs     07/19/24 1400 07/20/24  0235   SODIUM 139 142   POTASSIUM 5.0 4.4   CHLORIDE 96 99   CO2 27 25   BUN 26* 27*   CREATININE 5.92* 6.55*   PHOSPHORUS  --  6.9*   CALCIUM 8.8 8.1*     Recent Labs     07/19/24 1400 07/20/24  0235   ALTSGPT 13  --    ASTSGOT 27  --    ALKPHOSPHAT 76  --    TBILIRUBIN 0.5  --    GLUCOSE 122* 165*     Recent Labs     07/19/24  1156 07/19/24  1400 07/20/24  0235   WBC 5.9  --  17.3*   NEUTSPOLYS 72.40*  --   --    LYMPHOCYTES 13.20*  --   --    MONOCYTES 7.40  --   --    EOSINOPHILS 6.10  --   --    BASOPHILS 0.70  --   --    ASTSGOT  --  27  --    ALTSGPT  --  13  --    ALKPHOSPHAT  --  76  --    TBILIRUBIN  --  0.5  --      Recent Labs     07/19/24  1156 07/19/24 1400 07/20/24  0235   RBC 2.85*  --  2.86*   HEMOGLOBIN 8.4*  --  8.6*   HEMATOCRIT 27.3*  --  28.1*   PLATELETCT 181  --  199   PROTHROMBTM  --  15.4*  --    INR  --  1.20*  --    IRON  --   --  20*   FERRITIN  --   --  771.0*   TOTIRONBC  --   --  196*       Imaging  X-Ray:  I have personally reviewed the images and compared with prior images. and My impression is: Lines and tubes in appropriate position, bilateral consolidations with right> left    Assessment/Plan  * Acute on chronic respiratory failure with hypoxia and hypercapnia (HCC)  Assessment & Plan  Multifactorial: aspiration of blood, cardiogenic and non-cardiogenic pulmonary edema    Intubated date: 7/20  Reason intubated: Acute hypoxemic respiratory failure  GI prophylaxis: H2 blocker  Monitor ventilator waveforms  & blood gases, titrate flow/peep and volumes according.   Daily SAT/SBT  All ventilator bundles are in place     Epistaxis- (present on admission)  Assessment & Plan  07/20/24 IR performed b/l maxillary artery embolization   Packing management per ENT  Monitor for hemorrhage     ESRD (end stage renal disease) on dialysis (HCC)- (present on admission)  Assessment & Plan  Follows with [facility] nephrology-will consult them for dialysis today    Continue sevelamer    Strict I/Os  Renally dosed medications  Avoid nephrotoxic agents as able  Trend     Severe aortic stenosis- (present on admission)  Assessment & Plan  Maintain euvolemia with iHDS  Optimize filling pressures    Mitral regurgitation  Assessment & Plan  Maintain euvolemia with iHDS  Optimize filling pressures    Acute blood loss anemia- (present on admission)  Assessment & Plan  Due to epistaxis  Daily CBC  Transfuse when hemoglobin is less than 7    Pulmonary hypertension (HCC)- (present on admission)  Assessment & Plan  RVSP 68    Avoid acidosis, hypercapnia, and hypoxia as able  Maintain euvolemia       Primary hypertension- (present on admission)  Assessment & Plan  Reintroduce oral antihypertensives when appropriate      Type 2 diabetes mellitus (HCC)- (present on admission)  Assessment & Plan  Goal glucose 140-180  SQ insulin  Hypoglycemic protocols          VTE:  Contraindicated-recent life-threatening epistaxis  Ulcer: H2 Antagonist  Lines: Central Line  Ongoing indication addressed and Celis Catheter  Ongoing indication addressed    I have performed a physical exam and reviewed and updated ROS and Plan today (7/20/2024). In review of yesterday's note (7/19/2024), there are no changes except as documented above.     Discussed patient condition and risk of morbidity and/or mortality with Family, RN, RT, Pharmacy, , Code status disscussed, Charge nurse / hot rounds, and nephrology    The patient remains critically ill.  He is intubated and on  mechanical ventilation critical care time = 68 minutes in directly providing and coordinating critical care and extensive data review.  No time overlap and excludes procedures.

## 2024-07-20 NOTE — PROGRESS NOTES
Neuro Interventional Radiology   Progress Note     Author: Gela Thomas D.N.P. Date & Time created: 7/20/2024  2:01 PM   Date of admission  7/19/2024  Note to reader: this note follows the APSO format rather than the historical SOAP format. Assessment and plan located at the top of the note for ease of use.    Chief Complaint  73 y.o. male admitted 7/19/2024 with   Chief Complaint   Patient presents with    Epistaxis     Since this morning. Denies ASA or thinners, nose clamp in place.      HPI  Jean Barboza is a 73-year-old male with PMH significant for severe episodic epistaxis requiring cautery, ESRD on HD, HTN, severe aortic stenosis, DM type II, pulmonary hypertension, and history of CVA who presented as a return visit to the ED for epistaxis, last seen 7/10/2024 with packing placed.  Packing removed by PCP with bleeding recurrence within 24 hours without noted trauma.  In the ED attempted cautery without bleeding resolution.  TXA applied, nasal clamp placed, tamponade balloon placed with bleeding resolution.  7/19/2024 DANYEL Dr. Ojeda completed extracranial bilateral maxillary artery embolization for epistaxis using microspheres and Gelfoam.  General anesthesia for procedure.    Interval History DANYEL  7/20/2024: Overnight patient intubated for respiratory failure.  He remains intubated at this time but is responsive and appropriate to yes/no questions.  Right femoral angio site mildly tender without ecchymosis, oozing, or hematoma.  Dorsalis pedis pulses +2 bilaterally, skin cool but pink on uncovered feet.  Tamponade balloon remains in place in left nare without bleeding or epistaxis noted overnight.  I reviewed patient's most recent labs including WBC 17.3<5.9, Hgb 8.6<8.4, CR 6.55 (ESRD on HD).  Coordinated post DANYEL procedure care with intensivist and hospital nursing staff.    Assessment/Plan     Principal Problem:    Acute on chronic respiratory failure with hypoxia and hypercapnia (HCC)  Active Problems:     Type 2 diabetes mellitus (HCC)    Primary hypertension    Severe aortic stenosis    Pulmonary hypertension (HCC)    ESRD (end stage renal disease) on dialysis (HCC)    Acute blood loss anemia    Epistaxis    Mitral regurgitation      Plan IR  -Post IR groin access site instructions: no lifting greater than 5 lbs and no baths/swimming/soaking in tub for 7-10 days. Shower OK. OK to change dressings/band aid as needed.  - From a NeuroInterventional Service standpoint, OK to discharge to home when medically cleared by Hospitalist Service.    -Thank you for allowing Interventional Radiology team to participate in the patients care, if any additional care or requests are needed in the future please do not hesitate call or place IR order                    Review of Systems  Physical Exam   Review of Systems   Unable to perform ROS: Intubated      Vitals:    07/20/24 1015   BP:    Pulse: 69   Resp: (!) 21   Temp:    SpO2: 100%        Physical Exam  Constitutional:       General: He is not in acute distress.     Appearance: He is ill-appearing.   HENT:      Head: Atraumatic.   Cardiovascular:      Rate and Rhythm: Normal rate.      Pulses: Normal pulses.   Pulmonary:      Effort: Pulmonary effort is normal. No respiratory distress.      Comments: Intubated and ventilated  Abdominal:      General: There is no distension.      Palpations: Abdomen is soft.   Musculoskeletal:      Right lower leg: No edema.      Left lower leg: No edema.   Skin:     General: Skin is warm and dry.      Capillary Refill: Capillary refill takes 2 to 3 seconds.      Comments: RIGHT groin access site soft, non-tender, without ecchymosis; dressing cdi.     Neurological:      General: No focal deficit present.      Mental Status: He is oriented to person, place, and time.   Psychiatric:         Mood and Affect: Mood normal.         Behavior: Behavior normal.             Labs    Recent Labs     07/19/24  1156 07/20/24  0235   WBC 5.9 17.3*   RBC 2.85*  "2.86*   HEMOGLOBIN 8.4* 8.6*   HEMATOCRIT 27.3* 28.1*   MCV 95.8 98.3*   MCH 29.5 30.1   MCHC 30.8* 30.6*   RDW 57.6* 60.9*   PLATELETCT 181 199   MPV 11.1 11.0     Recent Labs     07/19/24  1400 07/20/24  0235   SODIUM 139 142   POTASSIUM 5.0 4.4   CHLORIDE 96 99   CO2 27 25   GLUCOSE 122* 165*   BUN 26* 27*   CREATININE 5.92* 6.55*   CALCIUM 8.8 8.1*     Recent Labs     07/19/24  1400 07/20/24  0235   ALBUMIN 4.1  --    TBILIRUBIN 0.5  --    ALKPHOSPHAT 76  --    TOTPROTEIN 7.8  --    ALTSGPT 13  --    ASTSGOT 27  --    CREATININE 5.92* 6.55*     DX-CHEST-LIMITED (1 VIEW)   Final Result         1. Endotracheal tube and central line appear adequate. No pneumothorax.   2. Progressive diffuse airspace disease.      DX-ABDOMEN FOR TUBE PLACEMENT   Final Result      Nasogastric tube is coiled within the pharynx. Technologist states that the patient's nurse is aware and the tube was subsequently removed.      DX-CHEST-LIMITED (1 VIEW)   Final Result      1.  Mixed interstitial and airspace disease, greater on the right.   2.  Nodular opacity at the right lower lung measuring 3.6 cm. Recommend follow-up CT chest.      IR-EMBOLIZATION    (Results Pending)       INR   Date Value Ref Range Status   07/19/2024 1.20 (H) 0.87 - 1.13 Final     Comment:     INR - Non-therapeutic Reference Range: 0.87-1.13  INR - Therapeutic Reference Range: 2.0-4.0       No results found for: \"POCINR\"     Intake/Output Summary (Last 24 hours) at 7/20/2024 1401  Last data filed at 7/20/2024 1000  Gross per 24 hour   Intake 494.67 ml   Output --   Net 494.67 ml      Labs not explicitly included in this progress note were reviewed by the author. Radiology/imaging not explicitly included in this progress note was reviewed by the author.     I have performed a physical exam and reviewed and updated ROS and Plan today (7/20/2024).     58 minutes in directly providing and coordinating care and extensive data review.  No time overlap and excludes " procedures.

## 2024-07-20 NOTE — PROGRESS NOTES
Assumed care of patient from NOC RN. VSS. All drips and alarm parameters verified with off going RN. Call light in reach, bed in lowest position, No family at bedside.

## 2024-07-20 NOTE — OR NURSING
1813  Patient arrived in PACU via gurney  Drowsy, arouses to vocal stimuli and returned to sleep quickly  Groin check, soft, pink, dressing dry and intact  Pedal and post tibial pulses checked with doppler; strong; foot warm and pink    1900  Patient has occasional soft cough; nonproductive  O2 saturation <90%; increased O2 to 18 l/m via mask; small amount of  improvement; patient encouraged to deep breath.      1945  Patient's O2 saturation decreased to 87 -89%.  Mask changed to oxymask at 10  l/m with minimal increase in O2 saturation.    2000  Patient placed on a non rebreather mask; O2 saturation increased to 100%    2015  Patient head raised to 30 degrees; no bleeding noted at right groin.  Dry cough increased in frequency.  Sips of water given; no complaints of nausea    Patient medicated X 2 for complaint of pain in nose; partial relief obtained

## 2024-07-20 NOTE — PROGRESS NOTES
[facility] Nursing Notes     1st HD today x 3hrs per Dr WERNER Ramos  Consent signed for HD, acknowledge also by his son Mr. Cutler  Initiated at 0835 and ended 1135     Pre HD assessment     Received patient intubate but awake, able to respond verbal stimuli/commands  No chest pain  Diminished sound on both lungs  No complains pre HD.     Edema - no edema     Access: - Right AVF, lower arm  Bruit/Thrill - strong and present     No s/s of infection: no redness, no tenderness, no pus, no oozing of blood, warm to touch.     Intra HD    BP on the lower side  IV Levophed 8mg infusion started during HD treatment  BP 93/50 to 119/56  UF target 2L net  Pt awake sometimes, able to respond verbal commands        Post HD     UF goal  achieved: 2500mLs - 500 mLs (200mls prime + 300mls rinseback)       Target Net UF : 2000mls     Completed HD tolerated well  Post vital signs stable  Nil complaints  Dressing: dry and intact     Documented by  LORAINE COKER RN    Report given to PCN Shasta Kendrick RN

## 2024-07-20 NOTE — RESPIRATORY CARE
PATIENT EXTUBATED WITHOUT INCIDENT. PATIENT PLACED ON NC AT 3LPM POST EXTUBATION SPO2 87%. PLACED PATIENT ON OXYMAS K DUE TO ONE NARE OCCULED. O2 10LPM OXYMASK   93%. RR 16 HR 90 - NO SIGNS OF INCREASED WOB OR RESP. DISTRESS.

## 2024-07-20 NOTE — PROGRESS NOTES
4 Eyes Skin Assessment Completed by PRATIBHA De León and PRATIBHA Hooper.    Head WDL  Ears WDL  Nose R nare nose packing  Mouth WDL  Neck WDL LIJ triple   Breast/Chest WDL  Shoulder Blades WDL  Spine WDL  (R) Arm/Elbow/Hand Fistula   (L) Arm/Elbow/Hand WDL, Brachial arterial line  Abdomen WDL. Small hernia   Groin R femoral artery perclose  Scrotum/Coccyx/Buttocks Non-Blanching and Discoloration, Potential DTI   (R) Leg Discoloration   (L) Leg Discoloration   (R) Heel/Foot/Toe Dryness, 2nd toe discoloration around nail   (L) Heel/Foot/Toe Dryness, discolored middle toe nail     Devices In Places ECG, Blood Pressure Cuff, Pulse Ox, Arterial Line, ET Tube, and Central Line      Interventions In Place Heel Mepilex, Sacral Mepilex, TAP System, Pillows, Q2 Turns, and Low Air Loss Mattress    Possible Skin Injury Yes    Pictures Uploaded Into Epic Yes  Wound Consult Placed Yes  RN Wound Prevention Protocol Ordered Yes

## 2024-07-20 NOTE — PROCEDURES
Procedures  Date: 07/20/24   Time: 4:18 AM     Procedure: Bronchoscopy with bronchoalveolar lavage and therapeutic aspiration of secretions    Indication: respiratory failure  Consent: emergent     Procedure: After obtaining consent, a time-out was performed. Respiratory therapy and nursing at bedside throughout procedure. Patient provided sedation and analgesia throughout the procedure. Placed on full ventilator support with an FiO2 of 100% throughout the procedure. Using a fiberoptic bronchoscope, trachea entered via ETT.  Airways visualized directly and the following intervention was performed: diagnostic and therapeutic lavage with all areas of lungs suctioned to clear.     Findings as below. Patient tolerated procedure well without any difficulties and left in care of bedside nurse/RT.     Medications: etomidate    Findings: Upper airway - Not visualized as bronchoscope passed through ETT.    Trachea to amari - normal appearing mucosa without lesions or mass, ETT tip measured 2 cm from the amari.    R proximal and distal airways - normal appearing mucosa without mass/lesion/anatomic variance; copious clear/pink frothy secretions    L proximal and distal airways - normal appearing mucosa without mass/lesion/anatomic variance; copious clear/pink frothy secretions     Samples - BAL RML    Complications: none

## 2024-07-20 NOTE — HOSPITAL COURSE
7/20 - worsening respiratory status, last night had bilateral maxillary artery embolizations for epistaxis.  Goals of care discussions, intubated and started on mechanical ventilation.    7/21 - Planning dialysis again today, will downgrade to tele when finished.  Remove CVC post dialysis.

## 2024-07-20 NOTE — ASSESSMENT & PLAN NOTE
Multifactorial: aspiration of blood, cardiogenic and non-cardiogenic pulmonary edema    Intubated date: 7/20  Extubated: 7/20 in afternoon    Dialysis per nephrology  RT protocols

## 2024-07-21 ENCOUNTER — HOSPITAL ENCOUNTER (OUTPATIENT)
Dept: RADIOLOGY | Facility: MEDICAL CENTER | Age: 74
End: 2024-07-21
Attending: STUDENT IN AN ORGANIZED HEALTH CARE EDUCATION/TRAINING PROGRAM
Payer: MEDICARE

## 2024-07-21 LAB
ALBUMIN SERPL BCP-MCNC: 3.4 G/DL (ref 3.2–4.9)
ALBUMIN/GLOB SERPL: 1.1 G/DL
ALP SERPL-CCNC: 60 U/L (ref 30–99)
ALT SERPL-CCNC: 8 U/L (ref 2–50)
ANION GAP SERPL CALC-SCNC: 15 MMOL/L (ref 7–16)
AST SERPL-CCNC: 17 U/L (ref 12–45)
BILIRUB SERPL-MCNC: 0.5 MG/DL (ref 0.1–1.5)
BUN SERPL-MCNC: 35 MG/DL (ref 8–22)
CALCIUM ALBUM COR SERPL-MCNC: 7.8 MG/DL (ref 8.5–10.5)
CALCIUM SERPL-MCNC: 7.3 MG/DL (ref 8.5–10.5)
CHLORIDE SERPL-SCNC: 93 MMOL/L (ref 96–112)
CO2 SERPL-SCNC: 28 MMOL/L (ref 20–33)
CREAT SERPL-MCNC: 6.15 MG/DL (ref 0.5–1.4)
ERYTHROCYTE [DISTWIDTH] IN BLOOD BY AUTOMATED COUNT: 59.7 FL (ref 35.9–50)
GFR SERPLBLD CREATININE-BSD FMLA CKD-EPI: 9 ML/MIN/1.73 M 2
GLOBULIN SER CALC-MCNC: 3.1 G/DL (ref 1.9–3.5)
GLUCOSE BLD STRIP.AUTO-MCNC: 128 MG/DL (ref 65–99)
GLUCOSE BLD STRIP.AUTO-MCNC: 130 MG/DL (ref 65–99)
GLUCOSE BLD STRIP.AUTO-MCNC: 148 MG/DL (ref 65–99)
GLUCOSE BLD STRIP.AUTO-MCNC: 151 MG/DL (ref 65–99)
GLUCOSE BLD STRIP.AUTO-MCNC: 158 MG/DL (ref 65–99)
GLUCOSE SERPL-MCNC: 129 MG/DL (ref 65–99)
HCT VFR BLD AUTO: 23.4 % (ref 42–52)
HGB BLD-MCNC: 7.4 G/DL (ref 14–18)
MAGNESIUM SERPL-MCNC: 1.9 MG/DL (ref 1.5–2.5)
MCH RBC QN AUTO: 30.1 PG (ref 27–33)
MCHC RBC AUTO-ENTMCNC: 31.6 G/DL (ref 32.3–36.5)
MCV RBC AUTO: 95.1 FL (ref 81.4–97.8)
PHOSPHATE SERPL-MCNC: 5.6 MG/DL (ref 2.5–4.5)
PLATELET # BLD AUTO: 141 K/UL (ref 164–446)
PMV BLD AUTO: 11.4 FL (ref 9–12.9)
POTASSIUM SERPL-SCNC: 5.2 MMOL/L (ref 3.6–5.5)
PROT SERPL-MCNC: 6.5 G/DL (ref 6–8.2)
RBC # BLD AUTO: 2.46 M/UL (ref 4.7–6.1)
SODIUM SERPL-SCNC: 136 MMOL/L (ref 135–145)
WBC # BLD AUTO: 12 K/UL (ref 4.8–10.8)

## 2024-07-21 PROCEDURE — 80053 COMPREHEN METABOLIC PANEL: CPT

## 2024-07-21 PROCEDURE — A9270 NON-COVERED ITEM OR SERVICE: HCPCS

## 2024-07-21 PROCEDURE — 84100 ASSAY OF PHOSPHORUS: CPT

## 2024-07-21 PROCEDURE — 99233 SBSQ HOSP IP/OBS HIGH 50: CPT | Performed by: STUDENT IN AN ORGANIZED HEALTH CARE EDUCATION/TRAINING PROGRAM

## 2024-07-21 PROCEDURE — 700101 HCHG RX REV CODE 250: Performed by: INTERNAL MEDICINE

## 2024-07-21 PROCEDURE — 90935 HEMODIALYSIS ONE EVALUATION: CPT

## 2024-07-21 PROCEDURE — 99233 SBSQ HOSP IP/OBS HIGH 50: CPT | Performed by: HOSPITALIST

## 2024-07-21 PROCEDURE — 307059 PAD,EAR PROTECTOR: Performed by: HOSPITALIST

## 2024-07-21 PROCEDURE — 83735 ASSAY OF MAGNESIUM: CPT

## 2024-07-21 PROCEDURE — 700102 HCHG RX REV CODE 250 W/ 637 OVERRIDE(OP): Performed by: HOSPITALIST

## 2024-07-21 PROCEDURE — 87641 MR-STAPH DNA AMP PROBE: CPT

## 2024-07-21 PROCEDURE — 85027 COMPLETE CBC AUTOMATED: CPT

## 2024-07-21 PROCEDURE — 82962 GLUCOSE BLOOD TEST: CPT

## 2024-07-21 PROCEDURE — A9270 NON-COVERED ITEM OR SERVICE: HCPCS | Performed by: HOSPITALIST

## 2024-07-21 PROCEDURE — 700102 HCHG RX REV CODE 250 W/ 637 OVERRIDE(OP): Performed by: INTERNAL MEDICINE

## 2024-07-21 PROCEDURE — 71045 X-RAY EXAM CHEST 1 VIEW: CPT

## 2024-07-21 PROCEDURE — 770020 HCHG ROOM/CARE - TELE (206)

## 2024-07-21 PROCEDURE — 700102 HCHG RX REV CODE 250 W/ 637 OVERRIDE(OP)

## 2024-07-21 RX ORDER — CEPHALEXIN 500 MG/1
500 CAPSULE ORAL 2 TIMES DAILY
Status: DISCONTINUED | OUTPATIENT
Start: 2024-07-21 | End: 2024-07-22

## 2024-07-21 RX ADMIN — INSULIN HUMAN 1 UNITS: 100 INJECTION, SOLUTION PARENTERAL at 01:19

## 2024-07-21 RX ADMIN — CEPHALEXIN 500 MG: 500 CAPSULE ORAL at 12:20

## 2024-07-21 RX ADMIN — ACETAMINOPHEN 650 MG: 325 TABLET, FILM COATED ORAL at 13:05

## 2024-07-21 RX ADMIN — ACETAMINOPHEN 650 MG: 325 TABLET, FILM COATED ORAL at 01:01

## 2024-07-21 RX ADMIN — CEPHALEXIN 500 MG: 500 CAPSULE ORAL at 17:31

## 2024-07-21 RX ADMIN — SEVELAMER CARBONATE 1600 MG: 800 TABLET, FILM COATED ORAL at 17:32

## 2024-07-21 RX ADMIN — LIDOCAINE HYDROCHLORIDE 1 ML: 10 INJECTION, SOLUTION EPIDURAL; INFILTRATION; INTRACAUDAL at 14:20

## 2024-07-21 RX ADMIN — INSULIN HUMAN 1 UNITS: 100 INJECTION, SOLUTION PARENTERAL at 14:36

## 2024-07-21 RX ADMIN — SEVELAMER CARBONATE 1600 MG: 800 TABLET, FILM COATED ORAL at 09:22

## 2024-07-21 RX ADMIN — ATORVASTATIN CALCIUM 20 MG: 20 TABLET, FILM COATED ORAL at 17:32

## 2024-07-21 RX ADMIN — SEVELAMER CARBONATE 1600 MG: 800 TABLET, FILM COATED ORAL at 14:33

## 2024-07-21 ASSESSMENT — ENCOUNTER SYMPTOMS
CHILLS: 0
SORE THROAT: 0
NERVOUS/ANXIOUS: 1
CARDIOVASCULAR NEGATIVE: 1
NAUSEA: 0
GASTROINTESTINAL NEGATIVE: 1
PALPITATIONS: 0
SPUTUM PRODUCTION: 0
NEUROLOGICAL NEGATIVE: 1
WEAKNESS: 1
VOMITING: 0
FEVER: 0
COUGH: 1
HEADACHES: 0
MUSCULOSKELETAL NEGATIVE: 1
EYES NEGATIVE: 1
ABDOMINAL PAIN: 0
FOCAL WEAKNESS: 0
SHORTNESS OF BREATH: 0
SHORTNESS OF BREATH: 1

## 2024-07-21 ASSESSMENT — PAIN DESCRIPTION - PAIN TYPE
TYPE: ACUTE PAIN

## 2024-07-21 ASSESSMENT — PATIENT HEALTH QUESTIONNAIRE - PHQ9
2. FEELING DOWN, DEPRESSED, IRRITABLE, OR HOPELESS: NOT AT ALL
SUM OF ALL RESPONSES TO PHQ9 QUESTIONS 1 AND 2: 0
1. LITTLE INTEREST OR PLEASURE IN DOING THINGS: NOT AT ALL

## 2024-07-21 ASSESSMENT — FIBROSIS 4 INDEX
FIB4 SCORE: 2.75
FIB4 SCORE: 3.11

## 2024-07-21 NOTE — WOUND TEAM
Attempted to see patient for wound consult of sacrum.  Patient getting dialysis at this time.  Will return at later time.

## 2024-07-21 NOTE — PROGRESS NOTES
[facility] Services Progress Note     Hemodialysis treatment ordered today per Dr. Abdulkadir Ramos x 2.5 hours.   Treatment initiated at 1429 and completed at 1700.      Patient tolerated tx; see electronic flow sheet for details.      Net UF 2000 mL.      Needles removed from access site. Dressings applied and sites held x 10 minutes; verified no bleeding. Positive bruit/thrill post tx.   Staff RN to monitor AVF for breakthrough bleeding. Should breakthrough bleeding occur, staff RN to apply pressure to access sites until bleeding resolved.   Notify Dialysis and Nephrologist for follow-up.     Report given to Primary RN Taisha Victoria .

## 2024-07-21 NOTE — CARE PLAN
"  Problem: Pain - Standard  Goal: Alleviation of pain or a reduction in pain to the patient’s comfort goal  Outcome: Progressing     Problem: Knowledge Deficit - Standard  Goal: Patient and family/care givers will demonstrate understanding of plan of care, disease process/condition, diagnostic tests and medications  Outcome: Progressing     Problem: Hemodynamics  Goal: Patient's hemodynamics, fluid balance and neurologic status will be stable or improve  Outcome: Progressing   The patient is Watcher - Medium risk of patient condition declining or worsening    Shift Goals  Clinical Goals: Rest, VSS, comfort.  Patient Goals: Patient shook head \"no\"  Family Goals: MALINI    Progress made toward(s) clinical / shift goals:  Transfer to tele after HD today.        "

## 2024-07-21 NOTE — PROGRESS NOTES
"[facility] -  PROGRESS NOTE               Author: Abdulkadir Ramos D.O. Date & Time: 7/21/2024  12:07 PM     HPI:  73Y M w ESRD on MWF HD ([facility]) and recent epistaxis returns with new nose bleed on 7/19. Per H&P, pt had reported he has been having nose bleed issues for about 2 months. Pt reported that he developed another nose bleed and has not been able to stop it.  ER Labwork showed tolerable electrolytes and per ER MD note, pt did not appear volume overloaded. He was taken taken to IR for embolization where b/l maxillary arteries were embolized. Anesthesia post-procedure note indicated pt was euvolemic. In PACU pt was noted to require additional O2, and was placed on non-rebreather mask around 2000. Pt continued to worsen overnight and was upgraded to ICU and required intubation around 0400. Nephrology was not paged overnight. Pt seen and examined on AM of 7/20 and is tolerating dialysis.     DAILY NEPHROLOGY SUMMARY:  7/20: consult done  7/21: Pt tolerated Hd yesterday. Extubated. On oxymask. States he feels better. Discussed additional HD today and pt is agreeable.      REVIEW OF SYSTEMS:    10 point ROS reviewed and is as per HPI/daily summary or otherwise negative    PMH/PSH/SH/FH: Reviewed and unchanged since admission note    CURRENT MEDICATIONS:   Scheduled Medications   Medication Dose Frequency    cephALEXin  500 mg BID    insulin regular  1-6 Units Q6HRS    [START ON 7/22/2024] epoetin  4,000 Units MO, WE + FR    atorvastatin  20 mg Q EVENING    [Held by provider] dipyridamole  25 mg BID    sevelamer carbonate  1,600 mg TID WITH MEALS    senna-docusate  2 Tablet Q EVENING       VS:  /61   Pulse 91   Temp 36 °C (96.8 °F) (Temporal)   Resp 20   Ht 1.753 m (5' 9.02\")   Wt 77.5 kg (170 lb 13.7 oz)   SpO2 94%   BMI 25.22 kg/m²     Physical Exam  Constitutional:       Appearance: Normal appearance.   HENT:      Head: Normocephalic and atraumatic.      Nose:      " Comments: Packing noted     Mouth/Throat:      Mouth: Mucous membranes are moist.   Cardiovascular:      Rate and Rhythm: Normal rate and regular rhythm.   Pulmonary:      Effort: Pulmonary effort is normal.      Breath sounds: Rhonchi present.      Comments: Coarse breath sounds  Neurological:      Mental Status: He is alert and oriented to person, place, and time. Mental status is at baseline.       Access:      Fluids:  In: 96 [P.O.:50; I.V.:46]  Out: 0     LABS:  Recent Labs     07/19/24  1400 07/20/24  0235 07/21/24  0400   SODIUM 139 142 136   POTASSIUM 5.0 4.4 5.2   CHLORIDE 96 99 93*   CO2 27 25 28   GLUCOSE 122* 165* 129*   BUN 26* 27* 35*   CREATININE 5.92* 6.55* 6.15*   CALCIUM 8.8 8.1* 7.3*       IMAGING:  - Imaging studies reviewed    ASSESSMENTS:    -ESRD     Outpt HD Center: [facility]     Maintenance dialysis qMWF and as needed     Via R arm AVF  -Hypotension     Hold HTN meds until BP improves  -Hypervolemia     Volume off with dialysis as BP tolerates  - Anemia     goal Hgb 10-11   -CKD-MBD  -Acute Epistaxis    -s/p IR maxillary embolization 7/19  -Acute Respiratory Failure    -pt decompensated after IR procedure    -intubated 7/20, extubated 7/21        PLAN:                - pt improved with HD yesterday  - will dialyze again today to help optimize respiratory status  - resume qMWF HD tomorrow   - Volume off with dialysis as BP tolerates  - EPO 13888C IV with HD to keep hgb 10-11  - Transfuse PRN hgb < 7  - PTH, Vit D at goal at goal  - No dietary protein restrictions, use renal diet     Goal: 1.5g Protein/kg/day  - Please dose all meds for ESRD  - Follow labs with further recommendations to follow     Please page nephrology with any questions or concerns

## 2024-07-21 NOTE — PROGRESS NOTES
Critical Care Progress Note    Date of admission  7/19/2024    Chief Complaint  73 y.o. male with a history of ESRD on MWF dialysis through a fistula (missed 7/19), severe aortic stenosis and MR, pulmonary hypertension, HTN, seizure disorder and diabetes who was admitted on 7/19 with recurrent epistaxis.  Initially stable, he went for bilateral maxillary artery embolization but his respiratory status declined significantly postoperatively.  He was started on BiPAP which quickly failed and after goals of care discussion he was intubated and started on mechanical ventilation.    Hospital Course  7/20 - worsening respiratory status, last night had bilateral maxillary artery embolizations for epistaxis.  Goals of care discussions, intubated and started on mechanical ventilation.    7/21 - Planning dialysis again today, will downgrade to tele when finished.  Remove CVC post dialysis.    Interval Problem Update  Reviewed last 24 hour events:  Tmax: Afebrile  Diet: Advance as tolerated - Renal  Vasopressors: None    Infusions: None    Antibx:   Ceftriaxone 7/20 - present    BAL 7/20 - pending/NGTD    Intake / Output  Urine Output past 24 hours: 400mL    Lab Trends  WBC 6 --> 17 --> 12  Hgb 8 --> 7.4  Platelets 199 --> 141    AG 16 --> 18 --> 15  Creatinine 6 --> 6.5 --> 6.1      Review of Systems  Review of Systems   Constitutional:  Negative for chills, fever and malaise/fatigue.   HENT:  Positive for nosebleeds. Negative for congestion and sore throat.    Eyes: Negative.    Respiratory:  Negative for sputum production and shortness of breath.    Cardiovascular:  Negative for chest pain and palpitations.   Gastrointestinal:  Negative for abdominal pain, nausea and vomiting.   Genitourinary: Negative.    Musculoskeletal: Negative.    Skin: Negative.    Neurological:  Positive for weakness. Negative for focal weakness and headaches.   All other systems reviewed and are negative.       Vital Signs for last 24 hours   Temp:   [36 °C (96.8 °F)-36.8 °C (98.2 °F)] 36 °C (96.8 °F)  Pulse:  [59-97] 91  Resp:  [14-39] 20  BP: ()/(50-70) 123/61  SpO2:  [82 %-100 %] 94 %    Hemodynamic parameters for last 24 hours       Respiratory Information for the last 24 hours  Vent Mode: Spont  PEEP/CPAP: 8  P Support: 5  MAP: 11    Physical Exam   Physical Exam  Vitals and nursing note reviewed. Exam conducted with a chaperone present.   Constitutional:       General: He is not in acute distress.     Appearance: Normal appearance. He is not ill-appearing.   HENT:      Head: Normocephalic.      Nose:      Comments: Rhino Rocket present     Mouth/Throat:      Mouth: Mucous membranes are moist.   Eyes:      Extraocular Movements: Extraocular movements intact.   Cardiovascular:      Rate and Rhythm: Normal rate and regular rhythm.      Pulses: Normal pulses.   Pulmonary:      Effort: Pulmonary effort is normal. No respiratory distress.   Abdominal:      General: There is no distension.      Palpations: Abdomen is soft.      Tenderness: There is no abdominal tenderness. There is no guarding or rebound.   Musculoskeletal:         General: Normal range of motion.      Cervical back: Normal range of motion and neck supple.   Skin:     General: Skin is warm and dry.      Capillary Refill: Capillary refill takes less than 2 seconds.   Neurological:      General: No focal deficit present.      Mental Status: He is alert and oriented to person, place, and time. Mental status is at baseline.         Medications  Current Facility-Administered Medications   Medication Dose Route Frequency Provider Last Rate Last Admin    Respiratory Therapy Consult   Nebulization Continuous RT Joe Huizar M.D.        insulin regular (HumuLIN R,NovoLIN R) injection  1-6 Units Subcutaneous Q6HRS Joe Huizar M.D.   1 Units at 07/21/24 0119    And    dextrose 50% (D50W) injection 25 g  25 g Intravenous Q15 MIN PRN Joe Huizar M.D.        ipratropium-albuterol  (DUONEB) nebulizer solution  3 mL Nebulization Q2HRS PRN (RT) Joe Huizar M.D.        [START ON 7/22/2024] epoetin (Retacrit) injection (Dialysis Use Only) 4,000 Units  4,000 Units Intravenous MO, WE + FR Abdulkadir Ramos D.O.        atorvastatin (Lipitor) tablet 20 mg  20 mg Oral Q EVENING Steph Vivar M.D.   20 mg at 07/20/24 1726    [Held by provider] dipyridamole (Persantine) tablet 25 mg  25 mg Oral BID Steph Vivar M.D.        sevelamer carbonate (Renvela) tablet 1,600 mg  1,600 mg Oral TID WITH MEALS Steph Vivar M.D.   1,600 mg at 07/21/24 0922    acetaminophen (Tylenol) tablet 650 mg  650 mg Oral Q6HRS PRN Steph Vivar M.D.   650 mg at 07/21/24 0101    senna-docusate (Pericolace Or Senokot S) 8.6-50 MG per tablet 2 Tablet  2 Tablet Oral Q EVENING Steph Vivar M.D.   2 Tablet at 07/20/24 1727    And    polyethylene glycol/lytes (Miralax) Packet 1 Packet  1 Packet Oral QDAY PRN Steph iVvar M.D.        labetalol (Normodyne/Trandate) injection 10 mg  10 mg Intravenous Q4HRS PRN Antione Bains M.D.        NS (Bolus) 0.9 % infusion 100 mL  100 mL Intravenous DIALYSIS PRN Abdulkadir Ramos D.O.        hydrALAZINE (Apresoline) injection 20 mg  20 mg Intravenous Q6HRS PRN Jeremiah Ojeda M.D.           Fluids    Intake/Output Summary (Last 24 hours) at 7/21/2024 1030  Last data filed at 7/21/2024 0800  Gross per 24 hour   Intake 113.82 ml   Output 0 ml   Net 113.82 ml       Laboratory  Recent Labs     07/20/24  0253 07/20/24  0452   ISTATAPH 7.290* 7.505*   ISTATAPCO2 56.6* 35.4   ISTATAPO2 47* 56*   ISTATATCO2 29 29   [id number] 76* 91*   ISTATARTHCO3 27.2* 27.9*   ISTATARTBE 0 5*   ISTATTEMP see below 99.4 F   ISTATFIO2 10 100   ISTATSPEC Arterial Arterial   ISTATAPHTC  --  7.498   [id number]  --  57*         Recent Labs     07/19/24  1400 07/20/24  0235 07/21/24  0400   SODIUM 139 142 136   POTASSIUM 5.0 4.4 5.2   CHLORIDE 96 99 93*   CO2 27 25 28   BUN 26* 27* 35*   CREATININE 5.92* 6.55* 6.15*    MAGNESIUM  --   --  1.9   PHOSPHORUS  --  6.9* 5.6*   CALCIUM 8.8 8.1* 7.3*     Recent Labs     07/19/24 1400 07/20/24 0235 07/21/24  0400   ALTSGPT 13  --  8   ASTSGOT 27  --  17   ALKPHOSPHAT 76  --  60   TBILIRUBIN 0.5  --  0.5   GLUCOSE 122* 165* 129*     Recent Labs     07/19/24  1156 07/19/24 1400 07/20/24 0235 07/21/24  0400   WBC 5.9  --  17.3* 12.0*   NEUTSPOLYS 72.40*  --   --   --    LYMPHOCYTES 13.20*  --   --   --    MONOCYTES 7.40  --   --   --    EOSINOPHILS 6.10  --   --   --    BASOPHILS 0.70  --   --   --    ASTSGOT  --  27  --  17   ALTSGPT  --  13  --  8   ALKPHOSPHAT  --  76  --  60   TBILIRUBIN  --  0.5  --  0.5     Recent Labs     07/19/24 1156 07/19/24 1400 07/20/24 0235 07/21/24  0400   RBC 2.85*  --  2.86* 2.46*   HEMOGLOBIN 8.4*  --  8.6* 7.4*   HEMATOCRIT 27.3*  --  28.1* 23.4*   PLATELETCT 181  --  199 141*   PROTHROMBTM  --  15.4*  --   --    INR  --  1.20*  --   --    IRON  --   --  20*  --    FERRITIN  --   --  771.0*  --    TOTIRONBC  --   --  196*  --        Imaging  X-Ray:  I have personally reviewed the images and compared with prior images. and My impression is: Lines and tubes in appropriate position, bilateral consolidations with right> left    Assessment/Plan  * Acute on chronic respiratory failure with hypoxia and hypercapnia (HCC)  Assessment & Plan  Multifactorial: aspiration of blood, cardiogenic and non-cardiogenic pulmonary edema    Intubated date: 7/20  Extubated: 7/20 in afternoon    Dialysis per nephrology  RT protocols    Epistaxis- (present on admission)  Assessment & Plan  07/20/24 IR performed b/l maxillary artery embolization   Packing management per ENT  Monitor for hemorrhage     Keep nasal packing in 5 days or take out prior to DC per ENT    ESRD (end stage renal disease) on dialysis (HCC)- (present on admission)  Assessment & Plan  Dialysis per [facility]  Continue sevelamer    Strict I/Os  Renally dosed medications  Avoid nephrotoxic agents  as able  Trend     Severe aortic stenosis- (present on admission)  Assessment & Plan  Maintain euvolemia with iHDS  Optimize filling pressures    Mitral regurgitation  Assessment & Plan  Maintain euvolemia with iHDS  Optimize filling pressures    Acute blood loss anemia- (present on admission)  Assessment & Plan  Due to epistaxis  Daily CBC  Transfuse when hemoglobin is less than 7    Pulmonary hypertension (HCC)- (present on admission)  Assessment & Plan  RVSP 68    Avoid acidosis, hypercapnia, and hypoxia as able  Maintain euvolemia       Primary hypertension- (present on admission)  Assessment & Plan  Reintroduce oral antihypertensives when appropriate      Type 2 diabetes mellitus (HCC)- (present on admission)  Assessment & Plan  Goal glucose 140-180  SQ insulin  Hypoglycemic protocols          VTE:  Contraindicated - recent life-threatening epistaxis  Ulcer: Not Indicated  Lines: None    I have performed a physical exam and reviewed and updated ROS and Plan today (7/21/2024). In review of yesterday's note (7/20/2024), there are no changes except as documented above.     Discussed patient condition and risk of morbidity and/or mortality with Family, RN, RT, Pharmacy, , Code status disscussed, Charge nurse / hot rounds, and nephrology    Ok to transfer patient out of ICU today. [facility] will sign off at transfer. Please call with questions.

## 2024-07-21 NOTE — PROGRESS NOTES
Monitor Summary:  Rhythm:SR  Ectopy: Not seen   HR: 70-80's   Measurements:0.16/0.1/0.53

## 2024-07-21 NOTE — CARE PLAN
Problem: Mechanical Ventilation  Goal: Safe management of artificial airway and ventilation  Outcome: Met  Goal: Successful weaning off mechanical ventilator, spontaneously maintains adequate gas exchange  Outcome: Met  Goal: Patient will be able to express needs and understand communication  Outcome: Met     Problem: Safety - Medical Restraint  Goal: Remains free of injury from restraints (Restraint for Interference with Medical Device)  Outcome: Met  Goal: Free from restraint(s) (Restraint for Interference with Medical Device)  Outcome: Met     Problem: Pain - Standard  Goal: Alleviation of pain or a reduction in pain to the patient’s comfort goal  Outcome: Progressing     Problem: Knowledge Deficit - Standard  Goal: Patient and family/care givers will demonstrate understanding of plan of care, disease process/condition, diagnostic tests and medications  Outcome: Progressing     Problem: Hemodynamics  Goal: Patient's hemodynamics, fluid balance and neurologic status will be stable or improve  Outcome: Progressing     Problem: Fall Risk  Goal: Patient will remain free from falls  Outcome: Progressing

## 2024-07-21 NOTE — CARE PLAN
"The patient is Stable - Low risk of patient condition declining or worsening    Shift Goals  Clinical Goals: Hemodynamic stability, rest and up in chair for AM breakfast  Patient Goals: Patient shook head \"no\"  Family Goals: MALINI    Progress made toward(s) clinical / shift goals:    Problem: Pain - Standard  Goal: Alleviation of pain or a reduction in pain to the patient’s comfort goal  Outcome: Progressing     Problem: Respiratory  Goal: Patient will achieve/maintain optimum respiratory ventilation and gas exchange  Outcome: Progressing  Flowsheets  Taken 7/21/2024 0100  O2 Delivery Device: Oxymask  Taken 7/21/2024 0000  Incentive Spirometer: Not Applicable  Taken 7/20/2024 2000  Deep Breathe and Cough: Needs Coaching  Note: Patient is decreased O2 LPM overnight.     Problem: Fall Risk  Goal: Patient will remain free from falls  7/21/2024 0105 by Naomi Cannon, R.N.  Outcome: Progressing  7/21/2024 0103 by Naomi Cannon R.N.  Outcome: Progressing       Patient is not progressing towards the following goals:      "

## 2024-07-22 LAB
ANION GAP SERPL CALC-SCNC: 13 MMOL/L (ref 7–16)
BACTERIA SPEC RESP CULT: NORMAL
BUN SERPL-MCNC: 27 MG/DL (ref 8–22)
CALCIUM SERPL-MCNC: 8.5 MG/DL (ref 8.5–10.5)
CHLORIDE SERPL-SCNC: 97 MMOL/L (ref 96–112)
CO2 SERPL-SCNC: 27 MMOL/L (ref 20–33)
CREAT SERPL-MCNC: 4.71 MG/DL (ref 0.5–1.4)
ERYTHROCYTE [DISTWIDTH] IN BLOOD BY AUTOMATED COUNT: 57.6 FL (ref 35.9–50)
GFR SERPLBLD CREATININE-BSD FMLA CKD-EPI: 12 ML/MIN/1.73 M 2
GLUCOSE BLD STRIP.AUTO-MCNC: 104 MG/DL (ref 65–99)
GLUCOSE BLD STRIP.AUTO-MCNC: 105 MG/DL (ref 65–99)
GLUCOSE BLD STRIP.AUTO-MCNC: 120 MG/DL (ref 65–99)
GLUCOSE BLD STRIP.AUTO-MCNC: 167 MG/DL (ref 65–99)
GLUCOSE SERPL-MCNC: 112 MG/DL (ref 65–99)
GRAM STN SPEC: NORMAL
HCT VFR BLD AUTO: 22.7 % (ref 42–52)
HGB BLD-MCNC: 7.1 G/DL (ref 14–18)
MAGNESIUM SERPL-MCNC: 2 MG/DL (ref 1.5–2.5)
MCH RBC QN AUTO: 29.8 PG (ref 27–33)
MCHC RBC AUTO-ENTMCNC: 31.3 G/DL (ref 32.3–36.5)
MCV RBC AUTO: 95.4 FL (ref 81.4–97.8)
PHOSPHATE SERPL-MCNC: 3.8 MG/DL (ref 2.5–4.5)
PLATELET # BLD AUTO: 124 K/UL (ref 164–446)
PMV BLD AUTO: 11.9 FL (ref 9–12.9)
POTASSIUM SERPL-SCNC: 4.4 MMOL/L (ref 3.6–5.5)
RBC # BLD AUTO: 2.38 M/UL (ref 4.7–6.1)
SCCMEC + MECA PNL NOSE NAA+PROBE: NEGATIVE
SIGNIFICANT IND 70042: NORMAL
SITE SITE: NORMAL
SODIUM SERPL-SCNC: 137 MMOL/L (ref 135–145)
SOURCE SOURCE: NORMAL
WBC # BLD AUTO: 9.9 K/UL (ref 4.8–10.8)

## 2024-07-22 PROCEDURE — 82962 GLUCOSE BLOOD TEST: CPT

## 2024-07-22 PROCEDURE — 700101 HCHG RX REV CODE 250

## 2024-07-22 PROCEDURE — A9270 NON-COVERED ITEM OR SERVICE: HCPCS | Performed by: HOSPITALIST

## 2024-07-22 PROCEDURE — 700111 HCHG RX REV CODE 636 W/ 250 OVERRIDE (IP): Mod: JZ,JG

## 2024-07-22 PROCEDURE — 36415 COLL VENOUS BLD VENIPUNCTURE: CPT

## 2024-07-22 PROCEDURE — 700102 HCHG RX REV CODE 250 W/ 637 OVERRIDE(OP): Performed by: HOSPITALIST

## 2024-07-22 PROCEDURE — 84100 ASSAY OF PHOSPHORUS: CPT

## 2024-07-22 PROCEDURE — 90935 HEMODIALYSIS ONE EVALUATION: CPT

## 2024-07-22 PROCEDURE — 770020 HCHG ROOM/CARE - TELE (206)

## 2024-07-22 PROCEDURE — 700102 HCHG RX REV CODE 250 W/ 637 OVERRIDE(OP)

## 2024-07-22 PROCEDURE — A9270 NON-COVERED ITEM OR SERVICE: HCPCS

## 2024-07-22 PROCEDURE — 700111 HCHG RX REV CODE 636 W/ 250 OVERRIDE (IP): Mod: JZ,JG | Performed by: INTERNAL MEDICINE

## 2024-07-22 PROCEDURE — 80048 BASIC METABOLIC PNL TOTAL CA: CPT

## 2024-07-22 PROCEDURE — 83735 ASSAY OF MAGNESIUM: CPT

## 2024-07-22 PROCEDURE — 700102 HCHG RX REV CODE 250 W/ 637 OVERRIDE(OP): Performed by: STUDENT IN AN ORGANIZED HEALTH CARE EDUCATION/TRAINING PROGRAM

## 2024-07-22 PROCEDURE — 99233 SBSQ HOSP IP/OBS HIGH 50: CPT | Performed by: STUDENT IN AN ORGANIZED HEALTH CARE EDUCATION/TRAINING PROGRAM

## 2024-07-22 PROCEDURE — 85027 COMPLETE CBC AUTOMATED: CPT

## 2024-07-22 PROCEDURE — A9270 NON-COVERED ITEM OR SERVICE: HCPCS | Performed by: STUDENT IN AN ORGANIZED HEALTH CARE EDUCATION/TRAINING PROGRAM

## 2024-07-22 PROCEDURE — A9270 NON-COVERED ITEM OR SERVICE: HCPCS | Performed by: INTERNAL MEDICINE

## 2024-07-22 PROCEDURE — 700102 HCHG RX REV CODE 250 W/ 637 OVERRIDE(OP): Performed by: INTERNAL MEDICINE

## 2024-07-22 PROCEDURE — 700105 HCHG RX REV CODE 258: Performed by: INTERNAL MEDICINE

## 2024-07-22 PROCEDURE — 700111 HCHG RX REV CODE 636 W/ 250 OVERRIDE (IP): Mod: JZ | Performed by: INTERNAL MEDICINE

## 2024-07-22 RX ORDER — SEVELAMER CARBONATE 800 MG/1
800 TABLET, FILM COATED ORAL
Status: DISCONTINUED | OUTPATIENT
Start: 2024-07-22 | End: 2024-07-25 | Stop reason: HOSPADM

## 2024-07-22 RX ORDER — LIDOCAINE HYDROCHLORIDE 10 MG/ML
INJECTION, SOLUTION INFILTRATION; PERINEURAL
Status: COMPLETED
Start: 2024-07-22 | End: 2024-07-22

## 2024-07-22 RX ADMIN — ATORVASTATIN CALCIUM 20 MG: 20 TABLET, FILM COATED ORAL at 18:38

## 2024-07-22 RX ADMIN — EPOETIN ALFA-EPBX 4000 UNITS: 4000 INJECTION, SOLUTION INTRAVENOUS; SUBCUTANEOUS at 15:15

## 2024-07-22 RX ADMIN — SEVELAMER CARBONATE 800 MG: 800 TABLET, FILM COATED ORAL at 18:41

## 2024-07-22 RX ADMIN — CEPHALEXIN 500 MG: 500 CAPSULE ORAL at 05:00

## 2024-07-22 RX ADMIN — SENNOSIDES AND DOCUSATE SODIUM 2 TABLET: 50; 8.6 TABLET ORAL at 18:38

## 2024-07-22 RX ADMIN — SODIUM CHLORIDE 250 MG: 9 INJECTION, SOLUTION INTRAVENOUS at 18:47

## 2024-07-22 RX ADMIN — LIDOCAINE HYDROCHLORIDE 1 ML: 10 INJECTION, SOLUTION INFILTRATION; PERINEURAL at 14:50

## 2024-07-22 RX ADMIN — LIDOCAINE HYDROCHLORIDE 1 ML: 10 INJECTION, SOLUTION EPIDURAL; INFILTRATION; INTRACAUDAL at 14:50

## 2024-07-22 RX ADMIN — SEVELAMER CARBONATE 1600 MG: 800 TABLET, FILM COATED ORAL at 08:04

## 2024-07-22 RX ADMIN — CEPHALEXIN 250 MG: 250 CAPSULE ORAL at 18:38

## 2024-07-22 ASSESSMENT — COGNITIVE AND FUNCTIONAL STATUS - GENERAL
DRESSING REGULAR LOWER BODY CLOTHING: A LITTLE
STANDING UP FROM CHAIR USING ARMS: A LITTLE
HELP NEEDED FOR BATHING: A LITTLE
SUGGESTED CMS G CODE MODIFIER DAILY ACTIVITY: CJ
CLIMB 3 TO 5 STEPS WITH RAILING: A LITTLE
TURNING FROM BACK TO SIDE WHILE IN FLAT BAD: A LITTLE
MOVING FROM LYING ON BACK TO SITTING ON SIDE OF FLAT BED: A LITTLE
MOVING TO AND FROM BED TO CHAIR: A LITTLE
DAILY ACTIVITIY SCORE: 20
DRESSING REGULAR UPPER BODY CLOTHING: A LITTLE
MOBILITY SCORE: 18
TOILETING: A LITTLE
WALKING IN HOSPITAL ROOM: A LITTLE
SUGGESTED CMS G CODE MODIFIER MOBILITY: CK

## 2024-07-22 ASSESSMENT — FIBROSIS 4 INDEX: FIB4 SCORE: 3.54

## 2024-07-22 ASSESSMENT — ENCOUNTER SYMPTOMS
MUSCULOSKELETAL NEGATIVE: 1
NERVOUS/ANXIOUS: 0
COUGH: 1
NEUROLOGICAL NEGATIVE: 1
SHORTNESS OF BREATH: 1
GASTROINTESTINAL NEGATIVE: 1
CARDIOVASCULAR NEGATIVE: 1
EYES NEGATIVE: 1

## 2024-07-22 ASSESSMENT — PAIN DESCRIPTION - PAIN TYPE: TYPE: ACUTE PAIN

## 2024-07-22 NOTE — PROGRESS NOTES
Handoff report received from day shift nurse. Pt care assumed. Pt is currently resting in bed. POC discussed with Pt and Pt verbalizes no questions at this time. Pt is AAOx4, on 8 L Oxymask , on Tele monitoring, and VSS. Call light and belongings within reach, bed in lowest and locked position, and Pt educated on use of call light. Will continue to monitor.

## 2024-07-22 NOTE — PROGRESS NOTES
Report received and patient care assumed. Pt is resting in bed, A&O4, with no complaints of pain, and is on 8L Oxymask. Tele box on. All fall precautions are in place, belongings at bedside table.  Pt was updated on POC, no questions or concerns. Pt educated on use of call light for assistance.

## 2024-07-22 NOTE — PROGRESS NOTES
[facility] Medicine Daily Progress Note    Date of Service  7/21/2024    Chief Complaint  Jean Barboza is a 73 y.o. male admitted 7/19/2024 with epistaxis    Hospital Course  73 y.o. male with a history of ESRD on MWF dialysis through a fistula (missed 7/19), severe aortic stenosis and MR, pulmonary hypertension, HTN, seizure disorder and diabetes who was admitted on 7/19 with recurrent epistaxis. Initially stable, he went for bilateral maxillary artery embolization, but his respiratory status declined significantly postoperatively. He was started on BiPAP which quickly failed and after goals of care discussion he was intubated and started on mechanical ventilation, transferred to the ICU, followed by nephrology for ongoing dialysis needs.  The patient stabilized, eventually successfully extubated, on 7/21 felt stabilized to transfer out of the ICU level of care.  The patient has a ongoing right Rhino Rocket in place, nasal packing in place, per report to stay in place for a total of 7 days, and then follow-up outpatient with ENT.  The patient is currently no headache, no further nasal bleeding is reported, he complains of a mild sore throat, otherwise the patient is overall improving    Interval Problem Update  Patient seen and examined today.  Data, Medication data reviewed.  Case discussed with nursing as available.  Plan of Care reviewed with patient and notified of changes.  7/21 the patient is afebrile, heart rate in the 90s, respiration unlabored, the patient is saturating in the high 90s on 5 to 7 L nasal cannula oxygen/OxyMask, blood pressure 1 teens to 130s over 70s,  Laboratory data show white count 12.0, hemoglobin 7.4, platelet count 141, chemistry with a sodium of 136, potassium 5.2, chloride 93, bicarb 28, glucose 129, BUN is 35, creatinine 6.15  I have discussed this patient's plan of care and discharge plan at IDT rounds today with Case Management, Nursing, Nursing leadership, and other members of  the IDT team.    Consultants/Specialty  critical care, ENT, and interventional radiology    Code Status  Full Code    Disposition  The patient is not medically cleared for discharge to home or a post-acute facility.  Anticipate discharge to: home with close outpatient follow-up    I have placed the appropriate orders for post-discharge needs.    Review of Systems  Review of Systems   Constitutional:  Positive for malaise/fatigue.   HENT:  Positive for congestion and nosebleeds.    Eyes: Negative.    Respiratory:  Positive for cough and shortness of breath.    Cardiovascular: Negative.    Gastrointestinal: Negative.    Genitourinary: Negative.    Musculoskeletal: Negative.    Skin: Negative.    Neurological: Negative.    Endo/Heme/Allergies: Negative.    Psychiatric/Behavioral:  The patient is nervous/anxious.    All other systems reviewed and are negative.       Physical Exam  Temp:  [36 °C (96.8 °F)-36.8 °C (98.2 °F)] 36.4 °C (97.5 °F)  Pulse:  [85-97] 91  Resp:  [14-39] 23  BP: (109-141)/(58-82) 135/69  SpO2:  [82 %-97 %] 97 %    Physical Exam  Vitals and nursing note reviewed.   Constitutional:       Appearance: He is well-developed.      Comments: Pt seen and examined.   HENT:      Head: Normocephalic and atraumatic.      Nose:      Comments: Nasal packing in place,    Eyes:      Pupils: Pupils are equal, round, and reactive to light.   Cardiovascular:      Rate and Rhythm: Normal rate and regular rhythm.      Heart sounds: Normal heart sounds.   Pulmonary:      Effort: Pulmonary effort is normal.      Breath sounds: Rhonchi present.   Abdominal:      General: Bowel sounds are normal.      Palpations: Abdomen is soft.   Musculoskeletal:         General: Normal range of motion.      Cervical back: Normal range of motion and neck supple.   Skin:     General: Skin is warm and dry.   Neurological:      General: No focal deficit present.      Mental Status: He is alert and oriented to person, place, and time.       Motor: Weakness present.   Psychiatric:         Behavior: Behavior normal.         Fluids    Intake/Output Summary (Last 24 hours) at 7/21/2024 1725  Last data filed at 7/21/2024 1300  Gross per 24 hour   Intake 353.82 ml   Output 0 ml   Net 353.82 ml       Laboratory  Recent Labs     07/19/24  1156 07/20/24  0235 07/21/24  0400   WBC 5.9 17.3* 12.0*   RBC 2.85* 2.86* 2.46*   HEMOGLOBIN 8.4* 8.6* 7.4*   HEMATOCRIT 27.3* 28.1* 23.4*   MCV 95.8 98.3* 95.1   MCH 29.5 30.1 30.1   MCHC 30.8* 30.6* 31.6*   RDW 57.6* 60.9* 59.7*   PLATELETCT 181 199 141*   MPV 11.1 11.0 11.4     Recent Labs     07/19/24  1400 07/20/24  0235 07/21/24  0400   SODIUM 139 142 136   POTASSIUM 5.0 4.4 5.2   CHLORIDE 96 99 93*   CO2 27 25 28   GLUCOSE 122* 165* 129*   BUN 26* 27* 35*   CREATININE 5.92* 6.55* 6.15*   CALCIUM 8.8 8.1* 7.3*     Recent Labs     07/19/24  1400   INR 1.20*               Imaging  DX-CHEST-PORTABLE (1 VIEW)   Final Result      1.  Improving bilateral pulmonary opacities.   2.  Cardiomegaly.   3.  Interval extubation.      DX-CHEST-LIMITED (1 VIEW)   Final Result         1. Endotracheal tube and central line appear adequate. No pneumothorax.   2. Progressive diffuse airspace disease.      DX-ABDOMEN FOR TUBE PLACEMENT   Final Result      Nasogastric tube is coiled within the pharynx. Technologist states that the patient's nurse is aware and the tube was subsequently removed.      DX-CHEST-LIMITED (1 VIEW)   Final Result      1.  Mixed interstitial and airspace disease, greater on the right.   2.  Nodular opacity at the right lower lung measuring 3.6 cm. Recommend follow-up CT chest.      IR-EMBOLIZATION    (Results Pending)        Assessment/Plan  * Acute on chronic respiratory failure with hypoxia and hypercapnia (HCC)  Assessment & Plan  Multifactorial: aspiration of blood, cardiogenic and non-cardiogenic pulmonary edema    Intubated date: 7/20  Extubated: 7/20 in afternoon    Dialysis per nephrology  RT  protocols    Mitral regurgitation  Assessment & Plan  Maintain euvolemia with iHDS  Optimize filling pressures    Epistaxis requiring cauterization  Assessment & Plan  Profound epistaxis that is been ongoing for several months according to the patient.  He says initially he thinks it was caused by the nasal cannula that lacerated something in his nose and since then they have not been able to cauterize it.  Patient is on fish oil as well as aspirin and these will need to be stopped as they will cause coagulation problems will need to obtain a TEG level  The plan at this point is for radiology to cauterize this and ENT will take the patient to surgery after it has been stabilized.    Epistaxis- (present on admission)  Assessment & Plan  07/20/24 IR performed b/l maxillary artery embolization   Packing management per ENT  Monitor for hemorrhage     Keep nasal packing in 7 days or take out prior to DC per ENT    Acute blood loss anemia- (present on admission)  Assessment & Plan  Monitor H&H if drops below 7 or 21 transfuse    Diarrhea of presumed infectious origin  Assessment & Plan  Patient at home was taking Imodium this is probably not the greatest idea we will need to at this point check stool studies    ESRD (end stage renal disease) on dialysis (HCC)- (present on admission)  Assessment & Plan  Dialysis per [facility]  Continue sevelamer    Strict I/Os  Renally dosed medications  Avoid nephrotoxic agents as able  Trend     Pulmonary hypertension (HCC)- (present on admission)  Assessment & Plan  RVSP 68    Avoid acidosis, hypercapnia, and hypoxia as able  Maintain euvolemia       Severe aortic stenosis- (present on admission)  Assessment & Plan  Maintain euvolemia with iHDS  Optimize filling pressures    Acute respiratory failure with hypoxia (HCC)  Assessment & Plan  Patient is hypoxic on room air is requiring oxygen support by facemask as he is unable to utilize currently his nasal cannula due to habitus  taxis.  Keep oxygen saturations above 90% titrate oxygen    Primary hypertension- (present on admission)  Assessment & Plan  Reintroduce oral antihypertensives when appropriate      Type 2 diabetes mellitus (HCC)- (present on admission)  Assessment & Plan  Goal glucose 140-180  SQ insulin  Hypoglycemic protocols     Plan  7/21  Continue with empiric antibiotics given the patient's nasal obstruction with still a packing in place  Hemodialysis for volume control  Ongoing close follow-up in terms of blood loss, close laboratory follow-up  Oxygen weaning  Mobilization as tolerated  A.m. labs  See orders  Patient is has a high medical complexity, complex decision making and is at high risk for complication, morbidity, and mortality.  I spent 54 minutes, reviewing the chart, obtaining and/or reviewing separately obtained history. Performing a medically appropriate examination and evaluation.  Counseling and educating the patient. Ordering and reviewing medications, tests, or procedures.   Documenting clinical information in EPIC. Independently interpreting results and communicating results to patient. Discussing future disposition of care with patient, RN and case management.    VTE prophylaxis:    pharmacologic prophylaxis contraindicated due to High bleeding risk      I have performed a physical exam and reviewed and updated ROS and Plan today (7/21/2024). In review of yesterday's note (7/20/2024), there are no changes except as documented above.      Please note that this dictation was created using voice recognition software. I have made every reasonable attempt to correct obvious errors, but I expect that there are errors of grammar and possibly context that I did not discover before finalizing the note.

## 2024-07-22 NOTE — ASSESSMENT & PLAN NOTE
Multifactorial: aspiration of blood, cardiogenic and non-cardiogenic pulmonary edema    Intubated date: 7/20  Extubated: 7/20 in afternoon    Dialysis per nephrology  RT protocols  Currently on 5L mask, baseline 4L  Ambulate with oxymetry

## 2024-07-22 NOTE — CARE PLAN
Problem: Knowledge Deficit - Standard  Goal: Patient and family/care givers will demonstrate understanding of plan of care, disease process/condition, diagnostic tests and medications  Description: Target End Date:  1-3 days or as soon as patient condition allows    Document in Patient Education    1.  Patient and family/caregiver oriented to unit, equipment, visitation policy and means for communicating concern  2.  Complete/review Learning Assessment  3.  Assess knowledge level of disease process/condition, treatment plan, diagnostic tests and medications  4.  Explain disease process/condition, treatment plan, diagnostic tests and medications  Outcome: Progressing     Problem: Respiratory  Goal: Patient will achieve/maintain optimum respiratory ventilation and gas exchange  Description: Target End Date:  Prior to discharge or change in level of care    Document on Assessment flowsheet    1.  Assess and monitor rate, rhythm, depth and effort of respiration  2.  Breath sounds assessed qshift and/or as needed  3.  Assess O2 saturation, administer/titrate oxygen as ordered  4.  Position patient for maximum ventilatory efficiency  5.  Turn, cough, and deep breath with splinting to improve effectiveness  6.  Collaborate with RT to administer medication/treatments per order  7.  Encourage use of incentive spirometer and encourage patient to cough after use and utilize splinting techniques if applicable  8.  Airway suctioning  9.  Monitor sputum production for changes in color, consistency and frequency  10. Perform frequent oral hygiene  11. Alternate physical activity with rest periods  Outcome: Progressing     Problem: Fall Risk  Goal: Patient will remain free from falls  Description: Target End Date:  Prior to discharge or change in level of care    Document interventions on the [facility] Fall Risk Assessment    1.  Assess for fall risk factors  2.  Implement fall precautions  Outcome: Progressing   The patient  is Stable - Low risk of patient condition declining or worsening    Shift Goals  Clinical Goals: VSS, safety  Patient Goals: to go home  Family Goals: MALINI    Progress made toward(s) clinical / shift goals:  VSS, safety    Patient is not progressing towards the following goals:

## 2024-07-22 NOTE — PROGRESS NOTES
"[facility] -  PROGRESS NOTE               Author: Gina Alejo M.D. Date & Time: 7/22/2024  11:44 AM     HPI:  73Y M w ESRD on MWF HD ([facility]) and recent epistaxis returns with new nose bleed on 7/19. Per H&P, pt had reported he has been having nose bleed issues for about 2 months. Pt reported that he developed another nose bleed and has not been able to stop it.  ER Labwork showed tolerable electrolytes and per ER MD note, pt did not appear volume overloaded. He was taken taken to IR for embolization where b/l maxillary arteries were embolized. Anesthesia post-procedure note indicated pt was euvolemic. In PACU pt was noted to require additional O2, and was placed on non-rebreather mask around 2000. Pt continued to worsen overnight and was upgraded to ICU and required intubation around 0400. Nephrology was not paged overnight. Pt seen and examined on AM of 7/20 and is tolerating dialysis.     DAILY NEPHROLOGY SUMMARY:  7/20: consult done  7/21: Pt tolerated Hd yesterday. Extubated. On oxymask. States he feels better. Discussed additional HD today and pt is agreeable.  7/22: HD again yesterday, net UF 2L, no complaints     REVIEW OF SYSTEMS:    10 point ROS reviewed and is as per HPI/daily summary or otherwise negative    PMH/PSH/SH/FH: Reviewed and unchanged since admission note    CURRENT MEDICATIONS:   Scheduled Medications   Medication Dose Frequency    cephALEXin  250 mg BID    insulin regular  1-6 Units Q6HRS    epoetin  4,000 Units MO, WE + FR    atorvastatin  20 mg Q EVENING    [Held by provider] dipyridamole  25 mg BID    sevelamer carbonate  1,600 mg TID WITH MEALS    senna-docusate  2 Tablet Q EVENING       VS:  /77   Pulse 92   Temp 36.5 °C (97.7 °F) (Temporal)   Resp 18   Ht 1.753 m (5' 9.02\")   Wt 73.4 kg (161 lb 13.1 oz)   SpO2 94%   BMI 23.89 kg/m²     Physical Exam  Constitutional:       Appearance: Normal appearance.   HENT:      Head: " Normocephalic and atraumatic.      Nose:      Comments: Packing noted     Mouth/Throat:      Mouth: Mucous membranes are moist.   Cardiovascular:      Rate and Rhythm: Normal rate and regular rhythm.   Pulmonary:      Effort: Pulmonary effort is normal.   Neurological:      Mental Status: He is alert and oriented to person, place, and time. Mental status is at baseline.       Access:      Fluids:  In: 790 [P.O.:290; Dialysis:500]  Out: 2500     LABS:  Recent Labs     07/20/24  0235 07/21/24  0400 07/22/24  0057   SODIUM 142 136 137   POTASSIUM 4.4 5.2 4.4   CHLORIDE 99 93* 97   CO2 25 28 27   GLUCOSE 165* 129* 112*   BUN 27* 35* 27*   CREATININE 6.55* 6.15* 4.71*   CALCIUM 8.1* 7.3* 8.5       IMAGING:  - Imaging studies reviewed    ASSESSMENTS:    -ESRD     Outpt HD Center: [facility]     Maintenance dialysis qMWF and as needed     Via R arm AVF  -Hypotension     BP improved   -Hypervolemia     Volume off with dialysis as BP tolerates  - Anemia     goal Hgb 10-11      Low iron stores 7/20  -CKD-MBD  -Acute Epistaxis    -s/p IR maxillary embolization 7/19  -Acute Respiratory Failure    -pt decompensated after IR procedure    -intubated 7/20, extubated 7/21  - Thrombocytopenia, mild         PLAN:  - HD  qMWF HD today  - Volume off with dialysis as BP tolerates  - Start IV iron   - EPO  with HD to keep hgb 10-11  - Transfuse PRN hgb < 7  - No dietary protein restrictions, use renal diet     Goal: 1.5g Protein/kg/day  - Please dose all meds for ESRD  - Follow labs with further recommendations to follow     Please page nephrology with any questions or concerns

## 2024-07-22 NOTE — PROGRESS NOTES
[facility] Progress Notes     Hemodialysis treatment x 3 hours completed as ordered per Dr. Alejo.  Treatment performed in dialysis MTR started at 1455 and ended at 1800.       Net UF Removed:  2000 mL     Dialysis Input: 500 mL (200 mL NS prime + 300 mL NS rinseback)  Dialysis Output: 2500 mL    Hemodialysis treatment orders and patient labs reviewed.  Procedure explained, verbalized understanding, consent for treatment verified.  Patient and access assessed pre and post treatment.  Patient tolerated treatment well. UF goal reached as tolerated  VPs initially high due to needle placement, readjusted with improvement in  BFR, no signs of infiltration, Dr. Staton notified and made aware.  Patient stable overall during and post treatment, no complaints  See dialysis electronic flow sheets under media for details.     Post tx access: 15 g cannulation needles removed from RUE forearm AVF access sites, hemostasis established on each insertion site within 5 minutes each; verified no bleeding. (+) bruit/thrill post treatment. Covered with clean gauze dressings and secured with tape.     Please monitor for RUE forearm AVF access bleeding. Should any breakthrough bleeding occur, please apply gauze and firm pressure on site until bleeding resolves then cover with gauze dressing and tape. Please observe RUE precautions- NO taking of BPs or blood draws to RUE    Please notify Nephrologist/Dialysis for follow-up.    1822 Report given to primary care nurse WERNER Hagen RN.    1823 Patient transported back to room via bed by transport with O2 @ 5L/NC. Patient awake, alert and stable.

## 2024-07-22 NOTE — PROGRESS NOTES
4 Eyes Skin Assessment Completed by PRATIBHA Escalante and PRATIBHA Rawls.    Head WDL  Ears Redness, slow to judit, discoloration  Nose Redness, bleeding  Mouth WDL  Neck WDL  Breast/Chest WDL  Shoulder Blades WDL  Spine WDL  (R) Arm/Elbow/Hand WDL, fistula  (L) Arm/Elbow/Hand WDL  Abdomen WDL, small hernia on Left lower  Groin femoral artery site  Scrotum/Coccyx/Buttocks Redness and Non-Blanching  (R) Leg Scar, discoloration  (L) Leg Scar, discoloration  (R) Heel/Foot/Toe 2nd toe discoloration  (L) Heel/Foot/Toe  middle toe discoloration          Devices In Places Tele Box, Blood Pressure Cuff, and Pulse Ox      Interventions In Place Heel Mepilex, Sacral Mepilex, and Pillows    Possible Skin Injury Yes    Pictures Uploaded Into Epic Yes  Wound Consult Placed Yes  RN Wound Prevention Protocol Ordered Yes

## 2024-07-22 NOTE — PROGRESS NOTES
[facility] Medicine Daily Progress Note    Date of Service  7/22/2024    Chief Complaint  Jean Barboza is a 73 y.o. male admitted 7/19/2024 with epistaxis    Hospital Course  73 y.o. male with a history of ESRD on MWF dialysis through a fistula (missed 7/19), severe aortic stenosis and MR, pulmonary hypertension, HTN, seizure disorder and diabetes who was admitted on 7/19 with recurrent epistaxis. Initially stable, he went for bilateral maxillary artery embolization, but his respiratory status declined significantly postoperatively. He was started on BiPAP which quickly failed and after goals of care discussion he was intubated and started on mechanical ventilation, transferred to the ICU, followed by nephrology for ongoing dialysis needs.  The patient stabilized, eventually successfully extubated, on 7/21 felt stabilized to transfer out of the ICU level of care.  The patient has a ongoing right Rhino Rocket in place, nasal packing in place, per report to stay in place for a total of 7 days, and then follow-up outpatient with ENT.  His Oxygen needs have remained above his baseline but improving. Hemoglobin and hematocrit stabilized.     Interval Problem Update  Patient seen and examined today. Feeling tried but denies further bleeding, feels that his breathing is also improving  -His vitals are stable his blood pressure is normal he is currently 96% on 5 L mask, baseline reported at 4 L  -Hemoglobin this morning is 7.1 relatively consistent with his baseline that fluctuates between 7 and 9, continue to monitor  -Still with bilateral nasal packing, plan to continue for total 7 days.  Continue Keflex for prophylaxis with packing in place  -ESRD continue HD per nephrology      Anticipate dc once pt is back to baseline oxygen, monitor H/H closely    I have discussed this patient's plan of care and discharge plan at IDT rounds today with Case Management, Nursing, Nursing leadership, and other members of the IDT  team.    Consultants/Specialty  critical care, ENT, and interventional radiology    Code Status  Full Code    Disposition  The patient is not medically cleared for discharge to home or a post-acute facility.      I have placed the appropriate orders for post-discharge needs.    Review of Systems  Review of Systems   Constitutional:  Positive for malaise/fatigue.   HENT:  Positive for congestion and nosebleeds.    Eyes: Negative.    Respiratory:  Positive for cough and shortness of breath.    Cardiovascular: Negative.    Gastrointestinal: Negative.    Genitourinary: Negative.    Musculoskeletal: Negative.    Skin: Negative.    Neurological: Negative.    Endo/Heme/Allergies: Negative.    Psychiatric/Behavioral:  The patient is not nervous/anxious.    All other systems reviewed and are negative.       Physical Exam  Temp:  [36.5 °C (97.7 °F)-37 °C (98.6 °F)] 37 °C (98.6 °F)  Pulse:  [84-95] 91  Resp:  [17-22] 18  BP: (127-152)/(75-82) 127/75  SpO2:  [94 %-98 %] 96 %    Physical Exam  Vitals and nursing note reviewed.   Constitutional:       Appearance: He is well-developed. He is ill-appearing.   HENT:      Head: Normocephalic and atraumatic.      Nose:      Comments: Nasal packing in place,    Eyes:      Pupils: Pupils are equal, round, and reactive to light.   Cardiovascular:      Rate and Rhythm: Normal rate and regular rhythm.      Heart sounds: Normal heart sounds.   Pulmonary:      Effort: Pulmonary effort is normal.      Breath sounds: Rhonchi present.   Abdominal:      General: Bowel sounds are normal.      Palpations: Abdomen is soft.   Musculoskeletal:         General: Normal range of motion.      Cervical back: Normal range of motion and neck supple.   Skin:     General: Skin is warm and dry.   Neurological:      General: No focal deficit present.      Mental Status: He is alert and oriented to person, place, and time.      Motor: Weakness present.   Psychiatric:         Behavior: Behavior normal.          Fluids    Intake/Output Summary (Last 24 hours) at 7/22/2024 1614  Last data filed at 7/22/2024 1324  Gross per 24 hour   Intake 980 ml   Output 2500 ml   Net -1520 ml       Laboratory  Recent Labs     07/20/24  0235 07/21/24  0400 07/22/24  0057   WBC 17.3* 12.0* 9.9   RBC 2.86* 2.46* 2.38*   HEMOGLOBIN 8.6* 7.4* 7.1*   HEMATOCRIT 28.1* 23.4* 22.7*   MCV 98.3* 95.1 95.4   MCH 30.1 30.1 29.8   MCHC 30.6* 31.6* 31.3*   RDW 60.9* 59.7* 57.6*   PLATELETCT 199 141* 124*   MPV 11.0 11.4 11.9     Recent Labs     07/20/24  0235 07/21/24  0400 07/22/24  0057   SODIUM 142 136 137   POTASSIUM 4.4 5.2 4.4   CHLORIDE 99 93* 97   CO2 25 28 27   GLUCOSE 165* 129* 112*   BUN 27* 35* 27*   CREATININE 6.55* 6.15* 4.71*   CALCIUM 8.1* 7.3* 8.5                     Imaging  DX-CHEST-PORTABLE (1 VIEW)   Final Result      1.  Improving bilateral pulmonary opacities.   2.  Cardiomegaly.   3.  Interval extubation.      DX-CHEST-LIMITED (1 VIEW)   Final Result         1. Endotracheal tube and central line appear adequate. No pneumothorax.   2. Progressive diffuse airspace disease.      DX-ABDOMEN FOR TUBE PLACEMENT   Final Result      Nasogastric tube is coiled within the pharynx. Technologist states that the patient's nurse is aware and the tube was subsequently removed.      DX-CHEST-LIMITED (1 VIEW)   Final Result      1.  Mixed interstitial and airspace disease, greater on the right.   2.  Nodular opacity at the right lower lung measuring 3.6 cm. Recommend follow-up CT chest.      IR-EMBOLIZATION   Final Result   Impression:      72-year-old patient with bilateral intractable epistaxis underwent embolization of the bilateral sphenopalatine arteries as described above.      Jeremiah AMAYA was physically present and participated during the entire procedure of the IR-EMBOLIZATION.              Assessment/Plan  * Acute on chronic respiratory failure with hypoxia and hypercapnia (HCC)  Assessment & Plan  Multifactorial:  aspiration of blood, cardiogenic and non-cardiogenic pulmonary edema    Intubated date: 7/20  Extubated: 7/20 in afternoon    Dialysis per nephrology  RT protocols  Currently on 5L mask, baseline 4L  Ambulate with oxymetry     Mitral regurgitation  Assessment & Plan  Maintain euvolemia with iHDS  Optimize filling pressures    Epistaxis requiring cauterization  Assessment & Plan  Profound epistaxis that is been ongoing for several months according to the patient.  He says initially he thinks it was caused by the nasal cannula that lacerated something in his nose and since then they have not been able to cauterize it.  Patient is on fish oil as well as aspirin and these will need to be stopped as they will cause coagulation problems will need to obtain a TEG level  The plan at this point is for radiology to cauterize this and ENT will take the patient to surgery after it has been stabilized.    Epistaxis- (present on admission)  Assessment & Plan  07/20/24 IR performed b/l maxillary artery embolization   Packing management per ENT  Monitor for hemorrhage     Keep nasal packing in 7 days or take out prior to DC per ENT, continue keflex while packing is in place    Acute blood loss anemia- (present on admission)  Assessment & Plan  Monitor H&H if drops below 7 or 21 transfuse  Hgb 7.1, relatively stable with baseline, appears to fluctuate 7-9  No further signs of bleeding  Receiving IV iron   Check CBC in am     Diarrhea of presumed infectious origin  Assessment & Plan  Patient at home was taking Imodium this is probably not the greatest idea we will need to at this point check stool studies    ESRD (end stage renal disease) on dialysis (HCC)- (present on admission)  Assessment & Plan  Dialysis per [facility]  Continue sevelamer    Strict I/Os  Renally dosed medications  Avoid nephrotoxic agents as able  Trend     Pulmonary hypertension (HCC)- (present on admission)  Assessment & Plan  RVSP 68    Avoid acidosis,  hypercapnia, and hypoxia as able  Maintain euvolemia       Severe aortic stenosis- (present on admission)  Assessment & Plan  Maintain euvolemia with iHDS  Optimize filling pressures    Acute respiratory failure with hypoxia (HCC)  Assessment & Plan  Patient is hypoxic on room air is requiring oxygen support by facemask as he is unable to utilize currently his nasal cannula due to habitus taxis.  Keep oxygen saturations above 90% titrate oxygen    Primary hypertension- (present on admission)  Assessment & Plan  Reintroduce oral antihypertensives when appropriate      Type 2 diabetes mellitus (HCC)- (present on admission)  Assessment & Plan  Goal glucose 140-180  SQ insulin  Hypoglycemic protocols         VTE prophylaxis:   SCDs/TEDs   pharmacologic prophylaxis contraindicated due to epistaxis requiring surgical intervention      I have performed a physical exam and reviewed and updated ROS and Plan today (7/22/2024). In review of yesterday's note (7/21/2024), there are no changes except as documented above.      Greater than 51 minutes spent prepping to see patient (e.g. review of tests) obtaining and/or reviewing separately obtained history. Performing a medically appropriate examination and/ evaluation.  Counseling and educating the patient/family/caregiver.  Ordering medications, tests, or procedures.  Referring and communicating with other health care professionals.  Documenting clinical information in EPIC.  Independently interpreting results and communicating results to patient/family/caregiver.  Care coordination.

## 2024-07-22 NOTE — PROGRESS NOTES
Report called to PRATIBHA Escalante. All questions answered. Patient left via WC with personal belongings, such as clothes, glasses, phone, wallet and bag with clothes. All needs met.

## 2024-07-22 NOTE — ASSESSMENT & PLAN NOTE
07/20/24 IR performed b/l maxillary artery embolization   Packing management per ENT  Monitor for hemorrhage     Keep nasal packing in 7 days or take out prior to DC per ENT, continue keflex while packing is in place

## 2024-07-22 NOTE — PROGRESS NOTES
Bedside report received and patient care assumed. Pt is resting in bed, A&O4, with no complaints of pain, and is on 5L oxymask. Tele box on. All fall precautions are in place, belongings at bedside table.  Pt was updated on POC, no questions or concerns. Pt educated on use of call light for assistance.

## 2024-07-23 LAB
ANION GAP SERPL CALC-SCNC: 15 MMOL/L (ref 7–16)
BASOPHILS # BLD AUTO: 0.4 % (ref 0–1.8)
BASOPHILS # BLD: 0.03 K/UL (ref 0–0.12)
BUN SERPL-MCNC: 24 MG/DL (ref 8–22)
CALCIUM SERPL-MCNC: 8.5 MG/DL (ref 8.5–10.5)
CHLORIDE SERPL-SCNC: 95 MMOL/L (ref 96–112)
CO2 SERPL-SCNC: 27 MMOL/L (ref 20–33)
CREAT SERPL-MCNC: 3.95 MG/DL (ref 0.5–1.4)
EOSINOPHIL # BLD AUTO: 0.35 K/UL (ref 0–0.51)
EOSINOPHIL NFR BLD: 4.8 % (ref 0–6.9)
ERYTHROCYTE [DISTWIDTH] IN BLOOD BY AUTOMATED COUNT: 55.8 FL (ref 35.9–50)
GFR SERPLBLD CREATININE-BSD FMLA CKD-EPI: 15 ML/MIN/1.73 M 2
GLUCOSE BLD STRIP.AUTO-MCNC: 101 MG/DL (ref 65–99)
GLUCOSE BLD STRIP.AUTO-MCNC: 145 MG/DL (ref 65–99)
GLUCOSE SERPL-MCNC: 103 MG/DL (ref 65–99)
HCT VFR BLD AUTO: 24.3 % (ref 42–52)
HGB BLD-MCNC: 7.6 G/DL (ref 14–18)
IMM GRANULOCYTES # BLD AUTO: 0.02 K/UL (ref 0–0.11)
IMM GRANULOCYTES NFR BLD AUTO: 0.3 % (ref 0–0.9)
LYMPHOCYTES # BLD AUTO: 0.89 K/UL (ref 1–4.8)
LYMPHOCYTES NFR BLD: 12.2 % (ref 22–41)
MAGNESIUM SERPL-MCNC: 2.1 MG/DL (ref 1.5–2.5)
MCH RBC QN AUTO: 29.8 PG (ref 27–33)
MCHC RBC AUTO-ENTMCNC: 31.3 G/DL (ref 32.3–36.5)
MCV RBC AUTO: 95.3 FL (ref 81.4–97.8)
MONOCYTES # BLD AUTO: 0.42 K/UL (ref 0–0.85)
MONOCYTES NFR BLD AUTO: 5.7 % (ref 0–13.4)
NEUTROPHILS # BLD AUTO: 5.61 K/UL (ref 1.82–7.42)
NEUTROPHILS NFR BLD: 76.6 % (ref 44–72)
NRBC # BLD AUTO: 0 K/UL
NRBC BLD-RTO: 0 /100 WBC (ref 0–0.2)
PLATELET # BLD AUTO: 147 K/UL (ref 164–446)
PMV BLD AUTO: 11.9 FL (ref 9–12.9)
POTASSIUM SERPL-SCNC: 4 MMOL/L (ref 3.6–5.5)
RBC # BLD AUTO: 2.55 M/UL (ref 4.7–6.1)
SODIUM SERPL-SCNC: 137 MMOL/L (ref 135–145)
WBC # BLD AUTO: 7.3 K/UL (ref 4.8–10.8)

## 2024-07-23 PROCEDURE — 85025 COMPLETE CBC W/AUTO DIFF WBC: CPT

## 2024-07-23 PROCEDURE — 700105 HCHG RX REV CODE 258: Performed by: INTERNAL MEDICINE

## 2024-07-23 PROCEDURE — A9270 NON-COVERED ITEM OR SERVICE: HCPCS | Performed by: HOSPITALIST

## 2024-07-23 PROCEDURE — 99232 SBSQ HOSP IP/OBS MODERATE 35: CPT | Performed by: INTERNAL MEDICINE

## 2024-07-23 PROCEDURE — 700102 HCHG RX REV CODE 250 W/ 637 OVERRIDE(OP): Performed by: HOSPITALIST

## 2024-07-23 PROCEDURE — 82962 GLUCOSE BLOOD TEST: CPT

## 2024-07-23 PROCEDURE — A9270 NON-COVERED ITEM OR SERVICE: HCPCS | Performed by: STUDENT IN AN ORGANIZED HEALTH CARE EDUCATION/TRAINING PROGRAM

## 2024-07-23 PROCEDURE — 700102 HCHG RX REV CODE 250 W/ 637 OVERRIDE(OP): Performed by: INTERNAL MEDICINE

## 2024-07-23 PROCEDURE — 700102 HCHG RX REV CODE 250 W/ 637 OVERRIDE(OP): Performed by: STUDENT IN AN ORGANIZED HEALTH CARE EDUCATION/TRAINING PROGRAM

## 2024-07-23 PROCEDURE — 80048 BASIC METABOLIC PNL TOTAL CA: CPT

## 2024-07-23 PROCEDURE — 770020 HCHG ROOM/CARE - TELE (206)

## 2024-07-23 PROCEDURE — 36415 COLL VENOUS BLD VENIPUNCTURE: CPT

## 2024-07-23 PROCEDURE — 700111 HCHG RX REV CODE 636 W/ 250 OVERRIDE (IP): Mod: JZ | Performed by: INTERNAL MEDICINE

## 2024-07-23 PROCEDURE — 83735 ASSAY OF MAGNESIUM: CPT

## 2024-07-23 PROCEDURE — A9270 NON-COVERED ITEM OR SERVICE: HCPCS | Performed by: INTERNAL MEDICINE

## 2024-07-23 RX ADMIN — ATORVASTATIN CALCIUM 20 MG: 20 TABLET, FILM COATED ORAL at 17:29

## 2024-07-23 RX ADMIN — SEVELAMER CARBONATE 800 MG: 800 TABLET, FILM COATED ORAL at 17:28

## 2024-07-23 RX ADMIN — SEVELAMER CARBONATE 800 MG: 800 TABLET, FILM COATED ORAL at 08:11

## 2024-07-23 RX ADMIN — CEPHALEXIN 250 MG: 250 CAPSULE ORAL at 04:44

## 2024-07-23 RX ADMIN — SENNOSIDES AND DOCUSATE SODIUM 2 TABLET: 50; 8.6 TABLET ORAL at 17:29

## 2024-07-23 RX ADMIN — CEPHALEXIN 250 MG: 250 CAPSULE ORAL at 17:28

## 2024-07-23 RX ADMIN — SEVELAMER CARBONATE 800 MG: 800 TABLET, FILM COATED ORAL at 12:03

## 2024-07-23 RX ADMIN — SODIUM CHLORIDE 250 MG: 9 INJECTION, SOLUTION INTRAVENOUS at 04:57

## 2024-07-23 ASSESSMENT — ENCOUNTER SYMPTOMS
MUSCULOSKELETAL NEGATIVE: 1
CARDIOVASCULAR NEGATIVE: 1
COUGH: 1
EYES NEGATIVE: 1
NEUROLOGICAL NEGATIVE: 1
GASTROINTESTINAL NEGATIVE: 1
NERVOUS/ANXIOUS: 0
SHORTNESS OF BREATH: 1

## 2024-07-23 ASSESSMENT — FIBROSIS 4 INDEX: FIB4 SCORE: 2.98

## 2024-07-23 NOTE — DISCHARGE PLANNING
Case Management Discharge Planning    Admission Date: 7/19/2024  GMLOS: 6.3  ALOS: 4    6-Clicks ADL Score: 20  6-Clicks Mobility Score: 18      Anticipated Discharge Dispo: Discharge Disposition: Discharged to home/self care (01)  Discharge Address: [address] [place]  [address]  Discharge Contact Phone Number: [phone]    DME Needed: No    Action(s) Taken: Updated Provider/Nurse on Discharge Plan and DC Assessment Complete (See below)      1430, RN JONA visited Pt to obtain assessment information. Pt said he might need assistance with transportation once discharged.    Escalations Completed: None    Medically Clear: No    Next Steps: CM will continue to assist Pt with discharge needs.      Barriers to Discharge: Medical clearance    Is the patient up for discharge tomorrow: No      Care Transition Team Assessment  RN JONA met with Pt at bedside to obtain assessment informations. Demographics verified. RN JOAN introduced self and purpose of visit.   Pt lives with brother in a 1 story house with 3 step to main entrance.  Pt has  brother for support.  Pt has FLORENCE TORRES M.D. for PCP  Pt was independent with ADLs prior to hospitalization.  Pt has a walker and a cane at home but he does not use it. Pt uses O2 at 4 LPM at home. Pt has O2 concentrator and portable O2 tanks.        Information Source  Orientation Level: Oriented X4  Information Given By: Patient  Who is responsible for making decisions for patient? : Patient    Readmission Evaluation  Is this a readmission?: No    Elopement Risk  Legal Hold: No  Ambulatory or Self Mobile in Wheelchair: No-Not an Elopement Risk  Disoriented: No  Psychiatric Symptoms: None  History of Wandering: No  Elopement this Admit: No  Vocalizing Wanting to Leave: No  Displays Behaviors, Body Language Wanting to Leave: No-Not at Risk for Elopement  Elopement Risk: Not at Risk for Elopement    Interdisciplinary Discharge Planning  Primary Care Physician: FLORENCE TORRES  M.D.  Lives with - Patient's Self Care Capacity: Sibling  Patient or legal guardian wants to designate a caregiver: No  Support Systems: Family Member(s)  Housing / Facility: 1 Story House (with 3 steps to main entrance)  Durable Medical Equipment: Home Oxygen, Walker (Pt has a walker and a cane at home but he does not use it. Pt uses O2 at 4 LPM at home. Pt has O2 concentrator and portable O2 tanks)    Discharge Preparedness  What is your plan after discharge?: Home with help  What are your discharge supports?: Sibling  Prior Functional Level: Independent with Activities of Daily Living, Independent with Medication Management    Functional Assesment  Prior Functional Level: Independent with Activities of Daily Living, Independent with Medication Management    Finances  Financial Barriers to Discharge: No  Prescription Coverage: Yes    Vision / Hearing Impairment  Vision Impairment : Yes  Right Eye Vision: Impaired, Wears Glasses  Left Eye Vision: Impaired, Wears Glasses  Hearing Impairment : No      Advance Directive  Advance Directive?: None    Domestic Abuse  Have you ever been the victim of abuse or violence?: No  Possible Abuse/Neglect Reported to:: Not Applicable    Psychological Assessment  History of Substance Abuse: None  History of Psychiatric Problems: No    Discharge Risks or Barriers  Discharge risks or barriers?: Uninsured / underinsured  Patient risk factors: Multiple ED visits    Anticipated Discharge Information  Discharge Disposition: Discharged to home/self care (01)  Discharge Address: [address] [place]  [address]  Discharge Contact Phone Number: [phone]

## 2024-07-23 NOTE — CARE PLAN
Problem: Knowledge Deficit - Standard  Goal: Patient and family/care givers will demonstrate understanding of plan of care, disease process/condition, diagnostic tests and medications  Description: Target End Date:  1-3 days or as soon as patient condition allows    Document in Patient Education    1.  Patient and family/caregiver oriented to unit, equipment, visitation policy and means for communicating concern  2.  Complete/review Learning Assessment  3.  Assess knowledge level of disease process/condition, treatment plan, diagnostic tests and medications  4.  Explain disease process/condition, treatment plan, diagnostic tests and medications  Outcome: Progressing     Problem: Hemodynamics  Goal: Patient's hemodynamics, fluid balance and neurologic status will be stable or improve  Description: Target End Date:  Prior to discharge or change in level of care    Document on Assessment and I/O flowsheet templates    1.  Monitor vital signs, pulse oximetry and cardiac monitor per provider order and/or policy  2.  Maintain blood pressure per provider order  3.  Hemodynamic monitoring per provider order  4.  Manage IV fluids and IV infusions  5.  Monitor intake and output  6.  Daily weights per unit policy or provider order  7.  Assess peripheral pulses and capillary refill  8.  Assess color and body temperature  9.  Position patient for maximum circulation/cardiac output  10. Monitor for signs/symptoms of excessive bleeding  11. Assess mental status, restlessness and changes in level of consciousness  12. Monitor temperature and report fever or hypothermia to provider immediately. Consideration of targeted temperature management.  Outcome: Progressing     Problem: Fall Risk  Goal: Patient will remain free from falls  Description: Target End Date:  Prior to discharge or change in level of care    Document interventions on the Aaliyah Morel Fall Risk Assessment    1.  Assess for fall risk factors  2.  Implement fall  precautions  Outcome: Progressing     Problem: Respiratory  Goal: Patient will achieve/maintain optimum respiratory ventilation and gas exchange  Description: Target End Date:  Prior to discharge or change in level of care    Document on Assessment flowsheet    1.  Assess and monitor rate, rhythm, depth and effort of respiration  2.  Breath sounds assessed qshift and/or as needed  3.  Assess O2 saturation, administer/titrate oxygen as ordered  4.  Position patient for maximum ventilatory efficiency  5.  Turn, cough, and deep breath with splinting to improve effectiveness  6.  Collaborate with RT to administer medication/treatments per order  7.  Encourage use of incentive spirometer and encourage patient to cough after use and utilize splinting techniques if applicable  8.  Airway suctioning  9.  Monitor sputum production for changes in color, consistency and frequency  10. Perform frequent oral hygiene  11. Alternate physical activity with rest periods  Outcome: Progressing   The patient is Stable - Low risk of patient condition declining or worsening    Shift Goals  Clinical Goals: Dialysis, monitor labs, titrate O2  Patient Goals: To go home  Family Goals: MALINI    Progress made toward(s) clinical / shift goals:  VSS, safety    Patient is not progressing towards the following goals:

## 2024-07-23 NOTE — CARE PLAN
Problem: Pain - Standard  Goal: Alleviation of pain or a reduction in pain to the patient’s comfort goal  Outcome: Progressing     Problem: Knowledge Deficit - Standard  Goal: Patient and family/care givers will demonstrate understanding of plan of care, disease process/condition, diagnostic tests and medications  Outcome: Progressing     Problem: Hemodynamics  Goal: Patient's hemodynamics, fluid balance and neurologic status will be stable or improve  Outcome: Progressing     Problem: Fall Risk  Goal: Patient will remain free from falls  Outcome: Progressing     Problem: Respiratory  Goal: Patient will achieve/maintain optimum respiratory ventilation and gas exchange  Outcome: Progressing   The patient is Watcher - Medium risk of patient condition declining or worsening    Shift Goals  Clinical Goals: VSS, safety  Patient Goals: to go home  Family Goals: updates    Progress made toward(s) clinical / shift goals: Patient ambulates with steady gait.    Patient is not progressing towards the following goals: Patient requires 2 liters oxygen.

## 2024-07-23 NOTE — PROGRESS NOTES
"[facility] -  PROGRESS NOTE               Author: Gina Alejo M.D. Date & Time: 7/23/2024  7:43 AM     HPI:  73Y M w ESRD on MWF HD ([facility]) and recent epistaxis returns with new nose bleed on 7/19. Per H&P, pt had reported he has been having nose bleed issues for about 2 months. Pt reported that he developed another nose bleed and has not been able to stop it.  ER Labwork showed tolerable electrolytes and per ER MD note, pt did not appear volume overloaded. He was taken taken to IR for embolization where b/l maxillary arteries were embolized. Anesthesia post-procedure note indicated pt was euvolemic. In PACU pt was noted to require additional O2, and was placed on non-rebreather mask around 2000. Pt continued to worsen overnight and was upgraded to ICU and required intubation around 0400. Nephrology was not paged overnight. Pt seen and examined on AM of 7/20 and is tolerating dialysis.     DAILY NEPHROLOGY SUMMARY:  7/20: consult done  7/21: Pt tolerated Hd yesterday. Extubated. On oxymask. States he feels better. Discussed additional HD today and pt is agreeable.  7/22: HD again yesterday, net UF 2L, no complaints   7/23: HD yesterday net UF 2L, no new complaints, feels tired     REVIEW OF SYSTEMS:    10 point ROS reviewed and is as per HPI/daily summary or otherwise negative    PMH/PSH/SH/FH: Reviewed and unchanged since admission note    CURRENT MEDICATIONS:   Scheduled Medications   Medication Dose Frequency    cephALEXin  250 mg BID    sevelamer carbonate  800 mg TID WITH MEALS    ferric gluconate (Ferrlecit) IV  250 mg DAILY    insulin regular  1-6 Units Q6HRS    epoetin  4,000 Units MO, WE + FR    atorvastatin  20 mg Q EVENING    [Held by provider] dipyridamole  25 mg BID    senna-docusate  2 Tablet Q EVENING       VS:  /66   Pulse 76   Temp 36.7 °C (98.1 °F) (Temporal)   Resp 17   Ht 1.753 m (5' 9.02\")   Wt 72.1 kg (158 lb 15.2 oz)   SpO2 99%   BMI " 23.46 kg/m²     Physical Exam  Constitutional:       Appearance: Normal appearance.   HENT:      Head: Normocephalic and atraumatic.      Nose:      Comments: Packing noted     Mouth/Throat:      Mouth: Mucous membranes are moist.   Cardiovascular:      Rate and Rhythm: Normal rate and regular rhythm.   Pulmonary:      Effort: Pulmonary effort is normal.   Neurological:      Mental Status: He is alert and oriented to person, place, and time. Mental status is at baseline.       Access:      Fluids:  In: 980 [P.O.:480; Dialysis:500]  Out: 2500     LABS:  Recent Labs     07/21/24  0400 07/22/24  0057 07/23/24  0226   SODIUM 136 137 137   POTASSIUM 5.2 4.4 4.0   CHLORIDE 93* 97 95*   CO2 28 27 27   GLUCOSE 129* 112* 103*   BUN 35* 27* 24*   CREATININE 6.15* 4.71* 3.95*   CALCIUM 7.3* 8.5 8.5       IMAGING:  - Imaging studies reviewed    ASSESSMENTS:    -ESRD     Outpt HD Center: [facility]     Maintenance dialysis qMWF and as needed     Via R arm AVF  -Hypotension     BP improved   -Hypervolemia     Volume off with dialysis as BP tolerates  - Anemia     goal Hgb 10-11      Low iron stores 7/20  -CKD-MBD  -Acute Epistaxis    -s/p IR maxillary embolization 7/19  -Acute Respiratory Failure    -pt decompensated after IR procedure    -intubated 7/20, extubated 7/21  - Thrombocytopenia, mild         PLAN:  - HD qMWF  - No HD today  - Volume off with dialysis as BP tolerates  - Ct  IV iron   - EPO  with HD to keep hgb 10-11  - Transfuse PRN hgb < 7  - Renal diet  - Dose all meds for ESRD  - P binders adjusted, Renvela 800 mg TID AC   - Follow labs with further recommendations to follow     Please page nephrology with any questions or concerns

## 2024-07-23 NOTE — CARE PLAN
The patient is Stable - Low risk of patient condition declining or worsening    Shift Goals  Clinical Goals: Dialysis, monitor labs, titrate O2  Patient Goals: To go home  Family Goals: MALINI      Problem: Pain - Standard  Goal: Alleviation of pain or a reduction in pain to the patient’s comfort goal  Outcome: Progressing     Problem: Knowledge Deficit - Standard  Goal: Patient and family/care givers will demonstrate understanding of plan of care, disease process/condition, diagnostic tests and medications  Outcome: Progressing     Problem: Hemodynamics  Goal: Patient's hemodynamics, fluid balance and neurologic status will be stable or improve  Outcome: Progressing     Problem: Fall Risk  Goal: Patient will remain free from falls  Outcome: Progressing     Problem: Respiratory  Goal: Patient will achieve/maintain optimum respiratory ventilation and gas exchange  Outcome: Progressing

## 2024-07-23 NOTE — PROGRESS NOTES
[facility] Medicine Daily Progress Note    Date of Service  7/23/2024    Chief Complaint  Jean Barboza is a 73 y.o. male admitted 7/19/2024 with epistaxis    Hospital Course  73 y.o. male with a history of ESRD on MWF dialysis through a fistula (missed 7/19), severe aortic stenosis and MR, pulmonary hypertension, HTN, seizure disorder and diabetes who was admitted on 7/19 with recurrent epistaxis. Initially stable, he went for bilateral maxillary artery embolization, but his respiratory status declined significantly postoperatively. He was started on BiPAP which quickly failed and after goals of care discussion he was intubated and started on mechanical ventilation, transferred to the ICU, followed by nephrology for ongoing dialysis needs.  The patient stabilized, eventually successfully extubated, on 7/21 felt stabilized to transfer out of the ICU level of care.  The patient has a ongoing right Rhino Rocket in place, nasal packing in place, per report to stay in place for a total of 7 days, and then follow-up outpatient with ENT.  His Oxygen needs have remained above his baseline but improving. Hemoglobin and hematocrit stabilized.     Interval Problem Update  Patient seen and examined today. Feeling tried but denies further bleeding, feels that his breathing is also improving  -His vitals are stable his blood pressure is normal he is currently 96% on 5 L mask, baseline reported at 4 L  -Hemoglobin this morning is 7.1 relatively consistent with his baseline that fluctuates between 7 and 9, continue to monitor  -Still with bilateral nasal packing, plan to continue for total 7 days.  Continue Keflex for prophylaxis with packing in place  -ESRD continue HD per nephrology  Anticipate dc once pt is back to baseline oxygen, monitor H/H closely    7/23: Patient seen and examined, afebrile, no nausea or vomiting wean off O2 as tolerated   Monitor hemoglobin   Cont abx   I have discussed this patient's plan of care and  discharge plan at IDT rounds today with Case Management, Nursing, Nursing leadership, and other members of the IDT team.    Consultants/Specialty  critical care, ENT, and interventional radiology    Code Status  Full Code    Disposition  The patient is not medically cleared for discharge to home or a post-acute facility.      I have placed the appropriate orders for post-discharge needs.    Review of Systems  Review of Systems   Constitutional:  Positive for malaise/fatigue.   HENT:  Positive for congestion and nosebleeds.    Eyes: Negative.    Respiratory:  Positive for cough and shortness of breath.    Cardiovascular: Negative.    Gastrointestinal: Negative.    Genitourinary: Negative.    Musculoskeletal: Negative.    Skin: Negative.    Neurological: Negative.    Endo/Heme/Allergies: Negative.    Psychiatric/Behavioral:  The patient is not nervous/anxious.    All other systems reviewed and are negative.       Physical Exam  Temp:  [36.5 °C (97.7 °F)-37 °C (98.6 °F)] 36.7 °C (98.1 °F)  Pulse:  [76-85] 78  Resp:  [16-18] 17  BP: (119-131)/(61-70) 123/67  SpO2:  [93 %-99 %] 98 %    Physical Exam  Vitals and nursing note reviewed.   Constitutional:       Appearance: He is well-developed. He is ill-appearing.   HENT:      Head: Normocephalic and atraumatic.      Nose:      Comments: Nasal packing in place,    Eyes:      Pupils: Pupils are equal, round, and reactive to light.   Cardiovascular:      Rate and Rhythm: Normal rate and regular rhythm.      Heart sounds: Normal heart sounds.   Pulmonary:      Effort: Pulmonary effort is normal.      Breath sounds: Rhonchi present.   Abdominal:      General: Bowel sounds are normal.      Palpations: Abdomen is soft.   Musculoskeletal:         General: Normal range of motion.      Cervical back: Normal range of motion and neck supple.   Skin:     General: Skin is warm and dry.   Neurological:      General: No focal deficit present.      Mental Status: He is alert and oriented  to person, place, and time.      Motor: Weakness present.   Psychiatric:         Behavior: Behavior normal.         Fluids    Intake/Output Summary (Last 24 hours) at 7/23/2024 1149  Last data filed at 7/22/2024 1815  Gross per 24 hour   Intake 740 ml   Output 2500 ml   Net -1760 ml       Laboratory  Recent Labs     07/21/24  0400 07/22/24  0057 07/23/24  0226   WBC 12.0* 9.9 7.3   RBC 2.46* 2.38* 2.55*   HEMOGLOBIN 7.4* 7.1* 7.6*   HEMATOCRIT 23.4* 22.7* 24.3*   MCV 95.1 95.4 95.3   MCH 30.1 29.8 29.8   MCHC 31.6* 31.3* 31.3*   RDW 59.7* 57.6* 55.8*   PLATELETCT 141* 124* 147*   MPV 11.4 11.9 11.9     Recent Labs     07/21/24  0400 07/22/24  0057 07/23/24  0226   SODIUM 136 137 137   POTASSIUM 5.2 4.4 4.0   CHLORIDE 93* 97 95*   CO2 28 27 27   GLUCOSE 129* 112* 103*   BUN 35* 27* 24*   CREATININE 6.15* 4.71* 3.95*   CALCIUM 7.3* 8.5 8.5                     Imaging  DX-CHEST-PORTABLE (1 VIEW)   Final Result      1.  Improving bilateral pulmonary opacities.   2.  Cardiomegaly.   3.  Interval extubation.      DX-CHEST-LIMITED (1 VIEW)   Final Result         1. Endotracheal tube and central line appear adequate. No pneumothorax.   2. Progressive diffuse airspace disease.      DX-ABDOMEN FOR TUBE PLACEMENT   Final Result      Nasogastric tube is coiled within the pharynx. Technologist states that the patient's nurse is aware and the tube was subsequently removed.      DX-CHEST-LIMITED (1 VIEW)   Final Result      1.  Mixed interstitial and airspace disease, greater on the right.   2.  Nodular opacity at the right lower lung measuring 3.6 cm. Recommend follow-up CT chest.      IR-EMBOLIZATION   Final Result   Impression:      72-year-old patient with bilateral intractable epistaxis underwent embolization of the bilateral sphenopalatine arteries as described above.      IJeremiah was physically present and participated during the entire procedure of the IR-EMBOLIZATION.              Assessment/Plan  * Acute  on chronic respiratory failure with hypoxia and hypercapnia (HCC)  Assessment & Plan  Multifactorial: aspiration of blood, cardiogenic and non-cardiogenic pulmonary edema    Intubated date: 7/20  Extubated: 7/20 in afternoon    Dialysis per nephrology  RT protocols  Currently on 5L mask, baseline 4L  Ambulate with oxymetry     Mitral regurgitation  Assessment & Plan  Maintain euvolemia with iHDS  Optimize filling pressures    Epistaxis requiring cauterization  Assessment & Plan  Profound epistaxis that is been ongoing for several months according to the patient.  He says initially he thinks it was caused by the nasal cannula that lacerated something in his nose and since then they have not been able to cauterize it.  Patient is on fish oil as well as aspirin and these will need to be stopped as they will cause coagulation problems will need to obtain a TEG level  The plan at this point is for radiology to cauterize this and ENT will take the patient to surgery after it has been stabilized.    Epistaxis- (present on admission)  Assessment & Plan  07/20/24 IR performed b/l maxillary artery embolization   Packing management per ENT  Monitor for hemorrhage     Keep nasal packing in 7 days or take out prior to DC per ENT, continue keflex while packing is in place    Acute blood loss anemia- (present on admission)  Assessment & Plan  Monitor H&H if drops below 7 or 21 transfuse  Hgb 7.1, relatively stable with baseline, appears to fluctuate 7-9  No further signs of bleeding  Receiving IV iron   Check CBC in am     Diarrhea of presumed infectious origin  Assessment & Plan  Patient at home was taking Imodium this is probably not the greatest idea we will need to at this point check stool studies    ESRD (end stage renal disease) on dialysis (HCC)- (present on admission)  Assessment & Plan  Dialysis per [facility]  Continue sevelamer    Strict I/Os  Renally dosed medications  Avoid nephrotoxic agents as able  Trend      Pulmonary hypertension (HCC)- (present on admission)  Assessment & Plan  RVSP 68    Avoid acidosis, hypercapnia, and hypoxia as able  Maintain euvolemia       Severe aortic stenosis- (present on admission)  Assessment & Plan  Maintain euvolemia with iHDS  Optimize filling pressures    Acute respiratory failure with hypoxia (HCC)  Assessment & Plan  Patient is hypoxic on room air is requiring oxygen support by facemask as he is unable to utilize currently his nasal cannula due to habitus taxis.  Keep oxygen saturations above 90% titrate oxygen    Primary hypertension- (present on admission)  Assessment & Plan  Reintroduce oral antihypertensives when appropriate      Type 2 diabetes mellitus (HCC)- (present on admission)  Assessment & Plan  Goal glucose 140-180  SQ insulin  Hypoglycemic protocols         VTE prophylaxis: scd      I have performed a physical exam and reviewed and updated ROS and Plan today (7/23/2024). In review of yesterday's note (7/22/2024), there are no changes except as documented above.

## 2024-07-24 LAB
ALBUMIN SERPL BCP-MCNC: 3.2 G/DL (ref 3.2–4.9)
ANION GAP SERPL CALC-SCNC: 13 MMOL/L (ref 7–16)
BUN SERPL-MCNC: 36 MG/DL (ref 8–22)
CALCIUM ALBUM COR SERPL-MCNC: 8.9 MG/DL (ref 8.5–10.5)
CALCIUM SERPL-MCNC: 8.3 MG/DL (ref 8.5–10.5)
CHLORIDE SERPL-SCNC: 97 MMOL/L (ref 96–112)
CO2 SERPL-SCNC: 27 MMOL/L (ref 20–33)
CREAT SERPL-MCNC: 5.89 MG/DL (ref 0.5–1.4)
ERYTHROCYTE [DISTWIDTH] IN BLOOD BY AUTOMATED COUNT: 55.8 FL (ref 35.9–50)
GFR SERPLBLD CREATININE-BSD FMLA CKD-EPI: 9 ML/MIN/1.73 M 2
GLUCOSE BLD STRIP.AUTO-MCNC: 135 MG/DL (ref 65–99)
GLUCOSE BLD STRIP.AUTO-MCNC: 138 MG/DL (ref 65–99)
GLUCOSE BLD STRIP.AUTO-MCNC: 169 MG/DL (ref 65–99)
GLUCOSE BLD STRIP.AUTO-MCNC: 94 MG/DL (ref 65–99)
GLUCOSE SERPL-MCNC: 91 MG/DL (ref 65–99)
HCT VFR BLD AUTO: 24.1 % (ref 42–52)
HGB BLD-MCNC: 7.3 G/DL (ref 14–18)
MAGNESIUM SERPL-MCNC: 2.2 MG/DL (ref 1.5–2.5)
MCH RBC QN AUTO: 28.5 PG (ref 27–33)
MCHC RBC AUTO-ENTMCNC: 30.3 G/DL (ref 32.3–36.5)
MCV RBC AUTO: 94.1 FL (ref 81.4–97.8)
PHOSPHATE SERPL-MCNC: 4.6 MG/DL (ref 2.5–4.5)
PLATELET # BLD AUTO: 156 K/UL (ref 164–446)
PMV BLD AUTO: 11.8 FL (ref 9–12.9)
POTASSIUM SERPL-SCNC: 3.8 MMOL/L (ref 3.6–5.5)
RBC # BLD AUTO: 2.56 M/UL (ref 4.7–6.1)
SODIUM SERPL-SCNC: 137 MMOL/L (ref 135–145)
WBC # BLD AUTO: 6.4 K/UL (ref 4.8–10.8)

## 2024-07-24 PROCEDURE — 83735 ASSAY OF MAGNESIUM: CPT

## 2024-07-24 PROCEDURE — 700101 HCHG RX REV CODE 250

## 2024-07-24 PROCEDURE — 90935 HEMODIALYSIS ONE EVALUATION: CPT

## 2024-07-24 PROCEDURE — A9270 NON-COVERED ITEM OR SERVICE: HCPCS | Performed by: STUDENT IN AN ORGANIZED HEALTH CARE EDUCATION/TRAINING PROGRAM

## 2024-07-24 PROCEDURE — 82962 GLUCOSE BLOOD TEST: CPT

## 2024-07-24 PROCEDURE — 700102 HCHG RX REV CODE 250 W/ 637 OVERRIDE(OP): Performed by: INTERNAL MEDICINE

## 2024-07-24 PROCEDURE — 700111 HCHG RX REV CODE 636 W/ 250 OVERRIDE (IP): Mod: JZ | Performed by: INTERNAL MEDICINE

## 2024-07-24 PROCEDURE — A9270 NON-COVERED ITEM OR SERVICE: HCPCS | Performed by: HOSPITALIST

## 2024-07-24 PROCEDURE — 80069 RENAL FUNCTION PANEL: CPT

## 2024-07-24 PROCEDURE — 85027 COMPLETE CBC AUTOMATED: CPT

## 2024-07-24 PROCEDURE — 99232 SBSQ HOSP IP/OBS MODERATE 35: CPT | Performed by: INTERNAL MEDICINE

## 2024-07-24 PROCEDURE — 700111 HCHG RX REV CODE 636 W/ 250 OVERRIDE (IP): Mod: JZ,JG

## 2024-07-24 PROCEDURE — A9270 NON-COVERED ITEM OR SERVICE: HCPCS | Performed by: INTERNAL MEDICINE

## 2024-07-24 PROCEDURE — 700105 HCHG RX REV CODE 258: Performed by: INTERNAL MEDICINE

## 2024-07-24 PROCEDURE — 700102 HCHG RX REV CODE 250 W/ 637 OVERRIDE(OP): Performed by: STUDENT IN AN ORGANIZED HEALTH CARE EDUCATION/TRAINING PROGRAM

## 2024-07-24 PROCEDURE — 97602 WOUND(S) CARE NON-SELECTIVE: CPT

## 2024-07-24 PROCEDURE — 770020 HCHG ROOM/CARE - TELE (206)

## 2024-07-24 PROCEDURE — 700102 HCHG RX REV CODE 250 W/ 637 OVERRIDE(OP): Performed by: HOSPITALIST

## 2024-07-24 RX ORDER — LIDOCAINE HYDROCHLORIDE 10 MG/ML
INJECTION, SOLUTION INFILTRATION; PERINEURAL
Status: COMPLETED
Start: 2024-07-24 | End: 2024-07-24

## 2024-07-24 RX ADMIN — LIDOCAINE HYDROCHLORIDE 1 ML: 10 INJECTION, SOLUTION EPIDURAL; INFILTRATION; INTRACAUDAL at 08:10

## 2024-07-24 RX ADMIN — SEVELAMER CARBONATE 800 MG: 800 TABLET, FILM COATED ORAL at 16:11

## 2024-07-24 RX ADMIN — EPOETIN ALFA-EPBX 4000 UNITS: 4000 INJECTION, SOLUTION INTRAVENOUS; SUBCUTANEOUS at 10:40

## 2024-07-24 RX ADMIN — SEVELAMER CARBONATE 800 MG: 800 TABLET, FILM COATED ORAL at 07:34

## 2024-07-24 RX ADMIN — ATORVASTATIN CALCIUM 20 MG: 20 TABLET, FILM COATED ORAL at 16:11

## 2024-07-24 RX ADMIN — CEPHALEXIN 250 MG: 250 CAPSULE ORAL at 04:49

## 2024-07-24 RX ADMIN — INSULIN HUMAN 1 UNITS: 100 INJECTION, SOLUTION PARENTERAL at 16:10

## 2024-07-24 RX ADMIN — SEVELAMER CARBONATE 800 MG: 800 TABLET, FILM COATED ORAL at 12:09

## 2024-07-24 RX ADMIN — LIDOCAINE HYDROCHLORIDE 1 ML: 10 INJECTION, SOLUTION INFILTRATION; PERINEURAL at 08:10

## 2024-07-24 RX ADMIN — CEPHALEXIN 250 MG: 250 CAPSULE ORAL at 16:11

## 2024-07-24 RX ADMIN — SENNOSIDES AND DOCUSATE SODIUM 2 TABLET: 50; 8.6 TABLET ORAL at 16:11

## 2024-07-24 RX ADMIN — SODIUM CHLORIDE 250 MG: 9 INJECTION, SOLUTION INTRAVENOUS at 04:49

## 2024-07-24 ASSESSMENT — FIBROSIS 4 INDEX: FIB4 SCORE: 2.81

## 2024-07-24 ASSESSMENT — ENCOUNTER SYMPTOMS
MUSCULOSKELETAL NEGATIVE: 1
EYES NEGATIVE: 1
NERVOUS/ANXIOUS: 0
CARDIOVASCULAR NEGATIVE: 1
GASTROINTESTINAL NEGATIVE: 1
COUGH: 1
SHORTNESS OF BREATH: 1
NEUROLOGICAL NEGATIVE: 1

## 2024-07-24 NOTE — CARE PLAN
Problem: Knowledge Deficit - Standard  Goal: Patient and family/care givers will demonstrate understanding of plan of care, disease process/condition, diagnostic tests and medications  Description: Target End Date:  1-3 days or as soon as patient condition allows    Document in Patient Education    1.  Patient and family/caregiver oriented to unit, equipment, visitation policy and means for communicating concern  2.  Complete/review Learning Assessment  3.  Assess knowledge level of disease process/condition, treatment plan, diagnostic tests and medications  4.  Explain disease process/condition, treatment plan, diagnostic tests and medications  Outcome: Progressing     Problem: Respiratory  Goal: Patient will achieve/maintain optimum respiratory ventilation and gas exchange  Description: Target End Date:  Prior to discharge or change in level of care    Document on Assessment flowsheet    1.  Assess and monitor rate, rhythm, depth and effort of respiration  2.  Breath sounds assessed qshift and/or as needed  3.  Assess O2 saturation, administer/titrate oxygen as ordered  4.  Position patient for maximum ventilatory efficiency  5.  Turn, cough, and deep breath with splinting to improve effectiveness  6.  Collaborate with RT to administer medication/treatments per order  7.  Encourage use of incentive spirometer and encourage patient to cough after use and utilize splinting techniques if applicable  8.  Airway suctioning  9.  Monitor sputum production for changes in color, consistency and frequency  10. Perform frequent oral hygiene  11. Alternate physical activity with rest periods  Outcome: Progressing     Problem: Fall Risk  Goal: Patient will remain free from falls  Description: Target End Date:  Prior to discharge or change in level of care    Document interventions on the [facility] Fall Risk Assessment    1.  Assess for fall risk factors  2.  Implement fall precautions  Outcome: Progressing   The patient  is Stable - Low risk of patient condition declining or worsening    Shift Goals  Clinical Goals: VSS, safety  Patient Goals: to go home  Family Goals: updates    Progress made toward(s) clinical / shift goals:  VSS, safety    Patient is not progressing towards the following goals:

## 2024-07-24 NOTE — PROGRESS NOTES
3 hr HD started @ 0814 and completed @ 1114,tx well tolerated,VSS,net UF = 2100ml.RFAAVF + B/T,cannulation sites covered with DD,CDI.Report given to Oscar Diez RN.

## 2024-07-24 NOTE — PROGRESS NOTES
[facility] Medicine Daily Progress Note    Date of Service  7/24/2024    Chief Complaint  Jean Barboza is a 73 y.o. male admitted 7/19/2024 with epistaxis    Hospital Course  73 y.o. male with a history of ESRD on MWF dialysis through a fistula (missed 7/19), severe aortic stenosis and MR, pulmonary hypertension, HTN, seizure disorder and diabetes who was admitted on 7/19 with recurrent epistaxis. Initially stable, he went for bilateral maxillary artery embolization, but his respiratory status declined significantly postoperatively. He was started on BiPAP which quickly failed and after goals of care discussion he was intubated and started on mechanical ventilation, transferred to the ICU, followed by nephrology for ongoing dialysis needs.  The patient stabilized, eventually successfully extubated, on 7/21 felt stabilized to transfer out of the ICU level of care.  The patient has a ongoing right Rhino Rocket in place, nasal packing in place, per report to stay in place for a total of 7 days, and then follow-up outpatient with ENT.  His Oxygen needs have remained above his baseline but improving. Hemoglobin and hematocrit stabilized.     Interval Problem Update  Patient seen and examined today. Feeling tried but denies further bleeding, feels that his breathing is also improving  -His vitals are stable his blood pressure is normal he is currently 96% on 5 L mask, baseline reported at 4 L  -Hemoglobin this morning is 7.1 relatively consistent with his baseline that fluctuates between 7 and 9, continue to monitor  -Still with bilateral nasal packing, plan to continue for total 7 days.  Continue Keflex for prophylaxis with packing in place  -ESRD continue HD per nephrology  Anticipate dc once pt is back to baseline oxygen, monitor H/H closely    7/23: Patient seen and examined, afebrile, no nausea or vomiting wean off O2 as tolerated   Monitor hemoglobin   Cont abx   7/24: Patient seen and examined, hemoglobin 7.3  today . Monitor for epistaxis, rhono rocket in place  Hold of on dipyridamole  Wean off O2 as tolerated     I have discussed this patient's plan of care and discharge plan at IDT rounds today with Case Management, Nursing, Nursing leadership, and other members of the IDT team.    Consultants/Specialty  critical care, ENT, and interventional radiology    Code Status  Full Code    Disposition  The patient is not medically cleared for discharge to home or a post-acute facility.      I have placed the appropriate orders for post-discharge needs.    Review of Systems  Review of Systems   Constitutional:  Positive for malaise/fatigue.   HENT:  Positive for congestion and nosebleeds.    Eyes: Negative.    Respiratory:  Positive for cough and shortness of breath.    Cardiovascular: Negative.    Gastrointestinal: Negative.    Genitourinary: Negative.    Musculoskeletal: Negative.    Skin: Negative.    Neurological: Negative.    Endo/Heme/Allergies: Negative.    Psychiatric/Behavioral:  The patient is not nervous/anxious.    All other systems reviewed and are negative.       Physical Exam  Temp:  [36.4 °C (97.5 °F)-36.7 °C (98.1 °F)] 36.4 °C (97.5 °F)  Pulse:  [67-84] 84  Resp:  [17-18] 18  BP: (106-138)/(66-80) 106/66  SpO2:  [91 %-99 %] 91 %    Physical Exam  Vitals and nursing note reviewed.   Constitutional:       Appearance: He is well-developed. He is ill-appearing.   HENT:      Head: Normocephalic and atraumatic.      Nose:      Comments: Nasal packing in place,    Eyes:      Pupils: Pupils are equal, round, and reactive to light.   Cardiovascular:      Rate and Rhythm: Normal rate and regular rhythm.      Heart sounds: Normal heart sounds.   Pulmonary:      Effort: Pulmonary effort is normal.      Breath sounds: Rhonchi present.   Abdominal:      General: Bowel sounds are normal.      Palpations: Abdomen is soft.   Musculoskeletal:         General: Normal range of motion.      Cervical back: Normal range of motion and  neck supple.   Skin:     General: Skin is warm and dry.   Neurological:      General: No focal deficit present.      Mental Status: He is alert and oriented to person, place, and time.      Motor: Weakness present.   Psychiatric:         Behavior: Behavior normal.         Fluids    Intake/Output Summary (Last 24 hours) at 7/24/2024 1543  Last data filed at 7/24/2024 1114  Gross per 24 hour   Intake 500 ml   Output 2600 ml   Net -2100 ml       Laboratory  Recent Labs     07/22/24 0057 07/23/24 0226 07/24/24 0228   WBC 9.9 7.3 6.4   RBC 2.38* 2.55* 2.56*   HEMOGLOBIN 7.1* 7.6* 7.3*   HEMATOCRIT 22.7* 24.3* 24.1*   MCV 95.4 95.3 94.1   MCH 29.8 29.8 28.5   MCHC 31.3* 31.3* 30.3*   RDW 57.6* 55.8* 55.8*   PLATELETCT 124* 147* 156*   MPV 11.9 11.9 11.8     Recent Labs     07/22/24 0057 07/23/24 0226 07/24/24 0228   SODIUM 137 137 137   POTASSIUM 4.4 4.0 3.8   CHLORIDE 97 95* 97   CO2 27 27 27   GLUCOSE 112* 103* 91   BUN 27* 24* 36*   CREATININE 4.71* 3.95* 5.89*   CALCIUM 8.5 8.5 8.3*                     Imaging  DX-CHEST-PORTABLE (1 VIEW)   Final Result      1.  Improving bilateral pulmonary opacities.   2.  Cardiomegaly.   3.  Interval extubation.      DX-CHEST-LIMITED (1 VIEW)   Final Result         1. Endotracheal tube and central line appear adequate. No pneumothorax.   2. Progressive diffuse airspace disease.      DX-ABDOMEN FOR TUBE PLACEMENT   Final Result      Nasogastric tube is coiled within the pharynx. Technologist states that the patient's nurse is aware and the tube was subsequently removed.      DX-CHEST-LIMITED (1 VIEW)   Final Result      1.  Mixed interstitial and airspace disease, greater on the right.   2.  Nodular opacity at the right lower lung measuring 3.6 cm. Recommend follow-up CT chest.      IR-EMBOLIZATION   Final Result   Impression:      72-year-old patient with bilateral intractable epistaxis underwent embolization of the bilateral sphenopalatine arteries as described above.      I,  Jeremiah Ojeda was physically present and participated during the entire procedure of the IR-EMBOLIZATION.              Assessment/Plan  * Acute on chronic respiratory failure with hypoxia and hypercapnia (HCC)  Assessment & Plan  Multifactorial: aspiration of blood, cardiogenic and non-cardiogenic pulmonary edema    Intubated date: 7/20  Extubated: 7/20 in afternoon    Dialysis per nephrology  RT protocols  Currently on 5L mask, baseline 4L  Ambulate with oxymetry     Mitral regurgitation  Assessment & Plan  Maintain euvolemia with iHDS  Optimize filling pressures    Epistaxis requiring cauterization  Assessment & Plan  Profound epistaxis that is been ongoing for several months according to the patient.  He says initially he thinks it was caused by the nasal cannula that lacerated something in his nose and since then they have not been able to cauterize it.  Patient is on fish oil as well as aspirin and these will need to be stopped as they will cause coagulation problems will need to obtain a TEG level  The plan at this point is for radiology to cauterize this and ENT will take the patient to surgery after it has been stabilized.    Epistaxis- (present on admission)  Assessment & Plan  07/20/24 IR performed b/l maxillary artery embolization   Packing management per ENT  Monitor for hemorrhage     Keep nasal packing in 7 days or take out prior to DC per ENT, continue keflex while packing is in place    Acute blood loss anemia- (present on admission)  Assessment & Plan  Monitor H&H if drops below 7 or 21 transfuse  Hgb 7.1, relatively stable with baseline, appears to fluctuate 7-9  No further signs of bleeding  Receiving IV iron   Check CBC in am     Diarrhea of presumed infectious origin  Assessment & Plan  Patient at home was taking Imodium this is probably not the greatest idea we will need to at this point check stool studies    ESRD (end stage renal disease) on dialysis (HCC)- (present on  admission)  Assessment & Plan  Dialysis per [facility]  Continue sevelamer    Strict I/Os  Renally dosed medications  Avoid nephrotoxic agents as able  Trend     Pulmonary hypertension (HCC)- (present on admission)  Assessment & Plan  RVSP 68    Avoid acidosis, hypercapnia, and hypoxia as able  Maintain euvolemia       Severe aortic stenosis- (present on admission)  Assessment & Plan  Maintain euvolemia with iHDS  Optimize filling pressures    Acute respiratory failure with hypoxia (HCC)  Assessment & Plan  Patient is hypoxic on room air is requiring oxygen support by facemask as he is unable to utilize currently his nasal cannula due to habitus taxis.  Keep oxygen saturations above 90% titrate oxygen    Primary hypertension- (present on admission)  Assessment & Plan  Reintroduce oral antihypertensives when appropriate      Type 2 diabetes mellitus (HCC)- (present on admission)  Assessment & Plan  Goal glucose 140-180  SQ insulin  Hypoglycemic protocols         VTE prophylaxis: scd      I have performed a physical exam and reviewed and updated ROS and Plan today (7/24/2024). In review of yesterday's note (7/23/2024), there are no changes except as documented above.

## 2024-07-24 NOTE — DISCHARGE PLANNING
Outpatient Dialysis Note     Confirmed patient is active at:     [facility]  [address] , [place] [address]     Schedule: Mon, Wed, Fri   Time: 10:30 AM     Patient is followed by Dr. Cole      Spoke with Wyatt at facility who confirmed patient information.   Forwarded records for review.     Xitlaly Reyes   Dialysis Coordinator / [facility]  Ph: [phone]

## 2024-07-24 NOTE — PROGRESS NOTES
[facility] -  PROGRESS NOTE               Author: Gina Alejo M.D. Date & Time: 7/24/2024  8:12 AM     HPI:  73Y M w ESRD on MWF HD ([facility]) and recent epistaxis returns with new nose bleed on 7/19. Per H&P, pt had reported he has been having nose bleed issues for about 2 months. Pt reported that he developed another nose bleed and has not been able to stop it.  ER Labwork showed tolerable electrolytes and per ER MD note, pt did not appear volume overloaded. He was taken taken to IR for embolization where b/l maxillary arteries were embolized. Anesthesia post-procedure note indicated pt was euvolemic. In PACU pt was noted to require additional O2, and was placed on non-rebreather mask around 2000. Pt continued to worsen overnight and was upgraded to ICU and required intubation around 0400. Nephrology was not paged overnight. Pt seen and examined on AM of 7/20 and is tolerating dialysis.     DAILY NEPHROLOGY SUMMARY:  7/20: consult done  7/21: Pt tolerated Hd yesterday. Extubated. On oxymask. States he feels better. Discussed additional HD today and pt is agreeable.  7/22: HD again yesterday, net UF 2L, no complaints   7/23: HD yesterday net UF 2L, no new complaints, feels tired   7/24: seen on HD, VSS, no complaints     REVIEW OF SYSTEMS:    10 point ROS reviewed and is as per HPI/daily summary or otherwise negative    PMH/PSH/SH/FH: Reviewed and unchanged since admission note    CURRENT MEDICATIONS:   Scheduled Medications   Medication Dose Frequency    lidocaine       epoetin       cephALEXin  250 mg BID    sevelamer carbonate  800 mg TID WITH MEALS    ferric gluconate (Ferrlecit) IV  250 mg DAILY    insulin regular  1-6 Units Q6HRS    epoetin  4,000 Units MO, WE + FR    atorvastatin  20 mg Q EVENING    [Held by provider] dipyridamole  25 mg BID    senna-docusate  2 Tablet Q EVENING       VS:  /80   Pulse 84   Temp 36.4 °C (97.5 °F) (Temporal)   Resp 18   Ht  "1.753 m (5' 9.02\")   Wt 73.9 kg (163 lb)   SpO2 93%   BMI 24.06 kg/m²     Physical Exam  Constitutional:       Appearance: Normal appearance.   HENT:      Head: Normocephalic and atraumatic.      Nose:      Comments: Packing noted     Mouth/Throat:      Mouth: Mucous membranes are moist.   Cardiovascular:      Rate and Rhythm: Normal rate and regular rhythm.   Pulmonary:      Effort: Pulmonary effort is normal.   Neurological:      Mental Status: He is alert and oriented to person, place, and time. Mental status is at baseline.       Access:      Fluids:  No intake/output data recorded.    LABS:  Recent Labs     07/22/24  0057 07/23/24  0226 07/24/24  0228   SODIUM 137 137 137   POTASSIUM 4.4 4.0 3.8   CHLORIDE 97 95* 97   CO2 27 27 27   GLUCOSE 112* 103* 91   BUN 27* 24* 36*   CREATININE 4.71* 3.95* 5.89*   CALCIUM 8.5 8.5 8.3*       IMAGING:  - Imaging studies reviewed    ASSESSMENTS:    -ESRD     Outpt HD Center: [facility]     Maintenance dialysis qMWF and as needed     Via R arm AVF  -Hypotension     BP improved   -Hypervolemia     Volume off with dialysis as BP tolerates  - Anemia     goal Hgb 10-11      Low iron stores 7/20  -CKD-MBD  -Acute Epistaxis    -s/p IR maxillary embolization 7/19  -Acute Respiratory Failure    -pt decompensated after IR procedure    -intubated 7/20, extubated 7/21  - Thrombocytopenia, mild         PLAN:  - HD qMWF  -  HD today  - Volume off with dialysis as BP tolerates  - Ct  IV iron   - EPO  with HD to keep hgb 10-11  - Transfuse PRN hgb < 7  - Renal diet  - Dose all meds for ESRD  - P binders adjusted, Renvela 800 mg TID AC   - No heparin with HD     OK to transition to OP HD once medically cleared      Please page nephrology with any questions or concerns        "

## 2024-07-25 VITALS
TEMPERATURE: 97.9 F | RESPIRATION RATE: 16 BRPM | WEIGHT: 156.53 LBS | SYSTOLIC BLOOD PRESSURE: 136 MMHG | OXYGEN SATURATION: 99 % | HEART RATE: 77 BPM | BODY MASS INDEX: 23.18 KG/M2 | HEIGHT: 69 IN | DIASTOLIC BLOOD PRESSURE: 72 MMHG

## 2024-07-25 LAB
ANION GAP SERPL CALC-SCNC: 13 MMOL/L (ref 7–16)
BUN SERPL-MCNC: 22 MG/DL (ref 8–22)
CALCIUM SERPL-MCNC: 8.8 MG/DL (ref 8.5–10.5)
CHLORIDE SERPL-SCNC: 96 MMOL/L (ref 96–112)
CO2 SERPL-SCNC: 25 MMOL/L (ref 20–33)
CREAT SERPL-MCNC: 4.61 MG/DL (ref 0.5–1.4)
ERYTHROCYTE [DISTWIDTH] IN BLOOD BY AUTOMATED COUNT: 55.3 FL (ref 35.9–50)
GFR SERPLBLD CREATININE-BSD FMLA CKD-EPI: 13 ML/MIN/1.73 M 2
GLUCOSE SERPL-MCNC: 150 MG/DL (ref 65–99)
HCT VFR BLD AUTO: 25.9 % (ref 42–52)
HGB BLD-MCNC: 8 G/DL (ref 14–18)
MAGNESIUM SERPL-MCNC: 2.1 MG/DL (ref 1.5–2.5)
MCH RBC QN AUTO: 28.7 PG (ref 27–33)
MCHC RBC AUTO-ENTMCNC: 30.9 G/DL (ref 32.3–36.5)
MCV RBC AUTO: 92.8 FL (ref 81.4–97.8)
PLATELET # BLD AUTO: 185 K/UL (ref 164–446)
PMV BLD AUTO: 11.3 FL (ref 9–12.9)
POTASSIUM SERPL-SCNC: 4.1 MMOL/L (ref 3.6–5.5)
RBC # BLD AUTO: 2.79 M/UL (ref 4.7–6.1)
SODIUM SERPL-SCNC: 134 MMOL/L (ref 135–145)
WBC # BLD AUTO: 8.1 K/UL (ref 4.8–10.8)

## 2024-07-25 PROCEDURE — 700105 HCHG RX REV CODE 258: Performed by: INTERNAL MEDICINE

## 2024-07-25 PROCEDURE — 99239 HOSP IP/OBS DSCHRG MGMT >30: CPT | Performed by: INTERNAL MEDICINE

## 2024-07-25 PROCEDURE — 83735 ASSAY OF MAGNESIUM: CPT

## 2024-07-25 PROCEDURE — 700102 HCHG RX REV CODE 250 W/ 637 OVERRIDE(OP): Performed by: INTERNAL MEDICINE

## 2024-07-25 PROCEDURE — 700105 HCHG RX REV CODE 258: Performed by: OTOLARYNGOLOGY

## 2024-07-25 PROCEDURE — A9270 NON-COVERED ITEM OR SERVICE: HCPCS | Performed by: STUDENT IN AN ORGANIZED HEALTH CARE EDUCATION/TRAINING PROGRAM

## 2024-07-25 PROCEDURE — A9270 NON-COVERED ITEM OR SERVICE: HCPCS | Performed by: OTOLARYNGOLOGY

## 2024-07-25 PROCEDURE — 700111 HCHG RX REV CODE 636 W/ 250 OVERRIDE (IP): Mod: JZ | Performed by: INTERNAL MEDICINE

## 2024-07-25 PROCEDURE — A9270 NON-COVERED ITEM OR SERVICE: HCPCS | Performed by: INTERNAL MEDICINE

## 2024-07-25 PROCEDURE — 700102 HCHG RX REV CODE 250 W/ 637 OVERRIDE(OP): Performed by: STUDENT IN AN ORGANIZED HEALTH CARE EDUCATION/TRAINING PROGRAM

## 2024-07-25 PROCEDURE — 80048 BASIC METABOLIC PNL TOTAL CA: CPT

## 2024-07-25 PROCEDURE — 85027 COMPLETE CBC AUTOMATED: CPT

## 2024-07-25 PROCEDURE — 700102 HCHG RX REV CODE 250 W/ 637 OVERRIDE(OP): Performed by: OTOLARYNGOLOGY

## 2024-07-25 PROCEDURE — 700101 HCHG RX REV CODE 250: Performed by: OTOLARYNGOLOGY

## 2024-07-25 RX ORDER — OXYMETAZOLINE HYDROCHLORIDE 0.05 G/100ML
3 SPRAY NASAL 3 TIMES DAILY
Status: DISCONTINUED | OUTPATIENT
Start: 2024-07-25 | End: 2024-07-25 | Stop reason: HOSPADM

## 2024-07-25 RX ORDER — ASPIRIN 81 MG/1
81 TABLET, CHEWABLE ORAL DAILY
Qty: 100 TABLET | Refills: 0 | Status: SHIPPED | OUTPATIENT
Start: 2024-07-25 | End: 2024-07-25

## 2024-07-25 RX ADMIN — CEPHALEXIN 250 MG: 250 CAPSULE ORAL at 05:06

## 2024-07-25 RX ADMIN — SEVELAMER CARBONATE 800 MG: 800 TABLET, FILM COATED ORAL at 08:00

## 2024-07-25 RX ADMIN — TRANEXAMIC ACID 1000 MG: 100 INJECTION, SOLUTION INTRAVENOUS at 05:06

## 2024-07-25 RX ADMIN — OXYMETAZOLINE HCL 3 SPRAY: 0.05 SPRAY NASAL at 08:34

## 2024-07-25 RX ADMIN — SODIUM CHLORIDE 250 MG: 9 INJECTION, SOLUTION INTRAVENOUS at 05:25

## 2024-07-25 ASSESSMENT — FIBROSIS 4 INDEX: FIB4 SCORE: 2.37

## 2024-07-25 ASSESSMENT — PAIN DESCRIPTION - PAIN TYPE: TYPE: ACUTE PAIN

## 2024-07-25 NOTE — DISCHARGE SUMMARY
Discharge Summary    CHIEF COMPLAINT ON ADMISSION  Chief Complaint   Patient presents with    Epistaxis     Since this morning. Denies ASA or thinners, nose clamp in place.        Reason for Admission  nose bleed     Admission Date  7/19/2024    CODE STATUS  Full Code    HPI & HOSPITAL COURSE  This is a 73 y.o. male ith a history of ESRD on MWF dialysis through a fistula (missed 7/19), severe aortic stenosis and MR, pulmonary hypertension, HTN, seizure disorder and diabetes who was admitted on 7/19 with recurrent epistaxis. Initially stable, he went for bilateral maxillary artery embolization, but his respiratory status declined significantly postoperatively. He was started on BiPAP which quickly failed and after goals of care discussion he was intubated and started on mechanical ventilation, transferred to the ICU, followed by nephrology for ongoing dialysis needs.  The patient stabilized, eventually successfully extubated, on 7/21 felt stabilized to transfer out of the ICU level of care.  The patient had Rhino Rocket in place, ENT saw patient and removed his Rhinorocket today 7/25/24. No more bleeding.  Patient now doing better and is back at his baseline oxygen and will be discharged home today.    The patient met 2-midnight criteria for an inpatient stay at the time of discharge.    Discharge Date  7/25/24    FOLLOW UP ITEMS POST DISCHARGE  ENT as needed    DISCHARGE DIAGNOSES  Principal Problem:    Acute on chronic respiratory failure with hypoxia and hypercapnia (HCC) (POA: Unknown)  Active Problems:    Type 2 diabetes mellitus (HCC) (Chronic) (POA: Yes)    Primary hypertension (POA: Yes)    Severe aortic stenosis (POA: Yes)    Pulmonary hypertension (HCC) (Chronic) (POA: Yes)    ESRD (end stage renal disease) on dialysis (HCC) (Chronic) (POA: Yes)    Acute blood loss anemia (POA: Yes)    Epistaxis (POA: Yes)    Mitral regurgitation (POA: Unknown)  Resolved Problems:    * No resolved hospital problems.  *      FOLLOW UP  No future appointments.  Clyde Sung M.D.  [address] [place] [id number] [phone]    Follow up in 1 week(s)  Please call to schedule a followup visit for your most recent hospitalization      MEDICATIONS ON DISCHARGE     Medication List        START taking these medications        Instructions   aspirin 81 MG Chew chewable tablet  Commonly known as: Asa  Replaces: aspirin 81 MG EC tablet   Chew 1 Tablet every day.  Dose: 81 mg            CONTINUE taking these medications        Instructions   atorvastatin 20 MG Tabs  Commonly known as: Lipitor   Take 20 mg by mouth every day.  Dose: 20 mg     felodipine ER 10 MG Tb24  Commonly known as: Plendil   Take 10 mg by mouth every day.  Dose: 10 mg     ferrous sulfate 325 (65 Fe) MG tablet   Take 325 mg by mouth every day.  Dose: 325 mg     Fish Oil Pearls 180 MG Caps   Take 180 mg by mouth every day.  Dose: 180 mg     gabapentin 100 MG Caps  Commonly known as: Neurontin   Take 100-300 mg by mouth at bedtime as needed (Neuropathy). 1 to 3 capsules = 100 to 300 mg.  Dose: 100-300 mg     lisinopril 40 MG tablet  Commonly known as: Prinivil   Take 40 mg by mouth every day.  Dose: 40 mg     Mircera 150 MCG/0.3ML Sosy  Generic drug: Methoxy PEG-Epoetin Beta   Inject 150 mcg as directed every 14 days. Administered at dialysis ([facility] [phone]).  Dose: 150 mcg     Nasal Spray No Drip 0.05 % Soln  Generic drug: oxymetazoline   Administer 2 Sprays into affected nostril(S) 2 times a day for 7 days.  Dose: 2 Spray     Ocean Nasal Spray 0.65 % Soln  Generic drug: sodium chloride   Administer 4-6 Sprays into affected nostril(S) every four hours as needed (Congestion).  Dose: 4-6 Spray     sevelamer carbonate 800 MG Tabs tablet  Commonly known as: Renvela   Take 1,600 mg by mouth 3 times a day with meals. 2 tablets = 1,600 mg.  Dose: 1,600 mg            STOP taking these medications      amoxicillin-clavulanate 875-125 MG Tabs  Commonly  known as: Augmentin     aspirin 81 MG EC tablet  Replaced by: aspirin 81 MG Chew chewable tablet     dipyridamole 25 MG Tabs  Commonly known as: Persantine     doxycycline 100 MG capsule  Commonly known as: Monodox     loperamide 2 MG Caps  Commonly known as: Imodium              Allergies  Allergies   Allergen Reactions    Penicillin G Rash     Rxn - Exacerbated a rash on his legs  Rash as a child   Tolerates ceftriaxone and cefepime       DIET  Orders Placed This Encounter   Procedures    Diet Order Diet: Renal     Standing Status:   Standing     Number of Occurrences:   1     Order Specific Question:   Diet:     Answer:   Renal [8]       ACTIVITY  As tolerated.  Weight bearing as tolerated    CONSULTATIONS  ENT  Critical care  IR    PROCEDURES  bilateral maxillary artery embolization    LABORATORY  Lab Results   Component Value Date    SODIUM 134 (L) 07/25/2024    POTASSIUM 4.1 07/25/2024    CHLORIDE 96 07/25/2024    CO2 25 07/25/2024    GLUCOSE 150 (H) 07/25/2024    BUN 22 07/25/2024    CREATININE 4.61 (HH) 07/25/2024        Lab Results   Component Value Date    WBC 8.1 07/25/2024    HEMOGLOBIN 8.0 (L) 07/25/2024    HEMATOCRIT 25.9 (L) 07/25/2024    PLATELETCT 185 07/25/2024        Total time of the discharge process exceeds 35 minutes.

## 2024-07-25 NOTE — PROGRESS NOTES
Report received from Rn. Assumed pt care. Pt is resting in bed on 2 L 02. Pt A&O x 4. Fall precautions in place, call light and belongings within reach, bed in lowest position. No signs of distress.

## 2024-07-25 NOTE — PROGRESS NOTES
HD 7 recurrent epistaxis s/p IR embolization of bilateral internal maxillary arteries by Dr. Ojeda    Right rapid rhino removed after TXA administered  No active bleeding  Afrin 3 sprays right side TID x 3 days  OK to DC Keflex    F/u in office as needed

## 2024-07-25 NOTE — CARE PLAN
Problem: Knowledge Deficit - Standard  Goal: Patient and family/care givers will demonstrate understanding of plan of care, disease process/condition, diagnostic tests and medications  Description: Target End Date:  1-3 days or as soon as patient condition allows    Document in Patient Education    1.  Patient and family/caregiver oriented to unit, equipment, visitation policy and means for communicating concern  2.  Complete/review Learning Assessment  3.  Assess knowledge level of disease process/condition, treatment plan, diagnostic tests and medications  4.  Explain disease process/condition, treatment plan, diagnostic tests and medications  Outcome: Progressing     Problem: Fall Risk  Goal: Patient will remain free from falls  Description: Target End Date:  Prior to discharge or change in level of care    Document interventions on the [facility] Fall Risk Assessment    1.  Assess for fall risk factors  2.  Implement fall precautions  Outcome: Progressing     Problem: Respiratory  Goal: Patient will achieve/maintain optimum respiratory ventilation and gas exchange  Description: Target End Date:  Prior to discharge or change in level of care    Document on Assessment flowsheet    1.  Assess and monitor rate, rhythm, depth and effort of respiration  2.  Breath sounds assessed qshift and/or as needed  3.  Assess O2 saturation, administer/titrate oxygen as ordered  4.  Position patient for maximum ventilatory efficiency  5.  Turn, cough, and deep breath with splinting to improve effectiveness  6.  Collaborate with RT to administer medication/treatments per order  7.  Encourage use of incentive spirometer and encourage patient to cough after use and utilize splinting techniques if applicable  8.  Airway suctioning  9.  Monitor sputum production for changes in color, consistency and frequency  10. Perform frequent oral hygiene  11. Alternate physical activity with rest periods  Outcome: Progressing   The patient  is Stable - Low risk of patient condition declining or worsening    Shift Goals  Clinical Goals: VSS, safety  Patient Goals: to go home, HD  Family Goals: updates    Progress made toward(s) clinical / shift goals:  VSS, safety    Patient is not progressing towards the following goals:

## 2024-07-25 NOTE — PROGRESS NOTES
Handoff report received from day shift nurse. Pt care assumed. Pt is currently resting in bed. POC discussed with Pt and Pt verbalizes no questions at this time. Pt is AAOx4, on 1 L NC, on Tele monitoring, and VSS. Call light and belongings within reach, bed in lowest and locked position, and Pt educated on use of call light. Will continue to monitor.

## 2024-07-25 NOTE — PROGRESS NOTES
Patient being discharged via [facility]. Pt educated on discharge instructions and new prescriptions, verbalized understanding. Follow up appointment to be made with PCP. PIV removed on floor. Patient going home via car with family.

## 2024-07-25 NOTE — WOUND TEAM
[facility]  Inpatient Services  Wound and Skin Care Brief Evaluation    Admission Date: 7/19/2024     Last order of IP CONSULT TO WOUND CARE was found on 7/20/2024 from Hospital Encounter on 7/19/2024     HPI, PMH, SH: Reviewed    Chief Complaint   Patient presents with    Epistaxis     Since this morning. Denies ASA or thinners, nose clamp in place.      Diagnosis: Epistaxis requiring cauterization [R04.0]  Epistaxis [R04.0]    Unit where seen by Wound Team: T720/00     Wound consult placed regarding sacrum. Chart and images reviewed. This discussed with bedside RN. This clinician in to assess patient. Patient pleasant and agreeable. Sacrococcygeal region with natural hyperpigmentation.  Offloading adhesive foam in place.    No pressure injuries or advanced wound care needs identified. Wound consult completed. Wound team signing off, re-consult if patient has further advanced wound care needs.         PREVENTATIVE INTERVENTIONS:    Q shift [place] - performed per nursing policy  Q shift pressure point assessments - performed per nursing policy

## 2024-07-25 NOTE — PROGRESS NOTES
[facility] -  PROGRESS NOTE               Author: Gina Alejo M.D. Date & Time: 7/25/2024  7:52 AM     HPI:  73Y M w ESRD on MWF HD ([facility]) and recent epistaxis returns with new nose bleed on 7/19. Per H&P, pt had reported he has been having nose bleed issues for about 2 months. Pt reported that he developed another nose bleed and has not been able to stop it.  ER Labwork showed tolerable electrolytes and per ER MD note, pt did not appear volume overloaded. He was taken taken to IR for embolization where b/l maxillary arteries were embolized. Anesthesia post-procedure note indicated pt was euvolemic. In PACU pt was noted to require additional O2, and was placed on non-rebreather mask around 2000. Pt continued to worsen overnight and was upgraded to ICU and required intubation around 0400. Nephrology was not paged overnight. Pt seen and examined on AM of 7/20 and is tolerating dialysis.     DAILY NEPHROLOGY SUMMARY:  7/20: consult done  7/21: Pt tolerated Hd yesterday. Extubated. On oxymask. States he feels better. Discussed additional HD today and pt is agreeable.  7/22: HD again yesterday, net UF 2L, no complaints   7/23: HD yesterday net UF 2L, no new complaints, feels tired   7/24: seen on HD, VSS, no complaints   7/25; HD yesterday net UF 2100 cc, no complaints      REVIEW OF SYSTEMS:    10 point ROS reviewed and is as per HPI/daily summary or otherwise negative    PMH/PSH/SH/FH: Reviewed and unchanged since admission note    CURRENT MEDICATIONS:   Scheduled Medications   Medication Dose Frequency    oxymetazoline  3 Spray TID    cephALEXin  250 mg BID    sevelamer carbonate  800 mg TID WITH MEALS    insulin regular  1-6 Units Q6HRS    epoetin  4,000 Units MO, WE + FR    atorvastatin  20 mg Q EVENING    [Held by provider] dipyridamole  25 mg BID    senna-docusate  2 Tablet Q EVENING       VS:  /72   Pulse 77   Temp 36.6 °C (97.9 °F) (Temporal)   Resp 16    "Ht 1.753 m (5' 9.02\")   Wt 71 kg (156 lb 8.4 oz)   SpO2 99%   BMI 23.10 kg/m²     Physical Exam  Constitutional:       Appearance: Normal appearance.   HENT:      Head: Normocephalic and atraumatic.      Mouth/Throat:      Mouth: Mucous membranes are moist.   Cardiovascular:      Rate and Rhythm: Normal rate and regular rhythm.   Pulmonary:      Effort: Pulmonary effort is normal.   Neurological:      Mental Status: He is alert and oriented to person, place, and time. Mental status is at baseline.       Access:      Fluids:  In: 500 [Dialysis:500]  Out: 2600     LABS:  Recent Labs     07/23/24  0226 07/24/24  0228 07/25/24  0256   SODIUM 137 137 134*   POTASSIUM 4.0 3.8 4.1   CHLORIDE 95* 97 96   CO2 27 27 25   GLUCOSE 103* 91 150*   BUN 24* 36* 22   CREATININE 3.95* 5.89* 4.61*   CALCIUM 8.5 8.3* 8.8       IMAGING:  - Imaging studies reviewed    ASSESSMENTS:    -ESRD     Outpt HD Center: [facility]     Maintenance dialysis qMWF and as needed     Via R arm AVF  -Hypotension     BP improved   -Hypervolemia     Volume off with dialysis as BP tolerates  - Anemia     goal Hgb 10-11      Low iron stores 7/20  -CKD-MBD  -Acute Epistaxis    -s/p IR maxillary embolization 7/19  -Acute Respiratory Failure    -pt decompensated after IR procedure    -intubated 7/20, extubated 7/21  - Thrombocytopenia, mild         PLAN:  - HD qMWF  -  No HD today  - Volume off with dialysis as BP tolerates  - Ct  IV iron   - EPO  with HD to keep hgb 10-11  - Transfuse PRN hgb < 7  - Renal diet  - Dose all meds for ESRD  - P binders adjusted, Renvela 800 mg TID AC   - No heparin with HD     OK to transition to OP HD once medically cleared      Please page nephrology with any questions or concerns        "

## 2024-07-25 NOTE — DISCHARGE PLANNING
Case Management Discharge Planning    Admission Date: 7/19/2024  GMLOS: 6.3  ALOS: 6    6-Clicks ADL Score: 20  6-Clicks Mobility Score: 18      Anticipated Discharge Dispo: Discharge Disposition: Discharged to home/self care (01)  Discharge Address: [address] [place]  [address]  Discharge Contact Phone Number: [phone]    DME Needed: No    Action(s) Taken: Patient is medically cleared to discharge home. Patient has transportation home but need an oxygen tank to be delivered to bedside. RNCM requested DPA to call [facility]. Per DPA, [facility] will deliver a portable tank to patient's bedside. Patient is planned to discharge after 1330.     Escalations Completed: None    Medically Clear: Yes    Next Steps: No other CM needs    Barriers to Discharge: DME    Is the patient up for discharge tomorrow: No - today

## 2024-07-25 NOTE — DISCHARGE PLANNING
0828  Agency/Facility Name: [facility]   Spoke To: Sheila   Outcome: [facility] called to ask if a tank can be delivered at bedside for discharge. [facility] informed pt is at [facility] and no changes to his O2 prescription. [facility] to notify  to deliver a tank at bedside.

## 2024-07-30 NOTE — PROGRESS NOTES
"  Assessment & Plan   1. Acute otitis media, unspecified otitis media type  Not acutely seen on exam today; but does not have supply of ear gtts at home currently in light of tubes.  Therefore, will plan to fill Ofloxacin to have available for prn drainage/evidence of infection.  - ofloxacin (FLOXIN) 0.3 % otic solution; Place 5 drops into both ears daily  Dispense: 10 mL; Refill: 0    2. Otalgia, bilateral  Reassurance on exam today.  OTC analgesics.  Suspect swimming and tubes had a role to play in recent episode of otalgia; however this has been occurring for a few months off and on.  Consideration for ENT follow up if still persisting or worsening.    Follow up: blanca      Gwyn Nicholas is a 3 year old, presenting for the following health issues:  No chief complaint on file.        7/31/2024     9:03 AM   Additional Questions   Roomed by KEVEN Dillon   Accompanied by mom and brother     History of Present Illness       Reason for visit:  Ears  Symptom onset:  3-7 days ago  Symptoms include:  Ear pain  Symptom intensity:  Mild  Symptom progression:  Staying the same  Had these symptoms before:  Yes  Has tried/received treatment for these symptoms:  Yes  Previous treatment was successful:  Yes  Prior treatment description:  Seen ent  What makes it worse:  .  What makes it better:  No ear pain      ~1 months of waxing and waning ear pain.  Will pull at it and just say \"hurt.\"  Had tubes put in with Dr. Carlson in [place] 5/30/24 (second set).  No drainage.  No fever.        Objective    There were no vitals taken for this visit.  No weight on file for this encounter.     Physical Exam   GENERAL: Active, alert, in no acute distress.  SKIN: Clear. No significant rash, abnormal pigmentation or lesions  HEAD: Normocephalic.  EYES:  No discharge or erythema. Normal pupils and EOM.  EARS: Normal canals. Tympanic membranes are normal; gray and translucent with patent white tubes in correct location.  NOSE: Normal " 12 hour chart check   without discharge.  MOUTH/THROAT: Clear. No oral lesions. Teeth intact without obvious abnormalities.  NECK: Supple, no masses.  LYMPH NODES: No adenopathy  LUNGS: Clear. No rales, rhonchi, wheezing or retractions  HEART: Regular rhythm. Normal S1/S2. No murmurs.  ABDOMEN: Soft, non-tender, not distended, no masses or hepatosplenomegaly. Bowel sounds normal.     Diagnostics: No results found for this or any previous visit (from the past 24 hour(s)).        Signed Electronically by: Alondra Benavidez DO

## 2025-01-13 ENCOUNTER — TELEPHONE (OUTPATIENT)
Dept: HEALTH INFORMATION MANAGEMENT | Facility: OTHER | Age: 75
End: 2025-01-13
Payer: MEDICARE

## 2025-08-23 ENCOUNTER — APPOINTMENT (OUTPATIENT)
Dept: RADIOLOGY | Facility: MEDICAL CENTER | Age: 75
DRG: 186 | End: 2025-08-23
Attending: EMERGENCY MEDICINE
Payer: MEDICARE

## 2025-08-23 ENCOUNTER — APPOINTMENT (OUTPATIENT)
Dept: RADIOLOGY | Facility: MEDICAL CENTER | Age: 75
DRG: 186 | End: 2025-08-23
Attending: INTERNAL MEDICINE
Payer: MEDICARE

## 2025-08-23 ENCOUNTER — HOSPITAL ENCOUNTER (INPATIENT)
Facility: MEDICAL CENTER | Age: 75
LOS: 3 days | DRG: 186 | End: 2025-08-27
Attending: EMERGENCY MEDICINE | Admitting: INTERNAL MEDICINE
Payer: MEDICARE

## 2025-08-23 DIAGNOSIS — I35.0 SEVERE AORTIC STENOSIS: ICD-10-CM

## 2025-08-23 DIAGNOSIS — J90 PLEURAL EFFUSION: ICD-10-CM

## 2025-08-23 DIAGNOSIS — I95.0 IDIOPATHIC HYPOTENSION: ICD-10-CM

## 2025-08-23 DIAGNOSIS — I34.0 MITRAL VALVE INSUFFICIENCY, UNSPECIFIED ETIOLOGY: ICD-10-CM

## 2025-08-23 DIAGNOSIS — R59.0 MEDIASTINAL LYMPHADENOPATHY: ICD-10-CM

## 2025-08-23 DIAGNOSIS — R06.00 DYSPNEA, UNSPECIFIED TYPE: Primary | ICD-10-CM

## 2025-08-23 DIAGNOSIS — I07.1 SEVERE TRICUSPID REGURGITATION: ICD-10-CM

## 2025-08-23 PROBLEM — J96.11 CHRONIC RESPIRATORY FAILURE WITH HYPOXIA (HCC): Status: ACTIVE | Noted: 2025-08-23

## 2025-08-23 LAB
ALBUMIN SERPL BCP-MCNC: 3.3 G/DL (ref 3.2–4.9)
ALBUMIN/GLOB SERPL: 1 G/DL
ALP SERPL-CCNC: 73 U/L (ref 30–99)
ALT SERPL-CCNC: 13 U/L (ref 2–50)
AMYLASE FLD-CCNC: 22 U/L
ANION GAP SERPL CALC-SCNC: 11 MMOL/L (ref 7–16)
APPEARANCE FLD: NORMAL
AST SERPL-CCNC: 25 U/L (ref 12–45)
BASOPHILS # BLD AUTO: 0.8 % (ref 0–1.8)
BASOPHILS # BLD: 0.05 K/UL (ref 0–0.12)
BILIRUB SERPL-MCNC: 1.6 MG/DL (ref 0.1–1.5)
BODY FLD TYPE: NORMAL
BUN SERPL-MCNC: 21 MG/DL (ref 8–22)
CALCIUM ALBUM COR SERPL-MCNC: 9.1 MG/DL (ref 8.5–10.5)
CALCIUM SERPL-MCNC: 8.5 MG/DL (ref 8.5–10.5)
CHLORIDE SERPL-SCNC: 100 MMOL/L (ref 96–112)
CO2 SERPL-SCNC: 28 MMOL/L (ref 20–33)
COLOR FLD: YELLOW
CREAT SERPL-MCNC: 4 MG/DL (ref 0.5–1.4)
CYTOLOGY REG CYTOL: NORMAL
EKG IMPRESSION: NORMAL
EOSINOPHIL # BLD AUTO: 0.33 K/UL (ref 0–0.51)
EOSINOPHIL NFR BLD: 5.3 % (ref 0–6.9)
EOSINOPHIL NFR FLD: 2 %
ERYTHROCYTE [DISTWIDTH] IN BLOOD BY AUTOMATED COUNT: 63.7 FL (ref 35.9–50)
FUNGUS SPEC FUNGUS STN: NORMAL
GFR SERPLBLD CREATININE-BSD FMLA CKD-EPI: 15 ML/MIN/1.73 M 2
GLOBULIN SER CALC-MCNC: 3.3 G/DL (ref 1.9–3.5)
GLUCOSE FLD-MCNC: 159 MG/DL
GLUCOSE SERPL-MCNC: 181 MG/DL (ref 65–99)
GRAM STN SPEC: NORMAL
HCT VFR BLD AUTO: 35.2 % (ref 42–52)
HGB BLD-MCNC: 11.5 G/DL (ref 14–18)
IMM GRANULOCYTES # BLD AUTO: 0.02 K/UL (ref 0–0.11)
IMM GRANULOCYTES NFR BLD AUTO: 0.3 % (ref 0–0.9)
LDH FLD L TO P-CCNC: 119 U/L
LYMPHOCYTES # BLD AUTO: 0.51 K/UL (ref 1–4.8)
LYMPHOCYTES NFR BLD: 8.1 % (ref 22–41)
LYMPHOCYTES NFR FLD: 31 %
MCH RBC QN AUTO: 32.9 PG (ref 27–33)
MCHC RBC AUTO-ENTMCNC: 32.7 G/DL (ref 32.3–36.5)
MCV RBC AUTO: 100.6 FL (ref 81.4–97.8)
MONOCYTES # BLD AUTO: 0.54 K/UL (ref 0–0.85)
MONOCYTES NFR BLD AUTO: 8.6 % (ref 0–13.4)
MONOS+MACROS NFR FLD MANUAL: 51 %
NEUTROPHILS # BLD AUTO: 4.82 K/UL (ref 1.82–7.42)
NEUTROPHILS NFR BLD: 76.9 % (ref 44–72)
NEUTROPHILS NFR FLD: 16 %
NRBC # BLD AUTO: 0 K/UL
NRBC BLD-RTO: 0 /100 WBC (ref 0–0.2)
NT-PROBNP SERPL IA-MCNC: ABNORMAL PG/ML (ref 0–125)
NUC CELL # FLD: 229 CELLS/UL
PH FLD: 8 [PH]
PLATELET # BLD AUTO: 116 K/UL (ref 164–446)
PMV BLD AUTO: 12.1 FL (ref 9–12.9)
POTASSIUM SERPL-SCNC: 3.4 MMOL/L (ref 3.6–5.5)
PROT FLD-MCNC: 2.7 G/DL
PROT SERPL-MCNC: 6.6 G/DL (ref 6–8.2)
RBC # BLD AUTO: 3.5 M/UL (ref 4.7–6.1)
RBC # FLD: <2000 CELLS/UL
SIGNIFICANT IND 70042: NORMAL
SIGNIFICANT IND 70042: NORMAL
SITE SITE: NORMAL
SITE SITE: NORMAL
SODIUM SERPL-SCNC: 139 MMOL/L (ref 135–145)
SOURCE SOURCE: NORMAL
SOURCE SOURCE: NORMAL
TROPONIN T SERPL-MCNC: 336 NG/L (ref 6–19)
WBC # BLD AUTO: 6.3 K/UL (ref 4.8–10.8)

## 2025-08-23 PROCEDURE — 71250 CT THORAX DX C-: CPT

## 2025-08-23 PROCEDURE — 84157 ASSAY OF PROTEIN OTHER: CPT

## 2025-08-23 PROCEDURE — 87102 FUNGUS ISOLATION CULTURE: CPT

## 2025-08-23 PROCEDURE — 88305 TISSUE EXAM BY PATHOLOGIST: CPT | Performed by: PATHOLOGY

## 2025-08-23 PROCEDURE — 83986 ASSAY PH BODY FLUID NOS: CPT

## 2025-08-23 PROCEDURE — 88305 TISSUE EXAM BY PATHOLOGIST: CPT | Mod: 26 | Performed by: PATHOLOGY

## 2025-08-23 PROCEDURE — 83880 ASSAY OF NATRIURETIC PEPTIDE: CPT

## 2025-08-23 PROCEDURE — 700102 HCHG RX REV CODE 250 W/ 637 OVERRIDE(OP): Performed by: INTERNAL MEDICINE

## 2025-08-23 PROCEDURE — 89051 BODY FLUID CELL COUNT: CPT

## 2025-08-23 PROCEDURE — A9270 NON-COVERED ITEM OR SERVICE: HCPCS | Performed by: INTERNAL MEDICINE

## 2025-08-23 PROCEDURE — 83615 LACTATE (LD) (LDH) ENZYME: CPT

## 2025-08-23 PROCEDURE — 71045 X-RAY EXAM CHEST 1 VIEW: CPT

## 2025-08-23 PROCEDURE — 82150 ASSAY OF AMYLASE: CPT

## 2025-08-23 PROCEDURE — 93005 ELECTROCARDIOGRAM TRACING: CPT | Mod: TC | Performed by: EMERGENCY MEDICINE

## 2025-08-23 PROCEDURE — 93005 ELECTROCARDIOGRAM TRACING: CPT | Mod: TC

## 2025-08-23 PROCEDURE — 82945 GLUCOSE OTHER FLUID: CPT

## 2025-08-23 PROCEDURE — G0378 HOSPITAL OBSERVATION PER HR: HCPCS

## 2025-08-23 PROCEDURE — 99285 EMERGENCY DEPT VISIT HI MDM: CPT

## 2025-08-23 PROCEDURE — 80053 COMPREHEN METABOLIC PANEL: CPT

## 2025-08-23 PROCEDURE — 88112 CYTOPATH CELL ENHANCE TECH: CPT | Mod: 26 | Performed by: PATHOLOGY

## 2025-08-23 PROCEDURE — 84484 ASSAY OF TROPONIN QUANT: CPT

## 2025-08-23 PROCEDURE — 88112 CYTOPATH CELL ENHANCE TECH: CPT | Performed by: PATHOLOGY

## 2025-08-23 PROCEDURE — 700111 HCHG RX REV CODE 636 W/ 250 OVERRIDE (IP): Mod: JZ | Performed by: INTERNAL MEDICINE

## 2025-08-23 PROCEDURE — 85025 COMPLETE CBC W/AUTO DIFF WBC: CPT

## 2025-08-23 PROCEDURE — 0W993ZZ DRAINAGE OF RIGHT PLEURAL CAVITY, PERCUTANEOUS APPROACH: ICD-10-PCS

## 2025-08-23 PROCEDURE — 87070 CULTURE OTHR SPECIMN AEROBIC: CPT

## 2025-08-23 PROCEDURE — 36415 COLL VENOUS BLD VENIPUNCTURE: CPT

## 2025-08-23 PROCEDURE — 4410569 US-THORACENTESIS PUNCTURE

## 2025-08-23 PROCEDURE — 87205 SMEAR GRAM STAIN: CPT

## 2025-08-23 PROCEDURE — 87116 MYCOBACTERIA CULTURE: CPT

## 2025-08-23 RX ORDER — LOPERAMIDE HYDROCHLORIDE 2 MG/1
2-4 CAPSULE ORAL 4 TIMES DAILY PRN
Status: DISCONTINUED | OUTPATIENT
Start: 2025-08-23 | End: 2025-08-27 | Stop reason: HOSPADM

## 2025-08-23 RX ORDER — ACETAMINOPHEN 325 MG/1
650 TABLET ORAL EVERY 6 HOURS PRN
Status: DISCONTINUED | OUTPATIENT
Start: 2025-08-23 | End: 2025-08-27 | Stop reason: HOSPADM

## 2025-08-23 RX ORDER — FUROSEMIDE 80 MG/1
80 TABLET ORAL DAILY
COMMUNITY
Start: 2024-12-20 | End: 2025-12-20

## 2025-08-23 RX ORDER — ASCORBIC ACID, THIAMINE MONONITRATE,RIBOFLAVIN, NIACINAMIDE, PYRIDOXINE HYDROCHLORIDE, FOLIC ACID, CYANOCOBALAMIN, BIOTIN, CALCIUM PANTOTHENATE, 100; 1.5; 1.7; 20; 10; 1; 6000; 150000; 5 MG/1; MG/1; MG/1; MG/1; MG/1; MG/1; UG/1; UG/1; MG/1
1 CAPSULE, LIQUID FILLED ORAL DAILY
Status: DISCONTINUED | OUTPATIENT
Start: 2025-08-23 | End: 2025-08-23

## 2025-08-23 RX ORDER — FELODIPINE 5 MG/1
10 TABLET, EXTENDED RELEASE ORAL DAILY
Status: DISCONTINUED | OUTPATIENT
Start: 2025-08-24 | End: 2025-08-27 | Stop reason: HOSPADM

## 2025-08-23 RX ORDER — ONDANSETRON 2 MG/ML
4 INJECTION INTRAMUSCULAR; INTRAVENOUS EVERY 4 HOURS PRN
Status: DISCONTINUED | OUTPATIENT
Start: 2025-08-23 | End: 2025-08-27 | Stop reason: HOSPADM

## 2025-08-23 RX ORDER — ONDANSETRON 4 MG/1
4 TABLET, ORALLY DISINTEGRATING ORAL EVERY 4 HOURS PRN
Status: DISCONTINUED | OUTPATIENT
Start: 2025-08-23 | End: 2025-08-27 | Stop reason: HOSPADM

## 2025-08-23 RX ORDER — FOLIC ACID 1 MG/1
1 TABLET ORAL DAILY
Status: DISCONTINUED | OUTPATIENT
Start: 2025-08-24 | End: 2025-08-27 | Stop reason: HOSPADM

## 2025-08-23 RX ORDER — MUPIROCIN 2 %
1 OINTMENT (GRAM) TOPICAL
COMMUNITY
Start: 2025-05-01 | End: 2026-04-30

## 2025-08-23 RX ORDER — FERROUS SULFATE 325(65) MG
325 TABLET ORAL DAILY
Status: DISCONTINUED | OUTPATIENT
Start: 2025-08-24 | End: 2025-08-27 | Stop reason: HOSPADM

## 2025-08-23 RX ORDER — LISINOPRIL 20 MG/1
40 TABLET ORAL DAILY
Status: DISCONTINUED | OUTPATIENT
Start: 2025-08-24 | End: 2025-08-27 | Stop reason: HOSPADM

## 2025-08-23 RX ORDER — ATORVASTATIN CALCIUM 20 MG/1
20 TABLET, FILM COATED ORAL DAILY
Status: DISCONTINUED | OUTPATIENT
Start: 2025-08-23 | End: 2025-08-27 | Stop reason: HOSPADM

## 2025-08-23 RX ORDER — ASCORBIC ACID, THIAMINE MONONITRATE,RIBOFLAVIN, NIACINAMIDE, PYRIDOXINE HYDROCHLORIDE, FOLIC ACID, CYANOCOBALAMIN, BIOTIN, CALCIUM PANTOTHENATE, 100; 1.5; 1.7; 20; 10; 1; 6000; 150000; 5 MG/1; MG/1; MG/1; MG/1; MG/1; MG/1; UG/1; UG/1; MG/1
1 CAPSULE, LIQUID FILLED ORAL DAILY
COMMUNITY
Start: 2025-01-30 | End: 2026-01-29

## 2025-08-23 RX ORDER — FUROSEMIDE 10 MG/ML
80 INJECTION INTRAMUSCULAR; INTRAVENOUS ONCE
Status: DISCONTINUED | OUTPATIENT
Start: 2025-08-23 | End: 2025-08-24

## 2025-08-23 RX ORDER — LOPERAMIDE HYDROCHLORIDE 2 MG/1
2-4 CAPSULE ORAL 4 TIMES DAILY PRN
COMMUNITY
Start: 2025-06-11 | End: 2026-06-10

## 2025-08-23 RX ORDER — SEVELAMER CARBONATE 800 MG/1
1600 TABLET, FILM COATED ORAL
Status: DISCONTINUED | OUTPATIENT
Start: 2025-08-23 | End: 2025-08-27 | Stop reason: HOSPADM

## 2025-08-23 RX ADMIN — SEVELAMER CARBONATE 1600 MG: 800 TABLET, FILM COATED ORAL at 22:16

## 2025-08-23 RX ADMIN — ATORVASTATIN CALCIUM 20 MG: 20 TABLET, FILM COATED ORAL at 14:22

## 2025-08-23 RX ADMIN — SEVELAMER CARBONATE 1600 MG: 800 TABLET, FILM COATED ORAL at 14:21

## 2025-08-23 ASSESSMENT — LIFESTYLE VARIABLES
TOTAL SCORE: 0
EVER FELT BAD OR GUILTY ABOUT YOUR DRINKING: NO
DOES PATIENT WANT TO STOP DRINKING: CANNOT ASSESS
CONSUMPTION TOTAL: NEGATIVE
EVER HAD A DRINK FIRST THING IN THE MORNING TO STEADY YOUR NERVES TO GET RID OF A HANGOVER: NO
AVERAGE NUMBER OF DAYS PER WEEK YOU HAVE A DRINK CONTAINING ALCOHOL: 0
HOW MANY TIMES IN THE PAST YEAR HAVE YOU HAD 5 OR MORE DRINKS IN A DAY: 0
ALCOHOL_USE: NO
TOTAL SCORE: 0
TOTAL SCORE: 0
ON A TYPICAL DAY WHEN YOU DRINK ALCOHOL HOW MANY DRINKS DO YOU HAVE: 0
HAVE PEOPLE ANNOYED YOU BY CRITICIZING YOUR DRINKING: NO
HAVE YOU EVER FELT YOU SHOULD CUT DOWN ON YOUR DRINKING: NO

## 2025-08-23 ASSESSMENT — COGNITIVE AND FUNCTIONAL STATUS - GENERAL
SUGGESTED CMS G CODE MODIFIER MOBILITY: CJ
MOBILITY SCORE: 22
SUGGESTED CMS G CODE MODIFIER DAILY ACTIVITY: CH
DAILY ACTIVITIY SCORE: 24
CLIMB 3 TO 5 STEPS WITH RAILING: A LITTLE
WALKING IN HOSPITAL ROOM: A LITTLE

## 2025-08-23 ASSESSMENT — FIBROSIS 4 INDEX
FIB4 SCORE: 4.42
FIB4 SCORE: 2.404163056034261583

## 2025-08-23 ASSESSMENT — ENCOUNTER SYMPTOMS
CHILLS: 0
FEVER: 0
SHORTNESS OF BREATH: 1
VOMITING: 0
NAUSEA: 0
PALPITATIONS: 0
ABDOMINAL PAIN: 0

## 2025-08-23 ASSESSMENT — PAIN DESCRIPTION - PAIN TYPE
TYPE: ACUTE PAIN
TYPE: ACUTE PAIN

## 2025-08-24 ENCOUNTER — APPOINTMENT (OUTPATIENT)
Dept: CARDIOLOGY | Facility: MEDICAL CENTER | Age: 75
DRG: 186 | End: 2025-08-24
Attending: INTERNAL MEDICINE
Payer: MEDICARE

## 2025-08-24 LAB
ALBUMIN SERPL BCP-MCNC: 3.1 G/DL (ref 3.2–4.9)
ANION GAP SERPL CALC-SCNC: 12 MMOL/L (ref 7–16)
BASOPHILS # BLD AUTO: 0.7 % (ref 0–1.8)
BASOPHILS # BLD: 0.05 K/UL (ref 0–0.12)
BUN SERPL-MCNC: 38 MG/DL (ref 8–22)
CALCIUM ALBUM COR SERPL-MCNC: 9.4 MG/DL (ref 8.5–10.5)
CALCIUM SERPL-MCNC: 8.7 MG/DL (ref 8.5–10.5)
CHLORIDE SERPL-SCNC: 95 MMOL/L (ref 96–112)
CO2 SERPL-SCNC: 29 MMOL/L (ref 20–33)
CREAT SERPL-MCNC: 5.55 MG/DL (ref 0.5–1.4)
EOSINOPHIL # BLD AUTO: 0.47 K/UL (ref 0–0.51)
EOSINOPHIL NFR BLD: 6.3 % (ref 0–6.9)
ERYTHROCYTE [DISTWIDTH] IN BLOOD BY AUTOMATED COUNT: 62.3 FL (ref 35.9–50)
EST. AVERAGE GLUCOSE BLD GHB EST-MCNC: 143 MG/DL
GFR SERPLBLD CREATININE-BSD FMLA CKD-EPI: 10 ML/MIN/1.73 M 2
GLUCOSE SERPL-MCNC: 158 MG/DL (ref 65–99)
HBA1C MFR BLD: 6.6 % (ref 4–5.6)
HCT VFR BLD AUTO: 35.5 % (ref 42–52)
HGB BLD-MCNC: 11.9 G/DL (ref 14–18)
IMM GRANULOCYTES # BLD AUTO: 0.03 K/UL (ref 0–0.11)
IMM GRANULOCYTES NFR BLD AUTO: 0.4 % (ref 0–0.9)
LYMPHOCYTES # BLD AUTO: 0.6 K/UL (ref 1–4.8)
LYMPHOCYTES NFR BLD: 8.1 % (ref 22–41)
MCH RBC QN AUTO: 33.2 PG (ref 27–33)
MCHC RBC AUTO-ENTMCNC: 33.5 G/DL (ref 32.3–36.5)
MCV RBC AUTO: 99.2 FL (ref 81.4–97.8)
MONOCYTES # BLD AUTO: 0.67 K/UL (ref 0–0.85)
MONOCYTES NFR BLD AUTO: 9 % (ref 0–13.4)
NEUTROPHILS # BLD AUTO: 5.62 K/UL (ref 1.82–7.42)
NEUTROPHILS NFR BLD: 75.5 % (ref 44–72)
NRBC # BLD AUTO: 0 K/UL
NRBC BLD-RTO: 0 /100 WBC (ref 0–0.2)
PHOSPHATE SERPL-MCNC: 3.5 MG/DL (ref 2.5–4.5)
PLATELET # BLD AUTO: 127 K/UL (ref 164–446)
PMV BLD AUTO: 12.3 FL (ref 9–12.9)
POTASSIUM SERPL-SCNC: 3.5 MMOL/L (ref 3.6–5.5)
RBC # BLD AUTO: 3.58 M/UL (ref 4.7–6.1)
SODIUM SERPL-SCNC: 136 MMOL/L (ref 135–145)
WBC # BLD AUTO: 7.4 K/UL (ref 4.8–10.8)

## 2025-08-24 PROCEDURE — 36415 COLL VENOUS BLD VENIPUNCTURE: CPT

## 2025-08-24 PROCEDURE — 83036 HEMOGLOBIN GLYCOSYLATED A1C: CPT

## 2025-08-24 PROCEDURE — 700102 HCHG RX REV CODE 250 W/ 637 OVERRIDE(OP): Performed by: INTERNAL MEDICINE

## 2025-08-24 PROCEDURE — 85025 COMPLETE CBC W/AUTO DIFF WBC: CPT

## 2025-08-24 PROCEDURE — 80069 RENAL FUNCTION PANEL: CPT

## 2025-08-24 PROCEDURE — A9270 NON-COVERED ITEM OR SERVICE: HCPCS | Performed by: INTERNAL MEDICINE

## 2025-08-24 PROCEDURE — 770020 HCHG ROOM/CARE - TELE (206)

## 2025-08-24 RX ORDER — OXYMETAZOLINE HYDROCHLORIDE 0.05 G/100ML
2 SPRAY NASAL 2 TIMES DAILY
Status: DISPENSED | OUTPATIENT
Start: 2025-08-24 | End: 2025-08-27

## 2025-08-24 RX ADMIN — SEVELAMER CARBONATE 1600 MG: 800 TABLET, FILM COATED ORAL at 17:23

## 2025-08-24 RX ADMIN — Medication 2 SPRAY: at 09:52

## 2025-08-24 RX ADMIN — ATORVASTATIN CALCIUM 20 MG: 20 TABLET, FILM COATED ORAL at 05:46

## 2025-08-24 RX ADMIN — SEVELAMER CARBONATE 1600 MG: 800 TABLET, FILM COATED ORAL at 07:57

## 2025-08-24 RX ADMIN — FELODIPINE 10 MG: 5 TABLET, EXTENDED RELEASE ORAL at 05:46

## 2025-08-24 RX ADMIN — FERROUS SULFATE TAB 325 MG (65 MG ELEMENTAL FE) 325 MG: 325 (65 FE) TAB at 05:46

## 2025-08-24 RX ADMIN — SEVELAMER CARBONATE 1600 MG: 800 TABLET, FILM COATED ORAL at 11:49

## 2025-08-24 RX ADMIN — FOLIC ACID 1 MG: 1 TABLET ORAL at 05:46

## 2025-08-24 ASSESSMENT — FIBROSIS 4 INDEX: FIB4 SCORE: 4.04

## 2025-08-24 ASSESSMENT — ENCOUNTER SYMPTOMS
CHILLS: 0
VOMITING: 0
FEVER: 0
NAUSEA: 0
DIZZINESS: 1
ABDOMINAL PAIN: 0
SHORTNESS OF BREATH: 1

## 2025-08-24 ASSESSMENT — PAIN DESCRIPTION - PAIN TYPE
TYPE: ACUTE PAIN
TYPE: ACUTE PAIN

## 2025-08-25 ENCOUNTER — APPOINTMENT (OUTPATIENT)
Dept: CARDIOLOGY | Facility: MEDICAL CENTER | Age: 75
DRG: 186 | End: 2025-08-25
Attending: INTERNAL MEDICINE
Payer: MEDICARE

## 2025-08-25 PROBLEM — I95.9 HYPOTENSION: Status: ACTIVE | Noted: 2025-08-25

## 2025-08-25 PROBLEM — R59.0 MEDIASTINAL LYMPHADENOPATHY: Status: ACTIVE | Noted: 2025-08-25

## 2025-08-25 LAB
ALBUMIN SERPL BCP-MCNC: 3.3 G/DL (ref 3.2–4.9)
ANION GAP SERPL CALC-SCNC: 13 MMOL/L (ref 7–16)
BUN SERPL-MCNC: 54 MG/DL (ref 8–22)
CALCIUM ALBUM COR SERPL-MCNC: 9.7 MG/DL (ref 8.5–10.5)
CALCIUM SERPL-MCNC: 9.1 MG/DL (ref 8.5–10.5)
CHLORIDE SERPL-SCNC: 91 MMOL/L (ref 96–112)
CO2 SERPL-SCNC: 31 MMOL/L (ref 20–33)
CREAT SERPL-MCNC: 7.11 MG/DL (ref 0.5–1.4)
GFR SERPLBLD CREATININE-BSD FMLA CKD-EPI: 7 ML/MIN/1.73 M 2
GLUCOSE SERPL-MCNC: 158 MG/DL (ref 65–99)
HAV IGM SERPL QL IA: NORMAL
HBV CORE IGM SER QL: NORMAL
HBV SURFACE AB SERPL IA-ACNC: 738 MIU/ML (ref 0–10)
HBV SURFACE AG SER QL: NORMAL
HCV AB SER QL: NORMAL
LV EJECT FRACT  99904: 50
LV EJECT FRACT MOD 2C 99903: 48.32
LV EJECT FRACT MOD 4C 99902: 50.28
LV EJECT FRACT MOD BP 99901: 49.76
PHOSPHATE SERPL-MCNC: 3.9 MG/DL (ref 2.5–4.5)
POTASSIUM SERPL-SCNC: 4 MMOL/L (ref 3.6–5.5)
SODIUM SERPL-SCNC: 135 MMOL/L (ref 135–145)

## 2025-08-25 PROCEDURE — 700117 HCHG RX CONTRAST REV CODE 255: Performed by: INTERNAL MEDICINE

## 2025-08-25 PROCEDURE — 700102 HCHG RX REV CODE 250 W/ 637 OVERRIDE(OP): Performed by: INTERNAL MEDICINE

## 2025-08-25 PROCEDURE — 93306 TTE W/DOPPLER COMPLETE: CPT | Mod: 26 | Performed by: INTERNAL MEDICINE

## 2025-08-25 PROCEDURE — 5A1D70Z PERFORMANCE OF URINARY FILTRATION, INTERMITTENT, LESS THAN 6 HOURS PER DAY: ICD-10-PCS | Performed by: NURSE PRACTITIONER

## 2025-08-25 PROCEDURE — 80074 ACUTE HEPATITIS PANEL: CPT

## 2025-08-25 PROCEDURE — 700111 HCHG RX REV CODE 636 W/ 250 OVERRIDE (IP): Mod: JZ | Performed by: INTERNAL MEDICINE

## 2025-08-25 PROCEDURE — A9270 NON-COVERED ITEM OR SERVICE: HCPCS | Performed by: INTERNAL MEDICINE

## 2025-08-25 PROCEDURE — 700111 HCHG RX REV CODE 636 W/ 250 OVERRIDE (IP): Performed by: NURSE PRACTITIONER

## 2025-08-25 PROCEDURE — 36415 COLL VENOUS BLD VENIPUNCTURE: CPT

## 2025-08-25 PROCEDURE — 86706 HEP B SURFACE ANTIBODY: CPT

## 2025-08-25 PROCEDURE — 770020 HCHG ROOM/CARE - TELE (206)

## 2025-08-25 PROCEDURE — 93306 TTE W/DOPPLER COMPLETE: CPT

## 2025-08-25 PROCEDURE — 80069 RENAL FUNCTION PANEL: CPT

## 2025-08-25 RX ORDER — MIDODRINE HYDROCHLORIDE 5 MG/1
5 TABLET ORAL
Status: DISCONTINUED | OUTPATIENT
Start: 2025-08-25 | End: 2025-08-27 | Stop reason: HOSPADM

## 2025-08-25 RX ORDER — MIDODRINE HYDROCHLORIDE 5 MG/1
10 TABLET ORAL
Status: DISCONTINUED | OUTPATIENT
Start: 2025-08-25 | End: 2025-08-27 | Stop reason: HOSPADM

## 2025-08-25 RX ORDER — HEPARIN SODIUM 5000 [USP'U]/ML
5000 INJECTION, SOLUTION INTRAVENOUS; SUBCUTANEOUS EVERY 8 HOURS
Status: DISCONTINUED | OUTPATIENT
Start: 2025-08-25 | End: 2025-08-27 | Stop reason: HOSPADM

## 2025-08-25 RX ORDER — MIDODRINE HYDROCHLORIDE 5 MG/1
5 TABLET ORAL
Status: DISCONTINUED | OUTPATIENT
Start: 2025-08-25 | End: 2025-08-25

## 2025-08-25 RX ORDER — MIDODRINE HYDROCHLORIDE 5 MG/1
5 TABLET ORAL ONCE
Status: COMPLETED | OUTPATIENT
Start: 2025-08-25 | End: 2025-08-25

## 2025-08-25 RX ADMIN — HEPARIN SODIUM 5000 UNITS: 5000 INJECTION, SOLUTION INTRAVENOUS; SUBCUTANEOUS at 22:48

## 2025-08-25 RX ADMIN — ATORVASTATIN CALCIUM 20 MG: 20 TABLET, FILM COATED ORAL at 04:24

## 2025-08-25 RX ADMIN — SEVELAMER CARBONATE 1600 MG: 800 TABLET, FILM COATED ORAL at 08:16

## 2025-08-25 RX ADMIN — MIDODRINE HYDROCHLORIDE 5 MG: 5 TABLET ORAL at 11:58

## 2025-08-25 RX ADMIN — LIDOCAINE HYDROCHLORIDE 1 ML: 10 INJECTION, SOLUTION INFILTRATION; PERINEURAL at 17:10

## 2025-08-25 RX ADMIN — FOLIC ACID 1 MG: 1 TABLET ORAL at 04:24

## 2025-08-25 RX ADMIN — SEVELAMER CARBONATE 1600 MG: 800 TABLET, FILM COATED ORAL at 11:40

## 2025-08-25 RX ADMIN — FERROUS SULFATE TAB 325 MG (65 MG ELEMENTAL FE) 325 MG: 325 (65 FE) TAB at 04:24

## 2025-08-25 RX ADMIN — SEVELAMER CARBONATE 1600 MG: 800 TABLET, FILM COATED ORAL at 16:45

## 2025-08-25 RX ADMIN — HUMAN ALBUMIN MICROSPHERES AND PERFLUTREN 3 ML: 10; .22 INJECTION, SOLUTION INTRAVENOUS at 14:45

## 2025-08-25 RX ADMIN — HEPARIN SODIUM 5000 UNITS: 5000 INJECTION, SOLUTION INTRAVENOUS; SUBCUTANEOUS at 16:45

## 2025-08-25 RX ADMIN — FELODIPINE 10 MG: 5 TABLET, EXTENDED RELEASE ORAL at 04:24

## 2025-08-25 RX ADMIN — MIDODRINE HYDROCHLORIDE 5 MG: 5 TABLET ORAL at 11:39

## 2025-08-25 RX ADMIN — MIDODRINE HYDROCHLORIDE 10 MG: 5 TABLET ORAL at 16:45

## 2025-08-25 ASSESSMENT — ENCOUNTER SYMPTOMS
DIZZINESS: 0
ABDOMINAL PAIN: 0
NAUSEA: 0
CHILLS: 0
FEVER: 0
VOMITING: 0
SHORTNESS OF BREATH: 0

## 2025-08-25 ASSESSMENT — FIBROSIS 4 INDEX: FIB4 SCORE: 4.04

## 2025-08-25 ASSESSMENT — PAIN DESCRIPTION - PAIN TYPE: TYPE: ACUTE PAIN

## 2025-08-26 LAB
ALBUMIN SERPL BCP-MCNC: 3.1 G/DL (ref 3.2–4.9)
ALBUMIN/GLOB SERPL: 1 G/DL
ALP SERPL-CCNC: 77 U/L (ref 30–99)
ALT SERPL-CCNC: 10 U/L (ref 2–50)
ANION GAP SERPL CALC-SCNC: 11 MMOL/L (ref 7–16)
AST SERPL-CCNC: 21 U/L (ref 12–45)
BACTERIA FLD AEROBE CULT: NORMAL
BILIRUB SERPL-MCNC: 1 MG/DL (ref 0.1–1.5)
BUN SERPL-MCNC: 32 MG/DL (ref 8–22)
CALCIUM ALBUM COR SERPL-MCNC: 9.1 MG/DL (ref 8.5–10.5)
CALCIUM SERPL-MCNC: 8.4 MG/DL (ref 8.5–10.5)
CHLORIDE SERPL-SCNC: 96 MMOL/L (ref 96–112)
CO2 SERPL-SCNC: 27 MMOL/L (ref 20–33)
CREAT SERPL-MCNC: 4.93 MG/DL (ref 0.5–1.4)
ERYTHROCYTE [DISTWIDTH] IN BLOOD BY AUTOMATED COUNT: 61.4 FL (ref 35.9–50)
GFR SERPLBLD CREATININE-BSD FMLA CKD-EPI: 12 ML/MIN/1.73 M 2
GLOBULIN SER CALC-MCNC: 3.2 G/DL (ref 1.9–3.5)
GLUCOSE SERPL-MCNC: 154 MG/DL (ref 65–99)
GRAM STN SPEC: NORMAL
HCT VFR BLD AUTO: 34.7 % (ref 42–52)
HGB BLD-MCNC: 11.5 G/DL (ref 14–18)
LDH SERPL L TO P-CCNC: 319 U/L (ref 107–266)
MCH RBC QN AUTO: 32.9 PG (ref 27–33)
MCHC RBC AUTO-ENTMCNC: 33.1 G/DL (ref 32.3–36.5)
MCV RBC AUTO: 99.1 FL (ref 81.4–97.8)
PHOSPHATE SERPL-MCNC: 2.6 MG/DL (ref 2.5–4.5)
PLATELET # BLD AUTO: 136 K/UL (ref 164–446)
PMV BLD AUTO: 12.9 FL (ref 9–12.9)
POTASSIUM SERPL-SCNC: 4.1 MMOL/L (ref 3.6–5.5)
PRELIMINARY RPT Q0601: NORMAL
PROT SERPL-MCNC: 6.3 G/DL (ref 6–8.2)
RBC # BLD AUTO: 3.5 M/UL (ref 4.7–6.1)
RHODAMINE-AURAMINE STN SPEC: NORMAL
SIGNIFICANT IND 70042: NORMAL
SITE SITE: NORMAL
SODIUM SERPL-SCNC: 134 MMOL/L (ref 135–145)
SOURCE SOURCE: NORMAL
WBC # BLD AUTO: 8 K/UL (ref 4.8–10.8)

## 2025-08-26 PROCEDURE — 36415 COLL VENOUS BLD VENIPUNCTURE: CPT

## 2025-08-26 PROCEDURE — 85027 COMPLETE CBC AUTOMATED: CPT

## 2025-08-26 PROCEDURE — 700102 HCHG RX REV CODE 250 W/ 637 OVERRIDE(OP): Performed by: INTERNAL MEDICINE

## 2025-08-26 PROCEDURE — 80053 COMPREHEN METABOLIC PANEL: CPT

## 2025-08-26 PROCEDURE — 700111 HCHG RX REV CODE 636 W/ 250 OVERRIDE (IP): Mod: JZ | Performed by: INTERNAL MEDICINE

## 2025-08-26 PROCEDURE — 83615 LACTATE (LD) (LDH) ENZYME: CPT

## 2025-08-26 PROCEDURE — 770020 HCHG ROOM/CARE - TELE (206)

## 2025-08-26 PROCEDURE — A9270 NON-COVERED ITEM OR SERVICE: HCPCS | Performed by: INTERNAL MEDICINE

## 2025-08-26 PROCEDURE — 84100 ASSAY OF PHOSPHORUS: CPT

## 2025-08-26 RX ADMIN — MIDODRINE HYDROCHLORIDE 10 MG: 5 TABLET ORAL at 07:26

## 2025-08-26 RX ADMIN — MIDODRINE HYDROCHLORIDE 10 MG: 5 TABLET ORAL at 11:31

## 2025-08-26 RX ADMIN — HEPARIN SODIUM 5000 UNITS: 5000 INJECTION, SOLUTION INTRAVENOUS; SUBCUTANEOUS at 21:18

## 2025-08-26 RX ADMIN — SEVELAMER CARBONATE 1600 MG: 800 TABLET, FILM COATED ORAL at 07:26

## 2025-08-26 RX ADMIN — FOLIC ACID 1 MG: 1 TABLET ORAL at 04:54

## 2025-08-26 RX ADMIN — MIDODRINE HYDROCHLORIDE 10 MG: 5 TABLET ORAL at 17:00

## 2025-08-26 RX ADMIN — SEVELAMER CARBONATE 1600 MG: 800 TABLET, FILM COATED ORAL at 11:32

## 2025-08-26 RX ADMIN — SEVELAMER CARBONATE 1600 MG: 800 TABLET, FILM COATED ORAL at 17:01

## 2025-08-26 RX ADMIN — HEPARIN SODIUM 5000 UNITS: 5000 INJECTION, SOLUTION INTRAVENOUS; SUBCUTANEOUS at 13:38

## 2025-08-26 RX ADMIN — HEPARIN SODIUM 5000 UNITS: 5000 INJECTION, SOLUTION INTRAVENOUS; SUBCUTANEOUS at 04:54

## 2025-08-26 RX ADMIN — FERROUS SULFATE TAB 325 MG (65 MG ELEMENTAL FE) 325 MG: 325 (65 FE) TAB at 04:54

## 2025-08-26 RX ADMIN — ATORVASTATIN CALCIUM 20 MG: 20 TABLET, FILM COATED ORAL at 04:54

## 2025-08-26 ASSESSMENT — ENCOUNTER SYMPTOMS
NAUSEA: 0
VOMITING: 0
FLANK PAIN: 0
HEARTBURN: 0
FEVER: 0
HEMOPTYSIS: 0
DIARRHEA: 0
FOCAL WEAKNESS: 0
ABDOMINAL PAIN: 0
BLURRED VISION: 0
CHILLS: 0
SHORTNESS OF BREATH: 0
DIZZINESS: 0
PALPITATIONS: 0

## 2025-08-26 ASSESSMENT — COGNITIVE AND FUNCTIONAL STATUS - GENERAL
CLIMB 3 TO 5 STEPS WITH RAILING: A LITTLE
MOBILITY SCORE: 22
SUGGESTED CMS G CODE MODIFIER MOBILITY: CJ
SUGGESTED CMS G CODE MODIFIER DAILY ACTIVITY: CH
STANDING UP FROM CHAIR USING ARMS: A LITTLE
DAILY ACTIVITIY SCORE: 24

## 2025-08-26 ASSESSMENT — PAIN DESCRIPTION - PAIN TYPE
TYPE: ACUTE PAIN
TYPE: ACUTE PAIN

## 2025-08-26 ASSESSMENT — FIBROSIS 4 INDEX: FIB4 SCORE: 3.61

## 2025-08-27 VITALS
TEMPERATURE: 97.3 F | RESPIRATION RATE: 18 BRPM | BODY MASS INDEX: 22.42 KG/M2 | SYSTOLIC BLOOD PRESSURE: 102 MMHG | DIASTOLIC BLOOD PRESSURE: 51 MMHG | OXYGEN SATURATION: 98 % | HEART RATE: 72 BPM | HEIGHT: 68 IN | WEIGHT: 147.93 LBS

## 2025-08-27 PROBLEM — N18.6 ANEMIA DUE TO END STAGE RENAL DISEASE (HCC): Status: ACTIVE | Noted: 2025-08-27

## 2025-08-27 PROBLEM — I95.0 IDIOPATHIC HYPOTENSION: Status: ACTIVE | Noted: 2025-08-25

## 2025-08-27 PROBLEM — D69.6 THROMBOCYTOPENIA (HCC): Status: ACTIVE | Noted: 2025-08-27

## 2025-08-27 PROBLEM — D63.1 ANEMIA DUE TO END STAGE RENAL DISEASE (HCC): Status: ACTIVE | Noted: 2025-08-27

## 2025-08-27 PROBLEM — R04.0 EPISTAXIS: Status: RESOLVED | Noted: 2024-05-31 | Resolved: 2025-08-27

## 2025-08-27 LAB
ALBUMIN SERPL BCP-MCNC: 3.3 G/DL (ref 3.2–4.9)
ALBUMIN/GLOB SERPL: 1 G/DL
ALP SERPL-CCNC: 84 U/L (ref 30–99)
ALT SERPL-CCNC: 10 U/L (ref 2–50)
ANION GAP SERPL CALC-SCNC: 14 MMOL/L (ref 7–16)
AST SERPL-CCNC: 15 U/L (ref 12–45)
BILIRUB SERPL-MCNC: 1 MG/DL (ref 0.1–1.5)
BUN SERPL-MCNC: 47 MG/DL (ref 8–22)
CALCIUM ALBUM COR SERPL-MCNC: 9.7 MG/DL (ref 8.5–10.5)
CALCIUM SERPL-MCNC: 9.1 MG/DL (ref 8.5–10.5)
CHLORIDE SERPL-SCNC: 96 MMOL/L (ref 96–112)
CO2 SERPL-SCNC: 26 MMOL/L (ref 20–33)
CREAT SERPL-MCNC: 6.7 MG/DL (ref 0.5–1.4)
FUNGUS SPEC CULT: NORMAL
FUNGUS SPEC FUNGUS STN: NORMAL
GFR SERPLBLD CREATININE-BSD FMLA CKD-EPI: 8 ML/MIN/1.73 M 2
GLOBULIN SER CALC-MCNC: 3.4 G/DL (ref 1.9–3.5)
GLUCOSE SERPL-MCNC: 147 MG/DL (ref 65–99)
PHOSPHATE SERPL-MCNC: 3.8 MG/DL (ref 2.5–4.5)
POTASSIUM SERPL-SCNC: 4.4 MMOL/L (ref 3.6–5.5)
PROT SERPL-MCNC: 6.7 G/DL (ref 6–8.2)
SIGNIFICANT IND 70042: NORMAL
SITE SITE: NORMAL
SODIUM SERPL-SCNC: 136 MMOL/L (ref 135–145)
SOURCE SOURCE: NORMAL

## 2025-08-27 PROCEDURE — 700102 HCHG RX REV CODE 250 W/ 637 OVERRIDE(OP): Performed by: INTERNAL MEDICINE

## 2025-08-27 PROCEDURE — 80053 COMPREHEN METABOLIC PANEL: CPT

## 2025-08-27 PROCEDURE — A9270 NON-COVERED ITEM OR SERVICE: HCPCS | Performed by: INTERNAL MEDICINE

## 2025-08-27 PROCEDURE — 700111 HCHG RX REV CODE 636 W/ 250 OVERRIDE (IP): Performed by: INTERNAL MEDICINE

## 2025-08-27 PROCEDURE — 84100 ASSAY OF PHOSPHORUS: CPT

## 2025-08-27 RX ORDER — MIDODRINE HYDROCHLORIDE 10 MG/1
10 TABLET ORAL
Qty: 90 TABLET | Refills: 2 | Status: SHIPPED | OUTPATIENT
Start: 2025-08-27

## 2025-08-27 RX ADMIN — MIDODRINE HYDROCHLORIDE 10 MG: 5 TABLET ORAL at 10:57

## 2025-08-27 RX ADMIN — HEPARIN SODIUM 5000 UNITS: 5000 INJECTION, SOLUTION INTRAVENOUS; SUBCUTANEOUS at 04:44

## 2025-08-27 RX ADMIN — FERROUS SULFATE TAB 325 MG (65 MG ELEMENTAL FE) 325 MG: 325 (65 FE) TAB at 04:44

## 2025-08-27 RX ADMIN — SEVELAMER CARBONATE 1600 MG: 800 TABLET, FILM COATED ORAL at 08:09

## 2025-08-27 RX ADMIN — ATORVASTATIN CALCIUM 20 MG: 20 TABLET, FILM COATED ORAL at 04:44

## 2025-08-27 RX ADMIN — FOLIC ACID 1 MG: 1 TABLET ORAL at 04:44

## 2025-08-27 RX ADMIN — LIDOCAINE HYDROCHLORIDE 1 ML: 10 INJECTION, SOLUTION INFILTRATION; PERINEURAL at 10:00

## 2025-08-27 RX ADMIN — MIDODRINE HYDROCHLORIDE 10 MG: 5 TABLET ORAL at 08:11

## 2025-08-27 RX ADMIN — HEPARIN SODIUM 5000 UNITS: 5000 INJECTION, SOLUTION INTRAVENOUS; SUBCUTANEOUS at 14:00

## 2025-08-27 RX ADMIN — MIDODRINE HYDROCHLORIDE 10 MG: 5 TABLET ORAL at 17:14

## 2025-08-27 ASSESSMENT — FIBROSIS 4 INDEX: FIB4 SCORE: 2.58

## 2025-08-27 ASSESSMENT — PAIN DESCRIPTION - PAIN TYPE: TYPE: ACUTE PAIN

## 2025-08-28 ENCOUNTER — TELEPHONE (OUTPATIENT)
Dept: CARDIOLOGY | Facility: MEDICAL CENTER | Age: 75
End: 2025-08-28
Payer: MEDICARE

## 2025-08-28 DIAGNOSIS — I35.0 SEVERE AORTIC STENOSIS: Primary | ICD-10-CM

## 2025-08-28 DIAGNOSIS — Z01.810 PRE-PROCEDURAL CARDIOVASCULAR EXAMINATION: ICD-10-CM

## (undated) DEVICE — LACTATED RINGERS INJ 1000 ML - (14EA/CA 60CA/PF)

## (undated) DEVICE — CANISTER SUCTION 3000ML MECHANICAL FILTER AUTO SHUTOFF MEDI-VAC NONSTERILE LF DISP  (40EA/CA)

## (undated) DEVICE — TUBING CLEARLINK DUO-VENT - C-FLO (48EA/CA)

## (undated) DEVICE — SET EXTENSION WITH 2 PORTS (48EA/CA) ***PART #2C8610 IS A SUBSTITUTE*****

## (undated) DEVICE — SOLUTION, IODINE, SCRUB 10%

## (undated) DEVICE — PACK ENT OR - (2EA/CA)

## (undated) DEVICE — ELECTRODE DUAL RETURN W/ CORD - (50/PK)

## (undated) DEVICE — COAGULATOR SUCTION L3 MR OD8 FR FOOTSWITCH CABLE FLEXIBLE SHAFT STERILE DISPOSABLE (10EA/BX)

## (undated) DEVICE — SUCTION INSTRUMENT YANKAUER BULBOUS TIP W/O VENT (50EA/CA)

## (undated) DEVICE — COVER LIGHT HANDLE ALC PLUS DISP (18EA/BX)

## (undated) DEVICE — SODIUM CHL IRRIGATION 0.9% 1000ML (12EA/CA)

## (undated) DEVICE — SENSOR OXIMETER ADULT SPO2 RD SET (20EA/BX)

## (undated) DEVICE — GOWN WARMING STANDARD FLEX - (30/CA)

## (undated) DEVICE — SET LEADWIRE 5 LEAD BEDSIDE DISPOSABLE ECG (1SET OF 5/EA)